# Patient Record
Sex: FEMALE | Race: WHITE | Employment: OTHER | URBAN - METROPOLITAN AREA
[De-identification: names, ages, dates, MRNs, and addresses within clinical notes are randomized per-mention and may not be internally consistent; named-entity substitution may affect disease eponyms.]

---

## 2017-04-25 ENCOUNTER — OFFICE VISIT (OUTPATIENT)
Dept: CARDIOLOGY CLINIC | Age: 77
End: 2017-04-25

## 2017-04-25 VITALS
OXYGEN SATURATION: 96 % | HEART RATE: 62 BPM | SYSTOLIC BLOOD PRESSURE: 132 MMHG | DIASTOLIC BLOOD PRESSURE: 86 MMHG | WEIGHT: 186.6 LBS | BODY MASS INDEX: 29.99 KG/M2 | HEIGHT: 66 IN

## 2017-04-25 DIAGNOSIS — I10 ESSENTIAL HYPERTENSION: Primary | ICD-10-CM

## 2017-04-25 NOTE — MR AVS SNAPSHOT
Visit Information Date & Time Provider Department Dept. Phone Encounter #  
 4/25/2017 11:20 AM Irvin Wood MD [facility] [phone] [id number] Upcoming Health Maintenance Date Due Pneumococcal 65+ High/Highest Risk (2 of 2 - PPSV23) 3/14/2013 MEDICARE YEARLY EXAM 6/2/2016 INFLUENZA AGE 9 TO ADULT 8/1/2016 GLAUCOMA SCREENING Q2Y 6/2/2017 DTaP/Tdap/Td series (2 - Td) 12/14/2017 Allergies as of 4/25/2017  Review Complete On: 4/25/2017 By: Taras Cheadle, LPN Severity Noted Reaction Type Reactions Betadine [Povidone-iodine]  04/25/2017    Rash Current Immunizations  Reviewed on 1/15/2015 Name Date Hep A Vaccine 1/1/2009 Hep B Vaccine 1/1/2009 Influenza High Dose Vaccine PF 9/30/2014 Pneumococcal Vaccine (Unspecified Type) 3/14/2008 Tdap 12/14/2007 Typhoid Vaccine 1/28/2009 Zoster Vaccine, Live 1/8/2008 Not reviewed this visit You Were Diagnosed With   
  
 Codes Comments Essential hypertension    -  Primary ICD-10-CM: I10 
ICD-9-CM: 401.9 Vitals BP Pulse Height(growth percentile) Weight(growth percentile) SpO2 BMI  
 132/86 (BP 1 Location: Left arm, BP Patient Position: Sitting) 62 5' 6\" (1.676 m) 186 lb 9.6 oz (84.6 kg) 96% 30.12 kg/m2 OB Status Smoking Status Postmenopausal Never Smoker BMI and BSA Data Body Mass Index Body Surface Area  
 30.12 kg/m 2 1.98 m 2 Preferred Pharmacy Pharmacy Name Phone [facility] [phone] Your Updated Medication List  
  
   
This list is accurate as of: 4/25/17 12:18 PM.  Always use your most recent med list.  
  
  
  
  
 albuterol 90 mcg/actuation inhaler Commonly known as:  PROVENTIL HFA, VENTOLIN HFA, PROAIR HFA Take 1 Puff by inhalation every four (4) hours as needed. aspirin delayed-release 81 mg tablet Take  by mouth daily. atorvastatin 20 mg tablet Commonly known as:  LIPITOR Take 1 Tab by mouth daily. carvedilol 3.125 mg tablet Commonly known as:  Drema Cali Take 1 Tab by mouth two (2) times daily (with meals). cholecalciferol (vitamin D3) 2,000 unit Tab Take 4,000 Units by mouth daily. clobetasol 0.05 % ointment Commonly known as:  Levorn Kirkland Apply 1 Each to affected area daily. EPINEPHrine 0.3 mg/0.3 mL injection Commonly known as:  EPIPEN  
0.3 mL by IntraMUSCular route once as needed for up to 1 dose. estradiol 0.01 % (0.1 mg/gram) vaginal cream  
Commonly known as:  ESTRACE Insert  into vagina. fluticasone 50 mcg/actuation nasal spray Commonly known as:  Annalisa Doug 2 Sprays by Both Nostrils route daily. fluticasone-salmeterol 250-50 mcg/dose diskus inhaler Commonly known as:  ADVAIR Take 1 Puff by inhalation two (2) times a day. Must rinse mouth after use  
  
 ibuprofen 600 mg tablet Commonly known as:  MOTRIN Take 200 mg by mouth as needed. multivitamin tablet Commonly known as:  ONE A DAY Take 1 Tab by mouth daily. OTHER Vi active daily  
  
 raloxifene 60 mg tablet Commonly known as:  EVISTA Take 1 Tab by mouth daily. SALINE NASAL MIST NA  
by Nasal route daily. SINGULAIR 10 mg tablet Generic drug:  montelukast  
Take 10 mg by mouth daily. SUDAFED PO Take  by mouth as needed. ZyrTEC 10 mg Cap Generic drug:  Cetirizine Take  by mouth daily. We Performed the Following AMB POC EKG ROUTINE W/ 12 LEADS, INTER & REP [75728 CPT(R)] Patient Instructions Follow up with Arnie Linton in 1 year Introducing [facility]! Dear Christin Erickson: Thank you for requesting a Socialite account. Our records indicate that you already have an active Socialite account. You can access your account anytime at [email or web address] Did you know that you can access your hospital and ER discharge instructions at any time in [employer]Suite? You can also review all of your test results from your hospital stay or ER visit. Additional Information If you have questions, please visit the Frequently Asked Questions section of the [employer] website at [email or web address] Remember, SpiderSuite is NOT to be used for urgent needs. For medical emergencies, dial 911. Now available from your iPhone and Android! Please provide this summary of care documentation to your next provider. Your primary care clinician is listed as Jordon Asif. If you have any questions after today's visit, please call [phone].

## 2017-04-25 NOTE — PROGRESS NOTES
HISTORY OF PRESENT ILLNESS  Jorge Smith is a 68 y.o. female. Patient with h/o HTN, HLD, Ca score of 0 in 2012, PVCs on ECG on 1/16 and echo on 1/16 with EF 55% mild MR. S/p LTKR in 1/16(seen by Dr. Altaf Mcmillan)  Also remote h/o breast ca s/p mastectomy in 1980 but no chemo or xrt  Here for follow up  Echo on 4/16:Ejection fraction was estimated to be 55 %. There were no  regional wall motion abnormalities. Wall thickness was at the upper limits  of normal.    Left atrium: The atrium was mildly dilated. Mitral valve: There was mild regurgitation. Aortic valve: Leaflets exhibited sclerosis. holter on 4/16: NSR  frequent pacs couplets triples and occasional non sustained at, frequent pvcs (0.65%)         Past Medical History   Diagnosis Date    Palpitations         resolved    Dyslipidemia         labs 2008 - chol 283, HDL 83, ,     HTN (hypertension)      Breast cancer ([facility] [address] 1980    Asthma         dr. Flores Connors allergist    Hyperlipidemia LDL goal < 130         for increased LDL particles, Dr. Stacie Senior             Past Surgical History   Procedure Laterality Date    Echo 2d adult   5/5/2008       normal, LVEF 60%    Stress test cardiolite   1/5/12       walked 7:31, no chest pain, normal MPI.  Ct heart w/o cont with calcium   1/2012       CAC score 0; calcified mediastinal lymph nodes consistent granulomatous disease    Hx urological   8/1/14       Urodynamics    Hx gyn        Hx hysterectomy Bilateral 03/19/2015        HPI  Doing great no complaints at all  Review of Systems   Respiratory: Negative. Cardiovascular: Negative. Physical Exam  Physical Exam   Blood pressure 132/86, pulse 62, height 5' 6\" (1.676 m), weight 84.6 kg (186 lb 9.6 oz), SpO2 96 %. Constitutional: She is oriented to person, place, and time. She appears well-developed and well-nourished. No distress. HENT: Head: Normocephalic. Eyes: No scleral icterus.  Neck: Normal range of motion. Neck supple. No JVD present. No tracheal deviation present. Cardiovascular: Normal rate, regular rhythm, normal heart sounds and intact distal pulses. Exam reveals no gallop and no friction rub. No murmur heard. Pulmonary/Chest: Effort normal and breath sounds normal. No stridor. No respiratory distress, wheezes or  rales. Abdominal: She exhibits no distension. Musculoskeletal: She exhibits no edema. Neurological: She is alert and oriented to person, place, and time. Coordination normal.   Skin: Skin is warm. No rash noted. Not diaphoretic. No erythema. Psychiatric:  Normal mood and affect. Behavior is normal.   Current Outpatient Prescriptions on File Prior to Visit   Medication Sig Dispense Refill    estradiol (ESTRACE) 0.01 % (0.1 mg/gram) vaginal cream Insert  into vagina.  OTHER Vi active daily      multivitamin (ONE A DAY) tablet Take 1 Tab by mouth daily.  cholecalciferol, vitamin D3, 2,000 unit tab Take 4,000 Units by mouth daily.  atorvastatin (LIPITOR) 20 mg tablet Take 1 Tab by mouth daily. 90 Tab 3    albuterol (PROVENTIL HFA, VENTOLIN HFA, PROAIR HFA) 90 mcg/actuation inhaler Take 1 Puff by inhalation every four (4) hours as needed. 2 Inhaler 3    fluticasone-salmeterol (ADVAIR) 250-50 mcg/dose diskus inhaler Take 1 Puff by inhalation two (2) times a day. Must rinse mouth after use 2 Inhaler 3    fluticasone (FLONASE) 50 mcg/actuation nasal spray 2 Sprays by Both Nostrils route daily. 2 Bottle 6    raloxifene (EVISTA) 60 mg tablet Take 1 Tab by mouth daily. 90 Tab 4    montelukast (SINGULAIR) 10 mg tablet Take 10 mg by mouth daily.  Cetirizine (ZYRTEC) 10 mg Cap Take  by mouth daily.  aspirin delayed-release 81 mg tablet Take  by mouth daily.  SODIUM CHLORIDE (SALINE NASAL MIST NA) by Nasal route daily.  PSEUDOEPHEDRINE HCL (SUDAFED PO) Take  by mouth as needed.       carvedilol (COREG) 3.125 mg tablet Take 1 Tab by mouth two (2) times daily (with meals). 180 Tab 3    EPINEPHrine (EPIPEN) 0.3 mg/0.3 mL (1:1,000) injection 0.3 mL by IntraMUSCular route once as needed for up to 1 dose. 0.3 mL 0    clobetasol (TEMOVATE) 0.05 % ointment Apply 1 Each to affected area daily. 0    ibuprofen (MOTRIN) 600 mg tablet Take 200 mg by mouth as needed. 0     No current facility-administered medications on file prior to visit. Lab Results   Component Value Date/Time    Sodium 139 04/20/2016 02:04 PM    Potassium 4.1 04/20/2016 02:04 PM    Chloride 98 04/20/2016 02:04 PM    CO2 25 04/20/2016 02:04 PM    Glucose 111 04/20/2016 02:04 PM    BUN 19 04/20/2016 02:04 PM    Creatinine 0.64 04/20/2016 02:04 PM    BUN/Creatinine ratio 30 04/20/2016 02:04 PM    GFR est  04/20/2016 02:04 PM    GFR est non-AA 88 04/20/2016 02:04 PM    Calcium 9.1 04/20/2016 02:04 PM     Lab Results   Component Value Date/Time    TSH 3.030 04/20/2016 02:04 PM       ASSESSMENT and PLAN  Palpitations: very seldom she tells me. She actually never started coreg and has continued hctz all along. Her ECG today shows NSR and no significant st t changes. Discussed results of previous echo and holter.  No additional interventions at this time  HTN:normal  encouraged to drink oj or eat banana to replete potassium  HLD: closely followed by her PCP  See her back in 1 year unless of occurring symptoms

## 2018-03-27 ENCOUNTER — HOSPITAL ENCOUNTER (INPATIENT)
Age: 78
LOS: 1 days | Discharge: HOME | DRG: 310 | End: 2018-03-28
Attending: STUDENT IN AN ORGANIZED HEALTH CARE EDUCATION/TRAINING PROGRAM | Admitting: FAMILY MEDICINE
Payer: MEDICARE

## 2018-03-27 DIAGNOSIS — I48.91 ATRIAL FIBRILLATION WITH RVR (HCC): Primary | ICD-10-CM

## 2018-03-27 DIAGNOSIS — R77.8 ELEVATED TROPONIN: ICD-10-CM

## 2018-03-27 PROBLEM — R42 DIZZINESS: Status: ACTIVE | Noted: 2018-03-27

## 2018-03-27 LAB
ALBUMIN SERPL-MCNC: 3.5 G/DL (ref 3.5–5)
ALBUMIN/GLOB SERPL: 1.1 {RATIO} (ref 1.1–2.2)
ALP SERPL-CCNC: 64 U/L (ref 45–117)
ALT SERPL-CCNC: 28 U/L (ref 12–78)
ANION GAP SERPL CALC-SCNC: 10 MMOL/L (ref 5–15)
AST SERPL-CCNC: 22 U/L (ref 15–37)
BASOPHILS # BLD: 0.1 K/UL (ref 0–0.1)
BASOPHILS NFR BLD: 1 % (ref 0–1)
BILIRUB SERPL-MCNC: 0.3 MG/DL (ref 0.2–1)
BUN SERPL-MCNC: 20 MG/DL (ref 6–20)
BUN/CREAT SERPL: 25 (ref 12–20)
CALCIUM SERPL-MCNC: 8.6 MG/DL (ref 8.5–10.1)
CHLORIDE SERPL-SCNC: 106 MMOL/L (ref 97–108)
CK SERPL-CCNC: 79 U/L (ref 26–192)
CO2 SERPL-SCNC: 26 MMOL/L (ref 21–32)
CREAT SERPL-MCNC: 0.8 MG/DL (ref 0.55–1.02)
DIFFERENTIAL METHOD BLD: NORMAL
EOSINOPHIL # BLD: 0.2 K/UL (ref 0–0.4)
EOSINOPHIL NFR BLD: 2 % (ref 0–7)
ERYTHROCYTE [DISTWIDTH] IN BLOOD BY AUTOMATED COUNT: 13.2 % (ref 11.5–14.5)
GLOBULIN SER CALC-MCNC: 3.1 G/DL (ref 2–4)
GLUCOSE SERPL-MCNC: 154 MG/DL (ref 65–100)
HCT VFR BLD AUTO: 38.5 % (ref 35–47)
HGB BLD-MCNC: 13.1 G/DL (ref 11.5–16)
IMM GRANULOCYTES # BLD: 0 K/UL (ref 0–0.04)
IMM GRANULOCYTES NFR BLD AUTO: 0 % (ref 0–0.5)
LYMPHOCYTES # BLD: 2.4 K/UL (ref 0.8–3.5)
LYMPHOCYTES NFR BLD: 25 % (ref 12–49)
MCH RBC QN AUTO: 32 PG (ref 26–34)
MCHC RBC AUTO-ENTMCNC: 34 G/DL (ref 30–36.5)
MCV RBC AUTO: 94.1 FL (ref 80–99)
MONOCYTES # BLD: 0.6 K/UL (ref 0–1)
MONOCYTES NFR BLD: 7 % (ref 5–13)
NEUTS SEG # BLD: 6.1 K/UL (ref 1.8–8)
NEUTS SEG NFR BLD: 65 % (ref 32–75)
NRBC # BLD: 0 K/UL (ref 0–0.01)
NRBC BLD-RTO: 0 PER 100 WBC
PLATELET # BLD AUTO: 195 K/UL (ref 150–400)
PMV BLD AUTO: 12 FL (ref 8.9–12.9)
POTASSIUM SERPL-SCNC: 3.4 MMOL/L (ref 3.5–5.1)
PROT SERPL-MCNC: 6.6 G/DL (ref 6.4–8.2)
RBC # BLD AUTO: 4.09 M/UL (ref 3.8–5.2)
SODIUM SERPL-SCNC: 142 MMOL/L (ref 136–145)
TROPONIN I SERPL-MCNC: 0.07 NG/ML
WBC # BLD AUTO: 9.4 K/UL (ref 3.6–11)

## 2018-03-27 PROCEDURE — 74011250636 HC RX REV CODE- 250/636: Performed by: STUDENT IN AN ORGANIZED HEALTH CARE EDUCATION/TRAINING PROGRAM

## 2018-03-27 PROCEDURE — 93005 ELECTROCARDIOGRAM TRACING: CPT

## 2018-03-27 PROCEDURE — 99285 EMERGENCY DEPT VISIT HI MDM: CPT

## 2018-03-27 PROCEDURE — 74011000250 HC RX REV CODE- 250: Performed by: STUDENT IN AN ORGANIZED HEALTH CARE EDUCATION/TRAINING PROGRAM

## 2018-03-27 PROCEDURE — 96374 THER/PROPH/DIAG INJ IV PUSH: CPT

## 2018-03-27 PROCEDURE — 80053 COMPREHEN METABOLIC PANEL: CPT | Performed by: STUDENT IN AN ORGANIZED HEALTH CARE EDUCATION/TRAINING PROGRAM

## 2018-03-27 PROCEDURE — 36415 COLL VENOUS BLD VENIPUNCTURE: CPT | Performed by: STUDENT IN AN ORGANIZED HEALTH CARE EDUCATION/TRAINING PROGRAM

## 2018-03-27 PROCEDURE — 85025 COMPLETE CBC W/AUTO DIFF WBC: CPT | Performed by: STUDENT IN AN ORGANIZED HEALTH CARE EDUCATION/TRAINING PROGRAM

## 2018-03-27 PROCEDURE — 96361 HYDRATE IV INFUSION ADD-ON: CPT

## 2018-03-27 PROCEDURE — 5A2204Z RESTORATION OF CARDIAC RHYTHM, SINGLE: ICD-10-PCS | Performed by: FAMILY MEDICINE

## 2018-03-27 PROCEDURE — 82550 ASSAY OF CK (CPK): CPT | Performed by: STUDENT IN AN ORGANIZED HEALTH CARE EDUCATION/TRAINING PROGRAM

## 2018-03-27 PROCEDURE — 65660000000 HC RM CCU STEPDOWN

## 2018-03-27 PROCEDURE — 74011250637 HC RX REV CODE- 250/637: Performed by: FAMILY MEDICINE

## 2018-03-27 PROCEDURE — 84484 ASSAY OF TROPONIN QUANT: CPT | Performed by: STUDENT IN AN ORGANIZED HEALTH CARE EDUCATION/TRAINING PROGRAM

## 2018-03-27 RX ORDER — ATORVASTATIN CALCIUM 20 MG/1
20 TABLET, FILM COATED ORAL DAILY
Status: DISCONTINUED | OUTPATIENT
Start: 2018-03-28 | End: 2018-03-28 | Stop reason: HOSPADM

## 2018-03-27 RX ORDER — POTASSIUM CHLORIDE 750 MG/1
40 TABLET, FILM COATED, EXTENDED RELEASE ORAL
Status: COMPLETED | OUTPATIENT
Start: 2018-03-27 | End: 2018-03-27

## 2018-03-27 RX ORDER — MELATONIN
4000 DAILY
Status: DISCONTINUED | OUTPATIENT
Start: 2018-03-28 | End: 2018-03-28 | Stop reason: HOSPADM

## 2018-03-27 RX ORDER — RALOXIFENE HYDROCHLORIDE 60 MG/1
60 TABLET, FILM COATED ORAL DAILY
Status: DISCONTINUED | OUTPATIENT
Start: 2018-03-28 | End: 2018-03-28 | Stop reason: HOSPADM

## 2018-03-27 RX ORDER — CETIRIZINE HCL 10 MG
10 TABLET ORAL DAILY
Status: DISCONTINUED | OUTPATIENT
Start: 2018-03-28 | End: 2018-03-28 | Stop reason: HOSPADM

## 2018-03-27 RX ORDER — FLUTICASONE PROPIONATE AND SALMETEROL 250; 50 UG/1; UG/1
1 POWDER RESPIRATORY (INHALATION)
Status: ON HOLD | COMMUNITY
End: 2019-05-10

## 2018-03-27 RX ORDER — ASPIRIN 81 MG/1
81 TABLET ORAL DAILY
Status: DISCONTINUED | OUTPATIENT
Start: 2018-03-28 | End: 2018-03-28 | Stop reason: HOSPADM

## 2018-03-27 RX ORDER — LABETALOL HYDROCHLORIDE 5 MG/ML
20 INJECTION, SOLUTION INTRAVENOUS ONCE
Status: COMPLETED | OUTPATIENT
Start: 2018-03-27 | End: 2018-03-27

## 2018-03-27 RX ORDER — SODIUM CHLORIDE 0.9 % (FLUSH) 0.9 %
5-10 SYRINGE (ML) INJECTION EVERY 8 HOURS
Status: DISCONTINUED | OUTPATIENT
Start: 2018-03-27 | End: 2018-03-28 | Stop reason: HOSPADM

## 2018-03-27 RX ORDER — HYDROCHLOROTHIAZIDE 12.5 MG/1
12.5 TABLET ORAL DAILY
COMMUNITY
End: 2018-03-28

## 2018-03-27 RX ORDER — MONTELUKAST SODIUM 10 MG/1
10 TABLET ORAL DAILY
Status: DISCONTINUED | OUTPATIENT
Start: 2018-03-28 | End: 2018-03-28 | Stop reason: HOSPADM

## 2018-03-27 RX ORDER — ESTRADIOL 0.1 MG/G
1 CREAM VAGINAL
COMMUNITY

## 2018-03-27 RX ORDER — HYDROCHLOROTHIAZIDE 25 MG/1
12.5 TABLET ORAL DAILY
Status: DISCONTINUED | OUTPATIENT
Start: 2018-03-28 | End: 2018-03-28

## 2018-03-27 RX ORDER — SODIUM CHLORIDE 0.9 % (FLUSH) 0.9 %
5-10 SYRINGE (ML) INJECTION AS NEEDED
Status: DISCONTINUED | OUTPATIENT
Start: 2018-03-27 | End: 2018-03-28 | Stop reason: HOSPADM

## 2018-03-27 RX ORDER — CETIRIZINE HCL 10 MG
10 TABLET ORAL EVERY EVENING
COMMUNITY

## 2018-03-27 RX ADMIN — LABETALOL HYDROCHLORIDE 20 MG: 5 INJECTION INTRAVENOUS at 22:33

## 2018-03-27 RX ADMIN — SODIUM CHLORIDE 1000 ML: 900 INJECTION, SOLUTION INTRAVENOUS at 22:30

## 2018-03-27 RX ADMIN — POTASSIUM CHLORIDE 40 MEQ: 750 TABLET, EXTENDED RELEASE ORAL at 23:44

## 2018-03-27 NOTE — IP AVS SNAPSHOT
[address] 
[phone] Patient: Jacqueline Ambrose MRN: [id number] About your hospitalization You were admitted on:  March 27, 2018 You last received care in the:  [facility] 4 SURG/BARIATRICS You were discharged on:  March 28, 2018 Why you were hospitalized Your primary diagnosis was:  A-Fib (Hcc) Your diagnoses also included:  Dizziness, Encounter For Cardioversion Procedure Follow-up Information Follow up With Details Comments Contact Info Sergei Carrera MD Go on 4/10/2018 2 pm for follow up visit- start Eliquis and toprol and [address] [facility] [address] [facility] 
[phone] Diogenes Villasenor MD Schedule an appointment as soon as possible for a visit on 4/3/2018 As needed  - [facility] hospital f/u appointment on Tuesday, 4/3/18 @ 10:00 a.m. 17[address] [facility] [address] [facility] [facility] 
[phone] Your Scheduled Appointments Tuesday April 10, 2018  2:00 PM EDT  
ESTABLISHED PATIENT with Sergei Carrera MD  
[facility] ([address] [place] [address]) [address]  [address] [facility]  
[phone] Wednesday April 25, 2018 11:20 AM EDT  
ESTABLISHED PATIENT with Gillian Singh MD  
[facility] ([address] [place] [address]) [address] [facility]  
[phone] Discharge Orders None A check kamran indicates which time of day the medication should be taken. My Medications START taking these medications Instructions Each Dose to Equal  
 Morning Noon Evening Bedtime  
 apixaban 5 mg tablet Commonly known as:  Chante Grad Your last dose was: Your next dose is: Take 1 Tab by mouth two (2) times a day. 5 mg  
    
   
   
   
  
 flecainide 100 mg tablet Commonly known as:  TAMBOCOR Your last dose was: Your next dose is: Take 1 Tab by mouth every twelve (12) hours. 100 mg  
    
   
   
   
  
 metoprolol succinate 50 mg XL tablet Commonly known as:  TOPROL-XL Start taking on:  3/29/2018 Your last dose was: Your next dose is: Take 1 Tab by mouth daily. 50 mg CONTINUE taking these medications Instructions Each Dose to Equal  
 Morning Noon Evening Bedtime ADVAIR DISKUS 250-50 mcg/dose diskus inhaler Generic drug:  fluticasone-salmeterol Your last dose was: Your next dose is: Take 1 Puff by inhalation as needed. 1 Puff  
    
   
   
   
  
 albuterol 90 mcg/actuation inhaler Commonly known as:  PROVENTIL HFA, VENTOLIN HFA, PROAIR HFA Your last dose was: Your next dose is: Take 1 Puff by inhalation every four (4) hours as needed. 1 Puff  
    
   
   
   
  
 aspirin delayed-release 81 mg tablet Your last dose was: Your next dose is: Take  by mouth daily. atorvastatin 20 mg tablet Commonly known as:  LIPITOR Your last dose was: Your next dose is: Take 1 Tab by mouth daily. 20 mg  
    
   
   
   
  
 cetirizine 10 mg tablet Commonly known as:  ZYRTEC Your last dose was: Your next dose is: Take 10 mg by mouth daily. 10 mg  
    
   
   
   
  
 cholecalciferol (vitamin D3) 2,000 unit Tab Your last dose was: Your next dose is: Take 4,000 Units by mouth daily. 4000 Units EPINEPHrine 0.3 mg/0.3 mL injection Commonly known as:  Meredith Son Your last dose was: Your next dose is: 0.3 mL by IntraMUSCular route once as needed for up to 1 dose. 0.3 mg  
    
   
   
   
  
 * ESTRACE 0.01 % (0.1 mg/gram) vaginal cream  
Generic drug:  estradiol Your last dose was: Your next dose is: Insert 2 g into vagina every Monday and Thursday. 2 g  
    
   
   
   
  
 * estradiol 0.01 % (0.1 mg/gram) vaginal cream  
Commonly known as:  ESTRACE Your last dose was: Your next dose is: Insert  into vagina. fluticasone 50 mcg/actuation nasal spray Commonly known as:  Bhaveshjese Soliz Your last dose was: Your next dose is: 2 Sprays by Both Nostrils route daily. 2 Spray  
    
   
   
   
  
 multivitamin tablet Commonly known as:  ONE A DAY Your last dose was: Your next dose is: Take 1 Tab by mouth daily. 1 Tab OTHER Your last dose was: Your next dose is:    
   
   
 Vi active daily  
     
   
   
   
  
 raloxifene 60 mg tablet Commonly known as:  EVISTA Your last dose was: Your next dose is: Take 1 Tab by mouth daily. 60 mg  
    
   
   
   
  
 SINGULAIR 10 mg tablet Generic drug:  montelukast  
   
Your last dose was: Your next dose is: Take 10 mg by mouth daily. 10 mg  
    
   
   
   
  
 * Notice: This list has 2 medication(s) that are the same as other medications prescribed for you. Read the directions carefully, and ask your doctor or other care provider to review them with you. STOP taking these medications   
 hydroCHLOROthiazide 12.5 mg tablet Commonly known as:  HYDRODIURIL  
   
  
 SUDAFED PO Where to Get Your Medications Information on where to get these meds will be given to you by the nurse or doctor. ! Ask your nurse or doctor about these medications  
  apixaban 5 mg tablet  
 flecainide 100 mg tablet  
 metoprolol succinate 50 mg XL tablet Discharge Instructions Discharge Instructions PATIENT ID: Rikki Zhao MRN: [id number] YOB: 1940 DATE OF ADMISSION: 3/27/2018  9:27 PM   
DATE OF DISCHARGE: 3/28/2018 PRIMARY CARE PROVIDER: Stella Segundo MD  
 
 
DISCHARGING PHYSICIAN: Estela Albert NP To contact this individual call [phone] and ask the  to page. If unavailable ask to be transferred the Adult Hospitalist Department. DISCHARGE DIAGNOSES Atrial Fibrillation CONSULTATIONS: IP CONSULT TO CARDIOLOGY 
IP CONSULT TO HOSPITALIST 
 
PROCEDURES/SURGERIES: * No surgery found * PENDING TEST RESULTS:  
At the time of discharge the following test results are still pending: na 
 
FOLLOW UP APPOINTMENTS:  
Follow-up Information Follow up With Details Comments Contact Info Yulisa Garcia MD Go on 4/10/2018 2 pm for follow up visit- start Eliquis and toprol and 2450 [facility] [address] [facility] 
[phone] Stella Segundo MD Schedule an appointment as soon as possible for a visit As needed 17[address] [facility] [address] [facility] 
[phone] ADDITIONAL CARE RECOMMENDATIONS: We have prescribed you Toprol XL and Flecainide which control atrial fibrillation. We have prescribed you a blood thinner called Eliquis to prevent complications from atrial fibrillation. Please follow up with Dr. Lilian Urbina as scheduled for you in April. Stop your HCTZ since you are starting these other medications. Discuss this with Dr. Lilian Urbina. Stop your Sudafed as these can cause you to have fast heart rates DIET: resume ACTIVITY: resume WOUND CARE: na 
 
EQUIPMENT needed: prescriptions DISCHARGE MEDICATIONS: 
 See Medication Reconciliation Form · It is important that you take the medication exactly as they are prescribed. · Keep your medication in the bottles provided by the pharmacist and keep a list of the medication names, dosages, and times to be taken in your wallet. · Do not take other medications without consulting your doctor. NOTIFY YOUR PHYSICIAN FOR ANY OF THE FOLLOWING:  
Fever over 101 degrees for 24 hours. Chest pain, shortness of breath, fever, chills, nausea, vomiting, diarrhea, change in mentation, falling, weakness, bleeding. Severe pain or pain not relieved by medications. Or, any other signs or symptoms that you may have questions about. DISPOSITION: 
  Home With: 
 OT  PT  HH  RN  
  
 SNF/Inpatient Rehab/LTAC  
x Independent/assisted living Hospice Other: CDMP Checked:  
Yes x Signed:  
Vincenzo Fernandez NP 
3/28/2018 
3:03 PM 
 
  
Atrial Fibrillation: Care Instructions Your Care Instructions Atrial fibrillation is an irregular and often fast heartbeat. Treating this condition is important for several reasons. It can cause blood clots, which can travel from your heart to your brain and cause a stroke. If you have a fast heartbeat, you may feel lightheaded, dizzy, and weak. An irregular heartbeat can also increase your risk for heart failure. Atrial fibrillation is often the result of another heart condition, such as high blood pressure or coronary artery disease. Making changes to improve your heart condition will help you stay healthy and active. Follow-up care is a key part of your treatment and safety. Be sure to make and go to all appointments, and call your doctor if you are having problems. It's also a good idea to know your test results and keep a list of the medicines you take. How can you care for yourself at home? Medicines ? · Take your medicines exactly as prescribed. Call your doctor if you think you are having a problem with your medicine. You will get more details on the specific medicines your doctor prescribes. ? · If your doctor has given you a blood thinner to prevent a stroke, be sure you get instructions about how to take your medicine safely. Blood thinners can cause serious bleeding problems.   
? · Do not take any vitamins, over-the-counter drugs, or herbal products without talking to your doctor first. ? Lifestyle changes ? · Do not smoke. Smoking can increase your chance of a stroke and heart attack. If you need help quitting, talk to your doctor about stop-smoking programs and medicines. These can increase your chances of quitting for good. ? · Eat a heart-healthy diet. ? · Stay at a healthy weight. Lose weight if you need to.  
? · Limit alcohol to 2 drinks a day for men and 1 drink a day for women. Too much alcohol can cause health problems. ? · Avoid colds and flu. Get a pneumococcal vaccine shot. If you have had one before, ask your doctor whether you need another dose. Get a flu shot every year. If you must be around people with colds or flu, wash your hands often. Activity ? · If your doctor recommends it, get more exercise. Walking is a good choice. Bit by bit, increase the amount you walk every day. Try for at least 30 minutes on most days of the week. You also may want to swim, bike, or do other activities. Your doctor may suggest that you join a cardiac rehabilitation program so that you can have help increasing your physical activity safely. ? · Start light exercise if your doctor says it is okay. Even a small amount will help you get stronger, have more energy, and manage stress. Walking is an easy way to get exercise. Start out by walking a little more than you did in the hospital. Gradually increase the amount you walk. ? · When you exercise, watch for signs that your heart is working too hard. You are pushing too hard if you cannot talk while you are exercising. If you become short of breath or dizzy or have chest pain, sit down and rest immediately. ? · Check your pulse regularly. Place two fingers on the artery at the palm side of your wrist, in line with your thumb. If your heartbeat seems uneven or fast, talk to your doctor. When should you call for help? Call 911 anytime you think you may need emergency care. For example, call if: ? · You have symptoms of a heart attack. These may include: ¨ Chest pain or pressure, or a strange feeling in the chest. 
¨ Sweating. ¨ Shortness of breath. ¨ Nausea or vomiting. ¨ Pain, pressure, or a strange feeling in the back, neck, jaw, or upper belly or in one or both shoulders or arms. ¨ Lightheadedness or sudden weakness. ¨ A fast or irregular heartbeat. After you call 911, the  may tell you to chew 1 adult-strength or 2 to 4 low-dose aspirin. Wait for an ambulance. Do not try to drive yourself. ? · You have symptoms of a stroke. These may include: 
¨ Sudden numbness, tingling, weakness, or loss of movement in your face, arm, or leg, especially on only one side of your body. ¨ Sudden vision changes. ¨ Sudden trouble speaking. ¨ Sudden confusion or trouble understanding simple statements. ¨ Sudden problems with walking or balance. ¨ A sudden, severe headache that is different from past headaches. ? · You passed out (lost consciousness). ?Call your doctor now or seek immediate medical care if: 
? · You have new or increased shortness of breath. ? · You feel dizzy or lightheaded, or you feel like you may faint. ? · Your heart rate becomes irregular. ? · You can feel your heart flutter in your chest or skip heartbeats. Tell your doctor if these symptoms are new or worse. ? Watch closely for changes in your health, and be sure to contact your doctor if you have any problems. Where can you learn more? Go to http://mary-ginger.info/. Enter U020 in the search box to learn more about \"Atrial Fibrillation: Care Instructions. \" Current as of: September 21, 2016 Content Version: 11.4 © 3344-6562 City Voice. Care instructions adapted under license by Woopie (which disclaims liability or warranty for this information).  If you have questions about a medical condition or this instruction, always ask your healthcare professional. [address] any warranty or liability for your use of this information. Learning About Cardioversion What is cardioversion? Cardioversion helps your heart return to a normal rhythm. It treats problems like atrial fibrillation. It is also sometimes used in emergencies. It can correct a fast heartbeat that causes low blood pressure, chest pain, or heart failure. There are two types: · The electrical type uses an electric current. The current enters your body through patches on your chest or back. · The chemical type uses medicines. The medicine is usually put into your arm through a tube called an IV. How is cardioversion done? Your doctor may ask you to take medicines before the treatment. These help prevent blood clots. Your doctor will watch you closely to make sure that there are no problems. Electrical cardioversion The electrical procedure is done in a hospital. You will get medicine to help you relax and control the pain. Your doctor will put patches on your chest or back. The patches send an electric current to your heart. This resets your heart rhythm. The electrical part takes about 5 minutes. But you will probably be in the hospital for 1 to 2 hours. You will need to recover from the effects of the sedative medicine. Chemical cardioversion The chemical procedure is most often done in a hospital. In most cases, the medicine is put into your arm through a tube called an IV. But you may get medicines to take by mouth. You may feel a quick sting or pinch when the IV starts. The procedure usually takes about 4 to 8 hours. What can you expect after cardioversion? · You can usually go home the same day. You will need someone to drive you home. · Your doctor may have you take medicines daily. These help your heart beat normally and prevent blood clots. · After electrical cardioversion, you may have redness where the patches were. This looks and feels like a sunburn. · Abnormal heart rhythms sometimes come back after cardioversion. Follow-up care is a key part of your treatment and safety. Be sure to make and go to all appointments, and call your doctor if you are having problems. It's also a good idea to know your test results and keep a list of the medicines you take. Where can you learn more? Go to http://mary-ginger.info/. Enter H046 in the search box to learn more about \"Learning About Cardioversion. \" Current as of: September 21, 2016 Content Version: 11.4 © 1249-6636 Remind. Care instructions adapted under license by Pix4D (which disclaims liability or warranty for this information). If you have questions about a medical condition or this instruction, always ask your healthcare professional. [place] [address] any warranty or liability for your use of this information. Follow up 4/10/18 at 2:00 pm with Dr Lucho Noble, post cardioversion  
eliquis $ 10 copay card given  toprol and flecainide Stop hydrochlorothiazide Kamelio Announcement We are excited to announce that we are making your provider's discharge notes available to you in Kamelio. You will see these notes when they are completed and signed by the physician that discharged you from your recent hospital stay. If you have any questions or concerns about any information you see in homedeco2ut, please call the Health Information Department where you were seen or reach out to your Primary Care Provider for more information about your plan of care. Introducing [facility]! Dear Kusum Hopkins: Thank you for requesting a Kamelio account. Our records indicate that you already have an active Kamelio account. You can access your account anytime at [email or web address]/[email or web address] Labs Did you know that you can access your hospital and ER discharge instructions at any time in Anova Culinary? You can also review all of your test results from your hospital stay or ER visit. Additional Information If you have questions, please visit the Frequently Asked Questions section of the [employer] website at [email or web address] Remember, Anova [employer] is NOT to be used for urgent needs. For medical emergencies, dial 911. Now available from your iPhone and Android! Introducing Jasen Perez As a [facility] patient, I wanted to make you aware of our electronic visit tool called Jasen Perez. Evotec/Textura allows you to connect within minutes with a medical provider 24 hours a day, seven days a week via a mobile device or tablet or logging into a secure website from your computer. You can access Jasen Perez from anywhere in the [place]. A virtual visit might be right for you when you have a simple condition and feel like you just dont want to get out of bed, or cant get away from work for an appointment, when your regular [employer] provider is not available (evenings, weekends or holidays), or when youre out of town and need minor care. Electronic visits cost only $49 and if the Evotec/Textura provider determines a prescription is needed to treat your condition, one can be electronically transmitted to a nearby pharmacy*. Please take a moment to enroll today if you have not already done so. The enrollment process is free and takes just a few minutes. To enroll, please download the Meitu dayron to your tablet or phone, or visit [email or web address] to enroll on your computer. And, as an [address] [place] [address] patient with a PlaceIQ account, the results of your visits will be scanned into your electronic medical record and your primary care provider will be able to view the scanned results. We urge you to continue to see your regular [employer] provider for your ongoing medical care. And while your primary care provider may not be the one available when you seek a Carbon Salonany[employer] virtual visit, the peace of mind you get from getting a real diagnosis real time can be priceless. For more information on Studio [employer]ale, view our Frequently Asked Questions (FAQs) at [email or web address]. Sincerely, 
 
Jose Kaplan MD 
Chief Medical Officer [address] *:  certain medications cannot be prescribed via Studio Whale Unresulted Labs-Please follow up with your PCP about these lab tests Order Current Status EKG, 12 LEAD, INITIAL Preliminary result EKG, 12 LEAD, INITIAL Preliminary result Providers Seen During Your Hospitalization Provider Specialty Primary office phone Murray Reese MD Emergency Medicine [phone] Michael Comer MD Family Practice [phone] Monie Mendoza MD Internal Medicine [phone] Your Primary Care Physician (PCP) Primary Care Physician Office Phone Office Fax Ralf Hinton [phone] You are allergic to the following Allergen Reactions Betadine (Povidone-Iodine) Rash Recent Documentation Height Weight BMI OB Status Smoking Status 1.676 m 87.4 kg 31.1 kg/m2 Postmenopausal Never Smoker Emergency Contacts Name Discharge Info Relation Home Work Mobile Virginia Ragsdale [1] Child [2] [id number] Patient Belongings The following personal items are in your possession at time of discharge: 
  Dental Appliances: None  Visual Aid: Glasses, With patient      Home Medications: None   Jewelry: Watch  Clothing: At bedside    Other Valuables: Cell Phone, Eyeglasses Discharge Instructions Attachments/References ATRIAL FIBRILLATION (ENGLISH) CARDIOVERSION: GENERAL INFO (ENGLISH) MEFS - FLECAINIDE (TAMBOCOR) - (BY MOUTH) (ENGLISH) MEFS - APIXABAN (ELIQUIS) - (BY MOUTH) (ENGLISH) MEFS - METOPROLOL (LOPRESSOR, TOPROL XL) - (BY MOUTH) (ENGLISH) Patient Handouts Atrial Fibrillation: Care Instructions Your Care Instructions Atrial fibrillation is an irregular and often fast heartbeat. Treating this condition is important for several reasons. It can cause blood clots, which can travel from your heart to your brain and cause a stroke. If you have a fast heartbeat, you may feel lightheaded, dizzy, and weak. An irregular heartbeat can also increase your risk for heart failure. Atrial fibrillation is often the result of another heart condition, such as high blood pressure or coronary artery disease. Making changes to improve your heart condition will help you stay healthy and active. Follow-up care is a key part of your treatment and safety. Be sure to make and go to all appointments, and call your doctor if you are having problems. It's also a good idea to know your test results and keep a list of the medicines you take. How can you care for yourself at home? Medicines ? · Take your medicines exactly as prescribed. Call your doctor if you think you are having a problem with your medicine. You will get more details on the specific medicines your doctor prescribes. ? · If your doctor has given you a blood thinner to prevent a stroke, be sure you get instructions about how to take your medicine safely. Blood thinners can cause serious bleeding problems. ? · Do not take any vitamins, over-the-counter drugs, or herbal products without talking to your doctor first. ? Lifestyle changes ? · Do not smoke. Smoking can increase your chance of a stroke and heart attack. If you need help quitting, talk to your doctor about stop-smoking programs and medicines. These can increase your chances of quitting for good. ? · Eat a heart-healthy diet. ? · Stay at a healthy weight. Lose weight if you need to.  
? · Limit alcohol to 2 drinks a day for men and 1 drink a day for women. Too much alcohol can cause health problems. ? · Avoid colds and flu. Get a pneumococcal vaccine shot. If you have had one before, ask your doctor whether you need another dose. Get a flu shot every year. If you must be around people with colds or flu, wash your hands often. Activity ? · If your doctor recommends it, get more exercise. Walking is a good choice. Bit by bit, increase the amount you walk every day. Try for at least 30 minutes on most days of the week. You also may want to swim, bike, or do other activities. Your doctor may suggest that you join a cardiac rehabilitation program so that you can have help increasing your physical activity safely. ? · Start light exercise if your doctor says it is okay. Even a small amount will help you get stronger, have more energy, and manage stress. Walking is an easy way to get exercise. Start out by walking a little more than you did in the hospital. Gradually increase the amount you walk. ? · When you exercise, watch for signs that your heart is working too hard. You are pushing too hard if you cannot talk while you are exercising. If you become short of breath or dizzy or have chest pain, sit down and rest immediately. ? · Check your pulse regularly. Place two fingers on the artery at the palm side of your wrist, in line with your thumb. If your heartbeat seems uneven or fast, talk to your doctor. When should you call for help? Call 911 anytime you think you may need emergency care. For example, call if: 
? · You have symptoms of a heart attack. These may include: ¨ Chest pain or pressure, or a strange feeling in the chest. 
¨ Sweating. ¨ Shortness of breath. ¨ Nausea or vomiting. ¨ Pain, pressure, or a strange feeling in the back, neck, jaw, or upper belly or in one or both shoulders or arms. ¨ Lightheadedness or sudden weakness. ¨ A fast or irregular heartbeat. After you call 911, the  may tell you to chew 1 adult-strength or 2 to 4 low-dose aspirin. Wait for an ambulance. Do not try to drive yourself. ? · You have symptoms of a stroke. These may include: 
¨ Sudden numbness, tingling, weakness, or loss of movement in your face, arm, or leg, especially on only one side of your body. ¨ Sudden vision changes. ¨ Sudden trouble speaking. ¨ Sudden confusion or trouble understanding simple statements. ¨ Sudden problems with walking or balance. ¨ A sudden, severe headache that is different from past headaches. ? · You passed out (lost consciousness). ?Call your doctor now or seek immediate medical care if: 
? · You have new or increased shortness of breath. ? · You feel dizzy or lightheaded, or you feel like you may faint. ? · Your heart rate becomes irregular. ? · You can feel your heart flutter in your chest or skip heartbeats. Tell your doctor if these symptoms are new or worse. ? Watch closely for changes in your health, and be sure to contact your doctor if you have any problems. Where can you learn more? Go to http://mary-ginger.info/. Enter U020 in the search box to learn more about \"Atrial Fibrillation: Care Instructions. \" Current as of: September 21, 2016 Content Version: 11.4 © 3976-6671 [employer]. Care instructions adapted under license by [employer] (which disclaims liability or warranty for this information). If you have questions about a medical condition or this instruction, always ask your healthcare professional. [address] any warranty or liability for your use of this information. Learning About Cardioversion What is cardioversion? Cardioversion helps your heart return to a normal rhythm. It treats problems like atrial fibrillation. It is also sometimes used in emergencies. It can correct a fast heartbeat that causes low blood pressure, chest pain, or heart failure. There are two types: · The electrical type uses an electric current. The current enters your body through patches on your chest or back. · The chemical type uses medicines. The medicine is usually put into your arm through a tube called an IV. How is cardioversion done? Your doctor may ask you to take medicines before the treatment. These help prevent blood clots. Your doctor will watch you closely to make sure that there are no problems. Electrical cardioversion The electrical procedure is done in a hospital. You will get medicine to help you relax and control the pain. Your doctor will put patches on your chest or back. The patches send an electric current to your heart. This resets your heart rhythm. The electrical part takes about 5 minutes. But you will probably be in the hospital for 1 to 2 hours. You will need to recover from the effects of the sedative medicine. Chemical cardioversion The chemical procedure is most often done in a hospital. In most cases, the medicine is put into your arm through a tube called an IV. But you may get medicines to take by mouth. You may feel a quick sting or pinch when the IV starts. The procedure usually takes about 4 to 8 hours. What can you expect after cardioversion? · You can usually go home the same day. You will need someone to drive you home. · Your doctor may have you take medicines daily. These help your heart beat normally and prevent blood clots. · After electrical cardioversion, you may have redness where the patches were. This looks and feels like a sunburn. · Abnormal heart rhythms sometimes come back after cardioversion. Follow-up care is a key part of your treatment and safety.  Be sure to make and go to all appointments, and call your doctor if you are having problems. It's also a good idea to know your test results and keep a list of the medicines you take. Where can you learn more? Go to http://mary-ginger.info/. Enter U689 in the search box to learn more about \"Learning About Cardioversion. \" Current as of: September 21, 2016 Content Version: 11.4 © 1745-0710 [employer]. Care instructions adapted under license by [employer] (which disclaims liability or warranty for this information). If you have questions about a medical condition or this instruction, always ask your healthcare professional. [place] [address] any warranty or liability for your use of this information. Flecainide (Tambocor) - (By mouth) Why this medicine is used:  
Used to prevent or treat arrhythmias (irregular heartbeats). Contact a nurse or doctor right away if you have: 
· Fast, pounding, or uneven heartbeat · Shortness of breath, trouble breathing · Lightheadedness or fainting · Swelling in your hands, ankles, feet Common side effects: · Vision changes · Dizziness, tiredness, weakness · Diarrhea, constipation, nausea, stomach pain, cramps · Headache © 2017 [address] Information is for End User's use only and may not be sold, redistributed or otherwise used for commercial purposes. Apixaban (Eliquis) - (By mouth) Why this medicine is used:  
Treats and prevents blood clots. Contact a nurse or doctor right away if you have: 
· Sudden or severe headache · Back pain, numbness, tingling, weakness in your legs or feet · Loss of bladder or bowel control · Bloody vomit or vomit that looks like coffee grounds; bloody or black, tarry stools · Bleeding that does not stop or bruises that do not heal  
 
Common side effects: · Minor bleeding or bruising © 2017 [address] Information is for End User's use only and may not be sold, redistributed or otherwise used for commercial purposes. Metoprolol (Lopressor, Toprol XL) - (By mouth) Why this medicine is used:  
Treats high blood pressure, chest pain, and heart failure. Contact a nurse or doctor right away if you have: · Lightheadedness, dizziness, fainting · Rapid weight gain; swelling in your hands, ankles, or feet Common side effects: · Mild dizziness · Tiredness © 2017 [address] Information is for End User's use only and may not be sold, redistributed or otherwise used for commercial purposes. Please provide this summary of care documentation to your next provider. Signatures-by signing, you are acknowledging that this After Visit Summary has been reviewed with you and you have received a copy. Patient Signature:  ____________________________________________________________ Date:  ____________________________________________________________  
  
Jyoti Parks Provider Signature:  ____________________________________________________________ Date:  ____________________________________________________________

## 2018-03-27 NOTE — IP AVS SNAPSHOT
[address] 
[phone] Patient: Earl Pod MRN: [id number] HKL:[id number] A check kamran indicates which time of day the medication should be taken. My Medications START taking these medications Instructions Each Dose to Equal  
 Morning Noon Evening Bedtime  
 apixaban 5 mg tablet Commonly known as:  Neda Bonilla Your last dose was: Your next dose is: Take 1 Tab by mouth two (2) times a day. 5 mg  
    
   
   
   
  
 flecainide 100 mg tablet Commonly known as:  TAMBOCOR Your last dose was: Your next dose is: Take 1 Tab by mouth every twelve (12) hours. 100 mg  
    
   
   
   
  
 metoprolol succinate 50 mg XL tablet Commonly known as:  TOPROL-XL Start taking on:  3/29/2018 Your last dose was: Your next dose is: Take 1 Tab by mouth daily. 50 mg CONTINUE taking these medications Instructions Each Dose to Equal  
 Morning Noon Evening Bedtime ADVAIR DISKUS 250-50 mcg/dose diskus inhaler Generic drug:  fluticasone-salmeterol Your last dose was: Your next dose is: Take 1 Puff by inhalation as needed. 1 Puff  
    
   
   
   
  
 albuterol 90 mcg/actuation inhaler Commonly known as:  PROVENTIL HFA, VENTOLIN HFA, PROAIR HFA Your last dose was: Your next dose is: Take 1 Puff by inhalation every four (4) hours as needed. 1 Puff  
    
   
   
   
  
 aspirin delayed-release 81 mg tablet Your last dose was: Your next dose is: Take  by mouth daily. atorvastatin 20 mg tablet Commonly known as:  LIPITOR Your last dose was: Your next dose is: Take 1 Tab by mouth daily. 20 mg  
    
   
   
   
  
 cetirizine 10 mg tablet Commonly known as:  ZYRTEC  
 Your last dose was: Your next dose is: Take 10 mg by mouth daily. 10 mg  
    
   
   
   
  
 cholecalciferol (vitamin D3) 2,000 unit Tab Your last dose was: Your next dose is: Take 4,000 Units by mouth daily. 4000 Units EPINEPHrine 0.3 mg/0.3 mL injection Commonly known as:  Amol Walton Your last dose was: Your next dose is: 0.3 mL by IntraMUSCular route once as needed for up to 1 dose. 0.3 mg  
    
   
   
   
  
 * ESTRACE 0.01 % (0.1 mg/gram) vaginal cream  
Generic drug:  estradiol Your last dose was: Your next dose is: Insert 2 g into vagina every Monday and Thursday. 2 g  
    
   
   
   
  
 * estradiol 0.01 % (0.1 mg/gram) vaginal cream  
Commonly known as:  ESTRACE Your last dose was: Your next dose is: Insert  into vagina. fluticasone 50 mcg/actuation nasal spray Commonly known as:  Marlaine Romance Your last dose was: Your next dose is: 2 Sprays by Both Nostrils route daily. 2 Spray  
    
   
   
   
  
 multivitamin tablet Commonly known as:  ONE A DAY Your last dose was: Your next dose is: Take 1 Tab by mouth daily. 1 Tab OTHER Your last dose was: Your next dose is:    
   
   
 Vi active daily  
     
   
   
   
  
 raloxifene 60 mg tablet Commonly known as:  EVISTA Your last dose was: Your next dose is: Take 1 Tab by mouth daily. 60 mg  
    
   
   
   
  
 SINGULAIR 10 mg tablet Generic drug:  montelukast  
   
Your last dose was: Your next dose is: Take 10 mg by mouth daily. 10 mg  
    
   
   
   
  
 * Notice: This list has 2 medication(s) that are the same as other medications prescribed for you.  Read the directions carefully, and ask your doctor or other care provider to review them with you. STOP taking these medications   
 hydroCHLOROthiazide 12.5 mg tablet Commonly known as:  CESAR KUNZ Where to Get Your Medications Information on where to get these meds will be given to you by the nurse or doctor. ! Ask your nurse or doctor about these medications  
  apixaban 5 mg tablet  
 flecainide 100 mg tablet  
 metoprolol succinate 50 mg XL tablet

## 2018-03-28 ENCOUNTER — APPOINTMENT (OUTPATIENT)
Dept: CT IMAGING | Age: 78
DRG: 310 | End: 2018-03-28
Attending: FAMILY MEDICINE
Payer: MEDICARE

## 2018-03-28 VITALS
HEIGHT: 66 IN | OXYGEN SATURATION: 100 % | TEMPERATURE: 97.3 F | WEIGHT: 192.68 LBS | DIASTOLIC BLOOD PRESSURE: 78 MMHG | SYSTOLIC BLOOD PRESSURE: 132 MMHG | RESPIRATION RATE: 18 BRPM | HEART RATE: 84 BPM | BODY MASS INDEX: 30.97 KG/M2

## 2018-03-28 PROBLEM — Z01.89 ENCOUNTER FOR CARDIOVERSION PROCEDURE: Status: ACTIVE | Noted: 2018-03-28

## 2018-03-28 LAB
ANION GAP SERPL CALC-SCNC: 7 MMOL/L (ref 5–15)
ATRIAL RATE: 133 BPM
ATRIAL RATE: 65 BPM
BASOPHILS # BLD: 0.1 K/UL (ref 0–0.1)
BASOPHILS NFR BLD: 1 % (ref 0–1)
BUN SERPL-MCNC: 15 MG/DL (ref 6–20)
BUN/CREAT SERPL: 23 (ref 12–20)
CALCIUM SERPL-MCNC: 8.1 MG/DL (ref 8.5–10.1)
CALCULATED P AXIS, ECG09: 63 DEGREES
CALCULATED R AXIS, ECG10: 13 DEGREES
CALCULATED R AXIS, ECG10: 23 DEGREES
CALCULATED T AXIS, ECG11: -52 DEGREES
CALCULATED T AXIS, ECG11: 60 DEGREES
CHLORIDE SERPL-SCNC: 110 MMOL/L (ref 97–108)
CHOLEST SERPL-MCNC: 142 MG/DL
CO2 SERPL-SCNC: 26 MMOL/L (ref 21–32)
CREAT SERPL-MCNC: 0.66 MG/DL (ref 0.55–1.02)
DIAGNOSIS, 93000: NORMAL
DIAGNOSIS, 93000: NORMAL
DIFFERENTIAL METHOD BLD: ABNORMAL
EOSINOPHIL # BLD: 0.2 K/UL (ref 0–0.4)
EOSINOPHIL NFR BLD: 3 % (ref 0–7)
ERYTHROCYTE [DISTWIDTH] IN BLOOD BY AUTOMATED COUNT: 13.2 % (ref 11.5–14.5)
GLUCOSE SERPL-MCNC: 101 MG/DL (ref 65–100)
HCT VFR BLD AUTO: 36.2 % (ref 35–47)
HDLC SERPL-MCNC: 76 MG/DL
HDLC SERPL: 1.9 {RATIO} (ref 0–5)
HGB BLD-MCNC: 11.8 G/DL (ref 11.5–16)
IMM GRANULOCYTES # BLD: 0 K/UL (ref 0–0.04)
IMM GRANULOCYTES NFR BLD AUTO: 0 % (ref 0–0.5)
LDLC SERPL CALC-MCNC: 55.2 MG/DL (ref 0–100)
LIPID PROFILE,FLP: NORMAL
LYMPHOCYTES # BLD: 2.6 K/UL (ref 0.8–3.5)
LYMPHOCYTES NFR BLD: 34 % (ref 12–49)
MAGNESIUM SERPL-MCNC: 2.2 MG/DL (ref 1.6–2.4)
MCH RBC QN AUTO: 31.4 PG (ref 26–34)
MCHC RBC AUTO-ENTMCNC: 32.6 G/DL (ref 30–36.5)
MCV RBC AUTO: 96.3 FL (ref 80–99)
MONOCYTES # BLD: 0.5 K/UL (ref 0–1)
MONOCYTES NFR BLD: 7 % (ref 5–13)
NEUTS SEG # BLD: 4.2 K/UL (ref 1.8–8)
NEUTS SEG NFR BLD: 55 % (ref 32–75)
NRBC # BLD: 0 K/UL (ref 0–0.01)
NRBC BLD-RTO: 0 PER 100 WBC
P-R INTERVAL, ECG05: 180 MS
PLATELET # BLD AUTO: 172 K/UL (ref 150–400)
PMV BLD AUTO: 12.3 FL (ref 8.9–12.9)
POTASSIUM SERPL-SCNC: 4 MMOL/L (ref 3.5–5.1)
Q-T INTERVAL, ECG07: 306 MS
Q-T INTERVAL, ECG07: 412 MS
QRS DURATION, ECG06: 94 MS
QRS DURATION, ECG06: 98 MS
QTC CALCULATION (BEZET), ECG08: 428 MS
QTC CALCULATION (BEZET), ECG08: 444 MS
RBC # BLD AUTO: 3.76 M/UL (ref 3.8–5.2)
SODIUM SERPL-SCNC: 143 MMOL/L (ref 136–145)
TRIGL SERPL-MCNC: 54 MG/DL (ref ?–150)
TROPONIN I SERPL-MCNC: 0.06 NG/ML
TROPONIN I SERPL-MCNC: 0.07 NG/ML
TROPONIN I SERPL-MCNC: 0.07 NG/ML
VENTRICULAR RATE, ECG03: 127 BPM
VENTRICULAR RATE, ECG03: 65 BPM
VLDLC SERPL CALC-MCNC: 10.8 MG/DL
WBC # BLD AUTO: 7.6 K/UL (ref 3.6–11)

## 2018-03-28 PROCEDURE — 74011000250 HC RX REV CODE- 250: Performed by: FAMILY MEDICINE

## 2018-03-28 PROCEDURE — 83735 ASSAY OF MAGNESIUM: CPT | Performed by: NURSE PRACTITIONER

## 2018-03-28 PROCEDURE — 74011250637 HC RX REV CODE- 250/637: Performed by: FAMILY MEDICINE

## 2018-03-28 PROCEDURE — 36415 COLL VENOUS BLD VENIPUNCTURE: CPT | Performed by: FAMILY MEDICINE

## 2018-03-28 PROCEDURE — 80061 LIPID PANEL: CPT | Performed by: FAMILY MEDICINE

## 2018-03-28 PROCEDURE — 93325 DOPPLER ECHO COLOR FLOW MAPG: CPT

## 2018-03-28 PROCEDURE — 84484 ASSAY OF TROPONIN QUANT: CPT | Performed by: FAMILY MEDICINE

## 2018-03-28 PROCEDURE — 74011250636 HC RX REV CODE- 250/636: Performed by: INTERNAL MEDICINE

## 2018-03-28 PROCEDURE — 99152 MOD SED SAME PHYS/QHP 5/>YRS: CPT

## 2018-03-28 PROCEDURE — 74011250637 HC RX REV CODE- 250/637: Performed by: NURSE PRACTITIONER

## 2018-03-28 PROCEDURE — 70450 CT HEAD/BRAIN W/O DYE: CPT

## 2018-03-28 PROCEDURE — 74011000250 HC RX REV CODE- 250: Performed by: INTERNAL MEDICINE

## 2018-03-28 PROCEDURE — 85025 COMPLETE CBC W/AUTO DIFF WBC: CPT | Performed by: FAMILY MEDICINE

## 2018-03-28 PROCEDURE — 74011250636 HC RX REV CODE- 250/636: Performed by: FAMILY MEDICINE

## 2018-03-28 PROCEDURE — 93005 ELECTROCARDIOGRAM TRACING: CPT

## 2018-03-28 PROCEDURE — 77030039046 HC PAD DEFIB RADIOTRNSPNT CNMD -B

## 2018-03-28 PROCEDURE — 74011250637 HC RX REV CODE- 250/637: Performed by: INTERNAL MEDICINE

## 2018-03-28 PROCEDURE — 80048 BASIC METABOLIC PNL TOTAL CA: CPT | Performed by: FAMILY MEDICINE

## 2018-03-28 RX ORDER — FLECAINIDE ACETATE 100 MG/1
100 TABLET ORAL EVERY 12 HOURS
Qty: 60 TAB | Refills: 2 | Status: SHIPPED | OUTPATIENT
Start: 2018-03-28 | End: 2018-04-10 | Stop reason: SDUPTHER

## 2018-03-28 RX ORDER — FLECAINIDE ACETATE 100 MG/1
100 TABLET ORAL EVERY 12 HOURS
Status: DISCONTINUED | OUTPATIENT
Start: 2018-03-28 | End: 2018-03-28 | Stop reason: HOSPADM

## 2018-03-28 RX ORDER — MIDAZOLAM HYDROCHLORIDE 1 MG/ML
.5-1 INJECTION, SOLUTION INTRAMUSCULAR; INTRAVENOUS
Status: DISCONTINUED | OUTPATIENT
Start: 2018-03-28 | End: 2018-03-28

## 2018-03-28 RX ORDER — FENTANYL CITRATE 50 UG/ML
25-200 INJECTION, SOLUTION INTRAMUSCULAR; INTRAVENOUS
Status: DISCONTINUED | OUTPATIENT
Start: 2018-03-28 | End: 2018-03-28

## 2018-03-28 RX ORDER — ATROPINE SULFATE 0.1 MG/ML
1 INJECTION INTRAVENOUS AS NEEDED
Status: DISCONTINUED | OUTPATIENT
Start: 2018-03-28 | End: 2018-03-28

## 2018-03-28 RX ORDER — FLECAINIDE ACETATE 100 MG/1
100 TABLET ORAL EVERY 12 HOURS
Qty: 60 TAB | Refills: 1 | Status: SHIPPED | OUTPATIENT
Start: 2018-03-28 | End: 2018-03-28

## 2018-03-28 RX ORDER — METOPROLOL SUCCINATE 50 MG/1
50 TABLET, EXTENDED RELEASE ORAL DAILY
Qty: 30 TAB | Refills: 2 | Status: SHIPPED | OUTPATIENT
Start: 2018-03-29 | End: 2018-04-10 | Stop reason: SDUPTHER

## 2018-03-28 RX ORDER — METOPROLOL SUCCINATE 50 MG/1
50 TABLET, EXTENDED RELEASE ORAL DAILY
Qty: 30 TAB | Refills: 1 | Status: SHIPPED | OUTPATIENT
Start: 2018-03-29 | End: 2018-03-28

## 2018-03-28 RX ORDER — METOPROLOL TARTRATE 5 MG/5ML
2.5 INJECTION INTRAVENOUS ONCE
Status: COMPLETED | OUTPATIENT
Start: 2018-03-28 | End: 2018-03-28

## 2018-03-28 RX ORDER — SODIUM CHLORIDE 0.9 % (FLUSH) 0.9 %
5-10 SYRINGE (ML) INJECTION AS NEEDED
Status: DISCONTINUED | OUTPATIENT
Start: 2018-03-28 | End: 2018-03-28

## 2018-03-28 RX ORDER — METOPROLOL SUCCINATE 50 MG/1
50 TABLET, EXTENDED RELEASE ORAL DAILY
Status: DISCONTINUED | OUTPATIENT
Start: 2018-03-28 | End: 2018-03-28 | Stop reason: HOSPADM

## 2018-03-28 RX ORDER — METOPROLOL SUCCINATE 50 MG/1
50 TABLET, EXTENDED RELEASE ORAL DAILY
Status: DISCONTINUED | OUTPATIENT
Start: 2018-03-29 | End: 2018-03-28

## 2018-03-28 RX ORDER — ENOXAPARIN SODIUM 100 MG/ML
1 INJECTION SUBCUTANEOUS EVERY 12 HOURS
Status: DISCONTINUED | OUTPATIENT
Start: 2018-03-28 | End: 2018-03-28

## 2018-03-28 RX ADMIN — Medication 9 ML: at 06:00

## 2018-03-28 RX ADMIN — RALOXIFENE 60 MG: 60 TABLET ORAL at 08:38

## 2018-03-28 RX ADMIN — HYDROCHLOROTHIAZIDE 12.5 MG: 25 TABLET ORAL at 08:39

## 2018-03-28 RX ADMIN — MIDAZOLAM HYDROCHLORIDE 1 MG: 1 INJECTION, SOLUTION INTRAMUSCULAR; INTRAVENOUS at 13:01

## 2018-03-28 RX ADMIN — ENOXAPARIN SODIUM 90 MG: 100 INJECTION SUBCUTANEOUS at 02:29

## 2018-03-28 RX ADMIN — FENTANYL CITRATE 50 MCG: 50 INJECTION, SOLUTION INTRAMUSCULAR; INTRAVENOUS at 12:54

## 2018-03-28 RX ADMIN — VITAMIN D, TAB 1000IU (100/BT) 4000 UNITS: 25 TAB at 08:38

## 2018-03-28 RX ADMIN — MIDAZOLAM HYDROCHLORIDE 2 MG: 1 INJECTION, SOLUTION INTRAMUSCULAR; INTRAVENOUS at 12:54

## 2018-03-28 RX ADMIN — Medication 10 ML: at 14:54

## 2018-03-28 RX ADMIN — Medication 10 ML: at 12:55

## 2018-03-28 RX ADMIN — BENZOCAINE 2 SPRAY: 200 SPRAY DENTAL; ORAL; PERIODONTAL at 12:50

## 2018-03-28 RX ADMIN — MIDAZOLAM HYDROCHLORIDE 2 MG: 1 INJECTION, SOLUTION INTRAMUSCULAR; INTRAVENOUS at 12:55

## 2018-03-28 RX ADMIN — FLECAINIDE ACETATE 100 MG: 100 TABLET ORAL at 14:53

## 2018-03-28 RX ADMIN — METOPROLOL SUCCINATE 50 MG: 50 TABLET, EXTENDED RELEASE ORAL at 16:54

## 2018-03-28 RX ADMIN — MIDAZOLAM HYDROCHLORIDE 1 MG: 1 INJECTION, SOLUTION INTRAMUSCULAR; INTRAVENOUS at 13:00

## 2018-03-28 RX ADMIN — APIXABAN 5 MG: 5 TABLET, FILM COATED ORAL at 16:54

## 2018-03-28 RX ADMIN — METOPROLOL TARTRATE 2.5 MG: 5 INJECTION, SOLUTION INTRAVENOUS at 02:29

## 2018-03-28 NOTE — CONSULTS
Cardiac Electrophysiology Consultation Note     Subjective:      Kenton Fuentes is a 68 y.o. patient who is seen for evaluation of  Atrial fibrillation with RVR and troponin 0.07  She has no chest pain and no known AFIB before  She had seen Dr Abhi Field in clinic and had holter with PACs  She felt tired and dizzy starting the night before last night but waited to come to ER when symptoms did not resolve  ECG showed atrial fibrillation with RVR and rate has not been controlled with metoprolol IV  She has not gotten out of bed since she was admitted  No chest pain  No BARRY  Stress test was negative 5 years ago per her  She has no GI bleeding  No stroke  Echo in 2016 with normal LVEF, mild MR    Patient Active Problem List   Diagnosis Code    Dyslipidemia E78.5    Palpitations R00.2    Chest pain, unspecified R07.9    Asthma J45.909    Hyperlipidemia LDL goal < 130 E78.5    HTN (hypertension) I10    Breast cancer (HCC) C50.919    Lung nodule R91.1    Cystocele [id number]    Uterine prolapse N81.4    Dizziness R42    A-fib ([facility] [address] I48.91     Current Facility-Administered Medications   Medication Dose Route Frequency Provider Last Rate Last Dose    enoxaparin (LOVENOX) injection 90 mg  1 mg/kg SubCUTAneous Q12H Tamir Rodriguez MD   90 mg at 03/28/18 5313    aspirin delayed-release tablet 81 mg  81 mg Oral DAILY Tamir Rodriguez MD        atorvastatin (LIPITOR) tablet 20 mg  20 mg Oral DAILY Tamir Rodriguez MD        cetirizine (ZYRTEC) tablet 10 mg  10 mg Oral DAILY Tamir Rodriguez MD        cholecalciferol (VITAMIN D3) tablet 4,000 Units  4,000 Units Oral DAILY Tamir Rodriguez MD        hydroCHLOROthiazide (HYDRODIURIL) tablet 12.5 mg  12.5 mg Oral DAILY Tamir Rodriguez MD        montelukast (SINGULAIR) tablet 10 mg  10 mg Oral DAILY Tamir Rodriguez MD        raloxifene (EVISTA) tablet 60 mg  60 mg Oral DAILY Tamir Rodriguez MD        sodium chloride (NS) flush 5-10 mL  5-10 mL IntraVENous Q8H Nicki Jennings MD   9 mL at 03/28/18 0600    sodium chloride (NS) flush 5-10 mL  5-10 mL IntraVENous PRN Nicki Jennings MD         Allergies   Allergen Reactions    Betadine [Povidone-Iodine] Rash     Past Medical History:   Diagnosis Date    Asthma     dr. Aurelia Dos Santos allergist    Atrial fibrillation ([facility] [address]     Breast cancer ([facility] [address] 1980    Dyslipidemia     labs 2008 - chol 283, HDL 83, ,     HTN (hypertension)     Hyperlipidemia LDL goal < 130     for increased LDL particles, Dr. Wally Cotton Lung nodule     Dr. Calos Fong Palpitations     resolved     Past Surgical History:   Procedure Laterality Date    CT HEART W/O CONT WITH CALCIUM  1/2012    CAC score 0; calcified mediastinal lymph nodes consistent granulomatous disease    ECHO 2D ADULT  5/5/2008    normal, LVEF 60%    HX GYN      HX HYSTERECTOMY Bilateral 03/19/2015    HX UROLOGICAL  8/1/14    Urodynamics    STRESS TEST CARDIOLITE  1/5/12    walked 7:31, no chest pain, normal MPI. Family History   Problem Relation Age of Onset    Heart Failure Mother     Stroke Father     Other Other      endometrial cancer, neice     Social History   Substance Use Topics    Smoking status: Never Smoker    Smokeless tobacco: Never Used    Alcohol use Yes      Comment: wine nightly        Review of Systems:   Constitutional: Negative for fever, chills, weight loss, +malaise/fatigue. HEENT: Negative for nosebleeds, vision changes. Respiratory: Negative for cough, hemoptysis  Cardiovascular: Negative for chest pain, palpitations, orthopnea, claudication, leg swelling, syncope, and PND. Gastrointestinal: Negative for nausea, vomiting, diarrhea, blood in stool and melena. Genitourinary: Negative for dysuria, and hematuria. Musculoskeletal: Negative for myalgias, arthralgia. Skin: Negative for rash. Heme: Does not bleed or bruise easily.    Neurological: Negative for speech change and focal weakness     Objective:     Visit Vitals    BP 117/51    Pulse (!) 118    Temp 98.3 °F (36.8 °C)    Resp 17    Ht 5' 6\" (1.676 m)    Wt 192 lb 10.9 oz (87.4 kg)    SpO2 96%    BMI 31.1 kg/m2      Physical Exam:   Constitutional: well-developed and well-nourished. No respiratory distress. Head: Normocephalic and atraumatic. Eyes: Pupils are equal, round  ENT: hearing normal  Neck: supple. No JVD present. Cardiovascular: fast rate, irregular rhythm. Exam reveals no gallop and no friction rub. No murmur heard. Pulmonary/Chest: Effort normal and breath sounds normal. No wheezes. Abdominal: Soft, no tenderness. mild obesity  Musculoskeletal: no edema. Neurological: alert,oriented. Skin: Skin is warm and dry  Psychiatric: normal mood and affect. Behavior is normal. Judgment and thought content normal.      EKG: nonspecific ST and T waves changes, atrial fibrillation, rate increased. Assessment/Plan:   Symptomatic atrial fibrillation with rapid ventricular rate, cannot be controlled with beta blocker  lovenox now  I recommend and she agrees with TIM and cardioversion  Troponin mild elevation is not NSTEMI, this is related to rapid ventricular rate likely  Level has been flat 0.07       Thank you for involving me in this patient's care and please call with further concerns or questions. Zaria Juares M.D.   Electrophysiology/Cardiology  [facility] [place]  [address], [address] , [address]  [address]  [phone]

## 2018-03-28 NOTE — PROCEDURES
Cardiac Procedure Note   Patient: Isabelle Peabody  MRN: [id number]  SSN: [id number]   YOB: 1940 Age: 68 y.o.   Sex: female    Date of Procedure: 3/28/2018   Pre-procedure Diagnosis: atrial fibrillation rapid ventricular rate despite beta blocker  Hypertension, dizziness, fatigue  Post-procedure Diagnosis: same  Procedure: TIM and Cardioversion  :  Dr. Dung Guerrero MD    Assistant(s):  None  Anesthesia: Moderate Sedation   Total 6 mg versed and 50 mcg fentanyl for TIM and cardioversion  Estimated Blood Loss: none  Specimens Removed: None  Findings:   TIM no PFO, trace AR, mild MR, no TR or NH  Mild atherosclerosis in prox descending aorta  No KENNEDY thrombus  LA moderate dilation  RA normal    200 J CV to NSR    Complications: None   Implants:  None  Signed by:  Dung Guerrero MD  3/28/2018  1:06 PM

## 2018-03-28 NOTE — ED NOTES
Pt placed on hospital bed. Remains on monitor, call light within reach, NADN, [address]

## 2018-03-28 NOTE — ED NOTES
Pt resting in bed, on monitor, A&Ox3 and following commands, denies CP/SOB, aware she is waiting on admit bed, NADN, HR improving, WCTM.

## 2018-03-28 NOTE — H&P
[address] [facility] [place]  ACUTE CARE HISTORY AND PHYSICAL    Name:LINDA ELLISON  MR#: [id number]  : 1940  ACCOUNT #: [de-identified]   DATE OF SERVICE: 2018    CHIEF COMPLAINT:  Dizziness. HISTORY OF PRESENT ILLNESS:  A 70-year-old white female with past medical history of paroxysmal atrial fibrillation, asthma, breast cancer, hyperlipidemia, hypertension, lung nodule, palpitations, presented to the Emergency Department from home with a chief complaint of dizziness. The patient notes onset of symptoms starting yesterday while she was walking. Episodes were intermittent and debilitating, to the point where she had to stop her activities. There were no specific exacerbating or alleviating factors. According to ER records, she complained of generalized weakness and seeing colored spots over her left eye, which prompted her to come to the ED. She has a known history of paroxysmal atrial fibrillation in the past.  Currently she is not on any anticoagulant or rate control medication for the same. The patient does not complain of any chest pain, shortness of breath, cough, congestion, syncope, loss of consciousness, numbness, paresthesias, slurred speech, facial droop, headache, visual disturbance other than that mentioned (neck pain, back pain, abdominal pain, nausea, vomiting, diarrhea, melena, dysuria, hematuria, calf pain, swelling, edema, fever, chills or rash). On arrival in the Emergency Department, initial reported vital signs were blood pressure of 149/89, heart rate 130, respiratory rate 18, O2 saturations 95% on room air. Workup included troponin equals 0.07. Potassium 3.4. A 12-lead EKG showed atrial fibrillation, premature ventricular complexes, ST changes in the anterior septal leads at 127 beats a minute. The patient initially remained tachycardic. She was given a dose of labetalol 20 mg IV x1 dose. Of note, there is a nationwide shortage of IV diltiazem.   The patient is now seen for admission to the hospitalist service for continued evaluation and treatment. From review of the chart records, a cardiologist was consulted per the ED. PAST MEDICAL HISTORY:    1. Asthma. 2.  Paroxysmal atrial fibrillation. 3.  Breast cancer. 4.  Hypertension. 5.  Hyperlipidemia. 6.  Lung nodule. 7.  Palpitations. PAST SURGICAL HISTORY:  Status post hysterectomy. MEDICATIONS:  Complete medication list reviewed and noted on chart records.   albuterol (PROVENTIL HFA, VENTOLIN HFA, PROAIR HFA) 90 mcg/actuation inhaler    12/28/15  -- Madeline Payton MD        Take 1 Puff by inhalation every four (4) hours as needed.      aspirin delayed-release 81 mg tablet  3/27/2018  --  --  Historical Provider      Take  by mouth daily.      atorvastatin (LIPITOR) 20 mg tablet  3/26/2018  01/12/16  -- Madeline Payton MD      Take 1 Tab by mouth daily.      cetirizine (ZYRTEC) 10 mg tablet  3/27/2018  --  --  Historical Provider      Take 10 mg by mouth daily.      cholecalciferol, vitamin D3, 2,000 unit tab  3/27/2018  --  --  Historical Provider      Take 4,000 Units by mouth daily.      EPINEPHrine (EPIPEN) 0.3 mg/0.3 mL (1:1,000) injection    01/12/16  -- Madeline Payton MD      0.3 mL by IntraMUSCular route once as needed for up to 1 dose.      estradiol (ESTRACE) 0.01 % (0.1 mg/gram) vaginal cream    07/22/15  --  Historical Provider      Insert  into vagina.      Notes:          estradiol (ESTRACE) 0.01 % (0.1 mg/gram) vaginal cream  3/26/2018  --  --  Historical Provider      Insert 2 g into vagina every Monday and Thursday.      fluticasone (FLONASE) 50 mcg/actuation nasal spray    12/14/15  -- Madeline Payton MD      2 Sprays by Both Nostrils route daily.      fluticasone-salmeterol (ADVAIR DISKUS) 250-50 mcg/dose diskus inhaler    --  --  Historical Provider      Take 1 Puff by inhalation as needed.      hydroCHLOROthiazide (HYDRODIURIL) 12.5 mg tablet 3/27/2018  --  --  Historical Provider      Take 12.5 mg by mouth daily.      montelukast (SINGULAIR) 10 mg tablet  3/27/2018  --  --  Historical Provider      Take 10 mg by mouth daily.      multivitamin (ONE A DAY) tablet  3/27/2018  --  --  Historical Provider      Take 1 Tab by mouth daily.      OTHER    --  --  Historical Provider      Vi active daily      PSEUDOEPHEDRINE HCL (SUDAFED PO)  3/27/2018  --  --  Historical Provider      Take  by mouth as needed.      raloxifene (EVISTA) 60 mg tablet  3/27/2018  08/26/15  -- Erik Juarez MD      Take 1 Tab by mouth daily.         ALLERGIES:  NO KNOWN DRUG ALLERGIES; BETADINE. SOCIAL HISTORY:  Negative for smoking. The patient admits to drinking one alcoholic drink per day. No report of illicit drugs. . FAMILY HISTORY:  Uterine cancer. MI. Stroke in father. Heart failure in mother. REVIEW OF SYSTEMS:  All systems reviewed. Pertinent positives were as per HPI, otherwise negative. PHYSICAL EXAMINATION:  VITAL SIGNS:  Temperature 98.4 degrees Fahrenheit, blood pressure 123/57, heart rate of 114, respiratory rate of 14, O2 saturation 95% on room air. Recorded weight equals 192 pounds (87.4 kg), recorded height of 5 feet 6 inches tall. GENERAL:  The patient is in no acute respiratory distress. The patient is awake, alert and oriented x3. NEUROLOGIC:  GCS of 15. Moves all extremities x4. Sensation grossly intact. No slurred speech or facial droop. HEENT:  Normocephalic, atraumatic. PERRLA. EOMs intact. Sclerae anicteric. Conjunctivae clear. Nares are patent. Oropharynx is clear. The tongue is midline, not edematous. NECK:  Supple, without lymphadenopathy, JVD, carotid bruits or thyromegaly. LYMPHATIC:  Negative cervical or supraclavicular lymphadenopathy. LUNGS:  Clear to auscultation bilaterally. CARDIOVASCULAR:  Heart tachycardic, irregular rhythm without murmurs, rubs or gallops.     GASTROINTESTINAL:  Abdomen soft, nontender, nondistended. Normoactive bowel sounds. No rebound, guarding, rigidity. No auscultated abdominal bruits or pulsatile mass. BACK:  No CVA tenderness. No step-off deformity. MUSCULOSKELETAL:  No acute palpable bony deformity. Negative for calf tenderness. VASCULAR:  2+ radial, 1+ dorsalis pedis pulses without cyanosis, clubbing, edema. SKIN:  Warm and dry. LABORATORY DATA:  I reviewed. Sodium 142, potassium 3.4, chloride 106, CO2 of 26, BUN of 20, creatinine 0.80, glucose 154, anion gap 10, calcium 8.6, GFR greater than 60, total bilirubin 0.3, total protein 6.6, albumin 3.4, ALT 20, AST 22,  CK total 79. Troponin I of 0.07, repeat is 0.07. WBC of 9.4, hemoglobin 13.1, hematocrit 38.5, platelets 092, neutrophils are 65%. IMAGING STUDIES:  12-lead EKG results noted in HPI. IMPRESSION AND PLAN:  1. Atrial fibrillation:  Rapid ventricular response, rate not controlled. As mentioned, there is a nationwide shortage of intravenous diltiazem. As such, an intravenous infusion was not ordered. We will order additional dose of intravenous metoprolol. Provide other agents as needed for rate control. Aim for goal heart rate of less than 100. The patient has a [id number] score of 4. She has an ATRIA bleeding risk score equals 2 (which is low risk). I discussed starting anticoagulation, as this patient is a candidate for the same, with the patient and she is in agreement at this time. Will place an order for Lovenox. The patient was seen by cardiologist and medications may be adjusted accordingly. Continue with telemetry monitoring. 2.  Elevated troponin. Repeat serial troponin levels and monitor closely. 3.  Intractable dizziness. Order computed tomography (CT) scan of the head to rule out any acute intracranial abnormalities. Place the patient on fall precautions. Neurovascular checks. 4.  Generalized weakness. Plan as noted above.   5.  Obesity (body mass index equals 31.6). Recommend weight loss, heart-healthy diet and lifestyle modification. 6.  Hypertension. The patient will be on intermittent dosing of medications for treatment of atrial fibrillation, which will help blood pressure at this time. 7.  Hyperlipidemia. Check lipid panel. Continue on atorvastatin. 8.  Deep venous thrombosis prophylaxis:  The patient is on Lovenox at this time. 9.  CODE STATUS:  FULL CODE. MD LES Tracey / LUIS ANGEL  D: 03/28/2018 02:21     T: 03/28/2018 04:10  JOB #: [id number]

## 2018-03-28 NOTE — PROGRESS NOTES
Cardiac Cath Lab Procedure Area Arrival Note:    Pia Rios arrived to Cardiac Cath Lab, Procedure Area. Patient identifiers verified with NAME and DATE OF BIRTH. Procedure verified with patient. Consent forms verified. Allergies verified. Patient informed of procedure and plan of care. Questions answered with review. Patient voiced understanding of procedure and plan of care. Patient on cardiac monitor, non-invasive blood pressure, SPO2 monitor. On room air Placed on  O2 @ 4 lpm via nasal l cannula  IV of NS on pump at 25 ml/hr. Patient status doing well without problems. Patient is A&Ox 4. Patient reports no pain. Patient medicated during procedure with orders obtained and verified by Dr. Skylar Venegas. Refer to patients Cardiac Cath Lab PROCEDURE REPORT for vital signs, assessment, status, and response during procedure, printed at end of case. Printed report on chart or scanned into chart.

## 2018-03-28 NOTE — PROGRESS NOTES
TRANSFER - IN REPORT:    Verbal report received from Doc Quinn\[facility] [place] [facility]\"" [place] (name[place] on Sagar Burleson  being received from Cath Lab Recovery (unit) for routine post - op      Report consisted of patients Situation, Background, Assessment and   Recommendations(SBAR). Information from the following report(s) SBAR, Kardex, Procedure Summary and Cardiac Rhythm NSR was reviewed with the receiving nurse. Opportunity for questions and clarification was provided. Assessment completed upon patients arrival to unit and care assumed.

## 2018-03-28 NOTE — DISCHARGE SUMMARY
Discharge Summary       PATIENT ID: Erik Cornelius  MRN: [id number]   YOB: 1940    DATE OF ADMISSION: 3/27/2018  9:27 PM    DATE OF DISCHARGE: 3/28/2018  PRIMARY CARE PROVIDER: Reg Wong MD       DISCHARGING PHYSICIAN: Selene Majano NP    To contact this individual call [phone] and ask the  to page. If unavailable ask to be transferred the Adult Hospitalist Department. CONSULTATIONS: IP CONSULT TO CARDIOLOGY  IP CONSULT TO HOSPITALIST    PROCEDURES/SURGERIES: * No surgery found *    ADMITTING DIAGNOSES & HOSPITAL COURSE:   68year old female with PMH of PAF, Asthma, Breast Cancer, Hyperlipidemia, Hypertension,  Lung Nodule, and palpitations who presented with lightheadedness. She was found to be in   A Fib with RVR. She was seen by Dr. Abida Tay who was able to perform a successful cardioversion. She was discharged with BB, Flecainide, and Eliquis to home in stable condition with outpatient  Follow up.       DISCHARGE DIAGNOSES / PLAN:      Dizziness and weakness r/t Afib with RVR (POA)  New onset A-fib with RVR  Cardiology following: TIM and Cardioversion completed with 200 J with conversion to NSR  TIM no PFO, trace AR, mild MR, no TR or VT Mild atherosclerosis in prox descending aortaNo KENNEDY thrombus LA moderate dilation RA normal  MD De La Cruz recommends eliquis, flecainide and metoprolol with 2 week follow-up  Advised to discontinue HCTZ, discuss at follow up    Elevated Troponin  Troponin 0.07  Elevated in the setting of RVR and demand/ischemia     Obesity  BMI 31  Counseled     History of hyperlipidemia  Continue home Lipitor     History of hypertension  Continue Hydrochlorothiazide      History of Asthma  Continue home Zyrtec, singulair, Advair, and duoneb     Code status: Full  DVT prophylaxis: Lovenox     Care Plan discussed with: Patient/Family  Disposition: Home w/Family and TBD         PENDING TEST RESULTS:   At the time of discharge the following test results are still pending: na    FOLLOW UP APPOINTMENTS:    Follow-up Information     Follow up With Details Comments Contact Info    Brenden Blanton MD Go on 4/10/2018 2 pm for follow up visit- start Eliquis and toprol and [address] [address] [address] [facility]           ADDITIONAL CARE RECOMMENDATIONS: We have prescribed you Toprol XL and Flecainide which control atrial fibrillation. We have prescribed you a blood thinner called Eliquis to prevent complications from atrial fibrillation. Please follow up with Dr. Chuck Pérez as scheduled for you in April. DIET: resume    ACTIVITY: resume    WOUND CARE: na    EQUIPMENT needed: prescriptions              DISCHARGE MEDICATIONS:  Current Discharge Medication List      START taking these medications    Details   metoprolol succinate (TOPROL-XL) 50 mg XL tablet Take 1 Tab by mouth daily. Qty: 30 Tab, Refills: 1      flecainide (TAMBOCOR) 100 mg tablet Take 1 Tab by mouth every twelve (12) hours. Qty: 60 Tab, Refills: 1      apixaban (ELIQUIS) 5 mg tablet Take 1 Tab by mouth two (2) times a day. Qty: 60 Tab, Refills: 1         CONTINUE these medications which have NOT CHANGED    Details   hydroCHLOROthiazide (HYDRODIURIL) 12.5 mg tablet Take 12.5 mg by mouth daily. cetirizine (ZYRTEC) 10 mg tablet Take 10 mg by mouth daily. fluticasone-salmeterol (ADVAIR DISKUS) 250-50 mcg/dose diskus inhaler Take 1 Puff by inhalation as needed. !! estradiol (ESTRACE) 0.01 % (0.1 mg/gram) vaginal cream Insert 2 g into vagina every Monday and Thursday. multivitamin (ONE A DAY) tablet Take 1 Tab by mouth daily. cholecalciferol, vitamin D3, 2,000 unit tab Take 4,000 Units by mouth daily. Associated Diagnoses: Pre-operative cardiovascular examination      atorvastatin (LIPITOR) 20 mg tablet Take 1 Tab by mouth daily.   Qty: 90 Tab, Refills: 3    Associated Diagnoses: Hyperlipidemia      EPINEPHrine (EPIPEN) 0.3 mg/0.3 mL (1:1,000) injection 0.3 mL by IntraMUSCular route once as needed for up to 1 dose. Qty: 0.3 mL, Refills: 0      albuterol (PROVENTIL HFA, VENTOLIN HFA, PROAIR HFA) 90 mcg/actuation inhaler Take 1 Puff by inhalation every four (4) hours as needed. Qty: 2 Inhaler, Refills: 3      fluticasone (FLONASE) 50 mcg/actuation nasal spray 2 Sprays by Both Nostrils route daily. Qty: 2 Bottle, Refills: 6      raloxifene (EVISTA) 60 mg tablet Take 1 Tab by mouth daily. Qty: 90 Tab, Refills: 4    Associated Diagnoses: Palpitations      montelukast (SINGULAIR) 10 mg tablet Take 10 mg by mouth daily. Associated Diagnoses: Palpitations      aspirin delayed-release 81 mg tablet Take  by mouth daily. Associated Diagnoses: Palpitations      !! estradiol (ESTRACE) 0.01 % (0.1 mg/gram) vaginal cream Insert  into vagina. OTHER Vi active daily       ! ! - Potential duplicate medications found. Please discuss with provider. STOP taking these medications       PSEUDOEPHEDRINE HCL (SUDAFED PO) Comments:   Reason for Stopping:                 NOTIFY YOUR PHYSICIAN FOR ANY OF THE FOLLOWING:   Fever over 101 degrees for 24 hours. Chest pain, shortness of breath, fever, chills, nausea, vomiting, diarrhea, change in mentation, falling, weakness, bleeding. Severe pain or pain not relieved by medications. Or, any other signs or symptoms that you may have questions about. DISPOSITION:    Home With:   OT  PT  HH  RN       Long term SNF/Inpatient Rehab   x Independent/assisted living    Hospice    Other:       PATIENT CONDITION AT DISCHARGE:     Functional status    Poor     Deconditioned    x Independent      Cognition   x  Lucid     Forgetful     Dementia      Catheters/lines (plus indication)    Howe     PICC     PEG    x None      Code status    x Full code     DNR      PHYSICAL EXAMINATION AT DISCHARGE:  Constitutional:  No acute distress, cooperative, pleasant    ENT:  Oral mucous membranes moist, oropharynx benign. Resp:  CTA bilaterally.  No wheezing/rhonchi/rales. No accessory muscle use   CV:  Regular rhythm, normal rate, no murmurs, gallops, rubs    GI:  Soft, non distended, non tender. normoactive bowel sounds, no hepatosplenomegaly     Musculoskeletal:  No edema, warm, 2+ pulses throughout    Neurologic:  Moves all extremities. AAOx3,gait not observed but documented by nursing as stable                                             Psych:  Good insight, Not anxious nor agitated.                                             Skin:  Good turgor, no rashes or ulcers                                            Hematologic/Lymphatic/Immunlogic:  No jaundice nor lymph node swelling     Visit Vitals    /78 (BP Patient Position: Standing)    Pulse 84    Temp 97.3 °F (36.3 °C)    Resp 18    Ht 5' 6\" (1.676 m)    Wt 87.4 kg (192 lb 10.9 oz)    SpO2 100%    BMI 31.1 kg/m2           CHRONIC MEDICAL DIAGNOSES:  Problem List as of 3/28/2018  Date Reviewed: 3/28/2018          Codes Class Noted - Resolved    Encounter for cardioversion procedure ICD-10-CM: Z01.89  ICD-9-CM: V72.85  3/28/2018 - Present    Overview Signed 3/28/2018  1:06 PM by Dorothy Chapa MD     3/28/2018 200 J to NSR after a TIM without thrombus             Dizziness ICD-10-CM: R42  ICD-9-CM: 780.4  3/27/2018 - Present        * (Principal)A-fib ([facility] ICD-10-CM: I48.91  ICD-9-CM: 427.31  3/27/2018 - Present        Cystocele ICD-10-CM: QLB4812  ICD-9-CM: TMA9100  8/1/2014 - Present        Uterine prolapse ICD-10-CM: N81.4  ICD-9-CM: 618.1  8/1/2014 - Present        HTN (hypertension) ICD-10-CM: I10  ICD-9-CM: 401.9  Unknown - Present        Breast cancer ([facility] ICD-10-CM: C50.919  ICD-9-CM: 174.9  Unknown - Present        Lung nodule ICD-10-CM: R91.1  ICD-9-CM: 793.11  Unknown - Present    Overview Signed 5/14/2014 10:34 AM by MD Dr. Joon Manuel: [id number]  ICD-9-CM: 493.90  Unknown - Present        Hyperlipidemia LDL goal < 130 ICD-10-CM: E78.5  ICD-9-CM: 272.4  Unknown - Present    Overview Signed 4/26/2013  9:17 AM by Kandi Moore MD     for increased LDL particles, Dr. Manuel Morning             Dyslipidemia ICD-10-CM: E78.5  ICD-9-CM: 272.4  Unknown - Present    Overview Signed 12/31/2011  4:11 PM by Nathaniel Bazan MD     labs 2008 - chol 283, HDL 83, ,              Palpitations ICD-10-CM: R00.2  ICD-9-CM: 785.1  Unknown - Present        Chest pain, unspecified ICD-10-CM: R07.9  ICD-9-CM: 786.50  12/31/2011 - Present              Greater than 30 minutes were spent with the patient on counseling and coordination of care    Signed:   Javad Kessler NP  3/28/2018  3:14 PM

## 2018-03-28 NOTE — H&P
H&P [facility]. [facility]# [id number].

## 2018-03-28 NOTE — PROCEDURES
CARDIOVERSION  Procedure date: 3/28/2018  PROCEDURE:   Electrical synchronized cardioversion of atrial fibrillation. BRIEF HISTORY:  This is a 68 y. o.woman with the history of persistent atrial fibrillation with rapid ventricular rate and she has dizziness, fatigue and ventricular rate cannot be controlled with beta blocker. She is just started on lovenox. She has hypertension. The risks and benefits have been discussed with her and she agreed to proceed. PROCEDURE IN DETAIL:   The patient is now in the supine position and the pads were applied in the anterior posterior direction. She was given additional 1 mg Versed and she got versed and fentanyl during TIM which has shown no KENNEDY thrombus. Thereafter the conscious sedation was maintained and 200 joule biphasic shock was delivered in anterior posterior direction and converted to sinus rhythm . The patient was then arousable. COMPLICATIONS:  None.     Specimen removed: NONE  ASSESSMENT AND PLAN:  The patient will be discharged home with family later  She will take eliquis, flecainide and toprol  Follow up 2 weeks

## 2018-03-28 NOTE — ED PROVIDER NOTES
HPI Comments: 68 y.o. female with past medical history significant for palpitations, HTN, breast cancer, Afib who presents from home with chief complaint of dizziness. Pt has one day onset of intermittent dizziness while walking yesterday with assoc SOB on exertion. Pt having to stop and collect herself before heading home to recuperate. Pt's sx are accompanied by generalized weakness and seeing \"colored lights above her left eye. \" She states feeling overall \"very uneasy. \" Pt has a past hx of Afib, and notes that she \"feels it becoming more frequent lately. \" Denies CP, SOB, and f/c. Not currently on anti-coagulants. Drove down to [place] two weeks ago, but did not observe leg edema. There are no other acute medical concerns at this time. Social hx: Drinks 1-2 times a day. Denies smoking or drug use. PCP: Claudy Callejas MD  Cardiologist: Beltran Gao MD    Note written by Karen Rider, as dictated by Ana Cristina Banda MD 9:50 PM      The history is provided by the patient. No  was used. Past Medical History:   Diagnosis Date    Asthma     dr. Edward Williamson allergist    [facility]     Breast cancer ([facility] 1980    Dyslipidemia     labs 2008 - chol 283, HDL 83, ,     HTN (hypertension)     Hyperlipidemia LDL goal < 130     for increased LDL particles, Dr. Garcia Pérez Lung nodule     Dr. Alexandria Cole Palpitations     resolved       Past Surgical History:   Procedure Laterality Date    CT HEART W/O CONT WITH CALCIUM  1/2012    CAC score 0; calcified mediastinal lymph nodes consistent granulomatous disease    ECHO 2D ADULT  5/5/2008    normal, LVEF 60%    HX GYN      HX HYSTERECTOMY Bilateral 03/19/2015    HX UROLOGICAL  8/1/14    Urodynamics    STRESS TEST CARDIOLITE  1/5/12    walked 7:31, no chest pain, normal MPI.          Family History:   Problem Relation Age of Onset    Heart Failure Mother     Stroke Father     Other Other endometrial cancer, neice       Social History     Social History    Marital status:      Spouse name: N/A    Number of children: N/A    Years of education: N/A     Occupational History    Not on file. Social History Main Topics    Smoking status: Never Smoker    Smokeless tobacco: Never Used    Alcohol use Yes      Comment: wine nightly    Drug use: No    Sexual activity: Yes     Partners: Male     Birth control/ protection: None     Other Topics Concern    Not on file     Social History Narrative    Retired          ALLERGIES: Betadine [povidone-iodine]    Review of Systems   Constitutional: Negative for activity change, chills, diaphoresis, fatigue and fever. HENT: Negative for congestion and sore throat. Eyes: Negative for photophobia and visual disturbance. Respiratory: Positive for shortness of breath. Negative for chest tightness. Cardiovascular: Negative for chest pain, palpitations and leg swelling. Gastrointestinal: Negative for abdominal pain, blood in stool, constipation, diarrhea, nausea and vomiting. Genitourinary: Negative for difficulty urinating, dysuria, flank pain, frequency and hematuria. Musculoskeletal: Negative for back pain. Neurological: Positive for dizziness. Negative for syncope, numbness and headaches. All other systems reviewed and are negative. Vitals:    03/27/18 2120   BP: 149/89   Pulse: (!) 130   Resp: 18   Temp: 98.4 °F (36.9 °C)   SpO2: 95%   Weight: 87.4 kg (192 lb 9.6 oz)   Height: 5' 6\" (1.676 m)            Physical Exam   Constitutional: She is oriented to person, place, and time. She appears well-developed and well-nourished. No distress. HENT:   Head: Normocephalic and atraumatic. Nose: Nose normal.   Mouth/Throat: Oropharynx is clear and moist. No oropharyngeal exudate. Eyes: Conjunctivae and EOM are normal. Pupils are equal, round, and reactive to light. Right eye exhibits no discharge.  Left eye exhibits no discharge. No scleral icterus. Neck: Normal range of motion. Neck supple. No JVD present. No tracheal deviation present. No thyromegaly present. Cardiovascular: Normal heart sounds and intact distal pulses. Exam reveals no gallop and no friction rub. No murmur heard. Pulmonary/Chest: Effort normal and breath sounds normal. No stridor. No respiratory distress. She has no wheezes. She has no rales. She exhibits no tenderness. Abdominal: Soft. Bowel sounds are normal. She exhibits no distension and no mass. There is no tenderness. There is no rebound. Musculoskeletal: Normal range of motion. She exhibits no edema or tenderness. Lymphadenopathy:     She has no cervical adenopathy. Neurological: She is alert and oriented to person, place, and time. No cranial nerve deficit. Coordination normal.   Skin: Skin is warm and dry. No rash noted. She is not diaphoretic. No erythema. No pallor. Psychiatric: She has a normal mood and affect. Her behavior is normal. Judgment and thought content normal.   Nursing note and vitals reviewed. Note written by Pittsviller Libman, Scribe, as dictated by Linsey Cabral MD 9:50 PM      MDM  Number of Diagnoses or Management Options  Atrial fibrillation with RVR [facility]):   Elevated troponin:   Diagnosis management comments: paroxysmal A. fib, A. fib with RVR, ACS. 66-year-old female presenting with dizziness this is associated dyspnea on exertion. Per patient she has a history of A. fib the proximal is not on anticoagulation or medications for such. Triage EKG shows A. fib with RVR rate in the 120s/130s. Patient currently has no neuro deficits or focal findings. Likely dizziness associated with A. fib with RVR during exertional episodes. Plan: 1 L normal saline bolus, unable to start a diltiazem bolus for drip due to nationwide shortage. CBC, CMP, cardiac enzymes, repeat EKG, labetalol 20 mg IV.     Reassessment: Patient heart rate is now in the 100-110 range for consult to cardiology Dr. Salvador Muñoz will see and evaluate patient in the morning agrees with plan and will admit to Hospitalist.       Amount and/or Complexity of Data Reviewed  Clinical lab tests: ordered and reviewed  Obtain history from someone other than the patient: yes  Review and summarize past medical records: yes  Discuss the patient with other providers: yes  Independent visualization of images, tracings, or specimens: yes    Risk of Complications, Morbidity, and/or Mortality  Presenting problems: moderate  Diagnostic procedures: moderate  Management options: moderate    Critical Care  Total time providing critical care: 30-74 minutes (Total critical care time spent exclusive of procedures:  40 min.)    Patient Progress  Patient progress: improved        ED Course       Procedures    ED EKG interpretation:  Rhythm: normal sinus rhythm; Rate (approx.): 127; ST/T wave: normal; AFib. Note written by Karen Brock, as dictated by Gayathri Nunez MD 9:50 PM    CONSULT NOTE:  11:08 PM Gayathri Nunez MD spoke with Dr. Salvador Muñoz, Consult for Cardiology. Discussed available diagnostic tests and clinical findings. Dr. Salvador Muñoz will see the patient in the morning. 7:56 AM  Patient is being admitted to the hospital.  The results of their tests and reasons for their admission have been discussed with them and/or available family. They convey agreement and understanding for the need to be admitted and for their admission diagnosis. Consultation has been made with the inpatient physician specialist for hospitalization.     LABORATORY TESTS:  Recent Results (from the past 12 hour(s))   EKG, 12 LEAD, INITIAL    Collection Time: 03/27/18  9:30 PM   Result Value Ref Range    Ventricular Rate 127 BPM    Atrial Rate 133 BPM    QRS Duration 94 ms    Q-T Interval 306 ms    QTC Calculation (Bezet) 444 ms    Calculated R Axis 13 degrees    Calculated T Axis -52 degrees    Diagnosis       Atrial fibrillation with premature ventricular or aberrantly conducted   complexes  Anteroseptal infarct , age undetermined  No previous ECGs available     CBC WITH AUTOMATED DIFF    Collection Time: 03/27/18  9:40 PM   Result Value Ref Range    WBC 9.4 3.6 - 11.0 K/uL    RBC 4.09 3.80 - 5.20 M/uL    HGB 13.1 11.5 - 16.0 g/dL    HCT 38.5 35.0 - 47.0 %    MCV 94.1 80.0 - 99.0 FL    MCH 32.0 26.0 - 34.0 PG    MCHC 34.0 30.0 - 36.5 g/dL    RDW 13.2 11.5 - 14.5 %    PLATELET 963 218 - 570 K/uL    MPV 12.0 8.9 - 12.9 FL    NRBC 0.0 0  WBC    ABSOLUTE NRBC 0.00 0.00 - 0.01 K/uL    NEUTROPHILS 65 32 - 75 %    LYMPHOCYTES 25 12 - 49 %    MONOCYTES 7 5 - 13 %    EOSINOPHILS 2 0 - 7 %    BASOPHILS 1 0 - 1 %    IMMATURE GRANULOCYTES 0 0.0 - 0.5 %    ABS. NEUTROPHILS 6.1 1.8 - 8.0 K/UL    ABS. LYMPHOCYTES 2.4 0.8 - 3.5 K/UL    ABS. MONOCYTES 0.6 0.0 - 1.0 K/UL    ABS. EOSINOPHILS 0.2 0.0 - 0.4 K/UL    ABS. BASOPHILS 0.1 0.0 - 0.1 K/UL    ABS. IMM. GRANS. 0.0 0.00 - 0.04 K/UL    DF AUTOMATED     METABOLIC PANEL, COMPREHENSIVE    Collection Time: 03/27/18  9:40 PM   Result Value Ref Range    Sodium 142 136 - 145 mmol/L    Potassium 3.4 (L) 3.5 - 5.1 mmol/L    Chloride 106 97 - 108 mmol/L    CO2 26 21 - 32 mmol/L    Anion gap 10 5 - 15 mmol/L    Glucose 154 (H) 65 - 100 mg/dL    BUN 20 6 - 20 MG/DL    Creatinine 0.80 0.55 - 1.02 MG/DL    BUN/Creatinine ratio 25 (H) 12 - 20      GFR est AA >60 >60 ml/min/1.73m2    GFR est non-AA >60 >60 ml/min/1.73m2    Calcium 8.6 8.5 - 10.1 MG/DL    Bilirubin, total 0.3 0.2 - 1.0 MG/DL    ALT (SGPT) 28 12 - 78 U/L    AST (SGOT) 22 15 - 37 U/L    Alk.  phosphatase 64 45 - 117 U/L    Protein, total 6.6 6.4 - 8.2 g/dL    Albumin 3.5 3.5 - 5.0 g/dL    Globulin 3.1 2.0 - 4.0 g/dL    A-G Ratio 1.1 1.1 - 2.2     TROPONIN I    Collection Time: 03/27/18  9:40 PM   Result Value Ref Range    Troponin-I, Qt. 0.07 (H) <0.05 ng/mL   CK W/ REFLX CKMB    Collection Time: 03/27/18  9:40 PM   Result Value Ref Range    CK 79 26 - 192 U/L TROPONIN I    Collection Time: 03/27/18 11:52 PM   Result Value Ref Range    Troponin-I, Qt. 0.07 (H) <0.05 ng/mL   TROPONIN I    Collection Time: 03/28/18  4:00 AM   Result Value Ref Range    Troponin-I, Qt. 0.07 (H) <2.86 ng/mL   METABOLIC PANEL, BASIC    Collection Time: 03/28/18  4:00 AM   Result Value Ref Range    Sodium 143 136 - 145 mmol/L    Potassium 4.0 3.5 - 5.1 mmol/L    Chloride 110 (H) 97 - 108 mmol/L    CO2 26 21 - 32 mmol/L    Anion gap 7 5 - 15 mmol/L    Glucose 101 (H) 65 - 100 mg/dL    BUN 15 6 - 20 MG/DL    Creatinine 0.66 0.55 - 1.02 MG/DL    BUN/Creatinine ratio 23 (H) 12 - 20      GFR est AA >60 >60 ml/min/1.73m2    GFR est non-AA >60 >60 ml/min/1.73m2    Calcium 8.1 (L) 8.5 - 10.1 MG/DL   LIPID PANEL    Collection Time: 03/28/18  4:00 AM   Result Value Ref Range    LIPID PROFILE          Cholesterol, total 142 <200 MG/DL    Triglyceride 54 <150 MG/DL    HDL Cholesterol 76 MG/DL    LDL, calculated 55.2 0 - 100 MG/DL    VLDL, calculated 10.8 MG/DL    CHOL/HDL Ratio 1.9 0 - 5.0     CBC WITH AUTOMATED DIFF    Collection Time: 03/28/18  4:00 AM   Result Value Ref Range    WBC 7.6 3.6 - 11.0 K/uL    RBC 3.76 (L) 3.80 - 5.20 M/uL    HGB 11.8 11.5 - 16.0 g/dL    HCT 36.2 35.0 - 47.0 %    MCV 96.3 80.0 - 99.0 FL    MCH 31.4 26.0 - 34.0 PG    MCHC 32.6 30.0 - 36.5 g/dL    RDW 13.2 11.5 - 14.5 %    PLATELET 340 300 - 615 K/uL    MPV 12.3 8.9 - 12.9 FL    NRBC 0.0 0  WBC    ABSOLUTE NRBC 0.00 0.00 - 0.01 K/uL    NEUTROPHILS 55 32 - 75 %    LYMPHOCYTES 34 12 - 49 %    MONOCYTES 7 5 - 13 %    EOSINOPHILS 3 0 - 7 %    BASOPHILS 1 0 - 1 %    IMMATURE GRANULOCYTES 0 0.0 - 0.5 %    ABS. NEUTROPHILS 4.2 1.8 - 8.0 K/UL    ABS. LYMPHOCYTES 2.6 0.8 - 3.5 K/UL    ABS. MONOCYTES 0.5 0.0 - 1.0 K/UL    ABS. EOSINOPHILS 0.2 0.0 - 0.4 K/UL    ABS. BASOPHILS 0.1 0.0 - 0.1 K/UL    ABS. IMM.  GRANS. 0.0 0.00 - 0.04 K/UL    DF AUTOMATED         IMAGING RESULTS:  CT HEAD WO CONT   Final Result        Ct Head Wo Cont    Result Date: 3/28/2018  EXAM:  CT HEAD WO CONT INDICATION:   DIZZINESS/ EVALUATE FOR ICH COMPARISON: None. CONTRAST:  None. TECHNIQUE: Unenhanced CT of the head was performed using 5 mm images. Brain and bone windows were generated. CT dose reduction was achieved through use of a standardized protocol tailored for this examination and automatic exposure control for dose modulation. FINDINGS: The ventricles and sulci are normal in size, shape and configuration and midline. There is no significant white matter disease. There is no intracranial hemorrhage, extra-axial collection, mass, mass effect or midline shift. The basilar cisterns are open. No acute infarct is identified. The bone windows demonstrate no abnormalities. The visualized portions of the paranasal sinuses and mastoid air cells are clear. IMPRESSION: No acute intracranial abnormality      MEDICATIONS GIVEN:  Medications   aspirin delayed-release tablet 81 mg (not administered)   atorvastatin (LIPITOR) tablet 20 mg (not administered)   cetirizine (ZYRTEC) tablet 10 mg (not administered)   cholecalciferol (VITAMIN D3) tablet 4,000 Units (not administered)   hydroCHLOROthiazide (HYDRODIURIL) tablet 12.5 mg (not administered)   montelukast (SINGULAIR) tablet 10 mg (not administered)   raloxifene (EVISTA) tablet 60 mg (not administered)   sodium chloride (NS) flush 5-10 mL (9 mL IntraVENous Given 3/28/18 0600)   sodium chloride (NS) flush 5-10 mL (not administered)   enoxaparin (LOVENOX) injection 90 mg (90 mg SubCUTAneous Given 3/28/18 0229)   sodium chloride 0.9 % bolus infusion 1,000 mL (0 mL IntraVENous IV Completed 3/28/18 0035)   labetalol (NORMODYNE;TRANDATE) injection 20 mg (20 mg IntraVENous Given 3/27/18 2233)   potassium chloride SR (KLOR-CON 10) tablet 40 mEq (40 mEq Oral Given 3/27/18 2344)   metoprolol (LOPRESSOR) injection 2.5 mg (2.5 mg IntraVENous Given 3/28/18 0229)       IMPRESSION:  1.  Atrial fibrillation with RVR ([address] 2. Elevated troponin        PLAN:  1.  Admit to Hospitalist    Total critical care time spent exclusive of procedures:  40 minutes      Tanmay Mendez MD

## 2018-03-28 NOTE — CDMP QUERY
Patient is noted to have a BMI of 31.1  Please clarify if this patient is:     =>Morbidly obese (BMI ³ 40)  =>Obese (BMI 30 - 39.9)  =>Overweight (BMI 25 - 29.9)  =>Other explanation of clinical findings  =>Unable to determine (no explanation for clinical findings)    Presentation: 5'6\", 192 lbs = BMI 31.1    REFERENCE:  The [address] [place] [address] has issued a statement indicating that, \"Individuals who are overweight, obese, or morbidly obese are at an increased risk for certain medical conditions when compared to persons of normal weight. Therefore, these conditions are always clinically significant and reportable when documented by the provider. Please clarify and document your clinical opinion in the progress notes and discharge summary, including the definitive and or presumptive diagnosis, (suspected or probable), related to the above clinical findings. Please include clinical findings supporting your diagnosis. .     Thank You  [email or web address]  [phone]

## 2018-03-28 NOTE — PHYSICIAN ADVISORY
Short Stay Review       Pt Name:  Sinan Hargrove   MR#  [id number]   CSN#   [id number]   Room and Hospital  439/01  @ [facility]   Hospitalization date  3/27/2018  9:27 PM   Current Attending Physician  Sha Dumont MD     A discharge order has been placed for this episode of hospital care for Ms. Sinan Hargrove; since this hospital stay is less than two midnights, I reviewed Ms. [address] chart. Ms. Awan [facility]  healthcare insurance/benefit include:  Payor: [place] MED[place]ARE / Plan: [place] MEDICARE PART A & B / Product Type: Medicare /     Utilization Review related clinical summary:   The pt was hospitalized for symptomatic Afib with rapid ventricular response. She suffers from other complex morbidities. Her condition is now improved after she went through synchronized DC cardioversion. This is an exceptional recovery. Her risk of deterioration were high when she was hospitalized.      On the basis of chart review, this patient's hospitalization status      is appropriate for Naila Elder MD MPH FACP   Physician Advisor    [facility] [address]   Utilization Review, Care Management         CSN:  [id number]   SHARRON:   [id number]  Admitted on :  3/27/2018   Discharge order

## 2018-03-28 NOTE — PROGRESS NOTES
I have seen and examined this patient and agree with the assessment and plan documented by Grace Bach. Lisa ISAAC  See my discharge summary from the same date                                                                Hospitalist Progress Note  Grace Bach NP student  Answering service: [phone] OR [phone] from in house phone        Date of Service:  3/28/2018  NAME:  Ibrahima Cherry  :  1940  MRN:  [id number]      Admission Summary:    A 66-year-old white female with past medical history of paroxysmal atrial fibrillation, asthma, breast cancer, hyperlipidemia, hypertension, lung nodule, palpitations, presented to the ER from home with a chief complaint of dizziness. The patient notes onset of symptoms starting yesterday while she was walking. Episodes were intermittent and debilitating, to the point where she had to stop her activities. There were no specific exacerbating or alleviating factors. She complained of dizziness and generalized weakness which prompted her to come to the ED. She denies chest pain, shortness of breath, cough, congestion, syncope, loss of consciousness, numbness, paresthesias, slurred speech, facial droop, headache, visual disturbance, nausea, vomiting, diarrhea, melena, dysuria, hematuria, calf pain, swelling, edema, fever, chills or rash. Interval history / Subjective:   Patient now in NSR post cardioversion.      Assessment & Plan:     Dizziness and weakness r/t Afib with RVR  New onset A-fib with RVR  Continue fall precautions  Continue telemetry monitoring  Cardiology following: TIM and Cardioversion completed with 200 J with conversion to NSR  TIM no PFO, trace AR, mild MR, no TR or MN Mild atherosclerosis in prox descending aortaNo KENNEDY thrombus LA moderate dilation RA normal  MD De La Cruz recommends eliquis, flecainide and metoprolol with 2 week follow-up    Elevated Troponin  Troponin 0.07  Elevated in the setting of RVR and demand/ischemia    Obesity  BMI 32  Counseled    History of hyperlipidemia  Continue home Lipitor    History of hypertension  Continue Hydrochlorothiazide     History of Asthma  Continue home Zyrtec, singulair, Advair, and duoneb    Code status: Full  DVT prophylaxis: Lovenox    Care Plan discussed with: Patient/Family  Disposition: Home w/Family and TBD     Hospital Problems  Date Reviewed: 4/25/2017          Codes Class Noted POA    Dizziness ICD-10-CM: R42  ICD-9-CM: 780.4  3/27/2018 Unknown        * (Principal)A-fib ([facility] [address] ICD-10-CM: I48.91  ICD-9-CM: 427.31  3/27/2018 Unknown                Review of Systems:   A comprehensive review of systems was negative except for that written in the HPI. Vital Signs:    Last 24hrs VS reviewed since prior progress note. Most recent are:  Visit Vitals    /78 (BP 1 Location: Left arm, BP Patient Position: At rest)    Pulse (!) 110    Temp 97.5 °F (36.4 °C)    Resp 17    Ht 5' 6\" (1.676 m)    Wt 87.4 kg (192 lb 10.9 oz)    SpO2 96%    BMI 31.1 kg/m2         Intake/Output Summary (Last 24 hours) at 03/28/18 0830  Last data filed at 03/28/18 0513   Gross per 24 hour   Intake                0 ml   Output                0 ml   Net                0 ml        Physical Examination:             Constitutional:  No acute distress, cooperative, pleasant    ENT:  Oral mucous membranes moist, oropharynx benign. Resp:  CTA bilaterally. No wheezing/rhonchi/rales. No accessory muscle use   CV:  Regular rhythm, normal rate, no murmurs, gallops, rubs    GI:  Soft, non distended, non tender. normoactive bowel sounds, no hepatosplenomegaly     Musculoskeletal:  No edema, warm, 2+ pulses throughout    Neurologic:  Moves all extremities. AAOx3,     Psych:  Good insight, Not anxious nor agitated.     Skin:  Good turgor, no rashes or ulcers    Hematologic/Lymphatic/Immunlogic:  No jaundice nor lymph node swelling         Data Review:    Review and/or order of clinical lab test      Labs:     Recent Labs 03/28/18 0400 03/27/18 2140   WBC  7.6  9.4   HGB  11.8  13.1   HCT  36.2  38.5   PLT  172  195     Recent Labs      03/28/18   0400  03/27/18 2140   NA  143  142   K  4.0  3.4*   CL  110*  106   CO2  26  26   BUN  15  20   CREA  0.66  0.80   GLU  101*  154*   CA  8.1*  8.6   MG  2.2   --      Recent Labs      03/27/18 2140   SGOT  22   ALT  28   AP  64   TBILI  0.3   TP  6.6   ALB  3.5   GLOB  3.1     No results for input(s): INR, PTP, APTT in the last 72 hours. No lab exists for component: INREXT   No results for input(s): FE, TIBC, PSAT, FERR in the last 72 hours. No results found for: FOL, RBCF   No results for input(s): PH, PCO2, PO2 in the last 72 hours.   Recent Labs      03/28/18 0400 03/27/18 2352 03/27/18 2140   TROIQ  0.07*  0.07*  0.07*     Lab Results   Component Value Date/Time    Cholesterol, total 142 03/28/2018 04:00 AM    HDL Cholesterol 76 03/28/2018 04:00 AM    LDL, calculated 55.2 03/28/2018 04:00 AM    Triglyceride 54 03/28/2018 04:00 AM    CHOL/HDL Ratio 1.9 03/28/2018 04:00 AM     Lab Results   Component Value Date/Time    Glucose (POC) 106 (H) 10/25/2009 03:04 AM     No results found for: COLOR, APPRN, SPGRU, REFSG, EULALIO, PROTU, GLUCU, KETU, BILU, UROU, KADIE, LEUKU, GLUKE, EPSU, BACTU, WBCU, RBCU, CASTS, UCRY      Medications Reviewed:     Current Facility-Administered Medications   Medication Dose Route Frequency    enoxaparin (LOVENOX) injection 90 mg  1 mg/kg SubCUTAneous Q12H    aspirin delayed-release tablet 81 mg  81 mg Oral DAILY    atorvastatin (LIPITOR) tablet 20 mg  20 mg Oral DAILY    cetirizine (ZYRTEC) tablet 10 mg  10 mg Oral DAILY    cholecalciferol (VITAMIN D3) tablet 4,000 Units  4,000 Units Oral DAILY    hydroCHLOROthiazide (HYDRODIURIL) tablet 12.5 mg  12.5 mg Oral DAILY    montelukast (SINGULAIR) tablet 10 mg  10 mg Oral DAILY    raloxifene (EVISTA) tablet 60 mg  60 mg Oral DAILY    sodium chloride (NS) flush 5-10 mL  5-10 mL IntraVENous Q8H  sodium chloride (NS) flush 5-10 mL  5-10 mL IntraVENous PRN     ______________________________________________________________________  EXPECTED LENGTH OF STAY: - - -  ACTUAL LENGTH OF STAY:          1                 Evaline Denver

## 2018-03-28 NOTE — ED TRIAGE NOTES
Triage note: pt arrives from home for reports of dizziness all day and then tonight began to have \"lights in the upper part of my vision. \" The lights lasted only a few minutes. Pt denies weakness, or other symptoms. Pt with hx of a fib, rate between 130-150 in triage.

## 2018-03-28 NOTE — PROGRESS NOTES
TRANSFER - IN REPORT:    Verbal report received from Lee Ann Hernandez RN on Mora Rodriguez  being received from procedure arera for routine progression of care. Report consisted of patients Situation, Background, Assessment and Recommendations(SBAR). Information from the following report(s) SBAR, MAR, Recent Results and Cardiac Rhythm NSR was reviewed with the receiving clinician. Opportunity for questions and clarification was provided. Assessment completed upon patients arrival to [address] [place] [address] and care assumed. Cardiac Cath Lab Recovery Arrival Note:    Mora Rodriguez arrived to [facility] recovery area. Patient procedure= TIM/CV. Patient on cardiac monitor, non-invasive blood pressure, SPO2 monitor. On  O2 @ 2 lpm via NC.  IV  of NS on pump at 25 ml/hr. Patient status doing well without problems. Patient is A&Ox 4. Patient reports No Pain. PROCEDURE SITE CHECK:    Procedure site:without any bleeding and No Hematoma, No pain/discomfort reported at procedure site. No change in patient status. Continue to monitor patient and status.

## 2018-03-28 NOTE — DISCHARGE INSTRUCTIONS
Discharge Instructions       PATIENT ID: Mendez Hoffman  MRN: [id number]   YOB: 1940    DATE OF ADMISSION: 3/27/2018  9:27 PM    DATE OF DISCHARGE: 3/28/2018    PRIMARY CARE PROVIDER: Katia Sher MD       DISCHARGING PHYSICIAN: Nena Roth NP    To contact this individual call [phone] and ask the  to page. If unavailable ask to be transferred the Adult Hospitalist Department. DISCHARGE DIAGNOSES Atrial Fibrillation    CONSULTATIONS: IP CONSULT TO CARDIOLOGY  IP CONSULT TO HOSPITALIST    PROCEDURES/SURGERIES: * No surgery found *    PENDING TEST RESULTS:   At the time of discharge the following test results are still pending: na    FOLLOW UP APPOINTMENTS:   Follow-up Information     Follow up With Details Comments Contact Info    Morteza Segura MD Go on 4/10/2018 2 pm for follow up visit- start Eliquis and toprol and [address] [facility] 200  [facility] 7 [address] [facility]      Katia Sher MD Schedule an appointment as soon as possible for a visit As needed [address] [place] [address]  [phone]             ADDITIONAL CARE RECOMMENDATIONS: We have prescribed you Toprol XL and Flecainide which control atrial fibrillation. We have prescribed you a blood thinner called Eliquis to prevent complications from atrial fibrillation. Please follow up with Dr. Jn Mallory as scheduled for you in April. Stop your HCTZ since you are starting these other medications. Discuss this with Dr. Jn Mallory. Stop your Sudafed as these can cause you to have fast heart rates    DIET: resume    ACTIVITY: resume    WOUND CARE: na    EQUIPMENT needed: prescriptions      DISCHARGE MEDICATIONS:   See Medication Reconciliation Form    · It is important that you take the medication exactly as they are prescribed. · Keep your medication in the bottles provided by the pharmacist and keep a list of the medication names, dosages, and times to be taken in your wallet. · Do not take other medications without consulting your doctor. NOTIFY YOUR PHYSICIAN FOR ANY OF THE FOLLOWING:   Fever over 101 degrees for 24 hours. Chest pain, shortness of breath, fever, chills, nausea, vomiting, diarrhea, change in mentation, falling, weakness, bleeding. Severe pain or pain not relieved by medications. Or, any other signs or symptoms that you may have questions about. DISPOSITION:    Home With:   OT  PT  HH  RN       SNF/Inpatient Rehab/LTAC   x Independent/assisted living    Hospice    Other:     CDMP Checked:   Yes x       Signed:   Aram Sigala NP  3/28/2018  3:03 PM       Atrial Fibrillation: Care Instructions  Your Care Instructions    Atrial fibrillation is an irregular and often fast heartbeat. Treating this condition is important for several reasons. It can cause blood clots, which can travel from your heart to your brain and cause a stroke. If you have a fast heartbeat, you may feel lightheaded, dizzy, and weak. An irregular heartbeat can also increase your risk for heart failure. Atrial fibrillation is often the result of another heart condition, such as high blood pressure or coronary artery disease. Making changes to improve your heart condition will help you stay healthy and active. Follow-up care is a key part of your treatment and safety. Be sure to make and go to all appointments, and call your doctor if you are having problems. It's also a good idea to know your test results and keep a list of the medicines you take. How can you care for yourself at home? Medicines  ? · Take your medicines exactly as prescribed. Call your doctor if you think you are having a problem with your medicine. You will get more details on the specific medicines your doctor prescribes. ? · If your doctor has given you a blood thinner to prevent a stroke, be sure you get instructions about how to take your medicine safely. Blood thinners can cause serious bleeding problems.    ? · Do not take any vitamins, over-the-counter drugs, or herbal products without talking to your doctor first.   ? Lifestyle changes  ? · Do not smoke. Smoking can increase your chance of a stroke and heart attack. If you need help quitting, talk to your doctor about stop-smoking programs and medicines. These can increase your chances of quitting for good. ? · Eat a heart-healthy diet. ? · Stay at a healthy weight. Lose weight if you need to.   ? · Limit alcohol to 2 drinks a day for men and 1 drink a day for women. Too much alcohol can cause health problems. ? · Avoid colds and flu. Get a pneumococcal vaccine shot. If you have had one before, ask your doctor whether you need another dose. Get a flu shot every year. If you must be around people with colds or flu, wash your hands often. Activity  ? · If your doctor recommends it, get more exercise. Walking is a good choice. Bit by bit, increase the amount you walk every day. Try for at least 30 minutes on most days of the week. You also may want to swim, bike, or do other activities. Your doctor may suggest that you join a cardiac rehabilitation program so that you can have help increasing your physical activity safely. ? · Start light exercise if your doctor says it is okay. Even a small amount will help you get stronger, have more energy, and manage stress. Walking is an easy way to get exercise. Start out by walking a little more than you did in the hospital. Gradually increase the amount you walk. ? · When you exercise, watch for signs that your heart is working too hard. You are pushing too hard if you cannot talk while you are exercising. If you become short of breath or dizzy or have chest pain, sit down and rest immediately. ? · Check your pulse regularly. Place two fingers on the artery at the palm side of your wrist, in line with your thumb. If your heartbeat seems uneven or fast, talk to your doctor. When should you call for help?   Call [phone] anytime you think you may need emergency care. For example, call if:  ? · You have symptoms of a heart attack. These may include:  ¨ Chest pain or pressure, or a strange feeling in the chest.  ¨ Sweating. ¨ Shortness of breath. ¨ Nausea or vomiting. ¨ Pain, pressure, or a strange feeling in the back, neck, jaw, or upper belly or in one or both shoulders or arms. ¨ Lightheadedness or sudden weakness. ¨ A fast or irregular heartbeat. After you call 911, the  may tell you to chew 1 adult-strength or 2 to 4 low-dose aspirin. Wait for an ambulance. Do not try to drive yourself. ? · You have symptoms of a stroke. These may include:  ¨ Sudden numbness, tingling, weakness, or loss of movement in your face, arm, or leg, especially on only one side of your body. ¨ Sudden vision changes. ¨ Sudden trouble speaking. ¨ Sudden confusion or trouble understanding simple statements. ¨ Sudden problems with walking or balance. ¨ A sudden, severe headache that is different from past headaches. ? · You passed out (lost consciousness). ?Call your doctor now or seek immediate medical care if:  ? · You have new or increased shortness of breath. ? · You feel dizzy or lightheaded, or you feel like you may faint. ? · Your heart rate becomes irregular. ? · You can feel your heart flutter in your chest or skip heartbeats. Tell your doctor if these symptoms are new or worse. ? Watch closely for changes in your health, and be sure to contact your doctor if you have any problems. Where can you learn more? Go to http://mary-ginger.info/. Enter U020 in the search box to learn more about \"Atrial Fibrillation: Care Instructions. \"  Current as of: September 21, 2016  Content Version: 11.4  © 7348-6302 [employer]. Care instructions adapted under license by [email or web address] (which disclaims liability or warranty for this information).  If you have questions about a medical condition or this instruction, always ask your healthcare professional. [address] any warranty or liability for your use of this information. Learning About Cardioversion  What is cardioversion? Cardioversion helps your heart return to a normal rhythm. It treats problems like atrial fibrillation. It is also sometimes used in emergencies. It can correct a fast heartbeat that causes low blood pressure, chest pain, or heart failure. There are two types:  · The electrical type uses an electric current. The current enters your body through patches on your chest or back. · The chemical type uses medicines. The medicine is usually put into your arm through a tube called an IV. How is cardioversion done? Your doctor may ask you to take medicines before the treatment. These help prevent blood clots. Your doctor will watch you closely to make sure that there are no problems. Electrical cardioversion  The electrical procedure is done in a hospital. You will get medicine to help you relax and control the pain. Your doctor will put patches on your chest or back. The patches send an electric current to your heart. This resets your heart rhythm. The electrical part takes about 5 minutes. But you will probably be in the hospital for 1 to 2 hours. You will need to recover from the effects of the sedative medicine. Chemical cardioversion  The chemical procedure is most often done in a hospital. In most cases, the medicine is put into your arm through a tube called an IV. But you may get medicines to take by mouth. You may feel a quick sting or pinch when the IV starts. The procedure usually takes about 4 to 8 hours. What can you expect after cardioversion? · You can usually go home the same day. You will need someone to drive you home. · Your doctor may have you take medicines daily. These help your heart beat normally and prevent blood clots.   · After electrical cardioversion, you may have redness where the patches were. This looks and feels like a sunburn. · Abnormal heart rhythms sometimes come back after cardioversion. Follow-up care is a key part of your treatment and safety. Be sure to make and go to all appointments, and call your doctor if you are having problems. It's also a good idea to know your test results and keep a list of the medicines you take. Where can you learn more? Go to http://mary-ginger.info/. Enter Y463 in the search box to learn more about \"Learning About Cardioversion. \"  Current as of: September 21, 2016  Content Version: 11.4  © 9261-0444 [employer]. Care instructions adapted under license by [employer] (which disclaims liability or warranty for this information). If you have questions about a medical condition or this instruction, always ask your healthcare professional. [facility] any warranty or liability for your use of this information.   Follow up 4/10/18 at 2:00 pm with Dr Jovanny Mendez, post cardioversion   eliquis $ 10 copay card given   toprol and flecainide  Stop hydrochlorothiazide

## 2018-03-28 NOTE — ROUTINE PROCESS
Bedside shift change report given to Elvira (oncoming nurse) by Dala Fothergill (offgoing nurse). Report included the following information SBAR, OR Summary, Procedure Summary, Med Rec Status and Cardiac Rhythm A-fib.

## 2018-03-28 NOTE — PROGRESS NOTES
Hospital follow-up PCP transitional care appointment has been scheduled with Dr. Juan Diego Silva for Tuesday, 4/3/18 at 10:00 a.m. Pending patient discharge.   Levar Juares, Care Management Specialist.

## 2018-03-28 NOTE — PROGRESS NOTES
TRANSFER - OUT REPORT:    Verbal report given to Elvira MADISON on Jaycee Artist being transferred to Room 439 for routine progression of care       Report consisted of patients Situation, Background, Assessment and   Recommendations(SBAR). Information from the following report(s) SBAR, Procedure Summary, MAR, Recent Results and Cardiac Rhythm NSR was reviewed with the receiving nurse. Opportunity for questions and clarification was provided.

## 2018-03-28 NOTE — PROGRESS NOTES
I have reviewed discharge instructions with the patient and pt's daughter. The patient and family verbalized understanding.

## 2018-03-28 NOTE — ROUTINE PROCESS
TRANSFER - IN REPORT:    Verbal report received from [facility] (name) on Ivory Tom  being received from ED(unit) for routine progression of care      Report consisted of patients Situation, Background, Assessment and   Recommendations(SBAR). Information from the following report(s) SBAR, Intake/Output, Med Rec Status and Cardiac Rhythm A-Fib 120s was reviewed with the receiving nurse. Opportunity for questions and clarification was provided. Assessment completed upon patients arrival to unit and care assumed.      SKIN  Primary Nurse Chanda Candelaria RN and  Vj Glynn RN performed a dual skin assessment on this patient No impairment noted  Guicho score is 23

## 2018-03-28 NOTE — PROGRESS NOTES
TRANSFER - IN REPORT:    Verbal report received from EVER Jhaveri RN(name) on Caden Amos  being received from Room 439(unit) for routine progression of care      Report consisted of patients Situation, Background, Assessment and   Recommendations(SBAR). Information from the following report(s) SBAR, Procedure Summary, MAR, Recent Results and Cardiac Rhythm AFIB was reviewed with the receiving nurse. Opportunity for questions and clarification was provided. Assessment completed upon patients arrival to unit and care assumed.

## 2018-04-02 ENCOUNTER — TELEPHONE (OUTPATIENT)
Dept: CARDIOLOGY CLINIC | Age: 78
End: 2018-04-02

## 2018-04-02 NOTE — TELEPHONE ENCOUNTER
Verified patient with two patient identifiers. Spoke with patient,she does not think she is back on Afib. She checked her HR and BP was normal.Advised her to come in for EKG,she declined. As far shortness of breath,she has hx. of Asthma and she is wheezing. She's concerned about her weight and gained 4-5 lbs. Discussed with Gordon Moctezuma and he does not think she needs to be on diuretics since EF last TIM was 60%. Patient said that she has appointment to see PCP this coming Thursday (4/5/18). She was advised if symptoms gets worse to go to ED.

## 2018-04-02 NOTE — TELEPHONE ENCOUNTER
Verified patient with two types of identifiers. Called c/o shortness of breath and a 4-5lb weight gain along with a cough. She is concerned that she is holding on to fluid and wants to know what to do. Advised her that I would let Dr Mary Villegas know and call with any changes.

## 2018-04-03 ENCOUNTER — HOSPITAL ENCOUNTER (OUTPATIENT)
Dept: GENERAL RADIOLOGY | Age: 78
Discharge: HOME | End: 2018-04-03
Payer: MEDICARE

## 2018-04-03 DIAGNOSIS — R06.02 SOB (SHORTNESS OF BREATH): ICD-10-CM

## 2018-04-03 PROCEDURE — 71046 X-RAY EXAM CHEST 2 VIEWS: CPT

## 2018-04-10 ENCOUNTER — OFFICE VISIT (OUTPATIENT)
Dept: CARDIOLOGY CLINIC | Age: 78
End: 2018-04-10

## 2018-04-10 VITALS
DIASTOLIC BLOOD PRESSURE: 64 MMHG | BODY MASS INDEX: 30.37 KG/M2 | HEIGHT: 66 IN | SYSTOLIC BLOOD PRESSURE: 122 MMHG | HEART RATE: 59 BPM | WEIGHT: 189 LBS

## 2018-04-10 DIAGNOSIS — Z98.890 HISTORY OF CARDIOVERSION: ICD-10-CM

## 2018-04-10 DIAGNOSIS — I48.91 ATRIAL FIBRILLATION, UNSPECIFIED TYPE (HCC): Primary | ICD-10-CM

## 2018-04-10 DIAGNOSIS — I10 ESSENTIAL HYPERTENSION: ICD-10-CM

## 2018-04-10 RX ORDER — METOPROLOL SUCCINATE 50 MG/1
50 TABLET, EXTENDED RELEASE ORAL DAILY
Qty: 90 TAB | Refills: 1 | Status: SHIPPED | OUTPATIENT
Start: 2018-04-10 | End: 2018-04-25 | Stop reason: DRUGHIGH

## 2018-04-10 RX ORDER — FLECAINIDE ACETATE 100 MG/1
100 TABLET ORAL EVERY 12 HOURS
Qty: 180 TAB | Refills: 1 | Status: SHIPPED | OUTPATIENT
Start: 2018-04-10 | End: 2018-08-01 | Stop reason: DRUGHIGH

## 2018-04-10 NOTE — MR AVS SNAPSHOT
[address] [facility] [address] [place] [address] 
[phone] Patient: Anali Jones MRN: [id number] Visit Information Date & Time Provider Department Dept. Phone Encounter #  
 4/10/2018  2:00 PM Sherri Kwan MD [facility] [phone] [id number] Follow-up Instructions Return in about 6 months (around 10/10/2018). Your Appointments 4/25/2018 11:20 AM  
ESTABLISHED PATIENT with Raul Corey MD  
[facility] ([facility]) Appt Note: annual f/u per Dr. Kael Mills [address]  
[address] [facility]  
  
    
 10/16/2018  2:00 PM  
ESTABLISHED PATIENT with Sherri Kwan MD  
[facility] ([facility]) Appt Note: 6 month f/u  
 [address] [place] [address]  
[address] [facility] [address] [address] Upcoming Health Maintenance Date Due Pneumococcal 65+ High/Highest Risk (2 of 2 - PPSV23) 3/14/2013 GLAUCOMA SCREENING Q2Y 6/2/2017 DTaP/Tdap/Td series (2 - Td) 12/14/2017 MEDICARE YEARLY EXAM 3/14/2018 Allergies as of 4/10/2018  Review Complete On: 4/10/2018 By: Sherri Kwan MD  
  
 Severity Noted Reaction Type Reactions Betadine [Povidone-iodine]  04/25/2017    Rash Current Immunizations  Reviewed on 3/28/2018 Name Date Hep A Vaccine 1/1/2009 Hep B Vaccine 1/1/2009 Influenza High Dose Vaccine PF 9/30/2014 Influenza Vaccine 10/12/2017 Pneumococcal Vaccine (Unspecified Type) 3/14/2008 Tdap 12/14/2007 Typhoid Vaccine 1/28/2009 Zoster Vaccine, Live 1/8/2008 Not reviewed this visit You Were Diagnosed With   
  
 Codes Comments  [facility], unspecified [facility]    -  Primary ICD-10-CM: I48.91 
ICD-9-CM: 427.31   
 Essential hypertension     ICD-10-CM: I10 
ICD-9-CM: 401.9 History of cardioversion     ICD-10-CM: Z98.890 ICD-9-CM: V15.1 Vitals BP Pulse Height(growth percentile) Weight(growth percentile) BMI OB Status 122/64 (BP 1 Location: Left arm, BP Patient Position: Sitting) (!) 59 5' 6\" (1.676 m) 189 lb (85.7 kg) 30.51 kg/m2 Postmenopausal  
 Smoking Status Never Smoker Vitals History BMI and BSA Data Body Mass Index Body Surface Area 30.51 kg/m 2 2 m 2 Preferred Pharmacy Pharmacy Name Phone [facility], [facility] [phone] Your Updated Medication List  
  
   
This list is accurate as of 4/10/18  3:05 PM.  Always use your most recent med list.  
  
  
  
  
 Davis Neumann 250-50 mcg/dose diskus inhaler Generic drug:  fluticasone-salmeterol Take 1 Puff by inhalation as needed. albuterol 90 mcg/actuation inhaler Commonly known as:  PROVENTIL HFA, VENTOLIN HFA, PROAIR HFA Take 1 Puff by inhalation every four (4) hours as needed. apixaban 5 mg tablet Commonly known as:  Verl Nailer Take 1 Tab by mouth two (2) times a day. aspirin delayed-release 81 mg tablet Take  by mouth daily. atorvastatin 20 mg tablet Commonly known as:  LIPITOR Take 1 Tab by mouth daily. cetirizine 10 mg tablet Commonly known as:  ZYRTEC Take 10 mg by mouth daily. cholecalciferol (vitamin D3) 2,000 unit Tab Take 4,000 Units by mouth daily. EPINEPHrine 0.3 mg/0.3 mL injection Commonly known as:  EPIPEN  
0.3 mL by IntraMUSCular route once as needed for up to 1 dose. ESTRACE 0.01 % (0.1 mg/gram) vaginal cream  
Generic drug:  estradiol Insert 2 g into vagina every Monday and Thursday. flecainide 100 mg tablet Commonly known as:  TAMBOCOR Take 1 Tab by mouth every twelve (12) hours. fluticasone 50 mcg/actuation nasal spray Commonly known as:  Margarita Mealy 2 Sprays by Both Nostrils route daily. metoprolol succinate 50 mg XL tablet Commonly known as:  TOPROL-XL Take 1 Tab by mouth daily. multivitamin tablet Commonly known as:  ONE A DAY Take 1 Tab by mouth daily. OTHER Vi active daily  
  
 raloxifene 60 mg tablet Commonly known as:  EVISTA Take 1 Tab by mouth daily. SINGULAIR 10 mg tablet Generic drug:  montelukast  
Take 10 mg by mouth daily. We Performed the Following AMB POC EKG ROUTINE W/ 12 LEADS, INTER & REP [27595 CPT(R)] Follow-up Instructions Return in about 6 months (around 10/10/2018). Introducing [facility] SERVICES! Dear Tracey Sees: Thank you for requesting a Klarna account. Our records indicate that you already have an active Klarna account. You can access your account anytime at [email or web address] Did you know that you can access your hospital and ER discharge instructions at any time in Klarna? You can also review all of your test results from your hospital stay or ER visit. Additional Information If you have questions, please visit the Frequently Asked Questions section of the Klarna website at [email or web address] Remember, Klarna is NOT to be used for urgent needs. For medical emergencies, dial 911. Now available from your iPhone and Android! Please provide this summary of care documentation to your next provider. Your primary care clinician is listed as Rigo Whitney. If you have any questions after today's visit, please call [phone].

## 2018-04-10 NOTE — PROGRESS NOTES
Verified patient with two types of identifiers. Verified medications with the patient. Verified pharmacy with patient.

## 2018-04-10 NOTE — PROGRESS NOTES
Cardiac Electrophysiology OFFICE Note     Subjective:      Jagdish Espinal is a 68 y.o. patient who is seen for evaluation of atrial fibrillation after cardioversion. She had a TIM with normal left ventricular function and mild MR TR. The patient had dilated left atrium. After cardioversion she felt better. However she had cold probably related to the seasonal change. She also have dry cough. She felt tired but tolerated the flecainide. This week she has gotten better and get used to it. She is traveling to [place] in a few days and will be there for 10 days. When I saw her at [facility]:  Atrial fibrillation with RVR and troponin 0.07  She has no chest pain and no known AFIB before  She had seen Dr Katia Durand in clinic and had holter with PACs  She felt tired and dizzy starting the night before last night but waited to come to ER when symptoms did not resolve  ECG showed atrial fibrillation with RVR and rate has not been controlled with metoprolol IV  Stress test was negative 5 years ago per her  She has no GI bleeding  No stroke  Echo in 2016 with normal LVEF, mild MR     Patient Active Problem List   Diagnosis Code    Dyslipidemia E78.5    Palpitations R00.2    Chest pain, unspecified R07.9    Asthma J45.909    Hyperlipidemia LDL goal < 130 E78.5    HTN (hypertension) I10    Breast cancer (HCC) C50.919    Lung nodule R91.1    Cystocele GUQ9147    Uterine prolapse N81.4    Dizziness R42    A-fib ([facility] [address] I48.91    Encounter for cardioversion procedure Z01.89     Current Outpatient Prescriptions   Medication Sig Dispense Refill    apixaban (ELIQUIS) 5 mg tablet Take 1 Tab by mouth two (2) times a day. 60 Tab 2    flecainide (TAMBOCOR) 100 mg tablet Take 1 Tab by mouth every twelve (12) hours. 60 Tab 2    metoprolol succinate (TOPROL-XL) 50 mg XL tablet Take 1 Tab by mouth daily. 30 Tab 2    cetirizine (ZYRTEC) 10 mg tablet Take 10 mg by mouth daily.       fluticasone-salmeterol (ADVAIR DISKUS) 250-50 mcg/dose diskus inhaler Take 1 Puff by inhalation as needed.  estradiol (ESTRACE) 0.01 % (0.1 mg/gram) vaginal cream Insert 2 g into vagina every Monday and Thursday.  OTHER Vi active daily      multivitamin (ONE A DAY) tablet Take 1 Tab by mouth daily.  cholecalciferol, vitamin D3, 2,000 unit tab Take 4,000 Units by mouth daily.  atorvastatin (LIPITOR) 20 mg tablet Take 1 Tab by mouth daily. 90 Tab 3    EPINEPHrine (EPIPEN) 0.3 mg/0.3 mL (1:1,000) injection 0.3 mL by IntraMUSCular route once as needed for up to 1 dose. 0.3 mL 0    albuterol (PROVENTIL HFA, VENTOLIN HFA, PROAIR HFA) 90 mcg/actuation inhaler Take 1 Puff by inhalation every four (4) hours as needed. 2 Inhaler 3    fluticasone (FLONASE) 50 mcg/actuation nasal spray 2 Sprays by Both Nostrils route daily. 2 Bottle 6    raloxifene (EVISTA) 60 mg tablet Take 1 Tab by mouth daily. 90 Tab 4    montelukast (SINGULAIR) 10 mg tablet Take 10 mg by mouth daily.  aspirin delayed-release 81 mg tablet Take  by mouth daily. Allergies   Allergen Reactions    Betadine [Povidone-Iodine] Rash     Past Medical History:   Diagnosis Date    Asthma     dr. Pebbles [place] allergist    Atrial fibrillation ([facility] [address]     Breast cancer ([facility] [address] 1980    Dyslipidemia     labs 2008 - chol 283, HDL 83, ,     HTN (hypertension)     Hyperlipidemia LDL goal < 130     for increased LDL particles, Dr. Kimber Yan nodule     Dr. Yariel Ramirez Palpitations     resolved     Past Surgical History:   Procedure Laterality Date    CT HEART W/O CONT WITH CALCIUM  1/2012    CAC score 0; calcified mediastinal lymph nodes consistent granulomatous disease    ECHO 2D ADULT  5/5/2008    normal, LVEF 60%    HX GYN      HX HYSTERECTOMY Bilateral 03/19/2015    HX UROLOGICAL  8/1/14    Urodynamics    VT CARDIOVERSION ELECTIVE ARRHYTHMIA EXTERNAL  3/28/2018         STRESS TEST CARDIOLITE  1/5/12    walked 7:31, no chest pain, normal MPI.      Family History   Problem Relation Age of Onset    Heart Failure Mother     Stroke Father     Other Other      endometrial cancer, neice     Social History   Substance Use Topics    Smoking status: Never Smoker    Smokeless tobacco: Never Used    Alcohol use Yes      Comment: wine nightly        Review of Systems:   Constitutional: Negative for fever, chills, weight loss, malaise/fatigue. HEENT: Negative for nosebleeds, vision changes. Respiratory: Negative for cough, hemoptysis  Cardiovascular: Negative for chest pain, palpitations, orthopnea, claudication, leg swelling, syncope, and PND. Gastrointestinal: Negative for nausea, vomiting, diarrhea, blood in stool and melena. Genitourinary: Negative for dysuria, and hematuria. Musculoskeletal: Negative for myalgias, arthralgia. Skin: Negative for rash. Heme: Does not bleed or bruise easily. Neurological: Negative for speech change and focal weakness     Objective:     Visit Vitals    /64 (BP 1 Location: Left arm, BP Patient Position: Sitting)    Pulse (!) 59    Ht 5' 6\" (1.676 m)    Wt 189 lb (85.7 kg)    BMI 30.51 kg/m2      Physical Exam:   Constitutional: well-developed and well-nourished. No respiratory distress. Head: Normocephalic and atraumatic. Eyes: Pupils are equal, round  ENT: hearing normal  Neck: supple. No JVD present. Cardiovascular: Normal rate, regular rhythm. Exam reveals no gallop and no friction rub. No murmur heard. Pulmonary/Chest: Effort normal and breath sounds normal. No wheezes. Abdominal: Soft, no tenderness. Musculoskeletal: no edema. Neurological: alert,oriented. Skin: Skin is warm and dry  Psychiatric: normal mood and affect. Behavior is normal. Judgment and thought content normal.      EKG: Normal sinus rhythm with first degree av block  normal QRS duration.   Anteroseptal Q waves present    Assessment/Plan:       ICD-10-CM ICD-9-CM    1. persistent atrial fibrillation I48.91 427.31 AMB POC EKG ROUTINE W/ 12 LEADS, INTER & REP   2. Essential hypertension I10 401.9    3. History of cardioversion Z98.890 V15.1      reviewed diet, exercise and weight control  reviewed medications and side effects in detail   Patient is doing better with sinus rhythm. She is recovering from her allergies or common cold. She may travel to [address] [place] [address]. The patient will see Dr. Savannah Mendez when she comes back. I will see her again in 6 months if she has recurrent atrial fibrillation, she may benefit from EP study and catheter ablation  Thank you for involving me in this patient's care and please call with further concerns or questions. Naida Harada, M.D.   Electrophysiology/Cardiology  [facility] [place]  [address] [facility], [facility] [address]  [address] [facility], [address] [place] [address]  [phone]

## 2018-04-25 ENCOUNTER — OFFICE VISIT (OUTPATIENT)
Dept: CARDIOLOGY CLINIC | Age: 78
End: 2018-04-25

## 2018-04-25 VITALS
HEIGHT: 66 IN | WEIGHT: 191 LBS | HEART RATE: 60 BPM | DIASTOLIC BLOOD PRESSURE: 68 MMHG | BODY MASS INDEX: 30.7 KG/M2 | SYSTOLIC BLOOD PRESSURE: 146 MMHG

## 2018-04-25 DIAGNOSIS — I48.19 PERSISTENT ATRIAL FIBRILLATION (HCC): Primary | ICD-10-CM

## 2018-04-25 RX ORDER — METOPROLOL SUCCINATE 25 MG/1
25 TABLET, EXTENDED RELEASE ORAL
COMMUNITY
End: 2018-09-07

## 2018-04-25 NOTE — PROGRESS NOTES
HISTORY OF PRESENT ILLNESS  Mickey Sorenson is a 68 y.o. female. Patient with h/o HTN, HLD, Ca score of 0 in 2012, PVCs on ECG on 1/16 and echo on 1/16 with EF 55% mild MR. S/p LTKR in 1/16(seen by Dr. Nicole Lawrence)  Also remote h/o breast ca s/p mastectomy in 1980 but no chemo or xrt  Here for follow up  Echo on 4/16:Ejection fraction was estimated to be 55 %. There were no  regional wall motion abnormalities. Wall thickness was at the upper limits  of normal.    Left atrium: The atrium was mildly dilated. Mitral valve: There was mild regurgitation. Aortic valve: Leaflets exhibited sclerosis. holter on 4/16: NSR  frequent pacs couplets triples and occasional non sustained at, frequent pvcs (0.65%)  On 3/18: [facility] to [facility] onset A-fib with RVR  TIM and Cardioversion completed with 200 J with conversion to NSR  TIM no PFO, trace AR, mild MR, no TR or ID Mild atherosclerosis in prox descending aortaNo KENNEDY thrombus LA moderate dilation RA normal  Started eliquis, flecainide and metoprolol             Past Medical History   Diagnosis Date    Palpitations            resolved    Dyslipidemia            labs 2008 - chol 283, HDL 83, ,     HTN (hypertension)       Breast cancer (HCC) Nilson Scales allergist    Hyperlipidemia LDL goal < 130            for increased LDL particles, Dr. Bauer Sender Dr. Jackie Lopez   Past Surgical History   Procedure Laterality Date    Echo 2d adult    5/5/2008         normal, LVEF 60%    Stress test cardiolite    1/5/12         walked 7:31, no chest pain, normal MPI.     Ct heart w/o cont with calcium    1/2012         CAC score 0; calcified mediastinal lymph nodes consistent granulomatous disease    Hx urological    8/1/14         Urodynamics    Hx gyn          Hx hysterectomy Bilateral        HPI  Feels tired fatigued lack of pep  No cp or sob or palpitations  Lots of bruises after oac started  Review of Systems   Constitutional: Positive for malaise/fatigue. Respiratory: Negative. Cardiovascular: Negative. Physical Exam  Physical Exam   Blood pressure 146/68, pulse 60, height 5' 6\" (1.676 m), weight 86.6 kg (191 lb). Constitutional: She is oriented to person, place, and time. She appears well-developed and well-nourished. No distress. HENT: Head: Normocephalic. Eyes: No scleral icterus. Neck: Normal range of motion. Neck supple. No JVD present. No tracheal deviation present. Cardiovascular: Normal rate, regular rhythm, normal heart sounds and intact distal pulses. Exam reveals no gallop and no friction rub. No murmur heard. Pulmonary/Chest: Effort normal and breath sounds normal. No stridor. No respiratory distress, wheezes or  rales. Abdominal: She exhibits no distension. Musculoskeletal: She exhibits no edema. Neurological: She is alert and oriented to person, place, and time. Coordination normal.   Skin: Skin is warm. No rash noted. Not diaphoretic. No erythema. Psychiatric:  Normal mood and affect. Behavior is normal.   Current Outpatient Prescriptions on File Prior to Visit   Medication Sig Dispense Refill    apixaban (ELIQUIS) 5 mg tablet Take 1 Tab by mouth two (2) times a day. 180 Tab 1    flecainide (TAMBOCOR) 100 mg tablet Take 1 Tab by mouth every twelve (12) hours. 180 Tab 1    metoprolol succinate (TOPROL-XL) 50 mg XL tablet Take 1 Tab by mouth daily. 90 Tab 1    cetirizine (ZYRTEC) 10 mg tablet Take 10 mg by mouth daily.  fluticasone-salmeterol (ADVAIR DISKUS) 250-50 mcg/dose diskus inhaler Take 1 Puff by inhalation as needed.  estradiol (ESTRACE) 0.01 % (0.1 mg/gram) vaginal cream Insert 2 g into vagina every Monday and Thursday.  OTHER Vi active daily      multivitamin (ONE A DAY) tablet Take 1 Tab by mouth daily.  cholecalciferol, vitamin D3, 2,000 unit tab Take 4,000 Units by mouth daily.       atorvastatin (LIPITOR) 20 mg tablet Take 1 Tab by mouth daily. 90 Tab 3    EPINEPHrine (EPIPEN) 0.3 mg/0.3 mL (1:1,000) injection 0.3 mL by IntraMUSCular route once as needed for up to 1 dose. 0.3 mL 0    albuterol (PROVENTIL HFA, VENTOLIN HFA, PROAIR HFA) 90 mcg/actuation inhaler Take 1 Puff by inhalation every four (4) hours as needed. 2 Inhaler 3    fluticasone (FLONASE) 50 mcg/actuation nasal spray 2 Sprays by Both Nostrils route daily. 2 Bottle 6    raloxifene (EVISTA) 60 mg tablet Take 1 Tab by mouth daily. 90 Tab 4    montelukast (SINGULAIR) 10 mg tablet Take 10 mg by mouth daily.  aspirin delayed-release 81 mg tablet Take  by mouth daily. No current facility-administered medications on file prior to visit.         ASSESSMENT and PLAN  PAF: s/p brandy cardioversion on 3/18 she remains in NSR  I suspect fatigue mostly related to toprol, this will be decreased to 25 mg and she will start to take it at night  Continue same dose of flecainide  Continue eliquis but stop asa for bruising and also she has no other h/o cad at this time  HTN: a bit elevated now off hctz I have asked her to keep an eye on it and let me know if it stays consistently >140 , may need to add medication  HLD: closely followed by her PCP  See her back in 3 months  unless of occurring symptoms

## 2018-04-25 NOTE — MR AVS SNAPSHOT
[address] [facility] [address] [place] 
[phone] Patient: Jamil Nixon MRN: [id number] Visit Information Date & Time Provider Department Dept. Phone Encounter #  
 4/25/2018 11:20 AM Dunia Grimes MD [facility] [place] [phone] [id number] Follow-up Instructions Return in about 3 months (around 7/25/2018). Follow-up and Disposition History Your Appointments 10/16/2018  2:00 PM  
ESTABLISHED PATIENT with Tiffany Yanez MD  
[facility] ([address]) Appt Note: 6 month f/u  
 [address] [facility] [address] [place]  
[address] [facility] [facility] [phone] Upcoming Health Maintenance Date Due Pneumococcal 65+ High/Highest Risk (2 of 2 - PPSV23) 3/14/2013 GLAUCOMA SCREENING Q2Y 6/2/2017 DTaP/Tdap/Td series (2 - Td) 12/14/2017 MEDICARE YEARLY EXAM 3/14/2018 Allergies as of 4/25/2018  Review Complete On: 4/25/2018 By: Dunia Grimes MD  
  
 Severity Noted Reaction Type Reactions Betadine [Povidone-iodine]  04/25/2017    Rash Current Immunizations  Reviewed on 3/28/2018 Name Date Hep A Vaccine 1/1/2009 Hep B Vaccine 1/1/2009 Influenza High Dose Vaccine PF 9/30/2014 Influenza Vaccine 10/12/2017 Pneumococcal Vaccine (Unspecified Type) 3/14/2008 Tdap 12/14/2007 Typhoid Vaccine 1/28/2009 Zoster Vaccine, Live 1/8/2008 Not reviewed this visit You Were Diagnosed With   
  
 Codes Comments Persistent atrial fibrillation (HCC)    -  Primary ICD-10-CM: I48.1 ICD-9-CM: 427.31 Vitals BP Pulse Height(growth percentile) Weight(growth percentile) BMI OB Status 146/68 (BP 1 Location: Left arm, BP Patient Position: Sitting) 60 5' 6\" (1.676 m) 191 lb (86.6 kg) 30.83 kg/m2 Postmenopausal  
 Smoking Status Never Smoker Vitals History BMI and BSA Data Body Mass Index Body Surface Area  
 30.83 kg/m 2 2.01 m 2 Preferred Pharmacy Pharmacy Name Phone [facility], [facility] [phone] Your Updated Medication List  
  
   
This list is accurate as of 4/25/18 12:22 PM.  Always use your most recent med list.  
  
  
  
  
 Frank  250-50 mcg/dose diskus inhaler Generic drug:  fluticasone-salmeterol Take 1 Puff by inhalation as needed. albuterol 90 mcg/actuation inhaler Commonly known as:  PROVENTIL HFA, VENTOLIN HFA, PROAIR HFA Take 1 Puff by inhalation every four (4) hours as needed. apixaban 5 mg tablet Commonly known as:  Jay Sheets Take 1 Tab by mouth two (2) times a day. atorvastatin 20 mg tablet Commonly known as:  LIPITOR Take 1 Tab by mouth daily. cetirizine 10 mg tablet Commonly known as:  ZYRTEC Take 10 mg by mouth daily. cholecalciferol (vitamin D3) 2,000 unit Tab Take 4,000 Units by mouth daily. EPINEPHrine 0.3 mg/0.3 mL injection Commonly known as:  EPIPEN  
0.3 mL by IntraMUSCular route once as needed for up to 1 dose. ESTRACE 0.01 % (0.1 mg/gram) vaginal cream  
Generic drug:  estradiol Insert 2 g into vagina every Monday and Thursday. flecainide 100 mg tablet Commonly known as:  TAMBOCOR Take 1 Tab by mouth every twelve (12) hours. fluticasone 50 mcg/actuation nasal spray Commonly known as:  Deon Andrey 2 Sprays by Both Nostrils route daily. multivitamin tablet Commonly known as:  ONE A DAY Take 1 Tab by mouth daily. OTHER Vi active daily  
  
 raloxifene 60 mg tablet Commonly known as:  EVISTA Take 1 Tab by mouth daily. SINGULAIR 10 mg tablet Generic drug:  montelukast  
Take 10 mg by mouth daily. TOPROL XL 25 mg XL tablet Generic drug:  metoprolol succinate Take 25 mg by mouth nightly. Follow-up Instructions Return in about 3 months (around 7/25/2018). Patient Instructions Stop Aspirin Decrease Toprol XL 25 mg at bedtime BP check for 2 weeks and keep log Follow up with DR. Colunga in 3 months Introducing [facility] SERVICES! Dear Chase Curtis: Thank you for requesting a [employer] account. Our records indicate that you already have an active [employer] account. You can access your account anytime at [email or web address]. [email or web address] Did you know that you can access your hospital and ER discharge instructions at any time in Ne[employer]? You can also review all of your test results from your hospital stay or ER visit. Additional Information If you have questions, please visit the Frequently Asked Questions section of the Neov[employer] website at [email or web address] Remember, Neov[employer] is NOT to be used for urgent needs. For medical emergencies, dial 911. Now available from your iPhone and Android! Please provide this summary of care documentation to your next provider. Your primary care clinician is listed as Enoch Medrano. If you have any questions after today's visit, please call [phone].

## 2018-04-25 NOTE — PATIENT INSTRUCTIONS
Stop Aspirin    Decrease Toprol XL 25 mg at bedtime    BP check for 2 weeks and keep log     Follow up with DR. Colunga in 3 months

## 2018-06-27 ENCOUNTER — HOSPITAL ENCOUNTER (OUTPATIENT)
Dept: GENERAL RADIOLOGY | Age: 78
Discharge: HOME | End: 2018-06-27
Payer: MEDICARE

## 2018-06-27 DIAGNOSIS — R93.89 ABNORMAL FINDINGS ON EXAMINATION OF BODY STRUCTURE: ICD-10-CM

## 2018-06-27 PROCEDURE — 71046 X-RAY EXAM CHEST 2 VIEWS: CPT

## 2018-08-01 ENCOUNTER — OFFICE VISIT (OUTPATIENT)
Dept: CARDIOLOGY CLINIC | Age: 78
End: 2018-08-01

## 2018-08-01 VITALS
HEIGHT: 66 IN | OXYGEN SATURATION: 98 % | RESPIRATION RATE: 16 BRPM | HEART RATE: 64 BPM | WEIGHT: 193 LBS | SYSTOLIC BLOOD PRESSURE: 140 MMHG | DIASTOLIC BLOOD PRESSURE: 80 MMHG | BODY MASS INDEX: 31.02 KG/M2

## 2018-08-01 DIAGNOSIS — I48.19 PERSISTENT ATRIAL FIBRILLATION (HCC): ICD-10-CM

## 2018-08-01 DIAGNOSIS — R06.02 SHORTNESS OF BREATH: Primary | ICD-10-CM

## 2018-08-01 RX ORDER — FLECAINIDE ACETATE 50 MG/1
50 TABLET ORAL 2 TIMES DAILY
COMMUNITY
End: 2018-08-27 | Stop reason: ALTCHOICE

## 2018-08-01 NOTE — MR AVS SNAPSHOT
[address] [facility] [address] [place] 
[phone] Patient: Bennie Charles MRN: [id number] Visit Information Date & Time Provider Department Dept. Phone Encounter #  
 8/1/2018 11:20 AM Bri Landers MD [facility] [place] [phone] [id number] Follow-up Instructions Return in about 1 week (around 8/8/2018). Follow-up and Disposition History Your Appointments 10/16/2018  2:00 PM  
ESTABLISHED PATIENT with Drew Baptiste MD  
[facility] ([facility]) Appt Note: 6 month f/u  
 [address] [place]  
[address] [address] Upcoming Health Maintenance Date Due Pneumococcal 65+ High/Highest Risk (2 of 2 - PPSV23) 3/14/2013 GLAUCOMA SCREENING Q2Y 6/2/2017 DTaP/Tdap/Td series (2 - Td) 12/14/2017 MEDICARE YEARLY EXAM 3/14/2018 Influenza Age 5 to Adult 8/1/2018 Allergies as of 8/1/2018  Review Complete On: 8/1/2018 By: Bri Landers MD  
  
 Severity Noted Reaction Type Reactions Betadine [Povidone-iodine]  04/25/2017    Rash Current Immunizations  Reviewed on 3/28/2018 Name Date Hep A Vaccine 1/1/2009 Hep B Vaccine 1/1/2009 Influenza High Dose Vaccine PF 9/30/2014 Influenza Vaccine 10/12/2017 Pneumococcal Vaccine (Unspecified Type) 3/14/2008 Tdap 12/14/2007 Typhoid Vaccine 1/28/2009 Zoster Vaccine, Live 1/8/2008 Not reviewed this visit You Were Diagnosed With   
  
 Codes Comments Persistent atrial fibrillation (HCC)    -  Primary ICD-10-CM: I48.1 ICD-9-CM: 427.31 Vitals BP Pulse Resp Height(growth percentile) Weight(growth percentile) SpO2  
 140/80 (BP 1 Location: Left arm, BP Patient Position: Sitting) 64 16 5' 6\" (1.676 m) 193 lb (87.5 kg) 98% BMI OB Status Smoking Status 31.15 kg/m2 Postmenopausal Never Smoker BMI and BSA Data Body Mass Index Body Surface Area  
 31.15 kg/m 2 2.02 m 2 Preferred Pharmacy Pharmacy Name Phone [facility], [facility] [phone] Your Updated Medication List  
  
   
This list is accurate as of 8/1/18 12:48 PM.  Always use your most recent med list.  
  
  
  
  
 Thera Melina 250-50 mcg/dose diskus inhaler Generic drug:  fluticasone-salmeterol Take 1 Puff by inhalation as needed. albuterol 90 mcg/actuation inhaler Commonly known as:  PROVENTIL HFA, VENTOLIN HFA, PROAIR HFA Take 1 Puff by inhalation every four (4) hours as needed. apixaban 5 mg tablet Commonly known as:  Kai Hammer Take 1 Tab by mouth two (2) times a day. atorvastatin 20 mg tablet Commonly known as:  LIPITOR Take 1 Tab by mouth daily. cetirizine 10 mg tablet Commonly known as:  ZYRTEC Take 10 mg by mouth daily. cholecalciferol (vitamin D3) 2,000 unit Tab Take 4,000 Units by mouth daily. EPINEPHrine 0.3 mg/0.3 mL injection Commonly known as:  EPIPEN  
0.3 mL by IntraMUSCular route once as needed for up to 1 dose. ESTRACE 0.01 % (0.1 mg/gram) vaginal cream  
Generic drug:  estradiol Insert 2 g into vagina every Monday and Thursday. flecainide 50 mg tablet Commonly known as:  TAMBOCOR Take 50 mg by mouth two (2) times a day. fluticasone 50 mcg/actuation nasal spray Commonly known as:  [place] 2 Sprays by Both Nostrils route daily. multivitamin tablet Commonly known as:  ONE A DAY Take 1 Tab by mouth daily. OTHER Vi active daily  
  
 raloxifene 60 mg tablet Commonly known as:  EVISTA Take 1 Tab by mouth daily. SINGULAIR 10 mg tablet Generic drug:  montelukast  
Take 10 mg by mouth daily. TOPROL XL 25 mg XL tablet Generic drug:  metoprolol succinate Take 25 mg by mouth nightly. We Performed the Following AMB POC EKG ROUTINE W/ 12 LEADS, INTER & REP [15254 CPT(R)] Follow-up Instructions Return in about 1 week (around 8/8/2018). Patient Instructions Decrease Flecainide 50 mg twice a day Echo and EKG in 1 week Follow up with Jc Brandt a week after Introducing [facility] SERVICES! Dear Elma Chatterjee: Thank you for requesting a [employer] account. Our records indicate that you already have an active Light[employer] account. You can access your account anytime at [email or web address] Did you know that you can access your hospital and ER discharge instructions at any time in [employer]? You can also review all of your test results from your hospital stay or ER visit. Additional Information If you have questions, please visit the Frequently Asked Questions section of the Light[employer] website at [email or web address] Remember, [employer] is NOT to be used for urgent needs. For medical emergencies, dial 911. Now available from your iPhone and Android! Please provide this summary of care documentation to your next provider. Your primary care clinician is listed as Brandon Henry. If you have any questions after today's visit, please call [phone].

## 2018-08-01 NOTE — PATIENT INSTRUCTIONS
Decrease Flecainide 50 mg twice a day    Echo and EKG in 1 week    Follow up with Toyin Chatman a week after

## 2018-08-01 NOTE — PROGRESS NOTES
HISTORY OF PRESENT ILLNESS  Ramakrishna Puentes is a 68 y.o. female. Patient with h/o HTN, HLD, Ca score of 0 in 2012, PVCs on ECG on 1/16 and echo on 1/16 with EF 55% mild MR. S/p LTKR in 1/16(seen by Dr. Trisha Hill)  Also remote h/o breast ca s/p mastectomy in 1980 but no chemo or xrt  Here for follow up  Echo on 4/16:Ejection fraction was estimated to be 55 %. There were no  regional wall motion abnormalities. Wall thickness was at the upper limits  of normal.    Left atrium: The atrium was mildly dilated. Mitral valve: There was mild regurgitation. Aortic valve: Leaflets exhibited sclerosis. holter on 4/16: NSR  frequent pacs couplets triples and occasional non sustained at, frequent pvcs (0.65%)  On 3/18: admitted to [facility] 27 onset A-fib with RVR  TIM and Cardioversion completed with 200 J with conversion to NSR  TIM no PFO, trace AR, mild MR, no TR or ND Mild atherosclerosis in prox descending aortaNo KENNEDY thrombus LA moderate dilation RA normal  Started eliquis, flecainide and metoprolol                 Past Medical History   Diagnosis Date    Palpitations            resolved    Dyslipidemia            labs 2008 - chol 283, HDL 83, ,     HTN (hypertension)       Breast cancer (HCC) Eva Wilkerson allergist    Hyperlipidemia LDL goal < 130            for increased LDL particles, Dr. Rita Noriega   Past Surgical History   Procedure Laterality Date    Echo 2d adult    5/5/2008         normal, LVEF 60%    Stress test cardiolite    1/5/12         walked 7:31, no chest pain, normal MPI.     Ct heart w/o cont with calcium    1/2012         CAC score 0; calcified mediastinal lymph nodes consistent granulomatous disease    Hx urological    8/1/14         Urodynamics    Hx gyn          Hx hysterectomy Bilateral         HPI  Some sob with exertion and le edema reported no cp no palpitations  Review of Systems Respiratory: Positive for shortness of breath. Negative for cough, hemoptysis, sputum production and wheezing. Cardiovascular: Positive for leg swelling. Negative for chest pain, palpitations, orthopnea, claudication and PND. Visit Vitals    /80 (BP 1 Location: Left arm, BP Patient Position: Sitting)    Pulse 64    Resp 16    Ht 5' 6\" (1.676 m)    Wt 87.5 kg (193 lb)    SpO2 98%    BMI 31.15 kg/m2       Physical Exam   Neck: No JVD present. Carotid bruit is not present. Cardiovascular: Normal rate and regular rhythm. Pulmonary/Chest: Effort normal and breath sounds normal.   Abdominal: Soft. Musculoskeletal: She exhibits edema. 1+   Psychiatric: She has a normal mood and affect. Current Outpatient Prescriptions on File Prior to Visit   Medication Sig Dispense Refill    metoprolol succinate (TOPROL XL) 25 mg XL tablet Take 25 mg by mouth nightly.  apixaban (ELIQUIS) 5 mg tablet Take 1 Tab by mouth two (2) times a day. 180 Tab 1    flecainide (TAMBOCOR) 100 mg tablet Take 1 Tab by mouth every twelve (12) hours. 180 Tab 1    cetirizine (ZYRTEC) 10 mg tablet Take 10 mg by mouth daily.  fluticasone-salmeterol (ADVAIR DISKUS) 250-50 mcg/dose diskus inhaler Take 1 Puff by inhalation as needed.  estradiol (ESTRACE) 0.01 % (0.1 mg/gram) vaginal cream Insert 2 g into vagina every Monday and Thursday.  OTHER Vi active daily      multivitamin (ONE A DAY) tablet Take 1 Tab by mouth daily.  cholecalciferol, vitamin D3, 2,000 unit tab Take 4,000 Units by mouth daily.  atorvastatin (LIPITOR) 20 mg tablet Take 1 Tab by mouth daily. 90 Tab 3    EPINEPHrine (EPIPEN) 0.3 mg/0.3 mL (1:1,000) injection 0.3 mL by IntraMUSCular route once as needed for up to 1 dose. 0.3 mL 0    albuterol (PROVENTIL HFA, VENTOLIN HFA, PROAIR HFA) 90 mcg/actuation inhaler Take 1 Puff by inhalation every four (4) hours as needed.  2 Inhaler 3    fluticasone (FLONASE) 50 mcg/actuation nasal spray 2 Sprays by Both Nostrils route daily. 2 Bottle 6    raloxifene (EVISTA) 60 mg tablet Take 1 Tab by mouth daily. 90 Tab 4    montelukast (SINGULAIR) 10 mg tablet Take 10 mg by mouth daily. No current facility-administered medications on file prior to visit. Lab Results   Component Value Date/Time    Sodium 143 03/28/2018 04:00 AM    Potassium 4.0 03/28/2018 04:00 AM    Chloride 110 (H) 03/28/2018 04:00 AM    CO2 26 03/28/2018 04:00 AM    Anion gap 7 03/28/2018 04:00 AM    Glucose 101 (H) 03/28/2018 04:00 AM    BUN 15 03/28/2018 04:00 AM    Creatinine 0.66 03/28/2018 04:00 AM    BUN/Creatinine ratio 23 (H) 03/28/2018 04:00 AM    GFR est AA >60 03/28/2018 04:00 AM    GFR est non-AA >60 03/28/2018 04:00 AM    Calcium 8.1 (L) 03/28/2018 04:00 AM       ASSESSMENT and PLAN  PAF: s/p brandy cardioversion on 3/18 she remains in NSR on flecainide and toprol  ECG with NSR with FAV and LNGQT, discussed with patient , continue same dose of nightly toprol (fatigue much better) and decrease flecainide to 50 mg bid with ecg in 1 week    Continue eliquis , no longer c/o bruising after stopping asa  Obtain recent labs from pcp    SOB/LE edema: proceed with echo for now.  No diuretics for the moment (interfere with flecainide)    HTN: acceptable albeit not optimal    HLD: closely followed by her PCP    See her back in 1 week after tests

## 2018-08-20 ENCOUNTER — CLINICAL SUPPORT (OUTPATIENT)
Dept: CARDIOLOGY CLINIC | Age: 78
End: 2018-08-20

## 2018-08-20 ENCOUNTER — OFFICE VISIT (OUTPATIENT)
Dept: CARDIOLOGY CLINIC | Age: 78
End: 2018-08-20

## 2018-08-20 DIAGNOSIS — R60.9 EDEMA, UNSPECIFIED TYPE: Primary | ICD-10-CM

## 2018-08-20 DIAGNOSIS — R06.02 SHORTNESS OF BREATH: ICD-10-CM

## 2018-08-20 DIAGNOSIS — I48.19 PERSISTENT ATRIAL FIBRILLATION (HCC): ICD-10-CM

## 2018-08-20 DIAGNOSIS — I48.91 ATRIAL FIBRILLATION, UNSPECIFIED TYPE (HCC): Primary | ICD-10-CM

## 2018-08-20 NOTE — PROGRESS NOTES
Patient came by for EKG and Echo. EKG showed to  in absence of . He said for patient to continue same meds.

## 2018-08-21 LAB
BUN SERPL-MCNC: 16 MG/DL (ref 8–27)
BUN/CREAT SERPL: 23 (ref 12–28)
CALCIUM SERPL-MCNC: 8.9 MG/DL (ref 8.7–10.3)
CHLORIDE SERPL-SCNC: 104 MMOL/L (ref 96–106)
CO2 SERPL-SCNC: 22 MMOL/L (ref 20–29)
CREAT SERPL-MCNC: 0.69 MG/DL (ref 0.57–1)
GLUCOSE SERPL-MCNC: 92 MG/DL (ref 65–99)
MAGNESIUM SERPL-MCNC: 2 MG/DL (ref 1.6–2.3)
POTASSIUM SERPL-SCNC: 4.4 MMOL/L (ref 3.5–5.2)
SODIUM SERPL-SCNC: 143 MMOL/L (ref 134–144)

## 2018-08-27 ENCOUNTER — OFFICE VISIT (OUTPATIENT)
Dept: CARDIOLOGY CLINIC | Age: 78
End: 2018-08-27

## 2018-08-27 VITALS
HEIGHT: 66 IN | WEIGHT: 189.8 LBS | BODY MASS INDEX: 30.5 KG/M2 | DIASTOLIC BLOOD PRESSURE: 60 MMHG | SYSTOLIC BLOOD PRESSURE: 106 MMHG | HEART RATE: 60 BPM | RESPIRATION RATE: 18 BRPM | OXYGEN SATURATION: 95 %

## 2018-08-27 DIAGNOSIS — R06.02 SHORTNESS OF BREATH: ICD-10-CM

## 2018-08-27 DIAGNOSIS — I48.19 PERSISTENT ATRIAL FIBRILLATION (HCC): ICD-10-CM

## 2018-08-27 DIAGNOSIS — I48.91 ATRIAL FIBRILLATION, UNSPECIFIED TYPE (HCC): Primary | ICD-10-CM

## 2018-08-27 DIAGNOSIS — I42.0 DILATED CARDIOMYOPATHY (HCC): ICD-10-CM

## 2018-08-27 RX ORDER — LOSARTAN POTASSIUM 25 MG/1
25 TABLET ORAL DAILY
COMMUNITY
End: 2018-08-27 | Stop reason: SDUPTHER

## 2018-08-27 RX ORDER — LOSARTAN POTASSIUM 25 MG/1
25 TABLET ORAL DAILY
Qty: 30 TAB | Refills: 2 | Status: SHIPPED | OUTPATIENT
Start: 2018-08-27 | End: 2018-11-15 | Stop reason: SDUPTHER

## 2018-08-27 NOTE — PATIENT INSTRUCTIONS
Stop Flecainide    Start Losartan 25 mg daily    Limited echo in 2-3 weeks and see River Goddard on same day (Per River Goddard)

## 2018-08-27 NOTE — PROGRESS NOTES
HISTORY OF PRESENT ILLNESS  Lizabeth Xie is a 68 y.o. female. Patient with h/o HTN, HLD, Ca score of 0 in 2012, PVCs on ECG on 1/16 and echo on 1/16 with EF 55% mild MR. S/p LTKR in 1/16(seen by Dr. Tonny Sykes)  Also remote h/o breast ca s/p mastectomy in 1980 but no chemo or xrt  Here for follow up  Echo on 4/16:Ejection fraction was estimated to be 55 %. There were no  regional wall motion abnormalities. Wall thickness was at the upper limits  of normal.    Left atrium: The atrium was mildly dilated. Mitral valve: There was mild regurgitation. Aortic valve: Leaflets exhibited sclerosis. holter on 4/16: NSR  frequent pacs couplets triples and occasional non sustained at, frequent pvcs (0.65%)  On 3/18: admitted to [facility] 27 onset A-fib with RVR  TIM and Cardioversion completed with 200 J with conversion to NSR  TIM no PFO, trace AR, mild MR, no TR or OH Mild atherosclerosis in prox descending aortaNo KENNEDY thrombus LA moderate dilation RA normal  Started eliquis, flecainide and metoprolol  Echo on 8/20/18: The ventricle was dilated. Systolic function was mildly  reduced by EF (biplane method of disks). Ejection fraction was estimated  to be 45 % in the range of 40 % to 50 %. There was mild diffuse  hypokinesis. Right ventricle: The size was at the upper limits of normal.    Left atrium: The atrium was moderately dilated. Right atrium: The atrium was mildly to moderately dilated. Mitral valve: There was moderate regurgitation. Aortic valve: The valve was trileaflet. Leaflets exhibited normal  thickness, normal cuspal separation, and sclerosis without stenosis. Tricuspid valve: There was mild to moderate regurgitation. Pulmonic valve: There was mild regurgitation. Pericardium: A trivial pericardial effusion was identified. COMPARISONS:  Comparison was made with the previous study of 22-Jan-2016. LV overall  function has decreased from 55 % to 45- 50 %.      Alberto Bumpers   Past Medical History   Diagnosis Date    Palpitations            resolved    Dyslipidemia            labs 2008 - chol 283, HDL 83, ,     HTN (hypertension)       Breast cancer (HCC) Felipe Fuentes allergist    Hyperlipidemia LDL goal < 130            for increased LDL particles, Dr. Marlen Rod   Past Surgical History   Procedure Laterality Date    Echo 2d adult    5/5/2008         normal, LVEF 60%    Stress test cardiolite    1/5/12         walked 7:31, no chest pain, normal MPI.  Ct heart w/o cont with calcium    1/2012         CAC score 0; calcified mediastinal lymph nodes consistent granulomatous disease    Hx urological    8/1/14         Urodynamics    Hx gyn          Hx hysterectomy Bilateral           HPI  Sob is better she tells me after decreasing flecainide  Review of Systems   Respiratory: Negative. Cardiovascular: Negative for chest pain, palpitations, orthopnea, claudication, leg swelling and PND. Physical Exam  Physical Exam   Blood pressure 106/60, pulse 60, resp. rate 18, height 5' 6\" (1.676 m), weight 86.1 kg (189 lb 12.8 oz), SpO2 95 %.   No le edema  Lab Results   Component Value Date/Time    TSH 3.030 04/20/2016 02:04 PM       ASSESSMENT and PLAN  PAF: s/p brandy cardioversion on 3/18 she remains in NSR continue eliquis and toprol  Unfortunately EF has now reduced , etiology of this unclear , I do not think related to structural heart disease but flecainide needs to be stopped for now  Patient aware of increase potential for recurrent AF off flecainide  No substitutions for now     Continue eliquis , no longer c/o bruising after stopping asa       SOB/LE edema: discussed results of echo now with mild CMP of unclear etiology and moderate MR  Stop flecainide and continue toprol  Add losartan 25 mg (side effects explained)  Limited echo in 2 weeks  Obtain eventually tsh and cbc if next echo is also abnormal and proceed at that time with stress test     HTN: low normal     HLD: closely followed by her PCP     See her back in 2 weeks after tests

## 2018-08-27 NOTE — MR AVS SNAPSHOT
[address] [place] 
[phone] Patient: Camila Harris MRN: [id number] [id number] Visit Information Date & Time Provider Department Dept. Phone Encounter #  
 8/27/2018 10:00 AM Kwesi Villalobos MD [facility] Sue Howell [phone] [id number] Follow-up Instructions Return in about 3 weeks (around 9/17/2018). Follow-up and Disposition History Your Appointments 10/16/2018  2:00 PM  
ESTABLISHED PATIENT with Mariely Pascual MD  
[facility] ([address]) Appt Note: 6 month f/u  
 [address] [place]  
[address] [facility] [address] Upcoming Health Maintenance Date Due Pneumococcal 65+ High/Highest Risk (2 of 2 - PPSV23) 3/14/2013 GLAUCOMA SCREENING Q2Y 6/2/2017 DTaP/Tdap/Td series (2 - Td) 12/14/2017 MEDICARE YEARLY EXAM 3/14/2018 Influenza Age 5 to Adult 8/1/2018 Allergies as of 8/27/2018  Review Complete On: 8/27/2018 By: Kwesi Villalobos MD  
  
 Severity Noted Reaction Type Reactions Betadine [Povidone-iodine]  04/25/2017    Rash Current Immunizations  Reviewed on 3/28/2018 Name Date Hep A Vaccine 1/1/2009 Hep B Vaccine 1/1/2009 Influenza High Dose Vaccine PF 9/30/2014 Influenza Vaccine 10/12/2017 Pneumococcal Vaccine (Unspecified Type) 3/14/2008 Tdap 12/14/2007 Typhoid Vaccine 1/28/2009 Zoster Vaccine, Live 1/8/2008 Not reviewed this visit You Were Diagnosed With   
  
 Codes Comments At[facility], unspecified [facility]    -  Primary ICD-10-CM: I48.91 
ICD-9-CM: 427.31 Shortness of breath     ICD-10-CM: R06.02 
ICD-9-CM: 786.05 Persistent atrial fibrillation (HCC)     ICD-10-CM: I48.1 ICD-9-CM: 427.31 Dilated cardiomyopathy ([facility]     ICD-10-CM: I42.0 ICD-9-CM: 425.4 Vitals BP Pulse Resp Height(growth percentile) Weight(growth percentile) SpO2  
 106/60 (BP 1 Location: Left arm, BP Patient Position: Sitting) 60 18 5' 6\" (1.676 m) 189 lb 12.8 oz (86.1 kg) 95% BMI OB Status Smoking Status 30.63 kg/m2 Postmenopausal Never Smoker Vitals History BMI and BSA Data Body Mass Index Body Surface Area  
 30.63 kg/m 2 2 m 2 Preferred Pharmacy Pharmacy Name Phone [facility], [facility] [phone] Your Updated Medication List  
  
   
This list is accurate as of 8/27/18 11:15 AM.  Always use your most recent med list.  
  
  
  
  
 Jess Baker 250-50 mcg/dose diskus inhaler Generic drug:  fluticasone-salmeterol Take 1 Puff by inhalation as needed. albuterol 90 mcg/actuation inhaler Commonly known as:  PROVENTIL HFA, VENTOLIN HFA, PROAIR HFA Take 1 Puff by inhalation every four (4) hours as needed. apixaban 5 mg tablet Commonly known as:  Heather Zac Take 1 Tab by mouth two (2) times a day. atorvastatin 20 mg tablet Commonly known as:  LIPITOR Take 1 Tab by mouth daily. cetirizine 10 mg tablet Commonly known as:  ZYRTEC Take 10 mg by mouth daily. cholecalciferol (vitamin D3) 2,000 unit Tab Take 4,000 Units by mouth daily. EPINEPHrine 0.3 mg/0.3 mL injection Commonly known as:  EPIPEN  
0.3 mL by IntraMUSCular route once as needed for up to 1 dose. ESTRACE 0.01 % (0.1 mg/gram) vaginal cream  
Generic drug:  estradiol Insert 2 g into vagina every Monday and Thursday. fluticasone 50 mcg/actuation nasal spray Commonly known as:  Sidney Metro 2 Sprays by Both Nostrils route daily. losartan 25 mg tablet Commonly known as:  COZAAR Take 25 mg by mouth daily. multivitamin tablet Commonly known as:  ONE A DAY Take 1 Tab by mouth daily. OTHER Vi active daily  
  
 raloxifene 60 mg tablet Commonly known as:  EVISTA Take 1 Tab by mouth daily. SINGULAIR 10 mg tablet Generic drug:  montelukast  
Take 10 mg by mouth daily. TOPROL XL 25 mg XL tablet Generic drug:  metoprolol succinate Take 25 mg by mouth nightly. Follow-up Instructions Return in about 3 weeks (around 9/17/2018). Patient Instructions Stop Flecainide Start Losartan 25 mg daily Limited echo in 2-3 weeks and see [facility] on same day (Per [facility]) Introducing [facility] SERVICES! Dear Kristie Frances: Thank you for requesting a [employer] account. Our records indicate that you already have an active [employer] account. You can access your account anytime at [email or web address] Did you know that you can access your hospital and ER discharge instructions at any time in [employer]? You can also review all of your test results from your hospital stay or ER visit. Additional Information If you have questions, please visit the Frequently Asked Questions section of the [employer] website at [email or web address] Remember, [employer] is NOT to be used for urgent needs. For medical emergencies, dial 911. Now available from your iPhone and Android! Please provide this summary of care documentation to your next provider. Your primary care clinician is listed as Larwence Comment. If you have any questions after today's visit, please call [phone].

## 2018-09-07 RX ORDER — METOPROLOL SUCCINATE 50 MG/1
25 TABLET, EXTENDED RELEASE ORAL EVERY EVENING
COMMUNITY
End: 2019-02-05 | Stop reason: DRUGHIGH

## 2018-09-07 RX ORDER — CHOLECALCIFEROL (VITAMIN D3) 125 MCG
2000 CAPSULE ORAL DAILY
COMMUNITY
End: 2021-07-27 | Stop reason: DRUGHIGH

## 2018-09-07 RX ORDER — ATORVASTATIN CALCIUM 20 MG/1
20 TABLET, FILM COATED ORAL EVERY EVENING
COMMUNITY

## 2018-09-07 RX ORDER — FLUTICASONE PROPIONATE 50 MCG
2 SPRAY, SUSPENSION (ML) NASAL
Status: ON HOLD | COMMUNITY
End: 2019-05-10

## 2018-09-08 ENCOUNTER — ANESTHESIA EVENT (OUTPATIENT)
Dept: ENDOSCOPY | Age: 78
End: 2018-09-08
Payer: MEDICARE

## 2018-09-08 NOTE — ANESTHESIA PREPROCEDURE EVALUATION
Anesthetic History No history of anesthetic complications Review of Systems / Medical History Patient summary reviewed, nursing notes reviewed and pertinent labs reviewed Pulmonary Asthma : well controlled Neuro/Psych Within defined limits Cardiovascular Hypertension Dysrhythmias : atrial fibrillation Hyperlipidemia GI/Hepatic/Renal 
Within defined limits Endo/Other Arthritis and cancer Other Findings Physical Exam 
 
Airway Mallampati: II 
TM Distance: > 6 cm Neck ROM: normal range of motion Mouth opening: Normal 
 
 Cardiovascular Regular rate and rhythm,  S1 and S2 normal,  no murmur, click, rub, or gallop Dental 
No notable dental hx Pulmonary Breath sounds clear to auscultation Abdominal 
GI exam deferred Other Findings Anesthetic Plan ASA: 3 Anesthesia type: MAC Induction: Intravenous Anesthetic plan and risks discussed with: Patient

## 2018-09-10 ENCOUNTER — HOSPITAL ENCOUNTER (OUTPATIENT)
Age: 78
Setting detail: OUTPATIENT SURGERY
Discharge: HOME | End: 2018-09-10
Attending: COLON & RECTAL SURGERY | Admitting: COLON & RECTAL SURGERY
Payer: MEDICARE

## 2018-09-10 ENCOUNTER — ANESTHESIA (OUTPATIENT)
Dept: ENDOSCOPY | Age: 78
End: 2018-09-10
Payer: MEDICARE

## 2018-09-10 VITALS
HEIGHT: 66 IN | OXYGEN SATURATION: 100 % | TEMPERATURE: 98.1 F | BODY MASS INDEX: 30.22 KG/M2 | WEIGHT: 188.06 LBS | SYSTOLIC BLOOD PRESSURE: 137 MMHG | DIASTOLIC BLOOD PRESSURE: 52 MMHG | RESPIRATION RATE: 18 BRPM | HEART RATE: 54 BPM

## 2018-09-10 PROCEDURE — 76040000019: Performed by: COLON & RECTAL SURGERY

## 2018-09-10 PROCEDURE — 76060000031 HC ANESTHESIA FIRST 0.5 HR: Performed by: COLON & RECTAL SURGERY

## 2018-09-10 PROCEDURE — 77030027957 HC TBNG IRR ENDOGTR BUSS -B: Performed by: COLON & RECTAL SURGERY

## 2018-09-10 PROCEDURE — 74011250636 HC RX REV CODE- 250/636

## 2018-09-10 PROCEDURE — 74011250636 HC RX REV CODE- 250/636: Performed by: COLON & RECTAL SURGERY

## 2018-09-10 RX ORDER — SODIUM CHLORIDE 9 MG/ML
INJECTION, SOLUTION INTRAVENOUS
Status: DISCONTINUED | OUTPATIENT
Start: 2018-09-10 | End: 2018-09-10 | Stop reason: HOSPADM

## 2018-09-10 RX ORDER — SODIUM CHLORIDE 9 MG/ML
50 INJECTION, SOLUTION INTRAVENOUS CONTINUOUS
Status: DISCONTINUED | OUTPATIENT
Start: 2018-09-10 | End: 2018-09-10 | Stop reason: HOSPADM

## 2018-09-10 RX ORDER — NALOXONE HYDROCHLORIDE 0.4 MG/ML
0.4 INJECTION, SOLUTION INTRAMUSCULAR; INTRAVENOUS; SUBCUTANEOUS
Status: DISCONTINUED | OUTPATIENT
Start: 2018-09-10 | End: 2018-09-10 | Stop reason: HOSPADM

## 2018-09-10 RX ORDER — FLUMAZENIL 0.1 MG/ML
0.2 INJECTION INTRAVENOUS
Status: DISCONTINUED | OUTPATIENT
Start: 2018-09-10 | End: 2018-09-10 | Stop reason: HOSPADM

## 2018-09-10 RX ORDER — ATROPINE SULFATE 0.1 MG/ML
0.5 INJECTION INTRAVENOUS
Status: DISCONTINUED | OUTPATIENT
Start: 2018-09-10 | End: 2018-09-10 | Stop reason: HOSPADM

## 2018-09-10 RX ORDER — DEXTROMETHORPHAN/PSEUDOEPHED 2.5-7.5/.8
1.2 DROPS ORAL
Status: DISCONTINUED | OUTPATIENT
Start: 2018-09-10 | End: 2018-09-10 | Stop reason: HOSPADM

## 2018-09-10 RX ORDER — PROPOFOL 10 MG/ML
INJECTION, EMULSION INTRAVENOUS AS NEEDED
Status: DISCONTINUED | OUTPATIENT
Start: 2018-09-10 | End: 2018-09-10 | Stop reason: HOSPADM

## 2018-09-10 RX ORDER — SODIUM CHLORIDE 0.9 % (FLUSH) 0.9 %
5-10 SYRINGE (ML) INJECTION EVERY 8 HOURS
Status: DISCONTINUED | OUTPATIENT
Start: 2018-09-10 | End: 2018-09-10 | Stop reason: HOSPADM

## 2018-09-10 RX ORDER — EPINEPHRINE 0.1 MG/ML
1 INJECTION INTRACARDIAC; INTRAVENOUS
Status: DISCONTINUED | OUTPATIENT
Start: 2018-09-10 | End: 2018-09-10 | Stop reason: HOSPADM

## 2018-09-10 RX ORDER — SODIUM CHLORIDE 0.9 % (FLUSH) 0.9 %
5-10 SYRINGE (ML) INJECTION AS NEEDED
Status: DISCONTINUED | OUTPATIENT
Start: 2018-09-10 | End: 2018-09-10 | Stop reason: HOSPADM

## 2018-09-10 RX ADMIN — SODIUM CHLORIDE: 9 INJECTION, SOLUTION INTRAVENOUS at 09:21

## 2018-09-10 RX ADMIN — PROPOFOL 50 MG: 10 INJECTION, EMULSION INTRAVENOUS at 09:23

## 2018-09-10 RX ADMIN — PROPOFOL 50 MG: 10 INJECTION, EMULSION INTRAVENOUS at 09:26

## 2018-09-10 RX ADMIN — PROPOFOL 50 MG: 10 INJECTION, EMULSION INTRAVENOUS at 09:19

## 2018-09-10 NOTE — H&P
[address] [facility] HISTORY AND PHYSICAL Eduard Ragsdale 
MR#: [id number] : 1940 ACCOUNT #: [de-identified] ADMIT DATE: 09/10/2018 CHIEF COMPLAINT:  Colonoscopy surveillance, history of serrated adenoma. POSTOPERATIVE DIAGNOSIS/FINDINGS:  Normal colon. PROCEDURE:  Colonoscopy. HISTORY OF PRESENT ILLNESS:  The patient is a 70-year-old female for colonoscopy screening. She comes in today after having a polyp in 1243-4202, and this was a serrated adenoma. She understands the procedure. PAST MEDICAL HISTORY:  Positive for AFib, some hypertension. She has had some mild degenerative disease. PAST SURGICAL HISTORY:  Includes hysterectomy, knee replacement, and a cardiac procedure, ablation. MEDICATIONS:  See list including Eliquis and flecainide. SOCIAL HISTORY:  She does not smoke or drink. ALLERGIES:  NONE LISTED HERE. REVIEW OF SYSTEMS:  No recent cardiopulmonary symptoms. Her flu shots are up-to-date. All of her shots are up-to-date. PHYSICAL EXAMINATION: 
LUNGS:  Clear. CHEST:  Symmetric. ABDOMEN:  Soft without rebound or guarding. ANORECTAL:  Deferred. PERIPHERAL VASCULAR:  Intact. LABORATORIES:  None. IMPRESSION:  This is a 70-year-old female with a history of serrated adenoma. RECOMMENDATIONS:  Recommend colonoscopy. PROCEDURE:  The patient will be taken to the endoscopy suite and notes will be in the chart. MD CHRISTINE Wilburn/BRET 
D: 09/10/2018 09:43 T: 09/10/2018 10:06 JOB #: [id number]

## 2018-09-10 NOTE — ANESTHESIA POSTPROCEDURE EVALUATION
Post-Anesthesia Evaluation and Assessment Patient: Olaf Francis MRN: [id number]  SSN: [id number] YOB: 1940  Age: 68 y.o. Sex: female Cardiovascular Function/Vital Signs Visit Vitals  /52  Pulse (!) 54  Temp 36.7 °C (98.1 °F)  Resp 18  Ht 5' 6\" (1.676 m)  Wt 85.3 kg (188 lb 1 oz)  SpO2 100%  Breastfeeding No  
 BMI 30.35 kg/m2 Patient is status post MAC anesthesia for Procedure(s): 
COLONOSCOPY. Nausea/Vomiting: None Postoperative hydration reviewed and adequate. Pain: 
Pain Scale 1: Numeric (0 - 10) (09/10/18 1000) Pain Intensity 1: 0 (09/10/18 1000) Managed Neurological Status: At baseline Mental Status and Level of Consciousness: Arousable Pulmonary Status:  
O2 Device: Room air (09/10/18 1000) Adequate oxygenation and airway patent Complications related to anesthesia: None Post-anesthesia assessment completed. No concerns Signed By: Jt Esteban MD   
 September 10, 2018

## 2018-09-10 NOTE — PROGRESS NOTES
Dannielle Becker  1940  [id number]    Situation:  Verbal report received from:Erin  Procedure: Procedure(s):  COLONOSCOPY    Background:    Preoperative diagnosis: HISTORY OF POLYPS  Postoperative diagnosis: normal exam    :  Sylvia Perea  Assistant(s): Endoscopy Technician-1: Rome Seth IV  Endoscopy RN-1: Rita Del Cid RN    Specimens: * No specimens in log *  H. Pylori  no    Assessment:  Intra-procedure medications     Anesthesia gave intra-procedure sedation and medications, see anesthesia flow sheet yes    Intravenous fluids: NS@ KVO     Vital signs stable     Abdominal assessment: round and soft     Recommendation:  Discharge patient per MD order    Family or Friend   Permission to share finding with family or friend yes

## 2018-09-10 NOTE — OP NOTES
Rudy Hernandez  [id number]  1940    Colonoscopy Operative Report    Procedure Type:   Colonoscopy --screening     Pre-operative Diagnosis:  See indication above. Post-operative Diagnosis:  See findings below. Surgeon:  Debbie John MD    Referring Provider: Henri Novoa MD    Sedation and Analgesia:  MAC anesthesia Propofol    Specimens Removed:  none    EBL:  0 mL. Complications: None apparent. Indication For Procedure:    Personal history of colon polyps (screening only)    Findings:       Procedure Details:  After informed consent was obtained, the patient was taken to the endoscopy suite where standard monitoring devices were attached and intravenous access was established. Sedation was administered by the anesthetist as needed throughout the procedure. The patient was placed in the left lateral decubitus position, and inspection of the perineum revealed no significant external lesions. Digital rectal examination revealed no masses. The Olympus videocolonoscope was lubricated and inserted transanally into the rectum. It was advanced into the colon and without difficulty to the cecum, which was identified by the presence of the ileocecal valve and the appendiceal orifice. The quality of the bowel preparation was good, as was the overall visualization. Careful inspection was performed during withdrawal of the colonoscope. There were no apparent complications, and the patient appeared to have tolerated the procedure well. At its conclusion, she was transported to the recovery area in good condition. Impression:    normal colonic mucosa throughout    Recommendations: --Repeat colonoscopy in 5 years. High fiber diet. Resume normal medication(s).

## 2018-09-10 NOTE — DISCHARGE INSTRUCTIONS
[facility]  [id number]  1940    COLON DISCHARGE INSTRUCTIONS  Discomfort:  Redness at IV site- apply warm compress to area; if redness or soreness persist- contact your physician  There may be a slight amount of blood passed from the rectum  Gaseous discomfort- walking, belching will help relieve any discomfort  You may not operate a vehicle for 12 hours  You may not engage in an occupation involving machinery or appliances for rest of today  You may not drink alcoholic beverages for at least 12 hours  Avoid making any critical decisions for at least 24 hour  DIET:   High fiber diet. - however -  remember your colon is empty and a heavy meal will produce gas. Avoid these foods:  vegetables, fried / greasy foods, carbonated drinks for today    MEDICATIONS:         ACTIVITY:  You may resume your normal daily activities it is recommended that you spend the remainder of the day resting -  avoid any strenuous activity. CALL M.D. ANY SIGN OF:   Increasing pain, nausea, vomiting  Abdominal distension (swelling)  New increased bleeding (oral or rectal)  Fever (chills)  Pain in chest area  Bloody discharge from nose or mouth  Shortness of breath     Follow-up Instructions:   Call Gualberto Vora MD if any questions or problems. Telephone # [phone]  Biopsy results will be available in  7 to10 days  Should have a repeat colonoscopy in 5 years. COLONOSCOPY FINDINGS:  Your colonoscopy showed: normal colon and no issue or POLYPS. [employer] Activation    Thank you for requesting access to [employer]. Please follow the instructions below to securely access and download your online medical record. [employer] allows you to send messages to your doctor, view your test results, renew your prescriptions, schedule appointments, and more. How Do I Sign Up? 1. In your internet browser, go to [email or web address]  2. Click on the First Time User? Click Here link in the Sign In box.  You will be redirect to the New Member Sign Up page. 3. Enter your [employer] Access Code exactly as it appears below. You will not need to use this code after youve completed the sign-up process. If you do not sign up before the expiration date, you must request a new code. MyChart Access Code: Activation code not generated  Current [employer] Status: Active (This is the date your [employer] access code will )    4. Enter the last four digits of your Social Security Number (xxxx) and Date of Birth (mm/dd/yyyy) as indicated and click Submit. You will be taken to the next sign-up page. 5. Create a [employer] ID. This will be your [employer] login ID and cannot be changed, so think of one that is secure and easy to remember. 6. Create a [employer] password. You can change your password at any time. 7. Enter your Password Reset Question and Answer. This can be used at a later time if you forget your password. 8. Enter your e-mail address. You will receive e-mail notification when new information is available in [address]. 9. Click Sign Up. You can now view and download portions of your medical record. 10. Click the Download Summary menu link to download a portable copy of your medical information. Additional Information    If you have questions, please visit the Frequently Asked Questions section of the [employer] website at [email or web address] Remember, [employer] is NOT to be used for urgent needs. For medical emergencies, dial 911.

## 2018-09-10 NOTE — IP AVS SNAPSHOT
1111 [facility] [address] [place] [address] 
[phone] Patient: Nancy Sanchez MRN: [id number] SRU:55/3/[id number] About your hospitalization You were admitted on:  September 10, 2018 [facility] You were discharged on:  September 10, 2018 Why you were hospitalized Your primary diagnosis was:  Not on File Follow-up Information Follow up With Details Comments Contact Info [address] [place] 
[phone] Your Scheduled Appointments Monday September 24, 2018  8:00 AM EDT  
ECHO CARDIOGRAMS 2D with Bam Blake [facility] ([facility] SCHEDULING) [address] [place]  
[phone] Monday September 24, 2018  9:00 AM EDT  
ESTABLISHED PATIENT with Joaquín Carcamo MD  
[facility] ([facility] [address] [facility]  
[phone] Tuesday October 16, 2018  2:00 PM EDT  
ESTABLISHED PATIENT with Kriss Farrell MD  
[facility] ([facility] [address] [facility]  
[phone] Discharge Orders None A check kamran indicates which time of day the medication should be taken. My Medications CONTINUE taking these medications Instructions Each Dose to Equal  
 Morning Noon Evening Bedtime ADVAIR DISKUS 250-50 mcg/dose diskus inhaler Generic drug:  fluticasone-salmeterol Your last dose was: Your next dose is: Take 1 Puff by inhalation two (2) times daily as needed. 1 Puff  
    
   
   
   
  
 albuterol 90 mcg/actuation inhaler Commonly known as:  PROVENTIL HFA, VENTOLIN HFA, PROAIR HFA Your last dose was: Your next dose is: Take 1 Puff by inhalation every four (4) hours as needed. 1 Puff  
    
   
   
   
  
 apixaban 5 mg tablet Commonly known as:  Yuliya Vargas Your last dose was: Your next dose is: Take 1 Tab by mouth two (2) times a day. 5 mg  
    
   
   
   
  
 atorvastatin 20 mg tablet Commonly known as:  LIPITOR Your last dose was: Your next dose is: Take 20 mg by mouth every evening. 20 mg  
    
   
   
   
  
 cetirizine 10 mg tablet Commonly known as:  ZYRTEC Your last dose was: Your next dose is: Take 10 mg by mouth every evening. 10 mg  
    
   
   
   
  
 cholecalciferol 5,000 unit capsule Commonly known as:  VITAMIN D3 Your last dose was: Your next dose is: Take 5,000 Units by mouth daily. 5000 Units EPINEPHrine 0.3 mg/0.3 mL injection Commonly known as:  Angel Leslie Your last dose was: Your next dose is: 0.3 mL by IntraMUSCular route once as needed for up to 1 dose. 0.3 mg  
    
   
   
   
  
 ESTRACE 0.01 % (0.1 mg/gram) vaginal cream  
Generic drug:  estradiol Your last dose was: Your next dose is: Insert  into vagina every Monday and Thursday. fluticasone 50 mcg/actuation nasal spray Commonly known as:  Nikhil Dietrich Your last dose was: Your next dose is: 2 Sprays by Both Nostrils route nightly. 2 Spray  
    
   
   
   
  
 losartan 25 mg tablet Commonly known as:  COZAAR Your last dose was: Your next dose is: Take 1 Tab by mouth daily. 25 mg  
    
   
   
   
  
 MULTIVITAMIN PO Your last dose was: Your next dose is: Take  by mouth. Takes one po once daily. raloxifene 60 mg tablet Commonly known as:  EVISTA Your last dose was: Your next dose is: Take 1 Tab by mouth daily. 60 mg  
    
   
   
   
  
 SINGULAIR 10 mg tablet Generic drug:  montelukast  
   
Your last dose was: Your next dose is: Take 10 mg by mouth every evening. 10 mg  
    
   
   
   
  
 TOPROL XL 50 mg XL tablet Generic drug:  metoprolol succinate Your last dose was: Your next dose is: Take 25 mg by mouth every evening. 25 mg Discharge Instructions Dimas Corrales [id number] 
1940 COLON DISCHARGE INSTRUCTIONS Discomfort: 
Redness at IV site- apply warm compress to area; if redness or soreness persist- contact your physician There may be a slight amount of blood passed from the rectum Gaseous discomfort- walking, belching will help relieve any discomfort You may not operate a vehicle for 12 hours You may not engage in an occupation involving machinery or appliances for rest of today You may not drink alcoholic beverages for at least 12 hours Avoid making any critical decisions for at least 24 hour DIET: 
 High fiber diet.  however -  remember your colon is empty and a heavy meal will produce gas. Avoid these foods:  vegetables, fried / greasy foods, carbonated drinks for today MEDICATIONS: 
 
 
  
ACTIVITY: 
You may resume your normal daily activities it is recommended that you spend the remainder of the day resting -  avoid any strenuous activity. CALL M.D. ANY SIGN OF: Increasing pain, nausea, vomiting Abdominal distension (swelling) New increased bleeding (oral or rectal) Fever (chills) Pain in chest area Bloody discharge from nose or mouth Shortness of breath Follow-up Instructions: 
 Call Orion Shen MD if any questions or problems. Telephone # [phone] Biopsy results will be available in  7 to10 days Should have a repeat colonoscopy in 5 years. COLONOSCOPY FINDINGS: 
Your colonoscopy showed: normal colon and no issue or POLYPS. [employer] (SAM) Activation Thank you for requesting access to [email or web address]. Please follow the instructions below to securely access and download your online medical record. Medico.com allows you to send messages to your doctor, view your test results, renew your prescriptions, schedule appointments, and more. How Do I Sign Up? 1. In your internet browser, go to [email or web address] 
2. Click on the First Time User? Click Here link in the Sign In box. You will be redirect to the New Member Sign Up page. 3. Enter your Medico.com Access Code exactly as it appears below. You will not need to use this code after youve completed the sign-up process. If you do not sign up before the expiration date, you must request a new code. Medico.com Access Code: Activation code not generated Current Medico.com Status: Active (This is the date your Medico.com access code will ) 4. Enter the last four digits of your Social Security Number (xxxx) and Date of Birth (mm/dd/yyyy) as indicated and click Submit. You will be taken to the next sign-up page. 5. Create a Medico.com ID. This will be your Medico.com login ID and cannot be changed, so think of one that is secure and easy to remember. 6. Create a Medico.com password. You can change your password at any time. 7. Enter your Password Reset Question and Answer. This can be used at a later time if you forget your password. 8. Enter your e-mail address. You will receive e-mail notification when new information is available in [address] 9. Click Sign Up. You can now view and download portions of your medical record. 10. Click the Download Summary menu link to download a portable copy of your medical information. Additional Information If you have questions, please visit the Frequently Asked Questions section of the [email or web address] website at [email or web address] Remember, Medico.com is NOT to be used for urgent needs. For medical emergencies, dial 911. Introducing [facility] & HEALTH SERVICES! Dear Zaira Samayoa: Thank you for requesting a [employer] account. Our records indicate that you already have an active [employer] account. You can access your account anytime at [email or web address] Did you know that you can access your hospital and ER discharge instructions at any time in [employer]? You can also review all of your test results from your hospital stay or ER visit. Additional Information If you have questions, please visit the Frequently Asked Questions section of the [employer] website at [email or web address] Remember, [employer] is NOT to be used for urgent needs. For medical emergencies, dial 911. Now available from your iPhone and Android! Introducing Jasen Perez As a [employer] patient, I wanted to make you aware of our electronic visit tool called Jasen Perez. [employer] 24/7 allows you to connect within minutes with a medical provider 24 hours a day, seven days a week via a mobile device or tablet or logging into a secure website from your computer. You can access Jasen Perez from anywhere in the [place]. A virtual visit might be right for you when you have a simple condition and feel like you just dont want to get out of bed, or cant get away from work for an appointment, when your regular [employer] provider is not available (evenings, weekends or holidays), or when youre out of town and need minor care. Electronic visits cost only $49 and if the [employer] 24/7 provider determines a prescription is needed to treat your condition, one can be electronically transmitted to a nearby pharmacy*. Please take a moment to enroll today if you have not already done so. The enrollment process is free and takes just a few minutes. To enroll, please download the [employer] 24/7 dayron to your tablet or phone, or visit [email or web address] to enroll on your computer. And, as an [address] [place] [address] patient with a [employer] account, the results of your visits will be scanned into your electronic medical record and your primary care provider will be able to view the scanned results. We urge you to continue to see your regular [facility] provider for your ongoing medical care. And while your primary care provider may not be the one available when you seek a Jasen Salamancafin virtual visit, the peace of mind you get from getting a real diagnosis real time can be priceless. For more information on Jasen Salamancafin, view our Frequently Asked Questions (FAQs) at [email or web address]. Sincerely, 
 
Marquise Carranza MD 
Chief Medical Officer Shakira Mai *:  certain medications cannot be prescribed via Jasen Nicolasanyfin Providers Seen During Your Hospitalization Provider Specialty Primary office phone Ben Martinez MD Colon and Rectal Surgery [phone] Your Primary Care Physician (PCP) Primary Care Physician Office Phone Office Fax Kelsey Alvarado [phone] You are allergic to the following Allergen Reactions Betadine (Povidone-Iodine) Rash Recent Documentation Height Weight Breastfeeding? BMI OB Status Smoking Status 1.676 m 85.3 kg No 30.35 kg/m2 Postmenopausal Never Smoker Emergency Contacts Name Discharge Info Relation Home Work Mobile [facility] DISCHARGE CAREGIVER [3] Child [2] [phone] Patient Belongings The following personal items are in your possession at time of discharge: 
  Dental Appliances: None  Visual Aid: Glasses Please provide this summary of care documentation to your next provider. Signatures-by signing, you are acknowledging that this After Visit Summary has been reviewed with you and you have received a copy.   
  
 
  
    
    
 Patient Signature: ____________________________________________________________ Date:  ____________________________________________________________  
  
[facility] Provider Signature:  ____________________________________________________________ Date:  ____________________________________________________________

## 2018-09-24 ENCOUNTER — CLINICAL SUPPORT (OUTPATIENT)
Dept: CARDIOLOGY CLINIC | Age: 78
End: 2018-09-24

## 2018-09-24 ENCOUNTER — OFFICE VISIT (OUTPATIENT)
Dept: CARDIOLOGY CLINIC | Age: 78
End: 2018-09-24

## 2018-09-24 VITALS
SYSTOLIC BLOOD PRESSURE: 120 MMHG | HEART RATE: 60 BPM | RESPIRATION RATE: 16 BRPM | WEIGHT: 191 LBS | DIASTOLIC BLOOD PRESSURE: 60 MMHG | HEIGHT: 66 IN | OXYGEN SATURATION: 98 % | BODY MASS INDEX: 30.7 KG/M2

## 2018-09-24 DIAGNOSIS — Z98.890 HISTORY OF CARDIOVERSION: ICD-10-CM

## 2018-09-24 DIAGNOSIS — I48.91 ATRIAL FIBRILLATION, UNSPECIFIED TYPE (HCC): Primary | ICD-10-CM

## 2018-09-24 DIAGNOSIS — I42.0 DILATED CARDIOMYOPATHY (HCC): Primary | ICD-10-CM

## 2018-09-24 DIAGNOSIS — I10 ESSENTIAL HYPERTENSION: ICD-10-CM

## 2018-09-24 NOTE — PROGRESS NOTES
HISTORY OF PRESENT ILLNESS  Justo Morales is a 68 y.o. female. Patient with h/o HTN, HLD, Ca score of 0 in 2012, PVCs on ECG on 1/16 and echo on 1/16 with EF 55% mild MR. S/p LTKR in 1/16(seen by Dr. Atif Santos)  Also remote h/o breast ca s/p mastectomy in 1980 but no chemo or xrt    Echo on 4/16:Ejection fraction was estimated to be 55 %. There were no  regional wall motion abnormalities. Wall thickness was at the upper limits  of normal.    Left atrium: The atrium was mildly dilated. Mitral valve: There was mild regurgitation. Aortic valve: Leaflets exhibited sclerosis. holter on 4/16: NSR  frequent pacs couplets triples and occasional non sustained at, frequent pvcs (0.65%)  On 3/18: admitted to [facility] 27 onset A-fib with RVR  TIM and Cardioversion completed with 200 J with conversion to NSR  TIM no PFO, trace AR, mild MR, no TR or PA Mild atherosclerosis in prox descending aortaNo KENNEDY thrombus LA moderate dilation RA normal  Started eliquis, flecainide and metoprolol  Echo on 8/20/18: The ventricle was dilated. Systolic function was mildly  reduced by EF (biplane method of disks). Ejection fraction was estimated  to be 45 % in the range of 40 % to 50 %. There was mild diffuse  hypokinesis. Right ventricle: The size was at the upper limits of normal.    Left atrium: The atrium was moderately dilated. Right atrium: The atrium was mildly to moderately dilated. Mitral valve: There was moderate regurgitation. Aortic valve: The valve was trileaflet. Leaflets exhibited normal  thickness, normal cuspal separation, and sclerosis without stenosis. Tricuspid valve: There was mild to moderate regurgitation. Pulmonic valve: There was mild regurgitation. Pericardium: A trivial pericardial effusion was identified. COMPARISONS:  Comparison was made with the previous study of 22-Jan-2016. LV overall  function has decreased from 55 % to 45- 50 %.   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 Isaac Felty Dr. Brinton Salina   Past Surgical History   Procedure Laterality Date    Echo 2d adult    5/5/2008         normal, LVEF 60%    Stress test cardiolite    1/5/12         walked 7:31, no chest pain, normal MPI.  Ct heart w/o cont with calcium    1/2012         CAC score 0; calcified mediastinal lymph nodes consistent granulomatous disease    Hx urological    8/1/14         Urodynamics    Hx gyn          Hx hysterectomy Bilateral          HPI  Doing very well no complaints at at this time  Review of Systems   Constitutional: Negative. Respiratory: Negative. Cardiovascular: Negative. Physical Exam   Cardiovascular: Normal rate and regular rhythm.        Visit Vitals    /60 (BP 1 Location: Left arm, BP Patient Position: Sitting)    Pulse 60    Resp 16    Ht 5' 6\" (1.676 m)    Wt 86.6 kg (191 lb)    SpO2 98%    BMI 30.83 kg/m2       Lab Results   Component Value Date/Time    Cholesterol, total 142 03/28/2018 04:00 AM    HDL Cholesterol 76 03/28/2018 04:00 AM    LDL, calculated 55.2 03/28/2018 04:00 AM    VLDL, calculated 10.8 03/28/2018 04:00 AM    Triglyceride 54 03/28/2018 04:00 AM    CHOL/HDL Ratio 1.9 03/28/2018 04:00 AM     Lab Results   Component Value Date/Time    WBC 7.6 03/28/2018 04:00 AM    Hemoglobin (POC) 12.9 10/25/2009 03:04 AM    HGB 11.8 03/28/2018 04:00 AM    Hematocrit (POC) 38 10/25/2009 03:04 AM    HCT 36.2 03/28/2018 04:00 AM    PLATELET 351 91/14/8645 04:00 AM    MCV 96.3 03/28/2018 04:00 AM     Lab Results   Component Value Date/Time    Sodium 143 08/20/2018 12:45 PM    Potassium 4.4 08/20/2018 12:45 PM Chloride 104 08/20/2018 12:45 PM    CO2 22 08/20/2018 12:45 PM    Anion gap 7 03/28/2018 04:00 AM    Glucose 92 08/20/2018 12:45 PM    BUN 16 08/20/2018 12:45 PM    Creatinine 0.69 08/20/2018 12:45 PM    BUN/Creatinine ratio 23 08/20/2018 12:45 PM    GFR est AA 97 08/20/2018 12:45 PM    GFR est non-AA 84 08/20/2018 12:45 PM    Calcium 8.9 08/20/2018 12:45 PM     Lab Results   Component Value Date/Time    TSH 3.030 04/20/2016 02:04 PM     Current Outpatient Prescriptions on File Prior to Visit   Medication Sig Dispense Refill    fluticasone (FLONASE) 50 mcg/actuation nasal spray 2 Sprays by Both Nostrils route nightly.  atorvastatin (LIPITOR) 20 mg tablet Take 20 mg by mouth every evening.  metoprolol succinate (TOPROL XL) 50 mg XL tablet Take 25 mg by mouth every evening.  MULTIVITAMIN PO Take  by mouth. Takes one po once daily.  cholecalciferol (VITAMIN D3) 5,000 unit capsule Take 5,000 Units by mouth daily.  losartan (COZAAR) 25 mg tablet Take 1 Tab by mouth daily. 30 Tab 2    apixaban (ELIQUIS) 5 mg tablet Take 1 Tab by mouth two (2) times a day. 180 Tab 1    cetirizine (ZYRTEC) 10 mg tablet Take 10 mg by mouth every evening.  fluticasone-salmeterol (ADVAIR DISKUS) 250-50 mcg/dose diskus inhaler Take 1 Puff by inhalation two (2) times daily as needed.  estradiol (ESTRACE) 0.01 % (0.1 mg/gram) vaginal cream Insert  into vagina every Monday and Thursday.  EPINEPHrine (EPIPEN) 0.3 mg/0.3 mL (1:1,000) injection 0.3 mL by IntraMUSCular route once as needed for up to 1 dose. 0.3 mL 0    albuterol (PROVENTIL HFA, VENTOLIN HFA, PROAIR HFA) 90 mcg/actuation inhaler Take 1 Puff by inhalation every four (4) hours as needed. 2 Inhaler 3    raloxifene (EVISTA) 60 mg tablet Take 1 Tab by mouth daily. 90 Tab 4    montelukast (SINGULAIR) 10 mg tablet Take 10 mg by mouth every evening. No current facility-administered medications on file prior to visit.             ASSESSMENT and PLAN  PAF: s/p brandy cardioversion on 3/18 she remains in NSR continue eliquis and toprol  Patient with echo of 8/18 revealing reduction in EF  Flecainide stopped  Echo today with EF of 60%  Unclear etiology of previous ef reduction  Discussed with patient   Continue to stay off flecainide for now  Continue toprol losartan and eliquis      Continue eliquis , no longer c/o bruising after stopping asa         SOB/LE edema: resolved continue toprol losartan at this time    BP is well controlled and cholesterol is closely followed by her pcp    See her back in 6 months or sooner if any issues

## 2018-09-24 NOTE — MR AVS SNAPSHOT
[address] [facility] [address] [facility] 
[phone] Patient: Corry Nguyen MRN: [id number] YCI:[birth year] Visit Information Date & Time Provider Department Dept. Phone Encounter #  
 9/24/2018  9:00 AM Loni Kehr, MD [facility] Shira Navarrete [phone] [id number] Your Appointments 10/16/2018  2:00 PM  
ESTABLISHED PATIENT with Kayden Valiente MD  
[facility] ([facility]) Appt Note: 6 month f/u  
 [address] [place]  
[address] [address] Upcoming Health Maintenance Date Due Pneumococcal 65+ High/Highest Risk (2 of 2 - PPSV23) 3/14/2013 GLAUCOMA SCREENING Q2Y 6/2/2017 DTaP/Tdap/Td series (2 - Td) 12/14/2017 MEDICARE YEARLY EXAM 3/14/2018 Influenza Age 5 to Adult 8/1/2018 Allergies as of 9/24/2018  Review Complete On: 9/24/2018 By: Markus Espinal Severity Noted Reaction Type Reactions Betadine [Povidone-iodine]  04/25/2017    Rash Current Immunizations  Reviewed on 3/28/2018 Name Date Hep A Vaccine 1/1/2009 Hep B Vaccine 1/1/2009 Influenza High Dose Vaccine PF 9/30/2014 Influenza Vaccine 10/12/2017 Pneumococcal Vaccine (Unspecified Type) 3/14/2008 Tdap 12/14/2007 Typhoid Vaccine 1/28/2009 Zoster Vaccine, Live 1/8/2008 Not reviewed this visit Vitals BP Pulse Resp Height(growth percentile) Weight(growth percentile) SpO2  
 120/60 (BP 1 Location: Left arm, BP Patient Position: Sitting) 60 16 5' 6\" (1.676 m) 191 lb (86.6 kg) 98% BMI OB Status Smoking Status 30.83 kg/m2 Postmenopausal Never Smoker BMI and BSA Data Body Mass Index Body Surface Area  
 30.83 kg/m 2 2.01 m 2 Preferred Pharmacy Pharmacy Name Phone  [facility], [address] [facility] [address] M[address] [phone] Your Updated Medication List  
  
   
This list is accurate as of 9/24/18  9:41 AM.  Always use your most recent med list.  
  
  
  
  
 Sandy Melendez 250-50 mcg/dose diskus inhaler Generic drug:  fluticasone-salmeterol Take 1 Puff by inhalation two (2) times daily as needed. albuterol 90 mcg/actuation inhaler Commonly known as:  PROVENTIL HFA, VENTOLIN HFA, PROAIR HFA Take 1 Puff by inhalation every four (4) hours as needed. apixaban 5 mg tablet Commonly known as:  Griselda Scripture Take 1 Tab by mouth two (2) times a day. atorvastatin 20 mg tablet Commonly known as:  LIPITOR Take 20 mg by mouth every evening. cetirizine 10 mg tablet Commonly known as:  ZYRTEC Take 10 mg by mouth every evening. cholecalciferol 5,000 unit capsule Commonly known as:  VITAMIN D3 Take 5,000 Units by mouth daily. EPINEPHrine 0.3 mg/0.3 mL injection Commonly known as:  EPIPEN  
0.3 mL by IntraMUSCular route once as needed for up to 1 dose. ESTRACE 0.01 % (0.1 mg/gram) vaginal cream  
Generic drug:  estradiol Insert  into vagina every Monday and Thursday. fluticasone 50 mcg/actuation nasal spray Commonly known as:  Jose A [place] 2 Sprays by Both Nostrils route nightly. losartan 25 mg tablet Commonly known as:  COZAAR Take 1 Tab by mouth daily. MULTIVITAMIN PO Take  by mouth. Takes one po once daily. raloxifene 60 mg tablet Commonly known as:  EVISTA Take 1 Tab by mouth daily. SINGULAIR 10 mg tablet Generic drug:  montelukast  
Take 10 mg by mouth every evening. TOPROL XL 50 mg XL tablet Generic drug:  metoprolol succinate Take 25 mg by mouth every evening. Patient Instructions Follow up with Chucho Hernandez in 6 months Introducing [facility] & HEALTH SERVICES! Dear Esmond Jeans: Thank you for requesting a [employer] account.   Our records indicate that you already have an active [employer] account. You can access your account anytime at [email or web address] Did you know that you can access your hospital and ER discharge instructions at any time in [employer]? You can also review all of your test results from your hospital stay or ER visit. Additional Information If you have questions, please visit the Frequently Asked Questions section of the [employer] website at [email or web address] Remember, [employer] is NOT to be used for urgent needs. For medical emergencies, dial 911. Now available from your iPhone and Android! Please provide this summary of care documentation to your next provider. Your primary care clinician is listed as Charlee Jorgensen. If you have any questions after today's visit, please call [phone].

## 2018-10-08 RX ORDER — APIXABAN 5 MG/1
TABLET, FILM COATED ORAL
Qty: 180 TAB | Refills: 1 | Status: SHIPPED | OUTPATIENT
Start: 2018-10-08 | End: 2019-03-23 | Stop reason: SDUPTHER

## 2018-10-08 NOTE — TELEPHONE ENCOUNTER
Request for eliquis 5mg twice a day. Last office visit 9/24/18, next office visit 10/16/18. Refills per verbal order from Dr. Hay Espinoza.

## 2018-10-16 ENCOUNTER — OFFICE VISIT (OUTPATIENT)
Dept: CARDIOLOGY CLINIC | Age: 78
End: 2018-10-16

## 2018-10-16 VITALS
WEIGHT: 192 LBS | DIASTOLIC BLOOD PRESSURE: 68 MMHG | SYSTOLIC BLOOD PRESSURE: 124 MMHG | HEIGHT: 66 IN | HEART RATE: 69 BPM | BODY MASS INDEX: 30.86 KG/M2

## 2018-10-16 DIAGNOSIS — I42.0 DILATED CARDIOMYOPATHY (HCC): ICD-10-CM

## 2018-10-16 DIAGNOSIS — I49.3 PVC (PREMATURE VENTRICULAR CONTRACTION): ICD-10-CM

## 2018-10-16 DIAGNOSIS — R00.2 PALPITATIONS: ICD-10-CM

## 2018-10-16 DIAGNOSIS — Z98.890 HISTORY OF CARDIOVERSION: ICD-10-CM

## 2018-10-16 DIAGNOSIS — I48.19 PERSISTENT ATRIAL FIBRILLATION (HCC): Primary | ICD-10-CM

## 2018-10-16 DIAGNOSIS — I31.39 PERICARDIAL EFFUSION: ICD-10-CM

## 2018-10-16 DIAGNOSIS — I10 ESSENTIAL HYPERTENSION: ICD-10-CM

## 2018-10-16 NOTE — MR AVS SNAPSHOT
[address] [facility] [address] [place] 
[phone] Patient: Trino Arnett MRN: [id number] OXR:24[birth year]40 Visit Information Date & Time Provider Department Dept. Phone Encounter #  
 10/16/2018  2:00 PM Daivd Kehr, MD [facility]ley [phone] [id number] Follow-up Instructions Return in about 11 months (around 9/16/2019). Your Appointments 3/25/2019  8:20 AM  
ESTABLISHED PATIENT with Missy Schultz MD  
[facility] ([address]) Appt Note: 6 month f/u  
 [address] [facility] [address] [place]  
[address] [phone] Upcoming Health Maintenance Date Due Shingrix Vaccine Age 50> (1 of 2) 10/9/1990 Pneumococcal 65+ High/Highest Risk (2 of 2 - PPSV23) 3/14/2013 GLAUCOMA SCREENING Q2Y 6/2/2017 DTaP/Tdap/Td series (2 - Td) 12/14/2017 MEDICARE YEARLY EXAM 3/14/2018 Influenza Age 5 to Adult 8/1/2018 Allergies as of 10/16/2018  Review Complete On: 10/16/2018 By: Daivd Kehr, MD  
  
 Severity Noted Reaction Type Reactions Betadine [Povidone-iodine]  04/25/2017    Rash Current Immunizations  Reviewed on 3/28/2018 Name Date Hep A Vaccine 1/1/2009 Hep B Vaccine 1/1/2009 Influenza High Dose Vaccine PF 9/30/2014 Influenza Vaccine 10/12/2017 Pneumococcal Vaccine (Unspecified Type) 3/14/2008 Tdap 12/14/2007 Typhoid Vaccine 1/28/2009 Zoster Vaccine, Live 1/8/2008 Not reviewed this visit You Were Diagnosed With   
  
 Codes Comments Persistent atrial fibrillation (HCC)    -  Primary ICD-10-CM: I48.1 ICD-9-CM: 427.31 Dilated cardiomyopathy ([facility] [address]     ICD-10-CM: I42.0 ICD-9-CM: 425.4 Essential hypertension     ICD-10-CM: I10 
ICD-9-CM: 401.9 History of cardioversion     ICD-10-CM: Z98.890 ICD-9-CM: V15.1 Palpitations     ICD-10-CM: R00.2 ICD-9-CM: 785.1 Pericardial effusion     ICD-10-CM: I31.3 ICD-9-CM: 423.9 PVC (premature ventricular contraction)     ICD-10-CM: I49.3 ICD-9-CM: 427.69 Vitals BP Pulse Height(growth percentile) Weight(growth percentile) BMI OB Status 124/68 (BP 1 Location: Left arm, BP Patient Position: Sitting) 69 5' 6\" (1.676 m) 192 lb (87.1 kg) 30.99 kg/m2 Postmenopausal  
 Smoking Status Never Smoker Vitals History BMI and BSA Data Body Mass Index Body Surface Area 30.99 kg/m 2 2.01 m 2 Preferred Pharmacy Pharmacy Name Phone [facility], [facility] [phone] Your Updated Medication List  
  
   
This list is accurate as of 10/16/18  2:34 PM.  Always use your most recent med list.  
  
  
  
  
 Baldo Mons 250-50 mcg/dose diskus inhaler Generic drug:  fluticasone-salmeterol Take 1 Puff by inhalation two (2) times daily as needed. albuterol 90 mcg/actuation inhaler Commonly known as:  PROVENTIL HFA, VENTOLIN HFA, PROAIR HFA Take 1 Puff by inhalation every four (4) hours as needed. atorvastatin 20 mg tablet Commonly known as:  LIPITOR Take 20 mg by mouth every evening. cetirizine 10 mg tablet Commonly known as:  ZYRTEC Take 10 mg by mouth every evening. cholecalciferol 5,000 unit capsule Commonly known as:  VITAMIN D3 Take 5,000 Units by mouth daily. ELIQUIS 5 mg tablet Generic drug:  apixaban TAKE 1 TABLET TWICE A DAY  
  
 EPINEPHrine 0.3 mg/0.3 mL injection Commonly known as:  EPIPEN  
0.3 mL by IntraMUSCular route once as needed for up to 1 dose. ESTRACE 0.01 % (0.1 mg/gram) vaginal cream  
Generic drug:  estradiol Insert  into vagina every Monday and Thursday. fluticasone 50 mcg/actuation nasal spray Commonly known as:  Goldie Ortiz 2 Sprays by Both Nostrils route nightly. losartan 25 mg tablet Commonly known as:  COZAAR Take 1 Tab by mouth daily. MULTIVITAMIN PO Take  by mouth. Takes one po once daily. raloxifene 60 mg tablet Commonly known as:  EVISTA Take 1 Tab by mouth daily. SINGULAIR 10 mg tablet Generic drug:  montelukast  
Take 10 mg by mouth every evening. TOPROL XL 50 mg XL tablet Generic drug:  metoprolol succinate Take 25 mg by mouth every evening. Follow-up Instructions Return in about 11 months (around 9/16/2019). Introducing [facility] SERVICES! Dear Aleksandr Conner: Thank you for requesting a [facility] account. Our records indicate that you already have an active [facility] account. You can access your account anytime at [email or web address] Did you know that you can access your hospital and ER discharge instructions at any time in [facility]? You can also review all of your test results from your hospital stay or ER visit. Additional Information If you have questions, please visit the Frequently Asked Questions section of the [facility] website at [email or web address] Remember, [facility] is NOT to be used for urgent needs. For medical emergencies, dial 911. Now available from your iPhone and Android! Please provide this summary of care documentation to your next provider. Your primary care clinician is listed as Sarah Zapata. If you have any questions after today's visit, please call [phone].

## 2018-10-16 NOTE — PROGRESS NOTES
Cardiac Electrophysiology OFFICE Note     Subjective:      Srinivasan Padgett is a 66 y.o. patient who is seen for Follow up on PVC and atrial fibrillation. The patient had August 2018 echocardiogram with EF dropped to 45% and Dr. Jovita Reed stopped the Flecainide and she was started on Toprol along with Losartan. She had been on Toprol before. After that a month later, the echocardiogram in September reported ejection fraction improved from 45 to 60%. The patient is feeling great. She only feels occasional thumping in the chest such as PVCs. No additional sustained fast heart beats any longer. She has no significant shortness of breath except for occasional asthma attacks. I had seen her for evaluation of atrial fibrillation after cardioversion. She had a TIM with normal left ventricular function and mild MR TR. The patient had dilated left atrium. After cardioversion she felt better. However she had cold probably related to the seasonal change. She also have dry cough. She felt tired but tolerated the flecainide. This week she has gotten better and get used to it. She is traveling to [place] in a few days and will be there for 10 days.   When I saw her at [facility]:  Atrial fibrillation with RVR and troponin 0.07  She has no chest pain and no known AFIB before  She had seen Dr Jovita Reed in clinic and had holter with PACs  She felt tired and dizzy starting the night before last night but waited to come to ER when symptoms did not resolve  ECG showed atrial fibrillation with RVR and rate has not been controlled with metoprolol IV  Stress test was negative 5 years ago per her  She has no GI bleeding  No stroke  Echo in 2016 with normal LVEF, mild MR     Patient Active Problem List   Diagnosis Code    Dyslipidemia E78.5    Palpitations R00.2    Chest pain, unspecified R07.9    Asthma J45.909    Hyperlipidemia LDL goal < 130 E78.5    HTN (hypertension) I10    Breast cancer ([facility] [address] C50.919    Lung nodule R91.1    Cystocele [id number]    Uterine prolapse N81.4    Dizziness R42    A-fib (HCC) I48.91    Encounter for cardioversion procedure Z01.89     Current Outpatient Prescriptions   Medication Sig Dispense Refill    ELIQUIS 5 mg tablet TAKE 1 TABLET TWICE A  Tab 1    fluticasone (FLONASE) 50 mcg/actuation nasal spray 2 Sprays by Both Nostrils route nightly.  atorvastatin (LIPITOR) 20 mg tablet Take 20 mg by mouth every evening.  metoprolol succinate (TOPROL XL) 50 mg XL tablet Take 25 mg by mouth every evening.  MULTIVITAMIN PO Take  by mouth. Takes one po once daily.  cholecalciferol (VITAMIN D3) 5,000 unit capsule Take 5,000 Units by mouth daily.  losartan (COZAAR) 25 mg tablet Take 1 Tab by mouth daily. 30 Tab 2    cetirizine (ZYRTEC) 10 mg tablet Take 10 mg by mouth every evening.  fluticasone-salmeterol (ADVAIR DISKUS) 250-50 mcg/dose diskus inhaler Take 1 Puff by inhalation two (2) times daily as needed.  estradiol (ESTRACE) 0.01 % (0.1 mg/gram) vaginal cream Insert  into vagina every Monday and Thursday.  EPINEPHrine (EPIPEN) 0.3 mg/0.3 mL (1:1,000) injection 0.3 mL by IntraMUSCular route once as needed for up to 1 dose. 0.3 mL 0    albuterol (PROVENTIL HFA, VENTOLIN HFA, PROAIR HFA) 90 mcg/actuation inhaler Take 1 Puff by inhalation every four (4) hours as needed. 2 Inhaler 3    raloxifene (EVISTA) 60 mg tablet Take 1 Tab by mouth daily. 90 Tab 4    montelukast (SINGULAIR) 10 mg tablet Take 10 mg by mouth every evening.        Allergies   Allergen Reactions    Betadine [Povidone-Iodine] Rash     Past Medical History:   Diagnosis Date    Arthritis     Asthma     dr. James Browne allergist    Atrial fibrillation ([facility] [address]     Breast cancer ([facility] [address] 1980    right - mastectomy    Coagulation disorder ([facility] [address]     on eliquis    Dyslipidemia     labs 2008 - chol 283, HDL 83, ,     HTN (hypertension)     Hyperlipidemia LDL goal < 130     for increased LDL particles, Dr. Will Pe[place] nodule     Dr. Sundeep Lyle Palpitations     resolved     Past Surgical History:   Procedure Laterality Date    CHEST SURGERY PROCEDURE UNLISTED      lung nodule removed - benign    COLONOSCOPY N/A 9/10/2018    COLONOSCOPY performed by Zachery Crigler, MD at [facility] 149 W/O CONT WITH CALCIUM  1/2012    CAC score 0; calcified mediastinal lymph nodes consistent granulomatous disease    ECHO 2D ADULT  5/5/2008    normal, LVEF 60%    HX APPENDECTOMY      HX HYSTERECTOMY Bilateral 03/19/2015    and uro repair    HX KNEE REPLACEMENT Right     HX MASTECTOMY Right     HX TONSILLECTOMY      HX UROLOGICAL  8/1/14    Urodynamics    DC CARDIOVERSION ELECTIVE ARRHYTHMIA EXTERNAL  3/28/2018         STRESS TEST CARDIOLITE  1/5/12    walked 7:31, no chest pain, normal MPI. Family History   Problem Relation Age of Onset    Heart Failure Mother     Stroke Father     Other Other      endometrial cancer, niece     Social History   Substance Use Topics    Smoking status: Never Smoker    Smokeless tobacco: Never Used    Alcohol use Yes      Comment: wine nightly        Review of Systems:   Constitutional: Negative for fever, chills, weight loss, malaise/fatigue. HEENT: Negative for nosebleeds, vision changes. Respiratory: Negative for cough, hemoptysis  Cardiovascular: Negative for chest pain, palpitations, orthopnea, claudication, leg swelling, syncope, and PND. Gastrointestinal: Negative for nausea, vomiting, diarrhea, blood in stool and melena. Genitourinary: Negative for dysuria, and hematuria. Musculoskeletal: Negative for myalgias, arthralgia. Skin: Negative for rash. Heme: Does not bleed or bruise easily.    Neurological: Negative for speech change and focal weakness     Objective:     Visit Vitals    /68 (BP 1 Location: Left arm, BP Patient Position: Sitting)    Pulse 69    Ht 5' 6\" (1.676 m)    Wt 192 lb (87.1 kg)    BMI 30.99 kg/m2 Physical Exam:   Constitutional: well-developed and well-nourished. No respiratory distress. Head: Normocephalic and atraumatic. Eyes: Pupils are equal, round  ENT: hearing normal  Neck: supple. No JVD present. Cardiovascular: Normal rate, regular rhythm. Exam reveals no gallop and no friction rub. No murmur heard. Pulmonary/Chest: Effort normal and breath sounds normal. No wheezes. Abdominal: Soft, no tenderness. Musculoskeletal: trace edema. Neurological: alert,oriented. Skin: Skin is warm and dry  Psychiatric: normal mood and affect. Behavior is normal. Judgment and thought content normal.      EKG: Normal sinus rhythm with first degree av block  normal QRS duration. Anteroseptal Q waves present    Assessment/Plan:       ICD-10-CM ICD-9-CM    1. Persistent atrial fibrillation (HCC) I48.1 427.31    2. Dilated cardiomyopathy (HCC) I42.0 425.4    3. Essential hypertension I10 401.9    4. History of cardioversion Z98.890 V15.1    5. Palpitations R00.2 785.1    6. Pericardial effusion I31.3 423.9    There was a small pericardial effusion reported on the echocardiogram. The EF presumably had been normal in September. We will continue with medication Losartan and Toprol. She is off Flecainide for now. She does have some symptom related PVC but not too severe. She will continue to monitor. Should she have more frequent and symptomatic PVC and atrial fibrillation, Dr. Tram Alexander will refer her back to me. She will see him in March. She wants to schedule an appointment in September with me, but will call me if the symptoms are getting worse. reviewed diet, exercise and weight control  reviewed medications and side effects in detail     Thank you for involving me in this patient's care and please call with further concerns or questions. Caroline Heaton M.D.   Electrophysiology/Cardiology  [address] [place] [address] [facility] [place]  [address] [place], [address] [place], [place] [id number] [phone]

## 2018-11-15 RX ORDER — LOSARTAN POTASSIUM 25 MG/1
25 TABLET ORAL DAILY
Qty: 90 TAB | Refills: 2 | Status: SHIPPED | OUTPATIENT
Start: 2018-11-15 | End: 2019-02-05

## 2019-02-05 ENCOUNTER — OFFICE VISIT (OUTPATIENT)
Dept: CARDIOLOGY CLINIC | Age: 79
End: 2019-02-05

## 2019-02-05 VITALS
HEART RATE: 70 BPM | HEIGHT: 66 IN | WEIGHT: 191.6 LBS | RESPIRATION RATE: 16 BRPM | DIASTOLIC BLOOD PRESSURE: 80 MMHG | BODY MASS INDEX: 30.79 KG/M2 | SYSTOLIC BLOOD PRESSURE: 130 MMHG

## 2019-02-05 DIAGNOSIS — I10 BENIGN ESSENTIAL HTN: ICD-10-CM

## 2019-02-05 DIAGNOSIS — I48.0 PAROXYSMAL A-FIB (HCC): Primary | ICD-10-CM

## 2019-02-05 DIAGNOSIS — Z79.01 CHRONIC ANTICOAGULATION: ICD-10-CM

## 2019-02-05 DIAGNOSIS — I42.8 NON-ISCHEMIC CARDIOMYOPATHY (HCC): ICD-10-CM

## 2019-02-05 RX ORDER — METOPROLOL SUCCINATE 50 MG/1
TABLET, EXTENDED RELEASE ORAL DAILY
COMMUNITY
End: 2019-02-22 | Stop reason: SDUPTHER

## 2019-02-05 NOTE — LETTER
2/6/2019 9:17 AM 
 
Patient:  Grey Joya YOB: 1940 Date of Visit: 2/5/2019 Dear Lydia Waggoner MD 
[address] [facility] 7 [address] VIA Facsimile: [phone] 
 : 
Ms. Delbert Ovalles is a 67 yo F pt of Dr. Leandra Oppenheim with a history of atrial fibrillation, previously had been on Flecainide, but she says her heart rate went too low and then has been on Toprol, she is on Eliquis for oral anticoagulation, history of nonischemic cardiomyopathy, coronary calcium score of zero in 2012, history of PVCs, history of breast cancer. She awoke last night and felt \"unsteady\". Later in the day at lunch, she was sitting at her computer and \"did not feel right\". She tried to schedule an appointment to see Dr. Vern Phalen, but was unavailable and ended up going to [facility].  They did an EKG there. She was in atrial fibrillation with a heart rate of 107 bpm.  She denies any current chest pain and her breathing has been stable. She notes often that she cannot tell when she is in atrial fibrillation. She is compensated on exam with clear lungs. She is in atrial fibrillation, heart rate is between 100 and 110 bpm, blood pressure here is 130/80. Assessment and Plan: 1. Paroxysmal atrial fibrillation. Heart rate is on the faster side and will double her Toprol for rate control. Apparently, her heart went too slow with Flecainide in the past.   To avoid hypotension,will have her hold her Losartan now. She has followup with Dr. Vern Phalen in a few weeks and she will keep this. 2. Cardiomyopathy. This was nonischemic and her EF had normalized on most recent echocardiogram.   
3. History of breast cancer. 4. Essential hypertension. Blood pressure is controlled. 5. Chronic anticoagulation. No bleeding issues on Eliquis. If you have questions, please do not hesitate to call me. Sincerely, Robson Corado MD

## 2019-02-05 NOTE — PROGRESS NOTES
[place]: Lala Scales  [phone]    History of Present Illness:   Ms. Chaparrita Nguyen is a 65 yo F pt of Dr. Sebastian Ward with a history of atrial fibrillation, previously had been on Flecainide, but she says her heart rate went too low and then has been on Toprol, she is on Eliquis for oral anticoagulation, history of nonischemic cardiomyopathy, coronary calcium score of zero in 2012, history of PVCs, history of breast cancer. She awoke last night and felt \"unsteady\". Later in the day at lunch, she was sitting at her computer and \"did not feel right\". She tried to schedule an appointment to see Dr. Jessica Easley, but was unavailable and ended up going to [facility].  They did an EKG there. She was in atrial fibrillation with a heart rate of 107 bpm.  She denies any current chest pain and her breathing has been stable. She notes often that she cannot tell when she is in atrial fibrillation. She is compensated on exam with clear lungs. She is in atrial fibrillation, heart rate is between 100 and 110 bpm, blood pressure here is 130/80. Assessment and Plan:   1. Paroxysmal atrial fibrillation. Heart rate is on the faster side and will double her Toprol for rate control. Apparently, her heart went too slow with Flecainide in the past.   To avoid hypotension,will have her hold her Losartan now. She has followup with Dr. Jessica Easley in a few weeks and she will keep this. 2. Cardiomyopathy. This was nonischemic and her EF had normalized on most recent echocardiogram.    3. History of breast cancer. 4. Essential hypertension. Blood pressure is controlled. 5. Chronic anticoagulation. No bleeding issues on Eliquis. She  has a past medical history of Arthritis, Asthma, Atrial fibrillation ([address] Breast cancer ([address] (1980), Coagulation disorder ([address] Dyslipidemia, HTN (hypertension), Hyperlipidemia LDL goal < 130, Lung nodule, and Palpitations.  She also has no past medical history of Adverse effect of anesthesia or Sleep apnea. All other systems negative except as above. PE  Vitals:    02/05/19 1426   BP: 130/80   Pulse: 70   Resp: 16   Weight: 191 lb 9.6 oz (86.9 kg)   Height: 5' 6\" (1.676 m)    Body mass index is 30.93 kg/m².    General appearance - alert, well appearing, and in no distress  Mental status - affect appropriate to mood  Eyes - sclera anicteric, moist mucous membranes  Neck - supple, no JVD  Chest - clear to auscultation, no wheezes, rales or rhonchi  Heart - irregularly irregular, tachy, normal S1, S2, I/VI systolic murmur LUSB  Abdomen - soft, nontender, nondistended, no masses or organomegaly  Neurological -  no focal deficit  Extremities - peripheral pulses normal, no pedal edema      Recent Labs:  Lab Results   Component Value Date/Time    Cholesterol, total 142 03/28/2018 04:00 AM    HDL Cholesterol 76 03/28/2018 04:00 AM    LDL, calculated 55.2 03/28/2018 04:00 AM    Triglyceride 54 03/28/2018 04:00 AM    CHOL/HDL Ratio 1.9 03/28/2018 04:00 AM     Lab Results   Component Value Date/Time    Creatinine (POC) 1.0 10/25/2009 03:04 AM    Creatinine 0.69 08/20/2018 12:45 PM     Lab Results   Component Value Date/Time    BUN 16 08/20/2018 12:45 PM    BUN (POC) 14 10/25/2009 03:04 AM     Lab Results   Component Value Date/Time    Potassium 4.4 08/20/2018 12:45 PM     No results found for: HBA1C, HGBE8, ZQF2YXMD, RIA8ZCAE  Lab Results   Component Value Date/Time    Hemoglobin (POC) 12.9 10/25/2009 03:04 AM    HGB 11.8 03/28/2018 04:00 AM     Lab Results   Component Value Date/Time    PLATELET 019 91/74/4821 04:00 AM       Reviewed:  Past Medical History:   Diagnosis Date    Arthritis     Asthma     dr. Callum Vaca allergist    Atrial fibrillation ([facility]     Breast cancer ([facility] [address] [facility] 1980    right - mastectomy    Coagulation disorder ([facility] [address] [facility]     on eliquis    Dyslipidemia     labs 2008 - chol 283, HDL 83, ,     HTN (hypertension)     Hyperlipidemia LDL goal < 130     for increased LDL particles, Dr. Stephania Cranker nodule     Dr. Cynthia Montesinos Palpitations     resolved     Social History     Tobacco Use   Smoking Status Never Smoker   Smokeless Tobacco Never Used     Social History     Substance and Sexual Activity   Alcohol Use Yes    Comment: wine nightly     Allergies   Allergen Reactions    Betadine [Povidone-Iodine] Rash       Current Outpatient Medications   Medication Sig    losartan (COZAAR) 25 mg tablet Take 1 Tab by mouth daily.  ELIQUIS 5 mg tablet TAKE 1 TABLET TWICE A DAY    fluticasone (FLONASE) 50 mcg/actuation nasal spray 2 Sprays by Both Nostrils route nightly.  atorvastatin (LIPITOR) 20 mg tablet Take 20 mg by mouth every evening.  metoprolol succinate (TOPROL XL) 50 mg XL tablet Take 25 mg by mouth every evening.  MULTIVITAMIN PO Take  by mouth. Takes one po once daily.  cholecalciferol (VITAMIN D3) 5,000 unit capsule Take 5,000 Units by mouth daily.  cetirizine (ZYRTEC) 10 mg tablet Take 10 mg by mouth every evening.  fluticasone-salmeterol (ADVAIR DISKUS) 250-50 mcg/dose diskus inhaler Take 1 Puff by inhalation two (2) times daily as needed.  estradiol (ESTRACE) 0.01 % (0.1 mg/gram) vaginal cream Insert  into vagina every Monday and Thursday.  EPINEPHrine (EPIPEN) 0.3 mg/0.3 mL (1:1,000) injection 0.3 mL by IntraMUSCular route once as needed for up to 1 dose.  albuterol (PROVENTIL HFA, VENTOLIN HFA, PROAIR HFA) 90 mcg/actuation inhaler Take 1 Puff by inhalation every four (4) hours as needed.  raloxifene (EVISTA) 60 mg tablet Take 1 Tab by mouth daily.  montelukast (SINGULAIR) 10 mg tablet Take 10 mg by mouth every evening. No current facility-administered medications for this visit.         Per Gregory MD  [facility] [place]  [address] [facility] [place] [address]  [address]

## 2019-02-06 ENCOUNTER — DOCUMENTATION ONLY (OUTPATIENT)
Dept: CARDIOLOGY CLINIC | Age: 79
End: 2019-02-06

## 2019-02-07 NOTE — PROGRESS NOTES
She was seen in clinic by Dr Keyshawn Fulton who sent me his note  She appears to have tachycardia and bradycardia problem with AFIB and toprol  Also problem with low Bp  She likely will need pacer +/- av node ablation or AFIB ablation  I had done cardioversion last year and she did not tolerate flecainide    Laura will make appt for her to evaluate and discuss the above procedures with me

## 2019-02-20 ENCOUNTER — OFFICE VISIT (OUTPATIENT)
Dept: CARDIOLOGY CLINIC | Age: 79
End: 2019-02-20

## 2019-02-20 ENCOUNTER — HOSPITAL ENCOUNTER (OUTPATIENT)
Dept: LAB | Age: 79
Discharge: HOME | End: 2019-02-20
Payer: MEDICARE

## 2019-02-20 VITALS
HEART RATE: 69 BPM | RESPIRATION RATE: 14 BRPM | WEIGHT: 198 LBS | HEIGHT: 66 IN | DIASTOLIC BLOOD PRESSURE: 70 MMHG | OXYGEN SATURATION: 98 % | BODY MASS INDEX: 31.82 KG/M2 | SYSTOLIC BLOOD PRESSURE: 110 MMHG

## 2019-02-20 DIAGNOSIS — Z01.818 PREOP TESTING: ICD-10-CM

## 2019-02-20 DIAGNOSIS — I48.91 ATRIAL FIBRILLATION, UNSPECIFIED TYPE (HCC): Primary | ICD-10-CM

## 2019-02-20 PROCEDURE — 80048 BASIC METABOLIC PNL TOTAL CA: CPT

## 2019-02-20 PROCEDURE — 36415 COLL VENOUS BLD VENIPUNCTURE: CPT

## 2019-02-20 PROCEDURE — 83735 ASSAY OF MAGNESIUM: CPT

## 2019-02-20 RX ORDER — AMIODARONE HYDROCHLORIDE 400 MG/1
400 TABLET ORAL 2 TIMES DAILY
COMMUNITY
End: 2019-02-20 | Stop reason: SDUPTHER

## 2019-02-20 RX ORDER — SODIUM CHLORIDE 9 MG/ML
25 INJECTION, SOLUTION INTRAVENOUS CONTINUOUS
Status: CANCELLED | OUTPATIENT
Start: 2019-02-22

## 2019-02-20 RX ORDER — AMIODARONE HYDROCHLORIDE 400 MG/1
400 TABLET ORAL DAILY
Status: ON HOLD | COMMUNITY
End: 2019-02-22

## 2019-02-20 RX ORDER — AMIODARONE HYDROCHLORIDE 400 MG/1
400 TABLET ORAL 2 TIMES DAILY
Qty: 6 TAB | Refills: 0 | Status: ON HOLD | OUTPATIENT
Start: 2019-02-20 | End: 2019-02-22

## 2019-02-20 NOTE — PATIENT INSTRUCTIONS
Start Amiodarone 400 mg twice a day start today    Have blood work drawn today    TIM and Cardioversion scheduled [facility] on Feb.22,2019 @ 1:15PM    NO TOPROL THE DAY OF PROCEDURE BUT CONTINUE REST OF YOUR MEDICINES

## 2019-02-20 NOTE — PROGRESS NOTES
Visit Vitals  /70 (BP 1 Location: Left arm, BP Patient Position: Sitting)   Pulse 69   Resp 14   Ht 5' 6\" (1.676 m)   Wt 198 lb (89.8 kg)   SpO2 98%   BMI 31.96 kg/m²

## 2019-02-21 LAB
BUN SERPL-MCNC: 13 MG/DL (ref 8–27)
BUN/CREAT SERPL: 17 (ref 12–28)
CALCIUM SERPL-MCNC: 8.9 MG/DL (ref 8.7–10.3)
CHLORIDE SERPL-SCNC: 105 MMOL/L (ref 96–106)
CO2 SERPL-SCNC: 26 MMOL/L (ref 20–29)
CREAT SERPL-MCNC: 0.75 MG/DL (ref 0.57–1)
GLUCOSE SERPL-MCNC: 127 MG/DL (ref 65–99)
MAGNESIUM SERPL-MCNC: 2.2 MG/DL (ref 1.6–2.3)
POTASSIUM SERPL-SCNC: 4.8 MMOL/L (ref 3.5–5.2)
SODIUM SERPL-SCNC: 143 MMOL/L (ref 134–144)

## 2019-02-22 ENCOUNTER — HOSPITAL ENCOUNTER (OUTPATIENT)
Dept: CARDIAC CATH/INVASIVE PROCEDURES | Age: 79
Discharge: HOME | End: 2019-02-22
Attending: SPECIALIST | Admitting: SPECIALIST
Payer: MEDICARE

## 2019-02-22 ENCOUNTER — ANESTHESIA EVENT (OUTPATIENT)
Dept: CARDIAC CATH/INVASIVE PROCEDURES | Age: 79
End: 2019-02-22
Payer: MEDICARE

## 2019-02-22 ENCOUNTER — TELEPHONE (OUTPATIENT)
Dept: CARDIOLOGY CLINIC | Age: 79
End: 2019-02-22

## 2019-02-22 ENCOUNTER — ANESTHESIA (OUTPATIENT)
Dept: CARDIAC CATH/INVASIVE PROCEDURES | Age: 79
End: 2019-02-22
Payer: MEDICARE

## 2019-02-22 VITALS
TEMPERATURE: 97.6 F | HEIGHT: 66 IN | WEIGHT: 196 LBS | HEART RATE: 61 BPM | RESPIRATION RATE: 20 BRPM | OXYGEN SATURATION: 94 % | BODY MASS INDEX: 31.5 KG/M2 | SYSTOLIC BLOOD PRESSURE: 135 MMHG | DIASTOLIC BLOOD PRESSURE: 66 MMHG

## 2019-02-22 DIAGNOSIS — I48.0 EPISODIC ATRIAL FIBRILLATION (HCC): ICD-10-CM

## 2019-02-22 LAB
ATRIAL RATE: 80 BPM
CALCULATED P AXIS, ECG09: 54 DEGREES
CALCULATED R AXIS, ECG10: 21 DEGREES
CALCULATED T AXIS, ECG11: 64 DEGREES
DIAGNOSIS, 93000: NORMAL
P-R INTERVAL, ECG05: 200 MS
Q-T INTERVAL, ECG07: 394 MS
QRS DURATION, ECG06: 90 MS
QTC CALCULATION (BEZET), ECG08: 454 MS
VENTRICULAR RATE, ECG03: 80 BPM

## 2019-02-22 PROCEDURE — 93041 RHYTHM ECG TRACING: CPT

## 2019-02-22 PROCEDURE — 74011250636 HC RX REV CODE- 250/636: Performed by: SPECIALIST

## 2019-02-22 PROCEDURE — 74011250636 HC RX REV CODE- 250/636

## 2019-02-22 PROCEDURE — 93325 DOPPLER ECHO COLOR FLOW MAPG: CPT

## 2019-02-22 PROCEDURE — 92960 CARDIOVERSION ELECTRIC EXT: CPT

## 2019-02-22 PROCEDURE — 74011000250 HC RX REV CODE- 250: Performed by: SPECIALIST

## 2019-02-22 PROCEDURE — 76060000031 HC ANESTHESIA FIRST 0.5 HR: Performed by: SPECIALIST

## 2019-02-22 RX ORDER — SODIUM CHLORIDE 9 MG/ML
25 INJECTION, SOLUTION INTRAVENOUS CONTINUOUS
Status: DISCONTINUED | OUTPATIENT
Start: 2019-02-22 | End: 2019-02-22

## 2019-02-22 RX ORDER — FLECAINIDE ACETATE 50 MG/1
50 TABLET ORAL 2 TIMES DAILY
Qty: 60 TAB | Refills: 1 | Status: SHIPPED | OUTPATIENT
Start: 2019-02-22 | End: 2019-03-22 | Stop reason: ALTCHOICE

## 2019-02-22 RX ORDER — PROPOFOL 10 MG/ML
INJECTION, EMULSION INTRAVENOUS AS NEEDED
Status: DISCONTINUED | OUTPATIENT
Start: 2019-02-22 | End: 2019-02-22 | Stop reason: HOSPADM

## 2019-02-22 RX ORDER — LIDOCAINE HYDROCHLORIDE 20 MG/ML
INJECTION, SOLUTION EPIDURAL; INFILTRATION; INTRACAUDAL; PERINEURAL AS NEEDED
Status: DISCONTINUED | OUTPATIENT
Start: 2019-02-22 | End: 2019-02-22 | Stop reason: HOSPADM

## 2019-02-22 RX ORDER — METOPROLOL SUCCINATE 25 MG/1
25 TABLET, EXTENDED RELEASE ORAL 2 TIMES DAILY
Qty: 180 TAB | Refills: 1 | Status: SHIPPED | OUTPATIENT
Start: 2019-02-22 | End: 2019-02-27 | Stop reason: DRUGHIGH

## 2019-02-22 RX ORDER — SODIUM CHLORIDE 9 MG/ML
INJECTION, SOLUTION INTRAVENOUS
Status: DISCONTINUED | OUTPATIENT
Start: 2019-02-22 | End: 2019-02-22 | Stop reason: HOSPADM

## 2019-02-22 RX ADMIN — SODIUM CHLORIDE 25 ML/HR: 900 INJECTION, SOLUTION INTRAVENOUS at 13:35

## 2019-02-22 RX ADMIN — PROPOFOL 10 MG: 10 INJECTION, EMULSION INTRAVENOUS at 13:53

## 2019-02-22 RX ADMIN — PROPOFOL 50 MG: 10 INJECTION, EMULSION INTRAVENOUS at 13:51

## 2019-02-22 RX ADMIN — PROPOFOL 20 MG: 10 INJECTION, EMULSION INTRAVENOUS at 13:52

## 2019-02-22 RX ADMIN — LIDOCAINE HYDROCHLORIDE 80 MG: 20 INJECTION, SOLUTION EPIDURAL; INFILTRATION; INTRACAUDAL; PERINEURAL at 13:51

## 2019-02-22 RX ADMIN — BENZOCAINE 2 SPRAY: 200 SPRAY DENTAL; ORAL; PERIODONTAL at 13:49

## 2019-02-22 RX ADMIN — SODIUM CHLORIDE: 9 INJECTION, SOLUTION INTRAVENOUS at 13:50

## 2019-02-22 NOTE — PROGRESS NOTES
Cardiac Catheterization Procedure Note   Patient: Mi Landon  MRN: [id number]  SSN: [id number]   YOB: 1940 Age: 66 y.o.   Sex: female    Date of Procedure: 2/22/2019   Pre-procedure Diagnosis: Atrial Fibrillation/Atrial Flutter  Post-procedure Diagnosis: No Cardiac Source of Embolism  Procedure: TIM and Cardioversion  :  Dr. Nayely Hyatt MD    Assistant(s):  None  Anesthesia: Moderate Sedation   Estimated Blood Loss: Less than 10 mL   Specimens Removed: None  Findings:   EF 50%  Mild mr    mild to moderate tr   no KENNEDY thrombus   no shunt  Cardioversion X1 with 200 j and restoration of NSR  A/p Successful dc cardioversion with restoration of nsr  Continue toprol start back small dose of flecainide (50 mg bid)  Obtain limited echo next week in office  Complications: None   Implants:  None  Signed by:  Nayely Hyatt MD  2/22/2019  2:01 PM

## 2019-02-22 NOTE — DISCHARGE INSTRUCTIONS
Patient Education        Deciding Between Electrical Cardioversion and Rate Control Medicines for Atrial Fibrillation  How can you decide between electrical cardioversion and rate control medicines for atrial fibrillation? What is atrial fibrillation? Atrial fibrillation (say \"AY-tree-neo kwv-vymg-TPO-shun\") is a kind of uneven heartbeat. It can make you feel lightheaded and dizzy. You may feel weak. It also can make you more likely to have a stroke. Electrical cardioversion can return your heart to a normal rhythm. First you'll get medicines to make you sleepy and control pain. Then your doctor will use patches to send an electric current to your heart. This resets the rhythm of your heart. Not everyone with atrial fibrillation needs this treatment. For some people, taking medicines may be better. Most people can live with an uneven heartbeat. It just has to be kept under control so the heart does not beat too fast.  Use this information to help you and your doctor decide which treatment to choose for atrial fibrillation. What are abdalla points about this decision? · Electrical cardioversion can return your heart to a normal rhythm. But the problem can come back. The longer you have had atrial fibrillation, the more likely it is to come back after this treatment. · Cardioversion may not work as well when an uneven heartbeat is caused by another heart disease, such as heart failure. · If your symptoms bother you a lot, you may want to try cardioversion. But even if it works, you may still need to take blood thinners to prevent a stroke. · If you don't have symptoms, or if they don't bother you much, you can try medicines to slow your heart rate. And you can take blood thinners to prevent a stroke. · Cardioversion does have risks, such as stroke. Discuss the risks with your doctor. Make sure you understand them. · You may have more than one heart problem.  Cardioversion doesn't work as well if you have more than one heart problem. Why might you choose electrical cardioversion? · It restores the normal heart rhythm for most people. · The idea of having an electric shock does not bother you. · Your symptoms bother you a lot. · You have had atrial fibrillation just one time. · You do not have other heart problems. · You may not have to take as many medicines. Or you may not need to take them as long. Why might you choose rate-control medicines? · These medicines keep many people from having symptoms. · You prefer to take medicines rather than have an electric shock. · Your symptoms don't bother you much. · If these medicines don't work, you can still try electrical cardioversion. Your decision  Thinking about the facts and your feelings can help you make a decision that is right for you. Be sure you understand the benefits and risks of your options. And think about what else you need to do before you make the decision. Where can you learn more? Go to http://mary-ginger.info/. Enter W399 in the search box to learn more about \"Deciding Between Electrical Cardioversion and Rate Control Medicines for Atrial Fibrillation. \"  Current as of: July 22, 2018  Content Version: 11.9  © 6330-7047 [employer]. Care instructions adapted under license by [employer] (which disclaims liability or warranty for this information). If you have questions about a medical condition or this instruction, always ask your healthcare professional. [place] [address] any warranty or liability for your use of this information.

## 2019-02-22 NOTE — TELEPHONE ENCOUNTER
Requested Prescriptions     Signed Prescriptions Disp Refills    metoprolol succinate (TOPROL-XL) 25 mg XL tablet 180 Tab 1     Sig: Take 1 Tab by mouth two (2) times a day. Do not need to split tablets. Authorizing Provider: Brandyn Wilcox     Ordering User: Rico Castellanos     Verbal order per Dr. Modesta Rincon. Confirmed follow up appts.     Future Appointments   Date Time Provider Hazel Romeroi   2/27/2019 10:00 AM Reji Alberto  [address]   3/4/2019  8:00 AM VASCULAR, [address]   3/7/2019  1:20 PM Chiquis Lozada  [address]   3/22/2019  8:40 AM Reji Alberto  [address] 9/17/2019  1:00 PM Chiquis Lozada  [address]

## 2019-02-22 NOTE — ANESTHESIA PREPROCEDURE EVALUATION
Anesthetic History No history of anesthetic complications Review of Systems / Medical History Patient summary reviewed, nursing notes reviewed and pertinent labs reviewed Pulmonary Within defined limits Asthma Neuro/Psych Within defined limits Cardiovascular Within defined limits Hypertension Dysrhythmias : atrial fibrillation GI/Hepatic/Renal 
Within defined limits Endo/Other Within defined limits Obesity and arthritis Other Findings Physical Exam 
 
Airway Mallampati: II 
TM Distance: > 6 cm Neck ROM: normal range of motion Mouth opening: Normal 
 
 Cardiovascular Regular rate and rhythm,  S1 and S2 normal,  no murmur, click, rub, or gallop Dental 
No notable dental hx Pulmonary Breath sounds clear to auscultation Abdominal 
GI exam deferred Other Findings Anesthetic Plan ASA: 3 Anesthesia type: MAC Induction: Intravenous Anesthetic plan and risks discussed with: Patient

## 2019-02-22 NOTE — ANESTHESIA POSTPROCEDURE EVALUATION
* No procedures listed *. 
 
<BSHSIANPOST> Visit Vitals /69 (BP 1 Location: Left arm, BP Patient Position: At rest) Pulse 73 Temp 36.8 °C (98.3 °F) Resp 18 Ht 5' 6\" (1.676 m) Wt 88.9 kg (196 lb) SpO2 98% Breastfeeding? No  
BMI 31.64 kg/m²

## 2019-02-22 NOTE — PROGRESS NOTES
Cardiac Cath Lab Recovery Arrival Note:      Bernardino Vieyra arrived to Cardiac Cath Lab, Recovery Area. Staff introduced to patient. Patient identifiers verified with NAME and DATE OF BIRTH. Procedure verified with patient. Consent forms reviewed and signed by patient or authorized representative and verified. Allergies verified. Patient and family oriented to department. Patient and family informed of procedure and plan of care. Questions answered with review. Patient prepped for procedure, per orders from physician, prior to arrival.    Patient on cardiac monitor, non-invasive blood pressure, SPO2 monitor. On room air. Patient is A&Ox 4. Patient reports no complaints. Patient in stretcher, in low position, with side rails up, call bell within reach, patient instructed to call if assistance as needed. Patient prep in: [address], [place]. Patient family has pager # [phone]  Family in: friends brought pt to hospital and will take her home. Prep by: Zaira Rivera RN    Pre TIM/CV teaching completed    1300 anesthesia in to see pt    1300 Dr Manning Degree in to see pt. Aware pt only had chemistry, no CBC , informed by Dr Manning Degree that only chemistry was needed.

## 2019-02-22 NOTE — PROGRESS NOTES
Anesthesia name:  Roney Ny CRNA     Anesthesia is present for case. Refer to anesthesia log for vitals.

## 2019-02-22 NOTE — PROGRESS NOTES
TRANSFER - IN REPORT:    Verbal report received from George Min on Chucho Frank  being received from procedural area for routine progression of care. Report consisted of patients Situation, Background, Assessment and Recommendations(SBAR). Information from the following report(s) Procedure Summary, MAR and Recent Results was reviewed with the receiving clinician. Opportunity for questions and clarification was provided. Assessment completed upon patients arrival to [address] [place] [address] and care assumed. Cardiac Cath Lab Recovery Arrival Note:    Chucho Frank arrived to [facility] recovery area. Patient procedure= TIM/CV. Patient on cardiac monitor, non-invasive blood pressure, SPO2 monitor. On  or O2 @ 2 lpm via NC.  IV  of NS on pump at 25 ml/hr. Patient status doing well without problems. Patient is A&Ox 3.  Patient reports no c/o's.    :        .

## 2019-02-22 NOTE — PROGRESS NOTES
Cardiac Cath Lab Procedure Area Arrival Note:    Sylvain Valdez arrived to Cardiac Cath Lab, Procedure Area. Patient identifiers verified with NAME and DATE OF BIRTH. Procedure verified with patient. Consent forms verified. Allergies verified. Patient informed of procedure and plan of care. Questions answered with review. Patient voiced understanding of procedure and plan of care. Patient on cardiac monitor, non-invasive blood pressure, SPO2 monitor. On  O2 @ 2 lpm via NC.  IV of NS on pump at 25 ml/hr. Patient status doing well without problems. Patient is A&Ox 4. Patient reports no pain. Patient medicated during procedure with orders obtained and verified by Dr. Gladys Kim. Refer to patients Cardiac Cath Lab PROCEDURE REPORT for vital signs, assessment, status, and response during procedure, printed at end of case. Printed report on chart or scanned into chart. 1405: TRANSFER - OUT REPORT:    Verbal report given to Emilee Fritz RN(name) on Sylvain Valdez  being transferred to (unit) for routine post - op       Report consisted of patients Situation, Background, Assessment and   Recommendations(SBAR). Information from the following report(s) SBAR, Procedure Summary and MAR was reviewed with the receiving nurse. Lines:   Peripheral IV 02/22/19 Left Forearm (Active)        Opportunity for questions and clarification was provided.       Patient transported with:   Registered Nurse

## 2019-02-22 NOTE — PROGRESS NOTES
Pt ambulated 100 ft gait steady, voided, back to stretcher assisted with dressing.   1600 tolerated liq,   Discharged via w/c with friends, instructions and belongings

## 2019-02-25 ENCOUNTER — HOSPITAL ENCOUNTER (OUTPATIENT)
Dept: VASCULAR SURGERY | Age: 79
Discharge: HOME | End: 2019-02-25
Attending: INTERNAL MEDICINE
Payer: MEDICARE

## 2019-02-25 ENCOUNTER — HOSPITAL ENCOUNTER (OUTPATIENT)
Dept: GENERAL RADIOLOGY | Age: 79
Discharge: HOME | End: 2019-02-25
Attending: INTERNAL MEDICINE
Payer: MEDICARE

## 2019-02-25 DIAGNOSIS — M79.662 PAIN OF LEFT LOWER LEG: ICD-10-CM

## 2019-02-25 DIAGNOSIS — R06.00 DYSPNEA: ICD-10-CM

## 2019-02-25 PROCEDURE — 71046 X-RAY EXAM CHEST 2 VIEWS: CPT

## 2019-02-25 PROCEDURE — 93971 EXTREMITY STUDY: CPT

## 2019-02-27 ENCOUNTER — OFFICE VISIT (OUTPATIENT)
Dept: CARDIOLOGY CLINIC | Age: 79
End: 2019-02-27

## 2019-02-27 VITALS
RESPIRATION RATE: 14 BRPM | BODY MASS INDEX: 31.27 KG/M2 | HEIGHT: 66 IN | SYSTOLIC BLOOD PRESSURE: 116 MMHG | DIASTOLIC BLOOD PRESSURE: 80 MMHG | HEART RATE: 57 BPM | WEIGHT: 194.6 LBS | OXYGEN SATURATION: 97 %

## 2019-02-27 DIAGNOSIS — I48.91 ATRIAL FIBRILLATION, UNSPECIFIED TYPE (HCC): Primary | ICD-10-CM

## 2019-02-27 RX ORDER — METOPROLOL SUCCINATE 25 MG/1
25 TABLET, EXTENDED RELEASE ORAL
COMMUNITY
End: 2019-03-22 | Stop reason: DRUGHIGH

## 2019-02-27 RX ORDER — FLUTICASONE PROPIONATE 220 UG/1
AEROSOL, METERED RESPIRATORY (INHALATION) AS NEEDED
COMMUNITY

## 2019-02-27 NOTE — PROGRESS NOTES
HISTORY OF PRESENT ILLNESS  Jamil Nixon is a 66 y.o. female. Patient with h/o HTN, HLD, Ca score of 0 in 2012, PVCs on ECG on 1/16 and echo on 1/16 with EF 55% mild MR. S/p LTKR in 1/16(seen by Dr. Heidy Mratin)  Also remote h/o breast ca s/p mastectomy in 1980 but no chemo or xrt     Echo on 4/16:Ejection fraction was estimated to be 55 %. There were no  regional wall motion abnormalities. Wall thickness was at the upper limits  of normal.    Left atrium: The atrium was mildly dilated. Mitral valve: There was mild regurgitation. Aortic valve: Leaflets exhibited sclerosis. holter on 4/16: NSR  frequent pacs couplets triples and occasional non sustained at, frequent pvcs (0.65%)     On 3/18: admitted to [facility] 27 onset A-fib with RVR     TIM and Cardioversion completed with 200 J with conversion to NSR  TIM no PFO, trace AR, mild MR, no TR or NC Mild atherosclerosis in prox descending aortaNo KENNEDY thrombus LA moderate dilation RA normal  Started eliquis, flecainide and metoprolol     Echo on 8/20/18: The ventricle was dilated. Systolic function was mildly  reduced by EF (biplane method of disks). Ejection fraction was estimated  to be 45 % in the range of 40 % to 50 %. There was mild diffuse  hypokinesis. Right ventricle: The size was at the upper limits of normal.    Left atrium: The atrium was moderately dilated. Right atrium: The atrium was mildly to moderately dilated. Mitral valve: There was moderate regurgitation. Aortic valve: The valve was trileaflet. Leaflets exhibited normal  thickness, normal cuspal separation, and sclerosis without stenosis. Tricuspid valve: There was mild to moderate regurgitation. Pulmonic valve: There was mild regurgitation. Pericardium: A trivial pericardial effusion was identified. COMPARISONS:  Comparison was made with the previous study of 22-Jan-2016. LV overall  function has decreased from 55 % to 45- 50 %.     Ky Likens on 2/19 resulting in NSR. Flecainide resumed    CXR on 2/25/19:1. Interval development of small bilateral pleural effusions and mild basilar    atelectasis. Follow-up to resolution is suggested. Doppler LLE on 2/19:No evidence of left lower extremity vein thrombosis. 02/27/19   ECHO ADULT FOLLOW-UP OR LIMITED 02/27/2019 2/27/2019    Narrative · Left ventricular low normal systolic function. Estimated left   ventricular ejection fraction is 51 - 55%. Calculated left ventricular   ejection fraction is 50%. Biplane method used to measure ejection   fraction. Normal left ventricular wall motion, no regional wall motion   abnormality noted. · Mild to moderate mitral valve regurgitation. · Trivial-to-small pericardial effusion. · Mild tricuspid valve stenosis is present. Signed by: MD Maninder Stoddard   Past Medical History   Diagnosis Date    Palpitations            resolved    Dyslipidemia            labs 2008 - chol 283, HDL 83, ,     HTN (hypertension)       Breast cancer (HCC) Fernando Ibrahim allergist    Hyperlipidemia LDL goal < 130            for increased LDL particles, Dr. Elvin Morales   Past Surgical History   Procedure Laterality Date    Echo 2d adult    5/5/2008         normal, LVEF 60%    Stress test cardiolite    1/5/12         walked 7:31, no chest pain, normal MPI.  Ct heart w/o cont with calcium    1/2012         CAC score 0; calcified mediastinal lymph nodes consistent granulomatous disease    Hx urological    8/1/14         Urodynamics    Hx gyn          Hx hysterectomy Bilateral           HPI  Tired but mostly sob    no cp reported and no palptations  Review of Systems   Respiratory: Positive for shortness of breath. Cardiovascular: Negative. Physical Exam   Cardiovascular: Normal rate and regular rhythm. Murmur heard.    Systolic murmur is present with a grade of 1/6.  Pulmonary/Chest: She has decreased breath sounds in the right lower field and the left lower field. Abdominal: Soft. Musculoskeletal: She exhibits no edema. Psychiatric: She has a normal mood and affect. Current Outpatient Medications on File Prior to Visit   Medication Sig Dispense Refill    fluticasone (FLOVENT HFA) 220 mcg/actuation inhaler Flovent  mcg/actuation aerosol inhaler   Inhale 1 puff twice a day by inhalation route.  flecainide (TAMBOCOR) 50 mg tablet Take 1 Tab by mouth two (2) times a day. 60 Tab 1    metoprolol succinate (TOPROL-XL) 25 mg XL tablet Take 1 Tab by mouth two (2) times a day. Do not need to split tablets. 180 Tab 1    ELIQUIS 5 mg tablet TAKE 1 TABLET TWICE A  Tab 1    fluticasone (FLONASE) 50 mcg/actuation nasal spray 2 Sprays by Both Nostrils route nightly.  atorvastatin (LIPITOR) 20 mg tablet Take 20 mg by mouth every evening.  MULTIVITAMIN PO Take  by mouth. Takes one po once daily.  cholecalciferol (VITAMIN D3) 5,000 unit capsule Take 5,000 Units by mouth daily.  cetirizine (ZYRTEC) 10 mg tablet Take 10 mg by mouth every evening.  estradiol (ESTRACE) 0.01 % (0.1 mg/gram) vaginal cream Insert  into vagina every Monday and Thursday.  EPINEPHrine (EPIPEN) 0.3 mg/0.3 mL (1:1,000) injection 0.3 mL by IntraMUSCular route once as needed for up to 1 dose. 0.3 mL 0    albuterol (PROVENTIL HFA, VENTOLIN HFA, PROAIR HFA) 90 mcg/actuation inhaler Take 1 Puff by inhalation every four (4) hours as needed. 2 Inhaler 3    raloxifene (EVISTA) 60 mg tablet Take 1 Tab by mouth daily. 90 Tab 4    montelukast (SINGULAIR) 10 mg tablet Take 10 mg by mouth every evening.  fluticasone-salmeterol (ADVAIR DISKUS) 250-50 mcg/dose diskus inhaler Take 1 Puff by inhalation two (2) times daily as needed. No current facility-administered medications on file prior to visit.       Lab Results   Component Value Date/Time    Sodium 143 02/20/2019 12:12 PM    Potassium 4.8 02/20/2019 12:12 PM    Chloride 105 02/20/2019 12:12 PM    CO2 26 02/20/2019 12:12 PM    Anion gap 7 03/28/2018 04:00 AM    Glucose 127 (H) 02/20/2019 12:12 PM    BUN 13 02/20/2019 12:12 PM    Creatinine 0.75 02/20/2019 12:12 PM    BUN/Creatinine ratio 17 02/20/2019 12:12 PM    GFR est AA 88 02/20/2019 12:12 PM    GFR est non-AA 77 02/20/2019 12:12 PM    Calcium 8.9 02/20/2019 12:12 PM   MG 2.2  ASSESSMENT and PLAN  AF: s/p brandy cardioversion on 2/19  With NSR restoration  continue eliquis and toprol but decrease toprol to once a day  On account of fatigue and sob   there could be a electrical mechanical dissociation in time and hopefully her symptoms will improve in the next 4 weeks    I would avoid diuretics at this time if possible given use of flecainide  ecg on 2/25/19 NSR FAV PACs IVCD 120 and normal qtc   amiodarone not started on account of iodine allergy reported by patient      Continue eliquis , no longer c/o bruising after stopping asa  Follow up with ep as planned  Bmp .  Mg before next ov otherwise see her back in 3 weeks           BP is low normal    Her cholesterol is closely followed by her pcp     See her back at the time of procedure

## 2019-02-27 NOTE — PATIENT INSTRUCTIONS
Decrease Toprol 25 mg at bedtime    Have blood work drawn before you see     Follow up with DR. Colunga in 3 weeks

## 2019-02-27 NOTE — PROGRESS NOTES
Visit Vitals  /80 (BP 1 Location: Left arm, BP Patient Position: Sitting)   Pulse (!) 57   Resp 14   Ht 5' 6\" (1.676 m)   Wt 194 lb 9.6 oz (88.3 kg)   SpO2 97%   BMI 31.41 kg/m²

## 2019-03-04 ENCOUNTER — HOSPITAL ENCOUNTER (OUTPATIENT)
Dept: LAB | Age: 79
Discharge: HOME | End: 2019-03-04
Payer: MEDICARE

## 2019-03-04 PROCEDURE — 80048 BASIC METABOLIC PNL TOTAL CA: CPT

## 2019-03-04 PROCEDURE — 83735 ASSAY OF MAGNESIUM: CPT

## 2019-03-04 PROCEDURE — 36415 COLL VENOUS BLD VENIPUNCTURE: CPT

## 2019-03-05 LAB
BUN SERPL-MCNC: 10 MG/DL (ref 8–27)
BUN/CREAT SERPL: 15 (ref 12–28)
CALCIUM SERPL-MCNC: 9 MG/DL (ref 8.7–10.3)
CHLORIDE SERPL-SCNC: 104 MMOL/L (ref 96–106)
CO2 SERPL-SCNC: 25 MMOL/L (ref 20–29)
CREAT SERPL-MCNC: 0.65 MG/DL (ref 0.57–1)
GLUCOSE SERPL-MCNC: 114 MG/DL (ref 65–99)
MAGNESIUM SERPL-MCNC: 2.2 MG/DL (ref 1.6–2.3)
POTASSIUM SERPL-SCNC: 4.3 MMOL/L (ref 3.5–5.2)
SODIUM SERPL-SCNC: 142 MMOL/L (ref 134–144)

## 2019-03-07 ENCOUNTER — OFFICE VISIT (OUTPATIENT)
Dept: CARDIOLOGY CLINIC | Age: 79
End: 2019-03-07

## 2019-03-07 VITALS
OXYGEN SATURATION: 95 % | HEIGHT: 66 IN | DIASTOLIC BLOOD PRESSURE: 70 MMHG | BODY MASS INDEX: 30.95 KG/M2 | SYSTOLIC BLOOD PRESSURE: 102 MMHG | HEART RATE: 83 BPM | WEIGHT: 192.6 LBS | RESPIRATION RATE: 14 BRPM

## 2019-03-07 DIAGNOSIS — Z79.01 CHRONIC ANTICOAGULATION: ICD-10-CM

## 2019-03-07 DIAGNOSIS — I48.19 PERSISTENT ATRIAL FIBRILLATION (HCC): Primary | ICD-10-CM

## 2019-03-07 DIAGNOSIS — Z85.3 HX: BREAST CANCER: ICD-10-CM

## 2019-03-07 DIAGNOSIS — R06.02 SHORTNESS OF BREATH: ICD-10-CM

## 2019-03-07 DIAGNOSIS — I10 ESSENTIAL HYPERTENSION: ICD-10-CM

## 2019-03-07 DIAGNOSIS — R00.2 PALPITATIONS: ICD-10-CM

## 2019-03-07 DIAGNOSIS — Z98.890 HISTORY OF CARDIOVERSION: ICD-10-CM

## 2019-03-07 DIAGNOSIS — I42.0 DILATED CARDIOMYOPATHY (HCC): ICD-10-CM

## 2019-03-07 NOTE — PATIENT INSTRUCTIONS
Your Afib Ablation procedure has been scheduled for 5/10/19 at 8:00 am, at [facility].    Please report to Admitting Department by 6:15 am, or 2 hours prior to your scheduled procedure. Please bring a list of your current medications and medication bottles, if able, to the hospital on this day. You will be unable to drive after your procedure so please make sure to bring someone with you to your procedure. You will need to have nothing to eat or drink after midnight, the night prior to your procedure. You may have small sips of water, if needed, to take with your medication. You will need labs drawn prior to your procedure. Please go to [facility] to have this done no sooner than 4/8/19 and no later than 5/4/19. You should stop your medication, Eliquis, 2 days prior to your scheduled procedure. After your procedure, you will need to follow up with Dr. Vic Montoya.  Your follow-up appointment has been scheduled for 5/23/19 at 2:00 pm.

## 2019-03-07 NOTE — PROGRESS NOTES
Cardiac Electrophysiology OFFICE Note     Subjective:      Stephenie Dawson is a 66 y.o. patient who is seen for PVC and atrial fibrillation. The patient had cardioversion with Dr. Kasi Pablo on 2/22/19 and had follow up on 2/27/19 and now appears to be back in atrial fibrillation. I have seen her before and Dr. Kasi Pablo referred her for catheter ablation of atrial fibrillation. She is fatigued and has shortness of breath. The patient had August 2018 echocardiogram with EF dropped to 45% and Dr. Kasi Pablo stopped the Flecainide and she was started on Toprol along with Losartan. She had been on Toprol before. After that a month later, the echocardiogram in September reported ejection fraction improved from 45 to 60%. She only feels occasional thumping in the chest such as PVCs. No additional sustained fast heart beats any longer. She has no significant shortness of breath except for occasional asthma attacks. I had seen her for evaluation of atrial fibrillation after cardioversion. She had a TIM with normal left ventricular function and mild MR TR. The patient had dilated left atrium. After cardioversion she felt better. However she had cold probably related to the seasonal change. She also have dry cough. She felt tired but tolerated the flecainide. This week she has gotten better and get used to it. She is traveling to [place] in a few days and will be there for 10 days.   When I saw her at [facility]:  Atrial fibrillation with RVR and troponin 0.07  She has no chest pain and no known AFIB before  She had seen Dr Kasi Pablo in clinic and had holter with PACs  She felt tired and dizzy starting the night before last night but waited to come to ER when symptoms did not resolve  ECG showed atrial fibrillation with RVR and rate has not been controlled with metoprolol IV  Stress test was negative 5 years ago per her  She has no GI bleeding  No stroke  Echo in 2016 with normal LVEF, mild MR     Patient Active Problem List   Diagnosis Code    Dyslipidemia E78.5    Palpitations R00.2    Chest pain, unspecified R07.9    Asthma J45.909    Hyperlipidemia LDL goal < 130 E78.5    HTN (hypertension) I10    Breast cancer (HCC) C50.919    Lung nodule R91.1    Cystocele [id number]    Uterine prolapse N81.4    Dizziness R42    A-fib (HCC) I48.91    Encounter for cardioversion procedure Z01.89     Current Outpatient Medications   Medication Sig Dispense Refill    fluticasone (FLOVENT HFA) 220 mcg/actuation inhaler Flovent  mcg/actuation aerosol inhaler   Inhale 1 puff twice a day by inhalation route.  metoprolol succinate (TOPROL XL) 25 mg XL tablet Take 25 mg by mouth nightly.  flecainide (TAMBOCOR) 50 mg tablet Take 1 Tab by mouth two (2) times a day. 60 Tab 1    ELIQUIS 5 mg tablet TAKE 1 TABLET TWICE A  Tab 1    fluticasone (FLONASE) 50 mcg/actuation nasal spray 2 Sprays by Both Nostrils route nightly.  atorvastatin (LIPITOR) 20 mg tablet Take 20 mg by mouth every evening.  MULTIVITAMIN PO Take  by mouth. Takes one po once daily.  cholecalciferol (VITAMIN D3) 5,000 unit capsule Take 5,000 Units by mouth daily.  cetirizine (ZYRTEC) 10 mg tablet Take 10 mg by mouth every evening.  estradiol (ESTRACE) 0.01 % (0.1 mg/gram) vaginal cream Insert  into vagina every Monday and Thursday.  EPINEPHrine (EPIPEN) 0.3 mg/0.3 mL (1:1,000) injection 0.3 mL by IntraMUSCular route once as needed for up to 1 dose. 0.3 mL 0    albuterol (PROVENTIL HFA, VENTOLIN HFA, PROAIR HFA) 90 mcg/actuation inhaler Take 1 Puff by inhalation every four (4) hours as needed. 2 Inhaler 3    raloxifene (EVISTA) 60 mg tablet Take 1 Tab by mouth daily. 90 Tab 4    montelukast (SINGULAIR) 10 mg tablet Take 10 mg by mouth every evening.  fluticasone-salmeterol (ADVAIR DISKUS) 250-50 mcg/dose diskus inhaler Take 1 Puff by inhalation two (2) times daily as needed.        Allergies   Allergen Reactions    Betadine [Povidone-Iodine] Rash     Past Medical History:   Diagnosis Date    Arthritis     Asthma     dr. Morales Board allergist    [facility]     Breast cancer ([facility] [address] 1980    right - mastectomy    Coagulation disorder ([facility] [address]     on eliquis    Dyslipidemia     labs 2008 - chol 283, HDL 83, ,     HTN (hypertension)     Hyperlipidemia LDL goal < 130     for increased LDL particles, Dr. Andrew Subramanian nodule     Dr. Swapna Arango Palpitations     resolved     Past Surgical History:   Procedure Laterality Date    CHEST SURGERY PROCEDURE UNLISTED      lung nodule removed - benign    COLONOSCOPY N/A 9/10/2018    COLONOSCOPY performed by Elbert Sanchez MD at [facility] W/O CONT WITH CALCIUM  1/2012    CAC score 0; calcified mediastinal lymph nodes consistent granulomatous disease    ECHO 2D ADULT  5/5/2008    normal, LVEF 60%    HX APPENDECTOMY      HX HYSTERECTOMY Bilateral 03/19/2015    and uro repair    HX KNEE REPLACEMENT Right     HX MASTECTOMY Right     HX TONSILLECTOMY      HX UROLOGICAL  8/1/14    Urodynamics    UT CARDIOVERSION ELECTIVE ARRHYTHMIA EXTERNAL  3/28/2018         STRESS TEST CARDIOLITE  1/5/12    walked 7:31, no chest pain, normal MPI. Family History   Problem Relation Age of Onset    Heart Failure Mother     Stroke Father     Other Other         endometrial cancer, niece     Social History     Tobacco Use    Smoking status: Never Smoker    Smokeless tobacco: Never Used   Substance Use Topics    Alcohol use: Yes     Comment: wine nightly        Review of Systems:   Constitutional: Negative for fever, chills, weight loss, + malaise/fatigue. HEENT: Negative for nosebleeds, vision changes. Respiratory: Negative for cough, hemoptysis  Cardiovascular: Negative for chest pain, + palpitations, no orthopnea, claudication, + leg swelling, no syncope, and PND.    Gastrointestinal: Negative for nausea, vomiting, diarrhea, blood in stool and melena. Genitourinary: Negative for dysuria, and hematuria. Musculoskeletal: Negative for myalgias, arthralgia. Skin: Negative for rash. Heme: Does not bleed or bruise easily. Neurological: Negative for speech change and focal weakness     Objective:     Visit Vitals  /70 (BP 1 Location: Left arm, BP Patient Position: Sitting)   Pulse 83   Resp 14   Ht 5' 6\" (1.676 m)   Wt 192 lb 9.6 oz (87.4 kg)   SpO2 95%   BMI 31.09 kg/m²      Physical Exam:   Constitutional: well-developed and well-nourished. No respiratory distress. Head: Normocephalic and atraumatic. Eyes: Pupils are equal, round  ENT: hearing normal  Neck: supple. No JVD present. Cardiovascular: Normal rate, irregular rhythm. Exam reveals no gallop and no friction rub. No murmur heard. Pulmonary/Chest: Effort normal and breath sounds normal. No wheezes. Abdominal: Soft, no tenderness. Musculoskeletal: trace edema. Neurological: alert,oriented. Skin: Skin is warm and dry  Psychiatric: normal mood and affect. Behavior is normal. Judgment and thought content normal.         Assessment/Plan:       ICD-10-CM ICD-9-CM    1. Persistent atrial fibrillation (HCC) I48.1 427.31    2. Chronic anticoagulation Z79.01 V58.61    3. Dilated cardiomyopathy (HCC) I42.0 425.4    4. Essential hypertension I10 401.9    5. History of cardioversion Z98.890 V15.1    6. Palpitations R00.2 785.1    7. Shortness of breath R06.02 786.05    8. HX: breast cancer Z85.3 V10.3    There was a small pericardial effusion reported on the echocardiogram with LVEF 50% mild MR AR   She was symptomatic with atrial fibrillation and now appears to be back in atrial fibrillation. I will check with a 12-lead EKG. She is on low dose medication and likely will not be able to maintain sinus rhythm as she cannot tolerate more medication because of low blood pressure. We discussed risks and benefits of ablation of atrial fibrillation and she agrees to proceed.   Risks include but are not limited to bleeding in the groin, infection in the groin and/or heart valves, fistula between the groin arteries and veins, valvular damage, diaphragmatic paralysis, aortic dissection, heart attack, stroke, blood clot in the leg, pulmonary embolism, lung collapse (pneumothorax or hemothorax), heart collapse (pericardial tamponade), death. The added risks for left atrial ablation may be atrial-esophageal fistula, pulmonary vein stenoses, kidney failure (from contrast injection), higher risk of bleeding, stroke and heart attack. Elective or emergency surgery may be required to repair some of these complications. Prolonged hospitalization would be required. General anesthesia and transeptal catheterization may be required for the procedure  Thank you for involving me in this patient's care and please call with further concerns or questions. Suzanne Eli M.D.   Electrophysiology/Cardiology  [facility] [place]  [address] [place] [address]  [phone]

## 2019-03-18 ENCOUNTER — HOSPITAL ENCOUNTER (OUTPATIENT)
Dept: CT IMAGING | Age: 79
Discharge: HOME | End: 2019-03-18
Attending: INTERNAL MEDICINE

## 2019-03-18 DIAGNOSIS — R06.02 SHORTNESS OF BREATH: ICD-10-CM

## 2019-03-18 DIAGNOSIS — Z79.01 CHRONIC ANTICOAGULATION: ICD-10-CM

## 2019-03-18 DIAGNOSIS — I48.19 PERSISTENT ATRIAL FIBRILLATION (HCC): ICD-10-CM

## 2019-03-18 DIAGNOSIS — Z85.3 HX: BREAST CANCER: ICD-10-CM

## 2019-03-18 DIAGNOSIS — R00.2 PALPITATIONS: ICD-10-CM

## 2019-03-18 DIAGNOSIS — I10 ESSENTIAL HYPERTENSION: ICD-10-CM

## 2019-03-18 DIAGNOSIS — Z98.890 HISTORY OF CARDIOVERSION: ICD-10-CM

## 2019-03-18 DIAGNOSIS — I42.0 DILATED CARDIOMYOPATHY (HCC): ICD-10-CM

## 2019-03-21 ENCOUNTER — HOSPITAL ENCOUNTER (OUTPATIENT)
Dept: CT IMAGING | Age: 79
Discharge: HOME | End: 2019-03-21
Attending: INTERNAL MEDICINE
Payer: MEDICARE

## 2019-03-21 PROCEDURE — 71275 CT ANGIOGRAPHY CHEST: CPT

## 2019-03-21 PROCEDURE — 74011636320 HC RX REV CODE- 636/320: Performed by: INTERNAL MEDICINE

## 2019-03-21 PROCEDURE — 74011000258 HC RX REV CODE- 258: Performed by: INTERNAL MEDICINE

## 2019-03-21 RX ORDER — SODIUM CHLORIDE 0.9 % (FLUSH) 0.9 %
10 SYRINGE (ML) INJECTION
Status: COMPLETED | OUTPATIENT
Start: 2019-03-21 | End: 2019-03-21

## 2019-03-21 RX ADMIN — Medication 10 ML: at 09:27

## 2019-03-21 RX ADMIN — IOPAMIDOL 80 ML: 755 INJECTION, SOLUTION INTRAVENOUS at 09:26

## 2019-03-21 RX ADMIN — SODIUM CHLORIDE 100 ML: 900 INJECTION, SOLUTION INTRAVENOUS at 09:27

## 2019-03-22 ENCOUNTER — OFFICE VISIT (OUTPATIENT)
Dept: CARDIOLOGY CLINIC | Age: 79
End: 2019-03-22

## 2019-03-22 VITALS
BODY MASS INDEX: 31.21 KG/M2 | WEIGHT: 194.2 LBS | HEIGHT: 66 IN | SYSTOLIC BLOOD PRESSURE: 110 MMHG | OXYGEN SATURATION: 95 % | DIASTOLIC BLOOD PRESSURE: 80 MMHG | HEART RATE: 95 BPM | RESPIRATION RATE: 14 BRPM

## 2019-03-22 DIAGNOSIS — Z98.890 HISTORY OF CARDIOVERSION: ICD-10-CM

## 2019-03-22 DIAGNOSIS — I48.19 ATRIAL FIBRILLATION, PERSISTENT (HCC): Primary | ICD-10-CM

## 2019-03-22 DIAGNOSIS — R06.02 SHORTNESS OF BREATH: ICD-10-CM

## 2019-03-22 DIAGNOSIS — Z85.3 HX: BREAST CANCER: ICD-10-CM

## 2019-03-22 DIAGNOSIS — R00.2 PALPITATIONS: ICD-10-CM

## 2019-03-22 DIAGNOSIS — Z79.01 CHRONIC ANTICOAGULATION: ICD-10-CM

## 2019-03-22 RX ORDER — METOPROLOL SUCCINATE 25 MG/1
25 TABLET, EXTENDED RELEASE ORAL 2 TIMES DAILY
COMMUNITY
End: 2019-07-18 | Stop reason: SDUPTHER

## 2019-03-22 NOTE — PROGRESS NOTES
Visit Vitals  /80 (BP 1 Location: Left arm, BP Patient Position: Sitting)   Pulse 95   Resp 14   Ht 5' 6\" (1.676 m)   Wt 194 lb 3.2 oz (88.1 kg)   SpO2 95%   BMI 31.34 kg/m²

## 2019-03-22 NOTE — PROGRESS NOTES
HISTORY OF PRESENT ILLNESS  Pheobe Moritz is a 66 y.o. female. Patient with h/o HTN, HLD, Ca score of 0 in 2012, PVCs on ECG on 1/16 and echo on 1/16 with EF 55% mild MR. S/p LTKR in 1/16(seen by Dr. Tessie Buitrago)  Also remote h/o breast ca s/p mastectomy in 1980 but no chemo or xrt     Echo on 4/16:Ejection fraction was estimated to be 55 %. There were no  regional wall motion abnormalities. Wall thickness was at the upper limits  of normal.    Left atrium: The atrium was mildly dilated. Mitral valve: There was mild regurgitation. Aortic valve: Leaflets exhibited sclerosis. holter on 4/16: NSR  frequent pacs couplets triples and occasional non sustained at, frequent pvcs (0.65%)     On 3/18: admitted to [facility] 27 onset A-fib with RVR     TIM and Cardioversion completed with 200 J with conversion to NSR  TIM no PFO, trace AR, mild MR, no TR or NH Mild atherosclerosis in prox descending aortaNo KENNEDY thrombus LA moderate dilation RA normal  Started eliquis, flecainide and metoprolol     Echo on 8/20/18: The ventricle was dilated. Systolic function was mildly  reduced by EF (biplane method of disks). Ejection fraction was estimated  to be 45 % in the range of 40 % to 50 %. There was mild diffuse  hypokinesis. Right ventricle: The size was at the upper limits of normal.    Left atrium: The atrium was moderately dilated. Right atrium: The atrium was mildly to moderately dilated. Mitral valve: There was moderate regurgitation. Aortic valve: The valve was trileaflet. Leaflets exhibited normal  thickness, normal cuspal separation, and sclerosis without stenosis. Tricuspid valve: There was mild to moderate regurgitation. Pulmonic valve: There was mild regurgitation. Pericardium: A trivial pericardial effusion was identified. COMPARISONS:  Comparison was made with the previous study of 22-Jan-2016. LV overall  function has decreased from 55 % to 45- 50 %.       TIM /CARDIOVERSION on 2/19 resulting in NSR. Flecainide resumed     CXR on 2/25/19:1. Interval development of small bilateral pleural effusions and mild basilar     atelectasis. Follow-up to resolution is suggested. Doppler LLE on 2/19:No evidence of left lower extremity vein thrombosis. 02/27/19   ECHO ADULT FOLLOW-UP OR LIMITED 02/27/2019 2/27/2019     Narrative · Left ventricular low normal systolic function. Estimated left   ventricular ejection fraction is 51 - 55%. Calculated left ventricular   ejection fraction is 50%. Biplane method used to measure ejection   fraction. Normal left ventricular wall motion, no regional wall motion   abnormality noted. · Mild to moderate mitral valve regurgitation. · Trivial-to-small pericardial effusion. · Mild tricuspid valve stenosis is present.          Signed by: Ellen Warren MD                       Past Medical History   Diagnosis Date    Palpitations            resolved    Dyslipidemia            labs 2008 - chol 283, HDL 83, ,     HTN (hypertension)       Breast cancer (HCC) Otto Booth allergist    Hyperlipidemia LDL goal < 130            for increased LDL particles, Dr. Perla Vidal   Past Surgical History   Procedure Laterality Date    Echo 2d adult    5/5/2008         normal, LVEF 60%    Stress test cardiolite    1/5/12         walked 7:31, no chest pain, normal MPI.  Ct heart w/o cont with calcium    1/2012         CAC score 0; calcified mediastinal lymph nodes consistent granulomatous disease    Hx urological    8/1/14         Urodynamics    Hx gyn          Hx hysterectomy Bilateral         HPI  Sob a bit and fatigued   no cp reported but she does notice some palpitations   Review of Systems   Respiratory: Positive for shortness of breath. Negative for cough, hemoptysis, sputum production and wheezing. Cardiovascular: Positive for palpitations.  Negative for chest pain, orthopnea, claudication, leg swelling and PND. Physical Exam   Visit Vitals  /80 (BP 1 Location: Left arm, BP Patient Position: Sitting)   Pulse 95   Resp 14   Ht 5' 6\" (1.676 m)   Wt 194 lb 3.2 oz (88.1 kg)   SpO2 95%   BMI 31.34 kg/m²       Lab Results   Component Value Date/Time    Sodium 142 03/04/2019 03:18 PM    Potassium 4.3 03/04/2019 03:18 PM    Chloride 104 03/04/2019 03:18 PM    CO2 25 03/04/2019 03:18 PM    Anion gap 7 03/28/2018 04:00 AM    Glucose 114 (H) 03/04/2019 03:18 PM    BUN 10 03/04/2019 03:18 PM    Creatinine 0.65 03/04/2019 03:18 PM    BUN/Creatinine ratio 15 03/04/2019 03:18 PM    GFR est AA 98 03/04/2019 03:18 PM    GFR est non-AA 85 03/04/2019 03:18 PM    Calcium 9.0 03/04/2019 03:18 PM     Mg 2.2 on 3/4/19  ASSESSMENT and PLAN  AF: s/p brandy cardioversion on 2/19  With NSR restoration  Unfortunately few days after her last ov with me she was back in AF with symptoms of sob and fatigue\ seen by Dr. Aisha España.    It was felt best options was to proceed with ablation  At this time no need for flecainide since she remains in AF   we need better HR control  Increase toprol to 25 mg bid   continue eliquis     amiodarone not started on account of iodine allergy reported by patient      Continue eliquis , no longer c/o bruising after stopping asa  For atrial fibrillation ablation in 4/19  Some weight fluctuations upward   no evidence for volume overload for now   Continue to follow clinically  Diuretics on hold for now    See her back in 3 months after ablation ( chest ct results pending at this time)

## 2019-03-25 RX ORDER — APIXABAN 5 MG/1
TABLET, FILM COATED ORAL
Qty: 180 TAB | Refills: 1 | Status: ON HOLD | OUTPATIENT
Start: 2019-03-25 | End: 2019-05-12 | Stop reason: SDUPTHER

## 2019-03-25 NOTE — TELEPHONE ENCOUNTER
Requested Prescriptions     Signed Prescriptions Disp Refills    ELIQUIS 5 mg tablet 180 Tab 1     Sig: TAKE 1 TABLET TWICE A DAY     Authorizing Provider: GARY LAYNE     Ordering User: Carmin Lanes       Per Dr. Thresea Olszewski   Date Time Provider Hazel Wade   5/10/2019  8:15 AM [facility] CATH LAB 2 [facility]   5/23/2019  2:00 PM Jackie Springer  [address] 6/21/2019  8:20 AM Lexi Michele  [address] 9/17/2019  1:00 PM Jackie Springer  [address]

## 2019-04-18 ENCOUNTER — TELEPHONE (OUTPATIENT)
Dept: CARDIOLOGY CLINIC | Age: 79
End: 2019-04-18

## 2019-04-18 RX ORDER — POTASSIUM CHLORIDE 750 MG/1
10 CAPSULE, EXTENDED RELEASE ORAL DAILY
COMMUNITY
End: 2019-04-18 | Stop reason: SDUPTHER

## 2019-04-18 RX ORDER — POTASSIUM CHLORIDE 750 MG/1
10 CAPSULE, EXTENDED RELEASE ORAL DAILY
Qty: 30 CAP | Refills: 3 | Status: SHIPPED | OUTPATIENT
Start: 2019-04-18 | End: 2019-04-25

## 2019-04-18 RX ORDER — FUROSEMIDE 20 MG/1
20 TABLET ORAL DAILY
COMMUNITY
End: 2019-04-18 | Stop reason: SDUPTHER

## 2019-04-18 RX ORDER — FUROSEMIDE 20 MG/1
20 TABLET ORAL DAILY
Qty: 30 TAB | Refills: 3 | Status: SHIPPED | OUTPATIENT
Start: 2019-04-18 | End: 2019-04-25

## 2019-04-18 NOTE — TELEPHONE ENCOUNTER
Patient states that she has excess swelling in both of her legs and is concerned. She would like a call back advising her of what she should do.     Phone #: [phone]  Thanks

## 2019-04-18 NOTE — TELEPHONE ENCOUNTER
Verified patient with two patient identifiers. Spoke with patient,she said that she has gained 10 pounds since she came back from vacation. Noticed edema on both legs and ankles and she is out of breath. Discussed with Rodrigo Stanley and he said to start Lasix and Potassium 10 meq daily. Follow up with Rodrigo Stanley in 1 week. Appointment given on April 25,2019 @ 11:40 AM.

## 2019-04-22 ENCOUNTER — HOSPITAL ENCOUNTER (OUTPATIENT)
Dept: LAB | Age: 79
Discharge: HOME | End: 2019-04-22
Payer: MEDICARE

## 2019-04-22 PROCEDURE — 85025 COMPLETE CBC W/AUTO DIFF WBC: CPT

## 2019-04-22 PROCEDURE — 80048 BASIC METABOLIC PNL TOTAL CA: CPT

## 2019-04-22 PROCEDURE — 36415 COLL VENOUS BLD VENIPUNCTURE: CPT

## 2019-04-23 LAB
BASOPHILS # BLD AUTO: 0 X10E3/UL (ref 0–0.2)
BASOPHILS NFR BLD AUTO: 1 %
BUN SERPL-MCNC: 13 MG/DL (ref 8–27)
BUN/CREAT SERPL: 20 (ref 12–28)
CALCIUM SERPL-MCNC: 8.4 MG/DL (ref 8.7–10.3)
CHLORIDE SERPL-SCNC: 102 MMOL/L (ref 96–106)
CO2 SERPL-SCNC: 23 MMOL/L (ref 20–29)
CREAT SERPL-MCNC: 0.66 MG/DL (ref 0.57–1)
EOSINOPHIL # BLD AUTO: 0.2 X10E3/UL (ref 0–0.4)
EOSINOPHIL NFR BLD AUTO: 3 %
ERYTHROCYTE [DISTWIDTH] IN BLOOD BY AUTOMATED COUNT: 14.3 % (ref 12.3–15.4)
GLUCOSE SERPL-MCNC: 122 MG/DL (ref 65–99)
HCT VFR BLD AUTO: 38.3 % (ref 34–46.6)
HGB BLD-MCNC: 12.5 G/DL (ref 11.1–15.9)
IMM GRANULOCYTES # BLD AUTO: 0 X10E3/UL (ref 0–0.1)
IMM GRANULOCYTES NFR BLD AUTO: 0 %
LYMPHOCYTES # BLD AUTO: 1.9 X10E3/UL (ref 0.7–3.1)
LYMPHOCYTES NFR BLD AUTO: 26 %
MCH RBC QN AUTO: 30.7 PG (ref 26.6–33)
MCHC RBC AUTO-ENTMCNC: 32.6 G/DL (ref 31.5–35.7)
MCV RBC AUTO: 94 FL (ref 79–97)
MONOCYTES # BLD AUTO: 0.5 X10E3/UL (ref 0.1–0.9)
MONOCYTES NFR BLD AUTO: 7 %
NEUTROPHILS # BLD AUTO: 4.5 X10E3/UL (ref 1.4–7)
NEUTROPHILS NFR BLD AUTO: 63 %
PLATELET # BLD AUTO: 199 X10E3/UL (ref 150–379)
POTASSIUM SERPL-SCNC: 4.5 MMOL/L (ref 3.5–5.2)
RBC # BLD AUTO: 4.07 X10E6/UL (ref 3.77–5.28)
SODIUM SERPL-SCNC: 140 MMOL/L (ref 134–144)
WBC # BLD AUTO: 7.1 X10E3/UL (ref 3.4–10.8)

## 2019-04-25 ENCOUNTER — OFFICE VISIT (OUTPATIENT)
Dept: CARDIOLOGY CLINIC | Age: 79
End: 2019-04-25

## 2019-04-25 ENCOUNTER — HOSPITAL ENCOUNTER (OUTPATIENT)
Dept: GENERAL RADIOLOGY | Age: 79
Discharge: HOME | End: 2019-04-25
Payer: MEDICARE

## 2019-04-25 VITALS
RESPIRATION RATE: 19 BRPM | SYSTOLIC BLOOD PRESSURE: 131 MMHG | HEART RATE: 78 BPM | OXYGEN SATURATION: 97 % | DIASTOLIC BLOOD PRESSURE: 78 MMHG | HEIGHT: 66 IN | WEIGHT: 199 LBS | BODY MASS INDEX: 31.98 KG/M2

## 2019-04-25 DIAGNOSIS — I48.19 PERSISTENT ATRIAL FIBRILLATION (HCC): ICD-10-CM

## 2019-04-25 DIAGNOSIS — R06.02 SHORTNESS OF BREATH: Primary | ICD-10-CM

## 2019-04-25 DIAGNOSIS — R06.02 SHORTNESS OF BREATH: ICD-10-CM

## 2019-04-25 PROCEDURE — 71046 X-RAY EXAM CHEST 2 VIEWS: CPT

## 2019-04-25 RX ORDER — POTASSIUM CHLORIDE 750 MG/1
20 CAPSULE, EXTENDED RELEASE ORAL DAILY
Qty: 180 CAP | Refills: 2 | Status: SHIPPED | OUTPATIENT
Start: 2019-04-25 | End: 2019-06-03 | Stop reason: DRUGHIGH

## 2019-04-25 RX ORDER — FUROSEMIDE 40 MG/1
40 TABLET ORAL DAILY
Qty: 90 TAB | Refills: 2 | Status: SHIPPED | OUTPATIENT
Start: 2019-04-25 | End: 2019-05-02 | Stop reason: DRUGHIGH

## 2019-04-25 NOTE — PROGRESS NOTES
HISTORY OF PRESENT ILLNESS  Yuriy Menchaca is a 66 y.o. female. Patient with h/o HTN, HLD, Ca score of 0 in 2012, PVCs on ECG on 1/16 and echo on 1/16 with EF 55% mild MR. S/p LTKR in 1/16(seen by Dr. Marta Benitez)  Also remote h/o breast ca s/p mastectomy in 1980 but no chemo or xrt     Echo on 4/16:Ejection fraction was estimated to be 55 %. There were no  regional wall motion abnormalities. Wall thickness was at the upper limits  of normal.    Left atrium: The atrium was mildly dilated. Mitral valve: There was mild regurgitation. Aortic valve: Leaflets exhibited sclerosis. holter on 4/16: NSR  frequent pacs couplets triples and occasional non sustained at, frequent pvcs (0.65%)     On 3/18: admitted to [facility] 27 onset A-fib with RVR     TIM and Cardioversion completed with 200 J with conversion to NSR  TIM no PFO, trace AR, mild MR, no TR or NM Mild atherosclerosis in prox descending aortaNo KENNEDY thrombus LA moderate dilation RA normal  Started eliquis, flecainide and metoprolol     Echo on 8/20/18: The ventricle was dilated. Systolic function was mildly  reduced by EF (biplane method of disks). Ejection fraction was estimated  to be 45 % in the range of 40 % to 50 %. There was mild diffuse  hypokinesis. Right ventricle: The size was at the upper limits of normal.    Left atrium: The atrium was moderately dilated. Right atrium: The atrium was mildly to moderately dilated. Mitral valve: There was moderate regurgitation. Aortic valve: The valve was trileaflet. Leaflets exhibited normal  thickness, normal cuspal separation, and sclerosis without stenosis. Tricuspid valve: There was mild to moderate regurgitation. Pulmonic valve: There was mild regurgitation. Pericardium: A trivial pericardial effusion was identified. COMPARISONS:  Comparison was made with the previous study of 22-Jan-2016. LV overall  function has decreased from 55 % to 45- 50 %.       TIM /CARDIOVERSION on 2/19 resulting in NSR. Flecainide resumed     CXR on 2/25/19:1. Interval development of small bilateral pleural effusions and mild basilar     atelectasis. Follow-up to resolution is suggested. Doppler LLE on 2/19:No evidence of left lower extremity vein thrombosis. 02/27/19   ECHO ADULT FOLLOW-UP OR LIMITED 02/27/2019 2/27/2019    Addendum · Left ventricular low normal systolic function. Estimated left  ventricular ejection fraction is 51 - 55%. Calculated left ventricular  ejection fraction is 50%. Biplane method used to measure ejection  fraction. Normal left ventricular wall motion, no regional wall motion  abnormality noted. · Mild to moderate mitral valve regurgitation. · Trivial-to-small pericardial effusion. · Mild tricuspid valve regurgitation is present. Nelsy Mast MD 2/27/2019  2:19 PM          Narrative · Left ventricular low normal systolic function. Estimated left   ventricular ejection fraction is 51 - 55%. Calculated left ventricular   ejection fraction is 50%. Biplane method used to measure ejection   fraction. Normal left ventricular wall motion, no regional wall motion   abnormality noted. · Mild to moderate mitral valve regurgitation. · Trivial-to-small pericardial effusion. · Mild tricuspid valve stenosis is present. Signed by: Nelsy Mast MD               02/27/19   ECHO ADULT FOLLOW-UP OR LIMITED 02/27/2019 2/27/2019     Narrative · Left ventricular low normal systolic function. Estimated left   ventricular ejection fraction is 51 - 55%. Calculated left ventricular   ejection fraction is 50%. Biplane method used to measure ejection   fraction. Normal left ventricular wall motion, no regional wall motion   abnormality noted. · Mild to moderate mitral valve regurgitation. · Trivial-to-small pericardial effusion. · Mild tricuspid valve stenosis is present.          Signed by: Nelsy Mast MD       chest ct on 3/19:1. CTA pulmonary vein/left atrial mapping performed.   2. No accessory pulmonary vein, thrombus or stenosis. 3. 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 4. No acute finding.                    Past Medical History   Diagnosis Date    Palpitations            resolved    Dyslipidemia            labs 2008 - chol 283, HDL 83, ,     HTN (hypertension)       Breast cancer (HCC) Evelyn Chiu dr. Mallorie Brownlee allergist    Hyperlipidemia LDL goal < 130            for increased LDL particles, Dr. Michael Vieyra   Past Surgical History   Procedure Laterality Date    Echo 2d adult    5/5/2008         normal, LVEF 60%    Stress test cardiolite    1/5/12         walked 7:31, no chest pain, normal MPI.  Ct heart w/o cont with calcium    1/2012         CAC score 0; calcified mediastinal lymph nodes consistent granulomatous disease    Hx urological    8/1/14         Urodynamics    Hx gyn          Hx hysterectomy Bilateral        HPI  sob she tells me with activities and le edema   no cp reported  Review of Systems   Respiratory: Positive for shortness of breath. Negative for cough, hemoptysis and sputum production. Cardiovascular: Negative. Visit Vitals  /78 (BP 1 Location: Right arm, BP Patient Position: Sitting)   Pulse 78   Resp 19   Ht 5' 6\" (1.676 m)   Wt 199 lb (90.3 kg)   SpO2 97%   BMI 32.12 kg/m²       Physical Exam   Neck: No JVD present. Carotid bruit is not present. Cardiovascular: Normal rate. An irregularly irregular rhythm present. Murmur heard. Systolic murmur is present with a grade of 1/6. Pulmonary/Chest: Effort normal. She has decreased breath sounds in the right lower field and the left lower field. Abdominal: Soft. Musculoskeletal: She exhibits edema.   1-2+b/l   Psychiatric: She has a normal mood and affect.      Current Outpatient Medications on File Prior to Visit   Medication Sig Dispense Refill    furosemide (LASIX) 20 mg tablet Take 1 Tab by mouth daily. 30 Tab 3    potassium chloride SA (MICRO-K) 10 mEq capsule Take 1 Cap by mouth daily. 30 Cap 3    ELIQUIS 5 mg tablet TAKE 1 TABLET TWICE A  Tab 1    metoprolol succinate (TOPROL XL) 25 mg XL tablet Take 25 mg by mouth two (2) times a day.  fluticasone (FLOVENT HFA) 220 mcg/actuation inhaler Flovent  mcg/actuation aerosol inhaler   Inhale 1 puff twice a day by inhalation route.  fluticasone (FLONASE) 50 mcg/actuation nasal spray 2 Sprays by Both Nostrils route nightly.  atorvastatin (LIPITOR) 20 mg tablet Take 20 mg by mouth every evening.  MULTIVITAMIN PO Take  by mouth. Takes one po once daily.  cholecalciferol (VITAMIN D3) 5,000 unit capsule Take 5,000 Units by mouth daily.  cetirizine (ZYRTEC) 10 mg tablet Take 10 mg by mouth every evening.  fluticasone-salmeterol (ADVAIR DISKUS) 250-50 mcg/dose diskus inhaler Take 1 Puff by inhalation two (2) times daily as needed.  estradiol (ESTRACE) 0.01 % (0.1 mg/gram) vaginal cream Insert  into vagina every Monday and Thursday.  EPINEPHrine (EPIPEN) 0.3 mg/0.3 mL (1:1,000) injection 0.3 mL by IntraMUSCular route once as needed for up to 1 dose. 0.3 mL 0    albuterol (PROVENTIL HFA, VENTOLIN HFA, PROAIR HFA) 90 mcg/actuation inhaler Take 1 Puff by inhalation every four (4) hours as needed. 2 Inhaler 3    raloxifene (EVISTA) 60 mg tablet Take 1 Tab by mouth daily. 90 Tab 4    montelukast (SINGULAIR) 10 mg tablet Take 10 mg by mouth every evening. No current facility-administered medications on file prior to visit. ASSESSMENT and PLAN  AF: s/p brandy cardioversion on 2/19  With NSR restoration  Unfortunately few days after her last ov with me she was back in AF with symptoms of sob and fatigue seen by Dr. Vinita Lyons.    It was felt best options was to proceed with ablation  She is scheduled for AF ablation on 5/19  At this time off flecainide  since she remains in AF  HR controlled  Continue toprol bid for now     amiodarone not started on account of iodine allergy reported by patient      Continue eliquis , no longer c/o bruising after stopping asa    Sob: unclear etiology , she also has asthma but some evidence of increased volume   increase lasix to 60 mg x 2 days with potassium 30 meq then she will continue with 40 mg and 20 meq of lasix and potassium respectively  Obtain cxr   obtain bmp in 1 week  Ef low normal as per echo of 2/19 hold off repeating for now  Worsening asthma from toprol also a possibility af also I suspect contributing to her sob     See her back in 1 week

## 2019-04-25 NOTE — PROGRESS NOTES
Visit Vitals  /78 (BP 1 Location: Right arm, BP Patient Position: Sitting)   Pulse 78   Resp 19   Ht 5' 6\" (1.676 m)   Wt 199 lb (90.3 kg)   SpO2 97%   BMI 32.12 kg/m²     Verified patient with two types of identifiers. Verified pharmacy with patient. Verified medications with the patient. Discontinued medications not current per Dr. Jordyn Esqueda.

## 2019-04-29 ENCOUNTER — TELEPHONE (OUTPATIENT)
Dept: CARDIOLOGY CLINIC | Age: 79
End: 2019-04-29

## 2019-04-29 NOTE — TELEPHONE ENCOUNTER
Patient would like to know if she should have her lab work done prior to her appt with Dr. Darius Reyna on 5/2/19. She stated that she was to have labs done on 5/2/19 and then see the doctor a few days later but that was the only available appt. Please advise.     Phone #: [phone]  Thanks

## 2019-04-29 NOTE — TELEPHONE ENCOUNTER
Verified patient with two patient identifiers. Spoke with patient and was advised to get her lab work done 2-3 days prior to her appointment. Also told her the result of chest xray.

## 2019-04-30 ENCOUNTER — HOSPITAL ENCOUNTER (OUTPATIENT)
Dept: LAB | Age: 79
Discharge: HOME | End: 2019-04-30
Payer: MEDICARE

## 2019-04-30 PROCEDURE — 80048 BASIC METABOLIC PNL TOTAL CA: CPT

## 2019-04-30 PROCEDURE — 36415 COLL VENOUS BLD VENIPUNCTURE: CPT

## 2019-05-01 LAB
BUN SERPL-MCNC: 15 MG/DL (ref 8–27)
BUN/CREAT SERPL: 21 (ref 12–28)
CALCIUM SERPL-MCNC: 8.8 MG/DL (ref 8.7–10.3)
CHLORIDE SERPL-SCNC: 104 MMOL/L (ref 96–106)
CO2 SERPL-SCNC: 26 MMOL/L (ref 20–29)
CREAT SERPL-MCNC: 0.7 MG/DL (ref 0.57–1)
GLUCOSE SERPL-MCNC: 88 MG/DL (ref 65–99)
POTASSIUM SERPL-SCNC: 4.6 MMOL/L (ref 3.5–5.2)
SODIUM SERPL-SCNC: 142 MMOL/L (ref 134–144)

## 2019-05-02 ENCOUNTER — OFFICE VISIT (OUTPATIENT)
Dept: CARDIOLOGY CLINIC | Age: 79
End: 2019-05-02

## 2019-05-02 VITALS
RESPIRATION RATE: 15 BRPM | HEIGHT: 66 IN | SYSTOLIC BLOOD PRESSURE: 108 MMHG | HEART RATE: 70 BPM | OXYGEN SATURATION: 95 % | BODY MASS INDEX: 30.7 KG/M2 | WEIGHT: 191 LBS | DIASTOLIC BLOOD PRESSURE: 62 MMHG

## 2019-05-02 DIAGNOSIS — Z98.890 HISTORY OF CARDIOVERSION: ICD-10-CM

## 2019-05-02 DIAGNOSIS — Z79.01 CHRONIC ANTICOAGULATION: ICD-10-CM

## 2019-05-02 DIAGNOSIS — I48.19 PERSISTENT ATRIAL FIBRILLATION (HCC): Primary | ICD-10-CM

## 2019-05-02 DIAGNOSIS — I10 ESSENTIAL HYPERTENSION: ICD-10-CM

## 2019-05-02 RX ORDER — FUROSEMIDE 40 MG/1
40 TABLET ORAL DAILY
COMMUNITY
End: 2019-06-03 | Stop reason: DRUGHIGH

## 2019-05-02 NOTE — PATIENT INSTRUCTIONS
Decrease Lasix 40 mg alternate 20 mg daily    Decrease Potassium 20 meq 10 meq daily    Follow up with Xenia Hooper in 1 month

## 2019-05-02 NOTE — PROGRESS NOTES
Visit Vitals  /62 (BP 1 Location: Left arm, BP Patient Position: Sitting)   Pulse 70   Resp 15   Ht 5' 6\" (1.676 m)   Wt 191 lb (86.6 kg)   SpO2 95%   BMI 30.83 kg/m²     Verified patient with two types of identifiers. Verified pharmacy with patient. Verified medications with the patient. Discontinued medications not current per Dr. Zacharaih Samuel.

## 2019-05-02 NOTE — PROGRESS NOTES
HISTORY OF PRESENT ILLNESS  Blaine Parr is a 66 y.o. female. Patient with h/o HTN, HLD, Ca score of 0 in 2012, PVCs on ECG on 1/16 and echo on 1/16 with EF 55% mild MR. S/p LTKR in 1/16(seen by Dr. Yair Dillon)  Also remote h/o breast ca s/p mastectomy in 1980 but no chemo or xrt     Echo on 4/16:Ejection fraction was estimated to be 55 %. There were no  regional wall motion abnormalities. Wall thickness was at the upper limits  of normal.    Left atrium: The atrium was mildly dilated. Mitral valve: There was mild regurgitation. Aortic valve: Leaflets exhibited sclerosis. holter on 4/16: NSR  frequent pacs couplets triples and occasional non sustained at, frequent pvcs (0.65%)     On 3/18: admitted to [facility] 27 onset A-fib with RVR     TIM and Cardioversion completed with 200 J with conversion to NSR  TIM no PFO, trace AR, mild MR, no TR or MA Mild atherosclerosis in prox descending aortaNo KENNEDY thrombus LA moderate dilation RA normal  Started eliquis, flecainide and metoprolol     Echo on 8/20/18: The ventricle was dilated. Systolic function was mildly  reduced by EF (biplane method of disks). Ejection fraction was estimated  to be 45 % in the range of 40 % to 50 %. There was mild diffuse  hypokinesis. Right ventricle: The size was at the upper limits of normal.    Left atrium: The atrium was moderately dilated. Right atrium: The atrium was mildly to moderately dilated. Mitral valve: There was moderate regurgitation. Aortic valve: The valve was trileaflet. Leaflets exhibited normal  thickness, normal cuspal separation, and sclerosis without stenosis. Tricuspid valve: There was mild to moderate regurgitation. Pulmonic valve: There was mild regurgitation. Pericardium: A trivial pericardial effusion was identified. COMPARISONS:  Comparison was made with the previous study of 22-Jan-2016. LV overall  function has decreased from 55 % to 45- 50 %.       TIM /CARDIOVERSION on 2/19 resulting in NSR. Flecainide resumed     CXR on 2/25/19:1. Interval development of small bilateral pleural effusions and mild basilar     atelectasis. Follow-up to resolution is suggested. Doppler LLE on 2/19:No evidence of left lower extremity vein thrombosis. 02/27/19   ECHO ADULT FOLLOW-UP OR LIMITED 02/27/2019 2/27/2019     Addendum · Left ventricular low normal systolic function. Estimated left  ventricular ejection fraction is 51 - 55%. Calculated left ventricular  ejection fraction is 50%. Biplane method used to measure ejection  fraction. Normal left ventricular wall motion, no regional wall motion  abnormality noted. · Mild to moderate mitral valve regurgitation. · Trivial-to-small pericardial effusion. · Mild tricuspid valve regurgitation is present. Carl Titus MD 2/27/2019  2:19 PM           Narrative · Left ventricular low normal systolic function. Estimated left   ventricular ejection fraction is 51 - 55%. Calculated left ventricular   ejection fraction is 50%. Biplane method used to measure ejection   fraction. Normal left ventricular wall motion, no regional wall motion   abnormality noted. · Mild to moderate mitral valve regurgitation. · Trivial-to-small pericardial effusion. · Mild tricuspid valve stenosis is present.          Signed by: Carl Titus MD                 02/27/19   ECHO ADULT FOLLOW-UP OR LIMITED 02/27/2019 2/27/2019     Narrative · Left ventricular low normal systolic function. Estimated left   ventricular ejection fraction is 51 - 55%. Calculated left ventricular   ejection fraction is 50%. Biplane method used to measure ejection   fraction. Normal left ventricular wall motion, no regional wall motion   abnormality noted. · Mild to moderate mitral valve regurgitation. · Trivial-to-small pericardial effusion.   · Mild tricuspid valve stenosis is present.          Signed by: Carl Titus MD       chest ct on 3/19:1. CTA pulmonary vein/left atrial mapping performed. 2. No accessory pulmonary vein, thrombus or stenosis. 3. 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 4. No acute finding.                    Past Medical History   Diagnosis Date    Palpitations            resolved    Dyslipidemia            labs 2008 - chol 283, HDL 83, ,     HTN (hypertension)       Breast cancer (HCC) Benjamin Solis allergist    Hyperlipidemia LDL goal < 130            for increased LDL particles, Dr. David Huggins Nine   Past Surgical History   Procedure Laterality Date    Echo 2d adult    5/5/2008         normal, LVEF 60%    Stress test cardiolite    1/5/12         walked 7:31, no chest pain, normal MPI.  Ct heart w/o cont with calcium    1/2012         CAC score 0; calcified mediastinal lymph nodes consistent granulomatous disease    Hx urological    8/1/14         Urodynamics    Hx gyn          Hx hysterectomy Bilateral         HPI  Feeling much better   Sob resolved   lost about 10 lbs   no cp or sob or palpitations  Review of Systems   Respiratory: Negative. Cardiovascular: Negative. Visit Vitals  /62 (BP 1 Location: Left arm, BP Patient Position: Sitting)   Pulse 70   Resp 15   Ht 5' 6\" (1.676 m)   Wt 191 lb (86.6 kg)   SpO2 95%   BMI 30.83 kg/m²       Physical Exam   Neck: No JVD present. Carotid bruit is not present. Cardiovascular: Normal rate. An irregularly irregular rhythm present. Pulmonary/Chest: Effort normal and breath sounds normal.   Abdominal: Soft. Musculoskeletal: She exhibits edema. trace   Psychiatric: She has a normal mood and affect. Current Outpatient Medications on File Prior to Visit   Medication Sig Dispense Refill    furosemide (LASIX) 40 mg tablet Take 1 Tab by mouth daily. 90 Tab 2    potassium chloride SA (MICRO-K) 10 mEq capsule Take 2 Caps by mouth daily.  180 Cap 2    ELIQUIS 5 mg tablet TAKE 1 TABLET TWICE A DAY 180 Tab 1    metoprolol succinate (TOPROL XL) 25 mg XL tablet Take 25 mg by mouth two (2) times a day.  fluticasone (FLOVENT HFA) 220 mcg/actuation inhaler Flovent  mcg/actuation aerosol inhaler   Inhale 1 puff twice a day by inhalation route.  fluticasone (FLONASE) 50 mcg/actuation nasal spray 2 Sprays by Both Nostrils route nightly.  atorvastatin (LIPITOR) 20 mg tablet Take 20 mg by mouth every evening.  MULTIVITAMIN PO Take  by mouth. Takes one po once daily.  cholecalciferol (VITAMIN D3) 5,000 unit capsule Take 5,000 Units by mouth daily.  cetirizine (ZYRTEC) 10 mg tablet Take 10 mg by mouth every evening.  fluticasone-salmeterol (ADVAIR DISKUS) 250-50 mcg/dose diskus inhaler Take 1 Puff by inhalation two (2) times daily as needed.  estradiol (ESTRACE) 0.01 % (0.1 mg/gram) vaginal cream Insert  into vagina every Monday and Thursday.  EPINEPHrine (EPIPEN) 0.3 mg/0.3 mL (1:1,000) injection 0.3 mL by IntraMUSCular route once as needed for up to 1 dose. 0.3 mL 0    albuterol (PROVENTIL HFA, VENTOLIN HFA, PROAIR HFA) 90 mcg/actuation inhaler Take 1 Puff by inhalation every four (4) hours as needed. 2 Inhaler 3    raloxifene (EVISTA) 60 mg tablet Take 1 Tab by mouth daily. 90 Tab 4    montelukast (SINGULAIR) 10 mg tablet Take 10 mg by mouth every evening. No current facility-administered medications on file prior to visit.         Lab Results   Component Value Date/Time    Sodium 142 04/30/2019 11:49 AM    Potassium 4.6 04/30/2019 11:49 AM    Chloride 104 04/30/2019 11:49 AM    CO2 26 04/30/2019 11:49 AM    Anion gap 7 03/28/2018 04:00 AM    Glucose 88 04/30/2019 11:49 AM    BUN 15 04/30/2019 11:49 AM    Creatinine 0.70 04/30/2019 11:49 AM    BUN/Creatinine ratio 21 04/30/2019 11:49 AM    GFR est AA 96 04/30/2019 11:49 AM    GFR est non-AA 83 04/30/2019 11:49 AM    Calcium 8.8 04/30/2019 11:49 AM   CXR 4/25/19: no acute process    ASSESSMENT and PLAN  AF: s/p brandy cardioversion on 2/19  With NSR restoration  Unfortunately few days after her last ov with me she was back in AF with symptoms of sob and fatigue seen by Dr. Caesar Navarrete.    It was felt best options was to proceed with ablation  She is scheduled for AF ablation on 5/19  At this time off flecainide  since she remains in AF  HR controlled  Continue toprol bid for now      amiodarone not started on account of iodine allergy reported by patient      Continue eliquis , no longer c/o bruising after stopping asa     Sob: resolved continue lasix and potassium but alternate 40 mg to 20 mg of lasix   she has lst 9 lbs since her last office visit  Ef low normal as per echo of 2/19 hold off repeating for now until after ablation    CXR and bmp ok and discussed with patient     See her back in 1 month after ablation

## 2019-05-03 RX ORDER — SODIUM CHLORIDE 0.9 % (FLUSH) 0.9 %
5-40 SYRINGE (ML) INJECTION EVERY 8 HOURS
Status: CANCELLED | OUTPATIENT
Start: 2019-05-03

## 2019-05-03 RX ORDER — SODIUM CHLORIDE 0.9 % (FLUSH) 0.9 %
5-40 SYRINGE (ML) INJECTION AS NEEDED
Status: CANCELLED | OUTPATIENT
Start: 2019-05-03

## 2019-05-09 ENCOUNTER — ANESTHESIA EVENT (OUTPATIENT)
Dept: CARDIAC CATH/INVASIVE PROCEDURES | Age: 79
End: 2019-05-09
Payer: MEDICARE

## 2019-05-10 ENCOUNTER — APPOINTMENT (OUTPATIENT)
Dept: CARDIAC CATH/INVASIVE PROCEDURES | Age: 79
End: 2019-05-10
Attending: INTERNAL MEDICINE
Payer: MEDICARE

## 2019-05-10 ENCOUNTER — ANESTHESIA (OUTPATIENT)
Dept: CARDIAC CATH/INVASIVE PROCEDURES | Age: 79
End: 2019-05-10
Payer: MEDICARE

## 2019-05-10 ENCOUNTER — HOSPITAL ENCOUNTER (OUTPATIENT)
Age: 79
Setting detail: OBSERVATION
Discharge: HOME | End: 2019-05-12
Attending: INTERNAL MEDICINE | Admitting: INTERNAL MEDICINE
Payer: MEDICARE

## 2019-05-10 DIAGNOSIS — I48.19 ATRIAL FIBRILLATION, PERSISTENT (HCC): ICD-10-CM

## 2019-05-10 PROBLEM — I34.0 NON-RHEUMATIC MITRAL REGURGITATION: Status: ACTIVE | Noted: 2019-05-10

## 2019-05-10 PROBLEM — Z86.79 S/P ABLATION OF ATRIAL FIBRILLATION: Status: ACTIVE | Noted: 2019-05-10

## 2019-05-10 PROBLEM — I30.9 PERICARDIAL EFFUSION, ACUTE: Status: ACTIVE | Noted: 2019-05-10

## 2019-05-10 PROBLEM — Z98.890 S/P ABLATION OF ATRIAL FIBRILLATION: Status: ACTIVE | Noted: 2019-05-10

## 2019-05-10 PROBLEM — I36.1 NON-RHEUMATIC TRICUSPID VALVE INSUFFICIENCY: Status: ACTIVE | Noted: 2019-05-10

## 2019-05-10 LAB
ABO + RH BLD: NORMAL
ACT BLD: 164 SECS (ref 79–138)
ACT BLD: 202 SECS (ref 79–138)
ACT BLD: 257 SECS (ref 79–138)
ANION GAP SERPL CALC-SCNC: 5 MMOL/L (ref 5–15)
ATRIAL RATE: 75 BPM
BLOOD GROUP ANTIBODIES SERPL: NORMAL
BUN SERPL-MCNC: 12 MG/DL (ref 6–20)
BUN/CREAT SERPL: 21 (ref 12–20)
CALCIUM SERPL-MCNC: 8.1 MG/DL (ref 8.5–10.1)
CALCULATED P AXIS, ECG09: 70 DEGREES
CALCULATED R AXIS, ECG10: 33 DEGREES
CALCULATED T AXIS, ECG11: 41 DEGREES
CHLORIDE SERPL-SCNC: 114 MMOL/L (ref 97–108)
CO2 SERPL-SCNC: 26 MMOL/L (ref 21–32)
CREAT SERPL-MCNC: 0.58 MG/DL (ref 0.55–1.02)
DIAGNOSIS, 93000: NORMAL
ERYTHROCYTE [DISTWIDTH] IN BLOOD BY AUTOMATED COUNT: 13.4 % (ref 11.5–14.5)
GLUCOSE SERPL-MCNC: 135 MG/DL (ref 65–100)
HCT VFR BLD AUTO: 39.9 % (ref 35–47)
HGB BLD-MCNC: 12.9 G/DL (ref 11.5–16)
MCH RBC QN AUTO: 31.2 PG (ref 26–34)
MCHC RBC AUTO-ENTMCNC: 32.3 G/DL (ref 30–36.5)
MCV RBC AUTO: 96.6 FL (ref 80–99)
NRBC # BLD: 0 K/UL (ref 0–0.01)
NRBC BLD-RTO: 0 PER 100 WBC
P-R INTERVAL, ECG05: 198 MS
PLATELET # BLD AUTO: 137 K/UL (ref 150–400)
PMV BLD AUTO: 12.4 FL (ref 8.9–12.9)
POTASSIUM SERPL-SCNC: 3.7 MMOL/L (ref 3.5–5.1)
Q-T INTERVAL, ECG07: 436 MS
QRS DURATION, ECG06: 96 MS
QTC CALCULATION (BEZET), ECG08: 486 MS
RBC # BLD AUTO: 4.13 M/UL (ref 3.8–5.2)
SODIUM SERPL-SCNC: 145 MMOL/L (ref 136–145)
SPECIMEN EXP DATE BLD: NORMAL
VENTRICULAR RATE, ECG03: 75 BPM
WBC # BLD AUTO: 6.5 K/UL (ref 3.6–11)

## 2019-05-10 PROCEDURE — 74011250636 HC RX REV CODE- 250/636: Performed by: INTERNAL MEDICINE

## 2019-05-10 PROCEDURE — 94640 AIRWAY INHALATION TREATMENT: CPT

## 2019-05-10 PROCEDURE — 86900 BLOOD TYPING SEROLOGIC ABO: CPT

## 2019-05-10 PROCEDURE — C1730 CATH, EP, 19 OR FEW ELECT: HCPCS | Performed by: INTERNAL MEDICINE

## 2019-05-10 PROCEDURE — 77030008684 HC TU ET CUF COVD -B: Performed by: NURSE ANESTHETIST, CERTIFIED REGISTERED

## 2019-05-10 PROCEDURE — C1894 INTRO/SHEATH, NON-LASER: HCPCS | Performed by: INTERNAL MEDICINE

## 2019-05-10 PROCEDURE — 85027 COMPLETE CBC AUTOMATED: CPT

## 2019-05-10 PROCEDURE — C1892 INTRO/SHEATH,FIXED,PEEL-AWAY: HCPCS | Performed by: INTERNAL MEDICINE

## 2019-05-10 PROCEDURE — C1759 CATH, INTRA ECHOCARDIOGRAPHY: HCPCS | Performed by: INTERNAL MEDICINE

## 2019-05-10 PROCEDURE — 85347 COAGULATION TIME ACTIVATED: CPT

## 2019-05-10 PROCEDURE — 36620 INSERTION CATHETER ARTERY: CPT

## 2019-05-10 PROCEDURE — C1893 INTRO/SHEATH, FIXED,NON-PEEL: HCPCS | Performed by: INTERNAL MEDICINE

## 2019-05-10 PROCEDURE — C1731 CATH, EP, 20 OR MORE ELEC: HCPCS | Performed by: INTERNAL MEDICINE

## 2019-05-10 PROCEDURE — 77030016898 HC CBL EP EXT3 STJU -B: Performed by: INTERNAL MEDICINE

## 2019-05-10 PROCEDURE — 74011000250 HC RX REV CODE- 250

## 2019-05-10 PROCEDURE — 93320 DOPPLER ECHO COMPLETE: CPT

## 2019-05-10 PROCEDURE — 77030013797 HC KT TRNSDUC PRSSR EDWD -A: Performed by: INTERNAL MEDICINE

## 2019-05-10 PROCEDURE — 74011250636 HC RX REV CODE- 250/636

## 2019-05-10 PROCEDURE — C8925 2D TEE W OR W/O FOL W/CON,IN: HCPCS

## 2019-05-10 PROCEDURE — 77030013079 HC BLNKT BAIR HGGR 3M -A

## 2019-05-10 PROCEDURE — 77030018733 HC ELECTRD KT ENSITE STJU -G: Performed by: INTERNAL MEDICINE

## 2019-05-10 PROCEDURE — 80048 BASIC METABOLIC PNL TOTAL CA: CPT

## 2019-05-10 PROCEDURE — 93613 INTRACARDIAC EPHYS 3D MAPG: CPT | Performed by: INTERNAL MEDICINE

## 2019-05-10 PROCEDURE — 93662 INTRACARDIAC ECG (ICE): CPT | Performed by: INTERNAL MEDICINE

## 2019-05-10 PROCEDURE — 77030039046 HC PAD DEFIB RADIOTRNSPNT CNMD -B: Performed by: INTERNAL MEDICINE

## 2019-05-10 PROCEDURE — 77030020506 HC NDL TRNSPTL NRG BAYL -F: Performed by: INTERNAL MEDICINE

## 2019-05-10 PROCEDURE — 77030033352 HC TBNG IRR PMP COOL PNT STJU -B: Performed by: INTERNAL MEDICINE

## 2019-05-10 PROCEDURE — 99218 HC RM OBSERVATION: CPT

## 2019-05-10 PROCEDURE — 77030026438 HC STYL ET INTUB CARD -A: Performed by: NURSE ANESTHETIST, CERTIFIED REGISTERED

## 2019-05-10 PROCEDURE — 74011000250 HC RX REV CODE- 250: Performed by: NURSE PRACTITIONER

## 2019-05-10 PROCEDURE — 77030016899 HC CBL EP EXT4 BSC -B: Performed by: INTERNAL MEDICINE

## 2019-05-10 PROCEDURE — 74011250637 HC RX REV CODE- 250/637: Performed by: NURSE PRACTITIONER

## 2019-05-10 PROCEDURE — 76060000039 HC ANESTHESIA 4 TO 4.5 HR: Performed by: INTERNAL MEDICINE

## 2019-05-10 PROCEDURE — 77030013785 HC KT PERICARDCENT BSC -C: Performed by: INTERNAL MEDICINE

## 2019-05-10 PROCEDURE — 74011250637 HC RX REV CODE- 250/637: Performed by: INTERNAL MEDICINE

## 2019-05-10 PROCEDURE — 36415 COLL VENOUS BLD VENIPUNCTURE: CPT

## 2019-05-10 PROCEDURE — 93656 COMPRE EP EVAL ABLTJ ATR FIB: CPT | Performed by: INTERNAL MEDICINE

## 2019-05-10 PROCEDURE — C1766 INTRO/SHEATH,STRBLE,NON-PEEL: HCPCS | Performed by: INTERNAL MEDICINE

## 2019-05-10 PROCEDURE — 93005 ELECTROCARDIOGRAM TRACING: CPT

## 2019-05-10 PROCEDURE — 77030003390 HC NDL ANGI MRTM -A: Performed by: INTERNAL MEDICINE

## 2019-05-10 PROCEDURE — C2630 CATH, EP, COOL-TIP: HCPCS | Performed by: INTERNAL MEDICINE

## 2019-05-10 RX ORDER — AMIODARONE HYDROCHLORIDE 400 MG/1
400 TABLET ORAL 2 TIMES DAILY
Qty: 28 TAB | Refills: 0 | Status: SHIPPED | OUTPATIENT
Start: 2019-05-11 | End: 2019-05-16 | Stop reason: SDUPTHER

## 2019-05-10 RX ORDER — HYDROCODONE BITARTRATE AND ACETAMINOPHEN 5; 325 MG/1; MG/1
1 TABLET ORAL
Status: DISCONTINUED | OUTPATIENT
Start: 2019-05-10 | End: 2019-05-12 | Stop reason: HOSPADM

## 2019-05-10 RX ORDER — POTASSIUM CHLORIDE 750 MG/1
20 TABLET, FILM COATED, EXTENDED RELEASE ORAL DAILY
Status: DISCONTINUED | OUTPATIENT
Start: 2019-05-11 | End: 2019-05-12 | Stop reason: HOSPADM

## 2019-05-10 RX ORDER — SODIUM CHLORIDE 0.9 % (FLUSH) 0.9 %
5-40 SYRINGE (ML) INJECTION EVERY 8 HOURS
Status: DISCONTINUED | OUTPATIENT
Start: 2019-05-10 | End: 2019-05-10 | Stop reason: HOSPADM

## 2019-05-10 RX ORDER — ALBUTEROL SULFATE 0.83 MG/ML
2.5 SOLUTION RESPIRATORY (INHALATION)
Status: DISCONTINUED | OUTPATIENT
Start: 2019-05-10 | End: 2019-05-12 | Stop reason: HOSPADM

## 2019-05-10 RX ORDER — AMIODARONE HYDROCHLORIDE 200 MG/1
400 TABLET ORAL 2 TIMES DAILY
Status: DISCONTINUED | OUTPATIENT
Start: 2019-05-10 | End: 2019-05-12 | Stop reason: HOSPADM

## 2019-05-10 RX ORDER — HEPARIN SODIUM 1000 [USP'U]/ML
INJECTION, SOLUTION INTRAVENOUS; SUBCUTANEOUS AS NEEDED
Status: DISCONTINUED | OUTPATIENT
Start: 2019-05-10 | End: 2019-05-10

## 2019-05-10 RX ORDER — BUDESONIDE 0.5 MG/2ML
500 INHALANT ORAL
Status: DISCONTINUED | OUTPATIENT
Start: 2019-05-10 | End: 2019-05-12 | Stop reason: HOSPADM

## 2019-05-10 RX ORDER — HEPARIN SODIUM 10000 [USP'U]/100ML
INJECTION, SOLUTION INTRAVENOUS
Status: DISCONTINUED | OUTPATIENT
Start: 2019-05-10 | End: 2019-05-10 | Stop reason: HOSPADM

## 2019-05-10 RX ORDER — PROTAMINE SULFATE 10 MG/ML
INJECTION, SOLUTION INTRAVENOUS AS NEEDED
Status: DISCONTINUED | OUTPATIENT
Start: 2019-05-10 | End: 2019-05-10 | Stop reason: HOSPADM

## 2019-05-10 RX ORDER — CETIRIZINE HCL 10 MG
10 TABLET ORAL EVERY EVENING
Status: DISCONTINUED | OUTPATIENT
Start: 2019-05-10 | End: 2019-05-12 | Stop reason: HOSPADM

## 2019-05-10 RX ORDER — FLUTICASONE PROPIONATE 50 MCG
2 SPRAY, SUSPENSION (ML) NASAL
Status: DISCONTINUED | OUTPATIENT
Start: 2019-05-10 | End: 2019-05-12 | Stop reason: HOSPADM

## 2019-05-10 RX ORDER — NEOSTIGMINE METHYLSULFATE 1 MG/ML
INJECTION INTRAVENOUS AS NEEDED
Status: DISCONTINUED | OUTPATIENT
Start: 2019-05-10 | End: 2019-05-10 | Stop reason: HOSPADM

## 2019-05-10 RX ORDER — ACETAMINOPHEN 325 MG/1
650 TABLET ORAL
Status: DISCONTINUED | OUTPATIENT
Start: 2019-05-10 | End: 2019-05-12 | Stop reason: HOSPADM

## 2019-05-10 RX ORDER — HEPARIN SODIUM 200 [USP'U]/100ML
INJECTION, SOLUTION INTRAVENOUS
Status: DISCONTINUED | OUTPATIENT
Start: 2019-05-10 | End: 2019-05-10

## 2019-05-10 RX ORDER — LIDOCAINE HYDROCHLORIDE 10 MG/ML
0.1 INJECTION, SOLUTION EPIDURAL; INFILTRATION; INTRACAUDAL; PERINEURAL AS NEEDED
Status: DISCONTINUED | OUTPATIENT
Start: 2019-05-10 | End: 2019-05-10

## 2019-05-10 RX ORDER — SODIUM CHLORIDE 0.9 % (FLUSH) 0.9 %
5-40 SYRINGE (ML) INJECTION EVERY 8 HOURS
Status: DISCONTINUED | OUTPATIENT
Start: 2019-05-10 | End: 2019-05-12 | Stop reason: HOSPADM

## 2019-05-10 RX ORDER — SODIUM CHLORIDE 0.9 % (FLUSH) 0.9 %
5-40 SYRINGE (ML) INJECTION AS NEEDED
Status: DISCONTINUED | OUTPATIENT
Start: 2019-05-10 | End: 2019-05-12 | Stop reason: HOSPADM

## 2019-05-10 RX ORDER — RALOXIFENE HYDROCHLORIDE 60 MG/1
60 TABLET, FILM COATED ORAL DAILY
Status: DISCONTINUED | OUTPATIENT
Start: 2019-05-11 | End: 2019-05-12 | Stop reason: HOSPADM

## 2019-05-10 RX ORDER — MONTELUKAST SODIUM 10 MG/1
10 TABLET ORAL EVERY EVENING
Status: DISCONTINUED | OUTPATIENT
Start: 2019-05-10 | End: 2019-05-12 | Stop reason: HOSPADM

## 2019-05-10 RX ORDER — ONDANSETRON 2 MG/ML
INJECTION INTRAMUSCULAR; INTRAVENOUS AS NEEDED
Status: DISCONTINUED | OUTPATIENT
Start: 2019-05-10 | End: 2019-05-10 | Stop reason: HOSPADM

## 2019-05-10 RX ORDER — FENTANYL CITRATE 50 UG/ML
25 INJECTION, SOLUTION INTRAMUSCULAR; INTRAVENOUS
Status: DISCONTINUED | OUTPATIENT
Start: 2019-05-10 | End: 2019-05-10 | Stop reason: HOSPADM

## 2019-05-10 RX ORDER — HEPARIN SODIUM 1000 [USP'U]/ML
INJECTION, SOLUTION INTRAVENOUS; SUBCUTANEOUS AS NEEDED
Status: DISCONTINUED | OUTPATIENT
Start: 2019-05-10 | End: 2019-05-10 | Stop reason: HOSPADM

## 2019-05-10 RX ORDER — GLYCOPYRROLATE 0.2 MG/ML
INJECTION INTRAMUSCULAR; INTRAVENOUS AS NEEDED
Status: DISCONTINUED | OUTPATIENT
Start: 2019-05-10 | End: 2019-05-10 | Stop reason: HOSPADM

## 2019-05-10 RX ORDER — POTASSIUM CHLORIDE 750 MG/1
40 TABLET, FILM COATED, EXTENDED RELEASE ORAL
Status: COMPLETED | OUTPATIENT
Start: 2019-05-10 | End: 2019-05-10

## 2019-05-10 RX ORDER — ROCURONIUM BROMIDE 10 MG/ML
INJECTION, SOLUTION INTRAVENOUS AS NEEDED
Status: DISCONTINUED | OUTPATIENT
Start: 2019-05-10 | End: 2019-05-10 | Stop reason: HOSPADM

## 2019-05-10 RX ORDER — FUROSEMIDE 40 MG/1
40 TABLET ORAL DAILY
Status: DISCONTINUED | OUTPATIENT
Start: 2019-05-11 | End: 2019-05-12 | Stop reason: HOSPADM

## 2019-05-10 RX ORDER — SUCCINYLCHOLINE CHLORIDE 20 MG/ML
INJECTION INTRAMUSCULAR; INTRAVENOUS AS NEEDED
Status: DISCONTINUED | OUTPATIENT
Start: 2019-05-10 | End: 2019-05-10 | Stop reason: HOSPADM

## 2019-05-10 RX ORDER — SODIUM CHLORIDE 0.9 % (FLUSH) 0.9 %
5-40 SYRINGE (ML) INJECTION EVERY 8 HOURS
Status: DISCONTINUED | OUTPATIENT
Start: 2019-05-10 | End: 2019-05-10

## 2019-05-10 RX ORDER — SODIUM CHLORIDE, SODIUM LACTATE, POTASSIUM CHLORIDE, CALCIUM CHLORIDE 600; 310; 30; 20 MG/100ML; MG/100ML; MG/100ML; MG/100ML
50 INJECTION, SOLUTION INTRAVENOUS CONTINUOUS
Status: DISCONTINUED | OUTPATIENT
Start: 2019-05-10 | End: 2019-05-10

## 2019-05-10 RX ORDER — FENTANYL CITRATE 50 UG/ML
INJECTION, SOLUTION INTRAMUSCULAR; INTRAVENOUS AS NEEDED
Status: DISCONTINUED | OUTPATIENT
Start: 2019-05-10 | End: 2019-05-10 | Stop reason: HOSPADM

## 2019-05-10 RX ORDER — SODIUM CHLORIDE 0.9 % (FLUSH) 0.9 %
5-40 SYRINGE (ML) INJECTION AS NEEDED
Status: DISCONTINUED | OUTPATIENT
Start: 2019-05-10 | End: 2019-05-10 | Stop reason: HOSPADM

## 2019-05-10 RX ORDER — ONDANSETRON 2 MG/ML
4 INJECTION INTRAMUSCULAR; INTRAVENOUS
Status: DISCONTINUED | OUTPATIENT
Start: 2019-05-10 | End: 2019-05-12 | Stop reason: HOSPADM

## 2019-05-10 RX ORDER — ONDANSETRON 2 MG/ML
4 INJECTION INTRAMUSCULAR; INTRAVENOUS AS NEEDED
Status: DISCONTINUED | OUTPATIENT
Start: 2019-05-10 | End: 2019-05-10 | Stop reason: HOSPADM

## 2019-05-10 RX ORDER — ATORVASTATIN CALCIUM 20 MG/1
20 TABLET, FILM COATED ORAL EVERY EVENING
Status: DISCONTINUED | OUTPATIENT
Start: 2019-05-10 | End: 2019-05-12 | Stop reason: HOSPADM

## 2019-05-10 RX ORDER — THERA TABS 400 MCG
1 TAB ORAL DAILY
Status: DISCONTINUED | OUTPATIENT
Start: 2019-05-11 | End: 2019-05-12 | Stop reason: HOSPADM

## 2019-05-10 RX ORDER — HYDROMORPHONE HYDROCHLORIDE 1 MG/ML
0.2 INJECTION, SOLUTION INTRAMUSCULAR; INTRAVENOUS; SUBCUTANEOUS
Status: DISCONTINUED | OUTPATIENT
Start: 2019-05-10 | End: 2019-05-10 | Stop reason: HOSPADM

## 2019-05-10 RX ORDER — FLUTICASONE PROPIONATE AND SALMETEROL 250; 50 UG/1; UG/1
1 POWDER RESPIRATORY (INHALATION) 2 TIMES DAILY
Status: DISCONTINUED | OUTPATIENT
Start: 2019-05-10 | End: 2019-05-10

## 2019-05-10 RX ORDER — PROPOFOL 10 MG/ML
INJECTION, EMULSION INTRAVENOUS AS NEEDED
Status: DISCONTINUED | OUTPATIENT
Start: 2019-05-10 | End: 2019-05-10 | Stop reason: HOSPADM

## 2019-05-10 RX ORDER — SODIUM CHLORIDE, SODIUM LACTATE, POTASSIUM CHLORIDE, CALCIUM CHLORIDE 600; 310; 30; 20 MG/100ML; MG/100ML; MG/100ML; MG/100ML
INJECTION, SOLUTION INTRAVENOUS
Status: DISCONTINUED | OUTPATIENT
Start: 2019-05-10 | End: 2019-05-10 | Stop reason: HOSPADM

## 2019-05-10 RX ORDER — METOPROLOL SUCCINATE 25 MG/1
25 TABLET, EXTENDED RELEASE ORAL 2 TIMES DAILY
Status: DISCONTINUED | OUTPATIENT
Start: 2019-05-10 | End: 2019-05-12 | Stop reason: HOSPADM

## 2019-05-10 RX ORDER — SODIUM CHLORIDE 0.9 % (FLUSH) 0.9 %
5-40 SYRINGE (ML) INJECTION AS NEEDED
Status: DISCONTINUED | OUTPATIENT
Start: 2019-05-10 | End: 2019-05-10

## 2019-05-10 RX ORDER — SODIUM CHLORIDE 9 MG/ML
INJECTION, SOLUTION INTRAVENOUS
Status: DISCONTINUED | OUTPATIENT
Start: 2019-05-10 | End: 2019-05-10 | Stop reason: HOSPADM

## 2019-05-10 RX ORDER — MORPHINE SULFATE 2 MG/ML
2 INJECTION, SOLUTION INTRAMUSCULAR; INTRAVENOUS
Status: DISCONTINUED | OUTPATIENT
Start: 2019-05-10 | End: 2019-05-10 | Stop reason: HOSPADM

## 2019-05-10 RX ORDER — MIDAZOLAM HYDROCHLORIDE 1 MG/ML
INJECTION, SOLUTION INTRAMUSCULAR; INTRAVENOUS AS NEEDED
Status: DISCONTINUED | OUTPATIENT
Start: 2019-05-10 | End: 2019-05-10 | Stop reason: HOSPADM

## 2019-05-10 RX ORDER — EPHEDRINE SULFATE/0.9% NACL/PF 50 MG/5 ML
SYRINGE (ML) INTRAVENOUS AS NEEDED
Status: DISCONTINUED | OUTPATIENT
Start: 2019-05-10 | End: 2019-05-10 | Stop reason: HOSPADM

## 2019-05-10 RX ORDER — MELATONIN
5000 DAILY
Status: DISCONTINUED | OUTPATIENT
Start: 2019-05-11 | End: 2019-05-12 | Stop reason: HOSPADM

## 2019-05-10 RX ADMIN — HEPARIN SODIUM 10000 UNITS: 1000 INJECTION, SOLUTION INTRAVENOUS; SUBCUTANEOUS at 10:59

## 2019-05-10 RX ADMIN — Medication 10 ML: at 15:01

## 2019-05-10 RX ADMIN — SODIUM CHLORIDE, SODIUM LACTATE, POTASSIUM CHLORIDE, CALCIUM CHLORIDE: 600; 310; 30; 20 INJECTION, SOLUTION INTRAVENOUS at 10:02

## 2019-05-10 RX ADMIN — Medication 10 ML: at 15:02

## 2019-05-10 RX ADMIN — PROPOFOL 150 MG: 10 INJECTION, EMULSION INTRAVENOUS at 09:55

## 2019-05-10 RX ADMIN — MIDAZOLAM HYDROCHLORIDE 2 MG: 1 INJECTION, SOLUTION INTRAMUSCULAR; INTRAVENOUS at 09:28

## 2019-05-10 RX ADMIN — ATORVASTATIN CALCIUM 20 MG: 20 TABLET, FILM COATED ORAL at 17:52

## 2019-05-10 RX ADMIN — ROCURONIUM BROMIDE 10 MG: 10 INJECTION, SOLUTION INTRAVENOUS at 10:19

## 2019-05-10 RX ADMIN — NEOSTIGMINE METHYLSULFATE 3 MG: 1 INJECTION INTRAVENOUS at 13:04

## 2019-05-10 RX ADMIN — Medication 5 MG: at 11:03

## 2019-05-10 RX ADMIN — BUDESONIDE 500 MCG: 0.5 INHALANT RESPIRATORY (INHALATION) at 21:24

## 2019-05-10 RX ADMIN — HEPARIN SODIUM 3000 UNITS: 1000 INJECTION, SOLUTION INTRAVENOUS; SUBCUTANEOUS at 11:59

## 2019-05-10 RX ADMIN — POTASSIUM CHLORIDE 40 MEQ: 750 TABLET, EXTENDED RELEASE ORAL at 17:53

## 2019-05-10 RX ADMIN — ONDANSETRON 4 MG: 2 INJECTION INTRAMUSCULAR; INTRAVENOUS at 10:08

## 2019-05-10 RX ADMIN — GLYCOPYRROLATE 0.4 MG: 0.2 INJECTION INTRAMUSCULAR; INTRAVENOUS at 13:04

## 2019-05-10 RX ADMIN — FENTANYL CITRATE 50 MCG: 50 INJECTION, SOLUTION INTRAMUSCULAR; INTRAVENOUS at 09:55

## 2019-05-10 RX ADMIN — Medication 5 MG: at 10:18

## 2019-05-10 RX ADMIN — HEPARIN SODIUM 18 UNITS/KG/HR: 10000 INJECTION, SOLUTION INTRAVENOUS at 11:12

## 2019-05-10 RX ADMIN — Medication 5 MG: at 10:07

## 2019-05-10 RX ADMIN — SODIUM CHLORIDE: 9 INJECTION, SOLUTION INTRAVENOUS at 09:36

## 2019-05-10 RX ADMIN — ROCURONIUM BROMIDE 15 MG: 10 INJECTION, SOLUTION INTRAVENOUS at 10:06

## 2019-05-10 RX ADMIN — SODIUM CHLORIDE: 9 INJECTION, SOLUTION INTRAVENOUS at 10:27

## 2019-05-10 RX ADMIN — FENTANYL CITRATE 25 MCG: 50 INJECTION, SOLUTION INTRAMUSCULAR; INTRAVENOUS at 12:29

## 2019-05-10 RX ADMIN — MONTELUKAST SODIUM 10 MG: 10 TABLET, FILM COATED ORAL at 17:53

## 2019-05-10 RX ADMIN — HEPARIN SODIUM 5000 UNITS: 1000 INJECTION, SOLUTION INTRAVENOUS; SUBCUTANEOUS at 11:30

## 2019-05-10 RX ADMIN — SUCCINYLCHOLINE CHLORIDE 160 MG: 20 INJECTION INTRAMUSCULAR; INTRAVENOUS at 09:55

## 2019-05-10 RX ADMIN — ROCURONIUM BROMIDE 5 MG: 10 INJECTION, SOLUTION INTRAVENOUS at 09:55

## 2019-05-10 RX ADMIN — AMIODARONE HYDROCHLORIDE 400 MG: 200 TABLET ORAL at 17:52

## 2019-05-10 RX ADMIN — Medication 10 MG: at 12:13

## 2019-05-10 RX ADMIN — METOPROLOL SUCCINATE 25 MG: 25 TABLET, EXTENDED RELEASE ORAL at 17:52

## 2019-05-10 RX ADMIN — PROTAMINE SULFATE 50 MG: 10 INJECTION, SOLUTION INTRAVENOUS at 12:17

## 2019-05-10 RX ADMIN — CETIRIZINE HYDROCHLORIDE 10 MG: 10 TABLET, FILM COATED ORAL at 17:54

## 2019-05-10 RX ADMIN — FENTANYL CITRATE 25 MCG: 50 INJECTION, SOLUTION INTRAMUSCULAR; INTRAVENOUS at 10:51

## 2019-05-10 RX ADMIN — ROCURONIUM BROMIDE 20 MG: 10 INJECTION, SOLUTION INTRAVENOUS at 10:42

## 2019-05-10 RX ADMIN — ACETAMINOPHEN 650 MG: 325 TABLET ORAL at 15:06

## 2019-05-10 NOTE — Clinical Note
BP dropping. ICE showing pericardial effusion. See anesthesia record for vital signs .  Heparin drip stopped

## 2019-05-10 NOTE — Clinical Note
Transseptal Cath Performed check box under hemodynamic and ICE and Fluoro, [place] 98cm via a guiding sheath. Needle inserted.

## 2019-05-10 NOTE — PROGRESS NOTES
Cardiac Cath Lab Procedure Area Arrival Note: 
 
Héctor Zamorano arrived to Cardiac Cath Lab, Procedure Area. Patient identifiers verified with NAME and DATE OF BIRTH. Procedure verified with patient. Consent forms verified. Allergies verified. Patient informed of procedure and plan of care. Questions answered with review. Patient voiced understanding of procedure and plan of care. Patient on cardiac monitor, non-invasive blood pressure, SPO2 monitor. Placed on O2 per CRNA documentation. IV per CRNA documentation. Patient status doing well without problems. Patient is A&Ox 4. Patient reports no discomfort. Patient medicated during procedure with orders obtained and verified by Dr. Brandi Slade. Refer to patients Cardiac Cath Lab PROCEDURE REPORT for vital signs, assessment, status, and response during procedure, printed at end of case. Printed report on chart or scanned into chart.

## 2019-05-10 NOTE — ROUTINE PROCESS
Cardiac Cath Lab Recovery Arrival Note: 
 
 
Amanda Thapa arrived to Cardiac Cath Lab, Recovery Area. Staff introduced to patient. Patient identifiers verified with NAME and DATE OF BIRTH. Procedure verified with patient. Consent forms reviewed and signed by patient or authorized representative and verified. Allergies verified. Patient informed of procedure and plan of care. Questions answered with review. Patient prepped for procedure, per orders from physician, prior to arrival. 
 
Patient on cardiac monitor, non-invasive blood pressure, SPO2 monitor. Patient is A&Ox 4. Patient reports no pain, no chest pain, no n/v. Patient in stretcher, in low position, with side rails up, call bell within reach, patient instructed to call of assistance as needed. Patient prep in: [facility] Recovery Area, Bed# 5. Family in: Family Deann Devi and Ancelmo Graham [phone]. Prep by:  LOS Pinedo

## 2019-05-10 NOTE — PROCEDURES
Cardiac Procedure Note   Patient: Ellen Cuellar  MRN: [id number]  SSN: [id number]   YOB: 1940 Age: 66 y.o. Sex: female    Date of Procedure: 5/10/2019   Pre-procedure Diagnosis: symptomatic persistent atrial fibrillation  Post-procedure Diagnosis: same  Pericardial effusion  Procedure:   1. EP study  2. Ablation atrial fibrillation  3. TIM  4. cardioversion  :  Dr. Marcia Dubon MD    Assistant(s):  None  Anesthesia: general anesthesia  Estimated Blood Loss: Less than 50 mL   Specimens Removed: None  Findings:   Right PV isolated  During catheter maneuver around LSPV and KENNEDY, BP dropped transiently.     ICE showed posterior moderate pericardial effusion   2 D echo at bedside same, no tamponade  TIM moderate posterolateral LA and LV pericardial effusion, no tamponade,   Effusion is loculated  No pericardiocentesis indicated as BP is stable post heparin reversal with protamine  Will consult CT surgery in case she needs pericardial window  I called Dr Danish Ramires, he will call back after surgery per nurse  Complications: moderate pericardial effusion loculated as above but no tamponade  BP and HR stable and normal  PVC  Moderate MR and TR on TIM  Implants:  None  Signed by:  Marcia Dubon MD  5/10/2019  1:35 PM

## 2019-05-10 NOTE — ANESTHESIA PREPROCEDURE EVALUATION
Relevant Problems No relevant active problems Anesthetic History No history of anesthetic complications Review of Systems / Medical History Patient summary reviewed, nursing notes reviewed and pertinent labs reviewed Pulmonary Asthma Neuro/Psych Within defined limits Cardiovascular Hypertension Dysrhythmias : atrial fibrillation Exercise tolerance: >4 METS 
  
GI/Hepatic/Renal 
  
 
 
 
 
 
 Endo/Other Arthritis Other Findings Physical Exam 
 
Airway Mallampati: I 
TM Distance: > 6 cm Neck ROM: normal range of motion Mouth opening: Normal 
 
 Cardiovascular Rhythm: regular Rate: normal 
 
 
 
 Dental 
No notable dental hx Pulmonary Breath sounds clear to auscultation Abdominal 
 
 
 
 Other Findings Anesthetic Plan ASA: 2 Anesthesia type: general 
 
 
 
 
Induction: Intravenous Anesthetic plan and risks discussed with: Patient

## 2019-05-10 NOTE — DISCHARGE INSTRUCTIONS
Hold eliquis for 48 hours due to pericardial effusion  Resume 5/13/2019       Patient Instructions Post-EP study and ablation    1. No heavy lifting or exercises for 1 week. This includes the following:  Do not push or move furniture, jog or run    2. Do not drive for 3 days. 3.  Call Dr. Brandi Slade at [phone] if you experience any of the following symptoms:  Dizziness, lightheadedness, fainting spells  Lack of energy  Shortness of breath  Rapid heart rate  Chest or muscle twitches  Blurry vision, double vision, weakness, numbness  Nausea, vomiting  Fever  Bleeding in the stool, black stool  Leg swelling, pain    4. Follow-up with Dr. Brandi Slade as noted below. Future Appointments   Date Time Provider Hazel Wade   5/23/2019  2:00 PM Ignacio Joy  [address]   6/3/2019  9:40 AM Jodee Serna  [address]   6/21/2019  8:20 AM Jodee Serna  [address]   9/17/2019  1:00 PM MD Mary Gamble M.D.   Electrophysiology/Cardiology  [facility] [place]  [address], [place] [address], [address]  [facility], [address]                             [address] [place] [address]  [phone]

## 2019-05-10 NOTE — H&P
Cardiac Electrophysiology H/P Note Subjective:  
  
Darshan Ortiz is a 66 y.o. patient who is seen for ablation of atrial fibrillation She has been in persistent atrial fibrillation and off NOAC 4 days She had been seen for PVC and atrial fibrillation. The patient had cardioversion with Dr. Evaristo Zambrano on 2/22/19 and had follow up on 2/27/19 and now appears to be back in atrial fibrillation. I have seen her before and Dr. Evaristo Zambrano referred her for catheter ablation of atrial fibrillation. She is fatigued and has shortness of breath. 
  
  
The patient had August 2018 echocardiogram with EF dropped to 45% and Dr. Evaristo Zambrano stopped the Flecainide and she was started on Toprol along with Losartan. She had been on Toprol before. After that a month later, the echocardiogram in September reported ejection fraction improved from 45 to 60%. She only feels occasional thumping in the chest such as PVCs. No additional sustained fast heart beats any longer. She has no significant shortness of breath except for occasional asthma attacks.  
  
I had seen her for evaluation of atrial fibrillation after cardioversion. She had a TIM with normal left ventricular function and mild MR TR. The patient had dilated left atrium. After cardioversion she felt better. However she had cold probably related to the seasonal change. She also have dry cough. She felt tired but tolerated the flecainide. This week she has gotten better and get used to it. When I saw her at [facility]: 
Atrial fibrillation with RVR and troponin 0.07 She has no chest pain and no known AFIB before She had seen Dr Evaristo Zambrano in clinic and had holter with PACs She felt tired and dizzy ECG showed atrial fibrillation with RVR and rate has not been controlled with metoprolol IV Stress test was negative 5 years ago per her She has no GI bleeding No stroke Echo in 2016 with normal LVEF, mild MR Patient Active Problem List  
Diagnosis Code  Dyslipidemia E78.5  Palpitations R00.2  Chest pain, unspecified R07.9  Asthma J45.909  Hyperlipidemia LDL goal < 130 E78.5  
 HTN (hypertension) I10  
 [facility] C50.919  Lung nodule R91.1  Cystocele [id number]  Uterine prolapse N81.4  Dizziness R42  A-fib ([facility] I48.91  
 Encounter for cardioversion procedure Z01.89 No current facility-administered medications on file prior to encounter. Current Outpatient Medications on File Prior to Encounter Medication Sig Dispense Refill  atorvastatin (LIPITOR) 20 mg tablet Take 20 mg by mouth every evening.  MULTIVITAMIN PO Take  by mouth. Takes one po once daily.  cholecalciferol (VITAMIN D3) 5,000 unit capsule Take 5,000 Units by mouth daily.  cetirizine (ZYRTEC) 10 mg tablet Take 10 mg by mouth every evening.  raloxifene (EVISTA) 60 mg tablet Take 1 Tab by mouth daily. 90 Tab 4  
 montelukast (SINGULAIR) 10 mg tablet Take 10 mg by mouth every evening.  fluticasone (FLOVENT HFA) 220 mcg/actuation inhaler Flovent  mcg/actuation aerosol inhaler Inhale 1 puff twice a day by inhalation route.  fluticasone (FLONASE) 50 mcg/actuation nasal spray 2 Sprays by Both Nostrils route nightly.  fluticasone-salmeterol (ADVAIR DISKUS) 250-50 mcg/dose diskus inhaler Take 1 Puff by inhalation two (2) times daily as needed.  estradiol (ESTRACE) 0.01 % (0.1 mg/gram) vaginal cream Insert  into vagina every Monday and Thursday.  EPINEPHrine (EPIPEN) 0.3 mg/0.3 mL (1:1,000) injection 0.3 mL by IntraMUSCular route once as needed for up to 1 dose. 0.3 mL 0  
 albuterol (PROVENTIL HFA, VENTOLIN HFA, PROAIR HFA) 90 mcg/actuation inhaler Take 1 Puff by inhalation every four (4) hours as needed. 2 Inhaler 3 Current Facility-Administered Medications Medication Dose Route Frequency Provider Last Rate Last Dose  lactated Ringers infusion  50 mL/hr IntraVENous CONTINUOUS Veronica Marroquin MD KENNA      
 sodium chloride (NS) flush 5-40 mL  5-40 mL IntraVENous Q8H Catrachita Mccain MD      
 sodium chloride (NS) flush 5-40 mL  5-40 mL IntraVENous PRN Catrachita Mccain MD      
 lidocaine (PF) (XYLOCAINE) 10 mg/mL (1 %) injection 0.1 mL  0.1 mL SubCUTAneous PRN Catrachita Mccain MD      
 sodium chloride (NS) flush 5-40 mL  5-40 mL IntraVENous Q8H Jose Rogers MD      
 sodium chloride (NS) flush 5-40 mL  5-40 mL IntraVENous PRN Jose Rogers MD      
 
Facility-Administered Medications Ordered in Other Encounters Medication Dose Route Frequency Provider Last Rate Last Dose  midazolam (VERSED) injection   IntraVENous PRN Carla Ibarra CRNA   2 mg at 05/10/19 1281 Allergies Allergen Reactions  Betadine [Povidone-Iodine] Rash Past Medical History:  
Diagnosis Date  Arthritis  Asthma   
 dr. Romana Chalet allergist  
 Atrial fibrillation ([facility]  Breast cancer ([address] 1980  
 right - mastectomy  Coagulation disorder ([address]   
 on eliquis  Dyslipidemia   
 labs 2008 - chol 283, HDL 83, ,   
 HTN (hypertension)  Hyperlipidemia LDL goal < 130   
 for increased LDL particles, Dr. Lissa Grimes  Lung nodule Dr. Betty Levin  Palpitations   
 resolved Past Surgical History:  
Procedure Laterality Date  CHEST SURGERY PROCEDURE UNLISTED    
 lung nodule removed - benign  COLONOSCOPY N/A 9/10/2018 COLONOSCOPY performed by Deuce Lozano MD at [facility] ENDOSCOPY  CT HEART W/O CONT WITH CALCIUM  1/2012 CAC score 0; calcified mediastinal lymph nodes consistent granulomatous disease  ECHO 2D ADULT  5/5/2008  
 normal, LVEF 60%  HX APPENDECTOMY  HX HYSTERECTOMY Bilateral 03/19/2015  
 and uro repair  HX KNEE REPLACEMENT Right  HX MASTECTOMY Right  HX TONSILLECTOMY  HX UROLOGICAL  8/1/14 [employer]  MT CARDIOVERSION ELECTIVE ARRHYTHMIA EXTERNAL  3/28/2018  STRESS TEST CARDIOLITE  1/5/12 walked 7:31, no chest pain, normal MPI. Family History Problem Relation Age of Onset  Heart Failure Mother  Stroke Father  Other Other   
     endometrial cancer, niece Social History Tobacco Use  Smoking status: Never Smoker  Smokeless tobacco: Never Used Substance Use Topics  Alcohol use: Yes Comment: wine nightly Review of Systems:  
Constitutional: Negative for fever, chills, weight loss, + malaise/fatigue. HEENT: Negative for nosebleeds, vision changes. Respiratory: Negative for cough, hemoptysis Cardiovascular: Negative for chest pain, + palpitations, no orthopnea, claudication, + leg swelling, no syncope, and PND. Gastrointestinal: Negative for nausea, vomiting, diarrhea, blood in stool and melena. Genitourinary: Negative for dysuria, and hematuria. Musculoskeletal: Negative for myalgias, arthralgia. Skin: Negative for rash. Heme: Does not bleed or bruise easily. Neurological: Negative for speech change and focal weakness Objective:  
 
Visit Vitals /60 (BP 1 Location: Left arm, BP Patient Position: At rest) Pulse 87 Temp 98.4 °F (36.9 °C) Resp 19 Ht 5' 6\" (1.676 m) Wt 188 lb 6.4 oz (85.5 kg) SpO2 97% Breastfeeding? No  
BMI 30.41 kg/m² Physical Exam:  
Constitutional: well-developed and well-nourished. No respiratory distress. Head: Normocephalic and atraumatic. Eyes: Pupils are equal, round ENT: hearing normal 
Neck: supple. No JVD present. Cardiovascular: Normal rate, irregular rhythm. Exam reveals no gallop and no friction rub. No murmur heard. Pulmonary/Chest: Effort normal and breath sounds normal. No wheezes. Abdominal: Soft, no tenderness. Obese Musculoskeletal: 3+ edema. Neurological: alert,oriented. Skin: Skin is warm and dry Psychiatric: normal mood and affect. Behavior is normal. Judgment and thought content normal.   
  
 
Assessment/Plan:  
Persistent atrial fibrillation Leg edema Unable to tolerate medications Hypotension to drug Hx of breast cancer She agrees to TIM and atrial fibrillation ablation Risks include but are not limited to bleeding in the groin, infection in the groin and/or heart valves, fistula between the groin arteries and veins, valvular damage, diaphragmatic paralysis, aortic dissection, heart attack, stroke, blood clot in the leg, pulmonary embolism, lung collapse (pneumothorax or hemothorax), heart collapse (pericardial tamponade), death. The added risks for left atrial ablation may be atrial-esophageal fistula, pulmonary vein stenoses, kidney failure (from contrast injection), higher risk of bleeding, stroke and heart attack. Elective or emergency surgery may be required to repair some of these complications. Prolonged hospitalization would be required. General anesthesia and transeptal catheterization may be required for the procedure Thank you for involving me in this patient's care and please call with further concerns or questions. Alise Johnston M.D. Electrophysiology/Cardiology [facility], [place] [address] [place] [address] 25 [phone] 
[phone]                                        [phone]

## 2019-05-10 NOTE — DISCHARGE SUMMARY
Cardiology Discharge Summary               Patient ID:  Corry Nguyen  [id number]  66 y.o.  1940    Admit Date: 5/10/2019    Discharge Date: 5/12/2019     Admitting Physician: Kayden Valiente MD     Discharge Physician: Loni Kehr, MD    Admission Diagnoses: Atrial fibrillation, persistent [facility]) [I48.1];S/P ablation of atrial fibrillation [Z98.890, Z86.79];S/P ablation of atrial fibrillation [Z98.890, Z86.79]    Discharge Diagnoses: Principal Problem:    S/P ablation of atrial fibrillation (5/10/2019)    Active Problems:    Persistent atrial fibrillation ([address] (3/27/2018)      Pericardial effusion, acute (5/10/2019)      Overview: Loculated posterior LA and LV, no tamponade      Non-rheumatic mitral regurgitation (5/10/2019)      Overview: moderate      Non-rheumatic tricuspid valve insufficiency (5/10/2019)        Discharge Condition: Stable    Cardiology Procedures this Admission:  TIM/ablation of atrial fibrillation    Hospital Course: she was admitted to CCU due to posterior lateral LA LV moderate pericardial effusion during left pulmonary vein isolation. Heparin was reversed. She was stable so no pericardiocentesis was warranted. CT surgery consult was obtained in case she needs pericardial window since the effusion is posterior and lateral location  Hold eliquis 48 hrs  She is NSR, amiodarone and beta blocker for now as she is at high risk of AFIB recurrence   Repeat echo with no tamponade and mild to moderate effusion of 1.3-1.8 cm   hemodynamically stable with normal bp and HR    cxr:1. No radiographic evidence of acute cardiopulmonary disease.   2. Postsurgical changes in the right upper lobe    We will  Dc home she will need close follow up this wek in office with Dr. Chandrakant Huertas or myself  Resume eliquis on 5/13/19  For now continue amiodarone but she seems to have developed some facial dermatitis possibly related to it   this will be followed for now    Consults: Cardiac Surgery    Discharge Exam: Visit Vitals  /61 (BP 1 Location: Left arm, BP Patient Position: At rest)   Pulse 75   Temp 98.1 °F (36.7 °C)   Resp 14   Ht 5' 6\" (1.676 m)   Wt 88 kg (194 lb 0.1 oz)   SpO2 96%   Breastfeeding? No   BMI 31.31 kg/m²     General Appearance:  Well developed, well nourished,alert and oriented x 3, and individual in no acute distress. Ears/Nose/Mouth/Throat:   Hearing grossly normal.         Neck: Supple. Chest:   Lungs clear to auscultation bilaterally. Cardiovascular:  Regular rhythm, 2/6 systolic murmur. Abdomen:   Soft, non-tender, obese   Extremities: 3+ edema bilaterally. Skin: Warm and dry. Disposition: home    Patient Instructions:   Current Discharge Medication List      START taking these medications    Details   amiodarone (PACERONE) 400 mg tablet Take 1 Tab by mouth two (2) times a day. Indications: Prevention of Recurrent Atrial Fibrillation  Qty: 28 Tab, Refills: 0         CONTINUE these medications which have NOT CHANGED    Details   furosemide (LASIX) 40 mg tablet Take 40 mg by mouth daily. Take 40 mg daily alternate 20 mg      potassium chloride SA (MICRO-K) 10 mEq capsule Take 2 Caps by mouth daily. Qty: 180 Cap, Refills: 2      metoprolol succinate (TOPROL XL) 25 mg XL tablet Take 25 mg by mouth two (2) times a day. fluticasone (FLOVENT HFA) 220 mcg/actuation inhaler Flovent  mcg/actuation aerosol inhaler   Inhale 1 puff twice a day by inhalation route. atorvastatin (LIPITOR) 20 mg tablet Take 20 mg by mouth every evening. MULTIVITAMIN PO Take  by mouth. Takes one po once daily. cholecalciferol (VITAMIN D3) 5,000 unit capsule Take 5,000 Units by mouth daily. cetirizine (ZYRTEC) 10 mg tablet Take 10 mg by mouth every evening. albuterol (PROVENTIL HFA, VENTOLIN HFA, PROAIR HFA) 90 mcg/actuation inhaler Take 1 Puff by inhalation every four (4) hours as needed.   Qty: 2 Inhaler, Refills: 3      raloxifene (EVISTA) 60 mg tablet Take 1 Tab by mouth daily. Qty: 90 Tab, Refills: 4    Associated Diagnoses: Palpitations      montelukast (SINGULAIR) 10 mg tablet Take 10 mg by mouth every evening. Associated Diagnoses: Palpitations      estradiol (ESTRACE) 0.01 % (0.1 mg/gram) vaginal cream Insert  into vagina every Monday and Thursday. EPINEPHrine (EPIPEN) 0.3 mg/0.3 mL (1:1,000) injection 0.3 mL by IntraMUSCular route once as needed for up to 1 dose. Qty: 0.3 mL, Refills: 0      ELIQUIS 5 mg 1 tab twice a day resume on 5/13/19                                      Referenced discharge instructions provided by nursing for diet and activity.         Future Appointments   Date Time Provider Hazel Wade   5/23/2019  2:00 PM Augusto Alejo  [address]   6/3/2019  9:40 AM Nelsy Mast  [address]   6/21/2019  8:20 AM Nelsy Mast  [address]   9/17/2019  1:00 PM Augusto Alejo  [address]     Signed:  Jo Aldridge MD  5/12/2019  7:46 PM

## 2019-05-10 NOTE — PROGRESS NOTES
Problem: Falls - Risk of 
Goal: *Absence of Falls Description Document Donovan Pina Fall Risk and appropriate interventions in the flowsheet. Outcome: Progressing Towards Goal 
  
Problem: Patient Education: Go to Patient Education Activity Goal: Patient/Family Education Outcome: Progressing Towards Goal 
  
Problem: Pressure Injury - Risk of 
Goal: *Prevention of pressure injury Description Document Guicho Scale and appropriate interventions in the flowsheet. Outcome: Progressing Towards Goal 
  
Problem: Patient Education: Go to Patient Education Activity Goal: Patient/Family Education Outcome: Progressing Towards Goal 
  
Problem: Patient Education: Go to Patient Education Activity Goal: Patient/Family Education Outcome: Progressing Towards Goal 
  
Problem: Pain Goal: *Control of Pain Outcome: Progressing Towards Goal 
Goal: *PALLIATIVE CARE:  Alleviation of Pain Outcome: Progressing Towards Goal 
  
Problem: Patient Education: Go to Patient Education Activity Goal: Patient/Family Education Outcome: Progressing Towards Goal 
  
Problem: Pressure Injury - Risk of 
Goal: *Prevention of pressure injury Description Document Guicho Scale and appropriate interventions in the flowsheet. Outcome: Progressing Towards Goal 
  
Problem: Patient Education: Go to Patient Education Activity Goal: Patient/Family Education Outcome: Progressing Towards Goal 
  
Problem: Infection - Risk of, Multi-drug Resistant Organism Colonization (MDRO) Goal: *Absence of MDRO colonization Outcome: Progressing Towards Goal 
Goal: *Absence of infection signs and symptoms Outcome: Progressing Towards Goal 
  
Problem: Patient Education: Go to Patient Education Activity Goal: Patient/Family Education Outcome: Progressing Towards Goal 
  
Problem: Patient Education: Go to Patient Education Activity Goal: Patient/Family Education Outcome: Progressing Towards Goal 
  
Problem: Afib Pathway: Day 1 
 Goal: Off Pathway (Use only if patient is Off Pathway) Outcome: Progressing Towards Goal 
Goal: Activity/Safety Outcome: Progressing Towards Goal 
Goal: Consults, if ordered Outcome: Progressing Towards Goal 
Goal: Diagnostic Test/Procedures Outcome: Progressing Towards Goal 
Goal: Nutrition/Diet Outcome: Progressing Towards Goal 
Goal: Discharge Planning Outcome: Progressing Towards Goal 
Goal: Medications Outcome: Progressing Towards Goal 
Goal: Respiratory Outcome: Progressing Towards Goal 
Goal: Treatments/Interventions/Procedures Outcome: Progressing Towards Goal 
Goal: Psychosocial 
Outcome: Progressing Towards Goal 
Goal: *Optimal pain control at patient's stated goal 
Outcome: Progressing Towards Goal 
Goal: *Hemodynamically stable Outcome: Progressing Towards Goal 
Goal: *Stable cardiac rhythm Outcome: Progressing Towards Goal 
Goal: *Lungs clear or at baseline Outcome: Progressing Towards Goal 
Goal: *Labs within defined limits Outcome: Progressing Towards Goal 
Goal: *Describes available resources and support systems Outcome: Progressing Towards Goal 
  
Problem: Afib Pathway: Day 2 Goal: Off Pathway (Use only if patient is Off Pathway) Outcome: Progressing Towards Goal 
Goal: Activity/Safety Outcome: Progressing Towards Goal 
Goal: Consults, if ordered Outcome: Progressing Towards Goal 
Goal: Diagnostic Test/Procedures Outcome: Progressing Towards Goal 
Goal: Nutrition/Diet Outcome: Progressing Towards Goal 
Goal: Discharge Planning Outcome: Progressing Towards Goal 
Goal: Medications Outcome: Progressing Towards Goal 
Goal: Respiratory Outcome: Progressing Towards Goal 
Goal: Treatments/Interventions/Procedures Outcome: Progressing Towards Goal 
Goal: Psychosocial 
Outcome: Progressing Towards Goal 
Goal: *Hemodynamically stable Outcome: Progressing Towards Goal 
Goal: *Optimal pain control at patient's stated goal 
Outcome: Progressing Towards Goal 
 Goal: *Stable cardiac rhythm Outcome: Progressing Towards Goal 
Goal: *Lungs clear or at baseline Outcome: Progressing Towards Goal 
Goal: *Describes available resources and support systems Outcome: Progressing Towards Goal 
  
Problem: Afib Pathway: Day 3 Goal: Off Pathway (Use only if patient is Off Pathway) Outcome: Progressing Towards Goal 
Goal: Activity/Safety Outcome: Progressing Towards Goal 
Goal: Diagnostic Test/Procedures Outcome: Progressing Towards Goal 
Goal: Nutrition/Diet Outcome: Progressing Towards Goal 
Goal: Discharge Planning Outcome: Progressing Towards Goal 
Goal: Medications Outcome: Progressing Towards Goal 
Goal: Respiratory Outcome: Progressing Towards Goal 
Goal: Treatments/Interventions/Procedures Outcome: Progressing Towards Goal 
Goal: Psychosocial 
Outcome: Progressing Towards Goal 
  
Problem: Afib: Discharge Outcomes (not in CAT) Goal: *Hemodynamically stable Outcome: Progressing Towards Goal 
Goal: *Stable cardiac rhythm Outcome: Progressing Towards Goal 
Goal: *Lungs clear or at baseline Outcome: Progressing Towards Goal 
Goal: *Optimal pain control at patient's stated goal 
Outcome: Progressing Towards Goal 
Goal: *Identifies cardiac risk factors Outcome: Progressing Towards Goal 
Goal: *Verbalizes home exercise program, activity guidelines, cardiac precautions Outcome: Progressing Towards Goal 
Goal: *Verbalizes understanding and describes prescribed diet Outcome: Progressing Towards Goal 
Goal: *Verbalizes understanding and describes medication purposes and frequencies Outcome: Progressing Towards Goal 
Goal: *Anxiety reduced or absent Outcome: Progressing Towards Goal 
Goal: *Understands and describes signs and symptoms to report to providers(Stroke Metric) Outcome: Progressing Towards Goal 
Goal: *Describes follow-up/return visits to physicians Outcome: Progressing Towards Goal 
Goal: *Describes available resources and support systems Outcome: Progressing Towards Goal 
Goal: *Influenza immunization Outcome: Progressing Towards Goal 
Goal: *Pneumococcal immunization Outcome: Progressing Towards Goal 
Goal: *Describes smoking cessation resources Outcome: Progressing Towards Goal 
  
Problem: Patient Education: Go to Patient Education Activity Goal: Patient/Family Education Outcome: Progressing Towards Goal 
  
Problem: Cath Lab Procedures: Post-Cath Day of Procedure (Initiate SCIP Measures for Post-Op Care) Goal: Off Pathway (Use only if patient is Off Pathway) Outcome: Progressing Towards Goal 
Goal: Activity/Safety Outcome: Progressing Towards Goal 
Goal: Consults, if ordered Outcome: Progressing Towards Goal 
Goal: Diagnostic Test/Procedures Outcome: Progressing Towards Goal 
Goal: Nutrition/Diet Outcome: Progressing Towards Goal 
Goal: Discharge Planning Outcome: Progressing Towards Goal 
Goal: Medications Outcome: Progressing Towards Goal 
Goal: Respiratory Outcome: Progressing Towards Goal 
Goal: Treatments/Interventions/Procedures Outcome: Progressing Towards Goal 
Goal: Psychosocial 
Outcome: Progressing Towards Goal 
Goal: *Procedure site is without bleeding and signs of infection six hours post sheath removal 
Outcome: Progressing Towards Goal 
Goal: *Hemodynamically stable Outcome: Progressing Towards Goal 
Goal: *Optimal pain control at patient's stated goal 
Outcome: Progressing Towards Goal 
  
Problem: Cath Lab Procedures: Post-Cath Day 1 Goal: Off Pathway (Use only if patient is Off Pathway) Outcome: Progressing Towards Goal 
Goal: Activity/Safety Outcome: Progressing Towards Goal 
Goal: Diagnostic Test/Procedures Outcome: Progressing Towards Goal 
Goal: Nutrition/Diet Outcome: Progressing Towards Goal 
Goal: Discharge Planning Outcome: Progressing Towards Goal 
Goal: Medications Outcome: Progressing Towards Goal 
Goal: Respiratory Outcome: Progressing Towards Goal 
Goal: Treatments/Interventions/Procedures Outcome: Progressing Towards Goal 
Goal: Psychosocial 
Outcome: Progressing Towards Goal 
  
Problem: Cath Lab Procedures: Discharge Outcomes Goal: *Stable cardiac rhythm Outcome: Progressing Towards Goal 
Goal: *Hemodynamically stable Outcome: Progressing Towards Goal 
Goal: *Optimal pain control at patient's stated goal 
Outcome: Progressing Towards Goal 
Goal: *Pulses palpable, skin color within defined limits, skin temperature warm Outcome: Progressing Towards Goal 
Goal: *Lungs clear or at baseline Outcome: Progressing Towards Goal 
Goal: *Demonstrates ability to perform prescribed activity without shortness of breath or discomfort Outcome: Progressing Towards Goal 
Goal: *Verbalizes home exercise program, activity guidelines, cardiac precautions Outcome: Progressing Towards Goal 
Goal: *Verbalizes understanding and describes prescribed diet Outcome: Progressing Towards Goal 
Goal: *Verbalizes understanding and describes medication purposes and frequencies Outcome: Progressing Towards Goal 
Goal: *Identifies cardiac risk factors Outcome: Progressing Towards Goal 
Goal: *No signs and symptoms of infection or wound complications Outcome: Progressing Towards Goal 
Goal: *Anxiety reduced or absent Outcome: Progressing Towards Goal 
Goal: *Verbalizes and demonstrates incision care Outcome: Progressing Towards Goal 
Goal: *Understands and describes signs and symptoms to report to providers(Stroke Metric) Outcome: Progressing Towards Goal 
Goal: *Describes follow-up/return visits to physicians Outcome: Progressing Towards Goal 
Goal: *Describes available resources and support systems Outcome: Progressing Towards Goal 
Goal: *Influenza immunization Outcome: Progressing Towards Goal 
Goal: *Pneumococcal immunization Outcome: Progressing Towards Goal

## 2019-05-10 NOTE — ANESTHESIA PROCEDURE NOTES
Arterial Line Placement    Start time: 5/10/2019 8:09 AM  End time: 5/10/2019 8:15 AM  Performed by: Tomas Mead MD  Authorized by: Tomas Mead MD     Pre-Procedure  Indications:  Arterial pressure monitoring  Preanesthetic Checklist: patient identified, risks and benefits discussed, anesthesia consent, site marked, patient being monitored, timeout performed and patient being monitored    Timeout Time: 08:09        Procedure:   Prep:  Chlorhexidine  Orientation:  Left  Location:  Radial artery  Catheter size:  20 G  Number of attempts:  1    Assessment:   Post-procedure:  Line secured and sterile dressing applied  Patient Tolerance:  Patient tolerated the procedure well with no immediate complications

## 2019-05-10 NOTE — Clinical Note
Transseptal Cath Performed check box under hemodynamic and ICE and Fluoro, [place] 98cm via a guiding sheath. Needle inserted.  Second transeptal

## 2019-05-10 NOTE — Clinical Note
Bilateral groin clipped, and draped. Wet prep solution applied at: 1021. Wet prep solution dried at: 1023. Wet prep elapsed drying time: 2 mins.

## 2019-05-10 NOTE — PROGRESS NOTES
TRANSFER - OUT REPORT: 
 
Verbal report given to Gavi Thorne RN(name) on Ellen Cuellar  being transferred to CCU(unit) for urgent transfer Report consisted of patients Situation, Background, Assessment and  
Recommendations(SBAR). Information from the following report(s) SBAR, Procedure Summary, MAR and Recent Results was reviewed with the receiving nurse. Lines:  
Peripheral IV 05/10/19 Left;Posterior Hand (Active) Peripheral IV 05/10/19 Right Hand (Active) Arterial Line 05/10/19 Left Radial artery (Active) Opportunity for questions and clarification was provided. Patient transported with: 
 Monitor Registered Nurse and CRNA

## 2019-05-10 NOTE — CONSULTS
[facility]   History and Physical    Subjective:      Blaine Parr is a 66 y.o. female who was referred for cardiac evaluation by Dr. Hayden Amaro for pericardial effusion s/p right PV isolation. A TIM showed moderate posterolateral LA and LV pericardial effusion, no tamponade, effusion is loculated. Last dose of eliquis was 5/7/19. [facility] was consulted for possible pericardial window. The patient has had SOB and LE edema over the past couple of months related to Afib. She was started on lasix which helped with LE edema. She had a cardioversion but went back into afib and then saw Dr. Hayden Amaro and made plans for ablation. She denies CP, SOB, dizziness, abdominal pain, nausea. The patient has a medical history of afib, HLD, HTN, asthma/seasonal allergies, breast CA. Past surgical history of mastectomy, right knee replacement. She denies smoking, drinks alcohol socially. She has a family history of heart failure and stroke. Cardiac Testing    EP study: Right PV isolated  During catheter maneuver around LSPV and KENNEDY, BP dropped transiently. ICE showed posterior moderate pericardial effusion   2 D echo at bedside same, no tamponade  TIM moderate posterolateral LA and LV pericardial effusion, no tamponade,   Effusion is loculated  No pericardiocentesis indicated as BP is stable post heparin reversal with protamine  Will consult CT surgery in case she needs pericardial window  I called Dr Andrei Hurst, he will call back after surgery per nurse  Complications: moderate pericardial effusion loculated as above but no tamponade  BP and HR stable and normal  PVC  Moderate MR and TR on TIM    TTE 2/27/19: Left ventricular low normal systolic function. Estimated left ventricular ejection fraction is 51 - 55%. Calculated left ventricular ejection fraction is 50%. Biplane method used to measure ejection fraction. Normal left ventricular wall motion, no regional wall motion abnormality noted. Mild to moderate mitral valve regurgitation.  Lucy Garcia pericardial effusion. Mild tricuspid valve regurgitation is present. Past Medical History:   Diagnosis Date    Arthritis     Asthma     dr. Africa Rivera allergist    Atrial fibrillation ([facility]     Breast cancer ([facility] 1980    right - mastectomy    Coagulation disorder ([facility] [facility]     on eliquis    Dyslipidemia     labs 2008 - chol 283, HDL 83, ,     HTN (hypertension)     Hyperlipidemia LDL goal < 130     for increased LDL particles, Dr. Lyn Jennings nodule     Dr. Charla Christopher Palpitations     resolved     Past Surgical History:   Procedure Laterality Date    CHEST SURGERY PROCEDURE UNLISTED      lung nodule removed - benign    COLONOSCOPY N/A 9/10/2018    COLONOSCOPY performed by Cal Angeles MD at [facility], [place] [address] W/O CONT WITH CALCIUM  1/2012    CAC score 0; calcified mediastinal lymph nodes consistent granulomatous disease    ECHO 2D ADULT  5/5/2008    normal, LVEF 60%    HX APPENDECTOMY      HX HYSTERECTOMY Bilateral 03/19/2015    and uro repair    HX KNEE REPLACEMENT Right     HX MASTECTOMY Right     HX TONSILLECTOMY      HX UROLOGICAL  8/1/14    [employer]    [place] CARDIOVERSION ELECTIVE ARRHYTHMIA EXTERNAL  3/28/2018         STRESS TEST CARDIOLITE  1/5/12    walked 7:31, no chest pain, normal MPI. Social History     Tobacco Use    Smoking status: Never Smoker    Smokeless tobacco: Never Used   Substance Use Topics    Alcohol use: Yes     Comment: wine nightly      Family History   Problem Relation Age of Onset    Heart Failure Mother     Stroke Father     Other Other         endometrial cancer, niece     Prior to Admission medications    Medication Sig Start Date End Date Taking? Authorizing Provider   furosemide (LASIX) 40 mg tablet Take 40 mg by mouth daily. Take 40 mg daily alternate 20 mg   Yes Provider, Historical   potassium chloride SA (MICRO-K) 10 mEq capsule Take 2 Caps by mouth daily. Patient taking differently: Take 20 mEq by mouth daily.  1 tablet daily alternate 4/25/19  Yes Danii Almazan MD   metoprolol succinate (TOPROL XL) 25 mg XL tablet Take 25 mg by mouth two (2) times a day. Yes Provider, Historical   atorvastatin (LIPITOR) 20 mg tablet Take 20 mg by mouth every evening. Yes Provider, Historical   MULTIVITAMIN PO Take  by mouth. Takes one po once daily. Yes Provider, Historical   cholecalciferol (VITAMIN D3) 5,000 unit capsule Take 5,000 Units by mouth daily. Yes Provider, Historical   cetirizine (ZYRTEC) 10 mg tablet Take 10 mg by mouth every evening. Yes Provider, Historical   raloxifene (EVISTA) 60 mg tablet Take 1 Tab by mouth daily. 8/26/15  Yes Daylin Sweeney MD   montelukast (SINGULAIR) 10 mg tablet Take 10 mg by mouth every evening. Yes Provider, Historical   ELIQUIS 5 mg tablet TAKE 1 TABLET TWICE A DAY 3/25/19   Janet Huntley MD   fluticasone (FLOVENT HFA) 220 mcg/actuation inhaler Flovent  mcg/actuation aerosol inhaler   Inhale 1 puff twice a day by inhalation route. Provider, Historical   fluticasone (FLONASE) 50 mcg/actuation nasal spray 2 Sprays by Both Nostrils route nightly. Provider, Historical   fluticasone-salmeterol (ADVAIR DISKUS) 250-50 mcg/dose diskus inhaler Take 1 Puff by inhalation two (2) times daily as needed. Provider, Historical   estradiol (ESTRACE) 0.01 % (0.1 mg/gram) vaginal cream Insert  into vagina every Monday and Thursday. Provider, Historical   EPINEPHrine (EPIPEN) 0.3 mg/0.3 mL (1:1,000) injection 0.3 mL by IntraMUSCular route once as needed for up to 1 dose. 1/12/16   Sharita Christopher MD   albuterol (PROVENTIL HFA, VENTOLIN HFA, PROAIR HFA) 90 mcg/actuation inhaler Take 1 Puff by inhalation every four (4) hours as needed. 12/28/15   Daylin Sweeney MD       Allergies   Allergen Reactions    Betadine [Povidone-Iodine] Rash       Review of Systems:   Consititutional: Denies fever or chills.   Eyes:  Denies use of glasses or vision problems(cataracts). ENT:  Denies hearing or swallowing difficulty. CV: Denies CP, claudication, + HTN. Resp: Denies productive cough, +SOB with exertion. : Denies dialysis or kidney problems. GI: Denies ulcers, esophageal strictures, liver problems. M/S: +right knee replacement  Skin: Denies varicose veins, + LE edema. Neuro: Denies strokes, or TIAs. Psych: Denies anxiety or depression. Endocrine: Denies thyroid problems or diabetes. Heme/Lymphatic: Denies easy bruising or lymphedema. Objective:     VS:   Visit Vitals  /69   Pulse 75   Temp (!) 92.8 °F (33.8 °C)   Resp 24   Ht 5' 6\" (1.676 m)   Wt 188 lb (85.3 kg)   SpO2 96%   Breastfeeding? No   BMI 30.34 kg/m²       Physical Exam:    General appearance: alert, cooperative, no distress  Head: normocephalic, without obvious abnormality; atraumatic  Eyes: conjunctivae/corneas clear; EOM's intact. Nose: nares normal; no drainage. Neck: no carotid bruit and no JVD  Lungs: clear to auscultation bilaterally  Heart: regular rate and rhythm; no murmur  Abdomen: soft, non-tender; bowel sounds normal  Extremities: moves all extremities; no weakness. Skin: Skin color normal; No varicose veins, 2+ LE edema. Neurologic: Grossly normal      Labs:   Recent Labs     05/10/19  1354   WBC 6.5   HGB 12.9   HCT 39.9   *       Diagnostics:     CXR: IMPRESSION:  No acute process.      EKG: Sinus rhythm with fusion complexes   Nonspecific T wave abnormality   Prolonged QT   When compared with ECG of 22-FEB-2019 14:23,   fusion complexes are now present   Nonspecific T wave abnormality now evident in Anterior leads   Assessment:     Principal Problem:    S/P ablation of atrial fibrillation (5/10/2019)    Active Problems:    Persistent atrial fibrillation ([address] (3/27/2018)      Pericardial effusion, acute (5/10/2019)      Overview: Loculated posterior LA and LV, no tamponade      Non-rheumatic mitral regurgitation (5/10/2019)      Overview: moderate Non-rheumatic tricuspid valve insufficiency (5/10/2019)        Plan:      1. Pericardial effusion: No tamponade on echo, BP and HR stable, Surgical plans per Dr. María Gonzalez  2. Afib s/p PV isolation: On PO amio, BB, EP following  3. HTN: On BB  4. HLD: On statin  5. Asthma: On pulmicort, advair, singulair  6. Hx Breast CA :  On evista      Signed By: Mat Monk NP     May 10, 2019

## 2019-05-10 NOTE — PROGRESS NOTES
[address] REPORT:Verbal report received from Adelso(name) on Chirag Abt  being received from Cath Lab(unit) for routine progression of care  Report consisted of patients Situation, Background, Assessment and  
Recommendations(SBAR). Information from the following report(s) SBAR, Intake/Output, MAR, Recent Results and Cardiac Rhythm Afib was reviewed with the receiving nurse. Opportunity for questions and clarification was provided. Assessment completed upon patients arrival to unit and care assumed. 1350 Pt arrived to CCU from Cath Lab. Pt A&Ox4 in NSR. Axillary Temp 92.8 F. Pt placed on [employer]. Primary Nurse Alexander Zelaya RN and Anand Vaz RN performed a dual skin assessment on this patient No impairment noted Guicho score is 20 Patient resting on [employer] bed. [address], NP with Cardiac Surgery at bedside. 46 Pt having increased ectopy. Episodes of NSR with Bigeminal PVCs. Alphia Boxer, NP notified. Orders received for PO Potassium. 1930 Bedside shift change report given to Taran Rmoero (oncoming nurse) by Burke Nuno (offgoing nurse). Report included the following information SBAR, Intake/Output, MAR, Recent Results and Cardiac Rhythm NSR with PVCs.

## 2019-05-11 ENCOUNTER — APPOINTMENT (OUTPATIENT)
Dept: NON INVASIVE DIAGNOSTICS | Age: 79
End: 2019-05-11
Attending: INTERNAL MEDICINE
Payer: MEDICARE

## 2019-05-11 LAB
ALBUMIN SERPL-MCNC: 2.7 G/DL (ref 3.5–5)
ALBUMIN/GLOB SERPL: 1.1 {RATIO} (ref 1.1–2.2)
ALP SERPL-CCNC: 56 U/L (ref 45–117)
ALT SERPL-CCNC: 28 U/L (ref 12–78)
ANION GAP SERPL CALC-SCNC: 6 MMOL/L (ref 5–15)
AST SERPL-CCNC: 30 U/L (ref 15–37)
BILIRUB SERPL-MCNC: 0.5 MG/DL (ref 0.2–1)
BUN SERPL-MCNC: 13 MG/DL (ref 6–20)
BUN/CREAT SERPL: 23 (ref 12–20)
CALCIUM SERPL-MCNC: 8.5 MG/DL (ref 8.5–10.1)
CHLORIDE SERPL-SCNC: 112 MMOL/L (ref 97–108)
CO2 SERPL-SCNC: 25 MMOL/L (ref 21–32)
CREAT SERPL-MCNC: 0.57 MG/DL (ref 0.55–1.02)
ERYTHROCYTE [DISTWIDTH] IN BLOOD BY AUTOMATED COUNT: 13.5 % (ref 11.5–14.5)
GLOBULIN SER CALC-MCNC: 2.5 G/DL (ref 2–4)
GLUCOSE SERPL-MCNC: 124 MG/DL (ref 65–100)
HCT VFR BLD AUTO: 36.9 % (ref 35–47)
HGB BLD-MCNC: 12 G/DL (ref 11.5–16)
MCH RBC QN AUTO: 31.2 PG (ref 26–34)
MCHC RBC AUTO-ENTMCNC: 32.5 G/DL (ref 30–36.5)
MCV RBC AUTO: 95.8 FL (ref 80–99)
NRBC # BLD: 0 K/UL (ref 0–0.01)
NRBC BLD-RTO: 0 PER 100 WBC
PLATELET # BLD AUTO: 151 K/UL (ref 150–400)
PMV BLD AUTO: 12.5 FL (ref 8.9–12.9)
POTASSIUM SERPL-SCNC: 4.5 MMOL/L (ref 3.5–5.1)
PROT SERPL-MCNC: 5.2 G/DL (ref 6.4–8.2)
RBC # BLD AUTO: 3.85 M/UL (ref 3.8–5.2)
SODIUM SERPL-SCNC: 143 MMOL/L (ref 136–145)
TSH SERPL DL<=0.05 MIU/L-ACNC: 0.93 UIU/ML (ref 0.36–3.74)
WBC # BLD AUTO: 9 K/UL (ref 3.6–11)

## 2019-05-11 PROCEDURE — 84443 ASSAY THYROID STIM HORMONE: CPT

## 2019-05-11 PROCEDURE — 74011250637 HC RX REV CODE- 250/637: Performed by: INTERNAL MEDICINE

## 2019-05-11 PROCEDURE — 93308 TTE F-UP OR LMTD: CPT

## 2019-05-11 PROCEDURE — 99218 HC RM OBSERVATION: CPT

## 2019-05-11 PROCEDURE — 74011000250 HC RX REV CODE- 250: Performed by: NURSE PRACTITIONER

## 2019-05-11 PROCEDURE — 36415 COLL VENOUS BLD VENIPUNCTURE: CPT

## 2019-05-11 PROCEDURE — 85027 COMPLETE CBC AUTOMATED: CPT

## 2019-05-11 PROCEDURE — 80053 COMPREHEN METABOLIC PANEL: CPT

## 2019-05-11 PROCEDURE — 74011250637 HC RX REV CODE- 250/637: Performed by: NURSE PRACTITIONER

## 2019-05-11 PROCEDURE — 74011000250 HC RX REV CODE- 250

## 2019-05-11 PROCEDURE — 94640 AIRWAY INHALATION TREATMENT: CPT

## 2019-05-11 RX ORDER — BACITRACIN 500 UNIT/G
PACKET (EA) TOPICAL
Status: COMPLETED
Start: 2019-05-11 | End: 2019-05-11

## 2019-05-11 RX ADMIN — BUDESONIDE 500 MCG: 0.5 INHALANT RESPIRATORY (INHALATION) at 19:33

## 2019-05-11 RX ADMIN — Medication 10 ML: at 07:10

## 2019-05-11 RX ADMIN — CETIRIZINE HYDROCHLORIDE 10 MG: 10 TABLET, FILM COATED ORAL at 18:34

## 2019-05-11 RX ADMIN — AMIODARONE HYDROCHLORIDE 400 MG: 200 TABLET ORAL at 09:06

## 2019-05-11 RX ADMIN — FUROSEMIDE 40 MG: 40 TABLET ORAL at 09:03

## 2019-05-11 RX ADMIN — BUDESONIDE 500 MCG: 0.5 INHALANT RESPIRATORY (INHALATION) at 08:08

## 2019-05-11 RX ADMIN — Medication 10 ML: at 21:18

## 2019-05-11 RX ADMIN — METOPROLOL SUCCINATE 25 MG: 25 TABLET, EXTENDED RELEASE ORAL at 18:34

## 2019-05-11 RX ADMIN — ATORVASTATIN CALCIUM 20 MG: 20 TABLET, FILM COATED ORAL at 18:34

## 2019-05-11 RX ADMIN — FLUTICASONE PROPIONATE 2 SPRAY: 50 SPRAY, METERED NASAL at 21:16

## 2019-05-11 RX ADMIN — VITAMIN D, TAB 1000IU (100/BT) 5000 UNITS: 25 TAB at 09:03

## 2019-05-11 RX ADMIN — RALOXIFENE 60 MG: 60 TABLET ORAL at 11:09

## 2019-05-11 RX ADMIN — METOPROLOL SUCCINATE 25 MG: 25 TABLET, EXTENDED RELEASE ORAL at 09:05

## 2019-05-11 RX ADMIN — AMIODARONE HYDROCHLORIDE 400 MG: 200 TABLET ORAL at 18:34

## 2019-05-11 RX ADMIN — POTASSIUM CHLORIDE 20 MEQ: 750 TABLET, EXTENDED RELEASE ORAL at 09:04

## 2019-05-11 RX ADMIN — BACITRACIN 1 PACKET: 500 OINTMENT TOPICAL at 17:18

## 2019-05-11 RX ADMIN — MONTELUKAST SODIUM 10 MG: 10 TABLET, FILM COATED ORAL at 18:34

## 2019-05-11 RX ADMIN — Medication 10 ML: at 17:23

## 2019-05-11 RX ADMIN — THERA TABS 1 TABLET: TAB at 09:03

## 2019-05-11 NOTE — PROGRESS NOTES
Cardiology Progress Note       NAME:  Eloisa Morrison :   1940 MRN:   [id number] Assessment/Plan:  
1. S/p attempted AF ablation with pericardial effusion complication. She is doing well this am. No complaints. No sob no dizziness standing up. 2. Pericardial effusion: no clinical evidence of hemodynamic instability. Proceed with echo today. 3. PAF: in NSR for now started on amiodarone by Dr. Kelly Aguilar. Off eliquis at least until Monday for pericardial effusion. Continue toprol as well for now 4. Labs ok tsh normal and h/h normal as well 5. Right and left groins with no bleeding hematoma or pulsatile masses Subjective:  
Cardiac ROS: Patient denies any exertional chest pain, dyspnea, palpitations, syncope, orthopnea, edema or paroxysmal nocturnal dyspnea. Objective:  
 
Visit Vitals /64 (BP 1 Location: Left arm, BP Patient Position: At rest) Pulse 70 Temp 97.9 °F (36.6 °C) Resp 21 Ht 5' 6\" (1.676 m) Wt 190 lb 11.2 oz (86.5 kg) SpO2 99% Breastfeeding? No  
BMI 30.78 kg/m² O2 Flow Rate (L/min): 3 l/min O2 Device: Room air Temp (24hrs), Av.1 °F (36.2 °C), Min:92.8 °F (33.8 °C), Max:98.6 °F (37 °C) No intake/output data recorded.  1901 -  0700 In: 1700 [I.V.:1700] Out: 7247 [[id number]] Hemodynamics: 
 CO:   
 CI:   
 CVP:   
 SVR:   
 PAP Systolic:   
 PAP Diastolic:   
 PVR:   
 EE69:   
 SCV02:   
 
TELE:  
 
 
 
Extubation Date / Time:  
 
Ventilator: 
  
 
Oxygen Therapy: 
Oxygen Therapy O2 Sat (%): 99 % (19) Pulse via Oximetry: 69 beats per minute (19) O2 Device: Room air (19) O2 Flow Rate (L/min): 3 l/min (05/10/19 2100) CXR: 
 
Admission Weight: Last Weight Weight: 188 lb 6.4 oz (85.5 kg) Weight: 190 lb 11.2 oz (86.5 kg) Intake / Kaye Foster Current Shift: No intake/output data recorded. Last 24 hrs.:  1901 -  0700 In: 1700 [I.V.:1700] Out: 1790 [[id number]] Chest Tube: EXAM: 
Visit Vitals /64 (BP 1 Location: Left arm, BP Patient Position: At rest) Pulse 70 Temp 97.9 °F (36.6 °C) Resp 21 Ht 5' 6\" (1.676 m) Wt 190 lb 11.2 oz (86.5 kg) SpO2 99% Breastfeeding? No  
BMI 30.78 kg/m² Neck: supple, symmetrical, trachea midline, no adenopathy, thyroid: not enlarged, symmetric, no tenderness/mass/nodules, no carotid bruit and no JVD Heart: regular rate and rhythm, systolic murmur: systolic ejection 1/6, grunting at 2nd left intercostal space Lungs: clear to auscultation bilaterally Abdomen: soft, non-tender. Bowel sounds normal. No masses,  no organomegaly Extremities: no edema Principal Problem: S/P ablation of atrial fibrillation (5/10/2019) Active Problems: 
  Persistent atrial fibrillation ([address] (3/27/2018) Pericardial effusion, acute (5/10/2019) Overview: Loculated posterior LA and LV, no tamponade Non-rheumatic mitral regurgitation (5/10/2019) Overview: moderate Non-rheumatic tricuspid valve insufficiency (5/10/2019) Care Plan discussed with: 
  Comments Patient Family RN Care Manager Consultant:     
 
 
Data Review: No results for input(s): TNIPOC in the last 72 hours. No lab exists for component: ITNL No results for input(s): CPK, CKMB, TROIQ in the last 72 hours. Recent Labs 05/11/19 
0310 05/10/19 
1513 05/10/19 
1354  145  --   
K 4.5 3.7  --   
* 114*  --   
CO2 25 26  --   
BUN 13 12  --   
CREA 0.57 0.58  --   
* 135*  --   
ALB 2.7*  --   --   
WBC 9.0  --  6.5 HGB 12.0  --  12.9 HCT 36.9  --  39.9   --  137* No results for input(s): INR, PTP, APTT in the last 72 hours. No lab exists for component: INREXT Medications reviewed Current Facility-Administered Medications Medication Dose Route Frequency  sodium chloride (NS) flush 5-40 mL  5-40 mL IntraVENous PRN  
  sodium chloride (NS) flush 5-40 mL  5-40 mL IntraVENous Q8H  
 sodium chloride (NS) flush 5-40 mL  5-40 mL IntraVENous PRN  
 acetaminophen (TYLENOL) tablet 650 mg  650 mg Oral Q4H PRN  
 HYDROcodone-acetaminophen (NORCO) 5-325 mg per tablet 1 Tab  1 Tab Oral Q4H PRN  
 ondansetron (ZOFRAN) injection 4 mg  4 mg IntraVENous Q4H PRN  
 albuterol (PROVENTIL VENTOLIN) nebulizer solution 2.5 mg  2.5 mg Inhalation Q4H PRN  
 atorvastatin (LIPITOR) tablet 20 mg  20 mg Oral QPM  
 cetirizine (ZYRTEC) tablet 10 mg  10 mg Oral QPM  
 cholecalciferol (VITAMIN D3) tablet 5,000 Units  5,000 Units Oral DAILY  fluticasone propionate (FLONASE) 50 mcg/actuation nasal spray 2 Spray  2 Spray Both Nostrils QHS  budesonide (PULMICORT) 500 mcg/2 ml nebulizer suspension  500 mcg Inhalation BID RT  
 furosemide (LASIX) tablet 40 mg  40 mg Oral DAILY  metoprolol succinate (TOPROL-XL) XL tablet 25 mg  25 mg Oral BID  montelukast (SINGULAIR) tablet 10 mg  10 mg Oral QPM  
 therapeutic multivitamin (THERAGRAN) tablet 1 Tab  1 Tab Oral DAILY  potassium chloride SR (KLOR-CON 10) tablet 20 mEq  20 mEq Oral DAILY  raloxifene (EVISTA) tablet 60 mg  60 mg Oral DAILY  amiodarone (CORDARONE) tablet 400 mg  400 mg Oral BID Marybeth Barrett MD

## 2019-05-11 NOTE — PROGRESS NOTES
Dr. Ralf Stewart asked us to follow concerning pericardial effusion noted during PV isolation procedure for atrial fibrillation. A new pericardial effusion seen on echo remained stable and unassociated with signs of cardiac tamponade or persistent hemodynamic changes for 45 min in cath lab. I viewed the echo images myself. These show a small pericardial effusion without RV diastolic compression. Under observation in CCU subsequently, she has remained hemodynamically stable, without hypotension and without tachycardia. She now denies abdominal discomfort, lightheadedness, and chest pain. Her periphery is warm and well-perfused. Neck veins are not distended presently. Liver is nontender. Pulses are full. Surgical intervention is not now warranted. I concur with following her closely near term. I discussed with nursing staff the need to watch serial BP and heart rate. They are to watch for changes in jugular distension. Orthostatic signs might be an early sign of trouble. Will follow with Dr. Ralf Stewart.

## 2019-05-11 NOTE — PROGRESS NOTES
0730: Bedside shift change report given to Gracy Wilkins RN (oncoming nurse) by Linden Ruff RN (offgoing nurse). Report included the following information SBAR, Kardex, ED Summary, Intake/Output, MAR, Recent Results and Cardiac Rhythm NSR c bigeminal PVCs. 0830: Dr Jordyn Esqueda, cardiology, assessing pt at bedside. MD removed L and R groin dressings. 0900: Echo tech at bedside. 1100: Howe catheter removed. RN assisted pt up from bed to bedside commode and then to the sink to brush her teeth. No complaints of dizziness, SOB or visible arrhythmias. 1200: RN assessed post-cath sites. L groin site had a small amount of blood around the site; a bandaid was placed over the site. 1600: Dr Jordyn Esqueda said to D/C the pt's arterial line and place orders to transfer pt to CVSU only. 66 91 21: RN removed arterial line and placed a tegaderm over the site. 1700: RN walked with pt to the front of the unit and back with no SOB or orthostatic hypotension. 1930: Bedside shift change report given to Linden Ruff RN (oncoming nurse) by Gracy Wilkins RN (offgoing nurse). Report included the following information SBAR, Kardex, Procedure Summary, Intake/Output, MAR, Recent Results and Cardiac Rhythm NSR.

## 2019-05-11 NOTE — PROGRESS NOTES
1930  Bedside shift change report given to Iris Denton (oncoming nurse) by Husam Jiménez (offgoing nurse). Report included the following information SBAR, Kardex, Intake/Output, MAR, Recent Results and Cardiac Rhythm NSR, PVC'S Itrace done. Bilat groin site stable. 0000  Pt denies c/o, Bilat groin sites stable. VSS. No drops in BP.  NSR  with bigemeny cont. 0310  Labs drawn and sent. No changes in assessment. VSS. 0630  Assisted pt OOB to [facility] for BM. 0730   Bedside shift change report given to Meghan Fabianite (oncoming nurse) by Iris Denton (offgoing nurse). Report included the following information SBAR, Kardex, Intake/Output, MAR and Recent Results.

## 2019-05-11 NOTE — PROGRESS NOTES
Cardiac Surgery Preoperative Note Admit Date: 5/10/2019 Plan/Recommendations/Medical Decision Making: No evidence of tamponade Risk of osmotic enlargement of pericardial fluid Recommend follow up Echo next week Patient seen and reviewed with Dr.Denyer CAGLE 
   
 
Assessment:  
 
Principal Problem: S/P ablation of atrial fibrillation (5/10/2019) Active Problems: 
  Persistent atrial fibrillation ([address] (3/27/2018) Pericardial effusion, acute (5/10/2019) Overview: Loculated posterior LA and LV, no tamponade Non-rheumatic mitral regurgitation (5/10/2019) Overview: moderate Non-rheumatic tricuspid valve insufficiency (5/10/2019) Summary:  
 
Stable hemodynamics in sinus rhythm without ectopy on no support. No chest pain or shortness of breath. Objective:  
 
Visit Vitals /64 Pulse 70 Temp 97.9 °F (36.6 °C) Resp 21 Ht 5' 6\" (1.676 m) Wt 190 lb (86.2 kg) SpO2 99% Breastfeeding? No  
BMI 30.67 kg/m² Temp (24hrs), Av.8 °F (36 °C), Min:92.8 °F (33.8 °C), Max:98.6 °F (37 °C) Last 3 Recorded Weights in this Encounter 05/10/19 1427 19 0400 19 1014 Weight: 188 lb (85.3 kg) 190 lb 11.2 oz (86.5 kg) 190 lb (86.2 kg) Lab Data Reviewed:  
Recent Labs 19 
0310 WBC 9.0 HGB 12.0 HCT 36.9  CREA 0.57 CXR Results  (Last 48 hours) None Last 24hr Input/Output: 
 
Intake/Output Summary (Last 24 hours) at 2019 1029 Last data filed at 2019 1000 Gross per 24 hour Intake 1200 ml Output 3285 ml Net -2085 ml Admission Weight: Last Weight Weight: 188 lb 6.4 oz (85.5 kg) Weight: 190 lb (86.2 kg) EXAM: 
   
Lungs:   Clear to auscultation bilaterally. Heart:  Regular rate and rhythm, S1, S2 normal, no murmur, no click, no rub Abdomen:   Soft, non-tender. Bowel sounds present. No masses,  No organomegaly. Extremities:  No edema Neurologic:  Gross motor and sensory apparatus intact.   
 
 
 
Signed By: JOSE Dorantes

## 2019-05-12 ENCOUNTER — APPOINTMENT (OUTPATIENT)
Dept: GENERAL RADIOLOGY | Age: 79
End: 2019-05-12
Attending: SPECIALIST
Payer: MEDICARE

## 2019-05-12 VITALS
WEIGHT: 194 LBS | RESPIRATION RATE: 14 BRPM | DIASTOLIC BLOOD PRESSURE: 61 MMHG | TEMPERATURE: 98.1 F | OXYGEN SATURATION: 96 % | BODY MASS INDEX: 31.18 KG/M2 | HEIGHT: 66 IN | HEART RATE: 75 BPM | SYSTOLIC BLOOD PRESSURE: 126 MMHG

## 2019-05-12 PROCEDURE — 74011250637 HC RX REV CODE- 250/637: Performed by: INTERNAL MEDICINE

## 2019-05-12 PROCEDURE — 71045 X-RAY EXAM CHEST 1 VIEW: CPT

## 2019-05-12 PROCEDURE — 74011000250 HC RX REV CODE- 250: Performed by: NURSE PRACTITIONER

## 2019-05-12 PROCEDURE — 74011250637 HC RX REV CODE- 250/637: Performed by: NURSE PRACTITIONER

## 2019-05-12 PROCEDURE — 99218 HC RM OBSERVATION: CPT

## 2019-05-12 PROCEDURE — 94640 AIRWAY INHALATION TREATMENT: CPT

## 2019-05-12 RX ADMIN — VITAMIN D, TAB 1000IU (100/BT) 5000 UNITS: 25 TAB at 08:13

## 2019-05-12 RX ADMIN — POTASSIUM CHLORIDE 20 MEQ: 750 TABLET, EXTENDED RELEASE ORAL at 08:14

## 2019-05-12 RX ADMIN — FUROSEMIDE 40 MG: 40 TABLET ORAL at 08:14

## 2019-05-12 RX ADMIN — THERA TABS 1 TABLET: TAB at 08:13

## 2019-05-12 RX ADMIN — BUDESONIDE 500 MCG: 0.5 INHALANT RESPIRATORY (INHALATION) at 08:15

## 2019-05-12 RX ADMIN — AMIODARONE HYDROCHLORIDE 400 MG: 200 TABLET ORAL at 08:12

## 2019-05-12 RX ADMIN — RALOXIFENE 60 MG: 60 TABLET ORAL at 08:15

## 2019-05-12 RX ADMIN — METOPROLOL SUCCINATE 25 MG: 25 TABLET, EXTENDED RELEASE ORAL at 08:12

## 2019-05-12 NOTE — PROGRESS NOTES
1025: TRANSFER - IN REPORT: 
 
Verbal report received from [facility]) on Darshan Ortiz  being received from CCU(unit) for routine progression of care Report consisted of patients Situation, Background, Assessment and  
Recommendations(SBAR). Information from the following report(s) SBAR and Kardex was reviewed with the receiving nurse. Opportunity for questions and clarification was provided. Assessment completed upon patients arrival to unit and care assumed. 1430: I have reviewed discharge instructions with the patient. The patient verbalized understanding. Current Discharge Medication List  
  
START taking these medications Details  
amiodarone (PACERONE) 400 mg tablet Take 1 Tab by mouth two (2) times a day. Indications: Prevention of Recurrent Atrial Fibrillation 
Qty: 28 Tab, Refills: 0 CONTINUE these medications which have CHANGED Details  
!! apixaban (ELIQUIS) 5 mg tablet Take 1 Tab by mouth two (2) times a day. Qty: 60 Tab, Refills: 2 Comments: Start 5/13/19  
  
!! apixaban (ELIQUIS) 5 mg tablet Take 1 Tab by mouth two (2) times a day. Start 5/13/19 Qty: 180 Tab, Refills: 1  
  
 !! - Potential duplicate medications found. Please discuss with provider. CONTINUE these medications which have NOT CHANGED Details  
furosemide (LASIX) 40 mg tablet Take 40 mg by mouth daily. Take 40 mg daily alternate 20 mg  
  
potassium chloride SA (MICRO-K) 10 mEq capsule Take 2 Caps by mouth daily. Qty: 180 Cap, Refills: 2  
  
metoprolol succinate (TOPROL XL) 25 mg XL tablet Take 25 mg by mouth two (2) times a day. fluticasone (FLOVENT HFA) 220 mcg/actuation inhaler Flovent  mcg/actuation aerosol inhaler Inhale 1 puff twice a day by inhalation route. atorvastatin (LIPITOR) 20 mg tablet Take 20 mg by mouth every evening. MULTIVITAMIN PO Take  by mouth. Takes one po once daily. cholecalciferol (VITAMIN D3) 5,000 unit capsule Take 5,000 Units by mouth daily. cetirizine (ZYRTEC) 10 mg tablet Take 10 mg by mouth every evening. albuterol (PROVENTIL HFA, VENTOLIN HFA, PROAIR HFA) 90 mcg/actuation inhaler Take 1 Puff by inhalation every four (4) hours as needed. Qty: 2 Inhaler, Refills: 3  
  
raloxifene (EVISTA) 60 mg tablet Take 1 Tab by mouth daily. Qty: 90 Tab, Refills: 4 Associated Diagnoses: Palpitations  
  
montelukast (SINGULAIR) 10 mg tablet Take 10 mg by mouth every evening. Associated Diagnoses: Palpitations  
  
estradiol (ESTRACE) 0.01 % (0.1 mg/gram) vaginal cream Insert  into vagina every Monday and Thursday. EPINEPHrine (EPIPEN) 0.3 mg/0.3 mL (1:1,000) injection 0.3 mL by IntraMUSCular route once as needed for up to 1 dose. Qty: 0.3 mL, Refills: 0 STOP taking these medications  
  
 fluticasone (FLONASE) 50 mcg/actuation nasal spray Comments:  
Reason for Stopping:

## 2019-05-12 NOTE — PROGRESS NOTES
0730- Bedside report obtained from [facility]. Assumed care for patient at this time. 7206- Patient up in the room with standby assistance from the nurse. Orthostatics checked at this time. BP's and heart rate normal with change of body position. Denies dizziness. 0830- Sitting in chair. Breakfast served. Patient feeling well. 
0900- Cardiac Surgery visits the patient. 1025- TRANSFER - OUT REPORT: 
 
Verbal report given to Lianne(name) on Bela Butler  being transferred to CVSU(unit) for routine progression of care Report consisted of patients Situation, Background, Assessment and  
Recommendations(SBAR). Information from the following report(s) SBAR, Kardex and MAR was reviewed with the receiving nurse. Lines:  
Peripheral IV 05/10/19 Left;Posterior Hand (Active) Site Assessment Clean, dry, & intact 5/12/2019  8:00 AM  
Phlebitis Assessment 0 5/12/2019  8:00 AM  
Infiltration Assessment 0 5/12/2019  8:00 AM  
Dressing Status Clean, dry, & intact 5/12/2019  8:00 AM  
Dressing Type Transparent 5/12/2019  8:00 AM  
Hub Color/Line Status Pink 5/12/2019  8:00 AM  
Action Taken Open ports on tubing capped 5/12/2019  8:00 AM  
Alcohol Cap Used Yes 5/12/2019  8:00 AM  
  
 
Opportunity for questions and clarification was provided. Patient transported with: 
 Monitor Registered Nurse

## 2019-05-12 NOTE — PROGRESS NOTES
Problem: Falls - Risk of 
Goal: *Absence of Falls Description Document Toy Paige Fall Risk and appropriate interventions in the flowsheet. Outcome: Progressing Towards Goal 
  
Problem: Patient Education: Go to Patient Education Activity Goal: Patient/Family Education Outcome: Progressing Towards Goal 
  
Problem: Pressure Injury - Risk of 
Goal: *Prevention of pressure injury Description Document Guicho Scale and appropriate interventions in the flowsheet. Outcome: Progressing Towards Goal 
  
Problem: Patient Education: Go to Patient Education Activity Goal: Patient/Family Education Outcome: Progressing Towards Goal 
  
Problem: Patient Education: Go to Patient Education Activity Goal: Patient/Family Education Outcome: Progressing Towards Goal 
  
Problem: Pain Goal: *Control of Pain Outcome: Progressing Towards Goal 
Goal: *PALLIATIVE CARE:  Alleviation of Pain Outcome: Progressing Towards Goal 
  
Problem: Patient Education: Go to Patient Education Activity Goal: Patient/Family Education Outcome: Progressing Towards Goal 
  
Problem: Pressure Injury - Risk of 
Goal: *Prevention of pressure injury Description Document Guicho Scale and appropriate interventions in the flowsheet. Outcome: Progressing Towards Goal 
  
Problem: Patient Education: Go to Patient Education Activity Goal: Patient/Family Education Outcome: Progressing Towards Goal 
  
Problem: Infection - Risk of, Multi-drug Resistant Organism Colonization (MDRO) Goal: *Absence of MDRO colonization Outcome: Progressing Towards Goal 
Goal: *Absence of infection signs and symptoms Outcome: Progressing Towards Goal 
  
Problem: Patient Education: Go to Patient Education Activity Goal: Patient/Family Education Outcome: Progressing Towards Goal 
  
Problem: Patient Education: Go to Patient Education Activity Goal: Patient/Family Education Outcome: Progressing Towards Goal 
  
Problem: Afib Pathway: Day 1 
 Goal: Off Pathway (Use only if patient is Off Pathway) Outcome: Progressing Towards Goal 
Goal: Activity/Safety Outcome: Progressing Towards Goal 
Goal: Consults, if ordered Outcome: Progressing Towards Goal 
Goal: Diagnostic Test/Procedures Outcome: Progressing Towards Goal 
Goal: Nutrition/Diet Outcome: Progressing Towards Goal 
Goal: Discharge Planning Outcome: Progressing Towards Goal 
Goal: Medications Outcome: Progressing Towards Goal 
Goal: Respiratory Outcome: Progressing Towards Goal 
Goal: Treatments/Interventions/Procedures Outcome: Progressing Towards Goal 
Goal: Psychosocial 
Outcome: Progressing Towards Goal 
Goal: *Optimal pain control at patient's stated goal 
Outcome: Progressing Towards Goal 
Goal: *Hemodynamically stable Outcome: Progressing Towards Goal 
Goal: *Stable cardiac rhythm Outcome: Progressing Towards Goal 
Goal: *Lungs clear or at baseline Outcome: Progressing Towards Goal 
Goal: *Labs within defined limits Outcome: Progressing Towards Goal 
Goal: *Describes available resources and support systems Outcome: Progressing Towards Goal 
  
Problem: Afib Pathway: Day 2 Goal: Off Pathway (Use only if patient is Off Pathway) Outcome: Progressing Towards Goal 
Goal: Activity/Safety Outcome: Progressing Towards Goal 
Goal: Consults, if ordered Outcome: Progressing Towards Goal 
Goal: Diagnostic Test/Procedures Outcome: Progressing Towards Goal 
Goal: Nutrition/Diet Outcome: Progressing Towards Goal 
Goal: Discharge Planning Outcome: Progressing Towards Goal 
Goal: Medications Outcome: Progressing Towards Goal 
Goal: Respiratory Outcome: Progressing Towards Goal 
Goal: Treatments/Interventions/Procedures Outcome: Progressing Towards Goal 
Goal: Psychosocial 
Outcome: Progressing Towards Goal 
Goal: *Hemodynamically stable Outcome: Progressing Towards Goal 
Goal: *Optimal pain control at patient's stated goal 
Outcome: Progressing Towards Goal 
 Goal: *Stable cardiac rhythm Outcome: Progressing Towards Goal 
Goal: *Lungs clear or at baseline Outcome: Progressing Towards Goal 
Goal: *Describes available resources and support systems Outcome: Progressing Towards Goal 
  
Problem: Afib Pathway: Day 3 Goal: Off Pathway (Use only if patient is Off Pathway) Outcome: Progressing Towards Goal 
Goal: Activity/Safety Outcome: Progressing Towards Goal 
Goal: Diagnostic Test/Procedures Outcome: Progressing Towards Goal 
Goal: Nutrition/Diet Outcome: Progressing Towards Goal 
Goal: Discharge Planning Outcome: Progressing Towards Goal 
Goal: Medications Outcome: Progressing Towards Goal 
Goal: Respiratory Outcome: Progressing Towards Goal 
Goal: Treatments/Interventions/Procedures Outcome: Progressing Towards Goal 
Goal: Psychosocial 
Outcome: Progressing Towards Goal 
  
Problem: Afib: Discharge Outcomes (not in CAT) Goal: *Hemodynamically stable Outcome: Progressing Towards Goal 
Goal: *Stable cardiac rhythm Outcome: Progressing Towards Goal 
Goal: *Lungs clear or at baseline Outcome: Progressing Towards Goal 
Goal: *Optimal pain control at patient's stated goal 
Outcome: Progressing Towards Goal 
Goal: *Identifies cardiac risk factors Outcome: Progressing Towards Goal 
Goal: *Verbalizes home exercise program, activity guidelines, cardiac precautions Outcome: Progressing Towards Goal 
Goal: *Verbalizes understanding and describes prescribed diet Outcome: Progressing Towards Goal 
Goal: *Verbalizes understanding and describes medication purposes and frequencies Outcome: Progressing Towards Goal 
Goal: *Anxiety reduced or absent Outcome: Progressing Towards Goal 
Goal: *Understands and describes signs and symptoms to report to providers(Stroke Metric) Outcome: Progressing Towards Goal 
Goal: *Describes follow-up/return visits to physicians Outcome: Progressing Towards Goal 
Goal: *Describes available resources and support systems Outcome: Progressing Towards Goal 
Goal: *Influenza immunization Outcome: Progressing Towards Goal 
Goal: *Pneumococcal immunization Outcome: Progressing Towards Goal 
Goal: *Describes smoking cessation resources Outcome: Progressing Towards Goal

## 2019-05-12 NOTE — PROGRESS NOTES
2000  Bedside shift change report given to Jake Solis (oncoming nurse) by Lizette Haas (offgoing nurse). Report included the following information SBAR, Kardex, Intake/Output, MAR, Recent Results and Cardiac Rhythm NSR.  
 
2100  Complete CHG bath done and linens changed. 0000  No changes in assessment. VSS. 0400  No changes in assessment. 0630  OOB to BSC,   Pt c/o continued SOB on exertion. Informed pt this would be passed on to oncoming RN and on call Physician today. No other needs noted. 0730  Bedside shift change report given to Nestor Abdul (oncoming nurse) Jake Solis (offgoing nurse). Report included the following information SBAR, Kardex, Intake/Output, MAR, Recent Results and Cardiac Rhythm NSR.

## 2019-05-13 ENCOUNTER — TELEPHONE (OUTPATIENT)
Dept: CARDIOLOGY CLINIC | Age: 79
End: 2019-05-13

## 2019-05-13 NOTE — TELEPHONE ENCOUNTER
----- Message from Reilly Fang MD sent at 5/13/2019  7:22 AM EDT -----  Has pericardial effusion with ablation and rash with amiodarone  Went home yesterday  Dr Dalia Plummer wanted me to change appt 2 weeks to this week for follow up (probably tomorrow or Thursday I guess)

## 2019-05-13 NOTE — TELEPHONE ENCOUNTER
Spoke with patient. Informed her of message and that needed to move her appointment up. Pt stated that she will be able to make that appointment. Pt verbalized understanding and denies any further questions at this time.

## 2019-05-13 NOTE — TELEPHONE ENCOUNTER
Future Appointments   Date Time Provider Hazel Wade   5/16/2019  3:20 PM Jose Rogers  [address] 5/23/2019  2:00 PM Jose Rogers  [address]   6/3/2019  9:40 AM Leonela Fall  [address] 6/21/2019  8:20 AM Leonela Fall  [address] 9/17/2019  1:00 PM Jose Rogers  [address]

## 2019-05-16 ENCOUNTER — OFFICE VISIT (OUTPATIENT)
Dept: CARDIOLOGY CLINIC | Age: 79
End: 2019-05-16

## 2019-05-16 VITALS
HEART RATE: 69 BPM | RESPIRATION RATE: 18 BRPM | OXYGEN SATURATION: 96 % | HEIGHT: 66 IN | BODY MASS INDEX: 30.22 KG/M2 | SYSTOLIC BLOOD PRESSURE: 118 MMHG | DIASTOLIC BLOOD PRESSURE: 60 MMHG | WEIGHT: 188 LBS

## 2019-05-16 DIAGNOSIS — Z98.890 HISTORY OF CARDIOVERSION: ICD-10-CM

## 2019-05-16 DIAGNOSIS — I31.39 PERICARDIAL EFFUSION: ICD-10-CM

## 2019-05-16 DIAGNOSIS — I42.0 DILATED CARDIOMYOPATHY (HCC): ICD-10-CM

## 2019-05-16 DIAGNOSIS — Z79.01 CHRONIC ANTICOAGULATION: ICD-10-CM

## 2019-05-16 DIAGNOSIS — Z98.890 S/P ABLATION OF ATRIAL FIBRILLATION: ICD-10-CM

## 2019-05-16 DIAGNOSIS — I48.19 PERSISTENT ATRIAL FIBRILLATION (HCC): Primary | ICD-10-CM

## 2019-05-16 DIAGNOSIS — I10 ESSENTIAL HYPERTENSION: ICD-10-CM

## 2019-05-16 DIAGNOSIS — Z86.79 S/P ABLATION OF ATRIAL FIBRILLATION: ICD-10-CM

## 2019-05-16 RX ORDER — AMIODARONE HYDROCHLORIDE 400 MG/1
400 TABLET ORAL DAILY
Qty: 30 TAB | Refills: 5 | Status: SHIPPED | OUTPATIENT
Start: 2019-05-16 | End: 2019-06-03 | Stop reason: DRUGHIGH

## 2019-05-16 NOTE — PROGRESS NOTES
Cardiac Electrophysiology OFFICE Note     Subjective:      Héctor Zamorano is a 66 y.o. patient who is seen for follow up of pericardial effusion that developed during AF ablation on 05/10/2019. There was no tamponade. PVI was not able to be completed on left side due to pericardial effusion without tamponade so heparin was stopped and reversed  PVI was completed on the right side    Restarted Eliquis on POD #1. Currently asymptomatic. 2 D echo showed small to moderate posterior pericardial effusion     She has been taking amiodarone as prescribed during blanking period, tolerating without difficulty. No bleeding issues on Eliquis. Planning to travel to [place] later this month. Previous:  Echo (05/11/2019): LVEF 51-55%, no RWMA. Mod dilated LA, mod dilated RA. Small to mod anterior pericardial effusion adjacent to LV & RA.    TIM (05/10/2019): LVEF 56-60%, no RWMA. RV mod dilated. LA mod dilated. RA mod dilated. Mod MR. Mod TR. Mod posterior loculated pericardial effusion adjacent to LV & LA measuring 15 mm. Persistent AF, failed DCCV & flecainide. Symptoms with AF include fatigue, SOB, leg edema. Cardioversion with Dr. Marylen Gale on 2/22/19, had follow up on 2/27/19, back in atrial fibrillation. Mild dilated cardiomyopathy, improved on GDMT. Couldn't tolerate meds due to low BP.     Negative stress test 5 yrs ago per her report.     Patient Active Problem List   Diagnosis Code    Dyslipidemia E78.5    Palpitations R00.2    Chest pain, unspecified R07.9    Asthma J45.909    Hyperlipidemia LDL goal < 130 E78.5    HTN (hypertension) I10    Breast cancer (HCC) C50.919    Lung nodule R91.1    Cystocele [id number]    Uterine prolapse N81.4    Dizziness R42    Persistent atrial fibrillation (HCC) I48.1    Encounter for cardioversion procedure Z01.89    S/P ablation of atrial fibrillation Z98.890, Z86.79    Pericardial effusion, acute I30.9    Non-rheumatic mitral regurgitation I34.0    Non-rheumatic tricuspid valve insufficiency I36.1     Current Outpatient Medications   Medication Sig Dispense Refill    amiodarone (PACERONE) 400 mg tablet Take 1 Tab by mouth daily. Take 1 tab daily for 2 weeks then 0.5 tab daily  Indications: Prevention of Recurrent Atrial Fibrillation 30 Tab 5    apixaban (ELIQUIS) 5 mg tablet Take 1 Tab by mouth two (2) times a day. 60 Tab 2    apixaban (ELIQUIS) 5 mg tablet Take 1 Tab by mouth two (2) times a day. Start 5/13/19 180 Tab 1    furosemide (LASIX) 40 mg tablet Take 40 mg by mouth daily. Take 40 mg daily alternate 20 mg      potassium chloride SA (MICRO-K) 10 mEq capsule Take 2 Caps by mouth daily. (Patient taking differently: Take 20 mEq by mouth daily. 1 tablet daily alternate) 180 Cap 2    metoprolol succinate (TOPROL XL) 25 mg XL tablet Take 25 mg by mouth two (2) times a day.  fluticasone (FLOVENT HFA) 220 mcg/actuation inhaler Flovent  mcg/actuation aerosol inhaler   Inhale 1 puff twice a day by inhalation route.  atorvastatin (LIPITOR) 20 mg tablet Take 20 mg by mouth every evening.  MULTIVITAMIN PO Take  by mouth. Takes one po once daily.  cholecalciferol (VITAMIN D3) 5,000 unit capsule Take 5,000 Units by mouth daily.  cetirizine (ZYRTEC) 10 mg tablet Take 10 mg by mouth every evening.  estradiol (ESTRACE) 0.01 % (0.1 mg/gram) vaginal cream Insert  into vagina every Monday and Thursday.  EPINEPHrine (EPIPEN) 0.3 mg/0.3 mL (1:1,000) injection 0.3 mL by IntraMUSCular route once as needed for up to 1 dose. 0.3 mL 0    albuterol (PROVENTIL HFA, VENTOLIN HFA, PROAIR HFA) 90 mcg/actuation inhaler Take 1 Puff by inhalation every four (4) hours as needed. 2 Inhaler 3    raloxifene (EVISTA) 60 mg tablet Take 1 Tab by mouth daily. 90 Tab 4    montelukast (SINGULAIR) 10 mg tablet Take 10 mg by mouth every evening.        Allergies   Allergen Reactions    Betadine [Povidone-Iodine] Rash     Past Medical History:   Diagnosis Date    Arthritis     Asthma     dr. Domingo Aragon allergist    Atrial fibrillation ([address]     Breast cancer ([address] 1980    right - mastectomy    Coagulation disorder ([address]     on eliquis    Dyslipidemia     labs 2008 - chol 283, HDL 83, ,     HTN (hypertension)     Hyperlipidemia LDL goal < 130     for increased LDL particles, Dr. Hancock Amie nodule     Dr. Donald Captain Palpitations     resolved     Past Surgical History:   Procedure Laterality Date    CHEST SURGERY PROCEDURE UNLISTED      lung nodule removed - benign    COLONOSCOPY N/A 9/10/2018    COLONOSCOPY performed by Nay Gaffney MD at [facility] [address] W/O CONT WITH CALCIUM  1/2012    CAC score 0; calcified mediastinal lymph nodes consistent granulomatous disease    ECHO 2D ADULT  5/5/2008    normal, LVEF 60%    HX APPENDECTOMY      HX HYSTERECTOMY Bilateral 03/19/2015    and uro repair    HX KNEE REPLACEMENT Right     HX MASTECTOMY Right     HX TONSILLECTOMY      HX UROLOGICAL  8/1/14    Urodynamics    INTRACARD ECHO, THER/DX INTERVENT N/A 5/10/2019    Intracardiac Echocardiogram performed by Amber Mackey MD at [address]  3/28/2018         MS EPHYS EVL TRNSPTL TX ATRIAL FIB ISOLAT PULM VEIN N/A 5/10/2019    ABLATION A-FIB  W COMPLETE EP STUDY performed by Amber Mackey MD at [address] CATH LAB    MS INTRACARDIAC ELECTROPHYSIOLOGIC 3D MAPPING N/A 5/10/2019    Ep 3d Mapping performed [place] [address] CATH LAB    STRESS TEST [facility]  1/5/12    walked 7:31, no chest pain, normal MPI.      Family History   Problem Relation Age of Onset    Heart Failure Mother     Stroke Father     Other Other         endometrial cancer, niece     Social History     Tobacco Use    Smoking status: Never Smoker    Smokeless tobacco: Never Used   Substance Use Topics    Alcohol use: Yes     Comment: wine nightly        Review of Systems:   Constitutional: Negative for fever, chills, weight loss, malaise/fatigue. HEENT: Negative for nosebleeds, vision changes. Respiratory: Negative for cough, hemoptysis  Cardiovascular: Negative for chest pain, palpitations, no orthopnea, claudication, leg swelling, no syncope, and PND. Gastrointestinal: Negative for nausea, vomiting, diarrhea, blood in stool and melena. Genitourinary: Negative for dysuria, and hematuria. Musculoskeletal: Negative for myalgias, arthralgia. Skin: Negative for rash. Heme: Does not bleed or bruise easily. Neurological: Negative for speech change and focal weakness     Objective:     Visit Vitals  /60 (BP 1 Location: Left arm, BP Patient Position: Sitting)   Pulse 69   Resp 18   Ht 5' 6\" (1.676 m)   Wt 188 lb (85.3 kg)   SpO2 96%   BMI 30.34 kg/m²      Physical Exam:   Constitutional: well-developed and well-nourished. No respiratory distress. Head: Normocephalic and atraumatic. Eyes: Pupils are equal, round  ENT: hearing normal  Neck: supple. No JVD present. Cardiovascular: Normal rate, regular rhythm. Exam reveals no gallop and no friction rub. No murmur heard. Pulmonary/Chest: Effort normal and breath sounds normal. No wheezes. Abdominal: Soft, no tenderness. Musculoskeletal: trace edema. Neurological: alert,oriented. Skin: Skin is warm and dry  Psychiatric: normal mood and affect. Behavior is normal. Judgment and thought content normal.         Assessment/Plan:       ICD-10-CM ICD-9-CM    1. Persistent atrial fibrillation (HCC) I48.1 427.31 ECHO ADULT COMPLETE   2. Chronic anticoagulation Z79.01 V58.61 ECHO ADULT COMPLETE   3. Essential hypertension I10 401.9 ECHO ADULT COMPLETE   4. Dilated cardiomyopathy (HCC) I42.0 425.4 ECHO ADULT COMPLETE   5. History of cardioversion Z98.890 V15.1    6. Pericardial effusion I31.3 423.9    7. S/P ablation of atrial fibrillation Z98.890 V45.89     Z86.79         Ms. Soria had EP study & partial PVI of the left superior vein and full PVI of the right veins during ablation on 05/10/2019, developed pericardial effusion during procedure. No tamponade. She was able to restarted Eliquis thereafter. No symptoms of pericarditis at this time. Continue Eliquis for embolic CVA prophylaxis. Echo is going to be repeated to reassess pericardial effusion. Currently taking amiodarone 400 mg po bid. Plan to reduce to 400 mg po bid for 2 weeks, then reduce to 200 mg po daily  thereafter. Anticipate amiodarone x 3 months (blanking period). Discussed potential recurrence of AF, potential need for repeat ablation. Follow up in 2 months, sooner for new/worsening issues. Follow up with Dr. Hermelinda Prince as previously scheduled. Future Appointments   Date Time Provider Hazel Wade   6/3/2019  9:40 AM Maureen Simon  [address]   6/20/2019 10:00 AM Verne Cheadle ATHENA SCHED   6/21/2019  8:20 AM Maureen Simon  [address]   7/18/2019  1:20 PM Maki Solomon  [address]   9/17/2019  1:00 PM Maki Solomon  [address]       Thank you for involving me in this patient's care and please call with further concerns or questions. Du Healy M.D.   Electrophysiology/Cardiology  [facility] [place]  [address], [place] [address], [address]  [address]  [phone]

## 2019-05-16 NOTE — PROGRESS NOTES
Cardiac Electrophysiology OFFICE Note Subjective:  
  
Ramakrishna Puentes is a 66 y.o. patient who is seen for follow up of pericardial effusion that developed during AF ablation on 05/10/2019. There was no tamponade. PVI was not able to be completed on left side, but all mapping was done. Restarted Eliquis on POD #1. Currently asymptomatic. She has been taking amiodarone as prescribed during blanking period, tolerating without difficulty. No bleeding issues on Eliquis. Planning to travel to [place] later this month. Previous: 
Echo (05/11/2019): LVEF 51-55%, no RWMA. Mod dilated LA, mod dilated RA. Small to mod anterior pericardial effusion adjacent to LV & RA. 
 
TIM (05/10/2019): LVEF 56-60%, no RWMA. RV mod dilated. LA mod dilated. RA mod dilated. Mod MR. Mod TR. Mod posterior loculated pericardial effusion adjacent to LV & LA measuring 15 mm. Persistent AF, failed DCCV & flecainide. Symptoms with AF include fatigue, SOB, leg edema. Cardioversion with Dr. Tennille De La Rosa on 2/22/19, had follow up on 2/27/19, back in atrial fibrillation. Mild dilated cardiomyopathy, improved on GDMT. Couldn't tolerate meds due to low BP. Negative stress test 5 yrs ago per her report. 
  
Patient Active Problem List  
Diagnosis Code  Dyslipidemia E78.5  Palpitations R00.2  Chest pain, unspecified R07.9  Asthma J45.909  Hyperlipidemia LDL goal < 130 E78.5  
 HTN (hypertension) I10  
 Breast cancer ([facility] C50.919  Lung nodule R91.1  Cystocele [id number]  Uterine prolapse N81.4  Dizziness R42  Persistent atrial fibrillation (HCC) I48.1  Encounter for cardioversion procedure Z01.89  
 S/P ablation of atrial fibrillation Z98.890, Z86.79  
 Pericardial effusion, acute I30.9  Non-rheumatic mitral regurgitation I34.0  Non-rheumatic tricuspid valve insufficiency I36.1 Current Outpatient Medications Medication Sig Dispense Refill  apixaban (ELIQUIS) 5 mg tablet Take 1 Tab by mouth two (2) times a day. 60 Tab 2  
 apixaban (ELIQUIS) 5 mg tablet Take 1 Tab by mouth two (2) times a day. Start 5/13/19 180 Tab 1  
 amiodarone (PACERONE) 400 mg tablet Take 1 Tab by mouth two (2) times a day. Indications: Prevention of Recurrent Atrial Fibrillation 28 Tab 0  
 furosemide (LASIX) 40 mg tablet Take 40 mg by mouth daily. Take 40 mg daily alternate 20 mg    
 potassium chloride SA (MICRO-K) 10 mEq capsule Take 2 Caps by mouth daily. (Patient taking differently: Take 20 mEq by mouth daily. 1 tablet daily alternate) 180 Cap 2  
 metoprolol succinate (TOPROL XL) 25 mg XL tablet Take 25 mg by mouth two (2) times a day.  fluticasone (FLOVENT HFA) 220 mcg/actuation inhaler Flovent  mcg/actuation aerosol inhaler Inhale 1 puff twice a day by inhalation route.  atorvastatin (LIPITOR) 20 mg tablet Take 20 mg by mouth every evening.  MULTIVITAMIN PO Take  by mouth. Takes one po once daily.  cholecalciferol (VITAMIN D3) 5,000 unit capsule Take 5,000 Units by mouth daily.  cetirizine (ZYRTEC) 10 mg tablet Take 10 mg by mouth every evening.  estradiol (ESTRACE) 0.01 % (0.1 mg/gram) vaginal cream Insert  into vagina every Monday and Thursday.  EPINEPHrine (EPIPEN) 0.3 mg/0.3 mL (1:1,000) injection 0.3 mL by IntraMUSCular route once as needed for up to 1 dose. 0.3 mL 0  
 albuterol (PROVENTIL HFA, VENTOLIN HFA, PROAIR HFA) 90 mcg/actuation inhaler Take 1 Puff by inhalation every four (4) hours as needed. 2 Inhaler 3  
 raloxifene (EVISTA) 60 mg tablet Take 1 Tab by mouth daily. 90 Tab 4  
 montelukast (SINGULAIR) 10 mg tablet Take 10 mg by mouth every evening. Allergies Allergen Reactions  Betadine [Povidone-Iodine] Rash Past Medical History:  
Diagnosis Date  Arthritis  Asthma   
 dr. Hema Camacho allergist  
 Atrial fibrillation ([facility]  Breast cancer ([facility] 1980  
 right - mastectomy  Coagulation disorder ([address]   
 on eliquis  Dyslipidemia   
 labs 2008 - chol 283, HDL 83, ,   
 HTN (hypertension)  Hyperlipidemia LDL goal < 130   
 for increased LDL particles, Dr. Santi Barrientos  Lung nodule Dr. Castañeda Pa  Palpitations   
 resolved Past Surgical History:  
Procedure Laterality Date  CHEST SURGERY PROCEDURE UNLISTED    
 lung nodule removed - benign  COLONOSCOPY N/A 9/10/2018 COLONOSCOPY performed by Elfego Mosley MD at [facility] ENDOSCOPY  CT HEART W/O CONT WITH CALCIUM  1/2012 CAC score 0; calcified mediastinal lymph nodes consistent granulomatous disease  ECHO 2D ADULT  5/5/2008  
 normal, LVEF 60%  HX APPENDECTOMY  HX HYSTERECTOMY Bilateral 03/19/2015  
 and uro repair  HX KNEE REPLACEMENT Right  HX MASTECTOMY Right  HX TONSILLECTOMY  HX UROLOGICAL  8/1/14 Urodynamics  INTRACARD ECHO, THER/DX INTERVENT N/A 5/10/2019 Intracardiac Echocardiogram performed by Ana Hopper MD at [address] CATH LAB  GA CARDIOVERSION ELECTIVE ARRHYTHMIA EXTERNAL  3/28/2018  GA EPHYS EVL TRNSPTL TX ATRIAL FIB ISOLAT PULM VEIN N/A 5/10/2019 ABLATION A-FIB  W COMPLETE EP STUDY [facility] [facility]  GA INTRACARDIAC ELECTROPHYSIOLOGIC 3D MAPPING N/A 5/10/2019 Ep 3d Mapping [facility] [address] CATH [facility]  STRESS TEST CARDIOLITE  1/5/12  
 walked 7:31, no chest pain, normal MPI. Family History Problem Relation Age of Onset  Heart Failure Mother  Stroke Father  Other Other   
     endometrial cancer, niece Social History Tobacco Use  Smoking status: Never Smoker  Smokeless tobacco: Never Used Substance Use Topics  Alcohol use: Yes Comment: wine nightly Review of Systems:  
Constitutional: Negative for fever, chills, weight loss, malaise/fatigue. HEENT: Negative for nosebleeds, vision changes. Respiratory: Negative for cough, hemoptysis Cardiovascular: Negative for chest pain, palpitations, no orthopnea, claudication, leg swelling, no syncope, and PND. Gastrointestinal: Negative for nausea, vomiting, diarrhea, blood in stool and melena. Genitourinary: Negative for dysuria, and hematuria. Musculoskeletal: Negative for myalgias, arthralgia. Skin: Negative for rash. Heme: Does not bleed or bruise easily. Neurological: Negative for speech change and focal weakness Objective:  
 
Visit Vitals /60 (BP 1 Location: Left arm, BP Patient Position: Sitting) Pulse 69 Resp 18 Ht 5' 6\" (1.676 m) Wt 188 lb (85.3 kg) SpO2 96% BMI 30.34 kg/m² Physical Exam:  
Constitutional: well-developed and well-nourished. No respiratory distress. Head: Normocephalic and atraumatic. Eyes: Pupils are equal, round ENT: hearing normal 
Neck: supple. No JVD present. Cardiovascular: Normal rate, regular rhythm. Exam reveals no gallop and no friction rub. No murmur heard. Pulmonary/Chest: Effort normal and breath sounds normal. No wheezes. Abdominal: Soft, no tenderness. Musculoskeletal: trace edema. Neurological: alert,oriented. Skin: Skin is warm and dry Psychiatric: normal mood and affect. Behavior is normal. Judgment and thought content normal.   
  
 
Assessment/Plan: ICD-10-CM ICD-9-CM 1. Persistent atrial fibrillation (HCC) I48.1 427.31   
2. Chronic anticoagulation Z79.01 V58.61   
3. Essential hypertension I10 401.9 4. Dilated cardiomyopathy (HCC) I42.0 425.4 Ms. Soria had EP study & partial PVI ablation on 05/10/2019, developed pericardial effusion during procedure. No tamponade. Stable overnight, rechecked echo the following morning. Restarted Eliquis thereafter. No symptoms of pericardial effusion at this time. Continue Eliquis for embolic CVA prophylaxis. Echo in 1 month to reassess effusion. Currently taking amiodarone 400 mg po bid. Plan to reduce to 200 mg po bid in 1 week, then reduce to 200 mg po daily 2 weeks thereafter. Anticipate amiodarone x 3 months (blanking period). Discussed potential recurrence of AF, potential need for repeat ablation. Follow up in 2 months, sooner for new/worsening issues. Follow up with Dr. Nay Orellana as previously scheduled. Future Appointments Date Time Provider Hazel Sheri 6/3/2019  9:40 AM Wallis Epley,  [address]  
6/20/2019 10:00 AM Declan LEE  
6/21/2019  8:20 AM Wallis Epley,  [address]  
9/17/2019  1:00 PM Caro Garcia  [address] Thank you for involving me in this patient's care and please call with further concerns or questions. Diogo Aviles M.D. Electrophysiology/Cardiology [facility] [address], [facility] [address], [place] [address] [place] [address] 
[phone]                                        [phone]

## 2019-05-16 NOTE — PATIENT INSTRUCTIONS
Please take Amiodarone 400 mg daily for 2 weeks, then 200 mg daily until you come back to see Dr. Derek Sellers will be scheduled for an Echo in 1 month    You will need to follow up in clinic with Dr. Gerardo Nunez in 2 months.

## 2019-05-21 ENCOUNTER — TELEPHONE (OUTPATIENT)
Dept: CARDIOLOGY CLINIC | Age: 79
End: 2019-05-21

## 2019-05-21 NOTE — TELEPHONE ENCOUNTER
Regarding: FW:Verena After Visit Follow-Up Message. Contact: [phone]      ----- Message -----  From: Cait Hoang  Sent: 5/20/2019   5:46 PM  To: Dalia Munson  Subject: Marge Vieyra After Visit Follow-Up Message. ----- Message from [address] [place] [address] 951, [facility] sent at 5/20/2019  5:46 PM EDT -----    I  would like to clarify the dosage of amiodarone. It was my understanding from Dr Vinita Lyons  that I should stop taking two pills, one in the morning and one in the evening on Thursday 5/23. However my check out information indicated 2 weeks of the 2x a day dose then go to one a day. Does this mean 2 weeks after the ablation or 2 weeks from my visit on May 16? After this I should take only 1 pill a day. When I get to that point is morning or evening better -- or does it not make a difference. Also I am noticing a little tingling in my hands -- I think it just started recently. Just checking to see if it okay -- especially as I will be away the week of May 26. I'll look forward to hearing from you. Kenrick Sharma  ----- Message -----  From: Sherri Hollis MD  Sent: 5/19/2019 12:00 AM EDT  To: Cait Hoang  Subject: Verena After Visit Follow-Up Message. Jose Ms. Guerrerolow,    Thank you for your recent visit to [facility]. Your health is important to us and we are privileged to be your healthcare provider and partner. If you have follow-up questions regarding your treatment plan from your recent visit, please contact us through the [facility] Patient Portal by replying to this message. In the near future, you may receive a survey about your experience with us. We hope you will take the time to let us know how we did so we can continue to improve the care you receive.       Thank you,    [facility]

## 2019-05-21 NOTE — TELEPHONE ENCOUNTER
Verified patient with two types of identifiers. Reviewed Amiodarone instructions with patient. Patient verbalized understanding and will call with any other questions.

## 2019-06-03 ENCOUNTER — OFFICE VISIT (OUTPATIENT)
Dept: CARDIOLOGY CLINIC | Age: 79
End: 2019-06-03

## 2019-06-03 VITALS
BODY MASS INDEX: 30.34 KG/M2 | HEIGHT: 66 IN | DIASTOLIC BLOOD PRESSURE: 66 MMHG | SYSTOLIC BLOOD PRESSURE: 118 MMHG | HEART RATE: 59 BPM | RESPIRATION RATE: 14 BRPM | WEIGHT: 188.8 LBS | OXYGEN SATURATION: 96 %

## 2019-06-03 DIAGNOSIS — I48.19 PERSISTENT ATRIAL FIBRILLATION (HCC): Primary | ICD-10-CM

## 2019-06-03 RX ORDER — FUROSEMIDE 40 MG/1
40 TABLET ORAL AS NEEDED
COMMUNITY
End: 2021-05-26

## 2019-06-03 RX ORDER — POTASSIUM CHLORIDE 750 MG/1
10 TABLET, EXTENDED RELEASE ORAL AS NEEDED
COMMUNITY
End: 2019-10-28 | Stop reason: DRUGHIGH

## 2019-06-03 RX ORDER — AMIODARONE HYDROCHLORIDE 200 MG/1
TABLET ORAL
COMMUNITY
End: 2019-06-03 | Stop reason: DRUGHIGH

## 2019-06-03 RX ORDER — AMIODARONE HYDROCHLORIDE 200 MG/1
200 TABLET ORAL DAILY
COMMUNITY
End: 2020-01-21

## 2019-06-03 NOTE — PATIENT INSTRUCTIONS
Decrease Amiodarone 200 mg daily    Lasix 40 mg daily as needed only    Potassium 10 meq daily as needed    Keep pending appointment

## 2019-06-03 NOTE — PROGRESS NOTES
HISTORY OF PRESENT ILLNESS  Jil Roa is a 66 y.o. female. Patient with h/o HTN, HLD, Ca score of 0 in 2012, PVCs on ECG on 1/16 and echo on 1/16 with EF 55% mild MR. S/p LTKR in 1/16(seen by Dr. Melba Olmedo)  Also remote h/o breast ca s/p mastectomy in 1980 but no chemo or xrt     Echo on 4/16:Ejection fraction was estimated to be 55 %. There were no  regional wall motion abnormalities. Wall thickness was at the upper limits  of normal.    Left atrium: The atrium was mildly dilated. Mitral valve: There was mild regurgitation. Aortic valve: Leaflets exhibited sclerosis. holter on 4/16: NSR  frequent pacs couplets triples and occasional non sustained at, frequent pvcs (0.65%)     On 3/18: [facility] A-fib with RVR     TIM and Cardioversion completed with 200 J with conversion to NSR  TIM no PFO, trace AR, mild MR, no TR or VT Mild atherosclerosis in prox descending aortaNo KENNEDY thrombus LA moderate dilation RA normal  Started eliquis, flecainide and metoprolol     Echo on 8/20/18: The ventricle was dilated. Systolic function was mildly  reduced by EF (biplane method of disks). Ejection fraction was estimated  to be 45 % in the range of 40 % to 50 %. There was mild diffuse  hypokinesis. Right ventricle: The size was at the upper limits of normal.    Left atrium: The atrium was moderately dilated. Right atrium: The atrium was mildly to moderately dilated. Mitral valve: There was moderate regurgitation. Aortic valve: The valve was trileaflet. Leaflets exhibited normal  thickness, normal cuspal separation, and sclerosis without stenosis. Tricuspid valve: There was mild to moderate regurgitation. Pulmonic valve: There was mild regurgitation. Pericardium: A trivial pericardial effusion was identified. COMPARISONS:  Comparison was made with the previous study of 22-Jan-2016. LV overall  function has decreased from 55 % to 45- 50 %.       TIM /CARDIOVERSION on 2/19 resulting in NSR. Flecainide resumed     CXR on 2/25/19:1. Interval development of small bilateral pleural effusions and mild basilar     atelectasis. Follow-up to resolution is suggested. Doppler LLE on 2/19:No evidence of left lower extremity vein thrombosis.         02/27/19   ECHO ADULT FOLLOW-UP OR LIMITED 02/27/2019 2/27/2019     Addendum · Left ventricular low normal systolic function. Estimated left  ventricular ejection fraction is 51 - 55%. Calculated left ventricular  ejection fraction is 50%. Biplane method used to measure ejection  fraction. Normal left ventricular wall motion, no regional wall motion  abnormality noted. · Mild to moderate mitral valve regurgitation. · Trivial-to-small pericardial effusion. · Mild tricuspid valve regurgitation is present. Teodoro Orozco MD 2/27/2019  2:19 PM           Narrative · Left ventricular low normal systolic function. Estimated left   ventricular ejection fraction is 51 - 55%. Calculated left ventricular   ejection fraction is 50%. Biplane method used to measure ejection   fraction. Normal left ventricular wall motion, no regional wall motion   abnormality noted. · Mild to moderate mitral valve regurgitation. · Trivial-to-small pericardial effusion. · Mild tricuspid valve stenosis is present.          Signed by: iT Barclay MD                 02/27/19   ECHO ADULT FOLLOW-UP OR LIMITED 02/27/2019 2/27/2019     Narrative · Left ventricular low normal systolic function. Estimated left   ventricular ejection fraction is 51 - 55%. Calculated left ventricular   ejection fraction is 50%. Biplane method used to measure ejection   fraction. Normal left ventricular wall motion, no regional wall motion   abnormality noted. · Mild to moderate mitral valve regurgitation. · Trivial-to-small pericardial effusion.   · Mild tricuspid valve stenosis is present.          Signed by: Ti Barclay MD       chest ct on 3/19:1. CTA pulmonary vein/left atrial mapping performed. 2. No accessory pulmonary vein, thrombus or stenosis. 3. 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 4. No acute finding. AF ablation on 5/19  EP study & partial PVI of the left superior vein and full PVI of the right veins during ablation on 05/10/2019, developed pericardial effusion during procedure. No tamponade. She was able to restarted Eliquis thereafter. but Melia Ibanez was admitted to CCU due to posterior lateral LA LV moderate pericardial effusion during left pulmonary vein isolation. Heparin was reversed. She was stable so no pericardiocentesis was warranted  Started on AMIO and BBlockers  05/10/19   ECHO ADULT FOLLOW-UP OR LIMITED 05/11/2019 5/11/2019    Narrative · Left Ventricle: Low normal systolic dysfunction. Estimated left   ventricular ejection fraction is 51 - 55%. No regional wall motion   abnormality noted. · Pericardium: Small-to-moderate anterior pericardial effusion adjacent to   left ventricle and right atrium. · Left Atrium: Moderately dilated left atrium. · Right Atrium: Moderately dilated right atrium. Signed by: MD Cecy Sheets   Past Medical History   Diagnosis Date    Palpitations            resolved    Dyslipidemia            labs 2008 - chol 283, HDL 83, ,     HTN (hypertension)       Breast cancer (HCC) [place] dr. Mallorie Brownlee allergist    Hyperlipidemia LDL goal < 130            for increased LDL particles, Dr. Michael Vieyra   Past Surgical History   Procedure Laterality Date    Echo 2d adult    5/5/2008         normal, LVEF 60%    Stress test cardiolite    1/5/12         walked 7:31, no chest pain, normal MPI.     Ct heart w/o cont with calcium    1/2012         CAC score 0; calcified mediastinal lymph nodes consistent granulomatous disease    Hx urological    8/1/14         Urodynamics    Hx gyn          Hx hysterectomy Bilateral She is feeling well no cp or sob reported    no palpitations    Visit Vitals  /66 (BP 1 Location: Left arm, BP Patient Position: Sitting)   Pulse (!) 59   Resp 14   Ht 5' 6\" (1.676 m)   Wt 188 lb 12.8 oz (85.6 kg)   SpO2 96%   BMI 30.47 kg/m²       Review of Systems   Respiratory: Negative. Cardiovascular: Negative. Physical Exam   Neck: No JVD present. Carotid bruit is not present. Cardiovascular: Normal rate and regular rhythm. Pulmonary/Chest: Effort normal and breath sounds normal.   Abdominal: Soft. Musculoskeletal: She exhibits no edema. Psychiatric: She has a normal mood and affect. Current Outpatient Medications on File Prior to Visit   Medication Sig Dispense Refill    amiodarone (PACERONE) 400 mg tablet Take 1 Tab by mouth daily. Take 1 tab daily for 2 weeks then 0.5 tab daily  Indications: Prevention of Recurrent Atrial Fibrillation 30 Tab 5    apixaban (ELIQUIS) 5 mg tablet Take 1 Tab by mouth two (2) times a day. 60 Tab 2    apixaban (ELIQUIS) 5 mg tablet Take 1 Tab by mouth two (2) times a day. Start 5/13/19 180 Tab 1    furosemide (LASIX) 40 mg tablet Take 40 mg by mouth daily. Take 40 mg daily alternate 20 mg      potassium chloride SA (MICRO-K) 10 mEq capsule Take 2 Caps by mouth daily. (Patient taking differently: Take 20 mEq by mouth daily. 1 tablet daily alternate) 180 Cap 2    metoprolol succinate (TOPROL XL) 25 mg XL tablet Take 25 mg by mouth two (2) times a day.  fluticasone (FLOVENT HFA) 220 mcg/actuation inhaler Flovent  mcg/actuation aerosol inhaler   Inhale 1 puff twice a day by inhalation route.  atorvastatin (LIPITOR) 20 mg tablet Take 20 mg by mouth every evening.  MULTIVITAMIN PO Take  by mouth. Takes one po once daily.  cholecalciferol (VITAMIN D3) 5,000 unit capsule Take 5,000 Units by mouth daily.  cetirizine (ZYRTEC) 10 mg tablet Take 10 mg by mouth every evening.       estradiol (ESTRACE) 0.01 % (0.1 mg/gram) vaginal cream Insert  into vagina every Monday and Thursday.  EPINEPHrine (EPIPEN) 0.3 mg/0.3 mL (1:1,000) injection 0.3 mL by IntraMUSCular route once as needed for up to 1 dose. 0.3 mL 0    albuterol (PROVENTIL HFA, VENTOLIN HFA, PROAIR HFA) 90 mcg/actuation inhaler Take 1 Puff by inhalation every four (4) hours as needed. 2 Inhaler 3    raloxifene (EVISTA) 60 mg tablet Take 1 Tab by mouth daily. 90 Tab 4    montelukast (SINGULAIR) 10 mg tablet Take 10 mg by mouth every evening. No current facility-administered medications on file prior to visit.       ASSESSMENT and PLAN  AF: s/p brandy cardioversion on 2/19  With NSR restoration  Unfortunately few days after her last ov with me she was back in AF with symptoms of sob and fatigue   now after partial af ablation complicated by pericardial effusion which remained stable  Now on amiodarone eliquis and bblocker  Check ecg she seems in nsr  Aware of potential side effects no allergic reaction thus far ( iodine allergy)      Continue eliquis , no longer c/o bruising after stopping asa     Sob: resolved at this time no evidence for volume overload  Discussed options start both lasix and potassium on a prn base   she will let me know if sob recurs ( she was at 8000 feet last week with no issues she tells me)          See her back at the time of echo

## 2019-06-03 NOTE — PROGRESS NOTES
Sean Souza is a 66 y.o. female    Chief Complaint   Patient presents with    Irregular Heart Beat    Follow-up     1 Month F/up       1. Have you been to the ER, urgent care clinic since your last visit? Hospitalized since your last visit? No    2. Have you seen or consulted any other health care providers outside of the [address] [place] [address] since your last visit? Include any pap smears or colon screening.  No

## 2019-06-07 NOTE — ANESTHESIA POSTPROCEDURE EVALUATION
Post-Anesthesia Evaluation and Assessment    Patient: Shahnaz Adair MRN: [id number]  SSN: [id number]    YOB: 1940  Age: 66 y.o. Sex: female      I have evaluated the patient and they are stable and ready for discharge from the PACU. Cardiovascular Function/Vital Signs  Visit Vitals  /61 (BP 1 Location: Left arm, BP Patient Position: At rest)   Pulse 75   Temp 36.7 °C (98.1 °F)   Resp 14   Ht 5' 6\" (1.676 m)   Wt 88 kg (194 lb 0.1 oz)   SpO2 96%   Breastfeeding? No   BMI 31.31 kg/m²       Patient is status post General anesthesia for Procedure(s):  ABLATION A-FIB  W COMPLETE EP STUDY  Ep 3d Mapping  Intracardiac Echocardiogram.    Nausea/Vomiting: None    Postoperative hydration reviewed and adequate. Pain:  Pain Scale 1: Numeric (0 - 10) (05/12/19 1116)  Pain Intensity 1: 0 (05/12/19 1116)   Managed    Neurological Status: At baseline    Mental Status, Level of Consciousness: Alert and  oriented to person, place, and time    Pulmonary Status:   O2 Device: Room air (05/12/19 1116)   Adequate oxygenation and airway patent    Complications related to anesthesia: None    Post-anesthesia assessment completed. No concerns    Signed By: Randal Davis MD     June 7, 2019              Procedure(s):  ABLATION A-FIB  W COMPLETE EP STUDY  Ep 3d Mapping  Intracardiac Echocardiogram.    general    <BSHSIANPOST>    No vitals data found for the desired time range.

## 2019-06-21 ENCOUNTER — OFFICE VISIT (OUTPATIENT)
Dept: CARDIOLOGY CLINIC | Age: 79
End: 2019-06-21

## 2019-06-21 VITALS
BODY MASS INDEX: 30.89 KG/M2 | RESPIRATION RATE: 12 BRPM | WEIGHT: 192.2 LBS | SYSTOLIC BLOOD PRESSURE: 140 MMHG | HEART RATE: 56 BPM | DIASTOLIC BLOOD PRESSURE: 70 MMHG | OXYGEN SATURATION: 95 % | HEIGHT: 66 IN

## 2019-06-21 DIAGNOSIS — I10 BENIGN ESSENTIAL HTN: ICD-10-CM

## 2019-06-21 DIAGNOSIS — I31.39 PERICARDIAL EFFUSION: ICD-10-CM

## 2019-06-21 DIAGNOSIS — Z98.890 S/P ABLATION OF ATRIAL FIBRILLATION: ICD-10-CM

## 2019-06-21 DIAGNOSIS — Z86.79 S/P ABLATION OF ATRIAL FIBRILLATION: ICD-10-CM

## 2019-06-21 DIAGNOSIS — I48.0 PAROXYSMAL A-FIB (HCC): Primary | ICD-10-CM

## 2019-06-21 DIAGNOSIS — Z79.899 ON AMIODARONE THERAPY: ICD-10-CM

## 2019-06-21 DIAGNOSIS — Z85.3 HX: BREAST CANCER: ICD-10-CM

## 2019-06-21 NOTE — PROGRESS NOTES
HISTORY OF PRESENT ILLNESS  Danyel Jasmine is a 66 y.o. female. Patient with h/o HTN, HLD, Ca score of 0 in 2012, PVCs on ECG on 1/16 and echo on 1/16 with EF 55% mild MR. S/p LTKR in 1/16(seen by Dr. Anna Weathers)  Also remote h/o breast ca s/p mastectomy in 1980 but no chemo or xrt     Echo on 4/16:Ejection fraction was estimated to be 55 %. There were no  regional wall motion abnormalities. Wall thickness was at the upper limits  of normal.    Left atrium: The atrium was mildly dilated. Mitral valve: There was mild regurgitation. Aortic valve: Leaflets exhibited sclerosis. holter on 4/16: NSR  frequent pacs couplets triples and occasional non sustained at, frequent pvcs (0.65%)     On 3/18: admitted to [place] [address] onset A-fib with RVR     TIM and Cardioversion completed with 200 J with conversion to NSR  TIM no PFO, trace AR, mild MR, no TR or TX Mild atherosclerosis in prox descending aortaNo KENNEDY thrombus LA moderate dilation RA normal  Started eliquis, flecainide and metoprolol     Echo on 8/20/18: The ventricle was dilated. Systolic function was mildly  reduced by EF (biplane method of disks). Ejection fraction was estimated  to be 45 % in the range of 40 % to 50 %. There was mild diffuse  hypokinesis.        TIM /CARDIOVERSION on 2/19 resulting in NSR. Flecainide resumed     CXR on 2/25/19:1. Interval development of small bilateral pleural effusions and mild basilar     atelectasis. Follow-up to resolution is suggested. Doppler LLE on 2/19:No evidence of left lower extremity vein thrombosis.         02/27/19   ECHO ADULT FOLLOW-UP OR LIMITED 02/27/2019 2/27/2019     Addendum · Left ventricular low normal systolic function. Estimated left  ventricular ejection fraction is 51 - 55%. Calculated left ventricular  ejection fraction is 50%. Biplane method used to measure ejection  fraction. Normal left ventricular wall motion, no regional wall motion  abnormality noted.  · Mild to moderate mitral valve regurgitation. · Trivial-to-small pericardial effusion. · Mild tricuspid valve regurgitation is present. Shimon Newby MD 2/27/2019  2:19 PM           Narrative · Left ventricular low normal systolic function. Estimated left   ventricular ejection fraction is 51 - 55%. Calculated left ventricular   ejection fraction is 50%. Biplane method used to measure ejection   fraction. Normal left ventricular wall motion, no regional wall motion   abnormality noted. · Mild to moderate mitral valve regurgitation. · Trivial-to-small pericardial effusion. · Mild tricuspid valve stenosis is present.          Signed by: Johnathon March MD                 02/27/19   ECHO ADULT FOLLOW-UP OR LIMITED 02/27/2019 2/27/2019     Narrative · Left ventricular low normal systolic function. Estimated left   ventricular ejection fraction is 51 - 55%. Calculated left ventricular   ejection fraction is 50%. Biplane method used to measure ejection   fraction. Normal left ventricular wall motion, no regional wall motion   abnormality noted. · Mild to moderate mitral valve regurgitation. · Trivial-to-small pericardial effusion. · Mild tricuspid valve stenosis is present.          Signed by: Johnathon March MD       chest ct on 3/19:1. CTA pulmonary vein/left atrial mapping performed. 2. No accessory pulmonary vein, thrombus or stenosis. 3. 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 4. No acute finding. AF ablation on 5/19  EP study & partial PVI of the left superior vein and full PVI of the right veins during ablation on 05/10/2019, developed pericardial effusion during procedure.  No tamponade.  She was able to restarted Eliquis thereafter.    but Edie Ramos was admitted to CCU due to posterior lateral LA LV moderate pericardial effusion during left pulmonary vein isolation.  Heparin was reversed.  She was stable so no pericardiocentesis was warranted  Started on AMIO and BBlockers  05/10/19   ECHO ADULT FOLLOW-UP OR LIMITED 05/11/2019 5/11/2019     Narrative · Left Ventricle: Low normal systolic dysfunction. Estimated left   ventricular ejection fraction is 51 - 55%. No regional wall motion   abnormality noted. · Pericardium: Small-to-moderate anterior pericardial effusion adjacent to   left ventricle and right atrium. · Left Atrium: Moderately dilated left atrium. · Right Atrium: Moderately dilated right atrium.          Signed by: Reyes Greaser, MD      06/20/19   ECHO ADULT COMPLETE 06/21/2019 6/21/2019    Narrative · Left Ventricle: Borderline hypertrophy. Low normal systolic dysfunction. Calculated left ventricular ejection fraction is 53%. Biplane method used   to measure ejection fraction. No regional wall motion abnormality noted. Moderate (grade 2) left ventricular diastolic dysfunction. · Pericardium: Small circumferential pericardial effusion measuring 1 mm. · Left Atrium: Moderately dilated left atrium. · Mitral Valve: Mild to moderate mitral valve regurgitation. · Aortic Valve: Mild aortic valve sclerosis with no significant stenosis. Mild aortic valve regurgitation is present. · Tricuspid Valve: Mild to moderate tricuspid valve regurgitation is   present. · Right Atrium: Mildly dilated right atrium. · Right Ventricle: Mildly dilated right ventricle. · Pulmonary Artery: There is no evidence of pulmonary hypertension.         Signed by: Reyes Greaser, MD                        Past Medical History   Diagnosis Date    Palpitations            resolved    Dyslipidemia            labs 2008 - chol 283, HDL 83, ,     HTN (hypertension)       Breast cancer (HCC) Sulema Wade allergist    Hyperlipidemia LDL goal < 130            for increased LDL particles, Dr. Rosanne Mondragon   Past Surgical History   Procedure Laterality Date    Echo 2d adult    5/5/2008         normal, LVEF 60%    Stress test cardiolite    1/5/12         walked 7:31, no chest pain, normal MPI.  Ct heart w/o cont with calcium    1/2012         CAC score 0; calcified mediastinal lymph nodes consistent granulomatous disease    Hx urological    8/1/14         Urodynamics    Hx gyn          Hx hysterectomy Bilateral          HPI  Doing well no sob she exercises routinely with no issues she tells me  Review of Systems   Respiratory: Negative. Cardiovascular: Negative. Visit Vitals  /70 (BP 1 Location: Left arm, BP Patient Position: Sitting)   Pulse (!) 56   Resp 12   Ht 5' 6\" (1.676 m)   Wt 192 lb 3.2 oz (87.2 kg)   SpO2 95%   BMI 31.02 kg/m²       Physical Exam   Neck: No JVD present. Carotid bruit is not present. Cardiovascular: Normal rate and regular rhythm. Murmur heard. Systolic murmur is present with a grade of 1/6. Pulmonary/Chest: Effort normal and breath sounds normal.   Abdominal: Soft. Musculoskeletal: She exhibits no edema. Psychiatric: She has a normal mood and affect. Current Outpatient Medications on File Prior to Visit   Medication Sig Dispense Refill    furosemide (LASIX) 40 mg tablet Take 40 mg by mouth as needed.  potassium chloride (KLOR-CON) 10 mEq tablet Take 10 mEq by mouth as needed.  amiodarone (CORDARONE) 200 mg tablet Take 200 mg by mouth daily.  apixaban (ELIQUIS) 5 mg tablet Take 1 Tab by mouth two (2) times a day. 60 Tab 2    metoprolol succinate (TOPROL XL) 25 mg XL tablet Take 25 mg by mouth two (2) times a day.  fluticasone (FLOVENT HFA) 220 mcg/actuation inhaler Flovent  mcg/actuation aerosol inhaler   Inhale 1 puff twice a day by inhalation route.  atorvastatin (LIPITOR) 20 mg tablet Take 20 mg by mouth every evening.  MULTIVITAMIN PO Take  by mouth. Takes one po once daily.  cholecalciferol (VITAMIN D3) 5,000 unit capsule Take 5,000 Units by mouth daily.  cetirizine (ZYRTEC) 10 mg tablet Take 10 mg by mouth every evening.       estradiol (ESTRACE) 0.01 % (0.1 mg/gram) vaginal cream Insert  into vagina every Monday and Thursday.  EPINEPHrine (EPIPEN) 0.3 mg/0.3 mL (1:1,000) injection 0.3 mL by IntraMUSCular route once as needed for up to 1 dose. 0.3 mL 0    albuterol (PROVENTIL HFA, VENTOLIN HFA, PROAIR HFA) 90 mcg/actuation inhaler Take 1 Puff by inhalation every four (4) hours as needed. 2 Inhaler 3    raloxifene (EVISTA) 60 mg tablet Take 1 Tab by mouth daily. 90 Tab 4    montelukast (SINGULAIR) 10 mg tablet Take 10 mg by mouth every evening. No current facility-administered medications on file prior to visit.         ASSESSMENT and PLAN  AF: s/p  partial af ablation complicated by pericardial effusion which has  remained stable  Discussed results of today's echo   no significant changes in effusion and no tamponade  Now on amiodarone eliquis and bblocker and thus she remains in NSR    Aware of potential side effects no allergic reaction thus far ( iodine allergy)      Continue eliquis , no longer c/o bruising after stopping asa  Obtain limited echo in 4 months and lft and tsh     Sob: resolved at this time no evidence for volume overload  Continue lasix and potassium on a prn base   she will let me know if sob recurs ( she was at 8000 feet few weeks ago with no issues she tells me)  Otherwise see her back in 4 months or sooner if any issues

## 2019-06-21 NOTE — PROGRESS NOTES
Chief Complaint   Patient presents with    Follow-up      Denies chest pain/shortness of breath/dizziness. Continued occassional swelling in left ankle (arthritis)     Visit Vitals  /70 (BP 1 Location: Left arm, BP Patient Position: Sitting)   Pulse (!) 56   Resp 12   Ht 5' 6\" (1.676 m)   Wt 192 lb 3.2 oz (87.2 kg)   SpO2 95%   BMI 31.02 kg/m²     Medication profile updated. Verbal order per Dr. Nawaf Marroquin.

## 2019-07-18 ENCOUNTER — OFFICE VISIT (OUTPATIENT)
Dept: CARDIOLOGY CLINIC | Age: 79
End: 2019-07-18

## 2019-07-18 VITALS
OXYGEN SATURATION: 98 % | WEIGHT: 192 LBS | BODY MASS INDEX: 30.86 KG/M2 | HEART RATE: 57 BPM | DIASTOLIC BLOOD PRESSURE: 60 MMHG | HEIGHT: 66 IN | SYSTOLIC BLOOD PRESSURE: 120 MMHG | RESPIRATION RATE: 16 BRPM

## 2019-07-18 DIAGNOSIS — Z86.79 S/P ABLATION OF ATRIAL FIBRILLATION: ICD-10-CM

## 2019-07-18 DIAGNOSIS — Z98.890 S/P ABLATION OF ATRIAL FIBRILLATION: ICD-10-CM

## 2019-07-18 DIAGNOSIS — Z98.890 HISTORY OF CARDIOVERSION: ICD-10-CM

## 2019-07-18 DIAGNOSIS — I10 BENIGN ESSENTIAL HTN: ICD-10-CM

## 2019-07-18 DIAGNOSIS — Z79.01 CHRONIC ANTICOAGULATION: ICD-10-CM

## 2019-07-18 DIAGNOSIS — Z79.899 ON AMIODARONE THERAPY: ICD-10-CM

## 2019-07-18 DIAGNOSIS — I31.39 PERICARDIAL EFFUSION: ICD-10-CM

## 2019-07-18 DIAGNOSIS — I48.19 PERSISTENT ATRIAL FIBRILLATION (HCC): Primary | ICD-10-CM

## 2019-07-18 RX ORDER — LEVOTHYROXINE SODIUM 25 UG/1
25 TABLET ORAL
COMMUNITY
Start: 2019-07-02

## 2019-07-18 RX ORDER — METOPROLOL SUCCINATE 25 MG/1
25 TABLET, EXTENDED RELEASE ORAL 2 TIMES DAILY
Qty: 90 TAB | Refills: 3 | Status: SHIPPED | OUTPATIENT
Start: 2019-07-18 | End: 2020-01-20

## 2019-07-18 NOTE — PROGRESS NOTES
Cardiac Electrophysiology OFFICE Note     Subjective:      Roberta Parsons is a 66 y.o. patient who is seen for follow up of pericardial effusion that developed during AF ablation on 05/10/2019. There was no tamponade. PVI was not able to be completed on left side due to pericardial effusion without tamponade so heparin was stopped and reversed  PVI was completed on the right side  She is doing well, no recurrent of AF  She takes amiodarone 100 mg every day as she splits 200 mg tab by 1/2  She has no chest pain or dyspnea    Previous:  Echo (05/11/2019): LVEF 51-55%, no RWMA. Mod dilated LA, mod dilated RA. Small to mod anterior pericardial effusion adjacent to LV & RA.    TIM (05/10/2019): LVEF 56-60%, no RWMA. RV mod dilated. LA mod dilated. RA mod dilated. Mod MR. Mod TR. Mod posterior loculated pericardial effusion adjacent to LV & LA measuring 15 mm. Persistent AF, failed DCCV & flecainide. Symptoms with AF include fatigue, SOB, leg edema. Cardioversion with Dr. Samantha Agrawal on 2/22/19, had follow up on 2/27/19, back in atrial fibrillation. Mild dilated cardiomyopathy, improved on GDMT. Couldn't tolerate meds due to low BP.     Negative stress test 5 yrs ago per her report.     Patient Active Problem List   Diagnosis Code    Dyslipidemia E78.5    Palpitations R00.2    Chest pain, unspecified R07.9    Asthma J45.909    Hyperlipidemia LDL goal < 130 E78.5    HTN (hypertension) I10    Breast cancer (HCC) C50.919    Lung nodule R91.1    Cystocele [id number]    Uterine prolapse N81.4    Dizziness R42    Persistent atrial fibrillation (HCC) I48.1    Encounter for cardioversion procedure Z01.89    S/P ablation of atrial fibrillation Z98.890, Z86.79    Pericardial effusion, acute I30.9    Non-rheumatic mitral regurgitation I34.0    Non-rheumatic tricuspid valve insufficiency I36.1     Current Outpatient Medications   Medication Sig Dispense Refill    levothyroxine (SYNTHROID) 25 mcg tablet       metoprolol succinate (TOPROL XL) 25 mg XL tablet Take 1 Tab by mouth two (2) times a day. 90 Tab 3    furosemide (LASIX) 40 mg tablet Take 40 mg by mouth as needed.  potassium chloride (KLOR-CON) 10 mEq tablet Take 10 mEq by mouth as needed.  amiodarone (CORDARONE) 200 mg tablet Take 200 mg by mouth daily.  apixaban (ELIQUIS) 5 mg tablet Take 1 Tab by mouth two (2) times a day. 60 Tab 2    fluticasone (FLOVENT HFA) 220 mcg/actuation inhaler Flovent  mcg/actuation aerosol inhaler   Inhale 1 puff twice a day by inhalation route.  atorvastatin (LIPITOR) 20 mg tablet Take 20 mg by mouth every evening.  MULTIVITAMIN PO Take  by mouth. Takes one po once daily.  cholecalciferol (VITAMIN D3) 5,000 unit capsule Take 5,000 Units by mouth daily.  cetirizine (ZYRTEC) 10 mg tablet Take 10 mg by mouth every evening.  estradiol (ESTRACE) 0.01 % (0.1 mg/gram) vaginal cream Insert  into vagina every Monday and Thursday.  EPINEPHrine (EPIPEN) 0.3 mg/0.3 mL (1:1,000) injection 0.3 mL by IntraMUSCular route once as needed for up to 1 dose. 0.3 mL 0    albuterol (PROVENTIL HFA, VENTOLIN HFA, PROAIR HFA) 90 mcg/actuation inhaler Take 1 Puff by inhalation every four (4) hours as needed. 2 Inhaler 3    raloxifene (EVISTA) 60 mg tablet Take 1 Tab by mouth daily. 90 Tab 4    montelukast (SINGULAIR) 10 mg tablet Take 10 mg by mouth every evening.        Allergies   Allergen Reactions    Betadine [Povidone-Iodine] Rash     Past Medical History:   Diagnosis Date    Arthritis     Asthma     dr. Lisset Gorman allergist    Atrial fibrillation ([facility]     Breast cancer ([facility] 1980    right - mastectomy    Coagulation disorder ([facility]     on eliquis    Dyslipidemia     labs 2008 - chol 283, HDL 83, ,     HTN (hypertension)     Hyperlipidemia LDL goal < 130     for increased LDL particles, Dr. Onesimo Cintron Lung nodule     Dr. Krystyna Boyce Palpitations     resolved     Past Surgical History:   Procedure Laterality Date    CHEST SURGERY PROCEDURE UNLISTED      lung nodule removed - benign    COLONOSCOPY N/A 9/10/2018    COLONOSCOPY performed by Maribel Colon [facility] at [facility] W/O CONT WITH CALCIUM  1/2012    CAC score 0; calcified mediastinal lymph nodes consistent granulomatous disease    ECHO 2D ADULT  5/5/2008    normal, LVEF 60%    HX APPENDECTOMY      HX HYSTERECTOMY Bilateral 03/19/2015    and uro repair    HX KNEE REPLACEMENT Right     HX MASTECTOMY Right     HX TONSILLECTOMY      HX UROLOGICAL  8/1/14    Urodynamics    INTRACARD ECHO, THER/DX INTERVENT N/A 5/10/2019    Intracardiac Echocardiogram performed by Tim Crowley MD at [place] [address] EXTERNAL  3/28/2018         GA EPHYS EVL TRNSPTL TX ATRIAL FIB ISOLAT PULM VEIN N/A 5/10/2019    ABLATION A-FIB  W COMPLETE EP STUDY performed by Tim Crowley MD at [place] [address], [facility] [address] CATH LAB    GA INTRACARDIAC ELECTROPHYSIOLOGIC 3D MAPPING N/A 5/10/2019    Ep 3d Mapping performed by Tim Crowley MD at [place] [address], [facility] [address] CATH LAB    STRESS TEST [facility]  1/5/12    walked 7:31, no chest pain, normal MPI. Family History   Problem Relation Age of Onset    Heart Failure Mother     Stroke Father     Other Other         endometrial cancer, niece     Social History     Tobacco Use    Smoking status: Never Smoker    Smokeless tobacco: Never Used   Substance Use Topics    Alcohol use: Yes     Comment: wine nightly        Review of Systems:   Constitutional: Negative for fever, chills, weight loss, malaise/fatigue. HEENT: Negative for nosebleeds, vision changes. Respiratory: Negative for cough, hemoptysis  Cardiovascular: Negative for chest pain, palpitations, no orthopnea, claudication, leg swelling, no syncope, and PND. Gastrointestinal: Negative for nausea, vomiting, diarrhea, blood in stool and melena. Genitourinary: Negative for dysuria, and hematuria. Musculoskeletal: Negative for myalgias, arthralgia. Skin: Negative for rash. Heme: Does not bleed or bruise easily. Neurological: Negative for speech change and focal weakness     Objective:     Visit Vitals  /60 (BP 1 Location: Left arm, BP Patient Position: Sitting)   Pulse (!) 57   Resp 16   Ht 5' 6\" (1.676 m)   Wt 192 lb (87.1 kg)   SpO2 98%   BMI 30.99 kg/m²      Physical Exam:   Constitutional: well-developed and well-nourished. No respiratory distress. Head: Normocephalic and atraumatic. Eyes: Pupils are equal, round  ENT: hearing normal  Neck: supple. No JVD present. Cardiovascular: slow rate, regular rhythm. Exam reveals no gallop and no friction rub. No murmur heard. Pulmonary/Chest: Effort normal and breath sounds normal. No wheezes. Abdominal: Soft, no tenderness. Musculoskeletal: trace edema. Neurological: alert,oriented. Skin: Skin is warm and dry  Psychiatric: normal mood and affect. Behavior is normal. Judgment and thought content normal.         Assessment/Plan:       ICD-10-CM ICD-9-CM    1. Persistent atrial fibrillation (HCC) I48.1 427.31    2. S/P ablation of atrial fibrillation Z98.890 V45.89     Z86.79     3. Pericardial effusion I31.3 423.9    4. Benign essential HTN I10 401.1    5. Chronic anticoagulation Z79.01 V58.61    6. History of cardioversion Z98.890 V15.1    7. On amiodarone therapy Z79.899 V58.69      Ms. Soria had EP study & partial PVI of the left superior vein and full PVI of the right veins during ablation on 05/10/2019, developed small pericardial effusion during procedure. No tamponade. Echo in 6/2019 small effusion  No symptoms of pericarditis at this time. She does not yet want to stop amiodarone 100 mg every day  She wants to call back for refill if running out  She has more amiodarone left because she takes half  She got refill of toprol  Discussed potential recurrence of AF, potential need for repeat ablation.     Future Appointments Date Time Provider Hazel Wade   10/24/2019 10:00 AM Benjy Garcia   10/28/2019  8:40 AM Brady Gaines  [address]   1/21/2020  2:00 PM Tmi Crowley  [address]         Thank you for involving me in this patient's care and please call with further concerns or questions. Mehnaz Bonilla M.D.   Electrophysiology/Cardiology  [facility]  [facility] [address], [facility] [address] , [facility] [address] [place] [address]  [phone]

## 2019-09-25 RX ORDER — APIXABAN 5 MG/1
TABLET, FILM COATED ORAL
Qty: 180 TAB | Refills: 4 | Status: SHIPPED | OUTPATIENT
Start: 2019-09-25 | End: 2020-12-11

## 2019-10-24 ENCOUNTER — HOSPITAL ENCOUNTER (OUTPATIENT)
Dept: LAB | Age: 79
Discharge: HOME | End: 2019-10-24
Payer: MEDICARE

## 2019-10-24 PROCEDURE — 80076 HEPATIC FUNCTION PANEL: CPT

## 2019-10-24 PROCEDURE — 84443 ASSAY THYROID STIM HORMONE: CPT

## 2019-10-24 PROCEDURE — 36415 COLL VENOUS BLD VENIPUNCTURE: CPT

## 2019-10-25 LAB
ALBUMIN SERPL-MCNC: 4.1 G/DL (ref 3.5–4.8)
ALP SERPL-CCNC: 77 IU/L (ref 39–117)
ALT SERPL-CCNC: 32 IU/L (ref 0–32)
AST SERPL-CCNC: 21 IU/L (ref 0–40)
BILIRUB DIRECT SERPL-MCNC: 0.14 MG/DL (ref 0–0.4)
BILIRUB SERPL-MCNC: 0.4 MG/DL (ref 0–1.2)
PROT SERPL-MCNC: 5.9 G/DL (ref 6–8.5)
TSH SERPL DL<=0.005 MIU/L-ACNC: 12.41 UIU/ML (ref 0.45–4.5)

## 2019-10-28 ENCOUNTER — OFFICE VISIT (OUTPATIENT)
Dept: CARDIOLOGY CLINIC | Age: 79
End: 2019-10-28

## 2019-10-28 VITALS
WEIGHT: 196 LBS | RESPIRATION RATE: 15 BRPM | HEART RATE: 57 BPM | SYSTOLIC BLOOD PRESSURE: 132 MMHG | HEIGHT: 66 IN | BODY MASS INDEX: 31.5 KG/M2 | DIASTOLIC BLOOD PRESSURE: 74 MMHG | OXYGEN SATURATION: 95 %

## 2019-10-28 DIAGNOSIS — I48.19 PERSISTENT ATRIAL FIBRILLATION (HCC): Primary | ICD-10-CM

## 2019-10-28 RX ORDER — POTASSIUM CHLORIDE 20 MEQ/1
20 TABLET, EXTENDED RELEASE ORAL
COMMUNITY
End: 2021-05-26 | Stop reason: SDUPTHER

## 2019-10-28 NOTE — PROGRESS NOTES
Chief Complaint   Patient presents with    Follow-up    Hypertension    Irregular Heart Beat     1. Have you been to the ER, urgent care clinic since your last visit? Hospitalized since your last visit? No    2. Have you seen or consulted any other health care providers outside of the [address] [place] [address] since your last visit? Include any pap smears or colon screening. No    3) Do you have an Advance Directive on file?  No  Visit Vitals  /74 (BP 1 Location: Left arm, BP Patient Position: Sitting)   Pulse (!) 57   Resp 15   Ht 5' 6\" (1.676 m)   Wt 196 lb (88.9 kg)   SpO2 95%   BMI 31.64 kg/m²

## 2019-10-28 NOTE — PROGRESS NOTES
HISTORY OF PRESENT ILLNESS  Rj Altamirano is a 78 y.o. female. Patient with h/o HTN, HLD, Ca score of 0 in 2012, PVCs on ECG on 1/16 and echo on 1/16 with EF 55% mild MR. S/p LTKR in 1/16(seen by Dr. Abdirahman Emmanuel)  Also remote h/o breast ca s/p mastectomy in 1980 but no chemo or xrt     Echo on 4/16:Ejection fraction was estimated to be 55 %. There were no  regional wall motion abnormalities. Wall thickness was at the upper limits  of normal.    Left atrium: The atrium was mildly dilated. Mitral valve: There was mild regurgitation. Aortic valve: Leaflets exhibited sclerosis. holter on 4/16: NSR  frequent pacs couplets triples and occasional non sustained at, frequent pvcs (0.65%)     On 3/18: admitted to [place] [id number] onset A-fib with RVR     TIM and Cardioversion completed with 200 J with conversion to NSR  TIM no PFO, trace AR, mild MR, no TR or MI Mild atherosclerosis in prox descending aortaNo KENNEDY thrombus LA moderate dilation RA normal  Started eliquis, flecainide and metoprolol     Echo on 8/20/18: The ventricle was dilated. Systolic function was mildly  reduced by EF (biplane method of disks). Ejection fraction was estimated  to be 45 % in the range of 40 % to 50 %. There was mild diffuse  hypokinesis.        TIM /CARDIOVERSION on 2/19 resulting in NSR. Flecainide resumed     CXR on 2/25/19:1. Interval development of small bilateral pleural effusions and mild basilar     atelectasis. Follow-up to resolution is suggested. Doppler LLE on 2/19:No evidence of left lower extremity vein thrombosis.         02/27/19   ECHO ADULT FOLLOW-UP OR LIMITED 02/27/2019 2/27/2019     Addendum · Left ventricular low normal systolic function. Estimated left  ventricular ejection fraction is 51 - 55%. Calculated left ventricular  ejection fraction is 50%. Biplane method used to measure ejection  fraction. Normal left ventricular wall motion, no regional wall motion  abnormality noted.  · Mild to moderate mitral valve regurgitation. · Trivial-to-small pericardial effusion. · Mild tricuspid valve regurgitation is present. Karen Rodriguez MD 2/27/2019  2:19 PM           Narrative · Left ventricular low normal systolic function. Estimated left   ventricular ejection fraction is 51 - 55%. Calculated left ventricular   ejection fraction is 50%. Biplane method used to measure ejection   fraction. Normal left ventricular wall motion, no regional wall motion   abnormality noted. · Mild to moderate mitral valve regurgitation. · Trivial-to-small pericardial effusion. · Mild tricuspid valve stenosis is present.          Signed by: Sakshi Floyd MD                 02/27/19   ECHO ADULT FOLLOW-UP OR LIMITED 02/27/2019 2/27/2019     Narrative · Left ventricular low normal systolic function. Estimated left   ventricular ejection fraction is 51 - 55%. Calculated left ventricular   ejection fraction is 50%. Biplane method used to measure ejection   fraction. Normal left ventricular wall motion, no regional wall motion   abnormality noted. · Mild to moderate mitral valve regurgitation. · Trivial-to-small pericardial effusion. · Mild tricuspid valve stenosis is present.          Signed by: Sakshi Floyd MD       chest ct on 3/19:1. CTA pulmonary vein/left atrial mapping performed. 2. No accessory pulmonary vein, thrombus or stenosis. 3. 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   4. No acute finding.   AF ablation on 5/19  EP study & partial PVI of the left superior vein and full PVI of the right veins during ablation on 05/10/2019, developed pericardial effusion during procedure.  No tamponade.  She was able to restarted Eliquis thereafter.   but  she was admitted to CCU due to posterior lateral LA LV moderate pericardial effusion during left pulmonary vein isolation.  Heparin was reversed.  She was stable so no pericardiocentesis was warranted  Started on AMIO and BBlockers       05/10/19   ECHO ADULT FOLLOW-UP OR LIMITED 05/11/2019 5/11/2019     Narrative · Left Ventricle: Low normal systolic dysfunction. Estimated left   ventricular ejection fraction is 51 - 55%. No regional wall motion   abnormality noted. · Pericardium: Small-to-moderate anterior pericardial effusion adjacent to   left ventricle and right atrium. · Left Atrium: Moderately dilated left atrium. · Right Atrium: Moderately dilated right atrium.          Signed by: Jacy Shields MD           06/20/19   ECHO ADULT COMPLETE 06/21/2019 6/21/2019     Narrative · Left Ventricle: Borderline hypertrophy. Low normal systolic dysfunction. Calculated left ventricular ejection fraction is 53%. Biplane method used   to measure ejection fraction. No regional wall motion abnormality noted. Moderate (grade 2) left ventricular diastolic dysfunction. · Pericardium: Small circumferential pericardial effusion measuring 1 mm. · Left Atrium: Moderately dilated left atrium. · Mitral Valve: Mild to moderate mitral valve regurgitation. · Aortic Valve: Mild aortic valve sclerosis with no significant stenosis. Mild aortic valve regurgitation is present. · Tricuspid Valve: Mild to moderate tricuspid valve regurgitation is   present. · Right Atrium: Mildly dilated right atrium. · Right Ventricle: Mildly dilated right ventricle. · Pulmonary Artery: There is no evidence of pulmonary hypertension.          Signed by: Jacy Shields MD            06/20/19   ECHO ADULT COMPLETE 06/21/2019 6/21/2019    Narrative · Left Ventricle: Borderline hypertrophy. Low normal systolic dysfunction. Calculated left ventricular ejection fraction is 53%. Biplane method used   to measure ejection fraction. No regional wall motion abnormality noted. Moderate (grade 2) left ventricular diastolic dysfunction. · Pericardium: Small circumferential pericardial effusion measuring 1 mm. · Left Atrium: Moderately dilated left atrium.   · Mitral Valve: Mild to moderate mitral valve regurgitation. · Aortic Valve: Mild aortic valve sclerosis with no significant stenosis. Mild aortic valve regurgitation is present. · Tricuspid Valve: Mild to moderate tricuspid valve regurgitation is   present. · Right Atrium: Mildly dilated right atrium. · Right Ventricle: Mildly dilated right ventricle. · Pulmonary Artery: There is no evidence of pulmonary hypertension. Signed by: Jose Luis Victoria MD     10/24/19   ECHO ADULT FOLLOW-UP OR LIMITED 10/28/2019 10/28/2019    Narrative · Left Ventricle: Normal systolic function (ejection fraction normal). Mildly dilated left ventricle. Mild concentric hypertrophy. Estimated left   ventricular ejection fraction is 56 - 60%. Biplane method used to measure   ejection fraction. No regional wall motion abnormality noted. Moderate   (grade 2) left ventricular diastolic dysfunction. · Pericardium: Trivial-to-small circumferential pericardial effusion. · Left Atrium: Moderately dilated left atrium. · Mitral Valve: Mild to moderate mitral valve regurgitation is present. · Aortic Valve: Mild aortic valve sclerosis with no significant stenosis. Mild aortic valve regurgitation is present. · Tricuspid Valve: Mild tricuspid valve regurgitation is present. · Right Atrium: Mildly dilated right atrium. · Right Ventricle: Mildly dilated right ventricle. · Pulmonary Artery: Mild to moderate pulmonary hypertension.         Signed by: Jose Luis Victoria MD            [place]   Past Medical History   Diagnosis Date    Palpitations            resolved    Dyslipidemia            labs 2008 - chol 283, HDL 83, ,     HTN (hypertension)       Breast cancer ([address] Solo Aguilar allergist    Hyperlipidemia LDL goal < 130            for increased LDL particles, Dr. Andres Shelley   Past Surgical History   Procedure Laterality Date    Echo 2d adult    5/5/2008         normal, LVEF 60%    Stress test cardiolite    1/5/12         walked 7:31, no chest pain, normal MPI.  Ct heart w/o cont with calcium    1/2012         CAC score 0; calcified mediastinal lymph nodes consistent granulomatous disease    Hx urological    8/1/14         Urodynamics    Hx gyn          Hx hysterectomy Bilateral          HPI  She is doing well she tells me. No shortness of breath chest pain palpitation fatigue reported. May be a very subtle lack of balance but she is not sure about it. Review of Systems   Constitutional: Negative. Respiratory: Negative. Cardiovascular: Negative. Visit Vitals  /74 (BP 1 Location: Left arm, BP Patient Position: Sitting)   Pulse (!) 57   Resp 15   Ht 5' 6\" (1.676 m)   Wt 196 lb (88.9 kg)   SpO2 95%   BMI 31.64 kg/m²       Physical Exam   Neck: No JVD present. Carotid bruit is not present. Cardiovascular: Normal rate and regular rhythm. Pulmonary/Chest: Effort normal and breath sounds normal.   Abdominal: Soft. Musculoskeletal: She exhibits no edema. Psychiatric: She has a normal mood and affect. Lab Results   Component Value Date/Time    Cholesterol, total 142 03/28/2018 04:00 AM    HDL Cholesterol 76 03/28/2018 04:00 AM    LDL, calculated 55.2 03/28/2018 04:00 AM    VLDL, calculated 10.8 03/28/2018 04:00 AM    Triglyceride 54 03/28/2018 04:00 AM    CHOL/HDL Ratio 1.9 03/28/2018 04:00 AM     Lab Results   Component Value Date/Time    ALT (SGPT) 32 10/24/2019 11:04 AM    AST (SGOT) 21 10/24/2019 11:04 AM    Alk.  phosphatase 77 10/24/2019 11:04 AM    Bilirubin, direct 0.14 10/24/2019 11:04 AM    Bilirubin, total 0.4 10/24/2019 11:04 AM     Lab Results   Component Value Date/Time    TSH 12.410 (H) 10/24/2019 11:04 AM     Current Outpatient Medications on File Prior to Visit   Medication Sig Dispense Refill    ELIQUIS 5 mg tablet TAKE 1 TABLET TWICE A  Tab 4    levothyroxine (SYNTHROID) 25 mcg tablet       metoprolol succinate (TOPROL XL) 25 mg XL tablet Take 1 Tab by mouth two (2) times a day. 90 Tab 3    furosemide (LASIX) 40 mg tablet Take 40 mg by mouth as needed.  potassium chloride (KLOR-CON) 10 mEq tablet Take 10 mEq by mouth as needed.  amiodarone (CORDARONE) 200 mg tablet Take 200 mg by mouth daily.  fluticasone (FLOVENT HFA) 220 mcg/actuation inhaler Flovent  mcg/actuation aerosol inhaler   Inhale 1 puff twice a day by inhalation route.  atorvastatin (LIPITOR) 20 mg tablet Take 20 mg by mouth every evening.  MULTIVITAMIN PO Take  by mouth. Takes one po once daily.  cholecalciferol (VITAMIN D3) 5,000 unit capsule Take 5,000 Units by mouth daily.  cetirizine (ZYRTEC) 10 mg tablet Take 10 mg by mouth every evening.  estradiol (ESTRACE) 0.01 % (0.1 mg/gram) vaginal cream Insert  into vagina every Monday and Thursday.  EPINEPHrine (EPIPEN) 0.3 mg/0.3 mL (1:1,000) injection 0.3 mL by IntraMUSCular route once as needed for up to 1 dose. 0.3 mL 0    albuterol (PROVENTIL HFA, VENTOLIN HFA, PROAIR HFA) 90 mcg/actuation inhaler Take 1 Puff by inhalation every four (4) hours as needed. 2 Inhaler 3    raloxifene (EVISTA) 60 mg tablet Take 1 Tab by mouth daily. 90 Tab 4    montelukast (SINGULAIR) 10 mg tablet Take 10 mg by mouth every evening. No current facility-administered medications on file prior to visit.         ASSESSMENT and PLAN  AF: s/p  partial af ablation complicated by pericardial effusion which has  remained stable  ecg with nsr prwp fav no changes from previous  Discussed results of today's echo  Trivial to mild effusion   mr and tr discussed  Now on amiodarone eliquis and bblocker and thus she remains in NSR     Aware of potential side effects no allergic reaction thus far ( iodine allergy)      Continue eliquis , no longer c/o bruising after stopping asa  Discussed tsh and she will follow up with her pcp for adjustments on her supplements  lft are normal  Discussed neurological effects of amiodarone  She will let me know if lack of balance becomes noticeable     Sob: resolved at this time no evidence for volume overload  Continue lasix and potassium on a prn base    Otherwise see her back in 4 months or sooner if any issues

## 2020-01-20 RX ORDER — METOPROLOL SUCCINATE 25 MG/1
TABLET, EXTENDED RELEASE ORAL
Qty: 90 TAB | Refills: 8 | Status: SHIPPED | OUTPATIENT
Start: 2020-01-20 | End: 2020-06-29

## 2020-01-21 ENCOUNTER — OFFICE VISIT (OUTPATIENT)
Dept: CARDIOLOGY CLINIC | Age: 80
End: 2020-01-21

## 2020-01-21 VITALS
HEIGHT: 66 IN | HEART RATE: 57 BPM | SYSTOLIC BLOOD PRESSURE: 138 MMHG | OXYGEN SATURATION: 98 % | WEIGHT: 195 LBS | BODY MASS INDEX: 31.34 KG/M2 | DIASTOLIC BLOOD PRESSURE: 70 MMHG | RESPIRATION RATE: 16 BRPM

## 2020-01-21 DIAGNOSIS — Z86.79 S/P ABLATION OF ATRIAL FIBRILLATION: ICD-10-CM

## 2020-01-21 DIAGNOSIS — I10 ESSENTIAL HYPERTENSION: ICD-10-CM

## 2020-01-21 DIAGNOSIS — I49.3 PVC (PREMATURE VENTRICULAR CONTRACTION): ICD-10-CM

## 2020-01-21 DIAGNOSIS — I34.0 MITRAL VALVE INSUFFICIENCY, UNSPECIFIED ETIOLOGY: ICD-10-CM

## 2020-01-21 DIAGNOSIS — I48.19 PERSISTENT ATRIAL FIBRILLATION (HCC): Primary | ICD-10-CM

## 2020-01-21 DIAGNOSIS — I42.0 DILATED CARDIOMYOPATHY (HCC): ICD-10-CM

## 2020-01-21 DIAGNOSIS — Z79.01 CHRONIC ANTICOAGULATION: ICD-10-CM

## 2020-01-21 DIAGNOSIS — Z98.890 S/P ABLATION OF ATRIAL FIBRILLATION: ICD-10-CM

## 2020-01-21 RX ORDER — AMIODARONE HYDROCHLORIDE 100 MG/1
100 TABLET ORAL DAILY
Qty: 90 TAB | Refills: 1 | Status: SHIPPED | OUTPATIENT
Start: 2020-01-21 | End: 2020-06-02

## 2020-01-21 NOTE — PROGRESS NOTES
Identified pt with two pt identifiers(name and ). Reviewed record in preparation for visit and have obtained necessary documentation. Chief Complaint   Patient presents with    Follow-up           Visit Vitals  /70 (BP 1 Location: Left arm, BP Patient Position: Sitting)   Pulse (!) 57   Resp 16   Ht 5' 6\" (1.676 m)   Wt 195 lb (88.5 kg)   SpO2 98%   BMI 31.47 kg/m²     Pain Scale: 0 - No pain/10    Coordination of Care Questionnaire:  :   1. Have you been to the ER, urgent care clinic since your last visit? Hospitalized since your last visit? No    2. Have you seen or consulted any other health care providers outside of the [address] [place] [address] since your last visit? Include any pap smears or colon screening.  Yes, PCP Partner MD

## 2020-01-21 NOTE — PROGRESS NOTES
Cardiac Electrophysiology OFFICE Note Subjective:  
  
Jil Roa is a 78 y.o. patient who is seen for follow up, is s/p AF ablation on 05/10/2019, had pericardial effusion develop afterward. PVI not able to be completed on left side due to pericardial effusion without tamponade; heparin stopped & reversed. Since ablation, she denies known recurrence of AF. Currently taking amiodarone 200 mg po daily. She started thyroid supplement back in the fall, has been monitored by PCP. States labs were done a few weeks ago. ECG today showed SB 58 bpm with QTc approx 460 ms. She has not required any doses of her prn furosemide. She denies chest pain, palpitations, SOB, PND, orthopnea, lightheadedness, syncope, or edema. Previous: 
Echo (05/11/2019): LVEF 51-55%, no RWMA. Mod dilated LA, mod dilated RA. Small to mod anterior pericardial effusion adjacent to LV & RA. 
 
TIM (05/10/2019): LVEF 56-60%, no RWMA. RV mod dilated. LA mod dilated. RA mod dilated. Mod MR. Mod TR. Mod posterior loculated pericardial effusion adjacent to LV & LA measuring 15 mm. Persistent AF, failed DCCV & flecainide. Symptoms with AF include fatigue, SOB, leg edema. Cardioversion with Dr. Kamran Espinoza on 2/22/19, had follow up on 2/27/19, back in atrial fibrillation. Mild dilated cardiomyopathy, improved on GDMT. Couldn't tolerate meds due to low BP. Negative stress test 5 yrs ago per her report. 
  
Patient Active Problem List  
Diagnosis Code  Dyslipidemia E78.5  Palpitations R00.2  Chest pain, unspecified R07.9  Asthma J45.909  Hyperlipidemia LDL goal < 130 E78.5  
 HTN (hypertension) I10  
 Breast cancer ([facility] [address] C50.919  Lung nodule R91.1  Cystocele [id number]  Uterine prolapse N81.4  Dizziness R42  Persistent atrial fibrillation I48.19  
 Encounter for cardioversion procedure Z01.89  
 S/P ablation of atrial fibrillation Z98.890, Z86.79  
  Pericardial effusion, acute I30.9  Non-rheumatic mitral regurgitation I34.0  Non-rheumatic tricuspid valve insufficiency I36.1 Current Outpatient Medications Medication Sig Dispense Refill  TOPROL XL 25 mg XL tablet TAKE 1 TABLET TWICE A DAY 90 Tab 8  potassium chloride (K-DUR, KLOR-CON) 20 mEq tablet Take 20 mEq by mouth daily as needed.  ELIQUIS 5 mg tablet TAKE 1 TABLET TWICE A  Tab 4  
 levothyroxine (SYNTHROID) 25 mcg tablet  furosemide (LASIX) 40 mg tablet Take 40 mg by mouth as needed.  amiodarone (CORDARONE) 200 mg tablet Take 200 mg by mouth daily.  fluticasone (FLOVENT HFA) 220 mcg/actuation inhaler Flovent  mcg/actuation aerosol inhaler Inhale 1 puff twice a day by inhalation route.  atorvastatin (LIPITOR) 20 mg tablet Take 20 mg by mouth every evening.  MULTIVITAMIN PO Take  by mouth. Takes one po once daily.  cholecalciferol (VITAMIN D3) 5,000 unit capsule Take 5,000 Units by mouth daily.  cetirizine (ZYRTEC) 10 mg tablet Take 10 mg by mouth every evening.  estradiol (ESTRACE) 0.01 % (0.1 mg/gram) vaginal cream Insert  into vagina every Monday and Thursday.  EPINEPHrine (EPIPEN) 0.3 mg/0.3 mL (1:1,000) injection 0.3 mL by IntraMUSCular route once as needed for up to 1 dose. 0.3 mL 0  
 albuterol (PROVENTIL HFA, VENTOLIN HFA, PROAIR HFA) 90 mcg/actuation inhaler Take 1 Puff by inhalation every four (4) hours as needed. 2 Inhaler 3  
 raloxifene (EVISTA) 60 mg tablet Take 1 Tab by mouth daily. 90 Tab 4  
 montelukast (SINGULAIR) 10 mg tablet Take 10 mg by mouth every evening. Allergies Allergen Reactions  Betadine [Povidone-Iodine] Rash Past Medical History:  
Diagnosis Date  Arthritis  Asthma   
 dr. Asya Fonseca allergist  
 Atrial [address] ([facility] [address]  Breast cancer ([facility] [address] 1980  
 right - mastectomy  Coagulation disorder ([facility] [address]   
 on eliquis  Dyslipidemia labs 2008 - chol 283, HDL 83, ,   
 HTN (hypertension)  Hyperlipidemia LDL goal < 130   
 for increased LDL particles, Dr. Inge Lubin  Lung nodule Dr. Lety Roth  Palpitations   
 resolved Past Surgical History:  
Procedure Laterality Date  CHEST SURGERY PROCEDURE UNLISTED    
 lung nodule removed - benign  COLONOSCOPY N/A 9/10/2018 COLONOSCOPY performed by Derek Treviño MD at [facility] ENDOSCOPY  CT HEART W/O CONT WITH CALCIUM  1/2012 CAC score 0; calcified mediastinal lymph nodes consistent granulomatous disease  ECHO 2D ADULT  5/5/2008  
 normal, LVEF 60%  HX APPENDECTOMY  HX HYSTERECTOMY Bilateral 03/19/2015  
 and uro repair  HX KNEE REPLACEMENT Right  HX MASTECTOMY Right  HX TONSILLECTOMY  HX UROLOGICAL  8/1/14 Urodynamics  INTRACARD ECHO, THER/DX INTERVENT N/A 5/10/2019 Intracardiac Echocardiogram [place] [facility] [place] [address] CATH LAB  ID CARDIOVERSION ELECTIVE ARRHYTHMIA EXTERNAL  3/28/2018  ID EPHYS [id number] [id number] TX ATRIAL FIB ISOLAT PULM VEIN N/A 5/10/2019 ABLATION A-FIB  W COMPLETE EP STUDY performed [facility] [place] [facility] [address] CATH LAB  ID INTRACARDIAC ELECTROPHYSIOLOGIC 3D MAPPING N/A 5/10/2019 Ep 3d Mapping performed [facility] [facility] [address] CATH LAB  STRESS TEST CARDIOLITE  1/5/12  
 walked 7:31, no chest pain, normal MPI. Family History Problem Relation Age of Onset  Heart Failure Mother  Stroke Father  Other Other   
     endometrial cancer, niece Social History Tobacco Use  Smoking status: Never Smoker  Smokeless tobacco: Never Used Substance Use Topics  Alcohol use: Yes Comment: wine nightly Review of Systems:  
Constitutional: Negative for fever, chills, weight loss, malaise/fatigue. HEENT: Negative for nosebleeds, vision changes. Respiratory: Negative for cough, hemoptysis Cardiovascular: Negative for chest pain, palpitations, no orthopnea, claudication, leg swelling, no syncope, and PND. Gastrointestinal: Negative for nausea, vomiting, diarrhea, blood in stool and melena. Genitourinary: Negative for dysuria, and hematuria. Musculoskeletal: Negative for myalgias, arthralgia. Skin: Negative for rash. Heme: Does not bleed or bruise easily. Neurological: Negative for speech change and focal weakness Objective:  
 
Visit Vitals /70 (BP 1 Location: Left arm, BP Patient Position: Sitting) Pulse (!) 57 Resp 16 Ht 5' 6\" (1.676 m) Wt 195 lb (88.5 kg) SpO2 98% BMI 31.47 kg/m² Physical Exam:  
Constitutional: Well-developed and well-nourished. No respiratory distress. Head: Normocephalic and atraumatic. Eyes: Pupils are equal, round ENT: Hearing normal. 
Neck: supple. No JVD present. Cardiovascular: Bradycardic rate, regular rhythm. Exam reveals no gallop and no friction rub. No murmur heard. Pulmonary/Chest: Effort normal and breath sounds normal. No wheezes. Abdominal: Soft, no tenderness. Musculoskeletal: Trace edema. Neurological: Alert,oriented. Skin: Skin is warm and dry. Psychiatric: Normal mood and affect. Behavior is normal. Judgment and thought content normal.   
 
ECG: Sinus bradycardia, 58 bpm.  QTc 460 ms. Assessment/Plan: ICD-10-CM ICD-9-CM 1. Persistent atrial fibrillation I48.19 427.31 AMB POC EKG ROUTINE W/ 12 LEADS, INTER & REP  
2. S/P ablation of atrial fibrillation Z98.890 V45.89   
 Z86.79    
3. Mitral valve insufficiency, unspecified etiology I34.0 424.0 4. Chronic anticoagulation Z79.01 V58.61   
5. Essential hypertension I10 401.9 6. Dilated cardiomyopathy (HCC) I42.0 425.4 7. PVC (premature ventricular contraction) I49.3 427.69   
 
Ms.  Yang had EP study & partial PVI of the left superior vein and full PVI of the right veins during ablation on 05/10/2019, developed small pericardial effusion during procedure. No tamponade. Currently taking amiodarone 200 mg po daily, hypothyroid per labs in 10/2019. States PCP started supplement, rechecked labs recently. Will request lab results LFTs normal in 10/2019, CXR normal in 05/2019. No need to repeat currently. Given thyroid dysfunction & borderline bradycardia, will reduce amiodarone to 100 mg po daily. No known recurrence of AF since procedure. Discussed potential recurrence of AF, potential need for repeat ablation. Follow up with Dr. Kris Jimenez as previously scheduled. EP follow up prn. Future Appointments Date Time Provider Hazel Sheri 3/3/2020 10:40 AM Clay Lozada  [address] Thank you for involving me in this patient's care and please call with further concerns or questions. Harshal Giraldo M.D. Electrophysiology/Cardiology [address] [facility] [place] [address], [address] 
[phone]

## 2020-01-22 ENCOUNTER — DOCUMENTATION ONLY (OUTPATIENT)
Dept: CARDIOLOGY CLINIC | Age: 80
End: 2020-01-22

## 2020-01-22 NOTE — PROGRESS NOTES
Cardiac Electrophysiology OFFICE Note     Subjective:      Bennie Charles is a [address] y.o. patient who is seen for follow up, is s/p AF ablation on 05/10/2019, had pericardial effusion develop afterward. PVI not able to be completed on left side due to pericardial effusion without tamponade; heparin stopped & reversed. Since ablation, she denies known recurrence of AF. Currently taking amiodarone 200 mg po daily. She started thyroid supplement back in the fall, has been monitored by PCP. States labs were done a few weeks ago. ECG today showed SB 58 bpm with QTc approx 460 ms. She has not required any doses of her prn furosemide. She denies chest pain, palpitations, SOB, PND, orthopnea, lightheadedness, syncope, or edema. Previous:  Echo (05/11/2019): LVEF 51-55%, no RWMA. Mod dilated LA, mod dilated RA. Small to mod anterior pericardial effusion adjacent to LV & RA.    TIM (05/10/2019): LVEF 56-60%, no RWMA. RV mod dilated. LA mod dilated. RA mod dilated. Mod MR. Mod TR. Mod posterior loculated pericardial effusion adjacent to LV & LA measuring 15 mm. Persistent AF, failed DCCV & flecainide. Symptoms with AF include fatigue, SOB, leg edema. Cardioversion with Dr. Jordyn Esqueda on 2/22/19, had follow up on 2/27/19, back in atrial fibrillation. Mild dilated cardiomyopathy, improved on GDMT. Couldn't tolerate meds due to low BP.     Negative stress test 5 yrs ago per her report.     Patient Active Problem List   Diagnosis Code    Dyslipidemia E78.5    Palpitations R00.2    Chest pain, unspecified R07.9    Asthma J45.909    Hyperlipidemia LDL goal < 130 E78.5    HTN (hypertension) I10    Breast cancer (HCC) C50.919    Lung nodule R91.1    Cystocele [id number]    Uterine prolapse N81.4    Dizziness R42    Persistent atrial fibrillation I48.19    Encounter for cardioversion procedure Z01.89    S/P ablation of atrial fibrillation Z98.890, Z86.79    Pericardial effusion, acute I30.9    Non-rheumatic mitral regurgitation I34.0    Non-rheumatic tricuspid valve insufficiency I36.1     Current Outpatient Medications   Medication Sig Dispense Refill    amiodarone (PACERONE) 100 mg tablet Take 1 Tab by mouth daily. 90 Tab 1    TOPROL XL 25 mg XL tablet TAKE 1 TABLET TWICE A DAY 90 Tab 8    potassium chloride (K-DUR, KLOR-CON) 20 mEq tablet Take 20 mEq by mouth daily as needed.  ELIQUIS 5 mg tablet TAKE 1 TABLET TWICE A  Tab 4    levothyroxine (SYNTHROID) 25 mcg tablet       furosemide (LASIX) 40 mg tablet Take 40 mg by mouth as needed.  fluticasone (FLOVENT HFA) 220 mcg/actuation inhaler Flovent  mcg/actuation aerosol inhaler   Inhale 1 puff twice a day by inhalation route.  atorvastatin (LIPITOR) 20 mg tablet Take 20 mg by mouth every evening.  MULTIVITAMIN PO Take  by mouth. Takes one po once daily.  cholecalciferol (VITAMIN D3) 5,000 unit capsule Take 5,000 Units by mouth daily.  cetirizine (ZYRTEC) 10 mg tablet Take 10 mg by mouth every evening.  estradiol (ESTRACE) 0.01 % (0.1 mg/gram) vaginal cream Insert  into vagina every Monday and Thursday.  EPINEPHrine (EPIPEN) 0.3 mg/0.3 mL (1:1,000) injection 0.3 mL by IntraMUSCular route once as needed for up to 1 dose. 0.3 mL 0    albuterol (PROVENTIL HFA, VENTOLIN HFA, PROAIR HFA) 90 mcg/actuation inhaler Take 1 Puff by inhalation every four (4) hours as needed. 2 Inhaler 3    raloxifene (EVISTA) 60 mg tablet Take 1 Tab by mouth daily. 90 Tab 4    montelukast (SINGULAIR) 10 mg tablet Take 10 mg by mouth every evening.        Allergies   Allergen Reactions    Betadine [Povidone-Iodine] Rash     Past Medical History:   Diagnosis Date    Arthritis     Asthma     dr. Romana Chalet allergist    [facility] [address]     Breast cancer ([facility] [address] 1980    right - mastectomy    Coagulation disorder ([facility] [address]     on eliquis    Dyslipidemia     labs 2008 - chol 283, HDL 83, ,     HTN (hypertension)     Hyperlipidemia LDL goal < 130     for increased LDL particles, Dr. Loulou Kerns nodule     Dr. Orlando Helms Palpitations     resolved     Past Surgical History:   Procedure Laterality Date    CHEST SURGERY PROCEDURE UNLISTED      lung nodule removed - benign    COLONOSCOPY N/A 9/10/2018    COLONOSCOPY performed by Eli Juarez MD at [facility] [address] W/O CONT WITH CALCIUM  1/2012    CAC score 0; calcified mediastinal lymph nodes consistent granulomatous disease    ECHO 2D ADULT  5/5/2008    normal, LVEF 60%    HX APPENDECTOMY      HX HYSTERECTOMY Bilateral 03/19/2015    and uro repair    HX KNEE REPLACEMENT Right     HX MASTECTOMY Right     HX TONSILLECTOMY      HX UROLOGICAL  8/1/14    Urodynamics    INTRACARD ECHO, THER/DX INTERVENT N/A 5/10/2019    Intracardiac Echocardiogram performed by Jagdish Lane MD at [address]  3/28/2018         AK EPHYS EVL TRNSPTL TX ATRIAL FIB ISOLAT PULM VEIN N/A 5/10/2019    ABLATION A-FIB  W COMPLETE EP STUDY performed by Jagdish Lane MD at [facility] [address] CATH LAB    AK INTRACARDIAC ELECTROPHYSIOLOGIC 3D MAPPING N/A 5/10/2019    Ep 3d Mapping performed by Jagdish Lane MD at [address] [place] [id number], [address] CATH LAB    [facility]  1[facility]    walked 7:31, no chest pain, normal MPI. Family History   Problem Relation Age of Onset    Heart Failure Mother     Stroke Father     Other Other         endometrial cancer, niece     Social History     Tobacco Use    Smoking status: Never Smoker    Smokeless tobacco: Never Used   Substance Use Topics    Alcohol use: Yes     Comment: wine nightly      Review of Systems:   Constitutional: Negative for fever, chills, weight loss, malaise/fatigue. HEENT: Negative for nosebleeds, vision changes.    Respiratory: Negative for cough, hemoptysis  Cardiovascular: Negative for chest pain, palpitations, no orthopnea, claudication, leg swelling, no syncope, and PND. Gastrointestinal: Negative for nausea, vomiting, diarrhea, blood in stool and melena. Genitourinary: Negative for dysuria, and hematuria. Musculoskeletal: Negative for myalgias, arthralgia. Skin: Negative for rash. Heme: Does not bleed or bruise easily. Neurological: Negative for speech change and focal weakness     Objective:     Visit Vitals  /70 (BP 1 Location: Left arm, BP Patient Position: Sitting)   Pulse (!) 57   Resp 16   Ht 5' 6\" (1.676 m)   Wt 195 lb (88.5 kg)   SpO2 98%   BMI 31.47 kg/m²      Physical Exam:   Constitutional: Well-developed and well-nourished. No respiratory distress. Head: Normocephalic and atraumatic. Eyes: Pupils are equal, round  ENT: Hearing normal.  Neck: supple. No JVD present. Cardiovascular: Bradycardic rate, regular rhythm. Exam reveals no gallop and no friction rub. No murmur heard. Pulmonary/Chest: Effort normal and breath sounds normal. No wheezes. Abdominal: Soft, no tenderness. Musculoskeletal: Trace edema. Neurological: Alert,oriented. Skin: Skin is warm and dry. Psychiatric: Normal mood and affect. Behavior is normal. Judgment and thought content normal.      ECG: Sinus bradycardia, 58 bpm.  QTc 460 ms. Assessment/Plan:       ICD-10-CM ICD-9-CM    1. Persistent atrial fibrillation I48.19 427.31 AMB POC EKG ROUTINE W/ 12 LEADS, INTER & REP   2. S/P ablation of atrial fibrillation Z98.890 V45.89     Z86.79     3. Mitral valve insufficiency, unspecified etiology I34.0 424.0    4. Chronic anticoagulation Z79.01 V58.61    5. Essential hypertension I10 401.9    6. Dilated cardiomyopathy (HCC) I42.0 425.4    7. PVC (premature ventricular contraction) I49.3 427.69      Ms. Yang had EP study & partial PVI of the left superior vein and full PVI of the right veins during ablation on 05/10/2019, developed small pericardial effusion during procedure. No tamponade.       Currently taking amiodarone 200 mg po daily, hypothyroid per labs in 10/2019. States PCP started supplement, rechecked labs recently. Will request lab results    LFTs normal in 10/2019, CXR normal in 05/2019. No need to repeat currently. Given thyroid dysfunction & borderline bradycardia, will reduce amiodarone to 100 mg po daily. If she continues to be free of AFIB then we may stop it    No known recurrence of AF since procedure. Discussed potential recurrence of AF, potential need for repeat ablation. Follow up with Dr. Jany Hood as previously scheduled. EP follow up prn. Future Appointments   Date Time Provider Hazel Wade   3/3/2020 10:40 AM Johnathon March  [address]       Thank you for involving me in this patient's care and please call with further concerns or questions. Amy Patel M.D.   Electrophysiology/Cardiology  [facility]  [address], [place] [address], [place] [address] [place] [address]  [phone]

## 2020-01-22 NOTE — PROGRESS NOTES
Received labs from Dr. Cal Ward, date 01/02/2020.       Total chol 188  Tri 76  HDL 87  LDL 86    Glu 103  BUN 13  Cr 0.84  Na 138  K 4.4  Total bili 0.6  AST 22  ALT 19    TSH 6.770  Free T4 1.53

## 2020-03-03 ENCOUNTER — OFFICE VISIT (OUTPATIENT)
Dept: CARDIOLOGY CLINIC | Age: 80
End: 2020-03-03

## 2020-03-03 VITALS
HEART RATE: 56 BPM | DIASTOLIC BLOOD PRESSURE: 70 MMHG | BODY MASS INDEX: 31.02 KG/M2 | SYSTOLIC BLOOD PRESSURE: 162 MMHG | HEIGHT: 66 IN | OXYGEN SATURATION: 96 % | RESPIRATION RATE: 17 BRPM | WEIGHT: 193 LBS

## 2020-03-03 DIAGNOSIS — I10 ESSENTIAL HYPERTENSION: ICD-10-CM

## 2020-03-03 DIAGNOSIS — Z79.899 ON AMIODARONE THERAPY: ICD-10-CM

## 2020-03-03 DIAGNOSIS — Z79.01 CHRONIC ANTICOAGULATION: Primary | ICD-10-CM

## 2020-03-03 DIAGNOSIS — I48.0 PAROXYSMAL A-FIB (HCC): ICD-10-CM

## 2020-03-03 NOTE — PATIENT INSTRUCTIONS
Schedule blood pressure check in 2 weeks. Please bring copy of home readings with you. Have labs obtained in 6 months. Follow up with Dr. Hermelinda Prince 1 week after labs obtained.

## 2020-03-03 NOTE — PROGRESS NOTES
Visit Vitals  /70 (BP 1 Location: Left arm, BP Patient Position: Sitting)   Pulse (!) 56   Resp 17   Ht 5' 6\" (1.676 m)   Wt 193 lb (87.5 kg)   SpO2 96%   BMI 31.15 kg/m²

## 2020-03-03 NOTE — PROGRESS NOTES
HISTORY OF PRESENT ILLNESS  Radha Jorgensen is a 78 y.o. female. Patient with h/o HTN, HLD, Ca score of 0 in 2012, PVCs on ECG on 1/16 and echo on 1/16 with EF 55% mild MR. S/p LTKR in 1/16(seen by Dr. Delfino Cardenas)  Also remote h/o breast ca s/p mastectomy in 1980 but no chemo or xrt     Echo on 4/16:Ejection fraction was estimated to be 55 %. There were no  regional wall motion abnormalities. Wall thickness was at the upper limits  of normal.    Left atrium: The atrium was mildly dilated. Mitral valve: There was mild regurgitation. Aortic valve: Leaflets exhibited sclerosis. holter on 4/16: NSR  frequent pacs couplets triples and occasional non sustained at, frequent pvcs (0.65%)     On 3/18: admitted to [place] [id number] onset A-fib with RVR     TIM and Cardioversion completed with 200 J with conversion to NSR  TIM no PFO, trace AR, mild MR, no TR or DE Mild atherosclerosis in prox descending aortaNo KENNEDY thrombus LA moderate dilation RA normal  Started eliquis, flecainide and metoprolol     Echo on 8/20/18: The ventricle was dilated. Systolic function was mildly  reduced by EF (biplane method of disks). Ejection fraction was estimated  to be 45 % in the range of 40 % to 50 %. There was mild diffuse  hypokinesis.        TIM /CARDIOVERSION on 2/19 resulting in NSR. Flecainide resumed     CXR on 2/25/19:1. Interval development of small bilateral pleural effusions and mild basilar     atelectasis. Follow-up to resolution is suggested. Doppler LLE on 2/19:No evidence of left lower extremity vein thrombosis.         02/27/19   ECHO ADULT FOLLOW-UP OR LIMITED 02/27/2019 2/27/2019     Addendum · Left ventricular low normal systolic function. Estimated left  ventricular ejection fraction is 51 - 55%. Calculated left ventricular  ejection fraction is 50%. Biplane method used to measure ejection  fraction. Normal left ventricular wall motion, no regional wall motion  abnormality noted.  · Mild to moderate mitral valve regurgitation. · Trivial-to-small pericardial effusion. · Mild tricuspid valve regurgitation is present. Lorena Moise MD 2/27/2019  2:19 PM           Narrative · Left ventricular low normal systolic function. Estimated left   ventricular ejection fraction is 51 - 55%. Calculated left ventricular   ejection fraction is 50%. Biplane method used to measure ejection   fraction. Normal left ventricular wall motion, no regional wall motion   abnormality noted. · Mild to moderate mitral valve regurgitation. · Trivial-to-small pericardial effusion. · Mild tricuspid valve stenosis is present.          Signed by: Gianfranco Aburto MD                 02/27/19   ECHO ADULT FOLLOW-UP OR LIMITED 02/27/2019 2/27/2019     Narrative · Left ventricular low normal systolic function. Estimated left   ventricular ejection fraction is 51 - 55%. Calculated left ventricular   ejection fraction is 50%. Biplane method used to measure ejection   fraction. Normal left ventricular wall motion, no regional wall motion   abnormality noted. · Mild to moderate mitral valve regurgitation. · Trivial-to-small pericardial effusion. · Mild tricuspid valve stenosis is present.          Signed by: Gianfranco Aburto MD       chest ct on 3/19:1. CTA pulmonary vein/left atrial mapping performed. 2. No accessory pulmonary vein, thrombus or stenosis. 3. 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   4. No acute finding.   AF ablation on 5/19  EP study & partial PVI of the left superior vein and full PVI of the right veins during ablation on 05/10/2019, developed pericardial effusion during procedure.  No tamponade.  She was able to restarted Eliquis thereafter.   but  she was admitted to CCU due to posterior lateral LA LV moderate pericardial effusion during left pulmonary vein isolation.  Heparin was reversed.  She was stable so no pericardiocentesis was warranted  Started on AMIO and BBlockers          05/10/19   ECHO ADULT FOLLOW-UP OR LIMITED 05/11/2019 5/11/2019     Narrative · Left Ventricle: Low normal systolic dysfunction. Estimated left   ventricular ejection fraction is 51 - 55%. No regional wall motion   abnormality noted. · Pericardium: Small-to-moderate anterior pericardial effusion adjacent to   left ventricle and right atrium. · Left Atrium: Moderately dilated left atrium. · Right Atrium: Moderately dilated right atrium.          Signed by: Corbett Schilder, MD              06/20/19   ECHO ADULT COMPLETE 06/21/2019 6/21/2019     Narrative · Left Ventricle: Borderline hypertrophy. Low normal systolic dysfunction. Calculated left ventricular ejection fraction is 53%. Biplane method used   to measure ejection fraction. No regional wall motion abnormality noted. Moderate (grade 2) left ventricular diastolic dysfunction. · Pericardium: Small circumferential pericardial effusion measuring 1 mm. · Left Atrium: Moderately dilated left atrium. · Mitral Valve: Mild to moderate mitral valve regurgitation. · Aortic Valve: Mild aortic valve sclerosis with no significant stenosis. Mild aortic valve regurgitation is present. · Tricuspid Valve: Mild to moderate tricuspid valve regurgitation is   present. · Right Atrium: Mildly dilated right atrium. · Right Ventricle: Mildly dilated right ventricle. · Pulmonary Artery: There is no evidence of pulmonary hypertension.          Signed by: Corbett Schilder, MD                 06/20/19   ECHO ADULT COMPLETE 06/21/2019 6/21/2019     Narrative · Left Ventricle: Borderline hypertrophy. Low normal systolic dysfunction. Calculated left ventricular ejection fraction is 53%. Biplane method used   to measure ejection fraction. No regional wall motion abnormality noted. Moderate (grade 2) left ventricular diastolic dysfunction. · Pericardium: Small circumferential pericardial effusion measuring 1 mm. · Left Atrium: Moderately dilated left atrium.   · Mitral Valve: Mild to moderate mitral valve regurgitation. · Aortic Valve: Mild aortic valve sclerosis with no significant stenosis. Mild aortic valve regurgitation is present. · Tricuspid Valve: Mild to moderate tricuspid valve regurgitation is   present. · Right Atrium: Mildly dilated right atrium. · Right Ventricle: Mildly dilated right ventricle. · Pulmonary Artery: There is no evidence of pulmonary hypertension.          Signed by: Jodee Serna MD           10/24/19   ECHO ADULT FOLLOW-UP OR LIMITED 10/28/2019 10/28/2019     Narrative · Left Ventricle: Normal systolic function (ejection fraction normal). Mildly dilated left ventricle. Mild concentric hypertrophy. Estimated left   ventricular ejection fraction is 56 - 60%. Biplane method used to measure   ejection fraction. No regional wall motion abnormality noted. Moderate   (grade 2) left ventricular diastolic dysfunction. · Pericardium: Trivial-to-small circumferential pericardial effusion. · Left Atrium: Moderately dilated left atrium. · Mitral Valve: Mild to moderate mitral valve regurgitation is present. · Aortic Valve: Mild aortic valve sclerosis with no significant stenosis. Mild aortic valve regurgitation is present. · Tricuspid Valve: Mild tricuspid valve regurgitation is present. · Right Atrium: Mildly dilated right atrium. · Right Ventricle: Mildly dilated right ventricle.   · Pulmonary Artery: Mild to moderate pulmonary hypertension.          Signed by: Jodee Serna MD                          Past Medical History   Diagnosis Date    Palpitations            resolved    Dyslipidemia            labs 2008 - chol 283, HDL 83, ,     HTN (hypertension)       Breast cancer ([facility] [address] Billie Schilder dr. Alverna Matt allergist    Hyperlipidemia LDL goal < 130            for increased LDL particles, Dr. Srinivasan Lemons   Past Surgical History   Procedure Laterality Date    Echo 2d adult    5/5/2008   Izabela Bob normal, LVEF 60%    Stress test cardiolite    1/5/12         walked 7:31, no chest pain, normal MPI.  Ct heart w/o cont with calcium    1/2012         CAC score 0; calcified mediastinal lymph nodes consistent granulomatous disease    Hx urological    8/1/14         Urodynamics    Hx gyn          Hx hysterectomy Bilateral          HPI  Doing well no cp or sob or palpitations  Review of Systems   Respiratory: Negative. Cardiovascular: Negative. Visit Vitals  /70 (BP 1 Location: Left arm, BP Patient Position: Sitting)   Pulse (!) 56   Resp 17   Ht 5' 6\" (1.676 m)   Wt 193 lb (87.5 kg)   SpO2 96%   BMI 31.15 kg/m²     Wt Readings from Last 3 Encounters:   03/03/20 193 lb (87.5 kg)   01/21/20 195 lb (88.5 kg)   10/28/19 196 lb (88.9 kg)       Physical Exam  Neck:      Vascular: No carotid bruit or JVD. Cardiovascular:      Rate and Rhythm: Regular rhythm. Bradycardia present. Pulmonary:      Effort: Pulmonary effort is normal.      Breath sounds: Normal breath sounds. Abdominal:      Palpations: Abdomen is soft. Musculoskeletal:      Right lower leg: No edema. Left lower leg: No edema. Psychiatric:         Mood and Affect: Mood and affect normal.       Lab Results   Component Value Date/Time    TSH 12.410 (H) 10/24/2019 11:04 AM     Lab Results   Component Value Date/Time    ALT (SGPT) 32 10/24/2019 11:04 AM    AST (SGOT) 21 10/24/2019 11:04 AM    Alk.  phosphatase 77 10/24/2019 11:04 AM    Bilirubin, direct 0.14 10/24/2019 11:04 AM    Bilirubin, total 0.4 10/24/2019 11:04 AM       ASSESSMENT and PLAN  AF: s/p  partial af ablation on 3/42 complicated by pericardial effusion which has  remained stable  ecg with nsr prwp fav no changes from previous  Now on amiodarone eliquis and bblocker and thus far she remains in NSR  ( amiodarone decreased by Dr. Coy January on account of bradycardia and hypothyroidism)     Aware of potential side effects no allergic reaction thus far ( iodine allergy)      Continue eliquis , no longer c/o bruising after stopping asa    Discussed tsh and she will follow up with her pcp for adjustments on her supplements  lft and tsh to be rechecked in 6 months    Discussed neurological effects of amiodarone she has none thus far    She will let me know if lack of balance becomes noticeable     Sob: resolved at this time no evidence for volume overload  Continue lasix and potassium on a prn base    HTN: unusually for her with elevated bp. bp log for 2 weeks and bp check again in 2 weeks before starting additional medications( no increase in toprol for bradycardia)     Otherwise see her back in 6 months or sooner if any issues

## 2020-03-17 ENCOUNTER — OFFICE VISIT (OUTPATIENT)
Dept: CARDIOLOGY CLINIC | Age: 80
End: 2020-03-17

## 2020-03-17 VITALS — DIASTOLIC BLOOD PRESSURE: 62 MMHG | SYSTOLIC BLOOD PRESSURE: 130 MMHG

## 2020-03-17 DIAGNOSIS — I10 ESSENTIAL HYPERTENSION: Primary | ICD-10-CM

## 2020-03-17 RX ORDER — ESTRADIOL 0.1 MG/G
CREAM VAGINAL
COMMUNITY
End: 2020-06-02 | Stop reason: SDUPTHER

## 2020-03-17 RX ORDER — ACETAMINOPHEN 500 MG
500 TABLET ORAL
COMMUNITY

## 2020-03-17 NOTE — PATIENT INSTRUCTIONS
Blood pressure acceptable. Continue same medications. Keep follow up. Future Appointments Date Time Provider Hazel Wade 9/14/2020  9:00 AM Mandy Waters  [address]

## 2020-05-29 ENCOUNTER — TELEPHONE (OUTPATIENT)
Dept: CARDIOLOGY CLINIC | Age: 80
End: 2020-05-29

## 2020-05-29 NOTE — TELEPHONE ENCOUNTER
Identifiers x 2. States last week she had feeling that she was back in a-fib. Confirmed that she is taking amiodarone 100 mg daily and eliquis 5 mg bid. States BP have been WNL. Does not remember numbers. States slight increase in SOB.  Confirmed follow up:    Future Appointments   Date Time Provider Hazel Wade   9/14/2020  9:00 AM Darline Munoz  [address]

## 2020-05-29 NOTE — TELEPHONE ENCOUNTER
Patient states that she believes that she was in afib on either Thursday or Friday of last week and would like a return call to discuss this further. Please advise.     Phone #: [phone]  Thanks

## 2020-06-01 NOTE — TELEPHONE ENCOUNTER
Per Dr. Iliana Morton:    Let me see her as soon as I am back from hospital rotation but also ask her to come by the office for ecg only     Identifiers x 2. Discussed the above.   Scheduled for EKG on 6-2-20 @ 10:40am.     Future Appointments   Date Time Provider Hazel Wade   6/2/2020 10:40 AM Chi Saba  [address]   9/14/2020  9:00 AM Chi Saba  [address]

## 2020-06-02 ENCOUNTER — OFFICE VISIT (OUTPATIENT)
Dept: CARDIOLOGY CLINIC | Age: 80
End: 2020-06-02

## 2020-06-02 DIAGNOSIS — I48.19 PERSISTENT ATRIAL FIBRILLATION (HCC): Primary | ICD-10-CM

## 2020-06-02 RX ORDER — AMOXICILLIN 500 MG/1
500 CAPSULE ORAL AS NEEDED
COMMUNITY
Start: 2020-03-31 | End: 2021-10-13

## 2020-06-02 RX ORDER — AMIODARONE HYDROCHLORIDE 100 MG/1
200 TABLET ORAL DAILY
Qty: 180 TAB | Refills: 1 | Status: SHIPPED | OUTPATIENT
Start: 2020-06-02 | End: 2020-12-09

## 2020-06-02 NOTE — PROGRESS NOTES
Complaints of shortness of breath. Blood pressures sysolic range of . Diastolic in 88R. Verbal order per Dr. Lilia Molina:    Increase amiodarone to 200 mg daily. Continue eliquis 5 mg bid. Have labs of BMP/MG/TSH obtained. Follow up with Dr. Lilia Molina on 6-12-20 @ 3pm.      Informed patient of the above. Verbalized understanding.

## 2020-06-03 ENCOUNTER — TELEPHONE (OUTPATIENT)
Dept: CARDIOLOGY CLINIC | Age: 80
End: 2020-06-03

## 2020-06-03 NOTE — TELEPHONE ENCOUNTER
Patient would like to know if she needs to fast for the lab work ordered. Please advise.      Phone: [phone]

## 2020-06-09 ENCOUNTER — HOSPITAL ENCOUNTER (OUTPATIENT)
Dept: LAB | Age: 80
Discharge: HOME | End: 2020-06-09
Payer: MEDICARE

## 2020-06-09 PROCEDURE — 36415 COLL VENOUS BLD VENIPUNCTURE: CPT

## 2020-06-09 PROCEDURE — 83735 ASSAY OF MAGNESIUM: CPT

## 2020-06-09 PROCEDURE — 80048 BASIC METABOLIC PNL TOTAL CA: CPT

## 2020-06-09 PROCEDURE — 84443 ASSAY THYROID STIM HORMONE: CPT

## 2020-06-10 LAB
BUN SERPL-MCNC: 13 MG/DL (ref 8–27)
BUN/CREAT SERPL: 13 (ref 12–28)
CALCIUM SERPL-MCNC: 8.8 MG/DL (ref 8.7–10.3)
CHLORIDE SERPL-SCNC: 104 MMOL/L (ref 96–106)
CO2 SERPL-SCNC: 27 MMOL/L (ref 20–29)
CREAT SERPL-MCNC: 0.97 MG/DL (ref 0.57–1)
GLUCOSE SERPL-MCNC: 118 MG/DL (ref 65–99)
INTERPRETATION: NORMAL
MAGNESIUM SERPL-MCNC: 2.1 MG/DL (ref 1.6–2.3)
POTASSIUM SERPL-SCNC: 4.2 MMOL/L (ref 3.5–5.2)
SODIUM SERPL-SCNC: 142 MMOL/L (ref 134–144)
TSH SERPL DL<=0.005 MIU/L-ACNC: 4.72 UIU/ML (ref 0.45–4.5)

## 2020-06-11 ENCOUNTER — TELEPHONE (OUTPATIENT)
Dept: CARDIOLOGY CLINIC | Age: 80
End: 2020-06-11

## 2020-06-12 ENCOUNTER — OFFICE VISIT (OUTPATIENT)
Dept: CARDIOLOGY CLINIC | Age: 80
End: 2020-06-12

## 2020-06-12 VITALS
HEART RATE: 88 BPM | SYSTOLIC BLOOD PRESSURE: 130 MMHG | OXYGEN SATURATION: 100 % | WEIGHT: 192 LBS | DIASTOLIC BLOOD PRESSURE: 80 MMHG | BODY MASS INDEX: 30.86 KG/M2 | RESPIRATION RATE: 16 BRPM | HEIGHT: 66 IN

## 2020-06-12 DIAGNOSIS — I48.19 PERSISTENT ATRIAL FIBRILLATION (HCC): Primary | ICD-10-CM

## 2020-06-12 NOTE — PROGRESS NOTES
[facility]                                                            OFFICE NOTE        Brendon Ballard M.D.,KATIE Ada Dates   1940  [id number]    Date/Time:  6/12/20203:12 PM          SUBJECTIVE:  2 weeks ago she felt palpitations and since then she has experienced fatigue and sob       Assessment/Plan  AF: s/p  partial af ablation on 5/64 complicated by pericardial effusion which has  remained stable  ecg now with AF rate controlled    on amiodarone (now back to 200 mg daily) eliquis and bblocker   ( amiodarone previously decreased by Dr. Wendy Jauregui on account of bradycardia and hypothyroidism)     Aware of potential side effects no allergic reaction thus far ( iodine allergy)      Continue eliquis , no longer c/o bruising after stopping asa    discused options   to be noted that patient admits to occasionally albeit rarely missing to take her medications    We will need to proceed with TIM first and then cardioversion in this patient symptomatic with AF  She is aware of risks and benefits and agreeable to proceed     Discussed neurological effects of amiodarone she has none thus far     She will let me know if lack of balance becomes noticeable     Sob: recurrent  I suspect related to AF  Continue lasix and potassium on a prn base     HTN: controlled     Otherwise see her back 2 weeks after cardioversion            HPI   78 y.o. female. Patient with h/o HTN, HLD, Ca score of 0 in 2012, PVCs on ECG on 1/16 and echo on 1/16 with EF 55% mild MR. S/p LTKR in 1/16(seen by Dr. Sophia Nance)  Also remote h/o breast ca s/p mastectomy in 1980 but no chemo or xrt     Echo on 4/16:Ejection fraction was estimated to be 55 %. There were no  regional wall motion abnormalities. Wall thickness was at the upper limits  of normal.    Left atrium: The atrium was mildly dilated. Mitral valve:  There was mild regurgitation. Aortic valve: Leaflets exhibited sclerosis. holter on 4/16: NSR  frequent pacs couplets triples and occasional non sustained at, frequent pvcs (0.65%)     On 3/18: admitted to [facility] 27 onset A-fib with RVR     TIM and Cardioversion completed with 200 J with conversion to NSR  TIM no PFO, trace AR, mild MR, no TR or DC Mild atherosclerosis in prox descending aortaNo KENNEDY thrombus LA moderate dilation RA normal  Started eliquis, flecainide and metoprolol     Echo on 8/20/18: The ventricle was dilated. Systolic function was mildly  reduced by EF (biplane method of disks). Ejection fraction was estimated  to be 45 % in the range of 40 % to 50 %. There was mild diffuse  hypokinesis.        TIM /CARDIOVERSION on 2/19 resulting in NSR. Flecainide resumed     CXR on 2/25/19:1. Interval development of small bilateral pleural effusions and mild basilar     atelectasis. Follow-up to resolution is suggested. Doppler LLE on 2/19:No evidence of left lower extremity vein thrombosis.         02/27/19   ECHO ADULT FOLLOW-UP OR LIMITED 02/27/2019 2/27/2019     Addendum · Left ventricular low normal systolic function. Estimated left  ventricular ejection fraction is 51 - 55%. Calculated left ventricular  ejection fraction is 50%. Biplane method used to measure ejection  fraction. Normal left ventricular wall motion, no regional wall motion  abnormality noted. · Mild to moderate mitral valve regurgitation. · Trivial-to-small pericardial effusion. · Mild tricuspid valve regurgitation is present. Chucho Vasquez MD 2/27/2019  2:19 PM           Narrative · Left ventricular low normal systolic function. Estimated left   ventricular ejection fraction is 51 - 55%. Calculated left ventricular   ejection fraction is 50%. Biplane method used to measure ejection   fraction. Normal left ventricular wall motion, no regional wall motion   abnormality noted. · Mild to moderate mitral valve regurgitation.   · Trivial-to-small pericardial effusion. · Mild tricuspid valve stenosis is present.          Signed by: Reyes Borg, MD                 02/27/19   ECHO ADULT FOLLOW-UP OR LIMITED 02/27/2019 2/27/2019     Narrative · Left ventricular low normal systolic function. Estimated left   ventricular ejection fraction is 51 - 55%. Calculated left ventricular   ejection fraction is 50%. Biplane method used to measure ejection   fraction. Normal left ventricular wall motion, no regional wall motion   abnormality noted. · Mild to moderate mitral valve regurgitation. · Trivial-to-small pericardial effusion. · Mild tricuspid valve stenosis is present.          Signed by: Reyes Borg, MD       chest ct on 3/19:1. CTA pulmonary vein/left atrial mapping performed. 2. No accessory pulmonary vein, thrombus or stenosis. 3. 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 4. No acute finding.     AF ablation on 5/19  EP study & partial PVI of the left superior vein and full PVI of the right veins during ablation on 05/10/2019, developed pericardial effusion during procedure.  No tamponade.  She was able to restarted Eliquis thereafter.   but  she was admitted to CCU due to posterior lateral LA LV moderate pericardial effusion during left pulmonary vein isolation.  Heparin was reversed.  She was stable so no pericardiocentesis was warranted  Started on AMIO and BBlockers                CARDIAC STUDIES        10/24/19   ECHO ADULT FOLLOW-UP OR LIMITED 10/28/2019 10/28/2019    Narrative · Left Ventricle: Normal systolic function (ejection fraction normal). Mildly dilated left ventricle. Mild concentric hypertrophy. Estimated left   ventricular ejection fraction is 56 - 60%. Biplane method used to measure   ejection fraction. No regional wall motion abnormality noted. Moderate   (grade 2) left ventricular diastolic dysfunction. · Pericardium: Trivial-to-small circumferential pericardial effusion.   · Left Atrium: Moderately dilated left atrium. · Mitral Valve: Mild to moderate mitral valve regurgitation is present. · Aortic Valve: Mild aortic valve sclerosis with no significant stenosis. Mild aortic valve regurgitation is present. · Tricuspid Valve: Mild tricuspid valve regurgitation is present. · Right Atrium: Mildly dilated right atrium. · Right Ventricle: Mildly dilated right ventricle. · Pulmonary Artery: Mild to moderate pulmonary hypertension. Signed by: Danny Ho MD                                 EKG Results     None              IMAGING      MRI Results (most recent):  Results from Hospital Encounter encounter on 07/16/13   MRI KNEE RT WO CONT    Narrative **Final Report**       ICD Codes / Adm. Diagnosis: 836.2  401.9 / Other tear of cartilage or men    Examination:  MR KNEE WO CON RT  - [id number] - Jul 16 2013  3:28PM  Accession No:  [id number]  Reason:  tear      REPORT:  EXAM:  Right knee MRI without contrast    INDICATION: Pain    COMPARISON: None    TECHNIQUE: Axial T2 fat-saturated and proton density fat-saturated; coronal   T1 and proton density fat-saturated; and sagittal T2 fat-saturated, proton   density fat-saturated, and gradient echo MRI of the     knee . CONTRAST:  None. FINDINGS: Bone marrow: Degenerative type bone marrow edema is present in the   medial aspects of the medial femoral condyle and medial tibial plateau. A   small focus of subchondral bone marrow edema is present in the lateral facet   of the patella. There is no evidence of fracture or marrow replacing process. Joint fluid: There is a mild joint effusion. Areas of synovitis are seen   throughout the joint space, particularly in the suprapatellar recess and   anterior to the lateral compartment. There is additional 9 mm loose body   along the course of the popliteal tendon on series 7 image 55. There is a moderate-sized Baker's cyst posterior medial to the knee.  This   measures 2.0 x 1.9 x 7.7 cm craniocaudal. There is a small rounded loose   body within this measuring 5 mm on series 8 image 4. Several strands of   synovitis are seen within this. Collateral ligaments and posterior, lateral corner: Intact. Medial meniscus: The medial meniscus is extruded. There is a complete radial   tear of the posterior horn of the medial meniscus as evidenced by an absent    posterior horn on series 9 image 8. The distal material is flipped   inferiorly and laterally into the gutter on series 8 image 4 and series 6   image 17. Lateral meniscus: High signal is seen in the free edge of the body related   to free edge tearing. Irregular increased signal is seen anterior to the   anterior horn is likely related to synovitis. ACL and PCL: A small area mucoid degeneration is present in the proximal   ACL. The ACL and PCL are intact however. Tendons: Intact. .    Muscles: Within normal limits. Patellofemoral alignment: No patellar subluxation/tilt. Trochlear groove is   not hypoplastic. TT-TG distance: Normal at 12 mm. Articular cartilage: Full-thickness cartilage loss is seen along the   weightbearing surfaces of the medial femoral condyle and medial tibial   plateau with significant marginal osteophytosis. Mild underlying subchondral   bone marrow edema is present medially. Near full-thickness fissuring is seen along the vertical ridge of the   patella. Mild irregular cartilage loss is seen along the lateral aspect of   the patellofemoral compartment. Small marginal osteophytes are seen adjacent   to this. There is a small area of subchondral bone marrow edema related to a   full-thickness fissure in the lateral facet of patella. There is a shallow thickness of fissuring are seen along the lateral femoral   condyle towards the tibial spine. Significant marginal osteophytes are   present. There is diffuse mild cartilage thinning throughout the lateral   compartment. Soft tissue mass: None. Nonspecific soft tissue edema is seen anterior to   the patellar tendon. IMPRESSION:  1. Complete radial tear of the posterior horn of the medial meniscus with   displacement of meniscal material into the inferior gutter. 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 2. Moderate lateral and patellofemoral osteoarthritis. 3. Free edge tearing in the body of the lateral meniscus. 4. Moderate joint effusion with areas of extensive synovitis likely related   to long-standing degenerative disease. 5. A moderate-sized Baker's cyst is seen posterior medial to the knee with   areas of synovitis and a small loose body. An additional small loose body is   seen along the course of the popliteus tendon. Signing/Reading Doctor: Mauro Villegas ([id number])    Approved: Mauro Villegas ([id number])  Jul 16 2013  4:08PM                                      CT Results (most recent):  Results from Hospital Encounter encounter on 03/18/19   CTA CHEST W OR W WO CONT    Narrative INDICATION: Atrial fibrillation, pulmonary vein mapping. CONTRAST: 100 mL of Isovue 370. TECHNIQUE: Multislice helical CT of the chest was performed on a 64-slice  multiple detector row CT system ([employer]). Unenhanced  images were obtained  to localize the volume for acquisition. Bolus tracking software was used for  timing. Thin section images were acquired during uneventful rapid bolus  intravenous administration of contrast. 3D image post processing was performed  including coronal MIPS and SSD. CT dose reduction was achieved through the use  of a standardized protocol tailored for this examination and automatic exposure  control for dose modulation. FINDINGS:   The left superior and inferior pulmonary veins and right superior and inferior  pulmonary veins demonstrate no stenosis. There is early branching of the right  inferior pulmonary vein.  No accessory pulmonary veins are identified. Left superior pulmonary vein ostium measures 1.9 x 1.8 cm, with first branch  originating 0.8 cm from the atrium. Left inferior pulmonary vein measures 1.9 x 1.4 cm, with first branch  originating 0.6 cm from the atrium. Right superior pulmonary vein measures 2.1 x 1.9 cm, with first branch  originating 0.8 cm from the atrium. Right inferior pulmonary vein measures 2.1 x 1.9 cm, with first branch  originating 0.1 cm from the atrium. There is no thrombus or mass within the pulmonary veins or left atrium. The visualized lungs are clear of mass, nodule, acute air space disease or  edema. There is no pleural or pericardial effusion. The aorta enhances normally without evidence of aneurysm or dissection. There is no significant adenopathy. Impression IMPRESSION:   1. CTA pulmonary vein/left atrial mapping performed. 2. No accessory pulmonary vein, thrombus or stenosis. 3. 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 4. No acute finding. XR Results (most recent):  Results from Hospital Encounter encounter on 05/10/19   XR CHEST PORT    Narrative INDICATION: . sob  Additional history: Dyspnea. Atrial fibrillation  COMPARISON: Previous chest xray, 4/25/2019. LIMITATIONS: Portable technique. Jarocho New FINDINGS: Single frontal view of the chest.   .  Lines/tubes/surgical: Prescribed cardiac leads. Heart/mediastinum: Unremarkable. Lungs/pleura: Chronic appearing lung changes. Linear opacity in the right upper  lobe associated with chain suture material. No visualized pleural effusion or  pneumothorax. Additional Comments: None. .    Impression IMPRESSION:  1. No radiographic evidence of acute cardiopulmonary disease.   2. Postsurgical changes in the right upper lobe               Past Medical History:   Diagnosis Date    Arthritis     Asthma     dr. Shelby Francis allergist    [facility] ([facility] [address]     Breast cancer [facility]) 1980    right - mastectomy    Coagulation disorder ([address]     on eliquis    Dyslipidemia     labs 2008 - chol 283, HDL 83, ,     HTN (hypertension)     Hyperlipidemia LDL goal < 130     for increased LDL particles, Dr. Ashlyn Esteves nodule     Dr. Jb Negron Palpitations     resolved     Past Surgical History:   Procedure Laterality Date    CHEST SURGERY PROCEDURE UNLISTED      lung nodule removed - benign    COLONOSCOPY N/A 9/10/2018    COLONOSCOPY performed by Joseph Christiansen MD at [facility] W/O CONT WITH CALCIUM  1/2012    CAC score 0; calcified mediastinal lymph nodes consistent granulomatous disease    ECHO 2D ADULT  5/5/2008    normal, LVEF 60%    HX APPENDECTOMY      HX HYSTERECTOMY Bilateral 03/19/2015    and uro repair    HX KNEE REPLACEMENT Right     HX MASTECTOMY Right     HX TONSILLECTOMY      HX UROLOGICAL  8/1/14    Urodynamics    INTRACARD ECHO, THER/DX INTERVENT N/A 5/10/2019    Intracardiac Echocardiogram performed by Claribel Cifuentes MD at [place] [address] EXTERNAL  3/28/2018         MT EPHYS EVL TRNSPTL TX ATRIAL FIB ISOLAT PULM VEIN N/A 5/10/2019    ABLATION A-FIB  W COMPLETE EP STUDY performed by Claribel Cifuentes MD at [place] [address], [address] CATH LAB    MT INTRACARDIAC ELECTROPHYSIOLOGIC 3D MAPPING N/A 5/10/2019    Ep 3d Mapping performed by Claribel Cifuentes MD at [place] [address], [address] CATH LAB    STRESS TEST [facility]  1/5/12    walked 7:31, no chest pain, normal MPI.      Social History     Tobacco Use    Smoking status: Never Smoker    Smokeless tobacco: Never Used   Substance Use Topics    Alcohol use: Yes     Comment: wine nightly    Drug use: No     Family History   Problem Relation Age of Onset    Heart Failure Mother     Stroke Father     Other Other         endometrial cancer, niece     Allergies   Allergen Reactions    Betadine [Povidone-Iodine] Rash         Visit Vitals  /80 (BP 1 Location: Left arm, BP Patient Position: Sitting)   Pulse 98   Resp 16 Ht 5' 6\" (1.676 m)   Wt 192 lb (87.1 kg)   SpO2 100%   BMI 30.99 kg/m²         Last 3 Recorded Weights in this Encounter    06/12/20 1506   Weight: 192 lb (87.1 kg)            Review of Systems:   Pertinent items are noted in the History of Present Illness. Neck: no JVD  Heart: irregularly irregular rhythm  Lungs: clear to auscultation bilaterally  Abdomen: soft, non-tender. Bowel sounds normal. No masses,  no organomegaly  Extremities: no edema      Current Outpatient Medications on File Prior to Visit   Medication Sig Dispense Refill    amoxicillin (AMOXIL) 500 mg capsule TAKE 2 CAPSULES BY MOUTH 1 HOUR BEFORE DENTAL PROCEDURE AND TAKE 2 CAPSULES 6 HOURS AFTER DENTAL PROCEDURE      amiodarone (PACERONE) 100 mg tablet Take 2 Tabs by mouth daily. 180 Tab 1    acetaminophen (Tylenol Extra Strength) 500 mg tablet Take 500 mg by mouth nightly as needed.  TOPROL XL 25 mg XL tablet TAKE 1 TABLET TWICE A DAY 90 Tab 8    potassium chloride (K-DUR, KLOR-CON) 20 mEq tablet Take 20 mEq by mouth daily as needed.  ELIQUIS 5 mg tablet TAKE 1 TABLET TWICE A  Tab 4    levothyroxine (SYNTHROID) 25 mcg tablet Take  by mouth Daily (before breakfast).  furosemide (LASIX) 40 mg tablet Take 40 mg by mouth as needed.  fluticasone (FLOVENT HFA) 220 mcg/actuation inhaler as needed.  atorvastatin (LIPITOR) 20 mg tablet Take 20 mg by mouth every evening.  MULTIVITAMIN PO Take  by mouth. Takes one po once daily.  cholecalciferol (VITAMIN D3) 5,000 unit capsule Take 5,000 Units by mouth daily.  cetirizine (ZYRTEC) 10 mg tablet Take 10 mg by mouth every evening.  estradiol (ESTRACE) 0.01 % (0.1 mg/gram) vaginal cream Insert  into vagina every Monday and Thursday.  EPINEPHrine (EPIPEN) 0.3 mg/0.3 mL (1:1,000) injection 0.3 mL by IntraMUSCular route once as needed for up to 1 dose.  0.3 mL 0    albuterol (PROVENTIL HFA, VENTOLIN HFA, PROAIR HFA) 90 mcg/actuation inhaler Take 1 Puff by inhalation every four (4) hours as needed. 2 Inhaler 3    raloxifene (EVISTA) 60 mg tablet Take 1 Tab by mouth daily. 90 Tab 4    montelukast (SINGULAIR) 10 mg tablet Take 10 mg by mouth every evening. No current facility-administered medications on file prior to visit. Lina Brizuela had no medications administered during this visit. Current Outpatient Medications   Medication Sig    amoxicillin (AMOXIL) 500 mg capsule TAKE 2 CAPSULES BY MOUTH 1 HOUR BEFORE DENTAL PROCEDURE AND TAKE 2 CAPSULES 6 HOURS AFTER DENTAL PROCEDURE    amiodarone (PACERONE) 100 mg tablet Take 2 Tabs by mouth daily.  acetaminophen (Tylenol Extra Strength) 500 mg tablet Take 500 mg by mouth nightly as needed.  TOPROL XL 25 mg XL tablet TAKE 1 TABLET TWICE A DAY    potassium chloride (K-DUR, KLOR-CON) 20 mEq tablet Take 20 mEq by mouth daily as needed.  ELIQUIS 5 mg tablet TAKE 1 TABLET TWICE A DAY    levothyroxine (SYNTHROID) 25 mcg tablet Take  by mouth Daily (before breakfast).  furosemide (LASIX) 40 mg tablet Take 40 mg by mouth as needed.  fluticasone (FLOVENT HFA) 220 mcg/actuation inhaler as needed.  atorvastatin (LIPITOR) 20 mg tablet Take 20 mg by mouth every evening.  MULTIVITAMIN PO Take  by mouth. Takes one po once daily.  cholecalciferol (VITAMIN D3) 5,000 unit capsule Take 5,000 Units by mouth daily.  cetirizine (ZYRTEC) 10 mg tablet Take 10 mg by mouth every evening.  estradiol (ESTRACE) 0.01 % (0.1 mg/gram) vaginal cream Insert  into vagina every Monday and Thursday.  EPINEPHrine (EPIPEN) 0.3 mg/0.3 mL (1:1,000) injection 0.3 mL by IntraMUSCular route once as needed for up to 1 dose.  albuterol (PROVENTIL HFA, VENTOLIN HFA, PROAIR HFA) 90 mcg/actuation inhaler Take 1 Puff by inhalation every four (4) hours as needed.  raloxifene (EVISTA) 60 mg tablet Take 1 Tab by mouth daily.  montelukast (SINGULAIR) 10 mg tablet Take 10 mg by mouth every evening.      No current facility-administered medications for this visit. Lab Results   Component Value Date/Time    Cholesterol, total 142 03/28/2018 04:00 AM    HDL Cholesterol 76 03/28/2018 04:00 AM    LDL, calculated 55.2 03/28/2018 04:00 AM    VLDL, calculated 10.8 03/28/2018 04:00 AM    Triglyceride 54 03/28/2018 04:00 AM    CHOL/HDL Ratio 1.9 03/28/2018 04:00 AM       Lab Results   Component Value Date/Time    Sodium 142 06/09/2020 11:06 AM    Potassium 4.2 06/09/2020 11:06 AM    Chloride 104 06/09/2020 11:06 AM    CO2 27 06/09/2020 11:06 AM    Anion gap 6 05/11/2019 03:10 AM    Glucose 118 (H) 06/09/2020 11:06 AM    BUN 13 06/09/2020 11:06 AM    Creatinine 0.97 06/09/2020 11:06 AM    BUN/Creatinine ratio 13 06/09/2020 11:06 AM    GFR est AA 64 06/09/2020 11:06 AM    GFR est non-AA 56 (L) 06/09/2020 11:06 AM    Calcium 8.8 06/09/2020 11:06 AM       Lab Results   Component Value Date/Time    ALT (SGPT) 32 10/24/2019 11:04 AM    Alk.  phosphatase 77 10/24/2019 11:04 AM    Bilirubin, direct 0.14 10/24/2019 11:04 AM    Bilirubin, total 0.4 10/24/2019 11:04 AM       Lab Results   Component Value Date/Time    WBC 9.0 05/11/2019 03:10 AM    Hemoglobin (POC) 12.9 10/25/2009 03:04 AM    HGB 12.0 05/11/2019 03:10 AM    Hematocrit (POC) 38 10/25/2009 03:04 AM    HCT 36.9 05/11/2019 03:10 AM    PLATELET 316 50/56/8996 03:10 AM    MCV 95.8 05/11/2019 03:10 AM       Lab Results   Component Value Date/Time    TSH 4.720 (H) 06/09/2020 11:06 AM         Lab Results   Component Value Date/Time    Cholesterol, total 142 03/28/2018 04:00 AM    Cholesterol, total 175 12/18/2015 10:18 AM    Cholesterol, total 251 (H) 06/03/2015 08:45 AM    Cholesterol, total 206 (H) 05/14/2014 10:46 AM    Cholesterol, total 201 (H) 04/26/2013 09:56 AM    HDL Cholesterol 76 03/28/2018 04:00 AM    HDL Cholesterol 93 12/18/2015 10:18 AM    HDL Cholesterol 89 06/03/2015 08:45 AM    HDL Cholesterol 92 05/14/2014 10:46 AM    HDL Cholesterol 99 04/26/2013 09:56 AM    LDL, calculated 55.2 03/28/2018 04:00 AM    LDL, calculated 68 12/18/2015 10:18 AM    LDL, calculated 141 (H) 06/03/2015 08:45 AM    LDL, calculated 98 05/14/2014 10:46 AM    LDL, calculated 83 04/26/2013 09:56 AM    Triglyceride 54 03/28/2018 04:00 AM    Triglyceride 68 12/18/2015 10:18 AM    Triglyceride 104 06/03/2015 08:45 AM    Triglyceride 79 05/14/2014 10:46 AM    Triglyceride 96 04/26/2013 09:56 AM    CHOL/HDL Ratio 1.9 03/28/2018 04:00 AM                Please note that this dictation was completed with [employer] the [employer] voice recognition software. Quite often unanticipated grammatical, syntax, homophones, and other interpretative errors are inadvertently transcribed by the computer software. Please disregard these errors. Please excuse any errors that have escaped final proofreading.

## 2020-06-12 NOTE — PATIENT INSTRUCTIONS
Your TIM/cardioversion is scheduled for procedure has been scheduled for 6-19-20 at 10:45 am [facility] with Dr. Steph Barnes. Please report to cell phone lot by 8:45 am or 2 hours prior to your scheduled procedure. Once arriving, please call patient registration at [phone] for further instructions. Please bring a list of your current medications and medication bottles, if able, to the hospital on this day. You will be unable to drive after your procedure so please make sure to bring someone with you to your procedure. No guests will be allowed in hospital.  Please provide cell phone number for person who will be taking you home. You will need to have nothing to eat or drink after midnight, the night prior to your procedure. You may have small sips of water, if needed, to take with your medication. Make sure that you continue taking eliquis. You do not need to have labs obtained prior to procedure. You will need to be tested for covid 19 on 6-15-20. After being tested, you will need to self quarantine. You do not need to stop any medications prior to procedure. Follow up with Dr. Robin Thomas after procedure. Our office will call you with date/time.

## 2020-06-12 NOTE — PROGRESS NOTES
Visit Vitals  /80 (BP 1 Location: Left arm, BP Patient Position: Sitting)   Pulse 88   Resp 16   Ht 5' 6\" (1.676 m)   Wt 192 lb (87.1 kg)   SpO2 100%   BMI 30.99 kg/m²

## 2020-06-15 ENCOUNTER — OFFICE VISIT (OUTPATIENT)
Dept: PRIMARY CARE CLINIC | Age: 80
End: 2020-06-15

## 2020-06-15 VITALS — OXYGEN SATURATION: 99 % | TEMPERATURE: 98.3 F | RESPIRATION RATE: 16 BRPM | HEART RATE: 90 BPM

## 2020-06-15 DIAGNOSIS — Z01.818 PRE-OP EXAM: Primary | ICD-10-CM

## 2020-06-15 DIAGNOSIS — Z11.59 SPECIAL SCREENING EXAMINATION FOR UNSPECIFIED VIRAL DISEASE: ICD-10-CM

## 2020-06-15 NOTE — PROGRESS NOTES
Patient is being seen at the [facility]. Please see scanned documentation as well for further information. S:  Ms. Evan Hall presents for pre-op or pre-procedure testing for Covid 19. Patient has procedure or surgery by Dr. Richelle Aguero scheduled for 6/19/20. Patient denies current Covid type symptoms. She denies cough/ cold, fever/ chills. O:    Visit Vitals  Pulse 90   Temp 98.3 °F (36.8 °C) (Oral)   Resp 16   SpO2 99%     Alert and oriented  No acute distress, no increased work of breathing  Normocephalic, atraumatic  Skin color normal  Calm and cooperative  Voice clear, conversant without shortness of breath    A/P:  Pre-op, Pre-procedure exam    Covid 19 testing performed  Patient understands she will be contacted if the results are positive. Follow up prn.

## 2020-06-16 DIAGNOSIS — I48.91 ATRIAL FIBRILLATION, UNSPECIFIED TYPE (HCC): Primary | ICD-10-CM

## 2020-06-16 LAB — SARS-COV-2, NAA: NOT DETECTED

## 2020-06-16 RX ORDER — SODIUM CHLORIDE 0.9 % (FLUSH) 0.9 %
5-40 SYRINGE (ML) INJECTION EVERY 8 HOURS
Status: CANCELLED | OUTPATIENT
Start: 2020-06-16

## 2020-06-16 RX ORDER — SODIUM CHLORIDE 0.9 % (FLUSH) 0.9 %
5-40 SYRINGE (ML) INJECTION AS NEEDED
Status: CANCELLED | OUTPATIENT
Start: 2020-06-16

## 2020-06-16 RX ORDER — DIPHENHYDRAMINE HYDROCHLORIDE 50 MG/ML
25 INJECTION, SOLUTION INTRAMUSCULAR; INTRAVENOUS
Status: CANCELLED | OUTPATIENT
Start: 2020-06-19 | End: 2020-06-20

## 2020-06-16 RX ORDER — SODIUM CHLORIDE 9 MG/ML
75 INJECTION, SOLUTION INTRAVENOUS CONTINUOUS
Status: CANCELLED | OUTPATIENT
Start: 2020-06-19

## 2020-06-17 ENCOUNTER — TELEPHONE (OUTPATIENT)
Dept: CARDIOLOGY CLINIC | Age: 80
End: 2020-06-17

## 2020-06-17 NOTE — TELEPHONE ENCOUNTER
Patient has questions about her procedure on Friday. Please advise.     Phone #: [phone]  Thanks

## 2020-06-19 ENCOUNTER — HOSPITAL ENCOUNTER (OUTPATIENT)
Dept: CARDIAC CATH/INVASIVE PROCEDURES | Age: 80
Discharge: HOME | End: 2020-06-19
Attending: SPECIALIST | Admitting: SPECIALIST
Payer: MEDICARE

## 2020-06-19 ENCOUNTER — ANESTHESIA (OUTPATIENT)
Dept: CARDIAC CATH/INVASIVE PROCEDURES | Age: 80
End: 2020-06-19
Payer: MEDICARE

## 2020-06-19 ENCOUNTER — ANESTHESIA EVENT (OUTPATIENT)
Dept: CARDIAC CATH/INVASIVE PROCEDURES | Age: 80
End: 2020-06-19
Payer: MEDICARE

## 2020-06-19 VITALS
BODY MASS INDEX: 29.73 KG/M2 | RESPIRATION RATE: 20 BRPM | SYSTOLIC BLOOD PRESSURE: 129 MMHG | TEMPERATURE: 97.8 F | OXYGEN SATURATION: 97 % | HEART RATE: 60 BPM | WEIGHT: 185 LBS | DIASTOLIC BLOOD PRESSURE: 71 MMHG | HEIGHT: 66 IN

## 2020-06-19 DIAGNOSIS — I48.91 ATRIAL FIBRILLATION, UNSPECIFIED TYPE (HCC): ICD-10-CM

## 2020-06-19 LAB
ATRIAL RATE: 51 BPM
CALCULATED P AXIS, ECG09: 51 DEGREES
CALCULATED R AXIS, ECG10: 21 DEGREES
CALCULATED T AXIS, ECG11: 68 DEGREES
DIAGNOSIS, 93000: NORMAL
ERYTHROCYTE [DISTWIDTH] IN BLOOD BY AUTOMATED COUNT: 13.7 % (ref 11.5–14.5)
HCT VFR BLD AUTO: 37.8 % (ref 35–47)
HGB BLD-MCNC: 12.3 G/DL (ref 11.5–16)
MCH RBC QN AUTO: 31.9 PG (ref 26–34)
MCHC RBC AUTO-ENTMCNC: 32.5 G/DL (ref 30–36.5)
MCV RBC AUTO: 98.2 FL (ref 80–99)
NRBC # BLD: 0 K/UL (ref 0–0.01)
NRBC BLD-RTO: 0 PER 100 WBC
P-R INTERVAL, ECG05: 212 MS
PLATELET # BLD AUTO: 182 K/UL (ref 150–400)
PMV BLD AUTO: 12.2 FL (ref 8.9–12.9)
Q-T INTERVAL, ECG07: 490 MS
QRS DURATION, ECG06: 104 MS
QTC CALCULATION (BEZET), ECG08: 451 MS
RBC # BLD AUTO: 3.85 M/UL (ref 3.8–5.2)
VENTRICULAR RATE, ECG03: 51 BPM
WBC # BLD AUTO: 7.1 K/UL (ref 3.6–11)

## 2020-06-19 PROCEDURE — 77030039046 HC PAD DEFIB RADIOTRNSPNT CNMD -B

## 2020-06-19 PROCEDURE — 93325 DOPPLER ECHO COLOR FLOW MAPG: CPT

## 2020-06-19 PROCEDURE — 74011250636 HC RX REV CODE- 250/636: Performed by: NURSE ANESTHETIST, CERTIFIED REGISTERED

## 2020-06-19 PROCEDURE — 93005 ELECTROCARDIOGRAM TRACING: CPT

## 2020-06-19 PROCEDURE — 76060000031 HC ANESTHESIA FIRST 0.5 HR

## 2020-06-19 PROCEDURE — 74011250636 HC RX REV CODE- 250/636: Performed by: SPECIALIST

## 2020-06-19 PROCEDURE — 85027 COMPLETE CBC AUTOMATED: CPT

## 2020-06-19 PROCEDURE — 92960 CARDIOVERSION ELECTRIC EXT: CPT

## 2020-06-19 PROCEDURE — 36415 COLL VENOUS BLD VENIPUNCTURE: CPT

## 2020-06-19 RX ORDER — DIPHENHYDRAMINE HYDROCHLORIDE 50 MG/ML
25 INJECTION, SOLUTION INTRAMUSCULAR; INTRAVENOUS
Status: DISCONTINUED | OUTPATIENT
Start: 2020-06-19 | End: 2020-06-19 | Stop reason: HOSPADM

## 2020-06-19 RX ORDER — SODIUM CHLORIDE 9 MG/ML
75 INJECTION, SOLUTION INTRAVENOUS CONTINUOUS
Status: DISCONTINUED | OUTPATIENT
Start: 2020-06-19 | End: 2020-06-19 | Stop reason: HOSPADM

## 2020-06-19 RX ORDER — SODIUM CHLORIDE 0.9 % (FLUSH) 0.9 %
5-40 SYRINGE (ML) INJECTION AS NEEDED
Status: DISCONTINUED | OUTPATIENT
Start: 2020-06-19 | End: 2020-06-19 | Stop reason: HOSPADM

## 2020-06-19 RX ORDER — SODIUM CHLORIDE 0.9 % (FLUSH) 0.9 %
5-40 SYRINGE (ML) INJECTION EVERY 8 HOURS
Status: DISCONTINUED | OUTPATIENT
Start: 2020-06-19 | End: 2020-06-19 | Stop reason: HOSPADM

## 2020-06-19 RX ORDER — PROPOFOL 10 MG/ML
INJECTION, EMULSION INTRAVENOUS AS NEEDED
Status: DISCONTINUED | OUTPATIENT
Start: 2020-06-19 | End: 2020-06-19 | Stop reason: HOSPADM

## 2020-06-19 RX ADMIN — PROPOFOL 100 MG: 10 INJECTION, EMULSION INTRAVENOUS at 11:26

## 2020-06-19 RX ADMIN — PROPOFOL 50 MG: 10 INJECTION, EMULSION INTRAVENOUS at 11:28

## 2020-06-19 RX ADMIN — SODIUM CHLORIDE 75 ML/HR: 900 INJECTION, SOLUTION INTRAVENOUS at 10:03

## 2020-06-19 NOTE — ANESTHESIA PREPROCEDURE EVALUATION
Relevant Problems   No relevant active problems       Anesthetic History   No history of anesthetic complications            Review of Systems / Medical History  Patient summary reviewed, nursing notes reviewed and pertinent labs reviewed    Pulmonary            Asthma        Neuro/Psych   Within defined limits           Cardiovascular    Hypertension        Dysrhythmias : atrial fibrillation      Exercise tolerance: >4 METS     GI/Hepatic/Renal                Endo/Other        Arthritis     Other Findings              Physical Exam    Airway  Mallampati: I  TM Distance: > 6 cm  Neck ROM: normal range of motion   Mouth opening: Normal     Cardiovascular    Rhythm: regular  Rate: normal         Dental  No notable dental hx       Pulmonary  Breath sounds clear to auscultation               Abdominal         Other Findings            Anesthetic Plan    ASA: 3  Anesthesia type: MAC          Induction: Intravenous  Anesthetic plan and risks discussed with: Patient

## 2020-06-19 NOTE — PROGRESS NOTES
Pt verbalized understanding of discharge instructions. Pt remains in First degree AV Block after dressing and ambulating. PIV removed and guaze with tape placed; no redness or swelling. Pt escorted to car via wheelchair with belongings. Pt's daughter Mini Chavarria is driving.

## 2020-06-19 NOTE — PROCEDURES
Cardiac Catheterization Procedure Note   Patient: Lura Leyden  MRN: [id number]  SSN: [id number]   YOB: 1940 Age: 78 y.o.   Sex: female    Date of Procedure: 6/19/2020   Pre-procedure Diagnosis: Atrial Fibrillation/Atrial Flutter  Post-procedure Diagnosis: No Cardiac Source of Embolism  Procedure: TIM and Cardioversion  :  Dr. Dorothy Adams MD    Assistant(s):  None  Anesthesia: Moderate Sedation   Estimated Blood Loss: Less than 10 mL   Specimens Removed: None  Findings: No clot in KENNEDY, LVEF about 40%, cardioverted on first attempt to NSR using 959 joules  Complications: None   Implants:  None  Signed by:  Dorothy Adams MD  6/19/2020  11:41 AM

## 2020-06-19 NOTE — PROGRESS NOTES
Anesthesia name:  Sandy Bishop     Anesthesia is present for case. Refer to anesthesia log for vitals.

## 2020-06-19 NOTE — PROGRESS NOTES
Cardiac Cath Lab Recovery Arrival Note:      Coretta Dailey arrived to Cardiac Cath Lab, Recovery Area. Staff introduced to patient. Patient identifiers verified with NAME and DATE OF BIRTH. Procedure verified with patient. Consent forms reviewed and signed by patient or authorized representative and verified. Allergies verified. Patient and family oriented to department. Patient and family informed of procedure and plan of care. Questions answered with review. Patient prepped for procedure, per orders from physician, prior to arrival.    Patient on cardiac monitor, non-invasive blood pressure, SPO2 monitor. On Room Air. Patient is A&Ox 4. Patient reports No pain. Patient in stretcher, in low position, with side rails up, call bell within reach, patient instructed to call if assistance as needed. Patient prep in: CCL Recovery Area, B  Family in: At Home.    Prep by: Ivone Amaya RN

## 2020-06-19 NOTE — ANESTHESIA POSTPROCEDURE EVALUATION
* No procedures listed *. MAC    Anesthesia Post Evaluation        Patient location during evaluation: PACU  Patient participation: complete - patient participated  Level of consciousness: awake  Pain management: adequate  Airway patency: patent  Anesthetic complications: no  Cardiovascular status: hemodynamically stable  Respiratory status: acceptable  Hydration status: acceptable  Comments: I have seen and evaluated the patient. The patient is ready for PACU discharge. Gayle Alejandro, DO                         INITIAL Post-op Vital signs:   Vitals Value Taken Time   /65 6/19/2020 12:15 PM   Temp     Pulse 56 6/19/2020 12:27 PM   Resp     SpO2 100 % 6/19/2020 12:27 PM   Vitals shown include unvalidated device data.

## 2020-06-26 ENCOUNTER — CLINICAL SUPPORT (OUTPATIENT)
Dept: CARDIOLOGY CLINIC | Age: 80
End: 2020-06-26

## 2020-06-26 DIAGNOSIS — I48.19 PERSISTENT ATRIAL FIBRILLATION (HCC): Primary | ICD-10-CM

## 2020-06-26 NOTE — PROGRESS NOTES
Medications confirmed. Dr. Davonte Bryan reviewed EKG. Follow up scheduled.       Future Appointments   Date Time Provider [facility] [place]   7/24/2020  1:00 PM Reyes Borg,  [address] 9/14/2020  9:00 AM Reyes Borg,  [address]

## 2020-06-29 ENCOUNTER — DOCUMENTATION ONLY (OUTPATIENT)
Dept: CARDIOLOGY CLINIC | Age: 80
End: 2020-06-29

## 2020-06-29 ENCOUNTER — TELEPHONE (OUTPATIENT)
Dept: CARDIOLOGY CLINIC | Age: 80
End: 2020-06-29

## 2020-06-29 NOTE — TELEPHONE ENCOUNTER
Per Dr. Gray Escoto:    ECG with second degree av block  Hold toprol and recheck ECG in 1 week to see if she can even continue with amiodarone    Identifiers x 2. Patient informed of the above. Verbalized understanding. Scheduled for repeat EKG. Medication profile updated.

## 2020-06-29 NOTE — PROGRESS NOTES
ECG with second degree av block  Hold toprol and recheck ECG in 1 week to see if she can even continue with amiodarone

## 2020-07-06 ENCOUNTER — TELEPHONE (OUTPATIENT)
Dept: CARDIOLOGY CLINIC | Age: 80
End: 2020-07-06

## 2020-07-06 ENCOUNTER — CLINICAL SUPPORT (OUTPATIENT)
Dept: CARDIOLOGY CLINIC | Age: 80
End: 2020-07-06

## 2020-07-06 DIAGNOSIS — I48.0 PAROXYSMAL A-FIB (HCC): Primary | ICD-10-CM

## 2020-07-06 NOTE — TELEPHONE ENCOUNTER
Dr. Austen Plasencia reviewed EKG. To continue to not take toprol. To repeat EKG at follow up visit. Future Appointments   Date Time Provider [facility]   7/24/2020  1:00 PM Giovani Lucas  [address] 9/14/2020  9:00 AM Giovani Lucas  [address]     Attempted to reach patient by telephone. A message was left for return call.

## 2020-07-06 NOTE — TELEPHONE ENCOUNTER
Patient in for EKG. Cardioversion on 6-19-20. toprol XL discontinued after repeat EKG. Patient continues taking amiodarone 100 mg bid. Dr. Karyle Lawman to review. Confirmed follow up.     Future Appointments   Date Time Provider [facility]   7/24/2020  1:00 PM Lesa Martin  [address]   9/14/2020  9:00 AM Lesa Martin  [address]

## 2020-07-07 NOTE — TELEPHONE ENCOUNTER
Identifiers x 2. Informed patient to continue to not take toprol. States taking amiodorone 2 x 100 mg tablets nightly. Medication profile updated. Confirmed follow up appt.     Future Appointments   Date Time Provider [facility]   7/24/2020  1:00 PM Josefina Hernandez  [address] 9/14/2020  9:00 AM Josefina Hernandez  [address]

## 2020-08-11 ENCOUNTER — OFFICE VISIT (OUTPATIENT)
Dept: CARDIOLOGY CLINIC | Age: 80
End: 2020-08-11
Payer: MEDICARE

## 2020-08-11 VITALS
OXYGEN SATURATION: 97 % | HEIGHT: 66 IN | RESPIRATION RATE: 15 BRPM | HEART RATE: 60 BPM | WEIGHT: 191 LBS | DIASTOLIC BLOOD PRESSURE: 78 MMHG | BODY MASS INDEX: 30.7 KG/M2 | SYSTOLIC BLOOD PRESSURE: 128 MMHG

## 2020-08-11 DIAGNOSIS — I48.19 PERSISTENT ATRIAL FIBRILLATION (HCC): ICD-10-CM

## 2020-08-11 DIAGNOSIS — I48.19 OTHER PERSISTENT ATRIAL FIBRILLATION (HCC): Primary | ICD-10-CM

## 2020-08-11 PROCEDURE — G8427 DOCREV CUR MEDS BY ELIG CLIN: HCPCS | Performed by: SPECIALIST

## 2020-08-11 PROCEDURE — G8752 SYS BP LESS 140: HCPCS | Performed by: SPECIALIST

## 2020-08-11 PROCEDURE — 1090F PRES/ABSN URINE INCON ASSESS: CPT | Performed by: SPECIALIST

## 2020-08-11 PROCEDURE — 93010 ELECTROCARDIOGRAM REPORT: CPT | Performed by: SPECIALIST

## 2020-08-11 PROCEDURE — G8432 DEP SCR NOT DOC, RNG: HCPCS | Performed by: SPECIALIST

## 2020-08-11 PROCEDURE — 99214 OFFICE O/P EST MOD 30 MIN: CPT | Performed by: SPECIALIST

## 2020-08-11 PROCEDURE — G8754 DIAS BP LESS 90: HCPCS | Performed by: SPECIALIST

## 2020-08-11 PROCEDURE — G8536 NO DOC ELDER MAL SCRN: HCPCS | Performed by: SPECIALIST

## 2020-08-11 PROCEDURE — 93005 ELECTROCARDIOGRAM TRACING: CPT | Performed by: SPECIALIST

## 2020-08-11 PROCEDURE — G8399 PT W/DXA RESULTS DOCUMENT: HCPCS | Performed by: SPECIALIST

## 2020-08-11 PROCEDURE — G8417 CALC BMI ABV UP PARAM F/U: HCPCS | Performed by: SPECIALIST

## 2020-08-11 PROCEDURE — 1101F PT FALLS ASSESS-DOCD LE1/YR: CPT | Performed by: SPECIALIST

## 2020-08-11 NOTE — PROGRESS NOTES
Visit Vitals  /78 (BP 1 Location: Left arm, BP Patient Position: Sitting)   Pulse 60   Resp 15   Ht 5' 6\" (1.676 m)   Wt 191 lb (86.6 kg)   SpO2 97%   BMI 30.83 kg/m²

## 2020-08-11 NOTE — PROGRESS NOTES
[address] [facility]                                                            OFFICE NOTE        Berenice Caba M.D.,KATIE LINDA ELLISON   1940  [id number]    Date/Time:  8/11/202012:06 PM        ICD-10-CM ICD-9-CM    1. Other persistent atrial fibrillation ([address]  I48.19 427.31    2. Persistent atrial fibrillation (HCC)  I48.19 427.31 AMB POC EKG ROUTINE W/ 12 LEADS, INTER & REP         SUBJECTIVE:  Doing very well especially after  Stopping metoprolol   no cp or sob or plapitations       Assessment/Plan  AF: s/p  partial af ablation on 5/19 complicated by pericardial effusion which has  remained stable  ecg now with NSR fav and minimal ivcd at 102 msec    (s/p TIM and cdc cardioversion on 6/20    on amiodarone (now back to 200 mg daily) eliquis    ( amiodarone previously decreased by Dr. Starr Henderson on account of bradycardia and hypothyroidism  But patient with recurrent AF then)  conitnue off metoprolol since she feels much better)     Aware of potential side effects no allergic reaction thus far ( iodine allergy)      Continue eliquis , no longer c/o bruising after stopping asa     Discussed need not to miss medications if at all possible     Discussed neurological effects of amiodarone she has none thus far     She will let me know if lack of balance becomes noticeable    Obtain lft and tft in 9/20     Sob: much better and resolved off metoprolol and in nsr  Continue lasix and potassium on a prn base     HTN: controlled     Otherwise see her back in 4 months           HPI   79 y.o. female. Patient with h/o HTN, HLD, Ca score of 0 in 2012, PVCs on ECG on 1/16 and echo on 1/16 with EF 55% mild MR.  S/p LTKR in 1/16(seen by Dr. Jostin Leahy)  Also remote h/o breast ca s/p mastectomy in 1980 but no chemo or xrt     Echo on 4/16:Ejection fraction was estimated to be 55 %. There were no  regional wall motion abnormalities. Wall thickness was at the upper limits  of normal.    Left atrium: The atrium was mildly dilated. Mitral valve: There was mild regurgitation. Aortic valve: Leaflets exhibited sclerosis. holter on 4/16: NSR  frequent pacs couplets triples and occasional non sustained at, frequent pvcs (0.65%)     On 3/18: admitted to [place] [id number] onset A-fib with RVR     TIM and Cardioversion completed with 200 J with conversion to NSR  TIM no PFO, trace AR, mild MR, no TR or ND Mild atherosclerosis in prox descending aortaNo KENNEDY thrombus LA moderate dilation RA normal  Started eliquis, flecainide and metoprolol     Echo on 8/20/18: The ventricle was dilated. Systolic function was mildly  reduced by EF (biplane method of disks). Ejection fraction was estimated  to be 45 % in the range of 40 % to 50 %. There was mild diffuse  hypokinesis.        TIM /CARDIOVERSION on 2/19 resulting in NSR. Flecainide resumed     CXR on 2/25/19:1. Interval development of small bilateral pleural effusions and mild basilar     atelectasis. Follow-up to resolution is suggested. Doppler LLE on 2/19:No evidence of left lower extremity vein thrombosis.         02/27/19   ECHO ADULT FOLLOW-UP OR LIMITED 02/27/2019 2/27/2019     Addendum · Left ventricular low normal systolic function. Estimated left  ventricular ejection fraction is 51 - 55%. Calculated left ventricular  ejection fraction is 50%. Biplane method used to measure ejection  fraction. Normal left ventricular wall motion, no regional wall motion  abnormality noted. · Mild to moderate mitral valve regurgitation. · Trivial-to-small pericardial effusion. · Mild tricuspid valve regurgitation is present. Stella Akers MD 2/27/2019  2:19 PM           Narrative · Left ventricular low normal systolic function. Estimated left   ventricular ejection fraction is 51 - 55%. Calculated left ventricular   ejection fraction is 50%.  Biplane method used to measure ejection fraction. Normal left ventricular wall motion, no regional wall motion   abnormality noted. · Mild to moderate mitral valve regurgitation. · Trivial-to-small pericardial effusion. · Mild tricuspid valve stenosis is present.          Signed by: Danyel Sheehan MD                 02/27/19   ECHO ADULT FOLLOW-UP OR LIMITED 02/27/2019 2/27/2019     Narrative · Left ventricular low normal systolic function. Estimated left   ventricular ejection fraction is 51 - 55%. Calculated left ventricular   ejection fraction is 50%. Biplane method used to measure ejection   fraction. Normal left ventricular wall motion, no regional wall motion   abnormality noted. · Mild to moderate mitral valve regurgitation. · Trivial-to-small pericardial effusion. · Mild tricuspid valve stenosis is present.          Signed by: Danyel Sheehan MD       chest ct on 3/19:1. CTA pulmonary vein/left atrial mapping performed. 2. No accessory pulmonary vein, thrombus or stenosis. 3. 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 4. No acute finding.      AF ablation on 5/19  EP study & partial PVI of the left superior vein and full PVI of the right veins during ablation on 05/10/2019, developed pericardial effusion during procedure.  No tamponade.  She was able to restarted Eliquis thereafter.   but  she was admitted to CCU due to posterior lateral LA LV moderate pericardial effusion during left pulmonary vein isolation.  Heparin was reversed.  She was stable so no pericardiocentesis was warranted  Started on AMIO and BBlockers                CARDIAC STUDIES        06/19/20   ECHO TIM W POSSIBLE CARDIOVERSION 06/19/2020 6/19/2020    Narrative · Mildly dilated left atrium. Left atrial appendage velocity is normal   (greater than 40 cm/sec). · Normal cavity size and wall thickness. Mild global systolic function. Estimated left ventricular ejection fraction is 45 - 50%. · Mild to moderate mitral valve regurgitation is present.   · Mild to moderate tricuspid valve regurgitation is present. Signed by: Hailey Womack MD                                 EKG Results     Procedure 720 Value Units Date/Time    AMB POC EKG ROUTINE W/ 12 LEADS, INTER & REP [[id number]] Resulted:  08/11/20 1137    Order Status:  Completed Updated:  08/11/20 1138              IMAGING      MRI Results (most recent):  Results from Hospital Encounter encounter on 07/16/13   MRI KNEE RT WO CONT    Narrative **Final Report**       ICD Codes / Adm. Diagnosis: 836.2  401.9 / Other tear of cartilage or men    Examination:  MR KNEE WO CON RT  - [id number] - Jul 16 2013  3:28PM  Accession No:  [id number]  Reason:  tear      REPORT:  EXAM:  Right knee MRI without contrast    INDICATION: Pain    COMPARISON: None    TECHNIQUE: Axial T2 fat-saturated and proton density fat-saturated; coronal   T1 and proton density fat-saturated; and sagittal T2 fat-saturated, proton   density fat-saturated, and gradient echo MRI of the     knee . CONTRAST:  None. FINDINGS: Bone marrow: Degenerative type bone marrow edema is present in the   medial aspects of the medial femoral condyle and medial tibial plateau. A   small focus of subchondral bone marrow edema is present in the lateral facet   of the patella. There is no evidence of fracture or marrow replacing process. Joint fluid: There is a mild joint effusion. Areas of synovitis are seen   throughout the joint space, particularly in the suprapatellar recess and   anterior to the lateral compartment. There is additional 9 mm loose body   along the course of the popliteal tendon on series 7 image 55. There is a moderate-sized Baker's cyst posterior medial to the knee. This   measures 2.0 x 1.9 x 7.7 cm craniocaudal. There is a small rounded loose   body within this measuring 5 mm on series 8 image 4. Several strands of   synovitis are seen within this. Collateral ligaments and posterior, lateral corner: Intact.     Medial meniscus: The medial meniscus is extruded. There is a complete radial   tear of the posterior horn of the medial meniscus as evidenced by an absent    posterior horn on series 9 image 8. The distal material is flipped   inferiorly and laterally into the gutter on series 8 image 4 and series 6   image 17. Lateral meniscus: High signal is seen in the free edge of the body related   to free edge tearing. Irregular increased signal is seen anterior to the   anterior horn is likely related to synovitis. ACL and PCL: A small area mucoid degeneration is present in the proximal   ACL. The ACL and PCL are intact however. Tendons: Intact. .    Muscles: Within normal limits. Patellofemoral alignment: No patellar subluxation/tilt. Trochlear groove is   not hypoplastic. TT-TG distance: Normal at 12 mm. Articular cartilage: Full-thickness cartilage loss is seen along the   weightbearing surfaces of the medial femoral condyle and medial tibial   plateau with significant marginal osteophytosis. Mild underlying subchondral   bone marrow edema is present medially. Near full-thickness fissuring is seen along the vertical ridge of the   patella. Mild irregular cartilage loss is seen along the lateral aspect of   the patellofemoral compartment. Small marginal osteophytes are seen adjacent   to this. There is a small area of subchondral bone marrow edema related to a   full-thickness fissure in the lateral facet of patella. There is a shallow thickness of fissuring are seen along the lateral femoral   condyle towards the tibial spine. Significant marginal osteophytes are   present. There is diffuse mild cartilage thinning throughout the lateral   compartment. Soft tissue mass: None. Nonspecific soft tissue edema is seen anterior to   the patellar tendon. IMPRESSION:  1. Complete radial tear of the posterior horn of the medial meniscus with   displacement of meniscal material into the inferior gutter.  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 2. Moderate lateral and patellofemoral osteoarthritis. 3. Free edge tearing in the body of the lateral meniscus. 4. Moderate joint effusion with areas of extensive synovitis likely related   to long-standing degenerative disease. 5. A moderate-sized Baker's cyst is seen posterior medial to the knee with   areas of synovitis and a small loose body. An additional small loose body is   seen along the course of the popliteus tendon. Signing/Reading Doctor: Anahi Mabry ([id number])    Approved: Anahi Mabry ([id number])  Jul 16 2013  4:08PM                                      CT Results (most recent):  Results from Hospital Encounter encounter on 03/18/19   CTA CHEST W OR W WO CONT    Narrative INDICATION: Atrial fibrillation, pulmonary vein mapping. CONTRAST: 100 mL of Isovue 370. TECHNIQUE: Multislice helical CT of the chest was performed on a 64-slice  multiple detector row CT system ([employer]). Unenhanced  images were obtained  to localize the volume for acquisition. Bolus tracking software was used for  timing. Thin section images were acquired during uneventful rapid bolus  intravenous administration of contrast. 3D image post processing was performed  including coronal MIPS and SSD. CT dose reduction was achieved through the use  of a standardized protocol tailored for this examination and automatic exposure  control for dose modulation. FINDINGS:   The left superior and inferior pulmonary veins and right superior and inferior  pulmonary veins demonstrate no stenosis. There is early branching of the right  inferior pulmonary vein. No accessory pulmonary veins are identified. Left superior pulmonary vein ostium measures 1.9 x 1.8 cm, with first branch  originating 0.8 cm from the atrium.   Left inferior pulmonary vein measures 1.9 x 1.4 cm, with first branch  originating 0.6 cm from the atrium. Right superior pulmonary vein measures 2.1 x 1.9 cm, with first branch  originating 0.8 cm from the atrium. Right inferior pulmonary vein measures 2.1 x 1.9 cm, with first branch  originating 0.1 cm from the atrium. There is no thrombus or mass within the pulmonary veins or left atrium. The visualized lungs are clear of mass, nodule, acute air space disease or  edema. There is no pleural or pericardial effusion. The aorta enhances normally without evidence of aneurysm or dissection. There is no significant adenopathy. Impression IMPRESSION:   1. CTA pulmonary vein/left atrial mapping performed. 2. No accessory pulmonary vein, thrombus or stenosis. 3. 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 4. No acute finding. XR Results (most recent):  Results from Hospital Encounter encounter on 05/10/19   XR CHEST PORT    Narrative INDICATION: . sob  Additional history: Dyspnea. Atrial fibrillation  COMPARISON: Previous chest xray, 4/25/2019. LIMITATIONS: Portable technique. [place] FINDINGS: Single frontal view of the chest.   .  Lines/tubes/surgical: Prescribed cardiac leads. Heart/mediastinum: Unremarkable. Lungs/pleura: Chronic appearing lung changes. Linear opacity in the right upper  lobe associated with chain suture material. No visualized pleural effusion or  pneumothorax. Additional Comments: None. .    Impression IMPRESSION:  1. No radiographic evidence of acute cardiopulmonary disease.   2. Postsurgical changes in the right upper lobe               Past Medical History:   Diagnosis Date    Arthritis     Asthma     dr. Tete Gonzalez allergist    [facility] ([facility]     Breast cancer ([facility] 1980    right - mastectomy    Coagulation disorder ([facility]     on eliquis    Dyslipidemia     labs 2008 - chol 283, HDL 83, ,     HTN (hypertension)     Hyperlipidemia LDL goal < 130     for increased LDL particles, Dr. Arsenio Chris nodule     Dr. Domingo Buckner  Palpitations     resolved     Past Surgical History:   Procedure Laterality Date    CHEST SURGERY PROCEDURE UNLISTED      lung nodule removed - benign    COLONOSCOPY N/A 9/10/2018    COLONOSCOPY performed by Spencer Sims MD at [facility] W/O CONT WITH CALCIUM  1/2012    CAC score 0; calcified mediastinal lymph nodes consistent granulomatous disease    ECHO 2D ADULT  5/5/2008    normal, LVEF 60%    HX APPENDECTOMY      HX HYSTERECTOMY Bilateral 03/19/2015    and uro repair    HX KNEE REPLACEMENT Right     HX MASTECTOMY Right     HX TONSILLECTOMY      HX UROLOGICAL  8/1/14    Urodynamics    INTRACARD ECHO, THER/DX INTERVENT N/A 5/10/2019    Intracardiac Echocardiogram performed by Linda Jean Baptiste MD at [place] [address] EXTERNAL  3/28/2018         MA EPHYS EVL TRNSPTL TX ATRIAL FIB ISOLAT PULM VEIN N/A 5/10/2019    ABLATION A-FIB  W COMPLETE EP STUDY performed by Linda Jean Baptiste MD at [facility] CATH [facility]    MA INTRACARDIAC ELECTROPHYSIOLOGIC 3D MAPPING N/A 5/10/2019    Ep 3d Mapping [place] [address] [address] C[facility]    [facility]  [facility]    walked 7:31, no chest pain, normal MPI.      Social History     Tobacco Use    Smoking status: Never Smoker    Smokeless tobacco: Never Used   Substance Use Topics    Alcohol use: Yes     Comment: wine nightly    Drug use: No     Family History   Problem Relation Age of Onset    Heart Failure Mother     Stroke Father     Other Other         endometrial cancer, niece     Allergies   Allergen Reactions    Betadine [Povidone-Iodine] Rash         Visit Vitals  /78 (BP 1 Location: Left arm, BP Patient Position: Sitting)   Pulse 60   Resp 15   Ht 5' 6\" (1.676 m)   Wt 191 lb (86.6 kg)   SpO2 97%   BMI 30.83 kg/m²         Last 3 Recorded Weights in this Encounter    08/11/20 1129   Weight: 191 lb (86.6 kg)            Review of Systems:   Pertinent items are noted in the History of Present Illness. Visit Vitals  /78 (BP 1 Location: Left arm, BP Patient Position: Sitting)   Pulse 60   Resp 15   Ht 5' 6\" (1.676 m)   Wt 191 lb (86.6 kg)   SpO2 97%   BMI 30.83 kg/m²     General Appearance:  Well developed, well nourished,alert and oriented x 3, and individual in no acute distress. Ears/Nose/Mouth/Throat:   Hearing grossly normal.         Neck: Supple. Chest:   Lungs clear to auscultation bilaterally. Cardiovascular:  Regular rate and rhythm, S1, S2 normal, no murmur. Abdomen:   Soft, non-tender, bowel sounds are active. Extremities: No edema bilaterally. Skin: Warm and dry. Current Outpatient Medications on File Prior to Visit   Medication Sig Dispense Refill    amoxicillin (AMOXIL) 500 mg capsule TAKE 2 CAPSULES BY MOUTH 1 HOUR BEFORE DENTAL PROCEDURE AND TAKE 2 CAPSULES 6 HOURS AFTER DENTAL PROCEDURE      amiodarone (PACERONE) 100 mg tablet Take 2 Tabs by mouth daily. (Patient taking differently: Take 200 mg by mouth nightly.) 180 Tab 1    acetaminophen (Tylenol Extra Strength) 500 mg tablet Take 500 mg by mouth nightly as needed.  potassium chloride (K-DUR, KLOR-CON) 20 mEq tablet Take 20 mEq by mouth daily as needed.  ELIQUIS 5 mg tablet TAKE 1 TABLET TWICE A  Tab 4    levothyroxine (SYNTHROID) 25 mcg tablet Take  by mouth Daily (before breakfast).  furosemide (LASIX) 40 mg tablet Take 40 mg by mouth as needed.  fluticasone (FLOVENT HFA) 220 mcg/actuation inhaler as needed.  atorvastatin (LIPITOR) 20 mg tablet Take 20 mg by mouth every evening.  MULTIVITAMIN PO Take  by mouth. Takes one po once daily.  cholecalciferol (VITAMIN D3) 5,000 unit capsule Take 5,000 Units by mouth daily.  cetirizine (ZYRTEC) 10 mg tablet Take 10 mg by mouth every evening.  estradiol (ESTRACE) 0.01 % (0.1 mg/gram) vaginal cream Insert  into vagina every Monday and Thursday.       EPINEPHrine (EPIPEN) 0.3 mg/0.3 mL (1:1,000) injection 0.3 mL by IntraMUSCular route once as needed for up to 1 dose. 0.3 mL 0    albuterol (PROVENTIL HFA, VENTOLIN HFA, PROAIR HFA) 90 mcg/actuation inhaler Take 1 Puff by inhalation every four (4) hours as needed. 2 Inhaler 3    raloxifene (EVISTA) 60 mg tablet Take 1 Tab by mouth daily. 90 Tab 4    montelukast (SINGULAIR) 10 mg tablet Take 10 mg by mouth every evening. No current facility-administered medications on file prior to visit. Elsy Limb had no medications administered during this visit. Current Outpatient Medications   Medication Sig    amoxicillin (AMOXIL) 500 mg capsule TAKE 2 CAPSULES BY MOUTH 1 HOUR BEFORE DENTAL PROCEDURE AND TAKE 2 CAPSULES 6 HOURS AFTER DENTAL PROCEDURE    amiodarone (PACERONE) 100 mg tablet Take 2 Tabs by mouth daily. (Patient taking differently: Take 200 mg by mouth nightly.)    acetaminophen (Tylenol Extra Strength) 500 mg tablet Take 500 mg by mouth nightly as needed.  potassium chloride (K-DUR, KLOR-CON) 20 mEq tablet Take 20 mEq by mouth daily as needed.  ELIQUIS 5 mg tablet TAKE 1 TABLET TWICE A DAY    levothyroxine (SYNTHROID) 25 mcg tablet Take  by mouth Daily (before breakfast).  furosemide (LASIX) 40 mg tablet Take 40 mg by mouth as needed.  fluticasone (FLOVENT HFA) 220 mcg/actuation inhaler as needed.  atorvastatin (LIPITOR) 20 mg tablet Take 20 mg by mouth every evening.  MULTIVITAMIN PO Take  by mouth. Takes one po once daily.  cholecalciferol (VITAMIN D3) 5,000 unit capsule Take 5,000 Units by mouth daily.  cetirizine (ZYRTEC) 10 mg tablet Take 10 mg by mouth every evening.  estradiol (ESTRACE) 0.01 % (0.1 mg/gram) vaginal cream Insert  into vagina every Monday and Thursday.  EPINEPHrine (EPIPEN) 0.3 mg/0.3 mL (1:1,000) injection 0.3 mL by IntraMUSCular route once as needed for up to 1 dose.     albuterol (PROVENTIL HFA, VENTOLIN HFA, PROAIR HFA) 90 mcg/actuation inhaler Take 1 Puff by inhalation every four (4) hours as needed.  raloxifene (EVISTA) 60 mg tablet Take 1 Tab by mouth daily.  montelukast (SINGULAIR) 10 mg tablet Take 10 mg by mouth every evening. No current facility-administered medications for this visit. Lab Results   Component Value Date/Time    Cholesterol, total 142 03/28/2018 04:00 AM    HDL Cholesterol 76 03/28/2018 04:00 AM    LDL, calculated 55.2 03/28/2018 04:00 AM    VLDL, calculated 10.8 03/28/2018 04:00 AM    Triglyceride 54 03/28/2018 04:00 AM    CHOL/HDL Ratio 1.9 03/28/2018 04:00 AM       Lab Results   Component Value Date/Time    Sodium 142 06/09/2020 11:06 AM    Potassium 4.2 06/09/2020 11:06 AM    Chloride 104 06/09/2020 11:06 AM    CO2 27 06/09/2020 11:06 AM    Anion gap 6 05/11/2019 03:10 AM    Glucose 118 (H) 06/09/2020 11:06 AM    BUN 13 06/09/2020 11:06 AM    Creatinine 0.97 06/09/2020 11:06 AM    BUN/Creatinine ratio 13 06/09/2020 11:06 AM    GFR est AA 64 06/09/2020 11:06 AM    GFR est non-AA 56 (L) 06/09/2020 11:06 AM    Calcium 8.8 06/09/2020 11:06 AM       Lab Results   Component Value Date/Time    ALT (SGPT) 32 10/24/2019 11:04 AM    Alk.  phosphatase 77 10/24/2019 11:04 AM    Bilirubin, direct 0.14 10/24/2019 11:04 AM    Bilirubin, total 0.4 10/24/2019 11:04 AM       Lab Results   Component Value Date/Time    WBC 7.1 06/19/2020 09:46 AM    Hemoglobin (POC) 12.9 10/25/2009 03:04 AM    HGB 12.3 06/19/2020 09:46 AM    Hematocrit (POC) 38 10/25/2009 03:04 AM    HCT 37.8 06/19/2020 09:46 AM    PLATELET 955 59/90/8153 09:46 AM    MCV 98.2 06/19/2020 09:46 AM       Lab Results   Component Value Date/Time    TSH 4.720 (H) 06/09/2020 11:06 AM         Lab Results   Component Value Date/Time    Cholesterol, total 142 03/28/2018 04:00 AM    Cholesterol, total 175 12/18/2015 10:18 AM    Cholesterol, total 251 (H) 06/03/2015 08:45 AM    Cholesterol, total 206 (H) 05/14/2014 10:46 AM    Cholesterol, total 201 (H) 04/26/2013 09:56 AM    HDL Cholesterol 76 03/28/2018 04:00 AM    HDL Cholesterol 93 12/18/2015 10:18 AM    HDL Cholesterol 89 06/03/2015 08:45 AM    HDL Cholesterol 92 05/14/2014 10:46 AM    HDL Cholesterol 99 04/26/2013 09:56 AM    LDL, calculated 55.2 03/28/2018 04:00 AM    LDL, calculated 68 12/18/2015 10:18 AM    LDL, calculated 141 (H) 06/03/2015 08:45 AM    LDL, calculated 98 05/14/2014 10:46 AM    LDL, calculated 83 04/26/2013 09:56 AM    Triglyceride 54 03/28/2018 04:00 AM    Triglyceride 68 12/18/2015 10:18 AM    Triglyceride 104 06/03/2015 08:45 AM    Triglyceride 79 05/14/2014 10:46 AM    Triglyceride 96 04/26/2013 09:56 AM    CHOL/HDL Ratio 1.9 03/28/2018 04:00 AM                Please note that this dictation was completed with [employer], the [employer] voice recognition software. Quite often unanticipated grammatical, syntax, homophones, and other interpretative errors are inadvertently transcribed by the computer software. Please disregard these errors. Please excuse any errors that have escaped final proofreading.

## 2020-10-19 NOTE — Clinical Note
Transthoracic echo done by Dr Cory Dahl Skin Substitute Paste Text: The defect edges were debeveled with a #15 scalpel blade.  Given the location of the defect, shape of the defect and the proximity to free margins a skin substitute micronized graft was deemed most appropriate.  The entire vial contents were admixed with 0.5ccs of sterile saline, formed into a paste and then evenly spread over the entire wound bed.

## 2020-10-20 ENCOUNTER — OFFICE VISIT (OUTPATIENT)
Dept: CARDIOLOGY CLINIC | Age: 80
End: 2020-10-20
Payer: MEDICARE

## 2020-10-20 VITALS
SYSTOLIC BLOOD PRESSURE: 120 MMHG | OXYGEN SATURATION: 98 % | WEIGHT: 189 LBS | HEIGHT: 66 IN | BODY MASS INDEX: 30.37 KG/M2 | HEART RATE: 60 BPM | DIASTOLIC BLOOD PRESSURE: 70 MMHG | RESPIRATION RATE: 16 BRPM

## 2020-10-20 DIAGNOSIS — I48.0 PAROXYSMAL A-FIB (HCC): Primary | ICD-10-CM

## 2020-10-20 DIAGNOSIS — I10 ESSENTIAL HYPERTENSION: ICD-10-CM

## 2020-10-20 PROCEDURE — G8399 PT W/DXA RESULTS DOCUMENT: HCPCS | Performed by: SPECIALIST

## 2020-10-20 PROCEDURE — G8752 SYS BP LESS 140: HCPCS | Performed by: SPECIALIST

## 2020-10-20 PROCEDURE — 1101F PT FALLS ASSESS-DOCD LE1/YR: CPT | Performed by: SPECIALIST

## 2020-10-20 PROCEDURE — G0463 HOSPITAL OUTPT CLINIC VISIT: HCPCS | Performed by: SPECIALIST

## 2020-10-20 PROCEDURE — G8432 DEP SCR NOT DOC, RNG: HCPCS | Performed by: SPECIALIST

## 2020-10-20 PROCEDURE — G8417 CALC BMI ABV UP PARAM F/U: HCPCS | Performed by: SPECIALIST

## 2020-10-20 PROCEDURE — G8536 NO DOC ELDER MAL SCRN: HCPCS | Performed by: SPECIALIST

## 2020-10-20 PROCEDURE — 1090F PRES/ABSN URINE INCON ASSESS: CPT | Performed by: SPECIALIST

## 2020-10-20 PROCEDURE — 99214 OFFICE O/P EST MOD 30 MIN: CPT | Performed by: SPECIALIST

## 2020-10-20 PROCEDURE — G8427 DOCREV CUR MEDS BY ELIG CLIN: HCPCS | Performed by: SPECIALIST

## 2020-10-20 PROCEDURE — G8754 DIAS BP LESS 90: HCPCS | Performed by: SPECIALIST

## 2020-10-20 RX ORDER — AMLODIPINE BESYLATE 2.5 MG/1
2.5 TABLET ORAL
Qty: 90 TAB | Refills: 1 | Status: SHIPPED | OUTPATIENT
Start: 2020-10-20 | End: 2020-11-13

## 2020-10-20 NOTE — PROGRESS NOTES
Visit Vitals  /70 (BP 1 Location: Left arm, BP Patient Position: Sitting)   Pulse 60   Resp 16   Ht 5' 6\" (1.676 m)   Wt 189 lb (85.7 kg)   SpO2 98%   BMI 30.51 kg/m²

## 2020-10-20 NOTE — PROGRESS NOTES
[address] [facility]                                                            OFFICE NOTE        Bal Patel M.D.,KATIE Yakov Keep   1940  [id number]    Date/Time:  10/20/70347:11 PM          SUBJECTIVE:  Doing ok no palpitations  No cp or sob  Concerned about her bp       Assessment/Plan  AF: s/p  partial af ablation on 5/19 complicated by pericardial effusion which has  remained stable  Last ecg of 8/20 with NSR fav and ivcd      (s/p TIM and cdc cardioversion on 6/20    on amiodarone (now back to 200 mg daily) and eliquis    ( amiodarone previously decreased by Dr. Reuben pSencer on account of bradycardia and hypothyroidism  But patient with recurrent AF then)  conitnue off metoprolol since she feels much better     Aware of potential side effects no allergic reaction thus far ( iodine allergy)      Continue eliquis , no longer c/o bruising after stopping asa     Discussed need not to miss medications if at all possible     Discussed neurological effects of amiodarone she has none thus far     She will let me know if lack of balance becomes noticeable     lft and tft q 6 months she tells me she will do with her pcp    CXR and eye exam yearly also discussed     Sob: much better and resolved off metoprolol and in nsr  Continue lasix and potassium on a prn base ( not taking at all thus far)     HTN: elevated , bp log reviewed, discussed options   start norvasc 2.5 mg qhs  Side effects explained    After starting bplog for 2 weeks     Otherwise see her back in 4 months           HPI   78 y.o. female. Patient with h/o HTN, HLD, Ca score of 0 in 2012, PVCs on ECG on 1/16 and echo on 1/16 with EF 55% mild MR.  S/p LTKR in 1/16(seen by Dr. Moose Porter)  Also remote h/o breast ca s/p mastectomy in 1980 but no chemo or xrt     Echo on 4/16:Ejection fraction was estimated to be 55 %. There were no  regional wall motion abnormalities. Wall thickness was at the upper limits  of normal.    Left atrium: The atrium was mildly dilated. Mitral valve: There was mild regurgitation. Aortic valve: Leaflets exhibited sclerosis. holter on 4/16: NSR  frequent pacs couplets triples and occasional non sustained at, frequent pvcs (0.65%)     On 3/18: admitted to [place] [address] onset A-fib with RVR     TIM and Cardioversion completed with 200 J with conversion to NSR  TIM no PFO, trace AR, mild MR, no TR or OK Mild atherosclerosis in prox descending aortaNo KENNEDY thrombus LA moderate dilation RA normal  Started eliquis, flecainide and metoprolol     Echo on 8/20/18: The ventricle was dilated. Systolic function was mildly  reduced by EF (biplane method of disks). Ejection fraction was estimated  to be 45 % in the range of 40 % to 50 %. There was mild diffuse  hypokinesis.        TIM /CARDIOVERSION on 2/19 resulting in NSR. Flecainide resumed     CXR on 2/25/19:1. Interval development of small bilateral pleural effusions and mild basilar     atelectasis. Follow-up to resolution is suggested. Doppler LLE on 2/19:No evidence of left lower extremity vein thrombosis.         02/27/19   ECHO ADULT FOLLOW-UP OR LIMITED 02/27/2019 2/27/2019     Addendum · Left ventricular low normal systolic function. Estimated left  ventricular ejection fraction is 51 - 55%. Calculated left ventricular  ejection fraction is 50%. Biplane method used to measure ejection  fraction. Normal left ventricular wall motion, no regional wall motion  abnormality noted. · Mild to moderate mitral valve regurgitation. · Trivial-to-small pericardial effusion. · Mild tricuspid valve regurgitation is present. Ulices Lozada MD 2/27/2019  2:19 PM           Narrative · Left ventricular low normal systolic function. Estimated left   ventricular ejection fraction is 51 - 55%. Calculated left ventricular   ejection fraction is 50%.  Biplane method used to measure ejection fraction. Normal left ventricular wall motion, no regional wall motion   abnormality noted. · Mild to moderate mitral valve regurgitation. · Trivial-to-small pericardial effusion. · Mild tricuspid valve stenosis is present.          Signed by: Rossi Ayoub MD                 02/27/19   ECHO ADULT FOLLOW-UP OR LIMITED 02/27/2019 2/27/2019     Narrative · Left ventricular low normal systolic function. Estimated left   ventricular ejection fraction is 51 - 55%. Calculated left ventricular   ejection fraction is 50%. Biplane method used to measure ejection   fraction. Normal left ventricular wall motion, no regional wall motion   abnormality noted. · Mild to moderate mitral valve regurgitation. · Trivial-to-small pericardial effusion. · Mild tricuspid valve stenosis is present.          Signed by: Rossi Ayoub MD       chest ct on 3/19:1. CTA pulmonary vein/left atrial mapping performed. 2. No accessory pulmonary vein, thrombus or stenosis. 3. 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 4. No acute finding.      AF ablation on 5/19  EP study & partial PVI of the left superior vein and full PVI of the right veins during ablation on 05/10/2019, developed pericardial effusion during procedure.  No tamponade.  She was able to restarted Eliquis thereafter.   but  she was admitted to CCU due to posterior lateral LA LV moderate pericardial effusion during left pulmonary vein isolation.  Heparin was reversed.  She was stable so no pericardiocentesis was warranted  Started on AMIO and BBlockers                CARDIAC STUDIES        06/19/20   ECHO TIM W POSSIBLE CARDIOVERSION 06/19/2020 6/19/2020    Narrative · Mildly dilated left atrium. Left atrial appendage velocity is normal   (greater than 40 cm/sec). · Normal cavity size and wall thickness. Mild global systolic function. Estimated left ventricular ejection fraction is 45 - 50%. · Mild to moderate mitral valve regurgitation is present.   · Mild to moderate tricuspid valve regurgitation is present. Signed by: Nan Celaya MD                                 EKG Results     None              IMAGING      MRI Results (most recent):  Results from Hospital Encounter encounter on 07/16/13   MRI KNEE RT WO CONT    Narrative **Final Report**       ICD Codes / Adm. Diagnosis: 836.2  401.9 / Other tear of cartilage or men    Examination:  MR KNEE WO CON RT  - [id number] - Jul 16 2013  3:28PM  Accession No:  [id number]  Reason:  tear      REPORT:  EXAM:  Right knee MRI without contrast    INDICATION: Pain    COMPARISON: None    TECHNIQUE: Axial T2 fat-saturated and proton density fat-saturated; coronal   T1 and proton density fat-saturated; and sagittal T2 fat-saturated, proton   density fat-saturated, and gradient echo MRI of the     knee . CONTRAST:  None. FINDINGS: Bone marrow: Degenerative type bone marrow edema is present in the   medial aspects of the medial femoral condyle and medial tibial plateau. A   small focus of subchondral bone marrow edema is present in the lateral facet   of the patella. There is no evidence of fracture or marrow replacing process. Joint fluid: There is a mild joint effusion. Areas of synovitis are seen   throughout the joint space, particularly in the suprapatellar recess and   anterior to the lateral compartment. There is additional 9 mm loose body   along the course of the popliteal tendon on series 7 image 55. There is a moderate-sized Baker's cyst posterior medial to the knee. This   measures 2.0 x 1.9 x 7.7 cm craniocaudal. There is a small rounded loose   body within this measuring 5 mm on series 8 image 4. Several strands of   synovitis are seen within this. Collateral ligaments and posterior, lateral corner: Intact. Medial meniscus: The medial meniscus is extruded.  There is a complete radial   tear of the posterior horn of the medial meniscus as evidenced by an absent    posterior horn on series 9 image 8. The distal material is flipped   inferiorly and laterally into the gutter on series 8 image 4 and series 6   image 17. Lateral meniscus: High signal is seen in the free edge of the body related   to free edge tearing. Irregular increased signal is seen anterior to the   anterior horn is likely related to synovitis. ACL and PCL: A small area mucoid degeneration is present in the proximal   ACL. The ACL and PCL are intact however. Tendons: Intact. .    Muscles: Within normal limits. Patellofemoral alignment: No patellar subluxation/tilt. Trochlear groove is   not hypoplastic. TT-TG distance: Normal at 12 mm. Articular cartilage: Full-thickness cartilage loss is seen along the   weightbearing surfaces of the medial femoral condyle and medial tibial   plateau with significant marginal osteophytosis. Mild underlying subchondral   bone marrow edema is present medially. Near full-thickness fissuring is seen along the vertical ridge of the   patella. Mild irregular cartilage loss is seen along the lateral aspect of   the patellofemoral compartment. Small marginal osteophytes are seen adjacent   to this. There is a small area of subchondral bone marrow edema related to a   full-thickness fissure in the lateral facet of patella. There is a shallow thickness of fissuring are seen along the lateral femoral   condyle towards the tibial spine. Significant marginal osteophytes are   present. There is diffuse mild cartilage thinning throughout the lateral   compartment. Soft tissue mass: None. Nonspecific soft tissue edema is seen anterior to   the patellar tendon. IMPRESSION:  1. Complete radial tear of the posterior horn of the medial meniscus with   displacement of meniscal material into the inferior gutter. 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 2. Moderate lateral and patellofemoral osteoarthritis.   3. Free edge tearing in the body of the lateral meniscus. 4. Moderate joint effusion with areas of extensive synovitis likely related   to long-standing degenerative disease. 5. A moderate-sized Baker's cyst is seen posterior medial to the knee with   areas of synovitis and a small loose body. An additional small loose body is   seen along the course of the popliteus tendon. Signing/Reading Doctor: Aroldo Campos ([id number])    Approved: Aroldo Campos ([id number])  Jul 16 2013  4:08PM                                      CT Results (most recent):  Results from Hospital Encounter encounter on 03/18/19   CTA CHEST W OR W WO CONT    Narrative INDICATION: Atrial fibrillation, pulmonary vein mapping. CONTRAST: 100 mL of Isovue 370. TECHNIQUE: Multislice helical CT of the chest was performed on a 64-slice  multiple detector row CT system (Hello! MessengerT). Unenhanced  images were obtained  to localize the volume for acquisition. Bolus tracking software was used for  timing. Thin section images were acquired during uneventful rapid bolus  intravenous administration of contrast. 3D image post processing was performed  including coronal MIPS and SSD. CT dose reduction was achieved through the use  of a standardized protocol tailored for this examination and automatic exposure  control for dose modulation. FINDINGS:   The left superior and inferior pulmonary veins and right superior and inferior  pulmonary veins demonstrate no stenosis. There is early branching of the right  inferior pulmonary vein. No accessory pulmonary veins are identified. Left superior pulmonary vein ostium measures 1.9 x 1.8 cm, with first branch  originating 0.8 cm from the atrium. Left inferior pulmonary vein measures 1.9 x 1.4 cm, with first branch  originating 0.6 cm from the atrium. Right superior pulmonary vein measures 2.1 x 1.9 cm, with first branch  originating 0.8 cm from the atrium.   Right inferior pulmonary vein measures 2.1 x 1.9 cm, with first branch  originating 0.1 cm from the atrium. There is no thrombus or mass within the pulmonary veins or left atrium. The visualized lungs are clear of mass, nodule, acute air space disease or  edema. There is no pleural or pericardial effusion. The aorta enhances normally without evidence of aneurysm or dissection. There is no significant adenopathy. Impression IMPRESSION:   1. CTA pulmonary vein/left atrial mapping performed. 2. No accessory pulmonary vein, thrombus or stenosis. 3. 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 4. No acute finding. XR Results (most recent):  Results from Hospital Encounter encounter on 05/10/19   XR CHEST PORT    Narrative INDICATION: . sob  Additional history: Dyspnea. Atrial fibrillation  COMPARISON: Previous chest xray, 4/25/2019. LIMITATIONS: Portable technique. Bridgette Sleet FINDINGS: Single frontal view of the chest.   .  Lines/tubes/surgical: Prescribed cardiac leads. Heart/mediastinum: Unremarkable. Lungs/pleura: Chronic appearing lung changes. Linear opacity in the right upper  lobe associated with chain suture material. No visualized pleural effusion or  pneumothorax. Additional Comments: None. .    Impression IMPRESSION:  1. No radiographic evidence of acute cardiopulmonary disease.   2. Postsurgical changes in the right upper lobe               Past Medical History:   Diagnosis Date    Arthritis     Asthma     dr. Tavon Pool allergist    [facility] [address]     Breast cancer ([facility] [address] 1980    right - mastectomy    Coagulation disorder ([facility] [address]     on eliquis    Dyslipidemia     labs 2008 - chol 283, HDL 83, ,     HTN (hypertension)     Hyperlipidemia LDL goal < 130     for increased LDL particles, Dr. Elizabeth Renteria nodule     Dr. Carrie Griffin Palpitations     resolved     Past Surgical History:   Procedure Laterality Date    CHEST SURGERY PROCEDURE UNLISTED      lung nodule removed - benign    COLONOSCOPY N/A 9/10/2018    COLONOSCOPY performed by Gabriela Almonte MD at [facility], [place] [address] W/O CONT WITH CALCIUM  1/2012    CAC score 0; calcified mediastinal lymph nodes consistent granulomatous disease    ECHO 2D ADULT  5/5/2008    normal, LVEF 60%    HX APPENDECTOMY      HX HYSTERECTOMY Bilateral 03/19/2015    and uro repair    HX KNEE REPLACEMENT Right     HX MASTECTOMY Right     HX TONSILLECTOMY      HX UROLOGICAL  8/1/14    Urodynamics    INTRACARD ECHO, THER/DX INTERVENT N/A 5/10/2019    Intracardiac Echocardiogram performed by Zaynab Hendrix MD at [place] [address] EXTERNAL  3/28/2018         ME EPHYS EVL TRNSPTL TX ATRIAL FIB ISOLAT PULM VEIN N/A 5/10/2019    ABLATION A-FIB  W COMPLETE EP STUDY performed by Zaynab Hendrix MD at [address] CATH LAB    ME INTRACARDIAC ELECTROPHYSIOLOGIC 3D MAPPING N/A 5/10/2019    Ep 3d Mapping performed by Zaynab Hendrix MD at [place] [address] CATH LAB    STRESS TEST [facility]  1/5/12    walked 7:31, no chest pain, normal MPI. Social History     Tobacco Use    Smoking status: Never Smoker    Smokeless tobacco: Never Used   Substance Use Topics    Alcohol use: Yes     Comment: wine nightly    Drug use: No     Family History   Problem Relation Age of Onset    Heart Failure Mother     Stroke Father     Other Other         endometrial cancer, niece     Allergies   Allergen Reactions    Betadine [Povidone-Iodine] Rash         There were no vitals taken for this visit. There were no vitals filed for this visit. Review of Systems:   Pertinent items are noted in the History of Present Illness. Visit Vitals  /70 (BP 1 Location: Left arm, BP Patient Position: Sitting)   Pulse 60   Resp 16   Ht 5' 6\" (1.676 m)   Wt 189 lb (85.7 kg)   SpO2 98%   BMI 30.51 kg/m²     General Appearance:  Well developed, well nourished,alert and oriented x 3, and individual in no acute distress.    Ears/Nose/Mouth/Throat: Hearing grossly normal.         Neck: Supple. Chest:   Lungs clear to auscultation bilaterally. Cardiovascular:  Regular rate and rhythm, S1, S2 normal, no murmur. Abdomen:   Soft, non-tender, bowel sounds are active. Extremities: No edema bilaterally. Skin: Warm and dry. Current Outpatient Medications on File Prior to Visit   Medication Sig Dispense Refill    amoxicillin (AMOXIL) 500 mg capsule TAKE 2 CAPSULES BY MOUTH 1 HOUR BEFORE DENTAL PROCEDURE AND TAKE 2 CAPSULES 6 HOURS AFTER DENTAL PROCEDURE      amiodarone (PACERONE) 100 mg tablet Take 2 Tabs by mouth daily. (Patient taking differently: Take 200 mg by mouth nightly.) 180 Tab 1    acetaminophen (Tylenol Extra Strength) 500 mg tablet Take 500 mg by mouth nightly as needed.  potassium chloride (K-DUR, KLOR-CON) 20 mEq tablet Take 20 mEq by mouth daily as needed.  ELIQUIS 5 mg tablet TAKE 1 TABLET TWICE A  Tab 4    levothyroxine (SYNTHROID) 25 mcg tablet Take  by mouth Daily (before breakfast).  furosemide (LASIX) 40 mg tablet Take 40 mg by mouth as needed.  fluticasone (FLOVENT HFA) 220 mcg/actuation inhaler as needed.  atorvastatin (LIPITOR) 20 mg tablet Take 20 mg by mouth every evening.  MULTIVITAMIN PO Take  by mouth. Takes one po once daily.  cholecalciferol (VITAMIN D3) 5,000 unit capsule Take 5,000 Units by mouth daily.  cetirizine (ZYRTEC) 10 mg tablet Take 10 mg by mouth every evening.  estradiol (ESTRACE) 0.01 % (0.1 mg/gram) vaginal cream Insert  into vagina every Monday and Thursday.  EPINEPHrine (EPIPEN) 0.3 mg/0.3 mL (1:1,000) injection 0.3 mL by IntraMUSCular route once as needed for up to 1 dose. 0.3 mL 0    albuterol (PROVENTIL HFA, VENTOLIN HFA, PROAIR HFA) 90 mcg/actuation inhaler Take 1 Puff by inhalation every four (4) hours as needed. 2 Inhaler 3    raloxifene (EVISTA) 60 mg tablet Take 1 Tab by mouth daily.  90 Tab 4    montelukast (SINGULAIR) 10 mg tablet Take 10 mg by mouth every evening. No current facility-administered medications on file prior to visit. Ashlyn Tejada had no medications administered during this visit. Current Outpatient Medications   Medication Sig    amoxicillin (AMOXIL) 500 mg capsule TAKE 2 CAPSULES BY MOUTH 1 HOUR BEFORE DENTAL PROCEDURE AND TAKE 2 CAPSULES 6 HOURS AFTER DENTAL PROCEDURE    amiodarone (PACERONE) 100 mg tablet Take 2 Tabs by mouth daily. (Patient taking differently: Take 200 mg by mouth nightly.)    acetaminophen (Tylenol Extra Strength) 500 mg tablet Take 500 mg by mouth nightly as needed.  potassium chloride (K-DUR, KLOR-CON) 20 mEq tablet Take 20 mEq by mouth daily as needed.  ELIQUIS 5 mg tablet TAKE 1 TABLET TWICE A DAY    levothyroxine (SYNTHROID) 25 mcg tablet Take  by mouth Daily (before breakfast).  furosemide (LASIX) 40 mg tablet Take 40 mg by mouth as needed.  fluticasone (FLOVENT HFA) 220 mcg/actuation inhaler as needed.  atorvastatin (LIPITOR) 20 mg tablet Take 20 mg by mouth every evening.  MULTIVITAMIN PO Take  by mouth. Takes one po once daily.  cholecalciferol (VITAMIN D3) 5,000 unit capsule Take 5,000 Units by mouth daily.  cetirizine (ZYRTEC) 10 mg tablet Take 10 mg by mouth every evening.  estradiol (ESTRACE) 0.01 % (0.1 mg/gram) vaginal cream Insert  into vagina every Monday and Thursday.  EPINEPHrine (EPIPEN) 0.3 mg/0.3 mL (1:1,000) injection 0.3 mL by IntraMUSCular route once as needed for up to 1 dose.  albuterol (PROVENTIL HFA, VENTOLIN HFA, PROAIR HFA) 90 mcg/actuation inhaler Take 1 Puff by inhalation every four (4) hours as needed.  raloxifene (EVISTA) 60 mg tablet Take 1 Tab by mouth daily.  montelukast (SINGULAIR) 10 mg tablet Take 10 mg by mouth every evening. No current facility-administered medications for this visit.           Lab Results   Component Value Date/Time    Cholesterol, total 142 03/28/2018 04:00 AM    HDL Cholesterol 76 03/28/2018 04:00 AM    LDL, calculated 55.2 03/28/2018 04:00 AM    VLDL, calculated 10.8 03/28/2018 04:00 AM    Triglyceride 54 03/28/2018 04:00 AM    CHOL/HDL Ratio 1.9 03/28/2018 04:00 AM       Lab Results   Component Value Date/Time    Sodium 142 06/09/2020 11:06 AM    Potassium 4.2 06/09/2020 11:06 AM    Chloride 104 06/09/2020 11:06 AM    CO2 27 06/09/2020 11:06 AM    Anion gap 6 05/11/2019 03:10 AM    Glucose 118 (H) 06/09/2020 11:06 AM    BUN 13 06/09/2020 11:06 AM    Creatinine 0.97 06/09/2020 11:06 AM    BUN/Creatinine ratio 13 06/09/2020 11:06 AM    GFR est AA 64 06/09/2020 11:06 AM    GFR est non-AA 56 (L) 06/09/2020 11:06 AM    Calcium 8.8 06/09/2020 11:06 AM       Lab Results   Component Value Date/Time    ALT (SGPT) 32 10/24/2019 11:04 AM    Alk.  phosphatase 77 10/24/2019 11:04 AM    Bilirubin, direct 0.14 10/24/2019 11:04 AM    Bilirubin, total 0.4 10/24/2019 11:04 AM       Lab Results   Component Value Date/Time    WBC 7.1 06/19/2020 09:46 AM    Hemoglobin (POC) 12.9 10/25/2009 03:04 AM    HGB 12.3 06/19/2020 09:46 AM    Hematocrit (POC) 38 10/25/2009 03:04 AM    HCT 37.8 06/19/2020 09:46 AM    PLATELET 234 90/16/5706 09:46 AM    MCV 98.2 06/19/2020 09:46 AM       Lab Results   Component Value Date/Time    TSH 4.720 (H) 06/09/2020 11:06 AM         Lab Results   Component Value Date/Time    Cholesterol, total 142 03/28/2018 04:00 AM    Cholesterol, total 175 12/18/2015 10:18 AM    Cholesterol, total 251 (H) 06/03/2015 08:45 AM    Cholesterol, total 206 (H) 05/14/2014 10:46 AM    Cholesterol, total 201 (H) 04/26/2013 09:56 AM    HDL Cholesterol 76 03/28/2018 04:00 AM    HDL Cholesterol 93 12/18/2015 10:18 AM    HDL Cholesterol 89 06/03/2015 08:45 AM    HDL Cholesterol 92 05/14/2014 10:46 AM    HDL Cholesterol 99 04/26/2013 09:56 AM    LDL, calculated 55.2 03/28/2018 04:00 AM    LDL, calculated 68 12/18/2015 10:18 AM    LDL, calculated 141 (H) 06/03/2015 08:45 AM    LDL, calculated 98 05/14/2014 10:46 AM    LDL, calculated 83 04/26/2013 09:56 AM    Triglyceride 54 03/28/2018 04:00 AM    Triglyceride 68 12/18/2015 10:18 AM    Triglyceride 104 06/03/2015 08:45 AM    Triglyceride 79 05/14/2014 10:46 AM    Triglyceride 96 04/26/2013 09:56 AM    CHOL/HDL Ratio 1.9 03/28/2018 04:00 AM                Please note that this dictation was completed with [employer], the computer voice recognition software. Quite often unanticipated grammatical, syntax, homophones, and other interpretative errors are inadvertently transcribed by the computer software. Please disregard these errors. Please excuse any errors that have escaped final proofreading.

## 2020-10-20 NOTE — PATIENT INSTRUCTIONS
Start taking norvasc 2.5 mg nightly. Record blood pressure/heart rate daily. Remember to sit for at least 3 minutes. Have both feet flat on floor and arm at heart level. Call or send readings via [employer] in 2 weeks. Keep December/2020 appointment.

## 2020-10-29 ENCOUNTER — TELEPHONE (OUTPATIENT)
Dept: CARDIOLOGY CLINIC | Age: 80
End: 2020-10-29

## 2020-11-13 ENCOUNTER — TELEPHONE (OUTPATIENT)
Dept: CARDIOLOGY CLINIC | Age: 80
End: 2020-11-13

## 2020-11-13 RX ORDER — DIPHENHYDRAMINE HCL 25 MG
25 CAPSULE ORAL
COMMUNITY
End: 2021-07-25

## 2020-11-13 NOTE — TELEPHONE ENCOUNTER
Patient is calling to speak with Liv Martines as she believes she may be allergic to a medication she is taking. Patient states that she had red splotches on her face and it is really hot like she has been in the sun and she has not. Please advise.       Phone: [phone]

## 2020-11-13 NOTE — TELEPHONE ENCOUNTER
Per Dr. Soni How:      Stop amlodipine. May take benadryl 25 mg po. Identifiers x 2. Informed of the above. Verbalized understanding.

## 2020-11-20 RX ORDER — LOSARTAN POTASSIUM 25 MG/1
25 TABLET ORAL
Qty: 90 TAB | Refills: 1 | Status: SHIPPED | OUTPATIENT
Start: 2020-11-20 | End: 2021-01-15 | Stop reason: SDUPTHER

## 2020-11-20 NOTE — TELEPHONE ENCOUNTER
Per Dr. Luzmaria Mata:    Stop norvasc    Start losartan 25 mg at dinner time    Bp log for 2 weeks     Requested Prescriptions     Signed Prescriptions Disp Refills    losartan (COZAAR) 25 mg tablet 90 Tab 1     Sig: Take 1 Tab by mouth daily (with dinner). Authorizing Provider: iVc Yin     Ordering User: Taisha Corrales     Verbal order per Dr. Luzmaria Mata.     Future Appointments   Date Time Provider Hazel Wade   12/11/2020 10:40 AM MD JOSÉ Elder AMB

## 2020-11-23 ENCOUNTER — PATIENT MESSAGE (OUTPATIENT)
Dept: CARDIOLOGY CLINIC | Age: 80
End: 2020-11-23

## 2020-11-25 ENCOUNTER — TELEPHONE (OUTPATIENT)
Dept: CARDIOLOGY CLINIC | Age: 80
End: 2020-11-25

## 2020-11-25 NOTE — TELEPHONE ENCOUNTER
Identifiers x 2. Patient states that the previous BP readings were prior to the losartan. She is going to continue to monitor daily and bring readings to follow up appt. To start taking losartan at bedtime, due to forgetting to take at dinner time.

## 2020-11-29 DIAGNOSIS — I48.19 PERSISTENT ATRIAL FIBRILLATION (HCC): ICD-10-CM

## 2020-12-01 NOTE — TELEPHONE ENCOUNTER
Patient was called by Kory Howell RN 11/25/20 at 5:45pm and told to continue to monitor her BP and bring log in to her follow up appt. Readings thus far forwarded to Dr. Diane Mcclelland.

## 2020-12-03 ENCOUNTER — HOSPITAL ENCOUNTER (OUTPATIENT)
Dept: LAB | Age: 80
Discharge: HOME | End: 2020-12-03
Payer: MEDICARE

## 2020-12-03 PROCEDURE — 84443 ASSAY THYROID STIM HORMONE: CPT

## 2020-12-03 PROCEDURE — 84436 ASSAY OF TOTAL THYROXINE: CPT

## 2020-12-03 PROCEDURE — 85027 COMPLETE CBC AUTOMATED: CPT

## 2020-12-03 PROCEDURE — 80053 COMPREHEN METABOLIC PANEL: CPT

## 2020-12-03 PROCEDURE — 84479 ASSAY OF THYROID (T3 OR T4): CPT

## 2020-12-04 LAB
ALBUMIN SERPL-MCNC: 4.1 G/DL (ref 3.7–4.7)
ALBUMIN/GLOB SERPL: 2 {RATIO} (ref 1.2–2.2)
ALP SERPL-CCNC: 68 IU/L (ref 39–117)
ALT SERPL-CCNC: 18 IU/L (ref 0–32)
AST SERPL-CCNC: 23 IU/L (ref 0–40)
BILIRUB SERPL-MCNC: 0.4 MG/DL (ref 0–1.2)
BUN SERPL-MCNC: 11 MG/DL (ref 8–27)
BUN/CREAT SERPL: 13 (ref 12–28)
CALCIUM SERPL-MCNC: 9 MG/DL (ref 8.7–10.3)
CHLORIDE SERPL-SCNC: 103 MMOL/L (ref 96–106)
CO2 SERPL-SCNC: 25 MMOL/L (ref 20–29)
CREAT SERPL-MCNC: 0.84 MG/DL (ref 0.57–1)
ERYTHROCYTE [DISTWIDTH] IN BLOOD BY AUTOMATED COUNT: 12.9 % (ref 11.7–15.4)
FT4I SERPL CALC-MCNC: 2.7 (ref 1.2–4.9)
GLOBULIN SER CALC-MCNC: 2.1 G/DL (ref 1.5–4.5)
GLUCOSE SERPL-MCNC: 101 MG/DL (ref 65–99)
HCT VFR BLD AUTO: 37.6 % (ref 34–46.6)
HGB BLD-MCNC: 12.8 G/DL (ref 11.1–15.9)
MCH RBC QN AUTO: 31.4 PG (ref 26.6–33)
MCHC RBC AUTO-ENTMCNC: 34 G/DL (ref 31.5–35.7)
MCV RBC AUTO: 92 FL (ref 79–97)
PLATELET # BLD AUTO: 239 X10E3/UL (ref 150–450)
POTASSIUM SERPL-SCNC: 4.2 MMOL/L (ref 3.5–5.2)
PROT SERPL-MCNC: 6.2 G/DL (ref 6–8.5)
RBC # BLD AUTO: 4.07 X10E6/UL (ref 3.77–5.28)
SODIUM SERPL-SCNC: 141 MMOL/L (ref 134–144)
T3RU NFR SERPL: 29 % (ref 24–39)
T4 SERPL-MCNC: 9.3 UG/DL (ref 4.5–12)
TSH SERPL DL<=0.005 MIU/L-ACNC: 2.72 UIU/ML (ref 0.45–4.5)
WBC # BLD AUTO: 5.4 X10E3/UL (ref 3.4–10.8)

## 2020-12-09 RX ORDER — AMIODARONE HYDROCHLORIDE 100 MG/1
200 TABLET ORAL
Qty: 180 TAB | Refills: 1 | Status: SHIPPED | OUTPATIENT
Start: 2020-12-09 | End: 2021-04-09 | Stop reason: SDUPTHER

## 2020-12-11 ENCOUNTER — OFFICE VISIT (OUTPATIENT)
Dept: CARDIOLOGY CLINIC | Age: 80
End: 2020-12-11
Payer: MEDICARE

## 2020-12-11 VITALS
OXYGEN SATURATION: 99 % | HEIGHT: 66 IN | RESPIRATION RATE: 18 BRPM | SYSTOLIC BLOOD PRESSURE: 130 MMHG | WEIGHT: 185 LBS | BODY MASS INDEX: 29.73 KG/M2 | DIASTOLIC BLOOD PRESSURE: 70 MMHG | HEART RATE: 57 BPM

## 2020-12-11 DIAGNOSIS — I48.19 PERSISTENT ATRIAL FIBRILLATION (HCC): Primary | ICD-10-CM

## 2020-12-11 DIAGNOSIS — I10 ESSENTIAL HYPERTENSION: ICD-10-CM

## 2020-12-11 PROCEDURE — G8427 DOCREV CUR MEDS BY ELIG CLIN: HCPCS | Performed by: SPECIALIST

## 2020-12-11 PROCEDURE — G8536 NO DOC ELDER MAL SCRN: HCPCS | Performed by: SPECIALIST

## 2020-12-11 PROCEDURE — G0463 HOSPITAL OUTPT CLINIC VISIT: HCPCS | Performed by: SPECIALIST

## 2020-12-11 PROCEDURE — 99214 OFFICE O/P EST MOD 30 MIN: CPT | Performed by: SPECIALIST

## 2020-12-11 PROCEDURE — G8754 DIAS BP LESS 90: HCPCS | Performed by: SPECIALIST

## 2020-12-11 PROCEDURE — G8510 SCR DEP NEG, NO PLAN REQD: HCPCS | Performed by: SPECIALIST

## 2020-12-11 PROCEDURE — G8399 PT W/DXA RESULTS DOCUMENT: HCPCS | Performed by: SPECIALIST

## 2020-12-11 PROCEDURE — 93005 ELECTROCARDIOGRAM TRACING: CPT | Performed by: SPECIALIST

## 2020-12-11 PROCEDURE — G8752 SYS BP LESS 140: HCPCS | Performed by: SPECIALIST

## 2020-12-11 PROCEDURE — 1100F PTFALLS ASSESS-DOCD GE2>/YR: CPT | Performed by: SPECIALIST

## 2020-12-11 PROCEDURE — 3288F FALL RISK ASSESSMENT DOCD: CPT | Performed by: SPECIALIST

## 2020-12-11 PROCEDURE — 93010 ELECTROCARDIOGRAM REPORT: CPT | Performed by: SPECIALIST

## 2020-12-11 PROCEDURE — G8417 CALC BMI ABV UP PARAM F/U: HCPCS | Performed by: SPECIALIST

## 2020-12-11 PROCEDURE — 1090F PRES/ABSN URINE INCON ASSESS: CPT | Performed by: SPECIALIST

## 2020-12-11 RX ORDER — CYCLOBENZAPRINE HCL 5 MG
5 TABLET ORAL AS NEEDED
COMMUNITY
Start: 2020-12-07 | End: 2021-07-25

## 2020-12-11 RX ORDER — APIXABAN 5 MG/1
TABLET, FILM COATED ORAL
Qty: 180 TAB | Refills: 3 | Status: ON HOLD | OUTPATIENT
Start: 2020-12-11 | End: 2021-06-21 | Stop reason: ALTCHOICE

## 2020-12-11 NOTE — PROGRESS NOTES
[address] [facility]                                                            OFFICE NOTE        Teresita Cote M.D.,KATIE LINDA ELLISON   1940  [id number]    Date/Time:  12/11/202011:21 AM        ICD-10-CM ICD-9-CM    1. Persistent atrial fibrillation (HCC)  I48.19 427.31 AMB POC EKG ROUTINE W/ 12 LEADS, INTER & REP         SUBJECTIVE:    Doing very well completely asymptomatic cardoiac wise   she walks 1-2 miles few times a week with no issues     Assessment/Plan  AF: s/p  partial af ablation on 5/19 complicated by pericardial effusion which has  remained stable  ecg today  with NSR fav and ivcd  No changes from previous     s/p TIM and cdc cardioversion on 6/20    on amiodarone (now back to 200 mg daily) and eliquis      ( amiodarone previously decreased by Dr. Rudy Marcus on account of bradycardia and hypothyroidism  But patient with recurrent AF then)  conitnue off metoprolol since she feels much better     Aware of potential side effects no allergic reaction thus far ( iodine allergy)      Continue eliquis , no longer c/o bruising after stopping asa     Discussed need not to miss medications if at all possible     Discussed neurological effects of amiodarone she has none thus far     She will let me know if lack of balance becomes noticeable     lft and tft q 6 months s    Discussed recenrt labs with normal tsh and lft     CXR and eye exam yearly also discussed     Sob: much better and resolved off metoprolol and in nsr  Continue lasix and potassium on a prn base ( not taking at all thus far)     HTN: well controlled   no changes in meds     Otherwise see her back in 6 months with cmp and tsh           HPI   80 y.o. female. Patient with h/o HTN, HLD, Ca score of 0 in 2012, PVCs on ECG on 1/16 and echo on 1/16 with EF 55% mild MR.  S/p LTKR in 1/16(seen by Dr. Raul John)  Also remote h/o breast ca s/p mastectomy in 1980 but no chemo or xrt     Echo on 4/16:Ejection fraction was estimated to be 55 %. There were no  regional wall motion abnormalities. Wall thickness was at the upper limits  of normal.    Left atrium: The atrium was mildly dilated. Mitral valve: There was mild regurgitation. Aortic valve: Leaflets exhibited sclerosis. holter on 4/16: NSR  frequent pacs couplets triples and occasional non sustained at, frequent pvcs (0.65%)     On 3/18: admitted to [place] [address] onset A-fib with RVR     TIM and Cardioversion completed with 200 J with conversion to NSR  TIM no PFO, trace AR, mild MR, no TR or OK Mild atherosclerosis in prox descending aortaNo KENNEDY thrombus LA moderate dilation RA normal  Started eliquis, flecainide and metoprolol     Echo on 8/20/18: The ventricle was dilated. Systolic function was mildly  reduced by EF (biplane method of disks). Ejection fraction was estimated  to be 45 % in the range of 40 % to 50 %. There was mild diffuse  hypokinesis.        TIM /CARDIOVERSION on 2/19 resulting in NSR. Flecainide resumed     CXR on 2/25/19:1. Interval development of small bilateral pleural effusions and mild basilar     atelectasis. Follow-up to resolution is suggested. Doppler LLE on 2/19:No evidence of left lower extremity vein thrombosis.         02/27/19   ECHO ADULT FOLLOW-UP OR LIMITED 02/27/2019 2/27/2019     Addendum · Left ventricular low normal systolic function. Estimated left  ventricular ejection fraction is 51 - 55%. Calculated left ventricular  ejection fraction is 50%. Biplane method used to measure ejection  fraction. Normal left ventricular wall motion, no regional wall motion  abnormality noted. · Mild to moderate mitral valve regurgitation. · Trivial-to-small pericardial effusion. · Mild tricuspid valve regurgitation is present. Kady Sanon MD 2/27/2019  2:19 PM           Narrative · Left ventricular low normal systolic function.  Estimated left   ventricular ejection fraction is 51 - 55%. Calculated left ventricular   ejection fraction is 50%. Biplane method used to measure ejection   fraction. Normal left ventricular wall motion, no regional wall motion   abnormality noted. · Mild to moderate mitral valve regurgitation. · Trivial-to-small pericardial effusion. · Mild tricuspid valve stenosis is present.          Signed by: Francine Roldan MD                 02/27/19   ECHO ADULT FOLLOW-UP OR LIMITED 02/27/2019 2/27/2019     Narrative · Left ventricular low normal systolic function. Estimated left   ventricular ejection fraction is 51 - 55%. Calculated left ventricular   ejection fraction is 50%. Biplane method used to measure ejection   fraction. Normal left ventricular wall motion, no regional wall motion   abnormality noted. · Mild to moderate mitral valve regurgitation. · Trivial-to-small pericardial effusion. · Mild tricuspid valve stenosis is present.          Signed by: Francine Roldan MD       chest ct on 3/19:1. CTA pulmonary vein/left atrial mapping performed. 2. No accessory pulmonary vein, thrombus or stenosis. 3. 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 4. No acute finding.      AF ablation on 5/19  EP study & partial PVI of the left superior vein and full PVI of the right veins during ablation on 05/10/2019, developed pericardial effusion during procedure.  No tamponade.  She was able to restarted Eliquis thereafter.   but  she was admitted to CCU due to posterior lateral LA LV moderate pericardial effusion during left pulmonary vein isolation.  Heparin was reversed.  She was stable so no pericardiocentesis was warranted  Started on AMIO and BBlockers                CARDIAC STUDIES        06/19/20   ECHO TIM W POSSIBLE CARDIOVERSION 06/19/2020 6/19/2020    Narrative · Mildly dilated left atrium. Left atrial appendage velocity is normal   (greater than 40 cm/sec). · Normal cavity size and wall thickness.  Mild global systolic function. Estimated left ventricular ejection fraction is 45 - 50%. · Mild to moderate mitral valve regurgitation is present. · Mild to moderate tricuspid valve regurgitation is present. Signed by: Tuan Morgan MD                                 EKG Results     Procedure 720 Value Units Date/Time    AMB POC EKG ROUTINE W/ 12 LEADS, INTER & REP [[id number]] Resulted:  12/11/20 1103    Order Status:  Completed Updated:  12/11/20 1107              IMAGING      MRI Results (most recent):  Results from [place] encounter on 07/16/13   MRI KNEE RT WO CONT    Narrative **Final Report**       ICD Codes / Adm. Diagnosis: 836.2  401.9 / Other tear of cartilage or men    Examination:  MR KNEE WO CON RT  - [id number] - Jul 16 2013  3:28PM  Accession No:  [id number]  Reason:  tear      REPORT:  EXAM:  Right knee MRI without contrast    INDICATION: Pain    COMPARISON: None    TECHNIQUE: Axial T2 fat-saturated and proton density fat-saturated; coronal   T1 and proton density fat-saturated; and sagittal T2 fat-saturated, proton   density fat-saturated, and gradient echo MRI of the     knee . CONTRAST:  None. FINDINGS: Bone marrow: Degenerative type bone marrow edema is present in the   medial aspects of the medial femoral condyle and medial tibial plateau. A   small focus of subchondral bone marrow edema is present in the lateral facet   of the patella. There is no evidence of fracture or marrow replacing process. Joint fluid: There is a mild joint effusion. Areas of synovitis are seen   throughout the joint space, particularly in the suprapatellar recess and   anterior to the lateral compartment. There is additional 9 mm loose body   along the course of the popliteal tendon on series 7 image 55. There is a moderate-sized Baker's cyst posterior medial to the knee. This   measures 2.0 x 1.9 x 7.7 cm craniocaudal. There is a small rounded loose   body within this measuring 5 mm on series 8 image 4. Several strands of   synovitis are seen within this. Collateral ligaments and posterior, lateral corner: Intact. Medial meniscus: The medial meniscus is extruded. There is a complete radial   tear of the posterior horn of the medial meniscus as evidenced by an absent    posterior horn on series 9 image 8. The distal material is flipped   inferiorly and laterally into the gutter on series 8 image 4 and series 6   image 17. Lateral meniscus: High signal is seen in the free edge of the body related   to free edge tearing. Irregular increased signal is seen anterior to the   anterior horn is likely related to synovitis. ACL and PCL: A small area mucoid degeneration is present in the proximal   ACL. The ACL and PCL are intact however. Tendons: Intact. .    Muscles: Within normal limits. Patellofemoral alignment: No patellar subluxation/tilt. Trochlear groove is   not hypoplastic. TT-TG distance: Normal at 12 mm. Articular cartilage: Full-thickness cartilage loss is seen along the   weightbearing surfaces of the medial femoral condyle and medial tibial   plateau with significant marginal osteophytosis. Mild underlying subchondral   bone marrow edema is present medially. Near full-thickness fissuring is seen along the vertical ridge of the   patella. Mild irregular cartilage loss is seen along the lateral aspect of   the patellofemoral compartment. Small marginal osteophytes are seen adjacent   to this. There is a small area of subchondral bone marrow edema related to a   full-thickness fissure in the lateral facet of patella. There is a shallow thickness of fissuring are seen along the lateral femoral   condyle towards the tibial spine. Significant marginal osteophytes are   present. There is diffuse mild cartilage thinning throughout the lateral   compartment. Soft tissue mass: None. Nonspecific soft tissue edema is seen anterior to   the patellar tendon. IMPRESSION:  1.  Complete radial tear of the posterior horn of the medial meniscus with   displacement of meniscal material into the inferior gutter. 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 2. Moderate lateral and patellofemoral osteoarthritis. 3. Free edge tearing in the body of the lateral meniscus. 4. Moderate joint effusion with areas of extensive synovitis likely related   to long-standing degenerative disease. 5. A moderate-sized Baker's cyst is seen posterior medial to the knee with   areas of synovitis and a small loose body. An additional small loose body is   seen along the course of the popliteus tendon. Signing/Reading Doctor: Sheldon Musa ([id number])    Approved: Sheldon Musa ([id number])  Jul 16 2013  4:08PM                                      CT Results (most recent):  Results from Hospital Encounter encounter on 03/18/19   CTA CHEST W OR W WO CONT    Narrative INDICATION: Atrial fibrillation, pulmonary vein mapping. CONTRAST: 100 mL of Isovue 370. TECHNIQUE: Multislice helical CT of the chest was performed on a 64-slice  multiple detector row CT system (BuysightT). Unenhanced  images were obtained  to localize the volume for acquisition. Bolus tracking software was used for  timing. Thin section images were acquired during uneventful rapid bolus  intravenous administration of contrast. 3D image post processing was performed  including coronal MIPS and SSD. CT dose reduction was achieved through the use  of a standardized protocol tailored for this examination and automatic exposure  control for dose modulation. FINDINGS:   The left superior and inferior pulmonary veins and right superior and inferior  pulmonary veins demonstrate no stenosis. There is early branching of the right  inferior pulmonary vein. No accessory pulmonary veins are identified.      Left superior pulmonary vein ostium measures 1.9 x 1.8 cm, with first branch  originating 0.8 cm from the atrium. Left inferior pulmonary vein measures 1.9 x 1.4 cm, with first branch  originating 0.6 cm from the atrium. Right superior pulmonary vein measures 2.1 x 1.9 cm, with first branch  originating 0.8 cm from the atrium. Right inferior pulmonary vein measures 2.1 x 1.9 cm, with first branch  originating 0.1 cm from the atrium. There is no thrombus or mass within the pulmonary veins or left atrium. The visualized lungs are clear of mass, nodule, acute air space disease or  edema. There is no pleural or pericardial effusion. The aorta enhances normally without evidence of aneurysm or dissection. There is no significant adenopathy. Impression IMPRESSION:   1. CTA pulmonary vein/left atrial mapping performed. 2. No accessory pulmonary vein, thrombus or stenosis. 3. 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 4. No acute finding. XR Results (most recent):  Results from Hospital Encounter encounter on 05/10/19   XR CHEST PORT    Narrative INDICATION: . sob  Additional history: Dyspnea. Atrial fibrillation  COMPARISON: Previous chest xray, 4/25/2019. LIMITATIONS: Portable technique. [place] FINDINGS: Single frontal view of the chest.   .  Lines/tubes/surgical: Prescribed cardiac leads. Heart/mediastinum: Unremarkable. Lungs/pleura: Chronic appearing lung changes. Linear opacity in the right upper  lobe associated with chain suture material. No visualized pleural effusion or  pneumothorax. Additional Comments: None. .    Impression IMPRESSION:  1. No radiographic evidence of acute cardiopulmonary disease.   2. Postsurgical changes in the right upper lobe               Past Medical History:   Diagnosis Date    Arthritis     Asthma     dr. Bindu Luz allergist    [facility] [address]     Breast cancer ([facility] [address] 1980    right - mastectomy    Coagulation disorder ([facility] [address]     on eliquis    Dyslipidemia     labs 2008 - chol 283, HDL 83, ,     HTN (hypertension)     Hyperlipidemia LDL goal < 130     for increased LDL particles, Dr. Masoud Lino nodule     Dr. Florence Carvalho Palpitations     resolved     Past Surgical History:   Procedure Laterality Date    CHEST SURGERY PROCEDURE UNLISTED      lung nodule removed - benign    COLONOSCOPY N/A 9/10/2018    COLONOSCOPY performed by Romy Abel MD at [facility] W/O CONT WITH CALCIUM  1/2012    CAC score 0; calcified mediastinal lymph nodes consistent granulomatous disease    ECHO 2D ADULT  5/5/2008    normal, LVEF 60%    HX APPENDECTOMY      HX HYSTERECTOMY Bilateral 03/19/2015    and uro repair    HX KNEE REPLACEMENT Right     HX MASTECTOMY Right     HX TONSILLECTOMY      HX UROLOGICAL  8/1/14    Urodynamics    INTRACARD ECHO, THER/DX INTERVENT N/A 5/10/2019    Intracardiac Echocardiogram performed by Jonny Mccain MD at [address]  3/28/2018         IN EPHYS EVL TRNSPTL TX ATRIAL FIB ISOLAT PULM VEIN N/A 5/10/2019    ABLATION A-FIB  W COMPLETE EP STUDY performed by Jonny Mccain MD at [place] [address] CATH LAB    IN INTRACARDIAC ELECTROPHYSIOLOGIC 3D MAPPING N/A 5/10/2019    Ep 3d Mapping performed by Jonny Mccain MD at [place] [address] CATH LAB    STRESS TEST [facility]  1/5/12    walked 7:31, no chest pain, normal MPI. Social History     Tobacco Use    Smoking status: Never Smoker    Smokeless tobacco: Never Used   Substance Use Topics    Alcohol use: Yes     Comment: wine nightly    Drug use: No     Family History   Problem Relation Age of Onset    Heart Failure Mother     Stroke Father     Other Other         endometrial cancer, niece     Allergies   Allergen Reactions    Betadine [Povidone-Iodine] Rash    Iodine Rash    Norvasc [Amlodipine] Other (comments)     Flushing/redness.          Visit Vitals  /70 (BP 1 Location: Left arm, BP Patient Position: Sitting)   Pulse (!) 57   Resp 18   Ht 5' 6\" (1.676 m) Wt 185 lb (83.9 kg)   SpO2 99%   BMI 29.86 kg/m²         Last 3 Recorded Weights in this Encounter    12/11/20 1053   Weight: 185 lb (83.9 kg)            Review of Systems:   Pertinent items are noted in the History of Present Illness. Visit Vitals  /70 (BP 1 Location: Left arm, BP Patient Position: Sitting)   Pulse (!) 57   Resp 18   Ht 5' 6\" (1.676 m)   Wt 185 lb (83.9 kg)   SpO2 99%   BMI 29.86 kg/m²     General Appearance:  Well developed, well nourished,alert and oriented x 3, and individual in no acute distress. Ears/Nose/Mouth/Throat:   Hearing grossly normal.         Neck: Supple. Chest:   Lungs clear to auscultation bilaterally. Cardiovascular:  Regular rate and rhythm, S1, S2 normal, no murmur. Abdomen:   Soft, non-tender, bowel sounds are active. Extremities: No edema bilaterally. Skin: Warm and dry. Current Outpatient Medications on File Prior to Visit   Medication Sig Dispense Refill    cyclobenzaprine (FLEXERIL) 5 mg tablet 5 mg as needed.  amiodarone (PACERONE) 100 mg tablet Take 2 Tabs by mouth nightly. 180 Tab 1    losartan (COZAAR) 25 mg tablet Take 1 Tab by mouth daily (with dinner). 90 Tab 1    diphenhydrAMINE (BenadryL) 25 mg capsule Take 25 mg by mouth every six (6) hours as needed.  amoxicillin (AMOXIL) 500 mg capsule Take 500 mg by mouth as needed.  acetaminophen (Tylenol Extra Strength) 500 mg tablet Take 500 mg by mouth nightly as needed.  ELIQUIS 5 mg tablet TAKE 1 TABLET TWICE A DAY (Patient taking differently: two (2) times a day.) 180 Tab 4    levothyroxine (SYNTHROID) 25 mcg tablet Take  by mouth Daily (before breakfast).  fluticasone (FLOVENT HFA) 220 mcg/actuation inhaler as needed.  atorvastatin (LIPITOR) 20 mg tablet Take 20 mg by mouth every evening.  MULTIVITAMIN PO Take  by mouth. Takes one po once daily.  cholecalciferol (VITAMIN D3) 5,000 unit capsule Take 5,000 Units by mouth daily.       cetirizine (ZYRTEC) 10 mg tablet Take 10 mg by mouth every evening.  estradiol (ESTRACE) 0.01 % (0.1 mg/gram) vaginal cream Insert  into vagina every Monday and Thursday.  EPINEPHrine (EPIPEN) 0.3 mg/0.3 mL (1:1,000) injection 0.3 mL by IntraMUSCular route once as needed for up to 1 dose. 0.3 mL 0    albuterol (PROVENTIL HFA, VENTOLIN HFA, PROAIR HFA) 90 mcg/actuation inhaler Take 1 Puff by inhalation every four (4) hours as needed. 2 Inhaler 3    raloxifene (EVISTA) 60 mg tablet Take 1 Tab by mouth daily. 90 Tab 4    montelukast (SINGULAIR) 10 mg tablet Take 10 mg by mouth every evening.  potassium chloride (K-DUR, KLOR-CON) 20 mEq tablet Take 20 mEq by mouth daily as needed.  furosemide (LASIX) 40 mg tablet Take 40 mg by mouth as needed. No current facility-administered medications on file prior to visit. Jeanine Reeder had no medications administered during this visit. Current Outpatient Medications   Medication Sig    cyclobenzaprine (FLEXERIL) 5 mg tablet 5 mg as needed.  amiodarone (PACERONE) 100 mg tablet Take 2 Tabs by mouth nightly.  losartan (COZAAR) 25 mg tablet Take 1 Tab by mouth daily (with dinner).  diphenhydrAMINE (BenadryL) 25 mg capsule Take 25 mg by mouth every six (6) hours as needed.  amoxicillin (AMOXIL) 500 mg capsule Take 500 mg by mouth as needed.  acetaminophen (Tylenol Extra Strength) 500 mg tablet Take 500 mg by mouth nightly as needed.  ELIQUIS 5 mg tablet TAKE 1 TABLET TWICE A DAY (Patient taking differently: two (2) times a day.)    levothyroxine (SYNTHROID) 25 mcg tablet Take  by mouth Daily (before breakfast).  fluticasone (FLOVENT HFA) 220 mcg/actuation inhaler as needed.  atorvastatin (LIPITOR) 20 mg tablet Take 20 mg by mouth every evening.  MULTIVITAMIN PO Take  by mouth. Takes one po once daily.  cholecalciferol (VITAMIN D3) 5,000 unit capsule Take 5,000 Units by mouth daily.     cetirizine (ZYRTEC) 10 mg tablet Take 10 mg by mouth every evening.  estradiol (ESTRACE) 0.01 % (0.1 mg/gram) vaginal cream Insert  into vagina every Monday and Thursday.  EPINEPHrine (EPIPEN) 0.3 mg/0.3 mL (1:1,000) injection 0.3 mL by IntraMUSCular route once as needed for up to 1 dose.  albuterol (PROVENTIL HFA, VENTOLIN HFA, PROAIR HFA) 90 mcg/actuation inhaler Take 1 Puff by inhalation every four (4) hours as needed.  raloxifene (EVISTA) 60 mg tablet Take 1 Tab by mouth daily.  montelukast (SINGULAIR) 10 mg tablet Take 10 mg by mouth every evening.  potassium chloride (K-DUR, KLOR-CON) 20 mEq tablet Take 20 mEq by mouth daily as needed.  furosemide (LASIX) 40 mg tablet Take 40 mg by mouth as needed. No current facility-administered medications for this visit. Lab Results   Component Value Date/Time    Cholesterol, total 142 03/28/2018 04:00 AM    HDL Cholesterol 76 03/28/2018 04:00 AM    LDL, calculated 55.2 03/28/2018 04:00 AM    VLDL, calculated 10.8 03/28/2018 04:00 AM    Triglyceride 54 03/28/2018 04:00 AM    CHOL/HDL Ratio 1.9 03/28/2018 04:00 AM       Lab Results   Component Value Date/Time    Sodium 141 12/03/2020 09:52 AM    Potassium 4.2 12/03/2020 09:52 AM    Chloride 103 12/03/2020 09:52 AM    CO2 25 12/03/2020 09:52 AM    Anion gap 6 05/11/2019 03:10 AM    Glucose 101 (H) 12/03/2020 09:52 AM    BUN 11 12/03/2020 09:52 AM    Creatinine 0.84 12/03/2020 09:52 AM    BUN/Creatinine ratio 13 12/03/2020 09:52 AM    GFR est AA 76 12/03/2020 09:52 AM    GFR est non-AA 66 12/03/2020 09:52 AM    Calcium 9.0 12/03/2020 09:52 AM       Lab Results   Component Value Date/Time    ALT (SGPT) 18 12/03/2020 09:52 AM    Alk.  phosphatase 68 12/03/2020 09:52 AM    Bilirubin, direct 0.14 10/24/2019 11:04 AM    Bilirubin, total 0.4 12/03/2020 09:52 AM       Lab Results   Component Value Date/Time    WBC 5.4 12/03/2020 09:52 AM    Hemoglobin (POC) 12.9 10/25/2009 03:04 AM    HGB 12.8 12/03/2020 09:52 AM Hematocrit (POC) 38 10/25/2009 03:04 AM    HCT 37.6 12/03/2020 09:52 AM    PLATELET 348 01/10/7727 09:52 AM    MCV 92 12/03/2020 09:52 AM       Lab Results   Component Value Date/Time    TSH 2.720 12/03/2020 09:52 AM         Lab Results   Component Value Date/Time    Cholesterol, total 142 03/28/2018 04:00 AM    Cholesterol, total 175 12/18/2015 10:18 AM    Cholesterol, total 251 (H) 06/03/2015 08:45 AM    Cholesterol, total 206 (H) 05/14/2014 10:46 AM    Cholesterol, total 201 (H) 04/26/2013 09:56 AM    HDL Cholesterol 76 03/28/2018 04:00 AM    HDL Cholesterol 93 12/18/2015 10:18 AM    HDL Cholesterol 89 06/03/2015 08:45 AM    HDL Cholesterol 92 05/14/2014 10:46 AM    HDL Cholesterol 99 04/26/2013 09:56 AM    LDL, calculated 55.2 03/28/2018 04:00 AM    LDL, calculated 68 12/18/2015 10:18 AM    LDL, calculated 141 (H) 06/03/2015 08:45 AM    LDL, calculated 98 05/14/2014 10:46 AM    LDL, calculated 83 04/26/2013 09:56 AM    Triglyceride 54 03/28/2018 04:00 AM    Triglyceride 68 12/18/2015 10:18 AM    Triglyceride 104 06/03/2015 08:45 AM    Triglyceride 79 05/14/2014 10:46 AM    Triglyceride 96 04/26/2013 09:56 AM    CHOL/HDL Ratio 1.9 03/28/2018 04:00 AM                Please note that this dictation was completed with [employer], the [employer] voice recognition software. Quite often unanticipated grammatical, syntax, homophones, and other interpretative errors are inadvertently transcribed by the computer software. Please disregard these errors. Please excuse any errors that have escaped final proofreading.

## 2020-12-11 NOTE — PATIENT INSTRUCTIONS
Please follow up with Dr. Maida Loera in 6 months. Have lab work completed 1 week prior. Lab slip provided.

## 2020-12-11 NOTE — TELEPHONE ENCOUNTER
Received refill request for Eliquis 5 mg tabs. Labs unremarkable on recent check. Refill authorized.     Future Appointments   Date Time Provider Hazel Wade   6/14/2021 10:20 AM MD JOSÉ Murcia AMB

## 2020-12-11 NOTE — PROGRESS NOTES
.  Visit Vitals  /70 (BP 1 Location: Left arm, BP Patient Position: Sitting)   Pulse (!) 57   Resp 18   Ht 5' 6\" (1.676 m)   Wt 185 lb (83.9 kg)   SpO2 99%   BMI 29.86 kg/m²

## 2021-01-19 RX ORDER — LOSARTAN POTASSIUM 25 MG/1
25 TABLET ORAL
Qty: 90 TAB | Refills: 2 | Status: SHIPPED | OUTPATIENT
Start: 2021-01-19 | End: 2021-05-26 | Stop reason: ALTCHOICE

## 2021-01-19 NOTE — TELEPHONE ENCOUNTER
Cardiologist: Dr. Yeni Mccain    Last appt: 12/11/2020  Future Appointments   Date Time Provider Hazel Wade   6/14/2021 10:20 AM MD JOSÉ Loza BS AMB       Requested Prescriptions     Signed Prescriptions Disp Refills    losartan (COZAAR) 25 mg tablet 90 Tab 2     Sig: Take 1 Tab by mouth daily (with dinner). Authorizing Provider: Waylon Ulloa     Ordering User: CLARKE LUNA VO per Dr. Yeni Mccain.

## 2021-04-08 ENCOUNTER — PATIENT MESSAGE (OUTPATIENT)
Dept: CARDIOLOGY CLINIC | Age: 81
End: 2021-04-08

## 2021-04-08 DIAGNOSIS — I48.19 PERSISTENT ATRIAL FIBRILLATION (HCC): ICD-10-CM

## 2021-04-09 ENCOUNTER — PATIENT MESSAGE (OUTPATIENT)
Dept: CARDIOLOGY CLINIC | Age: 81
End: 2021-04-09

## 2021-04-09 ENCOUNTER — TELEPHONE (OUTPATIENT)
Dept: CARDIOLOGY CLINIC | Age: 81
End: 2021-04-09

## 2021-04-09 RX ORDER — AMIODARONE HYDROCHLORIDE 100 MG/1
300 TABLET ORAL
Qty: 270 TAB | Refills: 1 | Status: SHIPPED | OUTPATIENT
Start: 2021-04-09 | End: 2021-04-26

## 2021-04-09 NOTE — TELEPHONE ENCOUNTER
Patient has been going through AFIB and says she has been messaging Dr. Juan Gong about the condition. She says that he told her to schedule an appointment out for 2-3 weeks but when going to schedule Dr. Juan Gong the soonest appointment he has available is 05/28/21. Patient wants to know if its anyway she can be seen sooner. Haven't yet scheduled appointment. Patient can be reached at [phone] or [phone].

## 2021-04-09 NOTE — TELEPHONE ENCOUNTER
Casey Cuba MD  You 29 minutes ago (3:10 PM)     Yes AF   If she feels fine   Continue eliquis for now and increase amiodarone to 300 mg daily     See me in office in 2-3 weeks

## 2021-04-26 ENCOUNTER — OFFICE VISIT (OUTPATIENT)
Dept: CARDIOLOGY CLINIC | Age: 81
End: 2021-04-26
Payer: MEDICARE

## 2021-04-26 VITALS
WEIGHT: 189 LBS | OXYGEN SATURATION: 99 % | HEART RATE: 84 BPM | BODY MASS INDEX: 30.37 KG/M2 | SYSTOLIC BLOOD PRESSURE: 120 MMHG | HEIGHT: 66 IN | DIASTOLIC BLOOD PRESSURE: 70 MMHG | RESPIRATION RATE: 20 BRPM

## 2021-04-26 DIAGNOSIS — I48.19 PERSISTENT ATRIAL FIBRILLATION (HCC): Primary | ICD-10-CM

## 2021-04-26 PROCEDURE — G8754 DIAS BP LESS 90: HCPCS | Performed by: SPECIALIST

## 2021-04-26 PROCEDURE — G0463 HOSPITAL OUTPT CLINIC VISIT: HCPCS | Performed by: SPECIALIST

## 2021-04-26 PROCEDURE — G8417 CALC BMI ABV UP PARAM F/U: HCPCS | Performed by: SPECIALIST

## 2021-04-26 PROCEDURE — G8510 SCR DEP NEG, NO PLAN REQD: HCPCS | Performed by: SPECIALIST

## 2021-04-26 PROCEDURE — 93005 ELECTROCARDIOGRAM TRACING: CPT | Performed by: SPECIALIST

## 2021-04-26 PROCEDURE — G8536 NO DOC ELDER MAL SCRN: HCPCS | Performed by: SPECIALIST

## 2021-04-26 PROCEDURE — 99214 OFFICE O/P EST MOD 30 MIN: CPT | Performed by: SPECIALIST

## 2021-04-26 PROCEDURE — G8752 SYS BP LESS 140: HCPCS | Performed by: SPECIALIST

## 2021-04-26 PROCEDURE — 93010 ELECTROCARDIOGRAM REPORT: CPT | Performed by: SPECIALIST

## 2021-04-26 PROCEDURE — G8427 DOCREV CUR MEDS BY ELIG CLIN: HCPCS | Performed by: SPECIALIST

## 2021-04-26 PROCEDURE — 1100F PTFALLS ASSESS-DOCD GE2>/YR: CPT | Performed by: SPECIALIST

## 2021-04-26 PROCEDURE — 3288F FALL RISK ASSESSMENT DOCD: CPT | Performed by: SPECIALIST

## 2021-04-26 PROCEDURE — G8399 PT W/DXA RESULTS DOCUMENT: HCPCS | Performed by: SPECIALIST

## 2021-04-26 PROCEDURE — 1090F PRES/ABSN URINE INCON ASSESS: CPT | Performed by: SPECIALIST

## 2021-04-26 RX ORDER — METOPROLOL TARTRATE 25 MG/1
12.5 TABLET, FILM COATED ORAL 2 TIMES DAILY
Qty: 90 TAB | Refills: 1 | Status: SHIPPED | OUTPATIENT
Start: 2021-04-26 | End: 2021-04-26 | Stop reason: SDUPTHER

## 2021-04-26 RX ORDER — AMIODARONE HYDROCHLORIDE 200 MG/1
200 TABLET ORAL 2 TIMES DAILY
Qty: 180 TAB | Refills: 1 | Status: ON HOLD | OUTPATIENT
Start: 2021-04-26 | End: 2021-06-25 | Stop reason: SDUPTHER

## 2021-04-26 RX ORDER — AMLODIPINE BESYLATE 2.5 MG/1
TABLET ORAL
COMMUNITY
Start: 2020-10-20 | End: 2021-06-18

## 2021-04-26 RX ORDER — METOPROLOL TARTRATE 25 MG/1
12.5 TABLET, FILM COATED ORAL 2 TIMES DAILY
Qty: 90 TAB | Refills: 1 | Status: SHIPPED | OUTPATIENT
Start: 2021-04-26 | End: 2021-06-28

## 2021-04-26 NOTE — PROGRESS NOTES
[facility]                                                            OFFICE NOTE        Robert Keane M.D.,KATIE ELLISON   1940  [id number]    Date/Time:  4/26/20212:17 PM          SUBJECTIVE:    She feels dyspneic and tired with activities  No cp     Assessment/Plan    1. Atrial fibrillation: She is status post partial ablation of atrial fibrillation in May 2019. This was complicated by pericardial effusion. She has undergone a TIM and cardioversion in June 2020 at which time she was on amiodarone 200 mg daily and Eliquis. She has had some hypothyroidism and bradycardia with increase doses of amiodarone. Unfortunately she is now back in atrial fibrillation and she appears to be symptomatic although not grossly    Limited by her symptoms at this time. She is currently on 300 of amiodarone. In my opinion she has failed amiodarone and this is likely not a long-term medication she will need to be on. Very temporarily nonetheless I will increase the amiodarone to 200 mg twice a day and will start metoprolol 12.5 mg twice a day and I will continue Eliquis. Referred to Dr. Jessee Jordan for electrophysiology to see if repeat ablation of atrial fibrillation is an option. In my opinion proceeding again with DC cardioversion as an option but not long-term since it began she seems to have failed amiodarone therapy and she has had issue with a higher dose of amiodarone in the past.    2.  Hypertension: Well controlled. Obtain CMP and thyroid function test.    See her back in 2 to 3 weeks after EP evaluation    HPI     80 y.o. female. Patient with h/o HTN, HLD, Ca score of 0 in 2012, PVCs on ECG on 1/16 and echo on 1/16 with EF 55% mild MR.  S/p LTKR in 1/16(seen by Dr. Arlene Liu)  Also remote h/o breast ca s/p mastectomy in 1980 but no chemo or xrt     Echo on 4/16:Ejection fraction was estimated to be 55 %. There were no  regional wall motion abnormalities. Wall thickness was at the upper limits  of normal.    Left atrium: The atrium was mildly dilated. Mitral valve: There was mild regurgitation. Aortic valve: Leaflets exhibited sclerosis. holter on 4/16: NSR  frequent pacs couplets triples and occasional non sustained at, frequent pvcs (0.65%)     On 3/18: admitted to [place] [id number] onset A-fib with RVR     TIM and Cardioversion completed with 200 J with conversion to NSR  TIM no PFO, trace AR, mild MR, no TR or MO Mild atherosclerosis in prox descending aortaNo KENNEDY thrombus LA moderate dilation RA normal  Started eliquis, flecainide and metoprolol     Echo on 8/20/18: The ventricle was dilated. Systolic function was mildly  reduced by EF (biplane method of disks). Ejection fraction was estimated  to be 45 % in the range of 40 % to 50 %. There was mild diffuse  hypokinesis.        TIM /CARDIOVERSION on 2/19 resulting in NSR. Flecainide resumed     CXR on 2/25/19:1. Interval development of small bilateral pleural effusions and mild basilar     atelectasis. Follow-up to resolution is suggested. Doppler LLE on 2/19:No evidence of left lower extremity vein thrombosis.         02/27/19   ECHO ADULT FOLLOW-UP OR LIMITED 02/27/2019 2/27/2019     Addendum · Left ventricular low normal systolic function. Estimated left  ventricular ejection fraction is 51 - 55%. Calculated left ventricular  ejection fraction is 50%. Biplane method used to measure ejection  fraction. Normal left ventricular wall motion, no regional wall motion  abnormality noted. · Mild to moderate mitral valve regurgitation. · Trivial-to-small pericardial effusion. · Mild tricuspid valve regurgitation is present. Sarah Gallegos MD 2/27/2019  2:19 PM           Narrative · Left ventricular low normal systolic function. Estimated left   ventricular ejection fraction is 51 - 55%.  Calculated left ventricular   ejection fraction is 50%. Biplane method used to measure ejection   fraction. Normal left ventricular wall motion, no regional wall motion   abnormality noted. · Mild to moderate mitral valve regurgitation. · Trivial-to-small pericardial effusion. · Mild tricuspid valve stenosis is present.          Signed by: Laney Escamilla MD                 02/27/19   ECHO ADULT FOLLOW-UP OR LIMITED 02/27/2019 2/27/2019     Narrative · Left ventricular low normal systolic function. Estimated left   ventricular ejection fraction is 51 - 55%. Calculated left ventricular   ejection fraction is 50%. Biplane method used to measure ejection   fraction. Normal left ventricular wall motion, no regional wall motion   abnormality noted. · Mild to moderate mitral valve regurgitation. · Trivial-to-small pericardial effusion. · Mild tricuspid valve stenosis is present.          Signed by: Laney Escamilla MD       chest ct on 3/19:1. CTA pulmonary vein/left atrial mapping performed. 2. No accessory pulmonary vein, thrombus or stenosis. 3. 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 4. No acute finding.      AF ablation on 5/19  EP study & partial PVI of the left superior vein and full PVI of the right veins during ablation on 05/10/2019, developed pericardial effusion during procedure.  No tamponade.  She was able to restarted Eliquis thereafter.   but  she was admitted to CCU due to posterior lateral LA LV moderate pericardial effusion during left pulmonary vein isolation.  Heparin was reversed.  She was stable so no pericardiocentesis was warranted  Started on AMIO and BBlockers              CARDIAC STUDIES        06/19/20   ECHO TIM W POSSIBLE CARDIOVERSION 06/19/2020 6/19/2020    Narrative · Mildly dilated left atrium. Left atrial appendage velocity is normal   (greater than 40 cm/sec). · Normal cavity size and wall thickness. Mild global systolic function. Estimated left ventricular ejection fraction is 45 - 50%.   · Mild to moderate mitral valve regurgitation is present. · Mild to moderate tricuspid valve regurgitation is present. Signed by: Brian Britt MD                                 EKG Results     None              IMAGING      MRI Results (most recent):  Results from Hospital Encounter encounter on 07/16/13   MRI KNEE RT WO CONT    Narrative **Final Report**       ICD Codes / Adm. Diagnosis: 836.2  401.9 / Other tear of cartilage or men    Examination:  MR KNEE WO CON RT  - [id number] - Jul 16 2013  3:28PM  Accession No:  [id number]  Reason:  tear      REPORT:  EXAM:  Right knee MRI without contrast    INDICATION: Pain    COMPARISON: None    TECHNIQUE: Axial T2 fat-saturated and proton density fat-saturated; coronal   T1 and proton density fat-saturated; and sagittal T2 fat-saturated, proton   density fat-saturated, and gradient echo MRI of the     knee . CONTRAST:  None. FINDINGS: Bone marrow: Degenerative type bone marrow edema is present in the   medial aspects of the medial femoral condyle and medial tibial plateau. A   small focus of subchondral bone marrow edema is present in the lateral facet   of the patella. There is no evidence of fracture or marrow replacing process. Joint fluid: There is a mild joint effusion. Areas of synovitis are seen   throughout the joint space, particularly in the suprapatellar recess and   anterior to the lateral compartment. There is additional 9 mm loose body   along the course of the popliteal tendon on series 7 image 55. There is a moderate-sized Baker's cyst posterior medial to the knee. This   measures 2.0 x 1.9 x 7.7 cm craniocaudal. There is a small rounded loose   body within this measuring 5 mm on series 8 image 4. Several strands of   synovitis are seen within this. Collateral ligaments and posterior, lateral corner: Intact. Medial meniscus: The medial meniscus is extruded.  There is a complete radial   tear of the posterior horn of the medial meniscus as evidenced by an absent    posterior horn on series 9 image 8. The distal material is flipped   inferiorly and laterally into the gutter on series 8 image 4 and series 6   image 17. Lateral meniscus: High signal is seen in the free edge of the body related   to free edge tearing. Irregular increased signal is seen anterior to the   anterior horn is likely related to synovitis. ACL and PCL: A small area mucoid degeneration is present in the proximal   ACL. The ACL and PCL are intact however. Tendons: Intact. .    Muscles: Within normal limits. Patellofemoral alignment: No patellar subluxation/tilt. Trochlear groove is   not hypoplastic. TT-TG distance: Normal at 12 mm. Articular cartilage: Full-thickness cartilage loss is seen along the   weightbearing surfaces of the medial femoral condyle and medial tibial   plateau with significant marginal osteophytosis. Mild underlying subchondral   bone marrow edema is present medially. Near full-thickness fissuring is seen along the vertical ridge of the   patella. Mild irregular cartilage loss is seen along the lateral aspect of   the patellofemoral compartment. Small marginal osteophytes are seen adjacent   to this. There is a small area of subchondral bone marrow edema related to a   full-thickness fissure in the lateral facet of patella. There is a shallow thickness of fissuring are seen along the lateral femoral   condyle towards the tibial spine. Significant marginal osteophytes are   present. There is diffuse mild cartilage thinning throughout the lateral   compartment. Soft tissue mass: None. Nonspecific soft tissue edema is seen anterior to   the patellar tendon. IMPRESSION:  1. Complete radial tear of the posterior horn of the medial meniscus with   displacement of meniscal material into the inferior gutter. 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   2. Moderate lateral and patellofemoral osteoarthritis. 3. Free edge tearing in the body of the lateral meniscus. 4. Moderate joint effusion with areas of extensive synovitis likely related   to long-standing degenerative disease. 5. A moderate-sized Baker's cyst is seen posterior medial to the knee with   areas of synovitis and a small loose body. An additional small loose body is   seen along the course of the popliteus tendon. Signing/Reading Doctor: Theola Councilman ([id number])    Approved: Theola Councilman ([id number])  Jul 16 2013  4:08PM                                      CT Results (most recent):  Results from Hospital Encounter encounter on 03/18/19   CTA CHEST W OR W WO CONT    Narrative INDICATION: Atrial fibrillation, pulmonary vein mapping. CONTRAST: 100 mL of Isovue 370. TECHNIQUE: Multislice helical CT of the chest was performed on a 64-slice  multiple detector row CT system (OpenPlacementT). Unenhanced  images were obtained  to localize the volume for acquisition. Bolus tracking software was used for  timing. Thin section images were acquired during uneventful rapid bolus  intravenous administration of contrast. 3D image post processing was performed  including coronal MIPS and SSD. CT dose reduction was achieved through the use  of a standardized protocol tailored for this examination and automatic exposure  control for dose modulation. FINDINGS:   The left superior and inferior pulmonary veins and right superior and inferior  pulmonary veins demonstrate no stenosis. There is early branching of the right  inferior pulmonary vein. No accessory pulmonary veins are identified. Left superior pulmonary vein ostium measures 1.9 x 1.8 cm, with first branch  originating 0.8 cm from the atrium. Left inferior pulmonary vein measures 1.9 x 1.4 cm, with first branch  originating 0.6 cm from the atrium. Right superior pulmonary vein measures 2.1 x 1.9 cm, with first branch  originating 0.8 cm from the atrium.   Right inferior pulmonary vein measures 2.1 x 1.9 cm, with first branch  originating 0.1 cm from the atrium. There is no thrombus or mass within the pulmonary veins or left atrium. The visualized lungs are clear of mass, nodule, acute air space disease or  edema. There is no pleural or pericardial effusion. The aorta enhances normally without evidence of aneurysm or dissection. There is no significant adenopathy. Impression IMPRESSION:   1. CTA pulmonary vein/left atrial mapping performed. 2. No accessory pulmonary vein, thrombus or stenosis. 3. 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 4. No acute finding. XR Results (most recent):  Results from Hospital Encounter encounter on 05/10/19   XR CHEST PORT    Narrative INDICATION: . sob  Additional history: Dyspnea. Atrial fibrillation  COMPARISON: Previous chest xray, 4/25/2019. LIMITATIONS: Portable technique. Kim Flannery FINDINGS: Single frontal view of the chest.   .  Lines/tubes/surgical: Prescribed cardiac leads. Heart/mediastinum: Unremarkable. Lungs/pleura: Chronic appearing lung changes. Linear opacity in the right upper  lobe associated with chain suture material. No visualized pleural effusion or  pneumothorax. Additional Comments: None. .    Impression IMPRESSION:  1. No radiographic evidence of acute cardiopulmonary disease.   2. Postsurgical changes in the right upper lobe               Past Medical History:   Diagnosis Date    Arthritis     Asthma     dr. Shena Duncan allergist    [facility] [address]     Breast cancer ([facility] [address] 1980    right - mastectomy    Coagulation disorder ([facility] [address]     on eliquis    Dyslipidemia     labs 2008 - chol 283, HDL 83, ,     HTN (hypertension)     Hyperlipidemia LDL goal < 130     for increased LDL particles, Dr. Eloise Russell nodule     Dr. Antionette Andrews Palpitations     resolved     Past Surgical History:   Procedure Laterality Date    CHEST SURGERY PROCEDURE UNLISTED lung nodule removed - benign    COLONOSCOPY N/A 9/10/2018    COLONOSCOPY performed by Marino Duarte MD at [facility] W/O CONT WITH CALCIUM  1/2012    CAC score 0; calcified mediastinal lymph nodes consistent granulomatous disease    ECHO 2D ADULT  5/5/2008    normal, LVEF 60%    HX APPENDECTOMY      HX HYSTERECTOMY Bilateral 03/19/2015    and uro repair    HX KNEE REPLACEMENT Right     HX MASTECTOMY Right     HX TONSILLECTOMY      HX UROLOGICAL  8/1/14    Urodynamics    INTRACARD ECHO, THER/DX INTERVENT N/A 5/10/2019    Intracardiac Echocardiogram performed by Prem Whitten MD at [place] [address] EXTERNAL  3/28/2018         [place] EPHYS EVL TRNSPTL TX ATRIAL FIB ISOLAT PULM VEIN N/A 5/10/2019    ABLATION A-FIB  W COMPLETE EP STUDY performed by Prem Whitten MD at [facility] [address] CATH LAB    [place] INTRACARDIAC ELECTROPHYSIOLOGIC 3D MAPPING N/A 5/10/2019    Ep 3d Mapping performed by Prem Whitten MD at [place] [address] CATH LAB    [facility]  [facility]    walked 7:31, no chest pain, normal MPI. Social History     Tobacco Use    Smoking status: Never Smoker    Smokeless tobacco: Never Used   Substance Use Topics    Alcohol use: Yes     Comment: wine nightly    Drug use: No     Family History   Problem Relation Age of Onset    Heart Failure Mother     Stroke Father     Other Other         endometrial cancer, niece     Allergies   Allergen Reactions    Betadine [Povidone-Iodine] Rash    Iodine Rash    Norvasc [Amlodipine] Other (comments)     Flushing/redness.          Visit Vitals  /70 (BP 1 Location: Left upper arm, BP Patient Position: Sitting, BP Cuff Size: Adult)   Pulse 84   Resp 20   Ht 5' 6\" (1.676 m)   Wt 189 lb (85.7 kg)   SpO2 99%   BMI 30.51 kg/m²         Last 3 Recorded Weights in this Encounter    04/26/21 1353   Weight: 189 lb (85.7 kg)            Review of Systems:   Pertinent items are noted in the History of Present Illness. Neck: no JVD  Heart: irregularly irregular rhythm  Lungs: clear to auscultation bilaterally  Abdomen: soft, non-tender. Bowel sounds normal. No masses,  no organomegaly  Extremities: no edema      Current Outpatient Medications on File Prior to Visit   Medication Sig Dispense Refill    amiodarone (PACERONE) 100 mg tablet Take 3 Tabs by mouth nightly. 270 Tab 1    losartan (COZAAR) 25 mg tablet Take 1 Tab by mouth daily (with dinner). 90 Tab 2    Eliquis 5 mg tablet TAKE 1 TABLET TWICE A  Tab 3    amoxicillin (AMOXIL) 500 mg capsule Take 500 mg by mouth as needed. Only for dental procedure      acetaminophen (Tylenol Extra Strength) 500 mg tablet Take 500 mg by mouth nightly as needed.  levothyroxine (SYNTHROID) 25 mcg tablet Take  by mouth Daily (before breakfast).  furosemide (LASIX) 40 mg tablet Take 40 mg by mouth as needed.  fluticasone (FLOVENT HFA) 220 mcg/actuation inhaler as needed.  atorvastatin (LIPITOR) 20 mg tablet Take 20 mg by mouth every evening.  MULTIVITAMIN PO Take  by mouth. Takes one po once daily.  cholecalciferol (VITAMIN D3) 5,000 unit capsule Take 5,000 Units by mouth daily.  cetirizine (ZYRTEC) 10 mg tablet Take 10 mg by mouth every evening.  estradiol (ESTRACE) 0.01 % (0.1 mg/gram) vaginal cream Insert  into vagina every Monday and Thursday.  EPINEPHrine (EPIPEN) 0.3 mg/0.3 mL (1:1,000) injection 0.3 mL by IntraMUSCular route once as needed for up to 1 dose. 0.3 mL 0    albuterol (PROVENTIL HFA, VENTOLIN HFA, PROAIR HFA) 90 mcg/actuation inhaler Take 1 Puff by inhalation every four (4) hours as needed. 2 Inhaler 3    raloxifene (EVISTA) 60 mg tablet Take 1 Tab by mouth daily. 90 Tab 4    montelukast (SINGULAIR) 10 mg tablet Take 10 mg by mouth every evening.  amLODIPine (NORVASC) 2.5 mg tablet       cyclobenzaprine (FLEXERIL) 5 mg tablet 5 mg as needed.       diphenhydrAMINE (BenadryL) 25 mg capsule Take 25 mg by mouth every six (6) hours as needed.  potassium chloride (K-DUR, KLOR-CON) 20 mEq tablet Take 20 mEq by mouth daily as needed. No current facility-administered medications on file prior to visit. Kristen Garcia had no medications administered during this visit. Current Outpatient Medications   Medication Sig    amiodarone (PACERONE) 100 mg tablet Take 3 Tabs by mouth nightly.  losartan (COZAAR) 25 mg tablet Take 1 Tab by mouth daily (with dinner).  Eliquis 5 mg tablet TAKE 1 TABLET TWICE A DAY    amoxicillin (AMOXIL) 500 mg capsule Take 500 mg by mouth as needed. Only for dental procedure    acetaminophen (Tylenol Extra Strength) 500 mg tablet Take 500 mg by mouth nightly as needed.  levothyroxine (SYNTHROID) 25 mcg tablet Take  by mouth Daily (before breakfast).  furosemide (LASIX) 40 mg tablet Take 40 mg by mouth as needed.  fluticasone (FLOVENT HFA) 220 mcg/actuation inhaler as needed.  atorvastatin (LIPITOR) 20 mg tablet Take 20 mg by mouth every evening.  MULTIVITAMIN PO Take  by mouth. Takes one po once daily.  cholecalciferol (VITAMIN D3) 5,000 unit capsule Take 5,000 Units by mouth daily.  cetirizine (ZYRTEC) 10 mg tablet Take 10 mg by mouth every evening.  estradiol (ESTRACE) 0.01 % (0.1 mg/gram) vaginal cream Insert  into vagina every Monday and Thursday.  EPINEPHrine (EPIPEN) 0.3 mg/0.3 mL (1:1,000) injection 0.3 mL by IntraMUSCular route once as needed for up to 1 dose.  albuterol (PROVENTIL HFA, VENTOLIN HFA, PROAIR HFA) 90 mcg/actuation inhaler Take 1 Puff by inhalation every four (4) hours as needed.  raloxifene (EVISTA) 60 mg tablet Take 1 Tab by mouth daily.  montelukast (SINGULAIR) 10 mg tablet Take 10 mg by mouth every evening.  amLODIPine (NORVASC) 2.5 mg tablet     cyclobenzaprine (FLEXERIL) 5 mg tablet 5 mg as needed.  diphenhydrAMINE (BenadryL) 25 mg capsule Take 25 mg by mouth every six (6) hours as needed.  potassium chloride (K-DUR, KLOR-CON) 20 mEq tablet Take 20 mEq by mouth daily as needed. No current facility-administered medications for this visit. Lab Results   Component Value Date/Time    Cholesterol, total 142 03/28/2018 04:00 AM    HDL Cholesterol 76 03/28/2018 04:00 AM    LDL, calculated 55.2 03/28/2018 04:00 AM    VLDL, calculated 10.8 03/28/2018 04:00 AM    Triglyceride 54 03/28/2018 04:00 AM    CHOL/HDL Ratio 1.9 03/28/2018 04:00 AM       Lab Results   Component Value Date/Time    Sodium 141 12/03/2020 09:52 AM    Potassium 4.2 12/03/2020 09:52 AM    Chloride 103 12/03/2020 09:52 AM    CO2 25 12/03/2020 09:52 AM    Anion gap 6 05/11/2019 03:10 AM    Glucose 101 (H) 12/03/2020 09:52 AM    BUN 11 12/03/2020 09:52 AM    Creatinine 0.84 12/03/2020 09:52 AM    BUN/Creatinine ratio 13 12/03/2020 09:52 AM    GFR est AA 76 12/03/2020 09:52 AM    GFR est non-AA 66 12/03/2020 09:52 AM    Calcium 9.0 12/03/2020 09:52 AM       Lab Results   Component Value Date/Time    ALT (SGPT) 18 12/03/2020 09:52 AM    Alk.  phosphatase 68 12/03/2020 09:52 AM    Bilirubin, direct 0.14 10/24/2019 11:04 AM    Bilirubin, total 0.4 12/03/2020 09:52 AM       Lab Results   Component Value Date/Time    WBC 5.4 12/03/2020 09:52 AM    Hemoglobin (POC) 12.9 10/25/2009 03:04 AM    HGB 12.8 12/03/2020 09:52 AM    Hematocrit (POC) 38 10/25/2009 03:04 AM    HCT 37.6 12/03/2020 09:52 AM    PLATELET 204 03/10/3566 09:52 AM    MCV 92 12/03/2020 09:52 AM       Lab Results   Component Value Date/Time    TSH 2.720 12/03/2020 09:52 AM         Lab Results   Component Value Date/Time    Cholesterol, total 142 03/28/2018 04:00 AM    Cholesterol, total 175 12/18/2015 10:18 AM    Cholesterol, total 251 (H) 06/03/2015 08:45 AM    Cholesterol, total 206 (H) 05/14/2014 10:46 AM    Cholesterol, total 201 (H) 04/26/2013 09:56 AM    HDL Cholesterol 76 03/28/2018 04:00 AM    HDL Cholesterol 93 12/18/2015 10:18 AM    HDL Cholesterol 89 06/03/2015 08:45 AM    HDL Cholesterol 92 05/14/2014 10:46 AM    HDL Cholesterol 99 04/26/2013 09:56 AM    LDL, calculated 55.2 03/28/2018 04:00 AM    LDL, calculated 68 12/18/2015 10:18 AM    LDL, calculated 141 (H) 06/03/2015 08:45 AM    LDL, calculated 98 05/14/2014 10:46 AM    LDL, calculated 83 04/26/2013 09:56 AM    Triglyceride 54 03/28/2018 04:00 AM    Triglyceride 68 12/18/2015 10:18 AM    Triglyceride 104 06/03/2015 08:45 AM    Triglyceride 79 05/14/2014 10:46 AM    Triglyceride 96 04/26/2013 09:56 AM    CHOL/HDL Ratio 1.9 03/28/2018 04:00 AM                Please note that this dictation was completed with [employer] the [employer] voice recognition software. Quite often unanticipated grammatical, syntax, homophones, and other interpretative errors are inadvertently transcribed by the computer software. Please disregard these errors. Please excuse any errors that have escaped final proofreading.

## 2021-04-26 NOTE — PROGRESS NOTES
.  Visit Vitals  /70 (BP 1 Location: Left upper arm, BP Patient Position: Sitting, BP Cuff Size: Adult)   Pulse 84   Resp 20   Ht 5' 6\" (1.676 m)   Wt 189 lb (85.7 kg)   SpO2 99%   BMI 30.51 kg/m²

## 2021-04-26 NOTE — PATIENT INSTRUCTIONS
Please change your Amiodarone to 200mg twice a day. Please start taking Metoprolol 12.5mg twice a day. Please make an appointment with Dr. Lupe Severino.  
 
Please follow up with Dr. Roby Herrera a few weeks after seeing Dr. Lupe Severino

## 2021-04-27 ENCOUNTER — OFFICE VISIT (OUTPATIENT)
Dept: CARDIOLOGY CLINIC | Age: 81
End: 2021-04-27
Payer: MEDICARE

## 2021-04-27 VITALS
HEART RATE: 64 BPM | HEIGHT: 66 IN | SYSTOLIC BLOOD PRESSURE: 136 MMHG | BODY MASS INDEX: 30.22 KG/M2 | RESPIRATION RATE: 16 BRPM | DIASTOLIC BLOOD PRESSURE: 84 MMHG | WEIGHT: 188 LBS

## 2021-04-27 DIAGNOSIS — I48.19 PERSISTENT ATRIAL FIBRILLATION (HCC): Primary | ICD-10-CM

## 2021-04-27 DIAGNOSIS — Z98.890 S/P ABLATION OF ATRIAL FIBRILLATION: ICD-10-CM

## 2021-04-27 DIAGNOSIS — I10 ESSENTIAL HYPERTENSION: ICD-10-CM

## 2021-04-27 DIAGNOSIS — I34.0 MITRAL VALVE INSUFFICIENCY, UNSPECIFIED ETIOLOGY: ICD-10-CM

## 2021-04-27 DIAGNOSIS — Z79.01 CHRONIC ANTICOAGULATION: ICD-10-CM

## 2021-04-27 DIAGNOSIS — Z86.79 S/P ABLATION OF ATRIAL FIBRILLATION: ICD-10-CM

## 2021-04-27 DIAGNOSIS — Z79.899 ON AMIODARONE THERAPY: ICD-10-CM

## 2021-04-27 DIAGNOSIS — Z98.890 HISTORY OF CARDIOVERSION: ICD-10-CM

## 2021-04-27 PROCEDURE — 99215 OFFICE O/P EST HI 40 MIN: CPT | Performed by: INTERNAL MEDICINE

## 2021-04-27 PROCEDURE — G8754 DIAS BP LESS 90: HCPCS | Performed by: INTERNAL MEDICINE

## 2021-04-27 PROCEDURE — G0463 HOSPITAL OUTPT CLINIC VISIT: HCPCS | Performed by: INTERNAL MEDICINE

## 2021-04-27 PROCEDURE — G8427 DOCREV CUR MEDS BY ELIG CLIN: HCPCS | Performed by: INTERNAL MEDICINE

## 2021-04-27 PROCEDURE — G8536 NO DOC ELDER MAL SCRN: HCPCS | Performed by: INTERNAL MEDICINE

## 2021-04-27 PROCEDURE — G8417 CALC BMI ABV UP PARAM F/U: HCPCS | Performed by: INTERNAL MEDICINE

## 2021-04-27 PROCEDURE — G8432 DEP SCR NOT DOC, RNG: HCPCS | Performed by: INTERNAL MEDICINE

## 2021-04-27 PROCEDURE — 3288F FALL RISK ASSESSMENT DOCD: CPT | Performed by: INTERNAL MEDICINE

## 2021-04-27 PROCEDURE — 1090F PRES/ABSN URINE INCON ASSESS: CPT | Performed by: INTERNAL MEDICINE

## 2021-04-27 PROCEDURE — G8399 PT W/DXA RESULTS DOCUMENT: HCPCS | Performed by: INTERNAL MEDICINE

## 2021-04-27 PROCEDURE — 1100F PTFALLS ASSESS-DOCD GE2>/YR: CPT | Performed by: INTERNAL MEDICINE

## 2021-04-27 PROCEDURE — G8752 SYS BP LESS 140: HCPCS | Performed by: INTERNAL MEDICINE

## 2021-04-27 NOTE — PATIENT INSTRUCTIONS
Your A-fib ablation procedure has been scheduled for 6/21/2021 at 7:30 am, at [facility].    Please report to Admitting Department by 6:15 am, or 2 hours prior to your scheduled procedure. Please bring a list of your current medications and medication bottles, if able, to the hospital on this day. You will be unable to drive after your procedure so please make sure to bring someone with you to your procedure. You will need to have nothing to eat or drink after midnight, the night prior to your procedure. You may have small sips of water, if needed, to take with your medication. You will need labs drawn prior to your procedure. Please go to the lab to have this done no sooner than 5/21/2021 and no later than 6/15/2021. You should stop your medication, Eliquis , 2 days prior to your scheduled procedure. After your procedure, you will need to follow up with Dr. Morena Lorenzo.  Your follow-up appointment has been scheduled for 7/8/2021 at 4:20 pm. acute resp failure requiring bipap with hypoxia, 2/2 pul edema  d/w son and he does not any aggressive measures, just necessary meds and keep her comfortable.   appec pulm recs and palliative recs  No BIPAP, only O2 nc.   No repeat trops, will check echo for LVF.  c/w empiric antibx, for posible PNA. procal elevated  s/s eval: pureed diet with honey thick liquids.  Pt probably aspitaed will make her NPO for today.  npo until evaled  caution with fluids as may be easily overloaded, prognosis guarded to poor dementia  dysphagia  poss PNA  resp distress - intermittent  planned for Hospice eval  Morphine PRN  DNR DNI  prognosis very poor  son is an MD - aware of prognosis and is on board with supportive comfort measures  will follow and monitor acute resp failure requiring bipap with hypoxia, 2/2 pul edema  d/w son and he does not any aggressive measures, just keep her comfortable.   D/W son about Hospice care as pt failed s/s eval today.  No BIPAP, only O2 nc. Morphine for dyspnea, distress, pain, palliative measures  will check echo for LVF.  c/w empiric antibx, for posible PNA. procal elevated  Only pleasure feeds. dementia  CHF  dysphagia  poss asp pna  s.p. resp distress  oral and skin care  assist with ADL  will start on Pureed diet, speech and swallow eval  HOB elev  on emp ABX  will dc pulse ox, will dc BIPAP, pt is DNR DNI, non invasive supportive measures only as per son - who is a physician  labs pending  will follow and monitor  tylenol prn, robitussin prn, mylanta prn, dulcolax prn dementia  asp pna  dysphagia  atelectasis  frail and weak  considered for [facility]  discussed with Son - Physician  pt is DNR DNI  will follow  pureed diet - pleasure feeding, remains high risk for aspiration, family aware  would complete total 5 days of ABX therapy pna  dementia  dysphagia  aspiration  resp distress  utilize Morphine PRN for resp distress  pt is on o2 support  on emp ABX regimen  discussed with son - pt is DNR DNI and planned for likely home Hospice DC today  will follow and monitor pna  htn  frail and weak  dementia  dysphagia  cardio following - needs optimization for HTN and BP control  supportive medical regimen without aggressive or invasive measures  o2 support  dual ABX  morphine prn for pain, distress, dyspnea,   prognosis guarded  advanced age and advanced dementia  pt is DNR DNI acute resp failure requiring bipap with hypoxia, 2/2 pul edema  d/w son and he does not any aggressive measures, just keep her comfortable.   D/W son about Hospice care as pt failed s/s eval today.  No BIPAP, only O2 nc. Morphine for dyspnea, distress, pain, palliative measures  will check echo for LVF.  c/w empiric antibx, for posible PNA. procal elevated  Only pleasure feeds. acute resp failure requiring bipap with hypoxia, 2/2 pul edema  d/w son and he does not any aggressive measures, just keep her comfortable.   D/W son about Hospice care as pt failed s/s eval today.  No BIPAP, only O2 nc. Morphine for dyspnea, distress, pain, palliative measures  will check echo for LVF.  c/w empiric antibx, for posible PNA. procal elevated  Only pleasure feeds.

## 2021-04-27 NOTE — PROGRESS NOTES
Cardiac Electrophysiology OFFICE Note     Subjective:      Jennifer Harris is a [de-identified] y.o. patient who presents for follow up, is s/p AF ablation on 05/10/2019 (left side not complete due to pericardial effusion during procedure). She believes she went into recurrent AF on 04/01/2021, feels that it has been very consistent since then based on Apple Watch tracings. ECG on 04/26/2021 showed AF 79 bpm with QTc 445 ms. Rhythm is more regular on auscultation today; Apple Watch shows frequent PACs. Dr. Kathy Li increased amiodarone to 200 mg po bid, started metoprolol 12.5 mg po bid. Thyroid testing WNL in 12/2020 on supplement. LFTs WNL in 12/2020. Overdue for CXR. She denies chest pain, palpitations, SOB, PND, orthopnea, lightheadedness, syncope, or edema. Anticoagulated with Eliquis. Denies bleeding issues. Previous:  S/p TIM/DCCV 06/2020. S/p AF ablation 05/10/2019. PVI unable to be completed on left side due to pericardial effusion without tamponade. Heparin stopped & reversed. Persistent AF, failed DCCV & flecainide. Cardioversion with Dr. Kathy iL on 2/22/19, had follow up on 2/27/19, back in atrial fibrillation. Mild dilated cardiomyopathy, improved on GDMT. Couldn't tolerate meds due to low BP.     Negative stress test 5 yrs ago per her report.     Patient Active Problem List   Diagnosis Code    Dyslipidemia E78.5    Palpitations R00.2    Chest pain, unspecified R07.9    Asthma J45.909    Hyperlipidemia LDL goal < 130 E78.5    HTN (hypertension) I10    Breast cancer (HCC) C50.919    Lung nodule R91.1    Cystocele [id number]    Uterine prolapse N81.4    Dizziness R42    Persistent atrial fibrillation (HCC) I48.19    Encounter for cardioversion procedure Z01.89    S/P ablation of atrial fibrillation Z98.890, Z86.79    Pericardial effusion, acute I30.9    Non-rheumatic mitral regurgitation I34.0    Non-rheumatic tricuspid valve insufficiency I36.1     Current Outpatient Medications   Medication Sig Dispense Refill    amLODIPine (NORVASC) 2.5 mg tablet       amiodarone (CORDARONE) 200 mg tablet Take 1 Tab by mouth two (2) times a day. 180 Tab 1    metoprolol tartrate (LOPRESSOR) 25 mg tablet Take 0.5 Tabs by mouth two (2) times a day. 90 Tab 1    losartan (COZAAR) 25 mg tablet Take 1 Tab by mouth daily (with dinner). 90 Tab 2    Eliquis 5 mg tablet TAKE 1 TABLET TWICE A  Tab 3    cyclobenzaprine (FLEXERIL) 5 mg tablet 5 mg as needed.  diphenhydrAMINE (BenadryL) 25 mg capsule Take 25 mg by mouth every six (6) hours as needed.  amoxicillin (AMOXIL) 500 mg capsule Take 500 mg by mouth as needed. Only for dental procedure      acetaminophen (Tylenol Extra Strength) 500 mg tablet Take 500 mg by mouth nightly as needed.  potassium chloride (K-DUR, KLOR-CON) 20 mEq tablet Take 20 mEq by mouth daily as needed.  levothyroxine (SYNTHROID) 25 mcg tablet Take  by mouth Daily (before breakfast).  furosemide (LASIX) 40 mg tablet Take 40 mg by mouth as needed.  fluticasone (FLOVENT HFA) 220 mcg/actuation inhaler as needed.  atorvastatin (LIPITOR) 20 mg tablet Take 20 mg by mouth every evening.  MULTIVITAMIN PO Take  by mouth. Takes one po once daily.  cholecalciferol (VITAMIN D3) 5,000 unit capsule Take 5,000 Units by mouth daily.  cetirizine (ZYRTEC) 10 mg tablet Take 10 mg by mouth every evening.  estradiol (ESTRACE) 0.01 % (0.1 mg/gram) vaginal cream Insert  into vagina every Monday and Thursday.  EPINEPHrine (EPIPEN) 0.3 mg/0.3 mL (1:1,000) injection 0.3 mL by IntraMUSCular route once as needed for up to 1 dose. 0.3 mL 0    albuterol (PROVENTIL HFA, VENTOLIN HFA, PROAIR HFA) 90 mcg/actuation inhaler Take 1 Puff by inhalation every four (4) hours as needed. 2 Inhaler 3    raloxifene (EVISTA) 60 mg tablet Take 1 Tab by mouth daily.  90 Tab 4    montelukast (SINGULAIR) 10 mg tablet Take 10 mg by mouth every evening. Allergies   Allergen Reactions    Betadine [Povidone-Iodine] Rash    Iodine Rash    Norvasc [Amlodipine] Other (comments)     Flushing/redness. Past Medical History:   Diagnosis Date    Arthritis     Asthma     dr. Lizette Adams allergist    [facility] [address]     Breast cancer ([facility] [address] 1980    right - mastectomy    Coagulation disorder ([facility] [address]     on eliquis    Dyslipidemia     labs 2008 - chol 283, HDL 83, ,     HTN (hypertension)     Hyperlipidemia LDL goal < 130     for increased LDL particles, Dr. Menendez Solar nodule     Dr. Felice Gottlieb Palpitations     resolved     Past Surgical History:   Procedure Laterality Date    CHEST SURGERY PROCEDURE UNLISTED      lung nodule removed - benign    COLONOSCOPY N/A 9/10/2018    COLONOSCOPY performed by Salome Pedraza MD at [facility] W/O CONT WITH CALCIUM  1/2012    CAC score 0; calcified mediastinal lymph nodes consistent granulomatous disease    ECHO 2D ADULT  5/5/2008    normal, LVEF 60%    HX APPENDECTOMY      HX HYSTERECTOMY Bilateral 03/19/2015    and uro repair    HX KNEE REPLACEMENT Right     HX MASTECTOMY Right     HX TONSILLECTOMY      HX UROLOGICAL  8/1/14    Urodynamics    INTRACARD ECHO, THER/DX INTERVENT N/A 5/10/2019    Intracardiac Echocardiogram performed by Nabil Lopez MD at [address]  3/28/2018         SC EPHYS EVL TRNSPTL TX ATRIAL FIB ISOLAT PULM VEIN N/A 5/10/2019    ABLATION A-FIB  W COMPLETE EP STUDY performed by Nabil Lopez MD at [facility] [place] [address] CATH LAB    SC INTRACARDIAC ELECTROPHYSIOLOGIC 3D MAPPING N/A 5/10/2019    Ep 3d Mapping performed by Nabil Lopez MD at [place] [address], [address] CATH LAB    [facility]  [facility]    walked 7:31, no chest pain, normal MPI.      Family History   Problem Relation Age of Onset    Heart Failure Mother     Stroke Father     Other Other         endometrial cancer, niece     Social History     Tobacco Use    Smoking status: Never Smoker    Smokeless tobacco: Never Used   Substance Use Topics    Alcohol use: Yes     Comment: wine nightly      Review of Systems: All other review of systems otherwise negative. Constitutional: Negative for fever, chills, weight loss, + malaise/fatigue. HEENT: Negative for nosebleeds, vision changes. Respiratory: Negative for cough, hemoptysis  Cardiovascular: Negative for chest pain, palpitations, no orthopnea, claudication, leg swelling, no syncope, and PND. + SOB  Gastrointestinal: Negative for nausea, vomiting, diarrhea, blood in stool and melena. Genitourinary: Negative for dysuria, and hematuria. Musculoskeletal: Negative for myalgias, arthralgia. Skin: Negative for rash. Heme: Does not bleed or bruise easily. Neurological: Negative for speech change and focal weakness     Objective:     Visit Vitals  /84 (BP 1 Location: Left upper arm, BP Patient Position: Sitting)   Pulse 64   Resp 16   Ht 5' 6\" (1.676 m)   Wt 188 lb (85.3 kg)   BMI 30.34 kg/m²      Physical Exam:   Constitutional: Well-developed and well-nourished. No respiratory distress. Head: Normocephalic and atraumatic. Eyes: Pupils are equal, round. ENT: Hearing grossly normal.  Neck: Supple. No JVD present. Cardiovascular: Normal rate, irregular rhythm. Exam reveals no gallop and no friction rub. No murmur heard. Pulmonary/Chest: Effort normal and breath sounds normal. No wheezes. Abdominal: Soft, no tenderness. Musculoskeletal: Moves extremities independently. Normal gait. Vasc/lymphatic: Trace edema. Neurological: Alert,oriented. Skin: Skin is warm and dry. Psychiatric: Normal mood and affect. Behavior is normal. Judgment and thought content normal.      ECG (04/26/2021): AF 79 bpm with QTc 445 ms       Assessment/Plan:     Imaging/Studies:  TIM (06/19/2020): LVEF 45-50%. Mildly dilated LA. Mild to mod MR. Mild to mod TR.     Limited echo (10/24/2019): LVEF 56-60%, mild concentric LVH, mildly dilated LV, no RWMA, grade 2 diastolic dysfunction. Mildly dilated RV. Mildly dilated RA. Mod dilated LA. Mild to mod MR. Mild AR & mild aortic valve sclerosis without significant stenosis. Mild to mod PH. Echo (05/11/2019): LVEF 51-55%, no RWMA. Mod dilated LA, mod dilated RA. Small to mod anterior pericardial effusion adjacent to LV & RA.    TIM (05/10/2019): LVEF 56-60%, no RWMA. RV mod dilated. LA mod dilated. RA mod dilated. Mod MR. Mod TR. Mod posterior loculated pericardial effusion adjacent to LV & LA measuring 15 mm. ICD-10-CM ICD-9-CM    1. Persistent atrial fibrillation (HCC)  I48.19 427.31    2. Essential hypertension  I10 401.9    3. Chronic anticoagulation  Z79.01 V58.61    4. On amiodarone therapy  Z79.899 V58.69    5. S/P ablation of atrial fibrillation  Z98.890 V45.89     Z86.79     6. Mitral valve insufficiency, unspecified etiology  I34.0 424.0    7. History of cardioversion  Z98.890 V15.1         Persistent AF: S/p EP study & partial PVI of left superior vein, full PVI of right veins during ablation on 05/10/2019, developed small pericardial effusion during procedure; no tamponade. Amiodarone has failed, required DCCV in 06/2020, now with further recurrence. Symptomatic with SOB, though rate is controlled. Now increased amiodarone to 200 mg po bid. LFTs & thyroid WNL in 12/2020, will recheck CXR at a later time. QTc not significantly prolonged on ECG on 04/26/2020. Risks/benefits of repeat ablation of persistent AF reviewed. She would like to proceed with scheduling. Post ablation, if she has no recurrent AF, would consider stopping amiodarone after blanking period.       Risks include but are not limited to bleeding in the groin, infection in the groin and/or heart valves, fistula between the groin arteries and veins, valvular damage, diaphragmatic paralysis, aortic dissection, heart attack, stroke, blood clot in the leg, pulmonary embolism, lung collapse (pneumothorax or hemothorax), heart collapse (pericardial tamponade), death. The added risks for left atrial ablation may be atrial-esophageal fistula, pulmonary vein stenoses, kidney failure (from contrast injection), higher risk of bleeding, stroke and heart attack. Elective or emergency surgery may be required to repair some of these complications. Prolonged hospitalization would be required. General anesthesia and transeptal catheterization may be required for the procedure    HTN: BP controlled. Continue amlodipine, metoprolol, & losartan as previously prescribed. MR/TR: Mild to moderate per TIM in 06/2020. Asymptomatic prior to recurrent arrhythmia. Anticoagulation: Continue Eliquis 5 mg po bid. She will hold x 2 days prior to procedure. Future Appointments   Date Time Provider Hazel Wade   5/11/2021 11:00 AM MD JOSÉ Mccall AMB       Thank you for involving me in this patient's care and please call with further concerns or questions. Adria Quinonez M.D.   Electrophysiology/Cardiology  [facility] [place]  [facility] [address], [facility] [address] [place] [address]  [phone]

## 2021-05-11 ENCOUNTER — OFFICE VISIT (OUTPATIENT)
Dept: CARDIOLOGY CLINIC | Age: 81
End: 2021-05-11
Payer: MEDICARE

## 2021-05-11 VITALS
DIASTOLIC BLOOD PRESSURE: 80 MMHG | HEART RATE: 68 BPM | WEIGHT: 187 LBS | HEIGHT: 66 IN | BODY MASS INDEX: 30.05 KG/M2 | SYSTOLIC BLOOD PRESSURE: 110 MMHG | RESPIRATION RATE: 16 BRPM | OXYGEN SATURATION: 100 %

## 2021-05-11 DIAGNOSIS — I48.19 PERSISTENT ATRIAL FIBRILLATION (HCC): Primary | ICD-10-CM

## 2021-05-11 PROCEDURE — 1100F PTFALLS ASSESS-DOCD GE2>/YR: CPT | Performed by: SPECIALIST

## 2021-05-11 PROCEDURE — 93005 ELECTROCARDIOGRAM TRACING: CPT | Performed by: SPECIALIST

## 2021-05-11 PROCEDURE — G0463 HOSPITAL OUTPT CLINIC VISIT: HCPCS | Performed by: SPECIALIST

## 2021-05-11 PROCEDURE — 99214 OFFICE O/P EST MOD 30 MIN: CPT | Performed by: SPECIALIST

## 2021-05-11 PROCEDURE — G8417 CALC BMI ABV UP PARAM F/U: HCPCS | Performed by: SPECIALIST

## 2021-05-11 PROCEDURE — G8399 PT W/DXA RESULTS DOCUMENT: HCPCS | Performed by: SPECIALIST

## 2021-05-11 PROCEDURE — G8536 NO DOC ELDER MAL SCRN: HCPCS | Performed by: SPECIALIST

## 2021-05-11 PROCEDURE — G8752 SYS BP LESS 140: HCPCS | Performed by: SPECIALIST

## 2021-05-11 PROCEDURE — G8427 DOCREV CUR MEDS BY ELIG CLIN: HCPCS | Performed by: SPECIALIST

## 2021-05-11 PROCEDURE — G8754 DIAS BP LESS 90: HCPCS | Performed by: SPECIALIST

## 2021-05-11 PROCEDURE — 1090F PRES/ABSN URINE INCON ASSESS: CPT | Performed by: SPECIALIST

## 2021-05-11 PROCEDURE — 3288F FALL RISK ASSESSMENT DOCD: CPT | Performed by: SPECIALIST

## 2021-05-11 PROCEDURE — 93010 ELECTROCARDIOGRAM REPORT: CPT | Performed by: SPECIALIST

## 2021-05-11 PROCEDURE — G8510 SCR DEP NEG, NO PLAN REQD: HCPCS | Performed by: SPECIALIST

## 2021-05-11 NOTE — PROGRESS NOTES
[address] [facility]                                                            OFFICE NOTE        Rexine Spatz M.D.,KATIE LINDA ELLISON   1940  [id number]    Date/Time:  5/11/202111:09 AM        ICD-10-CM ICD-9-CM    1. Persistent atrial fibrillation (HCC)  I48.19 427.31 AMB POC EKG ROUTINE W/ 12 LEADS, INTER & REP         SUBJECTIVE:         Assessment/Plan    1. Atrial fibrillation: She now has a persistent atrial fibrillation. She is symptomatic mostly with shortness of breath and fatigue. She is status post partial ablation of atrial fibrillation May 2019. This was complicated by pericardial effusion. She is now scheduled for atrial fibrillation of towards the end of June with Dr. Morena Lorenzo. For now continue with current dose of amiodarone metoprolol for rate control. Continue with Eliquis of course as well. We will stop the amiodarone as soon as atrial fibrillation ablation has been completed.     She has had some hypothyroidism and bradycardia with increase doses of amiodarone. Proceed with limited echocardiogram in next few weeks prior to repeat ablation atrial fibrillation.     2. Hypertension: Well controlled.     She has not done CMP  TSH at this time.     See her back in approximately 3 months.         HPI   80 y. o. female. Patient with h/o HTN, HLD, Ca score of 0 in 2012, PVCs on ECG on 1/16 and echo on 1/16 with EF 55% mild MR. S/p LTKR in 1/16(seen by Dr. Bela Pollard)  Also remote h/o breast ca s/p mastectomy in 1980 but no chemo or xrt     Echo on 4/16:Ejection fraction was estimated to be 55 %. There were no  regional wall motion abnormalities. Wall thickness was at the upper limits  of normal.    Left atrium: The atrium was mildly dilated. Mitral valve: There was mild regurgitation. Aortic valve: Leaflets exhibited sclerosis.   holter on 4/16: NSR  frequent pacs couplets triples and occasional non sustained at, frequent pvcs (0.65%)     On 3/18: admitted to [facility] onset A-fib with RVR     TIM and Cardioversion completed with 200 J with conversion to NSR  TIM no PFO, trace AR, mild MR, no TR or SC Mild atherosclerosis in prox descending aortaNo KENNEDY thrombus LA moderate dilation RA normal  Started eliquis, flecainide and metoprolol     Echo on 8/20/18: The ventricle was dilated. Systolic function was mildly  reduced by EF (biplane method of disks). Ejection fraction was estimated  to be 45 % in the range of 40 % to 50 %. There was mild diffuse  hypokinesis.        TIM /CARDIOVERSION on 2/19 resulting in NSR. Flecainide resumed     CXR on 2/25/19:1. Interval development of small bilateral pleural effusions and mild basilar     atelectasis. Follow-up to resolution is suggested. Doppler LLE on 2/19:No evidence of left lower extremity vein thrombosis.     chest ct on 3/19:1. CTA pulmonary vein/left atrial mapping performed. 2. No accessory pulmonary vein, thrombus or stenosis. 3. 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 4. No acute finding.      AF ablation on 5/19  EP study & partial PVI of the left superior vein and full PVI of the right veins during ablation on 05/10/2019, developed pericardial effusion during procedure.  No tamponade.  She was able to restarted Eliquis thereafter.   but  she was admitted to CCU due to posterior lateral LA LV moderate pericardial effusion during left pulmonary vein isolation.  Heparin was reversed.  She was stable so no pericardiocentesis was warranted  Started on AMIO and BBlockers                CARDIAC STUDIES        06/19/20   ECHO TIM W POSSIBLE CARDIOVERSION 06/19/2020 6/19/2020    Narrative · Mildly dilated left atrium. Left atrial appendage velocity is normal   (greater than 40 cm/sec). · Normal cavity size and wall thickness. Mild global systolic function.    Estimated left ventricular ejection fraction is 45 - 50%.  · Mild to moderate mitral valve regurgitation is present. · Mild to moderate tricuspid valve regurgitation is present. Signed by: Luc Gonzalez MD                                 EKG Results     Procedure 720 Value Units Date/Time    AMB POC EKG ROUTINE W/ 12 LEADS, INTER & REP [[id number]]     Order Status: Sent               IMAGING      MRI Results (most recent):  Results from [place] encounter on 07/16/13   MRI KNEE RT WO CONT    Narrative **Final Report**       ICD Codes / Adm. Diagnosis: 836.2  401.9 / Other tear of cartilage or men    Examination:  MR KNEE WO CON RT  - [id number] - Jul 16 2013  3:28PM  Accession No:  [id number]  Reason:  tear      REPORT:  EXAM:  Right knee MRI without contrast    INDICATION: Pain    COMPARISON: None    TECHNIQUE: Axial T2 fat-saturated and proton density fat-saturated; coronal   T1 and proton density fat-saturated; and sagittal T2 fat-saturated, proton   density fat-saturated, and gradient echo MRI of the     knee . CONTRAST:  None. FINDINGS: Bone marrow: Degenerative type bone marrow edema is present in the   medial aspects of the medial femoral condyle and medial tibial plateau. A   small focus of subchondral bone marrow edema is present in the lateral facet   of the patella. There is no evidence of fracture or marrow replacing process. Joint fluid: There is a mild joint effusion. Areas of synovitis are seen   throughout the joint space, particularly in the suprapatellar recess and   anterior to the lateral compartment. There is additional 9 mm loose body   along the course of the popliteal tendon on series 7 image 55. There is a moderate-sized Baker's cyst posterior medial to the knee. This   measures 2.0 x 1.9 x 7.7 cm craniocaudal. There is a small rounded loose   body within this measuring 5 mm on series 8 image 4. Several strands of   synovitis are seen within this.     Collateral ligaments and posterior, lateral corner: Intact. Medial meniscus: The medial meniscus is extruded. There is a complete radial   tear of the posterior horn of the medial meniscus as evidenced by an absent    posterior horn on series 9 image 8. The distal material is flipped   inferiorly and laterally into the gutter on series 8 image 4 and series 6   image 17. Lateral meniscus: High signal is seen in the free edge of the body related   to free edge tearing. Irregular increased signal is seen anterior to the   anterior horn is likely related to synovitis. ACL and PCL: A small area mucoid degeneration is present in the proximal   ACL. The ACL and PCL are intact however. Tendons: Intact. .    Muscles: Within normal limits. Patellofemoral alignment: No patellar subluxation/tilt. Trochlear groove is   not hypoplastic. TT-TG distance: Normal at 12 mm. Articular cartilage: Full-thickness cartilage loss is seen along the   weightbearing surfaces of the medial femoral condyle and medial tibial   plateau with significant marginal osteophytosis. Mild underlying subchondral   bone marrow edema is present medially. Near full-thickness fissuring is seen along the vertical ridge of the   patella. Mild irregular cartilage loss is seen along the lateral aspect of   the patellofemoral compartment. Small marginal osteophytes are seen adjacent   to this. There is a small area of subchondral bone marrow edema related to a   full-thickness fissure in the lateral facet of patella. There is a shallow thickness of fissuring are seen along the lateral femoral   condyle towards the tibial spine. Significant marginal osteophytes are   present. There is diffuse mild cartilage thinning throughout the lateral   compartment. Soft tissue mass: None. Nonspecific soft tissue edema is seen anterior to   the patellar tendon. IMPRESSION:  1.  Complete radial tear of the posterior horn of the medial meniscus with   displacement of meniscal material into the inferior gutter. 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 2. Moderate lateral and patellofemoral osteoarthritis. 3. Free edge tearing in the body of the lateral meniscus. 4. Moderate joint effusion with areas of extensive synovitis likely related   to long-standing degenerative disease. 5. A moderate-sized Baker's cyst is seen posterior medial to the knee with   areas of synovitis and a small loose body. An additional small loose body is   seen along the course of the popliteus tendon. Signing/Reading Doctor: Chad Hollins ([id number])    Approved: Chad Hollins ([id number])  Jul 16 2013  4:08PM                                      CT Results (most recent):  Results from Hospital Encounter encounter on 03/18/19   CTA CHEST W OR W WO CONT    Narrative INDICATION: Atrial fibrillation, pulmonary vein mapping. CONTRAST: 100 mL of Isovue 370. TECHNIQUE: Multislice helical CT of the chest was performed on a 64-slice  multiple detector row CT system (BrandtoneT). Unenhanced  images were obtained  to localize the volume for acquisition. Bolus tracking software was used for  timing. Thin section images were acquired during uneventful rapid bolus  intravenous administration of contrast. 3D image post processing was performed  including coronal MIPS and SSD. CT dose reduction was achieved through the use  of a standardized protocol tailored for this examination and automatic exposure  control for dose modulation. FINDINGS:   The left superior and inferior pulmonary veins and right superior and inferior  pulmonary veins demonstrate no stenosis. There is early branching of the right  inferior pulmonary vein. No accessory pulmonary veins are identified. Left superior pulmonary vein ostium measures 1.9 x 1.8 cm, with first branch  originating 0.8 cm from the atrium.   Left inferior pulmonary vein measures 1.9 x 1.4 cm, with first branch  originating 0.6 cm from the atrium. Right superior pulmonary vein measures 2.1 x 1.9 cm, with first branch  originating 0.8 cm from the atrium. Right inferior pulmonary vein measures 2.1 x 1.9 cm, with first branch  originating 0.1 cm from the atrium. There is no thrombus or mass within the pulmonary veins or left atrium. The visualized lungs are clear of mass, nodule, acute air space disease or  edema. There is no pleural or pericardial effusion. The aorta enhances normally without evidence of aneurysm or dissection. There is no significant adenopathy. Impression IMPRESSION:   1. CTA pulmonary vein/left atrial mapping performed. 2. No accessory pulmonary vein, thrombus or stenosis. 3. 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 4. No acute finding. XR Results (most recent):  Results from Hospital Encounter encounter on 05/10/19   XR CHEST PORT    Narrative INDICATION: . sob  Additional history: Dyspnea. Atrial fibrillation  COMPARISON: Previous chest xray, 4/25/2019. LIMITATIONS: Portable technique. Lysbeth Carrel FINDINGS: Single frontal view of the chest.   .  Lines/tubes/surgical: Prescribed cardiac leads. Heart/mediastinum: Unremarkable. Lungs/pleura: Chronic appearing lung changes. Linear opacity in the right upper  lobe associated with chain suture material. No visualized pleural effusion or  pneumothorax. Additional Comments: None. .    Impression IMPRESSION:  1. No radiographic evidence of acute cardiopulmonary disease.   2. Postsurgical changes in the right upper lobe               Past Medical History:   Diagnosis Date    Arthritis     Asthma     dr. Matilde Campos allergist    Atrial fibrillation ([address]     Breast cancer ([address] 1980    right - mastectomy    Coagulation disorder ([address]     on eliquis    Dyslipidemia     labs 2008 - chol 283, HDL 83, ,     HTN (hypertension)     Hyperlipidemia LDL goal < 130     for increased LDL particles, Dr. Maria Luisa Cobos Lung nodule Dr. Silverio Tillman Palpitations     resolved     Past Surgical History:   Procedure Laterality Date    CHEST SURGERY PROCEDURE UNLISTED      lung nodule removed - benign    COLONOSCOPY N/A 9/10/2018    COLONOSCOPY performed by Ebony Anaya MD at [facility] W/O CONT WITH CALCIUM  1/2012    CAC score 0; calcified mediastinal lymph nodes consistent granulomatous disease    ECHO 2D ADULT  5/5/2008    normal, LVEF 60%    HX APPENDECTOMY      HX HYSTERECTOMY Bilateral 03/19/2015    and uro repair    HX KNEE REPLACEMENT Right     HX MASTECTOMY Right     HX TONSILLECTOMY      HX UROLOGICAL  8/1/14    [employer]    INTRACARD ECHO, THER/DX INTERVENT N/A 5/10/2019    Intracardiac Echocardiogram performed by Jackie Morton MD at [address]  3/28/2018         NC EPHYS EVL TRNSPTL TX ATRIAL FIB ISOLAT PULM VEIN N/A 5/10/2019    ABLATION A-FIB  W COMPLETE EP STUDY [place] [address], [address] CATH LAB    NC INTRACARDIAC ELECTROPHYSIOLOGIC 3D MAPPING N/A 5/10/2019    Ep 3d Mapping performed by Jackie Morton MD at [place] [address], [address] CATH LAB    [facility]  1/5/12    walked 7:31, no chest pain, normal MPI. Social History     Tobacco Use    Smoking status: Never Smoker    Smokeless tobacco: Never Used   Substance Use Topics    Alcohol use: Yes     Comment: wine nightly    Drug use: No     Family History   Problem Relation Age of Onset    Heart Failure Mother     Stroke Father     Other Other         endometrial cancer, niece     Allergies   Allergen Reactions    Betadine [Povidone-Iodine] Rash    Iodine Rash    Norvasc [Amlodipine] Other (comments)     Flushing/redness.          Visit Vitals  /80 (BP 1 Location: Left arm, BP Patient Position: Sitting, BP Cuff Size: Adult)   Pulse 68   Resp 16   Ht 5' 6\" (1.676 m)   Wt 187 lb (84.8 kg)   SpO2 100%   BMI 30.18 kg/m²         Last 3 Recorded Weights in this Encounter    05/11/21 1106   Weight: 187 lb (84.8 kg)            Review of Systems:   Pertinent items are noted in the History of Present Illness. Current Outpatient Medications on File Prior to Visit   Medication Sig Dispense Refill    amLODIPine (NORVASC) 2.5 mg tablet       amiodarone (CORDARONE) 200 mg tablet Take 1 Tab by mouth two (2) times a day. 180 Tab 1    metoprolol tartrate (LOPRESSOR) 25 mg tablet Take 0.5 Tabs by mouth two (2) times a day. 90 Tab 1    losartan (COZAAR) 25 mg tablet Take 1 Tab by mouth daily (with dinner). 90 Tab 2    Eliquis 5 mg tablet TAKE 1 TABLET TWICE A  Tab 3    cyclobenzaprine (FLEXERIL) 5 mg tablet 5 mg as needed.  diphenhydrAMINE (BenadryL) 25 mg capsule Take 25 mg by mouth every six (6) hours as needed.  amoxicillin (AMOXIL) 500 mg capsule Take 500 mg by mouth as needed. Only for dental procedure      acetaminophen (Tylenol Extra Strength) 500 mg tablet Take 500 mg by mouth nightly as needed.  potassium chloride (K-DUR, KLOR-CON) 20 mEq tablet Take 20 mEq by mouth daily as needed.  levothyroxine (SYNTHROID) 25 mcg tablet Take  by mouth Daily (before breakfast).  furosemide (LASIX) 40 mg tablet Take 40 mg by mouth as needed.  fluticasone (FLOVENT HFA) 220 mcg/actuation inhaler as needed.  atorvastatin (LIPITOR) 20 mg tablet Take 20 mg by mouth every evening.  MULTIVITAMIN PO Take  by mouth. Takes one po once daily.  cholecalciferol (VITAMIN D3) 5,000 unit capsule Take 5,000 Units by mouth daily.  cetirizine (ZYRTEC) 10 mg tablet Take 10 mg by mouth every evening.  estradiol (ESTRACE) 0.01 % (0.1 mg/gram) vaginal cream Insert  into vagina every Monday and Thursday.  EPINEPHrine (EPIPEN) 0.3 mg/0.3 mL (1:1,000) injection 0.3 mL by IntraMUSCular route once as needed for up to 1 dose.  0.3 mL 0    albuterol (PROVENTIL HFA, VENTOLIN HFA, PROAIR HFA) 90 mcg/actuation inhaler Take 1 Puff by inhalation every four (4) hours as needed. 2 Inhaler 3    raloxifene (EVISTA) 60 mg tablet Take 1 Tab by mouth daily. 90 Tab 4    montelukast (SINGULAIR) 10 mg tablet Take 10 mg by mouth every evening. No current facility-administered medications on file prior to visit. Cruz Aviles had no medications administered during this visit. Current Outpatient Medications   Medication Sig    amLODIPine (NORVASC) 2.5 mg tablet     amiodarone (CORDARONE) 200 mg tablet Take 1 Tab by mouth two (2) times a day.  metoprolol tartrate (LOPRESSOR) 25 mg tablet Take 0.5 Tabs by mouth two (2) times a day.  losartan (COZAAR) 25 mg tablet Take 1 Tab by mouth daily (with dinner).  Eliquis 5 mg tablet TAKE 1 TABLET TWICE A DAY    cyclobenzaprine (FLEXERIL) 5 mg tablet 5 mg as needed.  diphenhydrAMINE (BenadryL) 25 mg capsule Take 25 mg by mouth every six (6) hours as needed.  amoxicillin (AMOXIL) 500 mg capsule Take 500 mg by mouth as needed. Only for dental procedure    acetaminophen (Tylenol Extra Strength) 500 mg tablet Take 500 mg by mouth nightly as needed.  potassium chloride (K-DUR, KLOR-CON) 20 mEq tablet Take 20 mEq by mouth daily as needed.  levothyroxine (SYNTHROID) 25 mcg tablet Take  by mouth Daily (before breakfast).  furosemide (LASIX) 40 mg tablet Take 40 mg by mouth as needed.  fluticasone (FLOVENT HFA) 220 mcg/actuation inhaler as needed.  atorvastatin (LIPITOR) 20 mg tablet Take 20 mg by mouth every evening.  MULTIVITAMIN PO Take  by mouth. Takes one po once daily.  cholecalciferol (VITAMIN D3) 5,000 unit capsule Take 5,000 Units by mouth daily.  cetirizine (ZYRTEC) 10 mg tablet Take 10 mg by mouth every evening.  estradiol (ESTRACE) 0.01 % (0.1 mg/gram) vaginal cream Insert  into vagina every Monday and Thursday.  EPINEPHrine (EPIPEN) 0.3 mg/0.3 mL (1:1,000) injection 0.3 mL by IntraMUSCular route once as needed for up to 1 dose.     albuterol (PROVENTIL HFA, VENTOLIN HFA, PROAIR HFA) 90 mcg/actuation inhaler Take 1 Puff by inhalation every four (4) hours as needed.  raloxifene (EVISTA) 60 mg tablet Take 1 Tab by mouth daily.  montelukast (SINGULAIR) 10 mg tablet Take 10 mg by mouth every evening. No current facility-administered medications for this visit. Lab Results   Component Value Date/Time    Cholesterol, total 142 03/28/2018 04:00 AM    HDL Cholesterol 76 03/28/2018 04:00 AM    LDL, calculated 55.2 03/28/2018 04:00 AM    VLDL, calculated 10.8 03/28/2018 04:00 AM    Triglyceride 54 03/28/2018 04:00 AM    CHOL/HDL Ratio 1.9 03/28/2018 04:00 AM       Lab Results   Component Value Date/Time    Sodium 141 12/03/2020 09:52 AM    Potassium 4.2 12/03/2020 09:52 AM    Chloride 103 12/03/2020 09:52 AM    CO2 25 12/03/2020 09:52 AM    Anion gap 6 05/11/2019 03:10 AM    Glucose 101 (H) 12/03/2020 09:52 AM    BUN 11 12/03/2020 09:52 AM    Creatinine 0.84 12/03/2020 09:52 AM    BUN/Creatinine ratio 13 12/03/2020 09:52 AM    GFR est AA 76 12/03/2020 09:52 AM    GFR est non-AA 66 12/03/2020 09:52 AM    Calcium 9.0 12/03/2020 09:52 AM       Lab Results   Component Value Date/Time    ALT (SGPT) 18 12/03/2020 09:52 AM    Alk.  phosphatase 68 12/03/2020 09:52 AM    Bilirubin, direct 0.14 10/24/2019 11:04 AM    Bilirubin, total 0.4 12/03/2020 09:52 AM       Lab Results   Component Value Date/Time    WBC 5.4 12/03/2020 09:52 AM    Hemoglobin (POC) 12.9 10/25/2009 03:04 AM    HGB 12.8 12/03/2020 09:52 AM    Hematocrit (POC) 38 10/25/2009 03:04 AM    HCT 37.6 12/03/2020 09:52 AM    PLATELET 953 71/32/8761 09:52 AM    MCV 92 12/03/2020 09:52 AM       Lab Results   Component Value Date/Time    TSH 2.720 12/03/2020 09:52 AM         Lab Results   Component Value Date/Time    Cholesterol, total 142 03/28/2018 04:00 AM    Cholesterol, total 175 12/18/2015 10:18 AM    Cholesterol, total 251 (H) 06/03/2015 08:45 AM    Cholesterol, total 206 (H) 05/14/2014 10:46 AM    Cholesterol, total 201 (H) 04/26/2013 09:56 AM    HDL Cholesterol 76 03/28/2018 04:00 AM    HDL Cholesterol 93 12/18/2015 10:18 AM    HDL Cholesterol 89 06/03/2015 08:45 AM    HDL Cholesterol 92 05/14/2014 10:46 AM    HDL Cholesterol 99 04/26/2013 09:56 AM    LDL, calculated 55.2 03/28/2018 04:00 AM    LDL, calculated 68 12/18/2015 10:18 AM    LDL, calculated 141 (H) 06/03/2015 08:45 AM    LDL, calculated 98 05/14/2014 10:46 AM    LDL, calculated 83 04/26/2013 09:56 AM    Triglyceride 54 03/28/2018 04:00 AM    Triglyceride 68 12/18/2015 10:18 AM    Triglyceride 104 06/03/2015 08:45 AM    Triglyceride 79 05/14/2014 10:46 AM    Triglyceride 96 04/26/2013 09:56 AM    CHOL/HDL Ratio 1.9 03/28/2018 04:00 AM                Please note that this dictation was completed with [employer], the [employer] voice recognition software. Quite often unanticipated grammatical, syntax, homophones, and other interpretative errors are inadvertently transcribed by the computer software. Please disregard these errors. Please excuse any errors that have escaped final proofreading.

## 2021-05-11 NOTE — PROGRESS NOTES
Visit Vitals  /80 (BP 1 Location: Left arm, BP Patient Position: Sitting, BP Cuff Size: Adult)   Pulse 68   Resp 16   Ht 5' 6\" (1.676 m)   Wt 187 lb (84.8 kg)   SpO2 100%   BMI 30.18 kg/m²

## 2021-05-20 ENCOUNTER — APPOINTMENT (OUTPATIENT)
Dept: CT IMAGING | Age: 81
End: 2021-05-20
Attending: EMERGENCY MEDICINE
Payer: MEDICARE

## 2021-05-20 ENCOUNTER — HOSPITAL ENCOUNTER (EMERGENCY)
Age: 81
Discharge: HOME | End: 2021-05-20
Attending: EMERGENCY MEDICINE | Admitting: EMERGENCY MEDICINE
Payer: MEDICARE

## 2021-05-20 VITALS
HEIGHT: 65 IN | SYSTOLIC BLOOD PRESSURE: 168 MMHG | BODY MASS INDEX: 29.99 KG/M2 | HEART RATE: 79 BPM | WEIGHT: 180 LBS | OXYGEN SATURATION: 97 % | DIASTOLIC BLOOD PRESSURE: 88 MMHG | TEMPERATURE: 98.5 F | RESPIRATION RATE: 19 BRPM

## 2021-05-20 DIAGNOSIS — S09.90XA CLOSED HEAD INJURY, INITIAL ENCOUNTER: Primary | ICD-10-CM

## 2021-05-20 LAB
ALBUMIN SERPL-MCNC: 3.4 G/DL (ref 3.5–5)
ALBUMIN/GLOB SERPL: 1.4 {RATIO} (ref 1.1–2.2)
ALP SERPL-CCNC: 95 U/L (ref 45–117)
ALT SERPL-CCNC: 50 U/L (ref 12–78)
ANION GAP SERPL CALC-SCNC: 5 MMOL/L (ref 5–15)
AST SERPL-CCNC: 31 U/L (ref 15–37)
BASOPHILS # BLD: 0.1 K/UL (ref 0–0.1)
BASOPHILS NFR BLD: 1 % (ref 0–1)
BILIRUB SERPL-MCNC: 0.6 MG/DL (ref 0.2–1)
BUN SERPL-MCNC: 12 MG/DL (ref 6–20)
BUN/CREAT SERPL: 13 (ref 12–20)
CALCIUM SERPL-MCNC: 8.4 MG/DL (ref 8.5–10.1)
CHLORIDE SERPL-SCNC: 108 MMOL/L (ref 97–108)
CO2 SERPL-SCNC: 28 MMOL/L (ref 21–32)
COMMENT, HOLDF: NORMAL
CREAT SERPL-MCNC: 0.91 MG/DL (ref 0.55–1.02)
DIFFERENTIAL METHOD BLD: ABNORMAL
EOSINOPHIL # BLD: 0.1 K/UL (ref 0–0.4)
EOSINOPHIL NFR BLD: 2 % (ref 0–7)
ERYTHROCYTE [DISTWIDTH] IN BLOOD BY AUTOMATED COUNT: 14.2 % (ref 11.5–14.5)
GLOBULIN SER CALC-MCNC: 2.5 G/DL (ref 2–4)
GLUCOSE SERPL-MCNC: 152 MG/DL (ref 65–100)
HCT VFR BLD AUTO: 38.3 % (ref 35–47)
HGB BLD-MCNC: 12.4 G/DL (ref 11.5–16)
IMM GRANULOCYTES # BLD AUTO: 0.1 K/UL (ref 0–0.04)
IMM GRANULOCYTES NFR BLD AUTO: 1 % (ref 0–0.5)
INR PPP: 1.1 (ref 0.9–1.1)
LYMPHOCYTES # BLD: 0.8 K/UL (ref 0.8–3.5)
LYMPHOCYTES NFR BLD: 14 % (ref 12–49)
MCH RBC QN AUTO: 32 PG (ref 26–34)
MCHC RBC AUTO-ENTMCNC: 32.4 G/DL (ref 30–36.5)
MCV RBC AUTO: 98.7 FL (ref 80–99)
MONOCYTES # BLD: 0.3 K/UL (ref 0–1)
MONOCYTES NFR BLD: 6 % (ref 5–13)
NEUTS SEG # BLD: 4.1 K/UL (ref 1.8–8)
NEUTS SEG NFR BLD: 76 % (ref 32–75)
NRBC # BLD: 0 K/UL (ref 0–0.01)
NRBC BLD-RTO: 0 PER 100 WBC
PLATELET # BLD AUTO: 157 K/UL (ref 150–400)
PMV BLD AUTO: 12.1 FL (ref 8.9–12.9)
POTASSIUM SERPL-SCNC: 3.8 MMOL/L (ref 3.5–5.1)
PROT SERPL-MCNC: 5.9 G/DL (ref 6.4–8.2)
PROTHROMBIN TIME: 11.5 SEC (ref 9–11.1)
RBC # BLD AUTO: 3.88 M/UL (ref 3.8–5.2)
RBC MORPH BLD: ABNORMAL
SAMPLES BEING HELD,HOLD: NORMAL
SODIUM SERPL-SCNC: 141 MMOL/L (ref 136–145)
WBC # BLD AUTO: 5.5 K/UL (ref 3.6–11)

## 2021-05-20 PROCEDURE — 85610 PROTHROMBIN TIME: CPT

## 2021-05-20 PROCEDURE — 99283 EMERGENCY DEPT VISIT LOW MDM: CPT

## 2021-05-20 PROCEDURE — 80053 COMPREHEN METABOLIC PANEL: CPT

## 2021-05-20 PROCEDURE — 85025 COMPLETE CBC W/AUTO DIFF WBC: CPT

## 2021-05-20 PROCEDURE — 70450 CT HEAD/BRAIN W/O DYE: CPT

## 2021-05-20 PROCEDURE — 36415 COLL VENOUS BLD VENIPUNCTURE: CPT

## 2021-05-20 NOTE — ED PROVIDER NOTES
26-year-old female with past medical history significant for A. Fib on Eliquis, hypertension, high cholesterol presents with complaints of falling backwards and hitting her head on a carpeted floor after attempting to stand on 1 foot while getting dressed this morning. Patient reports following the fall she had one episode of vomiting and feels unsteady on her feet when walking. Denies any current head pain. Denies vision changes. Denies numbness, tingling, weakness. No other complaints. Denies any recent illness, fever, chills, nausea, chest pain, shortness of breath, abdominal pain, urinary complaints.     Denies tobacco use, drug use  Nightly alcohol use  Primary CARE[facility]           Past Medical History:   Diagnosis Date    Arthritis     Asthma     dr. William Antonio allergist    Atrial fibrillation ([facility] [address]     Breast cancer ([facility] [address] 1980    right - mastectomy    Coagulation disorder ([facility] [address]     on eliquis    Dyslipidemia     labs 2008 - chol 283, HDL 83, ,     HTN (hypertension)     Hyperlipidemia LDL goal < 130     for increased LDL particles, Dr. Blood Anchors nodule     Dr. Sidhu Bunting Palpitations     resolved       Past Surgical History:   Procedure Laterality Date    COLONOSCOPY N/A 9/10/2018    COLONOSCOPY performed by Ron Garcia MD at [facility] 149 W/O CONT WITH CALCIUM  1/2012    CAC score 0; calcified mediastinal lymph nodes consistent granulomatous disease    ECHO 2D ADULT  5/5/2008    normal, LVEF 60%    HX APPENDECTOMY      HX HYSTERECTOMY Bilateral 03/19/2015    and uro repair    HX KNEE REPLACEMENT Right     HX MASTECTOMY Right     HX TONSILLECTOMY      HX UROLOGICAL  8/1/14    Urodynamics    INTRACARD ECHO, THER/DX INTERVENT N/A 5/10/2019    Intracardiac Echocardiogram performed by Zoë Wilkerson MD at [place] [address] EXTERNAL  3/28/2018         OR CHEST SURGERY PROCEDURE UNLISTED      lung nodule removed - benign    MT EPHYS EVL TRNSPTL TX ATRIAL FIB ISOLAT PULM VEIN N/A 5/10/2019    ABLATION A-FIB  W COMPLETE EP STUDY performed by Chuck Raphael MD at [address], [facility] CATH LAB    MT INTRACARDIAC ELECTROPHYSIOLOGIC 3D [address] [facility] N/A 5/10/2019    Ep 3d Mapping performed by Chuck Raphael MD at [address], [facility] CATH LAB    STRESS TEST CARDIOLITE  1/5/12    walked 7:31, no chest pain, normal MPI. Family History:   Problem Relation Age of Onset    Heart Failure Mother     Stroke Father     Other Other         endometrial cancer, niece       Social History     Socioeconomic History    Marital status:      Spouse name: Not on file    Number of children: Not on file    Years of education: Not on file    Highest education level: Not on file   Occupational History    Not on file   Tobacco Use    Smoking status: Never Smoker    Smokeless tobacco: Never Used   Substance and Sexual Activity    Alcohol use: Yes     Comment: wine nightly    Drug use: No    Sexual activity: Yes     Partners: Male     Birth control/protection: None   Other Topics Concern    Not on file   Social History Narrative    Retired      Social Determinants of Health     Financial Resource Strain:     Difficulty of Paying Living Expenses:    Food Insecurity:     Worried About [address] in the Last Year:    [address] [facility] in the Last Year:    Transportation Needs:     Lack of Transportation (Medical):      Lack of Transportation (Non-Medical):    Physical Activity:     Days of Exercise per Week:     Minutes of Exercise per Session:    Stress:     Feeling of Stress :    Social Connections:     Frequency of Communication with Friends and Family:     Frequency of Social Gatherings with Friends and Family:     Attends Muslim Services:     Active Member of Clubs or Organizations:     Attends Club or Organization Meetings:     Marital Status:    Intimate Partner Violence:     Fear of Current or Ex-Partner:     Emotionally Abused:     Physically Abused:     Sexually Abused: ALLERGIES: Betadine [povidone-iodine], Iodine, and Norvasc [amlodipine]    Review of Systems   Constitutional: Negative for chills and fever. HENT: Negative for congestion, nosebleeds and rhinorrhea. Eyes: Negative for pain and redness. Respiratory: Negative for cough and shortness of breath. Cardiovascular: Negative for chest pain and palpitations. Gastrointestinal: Positive for vomiting. Negative for abdominal pain and nausea. Genitourinary: Negative for dysuria, frequency, vaginal bleeding and vaginal pain. Musculoskeletal: Negative for myalgias. Skin: Negative for rash and wound. Neurological: Positive for light-headedness. Negative for seizures, syncope, facial asymmetry, weakness and headaches. Hematological: Does not bruise/bleed easily. Psychiatric/Behavioral: Negative for agitation, confusion, dysphoric mood and suicidal ideas. The patient is not nervous/anxious. Vitals:    05/20/21 1026   BP: (!) 175/91   Pulse: 95   Resp: 18   Temp: 98.5 °F (36.9 °C)   SpO2: 96%   Weight: 81.6 kg (180 lb)   Height: 5' 5\" (1.651 m)            Physical Exam  Vitals and nursing note reviewed. Constitutional:       Appearance: She is well-developed. HENT:      Head: Normocephalic and atraumatic. Eyes:      Pupils: Pupils are equal, round, and reactive to light. Neck:      Trachea: No tracheal deviation. Cardiovascular:      Rate and Rhythm: Normal rate and regular rhythm. Heart sounds: Normal heart sounds. Pulmonary:      Effort: Pulmonary effort is normal. No respiratory distress. Breath sounds: Normal breath sounds. No stridor. No wheezing or rales. Chest:      Chest wall: No tenderness. Abdominal:      General: Bowel sounds are normal. There is no distension. Palpations: Abdomen is soft. Tenderness: There is no abdominal tenderness. There is no rebound.    Musculoskeletal: General: No tenderness. Normal range of motion. Cervical back: Normal range of motion and neck supple. Skin:     General: Skin is warm and dry. Coloration: Skin is not pale. Findings: No rash. Neurological:      Mental Status: She is alert and oriented to person, place, and time. GCS: GCS eye subscore is 4. GCS verbal subscore is 5. GCS motor subscore is 6. Cranial Nerves: No cranial nerve deficit. Sensory: Sensation is intact. Motor: Motor function is intact. Coordination: Coordination is intact. MDM  Number of Diagnoses or Management Options  Closed head injury, initial encounter  Diagnosis management comments: 60-year-old female on Eliquis for A. fib presents with complaints of 1 episode of vomiting and feeling lightheaded/unsteady after fall with head trauma this morning. Patient is well-appearing, no acute distress, hemodynamically stable, neurologically intact, no signs of head trauma, afebrile, nontoxic. Planhead CT, CBC/CMP/coags, IV fluid hydration. CT and labs unremarkable       Amount and/or Complexity of Data Reviewed  Clinical lab tests: ordered and reviewed  Tests in the radiology section of CPT®: reviewed and ordered    Patient Progress  Patient progress: improved         Procedures    Patient reports feeling improved after IV fluid hydration. Patient's results have been reviewed with them. Patient and/or family have verbally conveyed their understanding and agreement of the patient's signs, symptoms, diagnosis, treatment and prognosis and additionally agree to follow up as recommended or return to the Emergency Room should their condition change prior to follow-up. Discharge instructions have also been provided to the patient with some educational information regarding their diagnosis as well a list of reasons why they would want to return to the ER prior to their follow-up appointment should their condition change.

## 2021-05-20 NOTE — ED TRIAGE NOTES
Pt reports her foot got stuck and she fell backwards hitting the back of her head. Pt reports she is on Eliquis. Pt reports she feels \"unsteady\" and had an episode of vomiting.

## 2021-05-26 ENCOUNTER — ANCILLARY PROCEDURE (OUTPATIENT)
Dept: CARDIOLOGY CLINIC | Age: 81
End: 2021-05-26
Payer: MEDICARE

## 2021-05-26 ENCOUNTER — PATIENT MESSAGE (OUTPATIENT)
Dept: CARDIOLOGY CLINIC | Age: 81
End: 2021-05-26

## 2021-05-26 ENCOUNTER — OFFICE VISIT (OUTPATIENT)
Dept: CARDIOLOGY CLINIC | Age: 81
End: 2021-05-26
Payer: MEDICARE

## 2021-05-26 VITALS
SYSTOLIC BLOOD PRESSURE: 168 MMHG | WEIGHT: 180 LBS | BODY MASS INDEX: 29.99 KG/M2 | HEIGHT: 65 IN | DIASTOLIC BLOOD PRESSURE: 88 MMHG

## 2021-05-26 VITALS
DIASTOLIC BLOOD PRESSURE: 80 MMHG | OXYGEN SATURATION: 96 % | BODY MASS INDEX: 29.73 KG/M2 | HEIGHT: 66 IN | RESPIRATION RATE: 16 BRPM | WEIGHT: 185 LBS | HEART RATE: 76 BPM | SYSTOLIC BLOOD PRESSURE: 120 MMHG

## 2021-05-26 DIAGNOSIS — I42.0 DILATED CARDIOMYOPATHY (HCC): ICD-10-CM

## 2021-05-26 DIAGNOSIS — I48.91 ATRIAL FIBRILLATION, UNSPECIFIED TYPE (HCC): Primary | ICD-10-CM

## 2021-05-26 LAB
ECHO AO ROOT DIAM: 2.94 CM
ECHO EST RA PRESSURE: 8 MMHG
ECHO IVC PROX: 2.06 CM
ECHO LA MAJOR AXIS: 4.54 CM
ECHO LA MINOR AXIS: 2.4 CM
ECHO LV EDV A2C: 139.52 ML
ECHO LV EDV A4C: 107.62 ML
ECHO LV EDV BP: 124.99 ML (ref 56–104)
ECHO LV EDV INDEX A4C: 56.9 ML/M2
ECHO LV EDV INDEX BP: 66.1 ML/M2
ECHO LV EDV NDEX A2C: 73.8 ML/M2
ECHO LV EJECTION FRACTION A2C: 33 PERCENT
ECHO LV EJECTION FRACTION A4C: 23 PERCENT
ECHO LV EJECTION FRACTION BIPLANE: 27.6 PERCENT (ref 55–100)
ECHO LV ESV A2C: 94.16 ML
ECHO LV ESV A4C: 83.32 ML
ECHO LV ESV BP: 90.55 ML (ref 19–49)
ECHO LV ESV INDEX A2C: 49.8 ML/M2
ECHO LV ESV INDEX A4C: 44.1 ML/M2
ECHO LV ESV INDEX BP: 47.9 ML/M2
ECHO LV INTERNAL DIMENSION DIASTOLIC: 5.23 CM (ref 3.9–5.3)
ECHO LV INTERNAL DIMENSION SYSTOLIC: 3.55 CM
ECHO LV IVSD: 1.08 CM (ref 0.6–0.9)
ECHO LV MASS 2D: 217.4 G (ref 67–162)
ECHO LV MASS INDEX 2D: 115 G/M2 (ref 43–95)
ECHO LV POSTERIOR WALL DIASTOLIC: 1.08 CM (ref 0.6–0.9)
ECHO RIGHT VENTRICULAR SYSTOLIC PRESSURE (RVSP): 39.71 MMHG
ECHO TV REGURGITANT MAX VELOCITY: 281.54 CM/S
ECHO TV REGURGITANT PEAK GRADIENT: 31.71 MMHG

## 2021-05-26 PROCEDURE — 93010 ELECTROCARDIOGRAM REPORT: CPT | Performed by: SPECIALIST

## 2021-05-26 PROCEDURE — G8536 NO DOC ELDER MAL SCRN: HCPCS | Performed by: SPECIALIST

## 2021-05-26 PROCEDURE — G8417 CALC BMI ABV UP PARAM F/U: HCPCS | Performed by: SPECIALIST

## 2021-05-26 PROCEDURE — G8399 PT W/DXA RESULTS DOCUMENT: HCPCS | Performed by: SPECIALIST

## 2021-05-26 PROCEDURE — 1090F PRES/ABSN URINE INCON ASSESS: CPT | Performed by: SPECIALIST

## 2021-05-26 PROCEDURE — 3288F FALL RISK ASSESSMENT DOCD: CPT | Performed by: SPECIALIST

## 2021-05-26 PROCEDURE — G8754 DIAS BP LESS 90: HCPCS | Performed by: SPECIALIST

## 2021-05-26 PROCEDURE — G0463 HOSPITAL OUTPT CLINIC VISIT: HCPCS | Performed by: SPECIALIST

## 2021-05-26 PROCEDURE — 1100F PTFALLS ASSESS-DOCD GE2>/YR: CPT | Performed by: SPECIALIST

## 2021-05-26 PROCEDURE — 93005 ELECTROCARDIOGRAM TRACING: CPT | Performed by: SPECIALIST

## 2021-05-26 PROCEDURE — G8752 SYS BP LESS 140: HCPCS | Performed by: SPECIALIST

## 2021-05-26 PROCEDURE — 99214 OFFICE O/P EST MOD 30 MIN: CPT | Performed by: SPECIALIST

## 2021-05-26 PROCEDURE — 93308 TTE F-UP OR LMTD: CPT | Performed by: SPECIALIST

## 2021-05-26 PROCEDURE — G8510 SCR DEP NEG, NO PLAN REQD: HCPCS | Performed by: SPECIALIST

## 2021-05-26 PROCEDURE — G8427 DOCREV CUR MEDS BY ELIG CLIN: HCPCS | Performed by: SPECIALIST

## 2021-05-26 RX ORDER — POTASSIUM CHLORIDE 20 MEQ/1
20 TABLET, EXTENDED RELEASE ORAL DAILY
Qty: 90 TABLET | Refills: 1 | Status: SHIPPED | OUTPATIENT
Start: 2021-05-26 | End: 2021-08-09

## 2021-05-26 RX ORDER — SACUBITRIL AND VALSARTAN 24; 26 MG/1; MG/1
1 TABLET, FILM COATED ORAL 2 TIMES DAILY
Qty: 180 TABLET | Refills: 1 | Status: SHIPPED | OUTPATIENT
Start: 2021-05-26 | End: 2021-10-12 | Stop reason: SDUPTHER

## 2021-05-26 RX ORDER — FUROSEMIDE 40 MG/1
40 TABLET ORAL DAILY
Qty: 90 TABLET | Refills: 1 | Status: ON HOLD | OUTPATIENT
Start: 2021-05-26 | End: 2021-06-25 | Stop reason: SDUPTHER

## 2021-05-26 NOTE — PATIENT INSTRUCTIONS
Please have lab work completed at next earliest convenience. Please STOP Losartan. 48 hours later, please START Entresto 24/26mg Please INCREASE your Lasix and Potassium to daily. You will be scheduled for a Nuclear Stress Test after your appointment today. For Nuclear Stress Test: 
Wear comfortable clothing (shorts or pants with a shirt or blouse- no underwire bras) and walking or athletic shoes. DO NOT eat or drink anything, except water, for at least 2 hours prior to your appointment. AVOID tobacco products for at least 6 hours prior to your test. 
 
DO NOT eat or drink anything containing caffeine, including but not limited to the following: chocolate, regular and decaffeinated coffee, soft drinks, or tea for at least 12-24 hours prior to your test. 
 
Do take your scheduled medications prior to your test.  
 
You will need to inform our office if you are pregnant, nursing, or think you may be pregnant. Your test will be performed on a 1 OR 2 day protocol. This is determined by your height, weight, and other risk factors. For a 2 day test, please allow for 2 hours in the office each day. For a 1 day test, please allow for 4 hours in the office that day. The radioactive isotope used for your testing is different from any of the dyes that are commonly used in x-ray procedures, and is ordered specially for your test. Please call to cancel or reschedule your appointment at least 24 hours prior to your scheduled appointment to avoid being billed for the expensive isotope.

## 2021-05-26 NOTE — PROGRESS NOTES
[address] [facility]                                                            OFFICE NOTE        Ismael Schulte M.D.,KATIE LINDA ELLISON   1940  [id number]    Date/Time:  5/26/202111:06 AM        ICD-10-CM ICD-9-CM    1. Atrial fibrillation, unspecified type (HCC)  I48.91 427.31 AMB POC EKG ROUTINE W/ 12 LEADS, INTER & REP         SUBJECTIVE:  She remains sob   no cp reported       Assessment/Plan     1. Atrial fibrillation: She now has a persistent atrial fibrillation. She is symptomatic mostly with shortness of breath and fatigue.     She is status post partial ablation of atrial fibrillation May 2019. This was complicated by pericardial effusion.     She is now scheduled for atrial fibrillation of towards the end of June with Dr. Ceasar Taylor.     For now continue with current dose of amiodarone toprol  for rate control. Continue with Eliquis of course as well. We will stop the amiodarone as soon as atrial fibrillation ablation has been completed.     She has had some hypothyroidism and bradycardia with increase doses of amiodarone.     2.  Hypertension: Well controlled.     3.  Cardiomyopathy: Unfortunately echocardiogram today reveals a significant reduction in ejection fraction at 30%. The etiology of cardiomyopathy is unclear at this time. She does have atrial fibrillation but her heart rate is being well controlled at all times at this point. Her EKG today reveals atrial fibrillation rate of 76 and nonspecific ST-T wave abnormalities and incomplete left bundle branch block. We have discussed significance implication of cardiomyopathy. Proceed with Lexiscan Myoview stress test to rule out underlying coronary artery disease even if in my opinion this is not very likely at this point but needs to be find out. Continue with Toprol but change losartan to Entresto 2426mg twice a day.     Start Lasix 40 mg daily and potassium 20 mEq daily. Thyroid function test was ordered but is not available at this time. This will need to be checked since could have any impact on her cardiomyopathy. Otherwise I will see her back in approximately 2 to 3weeks after above-mentioned tests have been completed.             HPI   80 y. o. female. Patient with h/o HTN, HLD, Ca score of 0 in 2012, PVCs on ECG on 1/16 and echo on 1/16 with EF 55% mild MR. S/p LTKR in 1/16(seen by Dr. Maxx Ashley)  Also remote h/o breast ca s/p mastectomy in 1980 but no chemo or xrt     Echo on 4/16:Ejection fraction was estimated to be 55 %. There were no  regional wall motion abnormalities. Wall thickness was at the upper limits  of normal.    Left atrium: The atrium was mildly dilated. Mitral valve: There was mild regurgitation. Aortic valve: Leaflets exhibited sclerosis. holter on 4/16: NSR  frequent pacs couplets triples and occasional non sustained at, frequent pvcs (0.65%)     On 3/18: admitted to [facility] 27 onset A-fib with RVR     TIM and Cardioversion completed with 200 J with conversion to NSR  TIM no PFO, trace AR, mild MR, no TR or TN Mild atherosclerosis in prox descending aortaNo KENNEDY thrombus LA moderate dilation RA normal  Started eliquis, flecainide and metoprolol     Echo on 8/20/18: The ventricle was dilated. Systolic function was mildly  reduced by EF (biplane method of disks). Ejection fraction was estimated  to be 45 % in the range of 40 % to 50 %. There was mild diffuse  hypokinesis.        TIM /CARDIOVERSION on 2/19 resulting in NSR. Flecainide resumed     CXR on 2/25/19:1. Interval development of small bilateral pleural effusions and mild basilar     atelectasis. Follow-up to resolution is suggested. Doppler LLE on 2/19:No evidence of left lower extremity vein thrombosis.     chest ct on 3/19:1. CTA pulmonary vein/left atrial mapping performed. 2. No accessory pulmonary vein, thrombus or stenosis.   3. 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 4. No acute finding.      AF ablation on 5/19  EP study & partial PVI of the left superior vein and full PVI of the right veins during ablation on 05/10/2019, developed pericardial effusion during procedure.  No tamponade.  She was able to restarted Eliquis thereafter.   but  she was admitted to CCU due to posterior lateral LA LV moderate pericardial effusion during left pulmonary vein isolation.  Heparin was reversed.  She was stable so no pericardiocentesis was warranted  Started on AMIO and BBlockers              CARDIAC STUDIES        06/19/20    ECHO TIM W POSSIBLE CARDIOVERSION 06/19/2020 6/19/2020    Interpretation Summary  · Mildly dilated left atrium. Left atrial appendage velocity is normal (greater than 40 cm/sec). · Normal cavity size and wall thickness. Mild global systolic function. Estimated left ventricular ejection fraction is 45 - 50%. · Mild to moderate mitral valve regurgitation is present. · Mild to moderate tricuspid valve regurgitation is present. Signed by: Brian Britt MD on 6/19/2020  4:45 PM                              EKG Results     Procedure 720 Value Units Date/Time    AMB POC EKG ROUTINE W/ 12 LEADS, INTER & REP [[id number]] Resulted: 05/26/21 1049    Order Status: Completed Updated: 05/26/21 1050              IMAGING      MRI Results (most recent):  Results from [place] encounter on 07/16/13    MRI KNEE RT WO CONT    Narrative  **Final Report**      ICD Codes / Adm. Diagnosis: 836.2  401.9 / Other tear of cartilage or men  Examination:  MR KNEE WO CON RT  - [id number] - Jul 16 2013  3:28PM  Accession No:  [id number]  Reason:  tear      REPORT:  EXAM:  Right knee MRI without contrast    INDICATION: Pain    COMPARISON: None    TECHNIQUE: Axial T2 fat-saturated and proton density fat-saturated; coronal  T1 and proton density fat-saturated; and sagittal T2 fat-saturated, proton  density fat-saturated, and gradient echo MRI of the     knee . CONTRAST:  None. FINDINGS: Bone marrow: Degenerative type bone marrow edema is present in the  medial aspects of the medial femoral condyle and medial tibial plateau. A  small focus of subchondral bone marrow edema is present in the lateral facet  of the patella. There is no evidence of fracture or marrow replacing process. Joint fluid: There is a mild joint effusion. Areas of synovitis are seen  throughout the joint space, particularly in the suprapatellar recess and  anterior to the lateral compartment. There is additional 9 mm loose body  along the course of the popliteal tendon on series 7 image 55. There is a moderate-sized Baker's cyst posterior medial to the knee. This  measures 2.0 x 1.9 x 7.7 cm craniocaudal. There is a small rounded loose  body within this measuring 5 mm on series 8 image 4. Several strands of  synovitis are seen within this. Collateral ligaments and posterior, lateral corner: Intact. Medial meniscus: The medial meniscus is extruded. There is a complete radial  tear of the posterior horn of the medial meniscus as evidenced by an absent  posterior horn on series 9 image 8. The distal material is flipped  inferiorly and laterally into the gutter on series 8 image 4 and series 6  image 17. Lateral meniscus: High signal is seen in the free edge of the body related  to free edge tearing. Irregular increased signal is seen anterior to the  anterior horn is likely related to synovitis. ACL and PCL: A small area mucoid degeneration is present in the proximal  ACL. The ACL and PCL are intact however. Tendons: Intact. .    Muscles: Within normal limits. Patellofemoral alignment: No patellar subluxation/tilt. Trochlear groove is  not hypoplastic. TT-TG distance: Normal at 12 mm.     Articular cartilage: Full-thickness cartilage loss is seen along the  weightbearing surfaces of the medial femoral condyle and medial tibial  plateau with significant marginal osteophytosis. Mild underlying subchondral  bone marrow edema is present medially. Near full-thickness fissuring is seen along the vertical ridge of the  patella. Mild irregular cartilage loss is seen along the lateral aspect of  the patellofemoral compartment. Small marginal osteophytes are seen adjacent  to this. There is a small area of subchondral bone marrow edema related to a  full-thickness fissure in the lateral facet of patella. There is a shallow thickness of fissuring are seen along the lateral femoral  condyle towards the tibial spine. Significant marginal osteophytes are  present. There is diffuse mild cartilage thinning throughout the lateral  compartment. Soft tissue mass: None. Nonspecific soft tissue edema is seen anterior to  the patellar tendon. IMPRESSION:  1. Complete radial tear of the posterior horn of the medial meniscus with  displacement of meniscal material into the inferior gutter. 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 2. Moderate lateral and patellofemoral osteoarthritis. 3. Free edge tearing in the body of the lateral meniscus. 4. Moderate joint effusion with areas of extensive synovitis likely related  to long-standing degenerative disease. 5. A moderate-sized Baker's cyst is seen posterior medial to the knee with  areas of synovitis and a small loose body. An additional small loose body is  seen along the course of the popliteus tendon. Signing/Reading Doctor: Ann Aranda ([id number])  Approved: Ann Aranda ([id number])  Jul 16 2013  4:08PM      CT Results (most recent):  Results from Hospital Encounter encounter on 05/20/21    CT HEAD WO CONT    Narrative  EXAM: CT HEAD WO CONT    INDICATION: Status post fall, vomiting, unsteadiness    COMPARISON: 3/28/2018. CONTRAST: None. TECHNIQUE: Unenhanced CT of the head was performed using 5 mm images. Brain and  bone windows were generated.  Coronal and sagittal reformats. CT dose reduction  was achieved through use of a standardized protocol tailored for this  examination and automatic exposure control for dose modulation. FINDINGS:  The ventricles and sulci are normal in size, shape and configuration. . There is  no significant white matter disease. There is no intracranial hemorrhage,  extra-axial collection, or mass effect. The basilar cisterns are open. No CT  evidence of acute infarct. The bone windows demonstrate no abnormalities. The visualized portions of the  paranasal sinuses and mastoid air cells are clear. Impression  No acute abnormality. XR Results (most recent):  Results from Hospital Encounter encounter on 05/10/19    XR CHEST PORT    Narrative  INDICATION: . sob  Additional history: Dyspnea. Atrial fibrillation  COMPARISON: Previous chest xray, 4/25/2019. LIMITATIONS: Portable technique. Leandro Kick FINDINGS: Single frontal view of the chest.  .  Lines/tubes/surgical: Prescribed cardiac leads. Heart/mediastinum: Unremarkable. Lungs/pleura: Chronic appearing lung changes. Linear opacity in the right upper  lobe associated with chain suture material. No visualized pleural effusion or  pneumothorax. Additional Comments: None. .    Impression  IMPRESSION:  1. No radiographic evidence of acute cardiopulmonary disease.   2. Postsurgical changes in the right upper lobe          Past Medical History:   Diagnosis Date    Arthritis     Asthma     dr. Rafael Handy allergist    [facility] [address]     Breast cancer ([facility] [address] 1980    right - mastectomy    Coagulation disorder ([address]     on eliquis    Dyslipidemia     labs 2008 - chol 283, HDL 83, ,     HTN (hypertension)     Hyperlipidemia LDL goal < 130     for increased LDL particles, Dr. Alicia Henry nodule     Dr. Ludmila Abreu Palpitations     resolved     Past Surgical History:   Procedure Laterality Date    COLONOSCOPY N/A 9/10/2018    COLONOS[facility]by [facility] at [facility] ENDOSCOPY    CT HEART W/O CONT WITH CALCIUM  1/2012    CAC score 0; calcified mediastinal lymph nodes consistent granulomatous disease    ECHO 2D ADULT  5/5/2008    normal, LVEF 60%    HX APPENDECTOMY      HX HYSTERECTOMY Bilateral 03/19/2015    and uro repair    HX KNEE REPLACEMENT Right     HX MASTECTOMY Right     HX TONSILLECTOMY      HX UROLOGICAL  8/1/14    Urodynamics    INTRACARD ECHO, THER/DX INTERVENT N/A 5/10/2019    Intracardiac Echocardiogram performed by Thom Aguilar MD at [address]  3/28/2018         NE CHEST SURGERY PROCEDURE UNLISTED      lung nodule removed - benign    NE EPHYS EVL TRNSPTL TX ATRIAL FIB ISOLAT PULM VEIN N/A 5/10/2019    ABLATION A-FIB  W COMPLETE EP STUDY performed by Thom Aguilar MD at [address] CATH LAB    NE INTRACARDIAC ELECTROPHYSIOLOGIC 3D MAPPING N/A 5/10/2019    Ep 3d Mapping performed by Thom Aguilar MD at [address] [place] [address] CATH LAB    STRESS TEST [facility]  1/5/12    walked 7:31, no chest pain, normal MPI. Social History     Tobacco Use    Smoking status: Never Smoker    Smokeless tobacco: Never Used   Substance Use Topics    Alcohol use: Yes     Comment: wine nightly    Drug use: No     Family History   Problem Relation Age of Onset    Heart Failure Mother     Stroke Father     Other Other         endometrial cancer, niece     Allergies   Allergen Reactions    Betadine [Povidone-Iodine] Rash    Iodine Rash    Norvasc [Amlodipine] Other (comments)     Flushing/redness. Visit Vitals  /80 (BP 1 Location: Left upper arm, BP Patient Position: Sitting, BP Cuff Size: Adult)   Pulse 76   Resp 16   Ht 5' 6\" (1.676 m)   Wt 185 lb (83.9 kg)   SpO2 96%   BMI 29.86 kg/m²         Last 3 Recorded Weights in this Encounter    05/26/21 1043   Weight: 185 lb (83.9 kg)            Review of Systems:   Pertinent items are noted in the History of Present Illness.        Neck: no JVD  Heart: irregularly irregular rhythm  Lungs: clear to auscultation bilaterally  Abdomen: soft, non-tender. Bowel sounds normal. No masses,  no organomegaly  Extremities: no edema      Current Outpatient Medications on File Prior to Visit   Medication Sig Dispense Refill    amLODIPine (NORVASC) 2.5 mg tablet       amiodarone (CORDARONE) 200 mg tablet Take 1 Tab by mouth two (2) times a day. 180 Tab 1    metoprolol tartrate (LOPRESSOR) 25 mg tablet Take 0.5 Tabs by mouth two (2) times a day. 90 Tab 1    losartan (COZAAR) 25 mg tablet Take 1 Tab by mouth daily (with dinner). 90 Tab 2    Eliquis 5 mg tablet TAKE 1 TABLET TWICE A  Tab 3    cyclobenzaprine (FLEXERIL) 5 mg tablet 5 mg as needed.  diphenhydrAMINE (BenadryL) 25 mg capsule Take 25 mg by mouth every six (6) hours as needed.  amoxicillin (AMOXIL) 500 mg capsule Take 500 mg by mouth as needed. Only for dental procedure      acetaminophen (Tylenol Extra Strength) 500 mg tablet Take 500 mg by mouth nightly as needed.  potassium chloride (K-DUR, KLOR-CON) 20 mEq tablet Take 20 mEq by mouth daily as needed.  levothyroxine (SYNTHROID) 25 mcg tablet Take  by mouth Daily (before breakfast).  furosemide (LASIX) 40 mg tablet Take 40 mg by mouth as needed.  fluticasone (FLOVENT HFA) 220 mcg/actuation inhaler as needed.  atorvastatin (LIPITOR) 20 mg tablet Take 20 mg by mouth every evening.  MULTIVITAMIN PO Take  by mouth. Takes one po once daily.  cholecalciferol (VITAMIN D3) 5,000 unit capsule Take 5,000 Units by mouth daily.  cetirizine (ZYRTEC) 10 mg tablet Take 10 mg by mouth every evening.  estradiol (ESTRACE) 0.01 % (0.1 mg/gram) vaginal cream Insert  into vagina every Monday and Thursday.  EPINEPHrine (EPIPEN) 0.3 mg/0.3 mL (1:1,000) injection 0.3 mL by IntraMUSCular route once as needed for up to 1 dose.  0.3 mL 0    albuterol (PROVENTIL HFA, VENTOLIN HFA, PROAIR HFA) 90 mcg/actuation inhaler Take 1 Puff by inhalation every four (4) hours as needed. 2 Inhaler 3    raloxifene (EVISTA) 60 mg tablet Take 1 Tab by mouth daily. 90 Tab 4    montelukast (SINGULAIR) 10 mg tablet Take 10 mg by mouth every evening. No current facility-administered medications on file prior to visit. Jonathan Lanier had no medications administered during this visit. Current Outpatient Medications   Medication Sig    amLODIPine (NORVASC) 2.5 mg tablet     amiodarone (CORDARONE) 200 mg tablet Take 1 Tab by mouth two (2) times a day.  metoprolol tartrate (LOPRESSOR) 25 mg tablet Take 0.5 Tabs by mouth two (2) times a day.  losartan (COZAAR) 25 mg tablet Take 1 Tab by mouth daily (with dinner).  Eliquis 5 mg tablet TAKE 1 TABLET TWICE A DAY    cyclobenzaprine (FLEXERIL) 5 mg tablet 5 mg as needed.  diphenhydrAMINE (BenadryL) 25 mg capsule Take 25 mg by mouth every six (6) hours as needed.  amoxicillin (AMOXIL) 500 mg capsule Take 500 mg by mouth as needed. Only for dental procedure    acetaminophen (Tylenol Extra Strength) 500 mg tablet Take 500 mg by mouth nightly as needed.  potassium chloride (K-DUR, KLOR-CON) 20 mEq tablet Take 20 mEq by mouth daily as needed.  levothyroxine (SYNTHROID) 25 mcg tablet Take  by mouth Daily (before breakfast).  furosemide (LASIX) 40 mg tablet Take 40 mg by mouth as needed.  fluticasone (FLOVENT HFA) 220 mcg/actuation inhaler as needed.  atorvastatin (LIPITOR) 20 mg tablet Take 20 mg by mouth every evening.  MULTIVITAMIN PO Take  by mouth. Takes one po once daily.  cholecalciferol (VITAMIN D3) 5,000 unit capsule Take 5,000 Units by mouth daily.  cetirizine (ZYRTEC) 10 mg tablet Take 10 mg by mouth every evening.  estradiol (ESTRACE) 0.01 % (0.1 mg/gram) vaginal cream Insert  into vagina every Monday and Thursday.  EPINEPHrine (EPIPEN) 0.3 mg/0.3 mL (1:1,000) injection 0.3 mL by IntraMUSCular route once as needed for up to 1 dose.  albuterol (PROVENTIL HFA, VENTOLIN HFA, PROAIR HFA) 90 mcg/actuation inhaler Take 1 Puff by inhalation every four (4) hours as needed.  raloxifene (EVISTA) 60 mg tablet Take 1 Tab by mouth daily.  montelukast (SINGULAIR) 10 mg tablet Take 10 mg by mouth every evening. No current facility-administered medications for this visit. Lab Results   Component Value Date/Time    Cholesterol, total 142 03/28/2018 04:00 AM    HDL Cholesterol 76 03/28/2018 04:00 AM    LDL, calculated 55.2 03/28/2018 04:00 AM    VLDL, calculated 10.8 03/28/2018 04:00 AM    Triglyceride 54 03/28/2018 04:00 AM    CHOL/HDL Ratio 1.9 03/28/2018 04:00 AM       Lab Results   Component Value Date/Time    Sodium 141 05/20/2021 11:00 AM    Potassium 3.8 05/20/2021 11:00 AM    Chloride 108 05/20/2021 11:00 AM    CO2 28 05/20/2021 11:00 AM    Anion gap 5 05/20/2021 11:00 AM    Glucose 152 (H) 05/20/2021 11:00 AM    BUN 12 05/20/2021 11:00 AM    Creatinine 0.91 05/20/2021 11:00 AM    BUN/Creatinine ratio 13 05/20/2021 11:00 AM    GFR est AA >60 05/20/2021 11:00 AM    GFR est non-AA 59 (L) 05/20/2021 11:00 AM    Calcium 8.4 (L) 05/20/2021 11:00 AM       Lab Results   Component Value Date/Time    ALT (SGPT) 50 05/20/2021 11:00 AM    Alk.  phosphatase 95 05/20/2021 11:00 AM    Bilirubin, direct 0.14 10/24/2019 11:04 AM    Bilirubin, total 0.6 05/20/2021 11:00 AM       Lab Results   Component Value Date/Time    WBC 5.5 05/20/2021 11:00 AM    Hemoglobin (POC) 12.9 10/25/2009 03:04 AM    HGB 12.4 05/20/2021 11:00 AM    Hematocrit (POC) 38 10/25/2009 03:04 AM    HCT 38.3 05/20/2021 11:00 AM    PLATELET 746 40/07/7226 11:00 AM    MCV 98.7 05/20/2021 11:00 AM       Lab Results   Component Value Date/Time    TSH 2.720 12/03/2020 09:52 AM         Lab Results   Component Value Date/Time    Cholesterol, total 142 03/28/2018 04:00 AM    Cholesterol, total 175 12/18/2015 10:18 AM    Cholesterol, total 251 (H) 06/03/2015 08:45 AM    Cholesterol, total 206 (H) 05/14/2014 10:46 AM    Cholesterol, total 201 (H) 04/26/2013 09:56 AM    HDL Cholesterol 76 03/28/2018 04:00 AM    HDL Cholesterol 93 12/18/2015 10:18 AM    HDL Cholesterol 89 06/03/2015 08:45 AM    HDL Cholesterol 92 05/14/2014 10:46 AM    HDL Cholesterol 99 04/26/2013 09:56 AM    LDL, calculated 55.2 03/28/2018 04:00 AM    LDL, calculated 68 12/18/2015 10:18 AM    LDL, calculated 141 (H) 06/03/2015 08:45 AM    LDL, calculated 98 05/14/2014 10:46 AM    LDL, calculated 83 04/26/2013 09:56 AM    Triglyceride 54 03/28/2018 04:00 AM    Triglyceride 68 12/18/2015 10:18 AM    Triglyceride 104 06/03/2015 08:45 AM    Triglyceride 79 05/14/2014 10:46 AM    Triglyceride 96 04/26/2013 09:56 AM    CHOL/HDL Ratio 1.9 03/28/2018 04:00 AM                Please note that this dictation was completed with [id number], the [employer] voice recognition software. Quite often unanticipated grammatical, syntax, homophones, and other interpretative errors are inadvertently transcribed by the computer software. Please disregard these errors. Please excuse any errors that have escaped final proofreading.

## 2021-05-26 NOTE — PROGRESS NOTES
Visit Vitals  /80 (BP 1 Location: Left upper arm, BP Patient Position: Sitting, BP Cuff Size: Adult)   Pulse 76   Resp 16   Ht 5' 6\" (1.676 m)   Wt 185 lb (83.9 kg)   SpO2 96%   BMI 29.86 kg/m²

## 2021-06-01 ENCOUNTER — TELEPHONE (OUTPATIENT)
Dept: CARDIOLOGY CLINIC | Age: 81
End: 2021-06-01

## 2021-06-01 ENCOUNTER — PATIENT MESSAGE (OUTPATIENT)
Dept: CARDIOLOGY CLINIC | Age: 81
End: 2021-06-01

## 2021-06-01 NOTE — TELEPHONE ENCOUNTER
Ruth Azevedo MD  You 4 days ago   MG  correct    Message text    Nevaeh Sinha MD 5 days ago   ES  I would assume yes, but just wanted to check with you

## 2021-06-09 ENCOUNTER — PATIENT MESSAGE (OUTPATIENT)
Dept: CARDIOLOGY CLINIC | Age: 81
End: 2021-06-09

## 2021-06-09 NOTE — TELEPHONE ENCOUNTER
Prior auth form signed and faxed.     Patti Wing (Key: [facility]) - [id number]  Marisol Gonzales 24-26MG tablets  Status: PA Response - Approved

## 2021-06-10 ENCOUNTER — TELEPHONE (OUTPATIENT)
Dept: CARDIOLOGY CLINIC | Age: 81
End: 2021-06-10

## 2021-06-10 NOTE — TELEPHONE ENCOUNTER
Left a voice message requesting a return call requesting a return to reschedule 7/8/2021 appointment with Dr. Henrique Penny.

## 2021-06-10 NOTE — TELEPHONE ENCOUNTER
Patient returning call to reschedule appointment with Dr. Cordelia Valverde, please advise      [phone]

## 2021-06-11 NOTE — TELEPHONE ENCOUNTER
Returned patient call. Verified patient by two identifiers. Rescheduled 7/8/2021 appointment with Dr. Britney Santizo to 7/13/2021, with patient. Patient verbalized all understanding and had no additional questions.      Future Appointments   Date Time Provider Hazel Wade   6/14/2021 12:30 PM CRISTINA REYEZ BS AMB   6/18/2021  3:20 PM MD JOSÉ Guerrier BS AMB   6/21/2021  7:30 AM [facility] CATH LAB [address] [facility]   7/13/2021 11:20 AM MD JOSÉ Tse BS AMB

## 2021-06-14 ENCOUNTER — ANCILLARY PROCEDURE (OUTPATIENT)
Dept: CARDIOLOGY CLINIC | Age: 81
End: 2021-06-14
Payer: MEDICARE

## 2021-06-14 VITALS
BODY MASS INDEX: 29.73 KG/M2 | HEIGHT: 66 IN | WEIGHT: 185 LBS | SYSTOLIC BLOOD PRESSURE: 128 MMHG | DIASTOLIC BLOOD PRESSURE: 66 MMHG

## 2021-06-14 DIAGNOSIS — I42.0 DILATED CARDIOMYOPATHY (HCC): ICD-10-CM

## 2021-06-14 DIAGNOSIS — Z01.89 ENCOUNTER FOR CARDIOVERSION PROCEDURE: ICD-10-CM

## 2021-06-14 DIAGNOSIS — I48.91 ATRIAL FIBRILLATION, UNSPECIFIED TYPE (HCC): ICD-10-CM

## 2021-06-14 DIAGNOSIS — I48.19 PERSISTENT ATRIAL FIBRILLATION (HCC): ICD-10-CM

## 2021-06-14 DIAGNOSIS — E78.5: ICD-10-CM

## 2021-06-14 DIAGNOSIS — I30.9 PERICARDIAL EFFUSION, ACUTE: ICD-10-CM

## 2021-06-14 DIAGNOSIS — R42 DIZZINESS: ICD-10-CM

## 2021-06-14 DIAGNOSIS — E78.5 DYSLIPIDEMIA: ICD-10-CM

## 2021-06-14 DIAGNOSIS — R00.2 PALPITATIONS: ICD-10-CM

## 2021-06-14 DIAGNOSIS — I10 ESSENTIAL HYPERTENSION: ICD-10-CM

## 2021-06-14 PROCEDURE — A9500 TC99M SESTAMIBI: HCPCS | Performed by: SPECIALIST

## 2021-06-14 PROCEDURE — 93017 CV STRESS TEST TRACING ONLY: CPT | Performed by: SPECIALIST

## 2021-06-14 PROCEDURE — 93018 CV STRESS TEST I&R ONLY: CPT | Performed by: SPECIALIST

## 2021-06-14 PROCEDURE — 93016 CV STRESS TEST SUPVJ ONLY: CPT | Performed by: SPECIALIST

## 2021-06-14 PROCEDURE — 78452 HT MUSCLE IMAGE SPECT MULT: CPT | Performed by: SPECIALIST

## 2021-06-14 RX ORDER — TETRAKIS(2-METHOXYISOBUTYLISOCYANIDE)COPPER(I) TETRAFLUOROBORATE 1 MG/ML
10 INJECTION, POWDER, LYOPHILIZED, FOR SOLUTION INTRAVENOUS ONCE
Status: COMPLETED | OUTPATIENT
Start: 2021-06-14 | End: 2021-06-14

## 2021-06-14 RX ORDER — TETRAKIS(2-METHOXYISOBUTYLISOCYANIDE)COPPER(I) TETRAFLUOROBORATE 1 MG/ML
40 INJECTION, POWDER, LYOPHILIZED, FOR SOLUTION INTRAVENOUS ONCE
Status: DISCONTINUED | OUTPATIENT
Start: 2021-06-14 | End: 2021-06-14

## 2021-06-14 RX ORDER — TETRAKIS(2-METHOXYISOBUTYLISOCYANIDE)COPPER(I) TETRAFLUOROBORATE 1 MG/ML
30 INJECTION, POWDER, LYOPHILIZED, FOR SOLUTION INTRAVENOUS ONCE
Status: COMPLETED | OUTPATIENT
Start: 2021-06-14 | End: 2021-06-14

## 2021-06-14 RX ADMIN — TETRAKIS(2-METHOXYISOBUTYLISOCYANIDE)COPPER(I) TETRAFLUOROBORATE 8.1 MILLICURIE: 1 INJECTION, POWDER, LYOPHILIZED, FOR SOLUTION INTRAVENOUS at 13:00

## 2021-06-14 RX ADMIN — REGADENOSON 0.4 MG: 0.08 INJECTION, SOLUTION INTRAVENOUS at 14:15

## 2021-06-14 RX ADMIN — TECHNETIUM TC 99M SESTAMIBI 26.4 MILLICURIE: 1 INJECTION INTRAVENOUS at 14:15

## 2021-06-15 LAB
STRESS BASELINE DIAS BP: 66 MMHG
STRESS BASELINE HR: 67 BPM
STRESS BASELINE SYS BP: 128 MMHG
STRESS O2 SAT PEAK: 100 %
STRESS O2 SAT REST: 100 %
STRESS PEAK DIAS BP: 70 MMHG
STRESS PEAK SYS BP: 120 MMHG
STRESS PERCENT HR ACHIEVED: 54 %
STRESS POST PEAK HR: 76 BPM
STRESS RATE PRESSURE PRODUCT: 9120 BPM*MMHG
STRESS ST DEPRESSION: 0 MM
STRESS ST ELEVATION: 0 MM
STRESS TARGET HR: 140 BPM

## 2021-06-16 RX ORDER — SODIUM CHLORIDE 0.9 % (FLUSH) 0.9 %
5-40 SYRINGE (ML) INJECTION AS NEEDED
Status: CANCELLED | OUTPATIENT
Start: 2021-06-16

## 2021-06-16 RX ORDER — SODIUM CHLORIDE 0.9 % (FLUSH) 0.9 %
5-40 SYRINGE (ML) INJECTION EVERY 8 HOURS
Status: CANCELLED | OUTPATIENT
Start: 2021-06-16

## 2021-06-18 ENCOUNTER — OFFICE VISIT (OUTPATIENT)
Dept: CARDIOLOGY CLINIC | Age: 81
DRG: 270 | End: 2021-06-18
Payer: MEDICARE

## 2021-06-18 VITALS
HEART RATE: 85 BPM | OXYGEN SATURATION: 97 % | RESPIRATION RATE: 16 BRPM | HEIGHT: 66 IN | SYSTOLIC BLOOD PRESSURE: 120 MMHG | WEIGHT: 182 LBS | DIASTOLIC BLOOD PRESSURE: 70 MMHG | BODY MASS INDEX: 29.25 KG/M2

## 2021-06-18 DIAGNOSIS — I48.91 ATRIAL FIBRILLATION, UNSPECIFIED TYPE (HCC): Primary | ICD-10-CM

## 2021-06-18 DIAGNOSIS — I42.0 DILATED CARDIOMYOPATHY (HCC): ICD-10-CM

## 2021-06-18 PROCEDURE — G0463 HOSPITAL OUTPT CLINIC VISIT: HCPCS | Performed by: SPECIALIST

## 2021-06-18 PROCEDURE — G8754 DIAS BP LESS 90: HCPCS | Performed by: SPECIALIST

## 2021-06-18 PROCEDURE — 1090F PRES/ABSN URINE INCON ASSESS: CPT | Performed by: SPECIALIST

## 2021-06-18 PROCEDURE — G8427 DOCREV CUR MEDS BY ELIG CLIN: HCPCS | Performed by: SPECIALIST

## 2021-06-18 PROCEDURE — 1100F PTFALLS ASSESS-DOCD GE2>/YR: CPT | Performed by: SPECIALIST

## 2021-06-18 PROCEDURE — 99214 OFFICE O/P EST MOD 30 MIN: CPT | Performed by: SPECIALIST

## 2021-06-18 PROCEDURE — 3288F FALL RISK ASSESSMENT DOCD: CPT | Performed by: SPECIALIST

## 2021-06-18 PROCEDURE — G8417 CALC BMI ABV UP PARAM F/U: HCPCS | Performed by: SPECIALIST

## 2021-06-18 PROCEDURE — G8510 SCR DEP NEG, NO PLAN REQD: HCPCS | Performed by: SPECIALIST

## 2021-06-18 PROCEDURE — G8536 NO DOC ELDER MAL SCRN: HCPCS | Performed by: SPECIALIST

## 2021-06-18 PROCEDURE — G8399 PT W/DXA RESULTS DOCUMENT: HCPCS | Performed by: SPECIALIST

## 2021-06-18 PROCEDURE — G8752 SYS BP LESS 140: HCPCS | Performed by: SPECIALIST

## 2021-06-18 NOTE — PATIENT INSTRUCTIONS
Stop taking norvasc. Follow up with Dr. Jalaine Holter in 8 weeks. Your A-fib ablation procedure has been scheduled for 6/21/2021 at 7:30 am, at [address]. 
  
Please report to Admitting Department by 6:15 am, or 2 hours prior to your scheduled procedure. Please bring a list of your current medications and medication bottles, if able, to the hospital on this day. You will be unable to drive after your procedure so please make sure to bring someone with you to your procedure. 
  
You will need to have nothing to eat or drink after midnight, the night prior to your procedure. You may have small sips of water, if needed, to take with your medication. 
  
You will need labs drawn prior to your procedure. Please go to the lab to have this done no sooner than 5/21/2021 and no later than 6/15/2021.   
  
You should stop your medication, Eliquis , 2 days prior to your scheduled procedure. 
  
After your procedure, you will need to follow up with Dr. Ml Ayala.  Your follow-up appointment has been scheduled for 7/8/2021 at 4:20 pm.

## 2021-06-18 NOTE — PROGRESS NOTES
Visit Vitals  /70   Pulse 85   Resp 16   Ht 5' 6\" (1.676 m)   Wt 182 lb (82.6 kg)   SpO2 97%   BMI 29.38 kg/m²

## 2021-06-18 NOTE — PROGRESS NOTES
[facility]                                                            OFFICE NOTE        Cory Hernandez M.D.,KATIE Jeff Jay   1940  [id number]    Date/Time:  6/18/20214:11 PM          SUBJECTIVE:  Doing a little better less sob       Assessment/Plan  1.  Atrial fibrillation: She now has a persistent atrial fibrillation.  She is symptomatic mostly with shortness of breath and fatigue.     She is status post partial ablation of atrial fibrillation May 6988.  This was complicated by pericardial effusion.     She is now scheduled for atrial fibrillation ablation of towards this coming Monday with Dr. De La Cruz.     For now continue with current dose of amiodarone toprol  for rate control.  Continue with Eliquis of course as well.   stop the amiodarone as soon as atrial fibrillation ablation has been completed.     She has had some hypothyroidism and bradycardia with increase doses of amiodarone.     2.  Hypertension: Well controlled stop norvasc for now     3.  Cardiomyopathy: Unfortunately echocardiogram revealed  a significant reduction in ejection fraction at 30%. The etiology of cardiomyopathy is unclear at this time. S    he does have atrial fibrillation but her heart rate is being well controlled at all times at this point. Her last EKG revealed atrial fibrillation rate of 76 and nonspecific ST-T wave abnormalities and incomplete left bundle branch block.     We have discussed significance implication of cardiomyopathy.   Nuclear stress test discussed with no ischemia and ef 49%     Continue with Toprol and entresto Entresto 24/26mg twice a day.     continue Lasix 40 mg daily and potassium 20 mEq daily.     Thyroid function test was normal  Lab Results   Component Value Date/Time    TSH 2.91 05/26/2021 12:03 PM          Otherwise I will see her back in approximately 8 weeks weeks         HPI 80 y.o. female. Patient with h/o HTN, HLD, Ca score of 0 in 2012, PVCs on ECG on 1/16 and echo on 1/16 with EF 55% mild MR. S/p LTKR in 1/16(seen by Dr. Suellen Lozada)  Also remote h/o breast ca s/p mastectomy in 1980 but no chemo or xrt     Echo on 4/16:Ejection fraction was estimated to be 55 %. There were no  regional wall motion abnormalities. Wall thickness was at the upper limits  of normal.    Left atrium: The atrium was mildly dilated. Mitral valve: There was mild regurgitation. Aortic valve: Leaflets exhibited sclerosis. holter on 4/16: NSR  frequent pacs couplets triples and occasional non sustained at, frequent pvcs (0.65%)     On 3/18: admitted to [place] [address] onset A-fib with RVR     TIM and Cardioversion completed with 200 J with conversion to NSR  TIM no PFO, trace AR, mild MR, no TR or AZ Mild atherosclerosis in prox descending aortaNo KENNEDY thrombus LA moderate dilation RA normal  Started eliquis, flecainide and metoprolol     Echo on 8/20/18: The ventricle was dilated. Systolic function was mildly  reduced by EF (biplane method of disks). Ejection fraction was estimated  to be 45 % in the range of 40 % to 50 %. There was mild diffuse  hypokinesis.        TIM /CARDIOVERSION on 2/19 resulting in NSR. Flecainide resumed     CXR on 2/25/19:1. Interval development of small bilateral pleural effusions and mild basilar     atelectasis. Follow-up to resolution is suggested. Doppler LLE on 2/19:No evidence of left lower extremity vein thrombosis.     chest ct on 3/19:1. CTA pulmonary vein/left atrial mapping performed. 2. No accessory pulmonary vein, thrombus or stenosis. 3. 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   4. No acute finding.      AF ablation on 5/19  EP study & partial PVI of the left superior vein and full PVI of the right veins during ablation on 05/10/2019, developed pericardial effusion during procedure.  No tamponade.  She was able to restarted Eliquis thereafter.   but  she was admitted to CCU due to posterior lateral LA LV moderate pericardial effusion during left pulmonary vein isolation.  Heparin was reversed.  She was stable so no pericardiocentesis was warranted  Started on AMIO and BBlockers               CARDIAC STUDIES        05/11/21    ECHO ADULT FOLLOW-UP OR LIMITED 05/26/2021 5/26/2021    Interpretation Summary  · LV: Calculated LVEF is 30%. Biplane method used to measure ejection fraction. Normal cavity size. Mild concentric hypertrophy. Moderate-to-severely and globally reduced systolic function. · LA: Dilated left atrium. · RA: Dilated right atrium. · AV: Mild aortic valve regurgitation is present. · MV: Moderate mitral valve regurgitation is present. · TV: Moderate tricuspid valve regurgitation is present. Right Ventricular Arterial Pressure (RVSP) is 40 mmHg. Pulmonary hypertension found to be mild. · IVC: Moderately elevated central venous pressure (8 mmHg); IVC diameter is larger than 21 mm and collapses more than 50% with respiration. Signed by: Ruth Azevedo MD on 5/26/2021  1:27 PM            06/14/21    NUCLEAR CARDIAC STRESS TEST 06/15/2021 6/17/2021    Interpretation Summary  · SPECT: Left ventricular function post-stress was abnormal. Calculated ejection fraction is 49%. The TID ratio is 1.1. · Baseline ECG: Atrial flutter, non-specific ST-T wave abnormalities, myocardial infarction, myocardial infarction. The infarction is located in the anterior regions. .  · SPECT: Myocardial perfusion imaging defect 1: There is a defect that is small in size with a mild reduction in uptake present in the mid-apical and anterior location(s) that is non-reversible. There is normal wall motion in the defect area. Viability in the area is good. The defect appears to probably be artifact caused by breast attenuation.   · SPECT: Left ventricular perfusion is probably normal. Myocardial perfusion imaging supports a low risk stress test.    Signed by: Neel Dick MD on 6/15/2021  5:18 PM                    EKG Results     None              IMAGING      MRI Results (most recent):  Results from Hospital Encounter encounter on 07/16/13    MRI KNEE RT WO CONT    Narrative  **Final Report**      ICD Codes / Adm. Diagnosis: 836.2  401.9 / Other tear of cartilage or men  Examination:  MR KNEE WO CON RT  - [id number] - Jul 16 2013  3:28PM  Accession No:  [id number]  Reason:  tear      REPORT:  EXAM:  Right knee MRI without contrast    INDICATION: Pain    COMPARISON: None    TECHNIQUE: Axial T2 fat-saturated and proton density fat-saturated; coronal  T1 and proton density fat-saturated; and sagittal T2 fat-saturated, proton  density fat-saturated, and gradient echo MRI of the     knee . CONTRAST:  None. FINDINGS: Bone marrow: Degenerative type bone marrow edema is present in the  medial aspects of the medial femoral condyle and medial tibial plateau. A  small focus of subchondral bone marrow edema is present in the lateral facet  of the patella. There is no evidence of fracture or marrow replacing process. Joint fluid: There is a mild joint effusion. Areas of synovitis are seen  throughout the joint space, particularly in the suprapatellar recess and  anterior to the lateral compartment. There is additional 9 mm loose body  along the course of the popliteal tendon on series 7 image 55. There is a moderate-sized Baker's cyst posterior medial to the knee. This  measures 2.0 x 1.9 x 7.7 cm craniocaudal. There is a small rounded loose  body within this measuring 5 mm on series 8 image 4. Several strands of  synovitis are seen within this. Collateral ligaments and posterior, lateral corner: Intact. Medial meniscus: The medial meniscus is extruded. There is a complete radial  tear of the posterior horn of the medial meniscus as evidenced by an absent  posterior horn on series 9 image 8.  The distal material is flipped  inferiorly and laterally into the gutter on series 8 image 4 and series 6  image 17. Lateral meniscus: High signal is seen in the free edge of the body related  to free edge tearing. Irregular increased signal is seen anterior to the  anterior horn is likely related to synovitis. ACL and PCL: A small area mucoid degeneration is present in the proximal  ACL. The ACL and PCL are intact however. Tendons: Intact. .    Muscles: Within normal limits. Patellofemoral alignment: No patellar subluxation/tilt. Trochlear groove is  not hypoplastic. TT-TG distance: Normal at 12 mm. Articular cartilage: Full-thickness cartilage loss is seen along the  weightbearing surfaces of the medial femoral condyle and medial tibial  plateau with significant marginal osteophytosis. Mild underlying subchondral  bone marrow edema is present medially. Near full-thickness fissuring is seen along the vertical ridge of the  patella. Mild irregular cartilage loss is seen along the lateral aspect of  the patellofemoral compartment. Small marginal osteophytes are seen adjacent  to this. There is a small area of subchondral bone marrow edema related to a  full-thickness fissure in the lateral facet of patella. There is a shallow thickness of fissuring are seen along the lateral femoral  condyle towards the tibial spine. Significant marginal osteophytes are  present. There is diffuse mild cartilage thinning throughout the lateral  compartment. Soft tissue mass: None. Nonspecific soft tissue edema is seen anterior to  the patellar tendon. IMPRESSION:  1. Complete radial tear of the posterior horn of the medial meniscus with  displacement of meniscal material into the inferior gutter. 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 2. Moderate lateral and patellofemoral osteoarthritis. 3. Free edge tearing in the body of the lateral meniscus.   4. Moderate joint effusion with areas of extensive synovitis likely related  to long-standing degenerative disease. 5. A moderate-sized Baker's cyst is seen posterior medial to the knee with  areas of synovitis and a small loose body. An additional small loose body is  seen along the course of the popliteus tendon. Signing/Reading Doctor: Fuad Lombardo ([id number])  Approved: Fuad Lombardo ([id number])  Jul 16 2013  4:08PM      CT Results (most recent):  Results from Hospital Encounter encounter on 05/20/21    CT HEAD WO CONT    Narrative  EXAM: CT HEAD WO CONT    INDICATION: Status post fall, vomiting, unsteadiness    COMPARISON: 3/28/2018. CONTRAST: None. TECHNIQUE: Unenhanced CT of the head was performed using 5 mm images. Brain and  bone windows were generated. Coronal and sagittal reformats. CT dose reduction  was achieved through use of a standardized protocol tailored for this  examination and automatic exposure control for dose modulation. FINDINGS:  The ventricles and sulci are normal in size, shape and configuration. . There is  no significant white matter disease. There is no intracranial hemorrhage,  extra-axial collection, or mass effect. The basilar cisterns are open. No CT  evidence of acute infarct. The bone windows demonstrate no abnormalities. The visualized portions of the  paranasal sinuses and mastoid air cells are clear. Impression  No acute abnormality. XR Results (most recent):  Results from Hospital Encounter encounter on 05/10/19    XR CHEST PORT    Narrative  INDICATION: . sob  Additional history: Dyspnea. Atrial fibrillation  COMPARISON: Previous chest xray, 4/25/2019. LIMITATIONS: Portable technique. [facility] FIND[facility]: Single frontal view of the chest.  .  Lines/tubes/surgical: Prescribed cardiac leads. Heart/mediastinum: Unremarkable. Lungs/pleura: Chronic appearing lung changes.  Linear opacity in the right upper  lobe associated with chain suture material. No visualized pleural effusion or  pneumothorax. Additional Comments: None. .    Impression  IMPRESSION:  1. No radiographic evidence of acute cardiopulmonary disease. 2. Postsurgical changes in the right upper lobe          Past Medical History:   Diagnosis Date    Arthritis     Asthma     dr. William Antonio allergist    [facility] ([facility]     Breast cancer ([facility] 1980    right - mastectomy    Coagulation disorder ([facility]     on eliquis    Dyslipidemia     labs 2008 - chol 283, HDL 83, ,     HTN (hypertension)     Hyperlipidemia LDL goal < 130     for increased LDL particles, Dr. Blood Anchors nodule     Dr. Sidhu Bunting Palpitations     resolved     Past Surgical History:   Procedure Laterality Date    COLONOSCOPY N/A 9/10/2018    COLONOSCOPY performed by Ron Garcia MD at [facility] W/O CONT WITH CALCIUM  1/2012    CAC score 0; calcified mediastinal lymph nodes consistent granulomatous disease    ECHO 2D ADULT  5/5/2008    normal, LVEF 60%    HX APPENDECTOMY      HX HYSTERECTOMY Bilateral 03/19/2015    and uro repair    HX KNEE REPLACEMENT Right     HX MASTECTOMY Right     HX TONSILLECTOMY      HX UROLOGICAL  8/1/14    Urodynamics    INTRACARD ECHO, THER/DX INTERVENT N/A 5/10/2019    Intracardiac Echocardiogram performed by Zoë Wilkerson MD at [address]  3/28/2018         ME CHEST SURGERY PROCEDURE UNLISTED      lung nodule removed - benign    ME EPHYS EVL TRNSPTL TX ATRIAL FIB ISOLAT PULM VEIN N/A 5/10/2019    ABLATION A-FIB  W COMPLETE EP STUDY performed by Zoë Wilkerson MD at [place] [address], [address] CATH LAB    ME INTRACARDIAC ELECTROPHYSIOLOGIC 3D MAPPING N/A 5/10/2019    Ep 3d Mapping performed by Zoë Wilkerson MD at [place] [address], [address] CATH LAB    [facility]  [facility]    walked 7:31, no chest pain, normal MPI.      Social History     Tobacco Use    Smoking status: Never Smoker    Smokeless tobacco: Never Used   Substance Use Topics    Alcohol use: Yes     Comment: wine nightly    Drug use: No     Family History   Problem Relation Age of Onset    Heart Failure Mother     Stroke Father     Other Other         endometrial cancer, niece     Allergies   Allergen Reactions    Betadine [Povidone-Iodine] Rash    Iodine Rash    Norvasc [Amlodipine] Other (comments)     Flushing/redness. Visit Vitals  /70   Pulse 85   Resp 16   Ht 5' 6\" (1.676 m)   Wt 182 lb (82.6 kg)   SpO2 97%   BMI 29.38 kg/m²         Last 3 Recorded Weights in this Encounter    06/18/21 1521   Weight: 182 lb (82.6 kg)            Review of Systems:   Pertinent items are noted in the History of Present Illness. Current Outpatient Medications on File Prior to Visit   Medication Sig Dispense Refill    sacubitriL-valsartan (Entresto) 24-26 mg tablet Take 1 Tablet by mouth two (2) times a day. 180 Tablet 1    potassium chloride (K-DUR, KLOR-CON) 20 mEq tablet Take 1 Tablet by mouth daily. 90 Tablet 1    furosemide (Lasix) 40 mg tablet Take 1 Tablet by mouth daily. 90 Tablet 1    amLODIPine (NORVASC) 2.5 mg tablet       amiodarone (CORDARONE) 200 mg tablet Take 1 Tab by mouth two (2) times a day. 180 Tab 1    metoprolol tartrate (LOPRESSOR) 25 mg tablet Take 0.5 Tabs by mouth two (2) times a day. 90 Tab 1    Eliquis 5 mg tablet TAKE 1 TABLET TWICE A  Tab 3    cyclobenzaprine (FLEXERIL) 5 mg tablet 5 mg as needed.  diphenhydrAMINE (BenadryL) 25 mg capsule Take 25 mg by mouth every six (6) hours as needed.  amoxicillin (AMOXIL) 500 mg capsule Take 500 mg by mouth as needed. Only for dental procedure      acetaminophen (Tylenol Extra Strength) 500 mg tablet Take 500 mg by mouth nightly as needed.  levothyroxine (SYNTHROID) 25 mcg tablet Take  by mouth Daily (before breakfast).  fluticasone (FLOVENT HFA) 220 mcg/actuation inhaler as needed.  atorvastatin (LIPITOR) 20 mg tablet Take 20 mg by mouth every evening.       MULTIVITAMIN PO Take  by mouth. Takes one po once daily.  cholecalciferol (VITAMIN D3) 5,000 unit capsule Take 5,000 Units by mouth daily.  cetirizine (ZYRTEC) 10 mg tablet Take 10 mg by mouth every evening.  estradiol (ESTRACE) 0.01 % (0.1 mg/gram) vaginal cream Insert  into vagina every Monday and Thursday.  EPINEPHrine (EPIPEN) 0.3 mg/0.3 mL (1:1,000) injection 0.3 mL by IntraMUSCular route once as needed for up to 1 dose. 0.3 mL 0    albuterol (PROVENTIL HFA, VENTOLIN HFA, PROAIR HFA) 90 mcg/actuation inhaler Take 1 Puff by inhalation every four (4) hours as needed. 2 Inhaler 3    raloxifene (EVISTA) 60 mg tablet Take 1 Tab by mouth daily. 90 Tab 4    montelukast (SINGULAIR) 10 mg tablet Take 10 mg by mouth every evening. No current facility-administered medications on file prior to visit. Loco Ann had no medications administered during this visit. Current Outpatient Medications   Medication Sig    sacubitriL-valsartan (Entresto) 24-26 mg tablet Take 1 Tablet by mouth two (2) times a day.  potassium chloride (K-DUR, KLOR-CON) 20 mEq tablet Take 1 Tablet by mouth daily.  furosemide (Lasix) 40 mg tablet Take 1 Tablet by mouth daily.  amLODIPine (NORVASC) 2.5 mg tablet     amiodarone (CORDARONE) 200 mg tablet Take 1 Tab by mouth two (2) times a day.  metoprolol tartrate (LOPRESSOR) 25 mg tablet Take 0.5 Tabs by mouth two (2) times a day.  Eliquis 5 mg tablet TAKE 1 TABLET TWICE A DAY    cyclobenzaprine (FLEXERIL) 5 mg tablet 5 mg as needed.  diphenhydrAMINE (BenadryL) 25 mg capsule Take 25 mg by mouth every six (6) hours as needed.  amoxicillin (AMOXIL) 500 mg capsule Take 500 mg by mouth as needed. Only for dental procedure    acetaminophen (Tylenol Extra Strength) 500 mg tablet Take 500 mg by mouth nightly as needed.  levothyroxine (SYNTHROID) 25 mcg tablet Take  by mouth Daily (before breakfast).     fluticasone (FLOVENT HFA) 220 mcg/actuation inhaler as needed.  atorvastatin (LIPITOR) 20 mg tablet Take 20 mg by mouth every evening.  MULTIVITAMIN PO Take  by mouth. Takes one po once daily.  cholecalciferol (VITAMIN D3) 5,000 unit capsule Take 5,000 Units by mouth daily.  cetirizine (ZYRTEC) 10 mg tablet Take 10 mg by mouth every evening.  estradiol (ESTRACE) 0.01 % (0.1 mg/gram) vaginal cream Insert  into vagina every Monday and Thursday.  EPINEPHrine (EPIPEN) 0.3 mg/0.3 mL (1:1,000) injection 0.3 mL by IntraMUSCular route once as needed for up to 1 dose.  albuterol (PROVENTIL HFA, VENTOLIN HFA, PROAIR HFA) 90 mcg/actuation inhaler Take 1 Puff by inhalation every four (4) hours as needed.  raloxifene (EVISTA) 60 mg tablet Take 1 Tab by mouth daily.  montelukast (SINGULAIR) 10 mg tablet Take 10 mg by mouth every evening. No current facility-administered medications for this visit. Lab Results   Component Value Date/Time    Cholesterol, total 142 03/28/2018 04:00 AM    HDL Cholesterol 76 03/28/2018 04:00 AM    LDL, calculated 55.2 03/28/2018 04:00 AM    VLDL, calculated 10.8 03/28/2018 04:00 AM    Triglyceride 54 03/28/2018 04:00 AM    CHOL/HDL Ratio 1.9 03/28/2018 04:00 AM       Lab Results   Component Value Date/Time    Sodium 142 06/07/2021 12:35 PM    Potassium 4.3 06/07/2021 12:35 PM    Chloride 108 06/07/2021 12:35 PM    CO2 29 06/07/2021 12:35 PM    Anion gap 5 06/07/2021 12:35 PM    Glucose 112 (H) 06/07/2021 12:35 PM    BUN 21 (H) 06/07/2021 12:35 PM    Creatinine 1.24 (H) 06/07/2021 12:35 PM    BUN/Creatinine ratio 17 06/07/2021 12:35 PM    GFR est AA 50 (L) 06/07/2021 12:35 PM    GFR est non-AA 42 (L) 06/07/2021 12:35 PM    Calcium 9.0 06/07/2021 12:35 PM       Lab Results   Component Value Date/Time    ALT (SGPT) 50 05/20/2021 11:00 AM    Alk.  phosphatase 95 05/20/2021 11:00 AM    Bilirubin, direct 0.14 10/24/2019 11:04 AM    Bilirubin, total 0.6 05/20/2021 11:00 AM       Lab Results Component Value Date/Time    WBC 7.2 06/07/2021 12:35 PM    Hemoglobin (POC) 12.9 10/25/2009 03:04 AM    HGB 12.8 06/07/2021 12:35 PM    Hematocrit (POC) 38 10/25/2009 03:04 AM    HCT 40.8 06/07/2021 12:35 PM    PLATELET 152 45/50/8418 12:35 PM    .2 (H) 06/07/2021 12:35 PM       Lab Results   Component Value Date/Time    TSH 2.91 05/26/2021 12:03 PM         Lab Results   Component Value Date/Time    Cholesterol, total 142 03/28/2018 04:00 AM    Cholesterol, total 175 12/18/2015 10:18 AM    Cholesterol, total 251 (H) 06/03/2015 08:45 AM    Cholesterol, total 206 (H) 05/14/2014 10:46 AM    Cholesterol, total 201 (H) 04/26/2013 09:56 AM    HDL Cholesterol 76 03/28/2018 04:00 AM    HDL Cholesterol 93 12/18/2015 10:18 AM    HDL Cholesterol 89 06/03/2015 08:45 AM    HDL Cholesterol 92 05/14/2014 10:46 AM    HDL Cholesterol 99 04/26/2013 09:56 AM    LDL, calculated 55.2 03/28/2018 04:00 AM    LDL, calculated 68 12/18/2015 10:18 AM    LDL, calculated 141 (H) 06/03/2015 08:45 AM    LDL, calculated 98 05/14/2014 10:46 AM    LDL, calculated 83 04/26/2013 09:56 AM    Triglyceride 54 03/28/2018 04:00 AM    Triglyceride 68 12/18/2015 10:18 AM    Triglyceride 104 06/03/2015 08:45 AM    Triglyceride 79 05/14/2014 10:46 AM    Triglyceride 96 04/26/2013 09:56 AM    CHOL/HDL Ratio 1.9 03/28/2018 04:00 AM                Please note that this dictation was completed with 27 bards, the [employer] voice recognition software. Quite often unanticipated grammatical, syntax, homophones, and other interpretative errors are inadvertently transcribed by the computer software. Please disregard these errors. Please excuse any errors that have escaped final proofreading.

## 2021-06-21 ENCOUNTER — ANESTHESIA (OUTPATIENT)
Dept: CARDIAC CATH/INVASIVE PROCEDURES | Age: 81
DRG: 270 | End: 2021-06-21
Payer: MEDICARE

## 2021-06-21 ENCOUNTER — HOSPITAL ENCOUNTER (INPATIENT)
Age: 81
LOS: 7 days | Discharge: HOME | DRG: 270 | End: 2021-06-28
Attending: INTERNAL MEDICINE | Admitting: INTERNAL MEDICINE
Payer: MEDICARE

## 2021-06-21 ENCOUNTER — ANESTHESIA EVENT (OUTPATIENT)
Dept: CARDIOTHORACIC SURGERY | Age: 81
DRG: 270 | End: 2021-06-21
Payer: MEDICARE

## 2021-06-21 ENCOUNTER — ANESTHESIA EVENT (OUTPATIENT)
Dept: CARDIAC CATH/INVASIVE PROCEDURES | Age: 81
DRG: 270 | End: 2021-06-21
Payer: MEDICARE

## 2021-06-21 ENCOUNTER — APPOINTMENT (OUTPATIENT)
Dept: GENERAL RADIOLOGY | Age: 81
DRG: 270 | End: 2021-06-21
Attending: NURSE PRACTITIONER
Payer: MEDICARE

## 2021-06-21 ENCOUNTER — APPOINTMENT (OUTPATIENT)
Dept: CARDIAC CATH/INVASIVE PROCEDURES | Age: 81
DRG: 270 | End: 2021-06-21
Attending: INTERNAL MEDICINE
Payer: MEDICARE

## 2021-06-21 ENCOUNTER — APPOINTMENT (OUTPATIENT)
Dept: NON INVASIVE DIAGNOSTICS | Age: 81
DRG: 270 | End: 2021-06-21
Attending: NURSE PRACTITIONER
Payer: MEDICARE

## 2021-06-21 ENCOUNTER — APPOINTMENT (OUTPATIENT)
Dept: GENERAL RADIOLOGY | Age: 81
DRG: 270 | End: 2021-06-21
Attending: PHYSICIAN ASSISTANT
Payer: MEDICARE

## 2021-06-21 ENCOUNTER — APPOINTMENT (OUTPATIENT)
Dept: GENERAL RADIOLOGY | Age: 81
DRG: 270 | End: 2021-06-21
Attending: INTERNAL MEDICINE
Payer: MEDICARE

## 2021-06-21 ENCOUNTER — ANESTHESIA (OUTPATIENT)
Dept: CARDIOTHORACIC SURGERY | Age: 81
DRG: 270 | End: 2021-06-21
Payer: MEDICARE

## 2021-06-21 DIAGNOSIS — I48.19 PERSISTENT ATRIAL FIBRILLATION (HCC): ICD-10-CM

## 2021-06-21 DIAGNOSIS — I31.4 PERICARDIAL EFFUSION WITH CARDIAC TAMPONADE: ICD-10-CM

## 2021-06-21 DIAGNOSIS — Z79.899 RECEIVING INOTROPIC MEDICATION: ICD-10-CM

## 2021-06-21 DIAGNOSIS — Z97.8 ENDOTRACHEALLY INTUBATED: ICD-10-CM

## 2021-06-21 DIAGNOSIS — I48.11 LONGSTANDING PERSISTENT ATRIAL FIBRILLATION (HCC): ICD-10-CM

## 2021-06-21 DIAGNOSIS — I31.39 PERICARDIAL EFFUSION WITH CARDIAC TAMPONADE: ICD-10-CM

## 2021-06-21 DIAGNOSIS — R57.0 CARDIOGENIC SHOCK (HCC): ICD-10-CM

## 2021-06-21 DIAGNOSIS — R73.9 HYPERGLYCEMIA: ICD-10-CM

## 2021-06-21 DIAGNOSIS — I48.91 ATRIAL FIBRILLATION, UNSPECIFIED TYPE (HCC): ICD-10-CM

## 2021-06-21 PROBLEM — Z98.890 S/P PERICARDIAL WINDOW CREATION: Status: ACTIVE | Noted: 2021-06-21

## 2021-06-21 LAB
ALBUMIN SERPL-MCNC: 3 G/DL (ref 3.5–5)
ALBUMIN SERPL-MCNC: 3.2 G/DL (ref 3.5–5)
ALBUMIN SERPL-MCNC: 3.3 G/DL (ref 3.5–5)
ALBUMIN/GLOB SERPL: 1.2 {RATIO} (ref 1.1–2.2)
ALBUMIN/GLOB SERPL: 1.3 {RATIO} (ref 1.1–2.2)
ALBUMIN/GLOB SERPL: 1.4 {RATIO} (ref 1.1–2.2)
ALP SERPL-CCNC: 55 U/L (ref 45–117)
ALP SERPL-CCNC: 71 U/L (ref 45–117)
ALP SERPL-CCNC: 72 U/L (ref 45–117)
ALT SERPL-CCNC: 57 U/L (ref 12–78)
ALT SERPL-CCNC: 59 U/L (ref 12–78)
ALT SERPL-CCNC: 78 U/L (ref 12–78)
AMYLASE SERPL-CCNC: 50 U/L (ref 25–115)
ANION GAP SERPL CALC-SCNC: 6 MMOL/L (ref 5–15)
ANION GAP SERPL CALC-SCNC: 8 MMOL/L (ref 5–15)
APTT PPP: 22.2 SEC (ref 22.1–31)
ARTERIAL PATENCY WRIST A: ABNORMAL
ARTERIAL PATENCY WRIST A: ABNORMAL
ARTERIAL PATENCY WRIST A: YES
AST SERPL-CCNC: 57 U/L (ref 15–37)
AST SERPL-CCNC: 59 U/L (ref 15–37)
AST SERPL-CCNC: 78 U/L (ref 15–37)
BASE DEFICIT BLDA-SCNC: 10.4 MMOL/L
BASE DEFICIT BLDA-SCNC: 4 MMOL/L
BASE DEFICIT BLDA-SCNC: 4.2 MMOL/L
BASOPHILS # BLD: 0 K/UL (ref 0–0.1)
BASOPHILS # BLD: 0 K/UL (ref 0–0.1)
BASOPHILS NFR BLD: 0 % (ref 0–1)
BASOPHILS NFR BLD: 0 % (ref 0–1)
BDY SITE: ABNORMAL
BILIRUB DIRECT SERPL-MCNC: 0.1 MG/DL (ref 0–0.2)
BILIRUB SERPL-MCNC: 0.4 MG/DL (ref 0.2–1)
BILIRUB SERPL-MCNC: 0.4 MG/DL (ref 0.2–1)
BILIRUB SERPL-MCNC: 0.8 MG/DL (ref 0.2–1)
BUN SERPL-MCNC: 20 MG/DL (ref 6–20)
BUN SERPL-MCNC: 21 MG/DL (ref 6–20)
BUN/CREAT SERPL: 20 (ref 12–20)
BUN/CREAT SERPL: 25 (ref 12–20)
CALCIUM SERPL-MCNC: 7.6 MG/DL (ref 8.5–10.1)
CALCIUM SERPL-MCNC: 8.7 MG/DL (ref 8.5–10.1)
CHLORIDE SERPL-SCNC: 113 MMOL/L (ref 97–108)
CHLORIDE SERPL-SCNC: 114 MMOL/L (ref 97–108)
CO2 SERPL-SCNC: 23 MMOL/L (ref 21–32)
CO2 SERPL-SCNC: 24 MMOL/L (ref 21–32)
CREAT SERPL-MCNC: 0.83 MG/DL (ref 0.55–1.02)
CREAT SERPL-MCNC: 1.01 MG/DL (ref 0.55–1.02)
DIFFERENTIAL METHOD BLD: ABNORMAL
DIFFERENTIAL METHOD BLD: ABNORMAL
EOSINOPHIL # BLD: 0 K/UL (ref 0–0.4)
EOSINOPHIL # BLD: 0 K/UL (ref 0–0.4)
EOSINOPHIL NFR BLD: 0 % (ref 0–7)
EOSINOPHIL NFR BLD: 0 % (ref 0–7)
ERYTHROCYTE [DISTWIDTH] IN BLOOD BY AUTOMATED COUNT: 13.8 % (ref 11.5–14.5)
ERYTHROCYTE [DISTWIDTH] IN BLOOD BY AUTOMATED COUNT: 13.9 % (ref 11.5–14.5)
ERYTHROCYTE [DISTWIDTH] IN BLOOD BY AUTOMATED COUNT: 13.9 % (ref 11.5–14.5)
FIO2 ON VENT: 100 %
FIO2 ON VENT: 40 %
FIO2 ON VENT: 60 %
GAS FLOW.O2 SETTING OXYMISER: 14 L/MIN
GAS FLOW.O2 SETTING OXYMISER: 14 L/MIN
GLOBULIN SER CALC-MCNC: 2.1 G/DL (ref 2–4)
GLOBULIN SER CALC-MCNC: 2.5 G/DL (ref 2–4)
GLOBULIN SER CALC-MCNC: 2.6 G/DL (ref 2–4)
GLUCOSE BLD STRIP.AUTO-MCNC: 147 MG/DL (ref 65–117)
GLUCOSE BLD STRIP.AUTO-MCNC: 205 MG/DL (ref 65–117)
GLUCOSE SERPL-MCNC: 165 MG/DL (ref 65–100)
GLUCOSE SERPL-MCNC: 271 MG/DL (ref 65–100)
HBV SURFACE AG SER QL: <0.1 INDEX
HBV SURFACE AG SER QL: NEGATIVE
HCG SERPL QL: NEGATIVE
HCO3 BLDA-SCNC: 17 MMOL/L (ref 22–26)
HCO3 BLDA-SCNC: 21 MMOL/L (ref 22–26)
HCO3 BLDA-SCNC: 21 MMOL/L (ref 22–26)
HCT VFR BLD AUTO: 28.6 % (ref 35–47)
HCT VFR BLD AUTO: 37 % (ref 35–47)
HCT VFR BLD AUTO: 39.2 % (ref 35–47)
HCV AB SERPL QL IA: NONREACTIVE
HCV COMMENT,HCGAC: NORMAL
HGB BLD-MCNC: 11.9 G/DL (ref 11.5–16)
HGB BLD-MCNC: 12.5 G/DL (ref 11.5–16)
HGB BLD-MCNC: 9.2 G/DL (ref 11.5–16)
HISTORY CHECKED?,CKHIST: NORMAL
HIV1 P24 AG SERPL QL IA: NONREACTIVE
HIV1+2 AB SERPL QL IA: NONREACTIVE
IMM GRANULOCYTES # BLD AUTO: 0.1 K/UL (ref 0–0.04)
IMM GRANULOCYTES # BLD AUTO: 0.1 K/UL (ref 0–0.04)
IMM GRANULOCYTES NFR BLD AUTO: 1 % (ref 0–0.5)
IMM GRANULOCYTES NFR BLD AUTO: 1 % (ref 0–0.5)
INR PPP: 1.2 (ref 0.9–1.1)
INR PPP: 1.2 (ref 0.9–1.1)
LYMPHOCYTES # BLD: 0.4 K/UL (ref 0.8–3.5)
LYMPHOCYTES # BLD: 0.6 K/UL (ref 0.8–3.5)
LYMPHOCYTES NFR BLD: 3 % (ref 12–49)
LYMPHOCYTES NFR BLD: 5 % (ref 12–49)
MAGNESIUM SERPL-MCNC: 2.2 MG/DL (ref 1.6–2.4)
MAGNESIUM SERPL-MCNC: 2.3 MG/DL (ref 1.6–2.4)
MCH RBC QN AUTO: 31.4 PG (ref 26–34)
MCH RBC QN AUTO: 31.8 PG (ref 26–34)
MCH RBC QN AUTO: 32.1 PG (ref 26–34)
MCHC RBC AUTO-ENTMCNC: 31.9 G/DL (ref 30–36.5)
MCHC RBC AUTO-ENTMCNC: 32.2 G/DL (ref 30–36.5)
MCHC RBC AUTO-ENTMCNC: 32.2 G/DL (ref 30–36.5)
MCV RBC AUTO: 98.5 FL (ref 80–99)
MCV RBC AUTO: 98.9 FL (ref 80–99)
MCV RBC AUTO: 99.7 FL (ref 80–99)
MONOCYTES # BLD: 0.4 K/UL (ref 0–1)
MONOCYTES # BLD: 0.5 K/UL (ref 0–1)
MONOCYTES NFR BLD: 3 % (ref 5–13)
MONOCYTES NFR BLD: 4 % (ref 5–13)
NEUTS SEG # BLD: 11.2 K/UL (ref 1.8–8)
NEUTS SEG # BLD: 12.6 K/UL (ref 1.8–8)
NEUTS SEG NFR BLD: 90 % (ref 32–75)
NEUTS SEG NFR BLD: 93 % (ref 32–75)
NRBC # BLD: 0 K/UL (ref 0–0.01)
NRBC BLD-RTO: 0 PER 100 WBC
PCO2 BLDA: 39 MMHG (ref 35–45)
PCO2 BLDA: 40 MMHG (ref 35–45)
PCO2 BLDA: 41 MMHG (ref 35–45)
PEEP RESPIRATORY: 5 CM[H2O]
PEEP RESPIRATORY: 5 CM[H2O]
PEEP RESPIRATORY: 8 CM[H2O]
PH BLDA: 7.24 [PH] (ref 7.35–7.45)
PH BLDA: 7.33 [PH] (ref 7.35–7.45)
PH BLDA: 7.36 [PH] (ref 7.35–7.45)
PHOSPHATE SERPL-MCNC: 3.5 MG/DL (ref 2.6–4.7)
PLATELET # BLD AUTO: 134 K/UL (ref 150–400)
PLATELET # BLD AUTO: 136 K/UL (ref 150–400)
PLATELET # BLD AUTO: 152 K/UL (ref 150–400)
PMV BLD AUTO: 12.6 FL (ref 8.9–12.9)
PMV BLD AUTO: 12.6 FL (ref 8.9–12.9)
PO2 BLDA: 110 MMHG (ref 80–100)
PO2 BLDA: 138 MMHG (ref 80–100)
PO2 BLDA: 242 MMHG (ref 80–100)
POTASSIUM SERPL-SCNC: 3.5 MMOL/L (ref 3.5–5.1)
POTASSIUM SERPL-SCNC: 3.7 MMOL/L (ref 3.5–5.1)
PRESSURE SUPPORT SETTING VENT: 5 CM[H2O]
PROT SERPL-MCNC: 5.1 G/DL (ref 6.4–8.2)
PROT SERPL-MCNC: 5.8 G/DL (ref 6.4–8.2)
PROT SERPL-MCNC: 5.8 G/DL (ref 6.4–8.2)
PROTHROMBIN TIME: 12.2 SEC (ref 9–11.1)
PROTHROMBIN TIME: 12.8 SEC (ref 9–11.1)
RBC # BLD AUTO: 2.87 M/UL (ref 3.8–5.2)
RBC # BLD AUTO: 3.74 M/UL (ref 3.8–5.2)
RBC # BLD AUTO: 3.98 M/UL (ref 3.8–5.2)
RBC MORPH BLD: ABNORMAL
SAO2 % BLD: 98 % (ref 92–97)
SAO2 % BLD: 99 % (ref 92–97)
SAO2 % BLD: 99 % (ref 92–97)
SAO2% DEVICE SAO2% SENSOR NAME: ABNORMAL
SERVICE CMNT-IMP: ABNORMAL
SODIUM SERPL-SCNC: 143 MMOL/L (ref 136–145)
SODIUM SERPL-SCNC: 145 MMOL/L (ref 136–145)
SPECIMEN SITE: ABNORMAL
THERAPEUTIC RANGE,PTTT: NORMAL SECS (ref 58–77)
VENTILATION MODE VENT: ABNORMAL
VT SETTING VENT: 450 ML
VT SETTING VENT: 450 ML
WBC # BLD AUTO: 12.4 K/UL (ref 3.6–11)
WBC # BLD AUTO: 12.8 K/UL (ref 3.6–11)
WBC # BLD AUTO: 13.5 K/UL (ref 3.6–11)

## 2021-06-21 PROCEDURE — 93312 ECHO TRANSESOPHAGEAL: CPT

## 2021-06-21 PROCEDURE — C1893 INTRO/SHEATH, FIXED,NON-PEEL: HCPCS | Performed by: INTERNAL MEDICINE

## 2021-06-21 PROCEDURE — 33016 PERICARDIOCENTESIS W/IMAGING: CPT | Performed by: INTERNAL MEDICINE

## 2021-06-21 PROCEDURE — 77030026438 HC STYL ET INTUB CARD -A: Performed by: ANESTHESIOLOGY

## 2021-06-21 PROCEDURE — 74011250636 HC RX REV CODE- 250/636: Performed by: PHYSICIAN ASSISTANT

## 2021-06-21 PROCEDURE — 80053 COMPREHEN METABOLIC PANEL: CPT

## 2021-06-21 PROCEDURE — 76060000037 HC ANESTHESIA 3 TO 3.5 HR: Performed by: INTERNAL MEDICINE

## 2021-06-21 PROCEDURE — C1769 GUIDE WIRE: HCPCS | Performed by: INTERNAL MEDICINE

## 2021-06-21 PROCEDURE — 77030038692 HC WND DEB SYS IRMX -B: Performed by: THORACIC SURGERY (CARDIOTHORACIC VASCULAR SURGERY)

## 2021-06-21 PROCEDURE — 77030003390 HC NDL ANGI MRTM -A: Performed by: INTERNAL MEDICINE

## 2021-06-21 PROCEDURE — 74011250636 HC RX REV CODE- 250/636: Performed by: EMERGENCY MEDICINE

## 2021-06-21 PROCEDURE — 36430 TRANSFUSION BLD/BLD COMPNT: CPT

## 2021-06-21 PROCEDURE — 74011250636 HC RX REV CODE- 250/636: Performed by: NURSE ANESTHETIST, CERTIFIED REGISTERED

## 2021-06-21 PROCEDURE — 74011250637 HC RX REV CODE- 250/637: Performed by: NURSE PRACTITIONER

## 2021-06-21 PROCEDURE — 77030040504 HC DRN WND MDII -B: Performed by: THORACIC SURGERY (CARDIOTHORACIC VASCULAR SURGERY)

## 2021-06-21 PROCEDURE — 36415 COLL VENOUS BLD VENIPUNCTURE: CPT

## 2021-06-21 PROCEDURE — 77030008771 HC TU NG SALEM SUMP -A: Performed by: ANESTHESIOLOGY

## 2021-06-21 PROCEDURE — 84703 CHORIONIC GONADOTROPIN ASSAY: CPT

## 2021-06-21 PROCEDURE — 74011000250 HC RX REV CODE- 250: Performed by: INTERNAL MEDICINE

## 2021-06-21 PROCEDURE — 77030016899 HC CBL EP EXT4 BSC -B: Performed by: INTERNAL MEDICINE

## 2021-06-21 PROCEDURE — 5A12012 PERFORMANCE OF CARDIAC OUTPUT, SINGLE, MANUAL: ICD-10-PCS | Performed by: THORACIC SURGERY (CARDIOTHORACIC VASCULAR SURGERY)

## 2021-06-21 PROCEDURE — 77030012390 HC DRN CHST BTL GTNG -B: Performed by: THORACIC SURGERY (CARDIOTHORACIC VASCULAR SURGERY)

## 2021-06-21 PROCEDURE — 0W9D0ZZ DRAINAGE OF PERICARDIAL CAVITY, OPEN APPROACH: ICD-10-PCS | Performed by: THORACIC SURGERY (CARDIOTHORACIC VASCULAR SURGERY)

## 2021-06-21 PROCEDURE — 93613 INTRACARDIAC EPHYS 3D MAPG: CPT | Performed by: INTERNAL MEDICINE

## 2021-06-21 PROCEDURE — 93656 COMPRE EP EVAL ABLTJ ATR FIB: CPT | Performed by: INTERNAL MEDICINE

## 2021-06-21 PROCEDURE — 93462 L HRT CATH TRNSPTL PUNCTURE: CPT | Performed by: INTERNAL MEDICINE

## 2021-06-21 PROCEDURE — 71045 X-RAY EXAM CHEST 1 VIEW: CPT

## 2021-06-21 PROCEDURE — 86901 BLOOD TYPING SEROLOGIC RH(D): CPT

## 2021-06-21 PROCEDURE — 77030002996 HC SUT SLK J&J -A: Performed by: THORACIC SURGERY (CARDIOTHORACIC VASCULAR SURGERY)

## 2021-06-21 PROCEDURE — C1894 INTRO/SHEATH, NON-LASER: HCPCS | Performed by: INTERNAL MEDICINE

## 2021-06-21 PROCEDURE — C1892 INTRO/SHEATH,FIXED,PEEL-AWAY: HCPCS | Performed by: INTERNAL MEDICINE

## 2021-06-21 PROCEDURE — 94002 VENT MGMT INPAT INIT DAY: CPT

## 2021-06-21 PROCEDURE — 93355 ECHO TRANSESOPHAGEAL (TEE): CPT | Performed by: THORACIC SURGERY (CARDIOTHORACIC VASCULAR SURGERY)

## 2021-06-21 PROCEDURE — 77030040922 HC BLNKT HYPOTHRM STRY -A

## 2021-06-21 PROCEDURE — 31500 INSERT EMERGENCY AIRWAY: CPT

## 2021-06-21 PROCEDURE — 77030020061 HC IV BLD WRMR ADMIN SET 3M -B: Performed by: ANESTHESIOLOGY

## 2021-06-21 PROCEDURE — 82150 ASSAY OF AMYLASE: CPT

## 2021-06-21 PROCEDURE — 85347 COAGULATION TIME ACTIVATED: CPT

## 2021-06-21 PROCEDURE — 77030037878 HC DRSG MEPILEX >48IN BORD MOLN -B: Performed by: THORACIC SURGERY (CARDIOTHORACIC VASCULAR SURGERY)

## 2021-06-21 PROCEDURE — 99291 CRITICAL CARE FIRST HOUR: CPT | Performed by: INTERNAL MEDICINE

## 2021-06-21 PROCEDURE — 82803 BLOOD GASES ANY COMBINATION: CPT

## 2021-06-21 PROCEDURE — 93621 COMP EP EVL L PAC&REC C SINS: CPT | Performed by: INTERNAL MEDICINE

## 2021-06-21 PROCEDURE — P9045 ALBUMIN (HUMAN), 5%, 250 ML: HCPCS | Performed by: EMERGENCY MEDICINE

## 2021-06-21 PROCEDURE — 93662 INTRACARDIAC ECG (ICE): CPT | Performed by: INTERNAL MEDICINE

## 2021-06-21 PROCEDURE — 74011636637 HC RX REV CODE- 636/637: Performed by: PHYSICIAN ASSISTANT

## 2021-06-21 PROCEDURE — 82962 GLUCOSE BLOOD TEST: CPT

## 2021-06-21 PROCEDURE — 0BH17EZ INSERTION OF ENDOTRACHEAL AIRWAY INTO TRACHEA, VIA NATURAL OR ARTIFICIAL OPENING: ICD-10-PCS | Performed by: EMERGENCY MEDICINE

## 2021-06-21 PROCEDURE — 99218 HC RM OBSERVATION: CPT

## 2021-06-21 PROCEDURE — 92960 CARDIOVERSION ELECTRIC EXT: CPT | Performed by: INTERNAL MEDICINE

## 2021-06-21 PROCEDURE — 77030003029 HC SUT VCRL J&J -B: Performed by: THORACIC SURGERY (CARDIOTHORACIC VASCULAR SURGERY)

## 2021-06-21 PROCEDURE — 77030018673: Performed by: THORACIC SURGERY (CARDIOTHORACIC VASCULAR SURGERY)

## 2021-06-21 PROCEDURE — 74011000250 HC RX REV CODE- 250: Performed by: NURSE ANESTHETIST, CERTIFIED REGISTERED

## 2021-06-21 PROCEDURE — 74011250636 HC RX REV CODE- 250/636

## 2021-06-21 PROCEDURE — 74011250636 HC RX REV CODE- 250/636: Performed by: INTERNAL MEDICINE

## 2021-06-21 PROCEDURE — 77030018831 HC SOL IRR H20 BAXT -A: Performed by: THORACIC SURGERY (CARDIOTHORACIC VASCULAR SURGERY)

## 2021-06-21 PROCEDURE — 77030019702 HC WRP THER MENM -C: Performed by: THORACIC SURGERY (CARDIOTHORACIC VASCULAR SURGERY)

## 2021-06-21 PROCEDURE — B24BZZ4 ULTRASONOGRAPHY OF HEART WITH AORTA, TRANSESOPHAGEAL: ICD-10-PCS | Performed by: ANESTHESIOLOGY

## 2021-06-21 PROCEDURE — 83735 ASSAY OF MAGNESIUM: CPT

## 2021-06-21 PROCEDURE — 74011250636 HC RX REV CODE- 250/636: Performed by: STUDENT IN AN ORGANIZED HEALTH CARE EDUCATION/TRAINING PROGRAM

## 2021-06-21 PROCEDURE — P9016 RBC LEUKOCYTES REDUCED: HCPCS

## 2021-06-21 PROCEDURE — 05HM33Z INSERTION OF INFUSION DEVICE INTO RIGHT INTERNAL JUGULAR VEIN, PERCUTANEOUS APPROACH: ICD-10-PCS | Performed by: INTERNAL MEDICINE

## 2021-06-21 PROCEDURE — 2709999900 HC NON-CHARGEABLE SUPPLY: Performed by: THORACIC SURGERY (CARDIOTHORACIC VASCULAR SURGERY)

## 2021-06-21 PROCEDURE — 86923 COMPATIBILITY TEST ELECTRIC: CPT

## 2021-06-21 PROCEDURE — 77030033352 HC TBNG IRR PMP COOL PNT STJU -B: Performed by: INTERNAL MEDICINE

## 2021-06-21 PROCEDURE — 4A133B1 MONITORING OF ARTERIAL PRESSURE, PERIPHERAL, PERCUTANEOUS APPROACH: ICD-10-PCS | Performed by: INTERNAL MEDICINE

## 2021-06-21 PROCEDURE — 5A1935Z RESPIRATORY VENTILATION, LESS THAN 24 CONSECUTIVE HOURS: ICD-10-PCS | Performed by: EMERGENCY MEDICINE

## 2021-06-21 PROCEDURE — 77030039046 HC PAD DEFIB RADIOTRNSPNT CNMD -B: Performed by: INTERNAL MEDICINE

## 2021-06-21 PROCEDURE — 92950 HEART/LUNG RESUSCITATION CPR: CPT

## 2021-06-21 PROCEDURE — 5A2204Z RESTORATION OF CARDIAC RHYTHM, SINGLE: ICD-10-PCS | Performed by: INTERNAL MEDICINE

## 2021-06-21 PROCEDURE — C1760 CLOSURE DEV, VASC: HCPCS | Performed by: INTERNAL MEDICINE

## 2021-06-21 PROCEDURE — 03HY32Z INSERTION OF MONITORING DEVICE INTO UPPER ARTERY, PERCUTANEOUS APPROACH: ICD-10-PCS | Performed by: INTERNAL MEDICINE

## 2021-06-21 PROCEDURE — 85025 COMPLETE CBC W/AUTO DIFF WBC: CPT

## 2021-06-21 PROCEDURE — C2630 CATH, EP, COOL-TIP: HCPCS | Performed by: INTERNAL MEDICINE

## 2021-06-21 PROCEDURE — 74011000250 HC RX REV CODE- 250: Performed by: PHYSICIAN ASSISTANT

## 2021-06-21 PROCEDURE — 33025 INCISION OF HEART SAC: CPT | Performed by: THORACIC SURGERY (CARDIOTHORACIC VASCULAR SURGERY)

## 2021-06-21 PROCEDURE — 85730 THROMBOPLASTIN TIME PARTIAL: CPT

## 2021-06-21 PROCEDURE — 93325 DOPPLER ECHO COLOR FLOW MAPG: CPT | Performed by: INTERNAL MEDICINE

## 2021-06-21 PROCEDURE — 93320 DOPPLER ECHO COMPLETE: CPT | Performed by: INTERNAL MEDICINE

## 2021-06-21 PROCEDURE — 84100 ASSAY OF PHOSPHORUS: CPT

## 2021-06-21 PROCEDURE — C1759 CATH, INTRA ECHOCARDIOGRAPHY: HCPCS | Performed by: INTERNAL MEDICINE

## 2021-06-21 PROCEDURE — 77030038563 HC TU ART PRESSR ICUM -Z

## 2021-06-21 PROCEDURE — 77030010880 HC CBL EP SUPRME STJU -C: Performed by: INTERNAL MEDICINE

## 2021-06-21 PROCEDURE — 30233N1 TRANSFUSION OF NONAUTOLOGOUS RED BLOOD CELLS INTO PERIPHERAL VEIN, PERCUTANEOUS APPROACH: ICD-10-PCS | Performed by: INTERNAL MEDICINE

## 2021-06-21 PROCEDURE — 77030008684 HC TU ET CUF COVD -B: Performed by: ANESTHESIOLOGY

## 2021-06-21 PROCEDURE — C1766 INTRO/SHEATH,STRBLE,NON-PEEL: HCPCS | Performed by: INTERNAL MEDICINE

## 2021-06-21 PROCEDURE — 99223 1ST HOSP IP/OBS HIGH 75: CPT | Performed by: INTERNAL MEDICINE

## 2021-06-21 PROCEDURE — 2709999900 HC NON-CHARGEABLE SUPPLY: Performed by: INTERNAL MEDICINE

## 2021-06-21 PROCEDURE — P9045 ALBUMIN (HUMAN), 5%, 250 ML: HCPCS | Performed by: PHYSICIAN ASSISTANT

## 2021-06-21 PROCEDURE — 93620 COMP EP EVL R AT VEN PAC&REC: CPT | Performed by: INTERNAL MEDICINE

## 2021-06-21 PROCEDURE — 0W9D3ZZ DRAINAGE OF PERICARDIAL CAVITY, PERCUTANEOUS APPROACH: ICD-10-PCS | Performed by: INTERNAL MEDICINE

## 2021-06-21 PROCEDURE — 76010000112 HC CV SURG 0.5 TO 1 HR: Performed by: THORACIC SURGERY (CARDIOTHORACIC VASCULAR SURGERY)

## 2021-06-21 PROCEDURE — 77030002933 HC SUT MCRYL J&J -A: Performed by: THORACIC SURGERY (CARDIOTHORACIC VASCULAR SURGERY)

## 2021-06-21 PROCEDURE — 93312 ECHO TRANSESOPHAGEAL: CPT | Performed by: INTERNAL MEDICINE

## 2021-06-21 PROCEDURE — 65610000003 HC RM ICU SURGICAL

## 2021-06-21 PROCEDURE — 77030025143 HC CBL EP SUPRME 1 STJU -C: Performed by: INTERNAL MEDICINE

## 2021-06-21 PROCEDURE — 77030038099 HC CBL CATH DIAG D-AVSE ABBT -D: Performed by: INTERNAL MEDICINE

## 2021-06-21 PROCEDURE — 76060000032 HC ANESTHESIA 0.5 TO 1 HR: Performed by: THORACIC SURGERY (CARDIOTHORACIC VASCULAR SURGERY)

## 2021-06-21 PROCEDURE — 85027 COMPLETE CBC AUTOMATED: CPT

## 2021-06-21 PROCEDURE — 74011250637 HC RX REV CODE- 250/637: Performed by: PHYSICIAN ASSISTANT

## 2021-06-21 PROCEDURE — 77030020506 HC NDL TRNSPTL NRG BAYL -F: Performed by: INTERNAL MEDICINE

## 2021-06-21 PROCEDURE — P9045 ALBUMIN (HUMAN), 5%, 250 ML: HCPCS

## 2021-06-21 PROCEDURE — 4A133J1 MONITORING OF ARTERIAL PULSE, PERIPHERAL, PERCUTANEOUS APPROACH: ICD-10-PCS | Performed by: INTERNAL MEDICINE

## 2021-06-21 PROCEDURE — 77030041076 HC DRSG AG OPTICELL MDII -A: Performed by: THORACIC SURGERY (CARDIOTHORACIC VASCULAR SURGERY)

## 2021-06-21 PROCEDURE — 77030011220 HC DEV ELECSURG COVD -B: Performed by: THORACIC SURGERY (CARDIOTHORACIC VASCULAR SURGERY)

## 2021-06-21 PROCEDURE — 99024 POSTOP FOLLOW-UP VISIT: CPT | Performed by: THORACIC SURGERY (CARDIOTHORACIC VASCULAR SURGERY)

## 2021-06-21 PROCEDURE — 80076 HEPATIC FUNCTION PANEL: CPT

## 2021-06-21 PROCEDURE — 77030018733 HC ELECTRD KT ENSITE STJU -G: Performed by: INTERNAL MEDICINE

## 2021-06-21 PROCEDURE — 75810000225 HC PERICARDIOCENTESIS: Performed by: INTERNAL MEDICINE

## 2021-06-21 PROCEDURE — 36600 WITHDRAWAL OF ARTERIAL BLOOD: CPT

## 2021-06-21 PROCEDURE — 85610 PROTHROMBIN TIME: CPT

## 2021-06-21 PROCEDURE — 77030013797 HC KT TRNSDUC PRSSR EDWD -A: Performed by: INTERNAL MEDICINE

## 2021-06-21 RX ORDER — SODIUM CHLORIDE 450 MG/100ML
10 INJECTION, SOLUTION INTRAVENOUS CONTINUOUS
Status: DISCONTINUED | OUTPATIENT
Start: 2021-06-21 | End: 2021-06-28 | Stop reason: HOSPADM

## 2021-06-21 RX ORDER — SODIUM CHLORIDE 0.9 % (FLUSH) 0.9 %
5-40 SYRINGE (ML) INJECTION EVERY 8 HOURS
Status: DISCONTINUED | OUTPATIENT
Start: 2021-06-21 | End: 2021-06-23

## 2021-06-21 RX ORDER — LEVOTHYROXINE SODIUM 25 UG/1
25 TABLET ORAL
Status: DISCONTINUED | OUTPATIENT
Start: 2021-06-22 | End: 2021-06-28 | Stop reason: HOSPADM

## 2021-06-21 RX ORDER — SODIUM BICARBONATE 84 MG/ML
50 INJECTION, SOLUTION INTRAVENOUS
Status: COMPLETED | OUTPATIENT
Start: 2021-06-21 | End: 2021-06-21

## 2021-06-21 RX ORDER — SODIUM CHLORIDE 0.9 % (FLUSH) 0.9 %
5-40 SYRINGE (ML) INJECTION AS NEEDED
Status: DISCONTINUED | OUTPATIENT
Start: 2021-06-21 | End: 2021-06-21 | Stop reason: HOSPADM

## 2021-06-21 RX ORDER — ROCURONIUM BROMIDE 10 MG/ML
INJECTION, SOLUTION INTRAVENOUS AS NEEDED
Status: DISCONTINUED | OUTPATIENT
Start: 2021-06-21 | End: 2021-06-21 | Stop reason: HOSPADM

## 2021-06-21 RX ORDER — ONDANSETRON 2 MG/ML
4 INJECTION INTRAMUSCULAR; INTRAVENOUS
Status: DISCONTINUED | OUTPATIENT
Start: 2021-06-21 | End: 2021-06-28 | Stop reason: HOSPADM

## 2021-06-21 RX ORDER — MELATONIN
5000 DAILY
Status: DISCONTINUED | OUTPATIENT
Start: 2021-06-22 | End: 2021-06-28 | Stop reason: HOSPADM

## 2021-06-21 RX ORDER — CETIRIZINE HCL 10 MG
10 TABLET ORAL EVERY EVENING
Status: DISCONTINUED | OUTPATIENT
Start: 2021-06-21 | End: 2021-06-28 | Stop reason: HOSPADM

## 2021-06-21 RX ORDER — MONTELUKAST SODIUM 10 MG/1
10 TABLET ORAL EVERY EVENING
Status: DISCONTINUED | OUTPATIENT
Start: 2021-06-21 | End: 2021-06-28 | Stop reason: HOSPADM

## 2021-06-21 RX ORDER — AMIODARONE HYDROCHLORIDE 200 MG/1
200 TABLET ORAL 2 TIMES DAILY
Status: DISCONTINUED | OUTPATIENT
Start: 2021-06-21 | End: 2021-06-21

## 2021-06-21 RX ORDER — ATORVASTATIN CALCIUM 20 MG/1
20 TABLET, FILM COATED ORAL EVERY EVENING
Status: DISCONTINUED | OUTPATIENT
Start: 2021-06-21 | End: 2021-06-28 | Stop reason: HOSPADM

## 2021-06-21 RX ORDER — SODIUM CHLORIDE 0.9 % (FLUSH) 0.9 %
5-40 SYRINGE (ML) INJECTION AS NEEDED
Status: DISCONTINUED | OUTPATIENT
Start: 2021-06-21 | End: 2021-06-28 | Stop reason: HOSPADM

## 2021-06-21 RX ORDER — EPINEPHRINE 0.1 MG/ML
1 INJECTION INTRACARDIAC; INTRAVENOUS
Status: COMPLETED | OUTPATIENT
Start: 2021-06-21 | End: 2021-06-21

## 2021-06-21 RX ORDER — VANCOMYCIN 2 GRAM/500 ML IN 0.9 % SODIUM CHLORIDE INTRAVENOUS
2000 ONCE
Status: COMPLETED | OUTPATIENT
Start: 2021-06-21 | End: 2021-06-21

## 2021-06-21 RX ORDER — POLYETHYLENE GLYCOL 3350 17 G/17G
17 POWDER, FOR SOLUTION ORAL DAILY
Status: DISCONTINUED | OUTPATIENT
Start: 2021-06-22 | End: 2021-06-28 | Stop reason: HOSPADM

## 2021-06-21 RX ORDER — SODIUM CHLORIDE 9 MG/ML
INJECTION, SOLUTION INTRAVENOUS
Status: DISCONTINUED | OUTPATIENT
Start: 2021-06-21 | End: 2021-06-21 | Stop reason: HOSPADM

## 2021-06-21 RX ORDER — SUCCINYLCHOLINE CHLORIDE 20 MG/ML
INJECTION INTRAMUSCULAR; INTRAVENOUS AS NEEDED
Status: DISCONTINUED | OUTPATIENT
Start: 2021-06-21 | End: 2021-06-21 | Stop reason: HOSPADM

## 2021-06-21 RX ORDER — MORPHINE SULFATE 4 MG/ML
4 INJECTION INTRAVENOUS
Status: DISCONTINUED | OUTPATIENT
Start: 2021-06-21 | End: 2021-06-23

## 2021-06-21 RX ORDER — FENTANYL CITRATE 50 UG/ML
INJECTION, SOLUTION INTRAMUSCULAR; INTRAVENOUS AS NEEDED
Status: DISCONTINUED | OUTPATIENT
Start: 2021-06-21 | End: 2021-06-21 | Stop reason: HOSPADM

## 2021-06-21 RX ORDER — OXYCODONE HYDROCHLORIDE 5 MG/1
10 TABLET ORAL
Status: DISCONTINUED | OUTPATIENT
Start: 2021-06-21 | End: 2021-06-28 | Stop reason: HOSPADM

## 2021-06-21 RX ORDER — MAGNESIUM SULFATE HEPTAHYDRATE 40 MG/ML
2 INJECTION, SOLUTION INTRAVENOUS ONCE
Status: CANCELLED | OUTPATIENT
Start: 2021-06-21 | End: 2021-06-21

## 2021-06-21 RX ORDER — BUDESONIDE 0.5 MG/2ML
1000 INHALANT ORAL 2 TIMES DAILY
Status: DISCONTINUED | OUTPATIENT
Start: 2021-06-21 | End: 2021-06-24

## 2021-06-21 RX ORDER — PHENYLEPHRINE HCL IN 0.9% NACL 0.4MG/10ML
SYRINGE (ML) INTRAVENOUS
Status: DISPENSED
Start: 2021-06-21 | End: 2021-06-22

## 2021-06-21 RX ORDER — MIDAZOLAM HYDROCHLORIDE 1 MG/ML
INJECTION, SOLUTION INTRAMUSCULAR; INTRAVENOUS
Status: COMPLETED
Start: 2021-06-21 | End: 2021-06-21

## 2021-06-21 RX ORDER — ALBUMIN HUMAN 50 G/1000ML
12.5 SOLUTION INTRAVENOUS
Status: COMPLETED | OUTPATIENT
Start: 2021-06-21 | End: 2021-06-22

## 2021-06-21 RX ORDER — ACETAMINOPHEN 325 MG/1
650 TABLET ORAL EVERY 4 HOURS
Status: DISPENSED | OUTPATIENT
Start: 2021-06-21 | End: 2021-06-23

## 2021-06-21 RX ORDER — FACIAL-BODY WIPES
10 EACH TOPICAL DAILY PRN
Status: DISCONTINUED | OUTPATIENT
Start: 2021-06-21 | End: 2021-06-28 | Stop reason: HOSPADM

## 2021-06-21 RX ORDER — HEPARIN SODIUM 1000 [USP'U]/ML
INJECTION, SOLUTION INTRAVENOUS; SUBCUTANEOUS AS NEEDED
Status: DISCONTINUED | OUTPATIENT
Start: 2021-06-21 | End: 2021-06-21

## 2021-06-21 RX ORDER — MIDAZOLAM HYDROCHLORIDE 1 MG/ML
2 INJECTION, SOLUTION INTRAMUSCULAR; INTRAVENOUS ONCE
Status: COMPLETED | OUTPATIENT
Start: 2021-06-21 | End: 2021-06-21

## 2021-06-21 RX ORDER — SODIUM CHLORIDE 0.9 % (FLUSH) 0.9 %
5-40 SYRINGE (ML) INJECTION EVERY 8 HOURS
Status: DISCONTINUED | OUTPATIENT
Start: 2021-06-21 | End: 2021-06-28 | Stop reason: HOSPADM

## 2021-06-21 RX ORDER — DEXAMETHASONE SODIUM PHOSPHATE 4 MG/ML
INJECTION, SOLUTION INTRA-ARTICULAR; INTRALESIONAL; INTRAMUSCULAR; INTRAVENOUS; SOFT TISSUE AS NEEDED
Status: DISCONTINUED | OUTPATIENT
Start: 2021-06-21 | End: 2021-06-21 | Stop reason: HOSPADM

## 2021-06-21 RX ORDER — POTASSIUM CHLORIDE 29.8 MG/ML
20 INJECTION INTRAVENOUS
Status: ACTIVE | OUTPATIENT
Start: 2021-06-21 | End: 2021-06-22

## 2021-06-21 RX ORDER — MAGNESIUM SULFATE 100 %
4 CRYSTALS MISCELLANEOUS AS NEEDED
Status: DISCONTINUED | OUTPATIENT
Start: 2021-06-21 | End: 2021-06-28 | Stop reason: HOSPADM

## 2021-06-21 RX ORDER — SODIUM CHLORIDE 9 MG/ML
250 INJECTION, SOLUTION INTRAVENOUS AS NEEDED
Status: DISCONTINUED | OUTPATIENT
Start: 2021-06-21 | End: 2021-06-28 | Stop reason: SDUPTHER

## 2021-06-21 RX ORDER — AMOXICILLIN 250 MG
1 CAPSULE ORAL 2 TIMES DAILY
Status: DISCONTINUED | OUTPATIENT
Start: 2021-06-22 | End: 2021-06-28 | Stop reason: HOSPADM

## 2021-06-21 RX ORDER — VANCOMYCIN HYDROCHLORIDE
1250
Status: DISCONTINUED | OUTPATIENT
Start: 2021-06-22 | End: 2021-06-23

## 2021-06-21 RX ORDER — SODIUM CHLORIDE 0.9 % (FLUSH) 0.9 %
5-40 SYRINGE (ML) INJECTION AS NEEDED
Status: DISCONTINUED | OUTPATIENT
Start: 2021-06-21 | End: 2021-06-28 | Stop reason: SDUPTHER

## 2021-06-21 RX ORDER — FUROSEMIDE 40 MG/1
40 TABLET ORAL DAILY
Status: DISCONTINUED | OUTPATIENT
Start: 2021-06-22 | End: 2021-06-21

## 2021-06-21 RX ORDER — ACETAMINOPHEN 325 MG/1
650 TABLET ORAL
Status: DISCONTINUED | OUTPATIENT
Start: 2021-06-21 | End: 2021-06-23

## 2021-06-21 RX ORDER — INSULIN LISPRO 100 [IU]/ML
INJECTION, SOLUTION INTRAVENOUS; SUBCUTANEOUS
Status: DISCONTINUED | OUTPATIENT
Start: 2021-06-21 | End: 2021-06-24

## 2021-06-21 RX ORDER — LANOLIN ALCOHOL/MO/W.PET/CERES
400 CREAM (GRAM) TOPICAL 2 TIMES DAILY
Status: DISCONTINUED | OUTPATIENT
Start: 2021-06-22 | End: 2021-06-23

## 2021-06-21 RX ORDER — DIPHENHYDRAMINE HCL 25 MG
25 CAPSULE ORAL
Status: DISCONTINUED | OUTPATIENT
Start: 2021-06-21 | End: 2021-06-28 | Stop reason: HOSPADM

## 2021-06-21 RX ORDER — EPINEPHRINE 1 MG/ML
INJECTION, SOLUTION, CONCENTRATE INTRAVENOUS AS NEEDED
Status: DISCONTINUED | OUTPATIENT
Start: 2021-06-21 | End: 2021-06-21 | Stop reason: HOSPADM

## 2021-06-21 RX ORDER — MIDAZOLAM HYDROCHLORIDE 1 MG/ML
1 INJECTION, SOLUTION INTRAMUSCULAR; INTRAVENOUS
Status: DISCONTINUED | OUTPATIENT
Start: 2021-06-21 | End: 2021-06-28 | Stop reason: HOSPADM

## 2021-06-21 RX ORDER — CALCIUM GLUCONATE 20 MG/ML
1 INJECTION, SOLUTION INTRAVENOUS ONCE
Status: DISPENSED | OUTPATIENT
Start: 2021-06-21 | End: 2021-06-22

## 2021-06-21 RX ORDER — PROPOFOL 10 MG/ML
0-50 VIAL (ML) INTRAVENOUS
Status: DISCONTINUED | OUTPATIENT
Start: 2021-06-21 | End: 2021-06-28 | Stop reason: HOSPADM

## 2021-06-21 RX ORDER — RALOXIFENE HYDROCHLORIDE 60 MG/1
60 TABLET, FILM COATED ORAL DAILY
Status: DISCONTINUED | OUTPATIENT
Start: 2021-06-22 | End: 2021-06-28 | Stop reason: HOSPADM

## 2021-06-21 RX ORDER — MAGNESIUM SULFATE 1 G/100ML
1 INJECTION INTRAVENOUS AS NEEDED
Status: DISCONTINUED | OUTPATIENT
Start: 2021-06-21 | End: 2021-06-28 | Stop reason: HOSPADM

## 2021-06-21 RX ORDER — CYCLOBENZAPRINE HCL 10 MG
5 TABLET ORAL
Status: DISCONTINUED | OUTPATIENT
Start: 2021-06-21 | End: 2021-06-28 | Stop reason: HOSPADM

## 2021-06-21 RX ORDER — DEXMEDETOMIDINE HYDROCHLORIDE 4 UG/ML
.1-1.5 INJECTION, SOLUTION INTRAVENOUS
Status: DISCONTINUED | OUTPATIENT
Start: 2021-06-21 | End: 2021-06-28 | Stop reason: SDUPTHER

## 2021-06-21 RX ORDER — AMIODARONE HYDROCHLORIDE 200 MG/1
200 TABLET ORAL DAILY
Status: DISCONTINUED | OUTPATIENT
Start: 2021-06-22 | End: 2021-06-28 | Stop reason: HOSPADM

## 2021-06-21 RX ORDER — SODIUM CHLORIDE 9 MG/ML
9 INJECTION, SOLUTION INTRAVENOUS CONTINUOUS
Status: DISCONTINUED | OUTPATIENT
Start: 2021-06-21 | End: 2021-06-28 | Stop reason: HOSPADM

## 2021-06-21 RX ORDER — METOPROLOL TARTRATE 25 MG/1
12.5 TABLET, FILM COATED ORAL 2 TIMES DAILY
Status: DISCONTINUED | OUTPATIENT
Start: 2021-06-21 | End: 2021-06-21

## 2021-06-21 RX ORDER — ONDANSETRON 2 MG/ML
4 INJECTION INTRAMUSCULAR; INTRAVENOUS
Status: DISCONTINUED | OUTPATIENT
Start: 2021-06-21 | End: 2021-06-23 | Stop reason: SDUPTHER

## 2021-06-21 RX ORDER — POTASSIUM CHLORIDE 750 MG/1
20 TABLET, FILM COATED, EXTENDED RELEASE ORAL DAILY
Status: DISCONTINUED | OUTPATIENT
Start: 2021-06-22 | End: 2021-06-22

## 2021-06-21 RX ORDER — MIDAZOLAM HYDROCHLORIDE 1 MG/ML
INJECTION, SOLUTION INTRAMUSCULAR; INTRAVENOUS
Status: DISPENSED
Start: 2021-06-21 | End: 2021-06-22

## 2021-06-21 RX ORDER — INSULIN GLARGINE 100 [IU]/ML
1-50 INJECTION, SOLUTION SUBCUTANEOUS
Status: ACTIVE | OUTPATIENT
Start: 2021-06-21 | End: 2021-06-22

## 2021-06-21 RX ORDER — ALBUMIN HUMAN 50 G/1000ML
12.5 SOLUTION INTRAVENOUS ONCE
Status: COMPLETED | OUTPATIENT
Start: 2021-06-21 | End: 2021-06-21

## 2021-06-21 RX ORDER — FAMOTIDINE 20 MG/1
20 TABLET, FILM COATED ORAL EVERY 12 HOURS
Status: DISCONTINUED | OUTPATIENT
Start: 2021-06-22 | End: 2021-06-28 | Stop reason: HOSPADM

## 2021-06-21 RX ORDER — INSULIN LISPRO 100 [IU]/ML
INJECTION, SOLUTION INTRAVENOUS; SUBCUTANEOUS
Status: DISCONTINUED | OUTPATIENT
Start: 2021-06-22 | End: 2021-06-24

## 2021-06-21 RX ORDER — ALBUTEROL SULFATE 0.83 MG/ML
2.5 SOLUTION RESPIRATORY (INHALATION)
Status: DISCONTINUED | OUTPATIENT
Start: 2021-06-21 | End: 2021-06-28 | Stop reason: HOSPADM

## 2021-06-21 RX ORDER — OXYCODONE HYDROCHLORIDE 5 MG/1
5 TABLET ORAL
Status: DISCONTINUED | OUTPATIENT
Start: 2021-06-21 | End: 2021-06-28 | Stop reason: HOSPADM

## 2021-06-21 RX ORDER — CEFAZOLIN SODIUM 1 G/3ML
INJECTION, POWDER, FOR SOLUTION INTRAMUSCULAR; INTRAVENOUS AS NEEDED
Status: DISCONTINUED | OUTPATIENT
Start: 2021-06-21 | End: 2021-06-21 | Stop reason: HOSPADM

## 2021-06-21 RX ORDER — SODIUM CHLORIDE 0.9 % (FLUSH) 0.9 %
5-40 SYRINGE (ML) INJECTION EVERY 8 HOURS
Status: DISCONTINUED | OUTPATIENT
Start: 2021-06-21 | End: 2021-06-21 | Stop reason: HOSPADM

## 2021-06-21 RX ORDER — DEXTROSE 50 % IN WATER (D50W) INTRAVENOUS SYRINGE
12.5-25 AS NEEDED
Status: DISCONTINUED | OUTPATIENT
Start: 2021-06-21 | End: 2021-06-28 | Stop reason: HOSPADM

## 2021-06-21 RX ORDER — MIDAZOLAM HYDROCHLORIDE 1 MG/ML
4 INJECTION, SOLUTION INTRAMUSCULAR; INTRAVENOUS ONCE
Status: COMPLETED | OUTPATIENT
Start: 2021-06-21 | End: 2021-06-21

## 2021-06-21 RX ORDER — CALCIUM CHLORIDE INJECTION 100 MG/ML
INJECTION, SOLUTION INTRAVENOUS AS NEEDED
Status: DISCONTINUED | OUTPATIENT
Start: 2021-06-21 | End: 2021-06-21 | Stop reason: HOSPADM

## 2021-06-21 RX ORDER — HYDROMORPHONE HYDROCHLORIDE 1 MG/ML
1 INJECTION, SOLUTION INTRAMUSCULAR; INTRAVENOUS; SUBCUTANEOUS
Status: DISCONTINUED | OUTPATIENT
Start: 2021-06-21 | End: 2021-06-28 | Stop reason: HOSPADM

## 2021-06-21 RX ORDER — NALOXONE HYDROCHLORIDE 0.4 MG/ML
0.4 INJECTION, SOLUTION INTRAMUSCULAR; INTRAVENOUS; SUBCUTANEOUS AS NEEDED
Status: DISCONTINUED | OUTPATIENT
Start: 2021-06-21 | End: 2021-06-28 | Stop reason: HOSPADM

## 2021-06-21 RX ORDER — PROPOFOL 10 MG/ML
INJECTION, EMULSION INTRAVENOUS AS NEEDED
Status: DISCONTINUED | OUTPATIENT
Start: 2021-06-21 | End: 2021-06-21 | Stop reason: HOSPADM

## 2021-06-21 RX ORDER — CHLORHEXIDINE GLUCONATE 1.2 MG/ML
10 RINSE ORAL EVERY 12 HOURS
Status: DISCONTINUED | OUTPATIENT
Start: 2021-06-21 | End: 2021-06-28 | Stop reason: HOSPADM

## 2021-06-21 RX ORDER — HYDROMORPHONE HYDROCHLORIDE 2 MG/ML
2 INJECTION, SOLUTION INTRAMUSCULAR; INTRAVENOUS; SUBCUTANEOUS ONCE
Status: DISPENSED | OUTPATIENT
Start: 2021-06-21 | End: 2021-06-21

## 2021-06-21 RX ORDER — HYDROCODONE BITARTRATE AND ACETAMINOPHEN 5; 325 MG/1; MG/1
1 TABLET ORAL
Status: DISCONTINUED | OUTPATIENT
Start: 2021-06-21 | End: 2021-06-23 | Stop reason: SDUPTHER

## 2021-06-21 RX ORDER — ALBUMIN HUMAN 50 G/1000ML
SOLUTION INTRAVENOUS
Status: COMPLETED
Start: 2021-06-21 | End: 2021-06-21

## 2021-06-21 RX ORDER — ACETAMINOPHEN 500 MG
500 TABLET ORAL
Status: DISCONTINUED | OUTPATIENT
Start: 2021-06-21 | End: 2021-06-21 | Stop reason: SDUPTHER

## 2021-06-21 RX ORDER — PROPOFOL 10 MG/ML
INJECTION, EMULSION INTRAVENOUS
Status: DISCONTINUED | OUTPATIENT
Start: 2021-06-21 | End: 2021-06-21 | Stop reason: HOSPADM

## 2021-06-21 RX ORDER — PROTAMINE SULFATE 10 MG/ML
INJECTION, SOLUTION INTRAVENOUS AS NEEDED
Status: DISCONTINUED | OUTPATIENT
Start: 2021-06-21 | End: 2021-06-21 | Stop reason: HOSPADM

## 2021-06-21 RX ORDER — SODIUM CHLORIDE, SODIUM LACTATE, POTASSIUM CHLORIDE, CALCIUM CHLORIDE 600; 310; 30; 20 MG/100ML; MG/100ML; MG/100ML; MG/100ML
INJECTION, SOLUTION INTRAVENOUS
Status: DISCONTINUED | OUTPATIENT
Start: 2021-06-21 | End: 2021-06-21 | Stop reason: HOSPADM

## 2021-06-21 RX ORDER — MUPIROCIN 20 MG/G
OINTMENT TOPICAL 2 TIMES DAILY
Status: DISPENSED | OUTPATIENT
Start: 2021-06-21 | End: 2021-06-26

## 2021-06-21 RX ORDER — ALBUTEROL SULFATE 0.83 MG/ML
2.5 SOLUTION RESPIRATORY (INHALATION)
Status: DISCONTINUED | OUTPATIENT
Start: 2021-06-21 | End: 2021-06-28 | Stop reason: SDUPTHER

## 2021-06-21 RX ADMIN — PROPOFOL 30 MCG/KG/MIN: 10 INJECTION, EMULSION INTRAVENOUS at 22:31

## 2021-06-21 RX ADMIN — SODIUM CHLORIDE: 900 INJECTION, SOLUTION INTRAVENOUS at 09:27

## 2021-06-21 RX ADMIN — MIDAZOLAM HYDROCHLORIDE 4 MG: 1 INJECTION, SOLUTION INTRAMUSCULAR; INTRAVENOUS at 16:45

## 2021-06-21 RX ADMIN — ALBUMIN (HUMAN) 12.5 G: 12.5 INJECTION, SOLUTION INTRAVENOUS at 16:57

## 2021-06-21 RX ADMIN — SODIUM CHLORIDE, POTASSIUM CHLORIDE, SODIUM LACTATE AND CALCIUM CHLORIDE: 600; 310; 30; 20 INJECTION, SOLUTION INTRAVENOUS at 17:44

## 2021-06-21 RX ADMIN — INSULIN LISPRO 3 UNITS: 100 INJECTION, SOLUTION INTRAVENOUS; SUBCUTANEOUS at 22:57

## 2021-06-21 RX ADMIN — EPINEPHRINE 1 MG: 0.1 INJECTION INTRACARDIAC; INTRAVENOUS at 16:33

## 2021-06-21 RX ADMIN — ROCURONIUM BROMIDE 25 MG: 10 SOLUTION INTRAVENOUS at 08:36

## 2021-06-21 RX ADMIN — ROCURONIUM BROMIDE 10 MG: 10 SOLUTION INTRAVENOUS at 09:24

## 2021-06-21 RX ADMIN — EPINEPHRINE 0.5 MG: 1 INJECTION INTRAMUSCULAR; INTRAVENOUS; SUBCUTANEOUS at 09:24

## 2021-06-21 RX ADMIN — VANCOMYCIN HYDROCHLORIDE 2000 MG: 10 INJECTION, POWDER, LYOPHILIZED, FOR SOLUTION INTRAVENOUS at 17:49

## 2021-06-21 RX ADMIN — SODIUM CHLORIDE 10 ML/HR: 4.5 INJECTION, SOLUTION INTRAVENOUS at 20:03

## 2021-06-21 RX ADMIN — ALBUMIN (HUMAN) 12.5 G: 12.5 INJECTION, SOLUTION INTRAVENOUS at 23:00

## 2021-06-21 RX ADMIN — FENTANYL CITRATE 50 MCG: 50 INJECTION, SOLUTION INTRAMUSCULAR; INTRAVENOUS at 18:00

## 2021-06-21 RX ADMIN — SODIUM CHLORIDE: 900 INJECTION, SOLUTION INTRAVENOUS at 07:41

## 2021-06-21 RX ADMIN — ACETAMINOPHEN 650 MG: 325 TABLET ORAL at 14:34

## 2021-06-21 RX ADMIN — SUCCINYLCHOLINE CHLORIDE 140 MG: 20 INJECTION, SOLUTION INTRAMUSCULAR; INTRAVENOUS at 08:07

## 2021-06-21 RX ADMIN — DEXAMETHASONE SODIUM PHOSPHATE 4 MG: 4 INJECTION, SOLUTION INTRAMUSCULAR; INTRAVENOUS at 08:26

## 2021-06-21 RX ADMIN — ROCURONIUM BROMIDE 50 MG: 10 SOLUTION INTRAVENOUS at 17:44

## 2021-06-21 RX ADMIN — MIDAZOLAM HYDROCHLORIDE 2 MG: 1 INJECTION, SOLUTION INTRAMUSCULAR; INTRAVENOUS at 17:36

## 2021-06-21 RX ADMIN — WATER 2 G: 1 INJECTION INTRAMUSCULAR; INTRAVENOUS; SUBCUTANEOUS at 17:55

## 2021-06-21 RX ADMIN — EPINEPHRINE 0.5 MG: 1 INJECTION INTRAMUSCULAR; INTRAVENOUS; SUBCUTANEOUS at 09:29

## 2021-06-21 RX ADMIN — SODIUM CHLORIDE: 900 INJECTION, SOLUTION INTRAVENOUS at 17:44

## 2021-06-21 RX ADMIN — PHENYLEPHRINE HYDROCHLORIDE 100 MCG/MIN: 10 INJECTION INTRAVENOUS at 15:10

## 2021-06-21 RX ADMIN — MUPIROCIN: 20 OINTMENT TOPICAL at 19:14

## 2021-06-21 RX ADMIN — PROPOFOL 25 MCG/KG/MIN: 10 INJECTION, EMULSION INTRAVENOUS at 10:07

## 2021-06-21 RX ADMIN — WATER 2 G: 1 INJECTION INTRAMUSCULAR; INTRAVENOUS; SUBCUTANEOUS at 22:13

## 2021-06-21 RX ADMIN — FENTANYL CITRATE 50 MCG: 50 INJECTION, SOLUTION INTRAMUSCULAR; INTRAVENOUS at 18:26

## 2021-06-21 RX ADMIN — CEFAZOLIN 2 G: 330 INJECTION, POWDER, FOR SOLUTION INTRAMUSCULAR; INTRAVENOUS at 10:00

## 2021-06-21 RX ADMIN — ACETAMINOPHEN 650 MG: 325 TABLET ORAL at 22:13

## 2021-06-21 RX ADMIN — CALCIUM CHLORIDE 0.5 G: 100 INJECTION, SOLUTION INTRAVENOUS; INTRAVENTRICULAR at 09:29

## 2021-06-21 RX ADMIN — EPINEPHRINE 0.5 MG: 1 INJECTION INTRAMUSCULAR; INTRAVENOUS; SUBCUTANEOUS at 09:53

## 2021-06-21 RX ADMIN — ALBUMIN (HUMAN) 12.5 G: 12.5 INJECTION, SOLUTION INTRAVENOUS at 15:40

## 2021-06-21 RX ADMIN — CHLORHEXIDINE GLUCONATE 10 ML: 1.2 RINSE ORAL at 20:52

## 2021-06-21 RX ADMIN — SODIUM BICARBONATE 50 MEQ: 84 INJECTION, SOLUTION INTRAVENOUS at 17:30

## 2021-06-21 RX ADMIN — PROPOFOL 30 MCG/KG/MIN: 10 INJECTION, EMULSION INTRAVENOUS at 17:11

## 2021-06-21 RX ADMIN — PROPOFOL 5 MG: 10 INJECTION, EMULSION INTRAVENOUS at 08:07

## 2021-06-21 RX ADMIN — FENTANYL CITRATE 100 MCG: 50 INJECTION, SOLUTION INTRAMUSCULAR; INTRAVENOUS at 08:07

## 2021-06-21 RX ADMIN — PHENYLEPHRINE HYDROCHLORIDE 20 MCG/MIN: 10 INJECTION INTRAVENOUS at 08:09

## 2021-06-21 RX ADMIN — PROTAMINE SULFATE 50 MG: 10 INJECTION, SOLUTION INTRAVENOUS at 09:23

## 2021-06-21 RX ADMIN — FAMOTIDINE 20 MG: 10 INJECTION, SOLUTION INTRAVENOUS at 20:52

## 2021-06-21 RX ADMIN — EPINEPHRINE 1 MG: 0.1 INJECTION INTRACARDIAC; INTRAVENOUS at 16:31

## 2021-06-21 RX ADMIN — Medication 10 ML: at 13:13

## 2021-06-21 RX ADMIN — HEPARIN SODIUM 10000 UNITS: 1000 INJECTION, SOLUTION INTRAVENOUS; SUBCUTANEOUS at 09:13

## 2021-06-21 RX ADMIN — HYDROMORPHONE HYDROCHLORIDE 1 MG: 1 INJECTION, SOLUTION INTRAMUSCULAR; INTRAVENOUS; SUBCUTANEOUS at 11:58

## 2021-06-21 NOTE — PROGRESS NOTES
Pharmacist Note - Vancomycin Dosing    Consult provided for this [de-identified] y.o. female for indication of bacteremia. -s/p cardiac arrest  -s/p TIM/DCCV 2020    Recent Labs     21  1451 21  0949   WBC 12.4* 12.8*   CREA 0.83  --    BUN 21*  --      Height: 168 cm  Weight: 83 kg  Est CrCl: 59 ml/min  Temp (24hrs), Av.8 °F (36.6 °C), Min:97.4 °F (36.3 °C), Max:98.1 °F (36.7 °C)    Goal trough = 15 - 20 mcg/mL    Therapy will be initiated with a loading dose of 2000 mg IV x 1 to be followed by a maintenance dose of 1250 mg IV every 16 hours. Pharmacy to follow patient daily and order levels / make dose adjustments as appropriate.

## 2021-06-21 NOTE — PROCEDURES
Indication - HD collapse  Diagnosis - pericardial tamponade    Done during code blue. The [place] was used and inserted into the oropharynx at which time there was a Grade 1 view of the vocal cords. A 7.5-Khmer endotracheal tube was inserted and visualized going through the vocal cords. The stylette was removed. Colorimetric change was visualized on the CO2 meter. Breath sounds were heard in both lung fields equally. The endotracheal tube was placed at 22cm, measured at the teeth.

## 2021-06-21 NOTE — ANESTHESIA PREPROCEDURE EVALUATION
Relevant Problems   No relevant active problems       Anesthetic History   No history of anesthetic complications            Review of Systems / Medical History  Patient summary reviewed, nursing notes reviewed and pertinent labs reviewed    Pulmonary  Within defined limits          Asthma        Neuro/Psych   Within defined limits           Cardiovascular  Within defined limits  Hypertension        Dysrhythmias            GI/Hepatic/Renal  Within defined limits              Endo/Other  Within defined limits      Arthritis     Other Findings              Physical Exam    Airway  Mallampati: II  TM Distance: > 6 cm  Neck ROM: normal range of motion   Mouth opening: Normal     Cardiovascular  Regular rate and rhythm,  S1 and S2 normal,  no murmur, click, rub, or gallop             Dental  No notable dental hx       Pulmonary  Breath sounds clear to auscultation               Abdominal  GI exam deferred       Other Findings            Anesthetic Plan    ASA: 3  Anesthesia type: general    Monitoring Plan: Arterial line      Induction: Intravenous  Anesthetic plan and risks discussed with: Patient

## 2021-06-21 NOTE — PROGRESS NOTES
Cardiac Cath Lab Procedure Area Arrival Note:    Ramona Lucas arrived to Cardiac Cath Lab, Procedure Area. Patient identifiers verified with NAME and DATE OF BIRTH. Procedure verified with patient. Consent forms verified. Allergies verified. Patient informed of procedure and plan of care. Questions answered with review. Patient voiced understanding of procedure and plan of care. Patient on cardiac monitor, non-invasive blood pressure, SPO2 monitor. On room air. Placed on O2 per CRNA   IV of NS on pump at 25 ml/hr. Patient status doing well without problems. Patient is A&Ox 4. Patient reports no pain. Patient medicated during procedure with orders obtained and verified by Dr. Maryann Aviles. Refer to patients Cardiac Cath Lab PROCEDURE REPORT for vital signs, assessment, status, and response during procedure, printed at end of case. Printed report on chart or scanned into chart.

## 2021-06-21 NOTE — Clinical Note
Bilateral groin prepped with ChloraPrep and draped. Wet prep solution applied at: 855. Wet prep solution dried at: 858. Wet prep elapsed drying time: 3 mins.

## 2021-06-21 NOTE — ANESTHESIA PROCEDURE NOTES
Arterial Line Placement    Start time: 6/21/2021 8:11 AM  End time: 6/21/2021 8:14 AM  Performed by: Kusum Mtz MD  Authorized by: Bryce Pina MD     Pre-Procedure  Indications:  Arterial pressure monitoring  Preanesthetic Checklist: patient identified, risks and benefits discussed, anesthesia consent, site marked, patient being monitored, timeout performed and patient being monitored    Timeout Time: 08:11 EDT        Procedure:   Prep:  Chlorhexidine  Seldinger Technique?: Yes    Orientation:  Right  Location:  Radial artery  Catheter size:  20 G  Number of attempts:  1    Assessment:   Post-procedure:  Line secured and sterile dressing applied  Patient Tolerance:  Patient tolerated the procedure well with no immediate complications

## 2021-06-21 NOTE — PROGRESS NOTES
Critical Care Note     Cardiac surgery was called for the evaluation and management of: cardiogenic shock, cardiac tamponade     The critical nature of the patient's condition includes the following: She is at imminent risk of death from cardiogenic shock. Diagnosis for which the procedure was performed: pericardial effusion     Procedure performed: pericardial window     Other critical care diagnoses that are being treated and are above and beyond the standard care for the procedure being performed:    Cardiogenic shock   Acute hypoxic respiratory failure     HPI: Patient is a [de-identified] woman with recent atrial ablation whi now has cardiogenic shock from cardiac tamponade. She is at imminent risk of death from cardiogenic shock. Cardiogenic shock   Patient is on high dose pressors / inotropes  Shocked x 2 for VT  Epi boluses  CPR    Addendum:  Continues on high dose inotropes    Addendum:  Adjusting drips   Going over issues with ICU attending    Addendum:  Adjusting drips  Going over issues with cardiac anesthesia    Addendum:  Woke up, moves everything    Addendum:  Looking better  Weaning drips    Acute hypoxic respiratory failure   Patient is intubated    Addendum:  Will plan to leave intubated overnight         Intake/Output Summary (Last 24 hours) at 6/21/2021 1659  Last data filed at 6/21/2021 1600  Gross per 24 hour   Intake 1286.55 ml   Output 630 ml   Net 656.55 ml      Visit Vitals  /69   Pulse 60   Temp 97.4 °F (36.3 °C)   Resp 23   Ht 5' 6\" (1.676 m)   Wt 183 lb 10.3 oz (83.3 kg)   SpO2 100%   Breastfeeding No   BMI 29.64 kg/m²      CXR Results  (Last 48 hours)    None               Total critical care time - 165 minutes (CPT 18621, 99292 x 4)       I personally spent the above critical care time. This is time spent at this critically ill patient's bedside / unit / floor actively involved in patient care as well as the coordination of care.   This does not include any procedural time which has been billed separately. This does not include any NP/PA patient care time. I had a face to face encounter with the patient, reviewed and interpreted patient data including clinical events, labs, images, vital signs, I/O's, and examined patient. I have discussed the case and the plan and management of the patient's care with the consulting services and bedside nurses. This patient has a high probability of imminent, clinically significant deterioration, which requires the highest level of preparedness to intervene urgently. I participated in the decision-making and personally managed or directed the management of life and organ supporting interventions to treat or prevent imminent deterioration. This patient has a critical illness or injury that is acutely impairing one or more vital organ systems such that there is a high probability of imminent or life threatening deterioration in the patient's condition. This patient requires high complexity medical decision making to assess, manipulate, and support vital organ system failure. After stabilization, this patient still requires management to prevent further life / organ threatening deterioration.

## 2021-06-21 NOTE — PROGRESS NOTES
CM spoke with patient's  at bedside who was present during [facility] - escorted  to waiting area. Cee Pina states patient has son and daughter in law - CM confirmed on demographics.  left card with CM for updates as appropriate after NOK is called - Cee Pina is [employer] [place] [facility] p:  [phone].     CM will follow up in the AM.     Babs Richard, [address]  Available via [facility] [place], [place] or

## 2021-06-21 NOTE — ANESTHESIA PREPROCEDURE EVALUATION
Relevant Problems   RESPIRATORY SYSTEM   (+) Asthma      CARDIOVASCULAR   (+) HTN (hypertension)   (+) Non-rheumatic mitral regurgitation   (+) Persistent atrial fibrillation (HCC)   (+) persistent atrial fibrillation       PERSONAL HX & FAMILY HX OF CANCER   (+) Breast cancer (HCC)     Relevant Problems   RESPIRATORY SYSTEM   (+) Asthma      CARDIOVASCULAR   (+) HTN (hypertension)   (+) Non-rheumatic mitral regurgitation   (+) Persistent atrial fibrillation (HCC)   (+) persistent atrial fibrillation       PERSONAL HX & FAMILY HX OF CANCER   (+) Breast cancer (HCC)       Anesthetic History   No history of anesthetic complications            Review of Systems / Medical History  Patient summary reviewed, nursing notes reviewed and pertinent labs reviewed    Pulmonary  Within defined limits          Asthma        Neuro/Psych   Within defined limits           Cardiovascular  Within defined limits  Hypertension        Dysrhythmias            GI/Hepatic/Renal  Within defined limits              Endo/Other  Within defined limits      Arthritis     Other Findings              Physical Exam    Airway  Mallampati: II  TM Distance: > 6 cm  Neck ROM: normal range of motion   Mouth opening: Normal     Cardiovascular  Regular rate and rhythm,  S1 and S2 normal,  no murmur, click, rub, or gallop             Dental  No notable dental hx       Pulmonary  Breath sounds clear to auscultation               Abdominal  GI exam deferred       Other Findings            Anesthetic Plan    ASA: 3  Anesthesia type: general    Monitoring Plan: Arterial line      Induction: Intravenous  Anesthetic plan and risks discussed with: Patient              Anesthetic History   No history of anesthetic complications            Review of Systems / Medical History  Patient summary reviewed, nursing notes reviewed and pertinent labs reviewed    Pulmonary            Asthma     Comments: Lung nodule   Neuro/Psych             Comments: Dizziness Cardiovascular    Hypertension        Dysrhythmias : atrial fibrillation  Hyperlipidemia    Exercise tolerance: <4 METS  Comments: S/P ablation of atrial fibrillation    Cardiac/pericardial tamponade   GI/Hepatic/Renal  Within defined limits              Endo/Other        Blood dyscrasia, arthritis and cancer     Other Findings            Physical Exam    Airway    TM Distance: 4 - 6 cm      Intubated   Cardiovascular    Rhythm: regular  Rate: abnormal        Comments: tachycardia  Dental         Pulmonary  Breath sounds clear to auscultation               Abdominal  GI exam deferred       Other Findings            Anesthetic Plan    ASA: 5, emergent  Anesthesia type: general    Monitoring Plan: Arterial line, CVP and TIM    Post procedure ventilation   Induction: Intravenous    No family bedside, s/p code blue

## 2021-06-21 NOTE — Clinical Note
As of this time approx 500 cc of blood has been drained from pericardium and  reinserted into  the pt via femoral sheath

## 2021-06-21 NOTE — PROGRESS NOTES
06/21/21 1925   Vent Settings   FIO2 (%) 60 %   SpO2/FIO2 Ratio 166.67   CMV Rate Set 14   Back-Up Rate 14   Vt Set (ml) 450 ml   PEEP/VENT (cm H2O) 5 cm H20   Insp Flow (l/min) 50 l/min   Flow Trigger 3     Patient placed on settings by MD

## 2021-06-21 NOTE — PROGRESS NOTES
TRANSFER - OUT REPORT:    Verbal report given to [id number] on Carlie Rothman being transferred to CVICU for change in patient condition(Pericardiocentesis with pericardial drain left in place)       Report consisted of patients Situation, Background, Assessment and   Recommendations(SBAR). Information from the following report(s) SBAR, Procedure Summary, Intake/Output, MAR and Cardiac Rhythm Sinus Rhythm was reviewed with the receiving nurse. Opportunity for questions and clarification was provided.

## 2021-06-21 NOTE — CONSULTS
HPI    22-year-old woman brought to the ICU for postoperative care. She has a significant history of A. fib-the plan was to go for an ablation oazal-Vfvjo-Ho the procedure was complicated by V. tach with hemodynamic instability and a large pericardial effusion-was cardioverted and emergency pericardiocentesis done and a pericardial drain left in. Brought to the ICU for continued care. Patient intubated. Review of systems unable to assess at this time.      Past Medical History:   Diagnosis Date    Arthritis     Asthma     dr. Doyle Davalos allergist    [facility]     Breast cancer ([facility] 1980    right - mastectomy    Coagulation disorder ([facility]     on eliquis    Dyslipidemia     labs 2008 - chol 283, HDL 83, ,     HTN (hypertension)     Hyperlipidemia LDL goal < 130     for increased LDL particles, Dr. Anabell Mcelroy nodule     Dr. Dejuan Underwood Palpitations     resolved     Past Surgical History:   Procedure Laterality Date    COLONOSCOPY N/A 9/10/2018    COLONOSCOPY performed by Geronimo Mcginnis MD at [facility] 149 W/O CONT WITH CALCIUM  1/2012    CAC score 0; calcified mediastinal lymph nodes consistent granulomatous disease    ECHO 2D ADULT  5/5/2008    normal, LVEF 60%    HX APPENDECTOMY      HX HYSTERECTOMY Bilateral 03/19/2015    and uro repair    HX KNEE REPLACEMENT Right     HX MASTECTOMY Right     HX TONSILLECTOMY      HX UROLOGICAL  8/1/14    Urodynamics    INTRACARD ECHO, THER/DX INTERVENT N/A 5/10/2019    Intracardiac Echocardiogram performed by Yara Martines MD at [place] [address] EXTERNAL  3/28/2018         MN CHEST SURGERY PROCEDURE UNLISTED      lung nodule removed - benign    MN EPHYS EVL TRNSPTL TX ATRIAL FIB ISOLAT PULM VEIN N/A 5/10/2019    ABLATION A-FIB  W COMPLETE EP STUDY performed by Yara Martines MD at [place] [address], [address] CATH LAB    MN INTRACARDIAC ELECTROPHYSIOLOGIC 3D MAPPING N/A 5/10/2019    Ep 3d Mapping performed by Nnamdi Shoemaker MD at [address] [facility] CATH LAB    STRESS TEST CARDIOLITE  1/5/12    walked 7:31, no chest pain, normal MPI. Current Outpatient Medications   Medication Instructions    acetaminophen (TYLENOL EXTRA STRENGTH) 500 mg, Oral, BEDTIME PRN    albuterol (PROVENTIL HFA, VENTOLIN HFA, PROAIR HFA) 90 mcg/actuation inhaler 1 Puff, Inhalation, EVERY 4 HOURS AS NEEDED    amiodarone (CORDARONE) 200 mg, Oral, 2 TIMES DAILY    amoxicillin (AMOXIL) 500 mg, AS NEEDED    atorvastatin (LIPITOR) 20 mg, Oral, EVERY EVENING    cetirizine (ZYRTEC) 10 mg, Oral, EVERY EVENING    cholecalciferol (VITAMIN D3) 5,000 Units, Oral, DAILY    cyclobenzaprine (FLEXERIL) 5 mg, AS NEEDED    diphenhydrAMINE (BENADRYL) 25 mg, EVERY 6 HOURS AS NEEDED    EPINEPHrine (EPIPEN) 0.3 mg, IntraMUSCular, ONCE PRN    estradiol (ESTRACE) 0.01 % (0.1 mg/gram) vaginal cream Vaginal, 2 TIMES A WEEK (MON & THUR)    fluticasone (FLOVENT HFA) 220 mcg/actuation inhaler AS NEEDED    furosemide (LASIX) 40 mg, Oral, DAILY    levothyroxine (SYNTHROID) 25 mcg tablet Oral, DAILY BEFORE BREAKFAST    metoprolol tartrate (LOPRESSOR) 12.5 mg, Oral, 2 TIMES DAILY    montelukast (SINGULAIR) 10 mg, Oral, EVERY EVENING    MULTIVITAMIN PO Oral, Takes one po once daily.  potassium chloride (K-DUR, KLOR-CON) 20 mEq tablet 20 mEq, Oral, DAILY    raloxifene (EVISTA) 60 mg, Oral, DAILY    sacubitriL-valsartan (Entresto) 24-26 mg tablet 1 Tablet, Oral, 2 TIMES DAILY     Allergies   Allergen Reactions    Betadine [Povidone-Iodine] Rash    Iodine Rash    Norvasc [Amlodipine] Other (comments)     Flushing/redness.      Social History     Socioeconomic History    Marital status:      Spouse name: Not on file    Number of children: Not on file    Years of education: Not on file    Highest education level: Not on file   Occupational History    Not on file   Tobacco Use    Smoking status: Never Smoker    Smokeless tobacco: Never Used   Substance and Sexual Activity    Alcohol use: Yes     Comment: wine nightly    Drug use: No    Sexual activity: Yes     Partners: Male     Birth control/protection: None   Other Topics Concern    Not on file   Social History Narrative    Retired      Social Determinants of Health     Financial Resource Strain:     Difficulty of Paying Living Expenses:    Food Insecurity:     Worried About [address] in the Last Year:     [address] in the Last Year:    Transportation Needs:     Lack of Transportation (Medical):      Lack of Transportation (Non-Medical):    Physical Activity:     Days of Exercise per Week:     Minutes of Exercise per Session:    Stress:     Feeling of Stress :    Social Connections:     Frequency of Communication with Friends and Family:     Frequency of Social Gatherings with Friends and Family:     Attends [employer] Services:     Active Member of Clubs or Organizations:     Attends Club or Organization Meetings:     Marital Status:    Intimate Partner Violence:     Fear of Current or Ex-Partner:     Emotionally Abused:     Physically Abused:     Sexually Abused:      Family History   Problem Relation Age of Onset    Heart Failure Mother     Stroke Father     Other Other         endometrial cancer, niece       Patient Vitals for the past 24 hrs:   Temp Pulse Resp BP SpO2   06/21/21 1300  61 17  99 %   06/21/21 1230  (!) 59 12  100 %   06/21/21 1219    129/69    06/21/21 1200  (!) 59 15     06/21/21 1140 97.4 °F (36.3 °C) 61 12     06/21/21 1110 97.4 °F (36.3 °C) 70 12 129/69 99 %   06/21/21 1025   12 (!) 172/102 99 %   06/21/21 1010   12 113/63 99 %   06/21/21 0700 98.1 °F (36.7 °C)  14 125/85 93 %   06/21/21 0654 98.1 °F (36.7 °C)         Physical exam  General-intubated, sedated  Neuro-pupils reactive, following simple commands  Cardiac-RRR, pericardial drain in situ  Lungs-clear  Abdomen-soft, nontender, nondistended  Extremities-warm    Labs and imaging reviewed    Assessment  A. fib  V. tach/cardiac tamponade status post emergency pericardiocentesis  Left intubated postoperatively    Plan    Analgesia/sedation with Precedex as needed    Continue hemodynamic monitoring, off pressors at the time of my evaluation, monitor drainage output, follow cardiology recommendations    Continue lung protective mechanical ventilation, will extubate when appropriate    N.p.o. for now, antiemetics as needed, serial abdominal examinations    Monitor urine output closely, check labs now, correct electrolyte derangements as needed    Check coags now, put SCDs on    Leukocytosis-monitor for now-we will have a low threshold to panculture and start antibiotic therapy    Keep glucose less than 180, continue Synthroid    Critical care time-40 minutes    Signed By: Kelsie Hernandes MD     June 21, 2021

## 2021-06-21 NOTE — PROCEDURES
Cardiac Procedure Note   Patient: Jaqueline Morales  MRN: [id number]  SSN: [id number]   YOB: 1940 Age: [de-identified] y.o. Sex: female    Date of Procedure: 6/21/2021   Pre-procedure Diagnosis: persistent atrial fibrillation with shortness of breaths despite rate control  Post-procedure Diagnosis: same  Acute pericardial effusion with tamponade  Procedure:  1. Transesophageal echocardiogram  2. EP study with 3 D MARLENI mapping and fluoroscopy, CS recording   3. Intracardiac echocardiogram  4. transeptal puncture  5. Pericardiocentesis with echo and fluoroscopy guidance  6. Electrical cardioversion of ventricular tachycardia  :  Dr. Jordan Benton MD    Assistant(s):  None  Anesthesia: general anesthesia  Estimated Blood Loss: Less than 500 mL from acute pericardial effusion but this was mostly injected back into central venous circulation of the patient  Specimens Removed: None  Findings:   TIM: Atrial fibrillation/atypical atrial flutter with severe enlargement of LA  No LA thrombus or appendage thrombus  MR is mild to moderate  TR mild  No AR or AS or MI  LVEF 25-30% K    CS catheter placement  Intracardiac echo with fluoroscopy for transeptal puncture  She was given 10, 000 unit heparin  Interatrial septum is thick and hard to advance Agilis sheath with dilator, needle. Protract wire was advanced into LA after RF was applied to the Calhoun Malachi needle but did not appears to come out of Agilis sheath tip so it was pulled back into the right atrium  Large posterior pericardial effusion was noted on intracardiac echocardiogram and SBP to 30 mmHg so emergency pericardiocentesis was performed with improvement in SBP to 170 mmHg  10, 000 unit protamine was given to reverse heparin  2 D echo was used to guide needle access (2 attempts from Apical aspect of LV).   First attempt was by me with a micropuncture needle and a small wire was advanced into pericardial space and second attempt was from Dr Dl Shaw when the first small wire did not travel far enough to exchange for long wire and then dilator and pig tail sheath were advanced into the pericardial space over the long wire  About 500 cc blood was removed and reinjected into the femoral venous sheath  Before pericardiocentesis she went into monomorphic VT and with low SBP 30 mmHg, 200 J cardioversion was delivered and converted her back into atrial fibrillation  Repeated 2 D echo at bedside was done by me and it does not show re accumulation of pericardial effusion, with pericardial drain left in place to gravity  Complications: pericardial effusion with tamponade during transeptal puncture and sheath advancement into left atrium  Implants:  None  Signed by:  Vasquez Amos MD  6/21/2021  10:31 AM

## 2021-06-21 NOTE — BRIEF OP NOTE
BRIEF OP NOTE  Pre-Op Diagnosis: PERICARDIAL EFFUSION    Post-Op Diagnosis: PERICARDIAL EFFUSION      Procedure:   Subxiphoid PERICARDIAL WINDOW with evacuation of mild amount of posterior clotted blood    Surgeon: Maurisio Soto MD    Assistant(s): Anita Schneider PA-C    Anesthesia: General     Infusions: propofol,  isidro    Estimated Blood Loss:  50ml    Drains and pacing wires: 2 manpreet drains    Complications: None    Findings: Cardiac tamponade

## 2021-06-21 NOTE — ANESTHESIA POSTPROCEDURE EVALUATION
Procedure(s):  ABLATION A-FIB  W COMPLETE EP STUDY  Ep 3d Mapping  Intracardiac Echocardiogram  Pericardiocentesis. general    Anesthesia Post Evaluation        Patient location during evaluation: PACU  Patient participation: complete - patient participated  Level of consciousness: awake and alert  Pain management: adequate  Airway patency: patent  Anesthetic complications: no  Cardiovascular status: acceptable  Respiratory status: acceptable  Hydration status: acceptable  Comments: I have seen and evaluated the patient and is ready for discharge. Mary Ellen Díaz MD    Post anesthesia nausea and vomiting:  none      INITIAL Post-op Vital signs: No vitals data found for the desired time range.

## 2021-06-21 NOTE — H&P
Cardiac Electrophysiology H&P Note     Subjective:      Ayaan Browne is a [de-identified] y.o. patient who presents for planned AF ablation, is s/p AF ablation on 05/10/2019 (left side not complete due to pericardial effusion during procedure).     ECG on 04/26/2021 showed AF 79 bpm with QTc 445 ms. Dr. Lynda Padilla increased amiodarone to 200 mg po bid, started metoprolol 12.5 mg po bid.     Thyroid testing WNL in 12/2020 on supplement. LFTs WNL in 12/2020. Overdue for CXR. She denies chest pain, palpitations, SOB, PND, orthopnea, lightheadedness, syncope, or edema.     Anticoagulated with Eliquis. Denies bleeding issues.        Previous:  S/p TIM/DCCV 06/2020.     S/p AF ablation 05/10/2019. PVI unable to be completed on left side due to pericardial effusion without tamponade. Heparin stopped & reversed.     Persistent AF, failed DCCV & flecainide.       Cardioversion with Dr. Lynda Padilla on 2/22/19, had follow up on 2/27/19, back in atrial fibrillation.     Mild dilated cardiomyopathy, improved on GDMT.   Couldn't tolerate meds due to low BP.     Negative stress test 5 yrs ago per her report.             Problem List  Date Reviewed: 6/18/2021        Codes Class Noted    S/P ablation of atrial fibrillation ICD-10-CM: Z98.890, Z86.79  ICD-9-CM: V45.89  5/10/2019        Pericardial effusion, acute ICD-10-CM: I30.9  ICD-9-CM: 420.90  5/10/2019    Overview Signed 5/10/2019  1:34 PM by Shannon Edouard MD     Loculated posterior LA and LV, no tamponade             Non-rheumatic mitral regurgitation ICD-10-CM: I34.0  ICD-9-CM: 424.0  5/10/2019    Overview Signed 5/10/2019  1:34 PM by Shannon Edouard MD     moderate             Non-rheumatic tricuspid valve insufficiency ICD-10-CM: I36.1  ICD-9-CM: 424.2  5/10/2019        Encounter for cardioversion procedure ICD-10-CM: Z01.89  ICD-9-CM: V72.85  3/28/2018    Overview Signed 3/28/2018  1:06 PM by Shannon Edouard MD     3/28/2018 200 J to NSR after a TIM without thrombus Dizziness ICD-10-CM: R42  ICD-9-CM: 780.4  3/27/2018        Persistent atrial fibrillation ([facility] ICD-10-CM: I48.19  ICD-9-CM: 427.31  3/27/2018        Cystocele ICD-10-CM: [id number]  ICD-9-CM: MWK8499  8/1/2014        Uterine prolapse ICD-10-CM: N81.4  ICD-9-CM: 618.1  8/1/2014        HTN (hypertension) ICD-10-CM: I10  ICD-9-CM: 401.9  Unknown        Breast cancer ([facility] ICD-10-CM: C50.919  ICD-9-CM: 174.9  Unknown        Lung nodule ICD-10-CM: R91.1  ICD-9-CM: 793.11  Unknown    Overview Signed 5/14/2014 10:34 AM by MD Dr. Boone Chavez: J45.909  ICD-9-CM: 493.90  Unknown        Hyperlipidemia LDL goal < 130 ICD-10-CM: E78.5  ICD-9-CM: 272.4  Unknown    Overview Signed 4/26/2013  9:17 AM by Joan Dick MD     for increased LDL particles, Dr. Cliff Lyons             Dyslipidemia ICD-10-CM: E78.5  ICD-9-CM: 272.4  Unknown    Overview Signed 12/31/2011  4:11 PM by Fito Bautista MD     labs 2008 - chol 283, HDL 83, ,              Palpitations ICD-10-CM: R00.2  ICD-9-CM: 785.1  Unknown        Chest pain, unspecified ICD-10-CM: R07.9  ICD-9-CM: 786.50  12/31/2011              Current Facility-Administered Medications   Medication Dose Route Frequency Provider Last Rate Last Admin    sodium chloride (NS) flush 5-40 mL  5-40 mL IntraVENous Q8H Sheldon Mathews MD        sodium chloride (NS) flush 5-40 mL  5-40 mL IntraVENous PRN Sheldon Mathews MD        sodium chloride (NS) flush 5-40 mL  5-40 mL IntraVENous Q8H Sheldon Mathews MD        sodium chloride (NS) flush 5-40 mL  5-40 mL IntraVENous PRN Sheldon Mathews MD         Allergies   Allergen Reactions    Betadine [Povidone-Iodine] Rash    Iodine Rash    Norvasc [Amlodipine] Other (comments)     Flushing/redness. No current facility-administered medications on file prior to encounter.      Current Outpatient Medications on File Prior to Encounter   Medication Sig Dispense Refill    amiodarone (CORDARONE) 200 mg tablet Take 1 Tab by mouth two (2) times a day. 180 Tab 1    atorvastatin (LIPITOR) 20 mg tablet Take 20 mg by mouth every evening.  cholecalciferol (VITAMIN D3) 5,000 unit capsule Take 5,000 Units by mouth daily.  cetirizine (ZYRTEC) 10 mg tablet Take 10 mg by mouth every evening.  albuterol (PROVENTIL HFA, VENTOLIN HFA, PROAIR HFA) 90 mcg/actuation inhaler Take 1 Puff by inhalation every four (4) hours as needed. 2 Inhaler 3    metoprolol tartrate (LOPRESSOR) 25 mg tablet Take 0.5 Tabs by mouth two (2) times a day. 90 Tab 1    Eliquis 5 mg tablet TAKE 1 TABLET TWICE A  Tab 3    cyclobenzaprine (FLEXERIL) 5 mg tablet 5 mg as needed. (Patient not taking: Reported on 6/21/2021)      diphenhydrAMINE (BenadryL) 25 mg capsule Take 25 mg by mouth every six (6) hours as needed. (Patient not taking: Reported on 6/21/2021)      amoxicillin (AMOXIL) 500 mg capsule Take 500 mg by mouth as needed. Only for dental procedure (Patient not taking: Reported on 6/21/2021)      acetaminophen (Tylenol Extra Strength) 500 mg tablet Take 500 mg by mouth nightly as needed.  levothyroxine (SYNTHROID) 25 mcg tablet Take  by mouth Daily (before breakfast).  fluticasone (FLOVENT HFA) 220 mcg/actuation inhaler as needed.  MULTIVITAMIN PO Take  by mouth. Takes one po once daily.  estradiol (ESTRACE) 0.01 % (0.1 mg/gram) vaginal cream Insert  into vagina every Monday and Thursday.  EPINEPHrine (EPIPEN) 0.3 mg/0.3 mL (1:1,000) injection 0.3 mL by IntraMUSCular route once as needed for up to 1 dose. 0.3 mL 0    raloxifene (EVISTA) 60 mg tablet Take 1 Tab by mouth daily. 90 Tab 4    montelukast (SINGULAIR) 10 mg tablet Take 10 mg by mouth every evening.          Past Medical History:   Diagnosis Date    Arthritis     Asthma     dr. López Parmar allergist    Atrial fibrillation ([facility]     Breast cancer [facility]) [facility]    right - mastectomy    Coagulation disorder ([facility]     on eliquis    Dyslipidemia     labs 2008 - chol 283, HDL 83, ,     HTN (hypertension)     Hyperlipidemia LDL goal < 130     for increased LDL particles, Dr. Cody Copeland nodule     Dr. Jolene Galindo Palpitations     resolved     Past Surgical History:   Procedure Laterality Date    COLONOSCOPY N/A 9/10/2018    COLONOSCOPY performed by Mayte Christopher MD at [facility] W/O CONT WITH CALCIUM  1/2012    CAC score 0; calcified mediastinal lymph nodes consistent granulomatous disease    ECHO 2D ADULT  5/5/2008    normal, LVEF 60%    HX APPENDECTOMY      HX HYSTERECTOMY Bilateral 03/19/2015    and uro repair    HX KNEE REPLACEMENT Right     HX MASTECTOMY Right     HX TONSILLECTOMY      HX UROLOGICAL  8/1/14    Urodynamics    INTRACARD ECHO, THER/DX INTERVENT N/A 5/10/2019    Intracardiac Echocardiogram performed by Sinai Dinh MD at [address]  3/28/2018         TN CHEST SURGERY PROCEDURE UNLISTED      lung nodule removed - benign    TN EPHYS EVL TRNSPTL TX ATRIAL FIB ISOLAT PULM VEIN N/A 5/10/2019    ABLATION A-FIB  W COMPLETE EP STUDY performed by Sinai Dinh MD at [facility] [address] CATH LAB    [place] INTRACARDIAC ELECTROPHYSIOLOGIC 3D MAPPING N/A 5/10/2019    Ep 3d Mapping performed by Sinai Dinh MD at [address] [place] [address] CATH LAB    STRESS TEST [facility]  1/5/12    walked 7:31, no chest pain, normal MPI. Family History   Problem Relation Age of Onset    Heart Failure Mother     Stroke Father     Other Other         endometrial cancer, niece     Social History     Tobacco Use    Smoking status: Never Smoker    Smokeless tobacco: Never Used   Substance Use Topics    Alcohol use: Yes     Comment: wine nightly        Review of Systems: Review of all other systems otherwise negative. Constitutional: Negative for fever, chills, weight loss, + malaise/fatigue. HEENT: Negative for nosebleeds, vision changes.    Respiratory: Negative for cough, hemoptysis  Cardiovascular: Negative for chest pain, palpitations, orthopnea, claudication, leg swelling, syncope, and PND. + SOB  Gastrointestinal: Negative for nausea, vomiting, diarrhea, blood in stool and melena. Genitourinary: Negative for dysuria, and hematuria. Musculoskeletal: Negative for myalgias, arthralgia. Skin: Negative for rash. Heme: Does not bleed or bruise easily. Neurological: Negative for speech change and focal weakness     Objective:     Visit Vitals  Temp 98.1 °F (36.7 °C)   Ht 5' 6\" (1.676 m)   Wt 183 lb 9.6 oz (83.3 kg)   BMI 29.63 kg/m²      Physical Exam:   Constitutional: Well-developed and well-nourished. No respiratory distress. Head: Normocephalic and atraumatic. Eyes: Pupils are equal, round  ENT: Hearing normal  Neck: Supple. No JVD present. Cardiovascular: Normal rate, irregular rhythm. Exam reveals no gallop and no friction rub. No murmur heard. Pulmonary/Chest: Effort normal and breath sounds normal. No wheezes. Right mastectomy  Abdominal: Soft, no tenderness. Musculoskeletal: Moves extremities independently. Normal gait. Vasc/lymphatic: No edema. Neurological: Alert,oriented. Skin: Skin is warm and dry  Psychiatric: Normal mood and affect. Behavior is normal. Judgment and thought content normal.      EKG: (04/26/2021): AF 79 bpm with QTc 445 ms      Assessment/Plan:     Imaging/Studies:  TIM (06/19/2020): LVEF 45-50%. Mildly dilated LA. Mild to mod MR. Mild to mod TR.     Limited echo (10/24/2019): LVEF 56-60%, mild concentric LVH, mildly dilated LV, no RWMA, grade 2 diastolic dysfunction. Mildly dilated RV. Mildly dilated RA. Mod dilated LA. Mild to mod MR. Mild AR & mild aortic valve sclerosis without significant stenosis. Mild to mod PH.     Echo (05/11/2019): LVEF 51-55%, no RWMA. Mod dilated LA, mod dilated RA. Small to mod anterior pericardial effusion adjacent to LV & RA.     TIM (05/10/2019): LVEF 56-60%, no RWMA. RV mod dilated. LA mod dilated. RA mod dilated. Mod MR. Mod TR. Mod posterior loculated pericardial effusion adjacent to LV & LA measuring 15 mm. Persistent AF: S/p EP study & partial PVI of left superior vein, full PVI of right veins during ablation on 05/10/2019, developed small pericardial effusion during procedure; no tamponade so procedure had to be stopped and heparin was reversed.     Amiodarone has failed, required DCCV in 06/2020, then with further recurrence this year. Symptomatic with SOB when in AFIB, though rate is controlled. Currently on amiodarone to 200 mg po bid. LFTs & thyroid WNL in 12/2020, will recheck CXR at a later time. QTc not significantly prolonged on ECG on 04/26/2020.     Risks/benefits of ablation of persistent AF reviewed. She agrees to proceed as scheduled. KEM Raphael [address] [facility]  06/21/21    Addendum from EP attending:   I have seen, examined patient, and discussed with nurse practitioner, registered nurse, reviewed, updated note and agree with the assessment and plan    I have talked to her this am. She is feeling short of breaths when in AFIB  Vital signs are stable  Exam shows irregular rhythm   Chest wall with right mastectomy  Assessment and Plan:  she agrees to the plan of AFIB ablation   She never had atrial flutter ablation so this is considered today too  TIM is needed  Risks include but are not limited to bleeding in the groin, infection in the groin and/or heart valves, fistula between the groin arteries and veins, valvular damage, diaphragmatic paralysis, aortic dissection, heart attack, stroke, blood clot in the leg, pulmonary embolism, lung collapse (pneumothorax or hemothorax), heart collapse (pericardial tamponade), death.  The added risks for left atrial ablation may be atrial-esophageal fistula, pulmonary vein stenoses, kidney failure (from contrast injection), higher risk of bleeding, stroke and heart attack. Elective or emergency surgery may be required to repair some of these complications. Prolonged hospitalization would be required. General anesthesia and transeptal catheterization may be required for the procedure  Thank you for involving me in this patient's care and please call with further concerns or questions. Jeramy Bravo M.D.   Electrophysiology/Cardiology  [facility]  [facility], [address] [place] [address]  [phone]

## 2021-06-21 NOTE — PROGRESS NOTES
Cardiac Cath Lab Recovery Arrival Note:      Lisa Soriano arrived to Cardiac Cath Lab, Recovery Area. Staff introduced to patient. Patient identifiers verified with NAME and DATE OF BIRTH. Procedure verified with patient. Consent forms reviewed and signed by patient or authorized representative and verified. Allergies verified. Patient and family oriented to department. Patient and family informed of procedure and plan of care. Questions answered with review. Patient prepped for procedure, per orders from physician, prior to arrival.    Patient on cardiac monitor, non-invasive blood pressure, SPO2 monitor. On RA. Patient is A&Ox 4. Patient reports no complaints. Patient in stretcher, in low position, with side rails up, call bell within reach, patient instructed to call if assistance as needed. Patient prep in: [address], [place].    Patient family has pager # [phone]  Family in: hospital.   Prep by: CC

## 2021-06-21 NOTE — PROGRESS NOTES
Spiritual Care Assessment/Progress Note  [facility]      NAME: Lala Mulligan      MRN: [id number]  AGE: [de-identified] y.o. SEX: female  Holiness Affiliation: Orthodoxy   Language: English     6/21/2021     Total Time (in minutes): 20     Spiritual Assessment begun in [facility] CV INTNSV CARE through conversation with:         []Patient        [] Family    [] Friend(s)        Reason for Consult: Other (comment) (Code Blue)     Spiritual beliefs: (Please include comment if needed)     [] Identifies with a jame tradition:         [] Supported by a jame community:            [] Claims no spiritual orientation:           [] Seeking spiritual identity:                [] Adheres to an individual form of spirituality:           [x] Not able to assess:                           Identified resources for coping:      [] Prayer                               [] Music                  [] Guided Imagery     [] Family/friends                 [] Pet visits     [] Devotional reading                         [x] Unknown     [] Other:                                             Interventions offered during this visit: (See comments for more details)    Patient Interventions: Other (comment) (Code Blue)           Plan of Care:     [] Support spiritual and/or cultural needs    [] Support AMD and/or advance care planning process      [] Support grieving process   [] Coordinate Rites and/or Rituals    [] Coordination with community clergy   [] No spiritual needs identified at this time   [] Detailed Plan of Care below (See Comments)  [] Make referral to Music Therapy  [] Make referral to Pet Therapy     [] Make referral to Addiction services  [] Make referral to [facility]  [] Make referral to Spiritual Care Partner  [] No future visits requested        [x] Follow up upon further referrals     Comments:    responded to Code [facility] page. Upon arrival, medical team was working on patient.  A nurse approached  expressing that the patients  was present when code happened and was escorted out to waiting room. Radha Marcela had asked if staff and/or  department to communicate the outcome of patient so she (the ) can communicate with son and daughter.  left card with contact info on patients carrillo chart.  advised nurse to contact [facility] for any further referrals.     Chaplain Hamzah Mahoney M.Div.  Porsha Cuellar ([id number])

## 2021-06-21 NOTE — ANESTHESIA POSTPROCEDURE EVALUATION
Procedure(s):  PERICARDIAL WINDOW / TIM BY DR. Keith Melgar. general    Anesthesia Post Evaluation      Multimodal analgesia: multimodal analgesia used between 6 hours prior to anesthesia start to PACU discharge  Patient location during evaluation: ICU  Note status: Sedated. Post-procedure mental status: sedated. Pain management: adequate  Airway patency: patent  Anesthetic complications: no  Cardiovascular status: acceptable  Respiratory status: acceptable  Hydration status: acceptable  Post anesthesia nausea and vomiting:  none      INITIAL Post-op Vital signs:   Vitals Value Taken Time   /67    Temp     Pulse 74 06/21/21 1850   Resp 15 06/21/21 1850   SpO2 100 % 06/21/21 1850   Vitals shown include unvalidated device data.

## 2021-06-21 NOTE — PROGRESS NOTES
1140- bedside report and drips verified. Patient is intubated and sedated. Plans to extubate soon. 1215- propofol decreased to 35 mcg.    1225- patient is starting to wake up. She will move all extremities and open eyes to command. 1250- RT at bedside to change ventilator to CPAP. Patient awake and following commands. Propofol down to 10 mcg. 1258- alo stopped; 138/67 (94). 1310- ABG drawn. 1325- patient suctioned and extubated to 4 L nasal cannula. She is able to cough and state her name. 56- Dr. Tania Haskins called and inquired about patient status. Updated him on extubation, he will not be able to come and speak with patient, but did update son on condition. 1508- HR 51, BP 50/33, patient stating she is seeing spots. Alo restarted at 100 mcg, pacer pads placed on patient; drain now has about 140 ml (did only have a few ml before). Dr. Cadence Jamison and Dr. Wilfred Kendall at bedside; Dr. Tania Haskins paged. Dr. Wilfred Kendall at bedside and performed bedside TTE; small pleural effusion seen. 1- Dr. Tania Haskins on phone and updated on events. Patient no longer seeing spots. BP and HR improving. 1- Dr. Wilfred Kendall gave orders to give albumin. Drain emptied, only 85 ml in bag.    1622- again HR and BP dropping quickly. Rushed to patients bedside; still responsive; HR 56, BP 84/52 (65). Within seconds BP dropping further; 46/34 (39). Able to doppler pulse and patient minimal responsive. Dr. Wilfred Kendall at bedside; epi given for above vitals. Bedside TTE showed larger effusion from before; he started aspirating blood from drain. Titrating alo drip up as needed. 8586- Patient no longer responsive, unable to doppler pulse; CODE BLUE called. CPR started immediately, EPI given. 1635- intubated by Dr. Wilfred Kendall. 1636- pulse check;  and strong. Cardiac surgery called for emergent pericardial window per Dr. Wilfred Kendall. 1645- total from drain aspirated 320 ml. Right central line placed by Dr. Bia Alberto.  Dr. Jordyn Garnett at bedside; plan for emergent pericardial window. 1657- albumin hung. 1708- ABG drawn; HCO3 17, 1 amp given. 36- daughter Meghan at bedside; update given by Dr. Luisana Boyce and Farzad Mcphersonma. Meghan given patient's jewelry (earrings and 5 rings). 1736- versed given prior to transport to OR. 1844- patient arrived back to CV[facility] 35. Patient assess and report from Melissa GARCIA, and Dr. Chiquis Michelle. 2052- MAP dropping to mid 50's; isidro increased to 30 mcg and patient placed supine. Chest tubes have small clots in them and was able to break them up to drain appropriately. 65- Dr. Ivana Turner at bedside to replace arterial line. 6455- Bedside and Verbal shift change report given to Nikki RN (oncoming nurse) by Tea Quiroga RN (offgoing nurse).  Report included the following information SBAR, Kardex, Recent Results and Cardiac Rhythm SR.

## 2021-06-21 NOTE — PROGRESS NOTES
SILVESTRE AYALA called at about 4:30 PM.      Prior to code her blood pressure dipped significantly. 2 hours prior I had done a bedside echo for a sudden drop in blood pressure which resolved with a alo push, it showed a small pericardial effusion without tamponade physiology-cardiology notified of said Cosme Morton was to continue to monitor    Echo done at bedside right before the code showed a much larger effusion with tamponade physiology-decision made to start aspirating directly from the catheter with 20 cc syringes due to concerns for a blocked drain-approximately 300 cc of maia blood was drained. While being drained she went into PEA arrest-ACLS activated. ROSC achieved within 2 minutes. Intubated during the code. Post code continued aspirating the pericardial accumulation. Anesthesia and cardiac surgery at bedside-plan is to take the patient to the OR for pericardial window.     For now sedation with propofol/Versed, support blood pressure with Alo-Synephrine/Levophed, will aspirate pericardial drain as needed till she goes to the OR, continue lung protective mechanical ventilation, n.p.o. for now, monitor urine output, correct electrolyte derangements as needed, transfuse for hemoglobin less than 7, platelet count less than 50, INR greater than 1.6, fibrinogen less than 100, considering how much manipulated the pericardial drain in the nonsterile fashion will start vancomycin, keep glucose less than 180    Additional critical care time-40 minutes

## 2021-06-21 NOTE — Clinical Note
under hemodynamic and ICE and Fluoro, utilizing a standard needle, Chito 98cm via a guiding sheath. Needle inserted.

## 2021-06-21 NOTE — PROGRESS NOTES
Critical CARE progress note    Patient was in EP lab when she acutely became hemodynamically compromised with suspected pericardial tamponade. Patient was assessed and was noted to have significant pericardial effusion with likely tamponade. A micropuncture needle was used under fluoroscopy and ultrasound to access the pericardial cavity and was subsequently upsized to a larger system/8 once position was confirmed in the pericardium. On initial entry of the needle, pericardial pressure was noted to be clearly high as blood was spurting from the pericardial cavity a total of about 5 to 600 cc of bloody fluid was removed and then subsequently injected through femoral sheath. Total of 35 minutes of critical care time was spent by me to support Dr. Andrea Jay in managing the patient during critical status. Blood pressure improved as pericardial pressures reduced and blood was removed from the pericardial cavity. I personally spent35 minutes of critical care time. This is time spent at this critically ill patient's bedside actively involved in patient care as well as the coordination of care and discussions with the patient's family. This does not include any procedural time which has been billed separately.

## 2021-06-22 ENCOUNTER — APPOINTMENT (OUTPATIENT)
Dept: NON INVASIVE DIAGNOSTICS | Age: 81
DRG: 270 | End: 2021-06-22
Attending: NURSE PRACTITIONER
Payer: MEDICARE

## 2021-06-22 ENCOUNTER — APPOINTMENT (OUTPATIENT)
Dept: GENERAL RADIOLOGY | Age: 81
DRG: 270 | End: 2021-06-22
Attending: INTERNAL MEDICINE
Payer: MEDICARE

## 2021-06-22 ENCOUNTER — APPOINTMENT (OUTPATIENT)
Dept: ULTRASOUND IMAGING | Age: 81
DRG: 270 | End: 2021-06-22
Attending: PHYSICIAN ASSISTANT
Payer: MEDICARE

## 2021-06-22 ENCOUNTER — APPOINTMENT (OUTPATIENT)
Dept: GENERAL RADIOLOGY | Age: 81
DRG: 270 | End: 2021-06-22
Attending: PHYSICIAN ASSISTANT
Payer: MEDICARE

## 2021-06-22 LAB
ABO + RH BLD: NORMAL
ACT BLD: 120 SECS (ref 79–138)
ALBUMIN SERPL-MCNC: 3.3 G/DL (ref 3.5–5)
ALBUMIN SERPL-MCNC: 3.7 G/DL (ref 3.5–5)
ALBUMIN/GLOB SERPL: 1.6 {RATIO} (ref 1.1–2.2)
ALBUMIN/GLOB SERPL: 1.8 {RATIO} (ref 1.1–2.2)
ALP SERPL-CCNC: 51 U/L (ref 45–117)
ALP SERPL-CCNC: 52 U/L (ref 45–117)
ALT SERPL-CCNC: 62 U/L (ref 12–78)
ALT SERPL-CCNC: 70 U/L (ref 12–78)
ANION GAP SERPL CALC-SCNC: 6 MMOL/L (ref 5–15)
ANION GAP SERPL CALC-SCNC: 7 MMOL/L (ref 5–15)
ANION GAP SERPL CALC-SCNC: 7 MMOL/L (ref 5–15)
APPEARANCE UR: ABNORMAL
ARTERIAL PATENCY WRIST A: ABNORMAL
AST SERPL-CCNC: 62 U/L (ref 15–37)
AST SERPL-CCNC: 68 U/L (ref 15–37)
BACTERIA URNS QL MICRO: NEGATIVE /HPF
BASE DEFICIT BLD-SCNC: 3 MMOL/L
BASE DEFICIT BLDA-SCNC: 3.5 MMOL/L
BASE DEFICIT BLDA-SCNC: 4 MMOL/L
BASOPHILS # BLD: 0 K/UL (ref 0–0.1)
BASOPHILS NFR BLD: 0 % (ref 0–1)
BDY SITE: ABNORMAL
BILIRUB SERPL-MCNC: 0.5 MG/DL (ref 0.2–1)
BILIRUB SERPL-MCNC: 0.7 MG/DL (ref 0.2–1)
BILIRUB UR QL: NEGATIVE
BLD PROD TYP BPU: NORMAL
BLD PROD TYP BPU: NORMAL
BLOOD GROUP ANTIBODIES SERPL: NORMAL
BPU ID: NORMAL
BPU ID: NORMAL
BUN SERPL-MCNC: 22 MG/DL (ref 6–20)
BUN SERPL-MCNC: 24 MG/DL (ref 6–20)
BUN SERPL-MCNC: 24 MG/DL (ref 6–20)
BUN/CREAT SERPL: 19 (ref 12–20)
BUN/CREAT SERPL: 21 (ref 12–20)
BUN/CREAT SERPL: 21 (ref 12–20)
CALCIUM SERPL-MCNC: 7.7 MG/DL (ref 8.5–10.1)
CALCIUM SERPL-MCNC: 7.9 MG/DL (ref 8.5–10.1)
CALCIUM SERPL-MCNC: 8 MG/DL (ref 8.5–10.1)
CHLORIDE SERPL-SCNC: 113 MMOL/L (ref 97–108)
CHLORIDE SERPL-SCNC: 115 MMOL/L (ref 97–108)
CHLORIDE SERPL-SCNC: 116 MMOL/L (ref 97–108)
CO2 SERPL-SCNC: 23 MMOL/L (ref 21–32)
CO2 SERPL-SCNC: 23 MMOL/L (ref 21–32)
CO2 SERPL-SCNC: 24 MMOL/L (ref 21–32)
COLOR UR: ABNORMAL
CREAT SERPL-MCNC: 1.12 MG/DL (ref 0.55–1.02)
CREAT SERPL-MCNC: 1.14 MG/DL (ref 0.55–1.02)
CREAT SERPL-MCNC: 1.16 MG/DL (ref 0.55–1.02)
CREAT UR-MCNC: 163 MG/DL
CROSSMATCH RESULT,%XM: NORMAL
CROSSMATCH RESULT,%XM: NORMAL
DIFFERENTIAL METHOD BLD: ABNORMAL
EOSINOPHIL # BLD: 0 K/UL (ref 0–0.4)
EOSINOPHIL NFR BLD: 0 % (ref 0–7)
EPITH CASTS URNS QL MICRO: ABNORMAL /LPF
ERYTHROCYTE [DISTWIDTH] IN BLOOD BY AUTOMATED COUNT: 15.3 % (ref 11.5–14.5)
GAS FLOW.O2 O2 DELIVERY SYS: ABNORMAL L/MIN
GAS FLOW.O2 SETTING OXYMISER: 12 BPM
GLOBULIN SER CALC-MCNC: 2.1 G/DL (ref 2–4)
GLOBULIN SER CALC-MCNC: 2.1 G/DL (ref 2–4)
GLUCOSE BLD STRIP.AUTO-MCNC: 109 MG/DL (ref 65–117)
GLUCOSE BLD STRIP.AUTO-MCNC: 128 MG/DL (ref 65–117)
GLUCOSE BLD STRIP.AUTO-MCNC: 145 MG/DL (ref 65–117)
GLUCOSE BLD STRIP.AUTO-MCNC: 183 MG/DL (ref 65–117)
GLUCOSE SERPL-MCNC: 130 MG/DL (ref 65–100)
GLUCOSE SERPL-MCNC: 135 MG/DL (ref 65–100)
GLUCOSE SERPL-MCNC: 157 MG/DL (ref 65–100)
GLUCOSE UR STRIP.AUTO-MCNC: NEGATIVE MG/DL
HCO3 BLD-SCNC: 23.1 MMOL/L (ref 22–26)
HCO3 BLDA-SCNC: 21 MMOL/L (ref 22–26)
HCO3 BLDA-SCNC: 21 MMOL/L (ref 22–26)
HCT VFR BLD AUTO: 28.6 % (ref 35–47)
HCT VFR BLD AUTO: 29 % (ref 35–47)
HCT VFR BLD AUTO: 31.5 % (ref 35–47)
HGB BLD-MCNC: 10.5 G/DL (ref 11.5–16)
HGB BLD-MCNC: 9.3 G/DL (ref 11.5–16)
HGB BLD-MCNC: 9.5 G/DL (ref 11.5–16)
HGB UR QL STRIP: ABNORMAL
HYALINE CASTS URNS QL MICRO: ABNORMAL /LPF (ref 0–5)
IMM GRANULOCYTES # BLD AUTO: 0.2 K/UL (ref 0–0.04)
IMM GRANULOCYTES NFR BLD AUTO: 1 % (ref 0–0.5)
KETONES UR QL STRIP.AUTO: NEGATIVE MG/DL
LEUKOCYTE ESTERASE UR QL STRIP.AUTO: NEGATIVE
LYMPHOCYTES # BLD: 1.2 K/UL (ref 0.8–3.5)
LYMPHOCYTES NFR BLD: 7 % (ref 12–49)
MAGNESIUM SERPL-MCNC: 2 MG/DL (ref 1.6–2.4)
MAGNESIUM SERPL-MCNC: 2 MG/DL (ref 1.6–2.4)
MCH RBC QN AUTO: 31.9 PG (ref 26–34)
MCHC RBC AUTO-ENTMCNC: 33.3 G/DL (ref 30–36.5)
MCV RBC AUTO: 95.7 FL (ref 80–99)
MONOCYTES # BLD: 1.2 K/UL (ref 0–1)
MONOCYTES NFR BLD: 7 % (ref 5–13)
NEUTS SEG # BLD: 14.9 K/UL (ref 1.8–8)
NEUTS SEG NFR BLD: 85 % (ref 32–75)
NITRITE UR QL STRIP.AUTO: NEGATIVE
NRBC # BLD: 0 K/UL (ref 0–0.01)
NRBC BLD-RTO: 0 PER 100 WBC
O2/TOTAL GAS SETTING VFR VENT: 80 %
PCO2 BLD: 44.9 MMHG (ref 35–45)
PCO2 BLDA: 36 MMHG (ref 35–45)
PCO2 BLDA: 37 MMHG (ref 35–45)
PEEP RESPIRATORY: 5 CMH2O
PH BLD: 7.32 [PH] (ref 7.35–7.45)
PH BLDA: 7.37 [PH] (ref 7.35–7.45)
PH BLDA: 7.38 [PH] (ref 7.35–7.45)
PH UR STRIP: 5.5 [PH] (ref 5–8)
PHOSPHATE SERPL-MCNC: 3.7 MG/DL (ref 2.6–4.7)
PLATELET # BLD AUTO: 162 K/UL (ref 150–400)
PLATELET COMMENTS,PCOM: ABNORMAL
PO2 BLD: 279 MMHG (ref 80–100)
PO2 BLDA: 100 MMHG (ref 80–100)
PO2 BLDA: 151 MMHG (ref 80–100)
POTASSIUM SERPL-SCNC: 4 MMOL/L (ref 3.5–5.1)
POTASSIUM SERPL-SCNC: 4.1 MMOL/L (ref 3.5–5.1)
POTASSIUM SERPL-SCNC: 4.2 MMOL/L (ref 3.5–5.1)
PRESSURE SUPPORT SETTING VENT: 5 CMH2O
PROT SERPL-MCNC: 5.4 G/DL (ref 6.4–8.2)
PROT SERPL-MCNC: 5.8 G/DL (ref 6.4–8.2)
PROT UR STRIP-MCNC: 30 MG/DL
RBC # BLD AUTO: 3.29 M/UL (ref 3.8–5.2)
RBC #/AREA URNS HPF: >100 /HPF (ref 0–5)
RBC MORPH BLD: ABNORMAL
SAO2 % BLD: 97 % (ref 92–97)
SAO2 % BLD: 99 % (ref 92–97)
SAO2 % BLD: 99.9 % (ref 92–97)
SAO2% DEVICE SAO2% SENSOR NAME: ABNORMAL
SAO2% DEVICE SAO2% SENSOR NAME: ABNORMAL
SERVICE CMNT-IMP: ABNORMAL
SERVICE CMNT-IMP: NORMAL
SODIUM SERPL-SCNC: 143 MMOL/L (ref 136–145)
SODIUM SERPL-SCNC: 145 MMOL/L (ref 136–145)
SODIUM SERPL-SCNC: 146 MMOL/L (ref 136–145)
SODIUM UR-SCNC: <5 MMOL/L
SP GR UR REFRACTOMETRY: 1.02 (ref 1–1.03)
SPECIMEN EXP DATE BLD: NORMAL
SPECIMEN SITE: ABNORMAL
SPECIMEN SITE: ABNORMAL
SPECIMEN TYPE: ABNORMAL
STATUS OF UNIT,%ST: NORMAL
STATUS OF UNIT,%ST: NORMAL
UNIT DIVISION, %UDIV: 0
UNIT DIVISION, %UDIV: 0
UR CULT HOLD, URHOLD: NORMAL
UROBILINOGEN UR QL STRIP.AUTO: 1 EU/DL (ref 0.2–1)
UUN UR-MCNC: 902 MG/DL
VENTILATION MODE VENT: ABNORMAL
VT SETTING VENT: 450 ML
WBC # BLD AUTO: 17.5 K/UL (ref 3.6–11)
WBC URNS QL MICRO: ABNORMAL /HPF (ref 0–4)

## 2021-06-22 PROCEDURE — 74011250636 HC RX REV CODE- 250/636: Performed by: INTERNAL MEDICINE

## 2021-06-22 PROCEDURE — 0W9B3ZZ DRAINAGE OF LEFT PLEURAL CAVITY, PERCUTANEOUS APPROACH: ICD-10-PCS | Performed by: RADIOLOGY

## 2021-06-22 PROCEDURE — 74011000250 HC RX REV CODE- 250: Performed by: INTERNAL MEDICINE

## 2021-06-22 PROCEDURE — P9045 ALBUMIN (HUMAN), 5%, 250 ML: HCPCS | Performed by: EMERGENCY MEDICINE

## 2021-06-22 PROCEDURE — 81001 URINALYSIS AUTO W/SCOPE: CPT

## 2021-06-22 PROCEDURE — 65610000003 HC RM ICU SURGICAL

## 2021-06-22 PROCEDURE — 94640 AIRWAY INHALATION TREATMENT: CPT

## 2021-06-22 PROCEDURE — 71045 X-RAY EXAM CHEST 1 VIEW: CPT

## 2021-06-22 PROCEDURE — 80053 COMPREHEN METABOLIC PANEL: CPT

## 2021-06-22 PROCEDURE — 99233 SBSQ HOSP IP/OBS HIGH 50: CPT | Performed by: INTERNAL MEDICINE

## 2021-06-22 PROCEDURE — 99291 CRITICAL CARE FIRST HOUR: CPT | Performed by: THORACIC SURGERY (CARDIOTHORACIC VASCULAR SURGERY)

## 2021-06-22 PROCEDURE — 36415 COLL VENOUS BLD VENIPUNCTURE: CPT

## 2021-06-22 PROCEDURE — 85014 HEMATOCRIT: CPT

## 2021-06-22 PROCEDURE — 84300 ASSAY OF URINE SODIUM: CPT

## 2021-06-22 PROCEDURE — 93308 TTE F-UP OR LMTD: CPT

## 2021-06-22 PROCEDURE — 74011250637 HC RX REV CODE- 250/637: Performed by: PHYSICIAN ASSISTANT

## 2021-06-22 PROCEDURE — 84540 ASSAY OF URINE/UREA-N: CPT

## 2021-06-22 PROCEDURE — 99232 SBSQ HOSP IP/OBS MODERATE 35: CPT | Performed by: CLINICAL NURSE SPECIALIST

## 2021-06-22 PROCEDURE — 74011000250 HC RX REV CODE- 250: Performed by: EMERGENCY MEDICINE

## 2021-06-22 PROCEDURE — 74011250636 HC RX REV CODE- 250/636: Performed by: PHYSICIAN ASSISTANT

## 2021-06-22 PROCEDURE — 32555 ASPIRATE PLEURA W/ IMAGING: CPT

## 2021-06-22 PROCEDURE — P9045 ALBUMIN (HUMAN), 5%, 250 ML: HCPCS | Performed by: PHYSICIAN ASSISTANT

## 2021-06-22 PROCEDURE — 74011250637 HC RX REV CODE- 250/637: Performed by: INTERNAL MEDICINE

## 2021-06-22 PROCEDURE — 83735 ASSAY OF MAGNESIUM: CPT

## 2021-06-22 PROCEDURE — 74011000250 HC RX REV CODE- 250: Performed by: PHYSICIAN ASSISTANT

## 2021-06-22 PROCEDURE — 74011250636 HC RX REV CODE- 250/636: Performed by: EMERGENCY MEDICINE

## 2021-06-22 PROCEDURE — 74011000258 HC RX REV CODE- 258: Performed by: EMERGENCY MEDICINE

## 2021-06-22 PROCEDURE — 77010033678 HC OXYGEN DAILY

## 2021-06-22 PROCEDURE — 2709999900 HC NON-CHARGEABLE SUPPLY

## 2021-06-22 PROCEDURE — 82570 ASSAY OF URINE CREATININE: CPT

## 2021-06-22 PROCEDURE — 93325 DOPPLER ECHO COLOR FLOW MAPG: CPT | Performed by: SPECIALIST

## 2021-06-22 PROCEDURE — 74011250637 HC RX REV CODE- 250/637: Performed by: NURSE PRACTITIONER

## 2021-06-22 PROCEDURE — C1729 CATH, DRAINAGE: HCPCS

## 2021-06-22 PROCEDURE — 93308 TTE F-UP OR LMTD: CPT | Performed by: SPECIALIST

## 2021-06-22 PROCEDURE — 93321 DOPPLER ECHO F-UP/LMTD STD: CPT | Performed by: SPECIALIST

## 2021-06-22 PROCEDURE — 99292 CRITICAL CARE ADDL 30 MIN: CPT | Performed by: THORACIC SURGERY (CARDIOTHORACIC VASCULAR SURGERY)

## 2021-06-22 PROCEDURE — 74011636637 HC RX REV CODE- 636/637: Performed by: PHYSICIAN ASSISTANT

## 2021-06-22 PROCEDURE — 84100 ASSAY OF PHOSPHORUS: CPT

## 2021-06-22 PROCEDURE — 85025 COMPLETE CBC W/AUTO DIFF WBC: CPT

## 2021-06-22 PROCEDURE — 82962 GLUCOSE BLOOD TEST: CPT

## 2021-06-22 RX ORDER — LEVOFLOXACIN 5 MG/ML
750 INJECTION, SOLUTION INTRAVENOUS
Status: DISCONTINUED | OUTPATIENT
Start: 2021-06-22 | End: 2021-06-23

## 2021-06-22 RX ORDER — ALBUMIN HUMAN 50 G/1000ML
12.5 SOLUTION INTRAVENOUS ONCE
Status: COMPLETED | OUTPATIENT
Start: 2021-06-22 | End: 2021-06-22

## 2021-06-22 RX ORDER — NOREPINEPHRINE BITARTRATE/D5W 8 MG/250ML
.5-3 PLASTIC BAG, INJECTION (ML) INTRAVENOUS
Status: DISCONTINUED | OUTPATIENT
Start: 2021-06-22 | End: 2021-06-28 | Stop reason: HOSPADM

## 2021-06-22 RX ORDER — ALBUMIN HUMAN 50 G/1000ML
25 SOLUTION INTRAVENOUS ONCE
Status: COMPLETED | OUTPATIENT
Start: 2021-06-22 | End: 2021-06-22

## 2021-06-22 RX ADMIN — LEVOTHYROXINE SODIUM 25 MCG: 0.03 TABLET ORAL at 06:09

## 2021-06-22 RX ADMIN — VANCOMYCIN HYDROCHLORIDE 1250 MG: 10 INJECTION, POWDER, LYOPHILIZED, FOR SOLUTION INTRAVENOUS at 11:07

## 2021-06-22 RX ADMIN — POLYETHYLENE GLYCOL 3350 17 G: 17 POWDER, FOR SOLUTION ORAL at 08:39

## 2021-06-22 RX ADMIN — MUPIROCIN: 20 OINTMENT TOPICAL at 08:41

## 2021-06-22 RX ADMIN — Medication 10 ML: at 07:05

## 2021-06-22 RX ADMIN — NOREPINEPHRINE BITARTRATE 4 MCG/MIN: 1 INJECTION, SOLUTION, CONCENTRATE INTRAVENOUS at 09:59

## 2021-06-22 RX ADMIN — MAGNESIUM OXIDE TAB 400 MG (241.3 MG ELEMENTAL MG) 400 MG: 400 (241.3 MG) TAB at 18:55

## 2021-06-22 RX ADMIN — ALBUMIN (HUMAN) 12.5 G: 12.5 INJECTION, SOLUTION INTRAVENOUS at 02:26

## 2021-06-22 RX ADMIN — PROPOFOL 30 MCG/KG/MIN: 10 INJECTION, EMULSION INTRAVENOUS at 05:24

## 2021-06-22 RX ADMIN — FAMOTIDINE 20 MG: 10 INJECTION, SOLUTION INTRAVENOUS at 08:39

## 2021-06-22 RX ADMIN — LEVOFLOXACIN 750 MG: 5 INJECTION, SOLUTION INTRAVENOUS at 08:39

## 2021-06-22 RX ADMIN — CHLORHEXIDINE GLUCONATE 10 ML: 1.2 RINSE ORAL at 08:40

## 2021-06-22 RX ADMIN — Medication 5000 UNITS: at 08:40

## 2021-06-22 RX ADMIN — ALBUMIN (HUMAN) 12.5 G: 12.5 INJECTION, SOLUTION INTRAVENOUS at 15:12

## 2021-06-22 RX ADMIN — ALBUMIN (HUMAN) 25 G: 12.5 INJECTION, SOLUTION INTRAVENOUS at 08:39

## 2021-06-22 RX ADMIN — ACETAMINOPHEN 650 MG: 325 TABLET ORAL at 03:37

## 2021-06-22 RX ADMIN — ACETAMINOPHEN 650 MG: 325 TABLET ORAL at 18:55

## 2021-06-22 RX ADMIN — DOCUSATE SODIUM 50 MG AND SENNOSIDES 8.6 MG 1 TABLET: 8.6; 5 TABLET, FILM COATED ORAL at 08:39

## 2021-06-22 RX ADMIN — BUDESONIDE 1000 MCG: 0.5 INHALANT RESPIRATORY (INHALATION) at 09:00

## 2021-06-22 RX ADMIN — MUPIROCIN: 20 OINTMENT TOPICAL at 18:59

## 2021-06-22 RX ADMIN — FAMOTIDINE 20 MG: 20 TABLET ORAL at 07:29

## 2021-06-22 RX ADMIN — MIDAZOLAM HYDROCHLORIDE 1 MG: 1 INJECTION, SOLUTION INTRAMUSCULAR; INTRAVENOUS at 04:09

## 2021-06-22 RX ADMIN — HYDROMORPHONE HYDROCHLORIDE 1 MG: 1 INJECTION, SOLUTION INTRAMUSCULAR; INTRAVENOUS; SUBCUTANEOUS at 06:09

## 2021-06-22 RX ADMIN — CETIRIZINE HYDROCHLORIDE 10 MG: 10 TABLET, FILM COATED ORAL at 18:55

## 2021-06-22 RX ADMIN — POTASSIUM BICARBONATE 20 MEQ: 782 TABLET, EFFERVESCENT ORAL at 08:40

## 2021-06-22 RX ADMIN — HYDROCODONE BITARTRATE AND ACETAMINOPHEN 1 TABLET: 5; 325 TABLET ORAL at 12:00

## 2021-06-22 RX ADMIN — PROPOFOL 30 MCG/KG/MIN: 10 INJECTION, EMULSION INTRAVENOUS at 10:12

## 2021-06-22 RX ADMIN — ACETAMINOPHEN 650 MG: 325 TABLET ORAL at 07:29

## 2021-06-22 RX ADMIN — HYDROMORPHONE HYDROCHLORIDE 1 MG: 1 INJECTION, SOLUTION INTRAMUSCULAR; INTRAVENOUS; SUBCUTANEOUS at 14:25

## 2021-06-22 RX ADMIN — MONTELUKAST 10 MG: 10 TABLET, FILM COATED ORAL at 18:55

## 2021-06-22 RX ADMIN — AMIODARONE HYDROCHLORIDE 200 MG: 200 TABLET ORAL at 08:40

## 2021-06-22 RX ADMIN — CHLORHEXIDINE GLUCONATE 10 ML: 1.2 RINSE ORAL at 21:47

## 2021-06-22 RX ADMIN — ATORVASTATIN CALCIUM 20 MG: 20 TABLET, FILM COATED ORAL at 18:55

## 2021-06-22 RX ADMIN — SODIUM CHLORIDE 3 ML/HR: 9 INJECTION, SOLUTION INTRAVENOUS at 06:34

## 2021-06-22 RX ADMIN — BUDESONIDE 1000 MCG: 0.5 INHALANT RESPIRATORY (INHALATION) at 20:00

## 2021-06-22 RX ADMIN — DOCUSATE SODIUM 50 MG AND SENNOSIDES 8.6 MG 1 TABLET: 8.6; 5 TABLET, FILM COATED ORAL at 18:55

## 2021-06-22 RX ADMIN — PHENYLEPHRINE HYDROCHLORIDE 50 MCG/MIN: 10 INJECTION INTRAVENOUS at 01:46

## 2021-06-22 RX ADMIN — FAMOTIDINE 20 MG: 20 TABLET ORAL at 21:47

## 2021-06-22 RX ADMIN — Medication 10 ML: at 21:48

## 2021-06-22 RX ADMIN — ACETAMINOPHEN 650 MG: 325 TABLET ORAL at 22:11

## 2021-06-22 RX ADMIN — ACETAMINOPHEN 650 MG: 325 TABLET ORAL at 11:59

## 2021-06-22 RX ADMIN — INSULIN LISPRO 2 UNITS: 100 INJECTION, SOLUTION INTRAVENOUS; SUBCUTANEOUS at 08:39

## 2021-06-22 RX ADMIN — WATER 2 G: 1 INJECTION INTRAMUSCULAR; INTRAVENOUS; SUBCUTANEOUS at 06:09

## 2021-06-22 RX ADMIN — ALBUMIN (HUMAN) 12.5 G: 12.5 INJECTION, SOLUTION INTRAVENOUS at 03:37

## 2021-06-22 NOTE — PROCEDURES
[facility]      Procedure Note - Arterial Access:   Performed by Elicia Coyne MD.  Diagnosis: post arrest   Insertion Date: 06/21/2021 Time:2230   Obtained Consent? no; emergent   Procedure Location:  CVICU    Immediately prior to the procedure, the patient was reevaluated and found suitable for the planned procedure and any planned medications. Immediately prior to the procedure a time out was called to verify the correct patient, procedure, equipment, staff, and marking as appropriate. Central line Bundle:  Full sterile barrier precautions used. 5 mL 1% Lidocaine placed at insertion site. Method: Seldinger technique. Site Prep: ChloraPrep and Sterile draping. Procedure: Arterial Catheter Insertion in Left, Radial Artery   Catheter inserted into a new site. Number of Attempts: 2 Indication: Monitoring and Blood Drawing. There was bright red, pulsatile blood return. Femoral Site? no. If Yes, reason femoral site was chosen: N/A  Catheter secured. Biopatch in place? yes. Sterile Bio-occlusive dressing placed. Complication None. The procedure was tolerated well.     Elicia Coyne MD   Critical Care Medicine  [facility]

## 2021-06-22 NOTE — PROGRESS NOTES
0000: Bedside shift change report given to Nikki ROSS RN (oncoming nurse) by Madina Reyes RN (offgoing nurse). Report included the following information SBAR, OR Summary, Intake/Output, MAR, Recent Results, Cardiac Rhythm NSR and Alarm Parameters . 1st degree AVB noted measuring 0.32    Pt noted to intermittently have a wide QRS/ventricular rhythm. Dr. Abundio Antonio notified and orders to obtain BMP received    0030: pts BP dropped to 50s, improved independently    0210: pt turned slightly to R) side to aide with CT draining. Pts SBP dropped ~10pts but pt able to recover    0220: Dr. Abundio Antonio notified of UOP <20. Orders received for albumin 5% x1    0330: Dr. Abundio Antonio at bedside, notified of continued low UOP, pt assessed. Albumin 5% x1 ordered. Alo at 60 d/t SBP 80-90s, attempting to increase BP to aide with UOP    0400: bath completed. Pt awakened slightly, grimacing to stimulation, opens eyes to stimulation. Bilateral arms noted to move spontaneously, unable to [facility] at this time    0600: pt moving bilateral arms, frequent grimacing, dilaudid admin for pain    0705: Dr. Reginold Bumpers at bedside, updated by RN on overnight events    0730: JOSE Finn at bedside, updated on overnight events. Orders received for albumin x1    0800: Bedside shift change report given to Kwesi Reis RN (oncoming nurse) by Margarito Banda RN (offgoing nurse). Report included the following information SBAR, Intake/Output, MAR, Recent Results, Cardiac Rhythm NSR, 1st degree AVB and Alarm Parameters .

## 2021-06-22 NOTE — CONSULTS
CSS Consult    Subjective:      Eder Navarrete is a [de-identified] y.o. female who was referred for cardiac evaluation from Dr. Danelle Guthrie for cardiac tamponade s/p AF ablation. Dr. Rosalee Lorenzo responded to code blue in CVI with patient in cardiac tamponade. Pericardial drain inserted and patient achieved ROSC. Patient taken to OR for emergent pericardial window.        Past Medical History:   Diagnosis Date    Arthritis     Asthma     dr. Goddard Phoenix allergist    [facility] [address] [address]     Breast cancer ([facility] [address] 1980    right - mastectomy    Coagulation disorder ([facility] [address]     on eliquis    Dyslipidemia     labs 2008 - chol 283, HDL 83, ,     HTN (hypertension)     Hyperlipidemia LDL goal < 130     for increased LDL particles, Dr. Cody Copeland nodule     Dr. Jolene Galindo Palpitations     resolved     Past Surgical History:   Procedure Laterality Date    COLONOSCOPY N/A 9/10/2018    COLONOSCOPY performed by Mayte Christopher MD at [facility] W/O CONT WITH CALCIUM  1/2012    CAC score 0; calcified mediastinal lymph nodes consistent granulomatous disease    ECHO 2D ADULT  5/5/2008    normal, LVEF 60%    HX APPENDECTOMY      HX HYSTERECTOMY Bilateral 03/19/2015    and uro repair    HX KNEE REPLACEMENT Right     HX MASTECTOMY Right     HX TONSILLECTOMY      HX UROLOGICAL  8/1/14    Urodynamics    INTRACARD ECHO, THER/DX INTERVENT N/A 5/10/2019    Intracardiac Echocardiogram performed by Sinai Dinh MD at [address]  3/28/2018         [facility] CHEST SURGERY PROCEDURE UNLISTED      lung nodule removed - benign    [facility] EPHYS EVL TRNSPTL TX ATRIAL FIB ISOLAT PULM VEIN N/A 5/10/2019    ABLATION A-FIB  W COMPLETE EP STUDY performed by Sinai Dinh MD at [address] [place] [address], [address] CATH LAB    [facility] INTRACARDIAC ELECTROPHYSIOLOGIC 3D MAPPING N/A 5/10/2019    Ep 3d Mapping [place] [address] CATH LAB    STRESS TEST [facility]  1/5/12    walked 7:31, no chest pain, normal MPI. Social History     Tobacco Use    Smoking status: Never Smoker    Smokeless tobacco: Never Used   Substance Use Topics    Alcohol use: Yes     Comment: wine nightly      Family History   Problem Relation Age of Onset    Heart Failure Mother     Stroke Father     Other Other         endometrial cancer, niece     Prior to Admission medications    Medication Sig Start Date End Date Taking? Authorizing Provider   sacubitriL-valsartan Soundra Sale) 24-26 mg tablet Take 1 Tablet by mouth two (2) times a day. 5/26/21  Yes Cesar White MD   potassium chloride (K-DUR, KLOR-CON) 20 mEq tablet Take 1 Tablet by mouth daily. 5/26/21  Yes Cesar White MD   furosemide (Lasix) 40 mg tablet Take 1 Tablet by mouth daily. 5/26/21  Yes Cesar White MD   amiodarone (CORDARONE) 200 mg tablet Take 1 Tab by mouth two (2) times a day. 4/26/21  Yes Cesar White MD   metoprolol tartrate (LOPRESSOR) 25 mg tablet Take 0.5 Tabs by mouth two (2) times a day. 4/26/21  Yes Cesar White MD   levothyroxine (SYNTHROID) 25 mcg tablet Take  by mouth Daily (before breakfast). 7/2/19  Yes Provider, Historical   fluticasone (FLOVENT HFA) 220 mcg/actuation inhaler as needed. Yes Provider, Historical   atorvastatin (LIPITOR) 20 mg tablet Take 20 mg by mouth every evening. Yes Provider, Historical   MULTIVITAMIN PO Take  by mouth. Takes one po once daily. Yes Provider, Historical   cholecalciferol (VITAMIN D3) 5,000 unit capsule Take 5,000 Units by mouth daily. Yes Provider, Historical   cetirizine (ZYRTEC) 10 mg tablet Take 10 mg by mouth every evening. Yes Provider, Historical   estradiol (ESTRACE) 0.01 % (0.1 mg/gram) vaginal cream Insert  into vagina every Monday and Thursday. Yes Provider, Historical   albuterol (PROVENTIL HFA, VENTOLIN HFA, PROAIR HFA) 90 mcg/actuation inhaler Take 1 Puff by inhalation every four (4) hours as needed.  12/28/15  Yes Genoveva Christopher MD   raloxifene (EVISTA) 60 mg tablet Take 1 Tab by mouth daily. 8/26/15  Yes Shellie Gutierrez MD   montelukast (SINGULAIR) 10 mg tablet Take 10 mg by mouth every evening. Yes Provider, Historical   cyclobenzaprine (FLEXERIL) 5 mg tablet 5 mg as needed. Patient not taking: Reported on 6/21/2021 12/7/20   Provider, Historical   diphenhydrAMINE (BenadryL) 25 mg capsule Take 25 mg by mouth every six (6) hours as needed. Patient not taking: Reported on 6/21/2021    Provider, Historical   amoxicillin (AMOXIL) 500 mg capsule Take 500 mg by mouth as needed. Only for dental procedure  Patient not taking: Reported on 6/21/2021 3/31/20   Provider, Historical   acetaminophen (Tylenol Extra Strength) 500 mg tablet Take 500 mg by mouth nightly as needed. Provider, Historical   EPINEPHrine (EPIPEN) 0.3 mg/0.3 mL (1:1,000) injection 0.3 mL by IntraMUSCular route once as needed for up to 1 dose. 1/12/16   Shellie Gutierrez MD       Allergies   Allergen Reactions    Betadine [Povidone-Iodine] Rash    Iodine Rash    Norvasc [Amlodipine] Other (comments)     Flushing/redness. Review of Systems:   Consititutional: Denies fever or chills. Eyes:  Denies use of glasses or vision problems(cataracts). ENT:  Denies hearing or swallowing difficulty. CV: Denies CP, claudication, HTN. Resp: Denies dyspnea, productive cough. : Denies dialysis or kidney problems. GI: Denies ulcers, esophageal strictures, liver problems. M/S: Denies joint or bone problems, or implanted artificial hardware. Skin: Denies varicose veins, edema. Neuro: Denies strokes, or TIAs. Psych: Denies anxiety or depression. Endocrine: Denies thyroid problems or diabetes. Heme/Lymphatic: Denies easy bruising or lymphedema.      Objective:     VS:   Visit Vitals  BP (!) 84/53   Pulse 69   Temp 99.2 °F (37.3 °C)   Resp 14   Ht 5' 6\" (1.676 m)   Wt 186 lb 8.2 oz (84.6 kg)   SpO2 97%   Breastfeeding No   BMI 30.10 kg/m²     Physical Exam:    General appearance: Sedated, intubated  Head: normocephalic, without obvious abnormality; atraumatic  Eyes: conjunctivae/corneas clear  Nose: nares normal; no drainage. Neck: no carotid bruit and no JVD  Lungs: clear to auscultation bilaterally  Heart: regular rate and rhythm; no murmur  Abdomen: soft, bowel sounds normal  Extremities: Sedated  Skin: Skin color normal; No varicose veins or edema. Neurologic: Sedated    Labs:   Recent Labs     06/22/21  0346 06/21/21  2210 06/21/21  1900 06/21/21  1513   WBC 17.5*  --  13.5*   < >   HGB 10.5*  --  9.2*   < >   HCT 31.5*  --  28.6*   < >     --  134*   < >     --  145   < >   K 4.1  --  3.5   < >   BUN 24*  --  20   < >   CREA 1.14*  --  1.01   < >   *  --  271*   < >   GLUCPOC  --  205*  --   --    INR  --   --  1.2*  --     < > = values in this interval not displayed. Assessment:     Principal Problem:    Pericardial effusion with cardiac tamponade (5/10/2019)      Overview: Loculated posterior LA and LV, no tamponade    Active Problems:    persistent atrial fibrillation  (6/21/2021)      Breast cancer (HCC) ()      Non-rheumatic mitral regurgitation (5/10/2019)      Overview: moderate      Non-rheumatic tricuspid valve insufficiency (5/10/2019)      Cardiac/pericardial tamponade (6/21/2021)      S/P pericardial window creation (6/21/2021)        Plan:     1. Cardiac tamponade: Taken to OR emergently for pericardial window. 2. Cardiogenic shock due to cardiac tamponade: ROSC, support MAPs with isidro  3. Acute hypoxic respiratory failure: Intubated during code  4. AF s/p ablation: Dr. Andrea Jay following  5. Hypothyroidism: On replacement  6. Asthma: On singulair  7.  Hyperlipidemia: On atorvastatin      Signed By: JSOE Smallwood     June 22, 2021

## 2021-06-22 NOTE — PROGRESS NOTES
HPI    78-year-old woman brought to the ICU for postoperative care. She has a significant history of A. fib-the plan was to go for an ablation [id number] the procedure was complicated by V. tach with hemodynamic instability and a large pericardial effusion-was cardioverted and emergency pericardiocentesis done and a pericardial drain left in. Brought to the ICU for continued care. Patient intubated.     Interval changes    6/22 - yesterday had re accumulation of effusion - went into tamponade, coded, ROSC after 2 mins, went to OR for manpreet placements, s/p L thora this AM - 400 cc of sanguinous drainage, extubated this AM    Patient Vitals for the past 24 hrs:   Temp Pulse Resp BP SpO2   06/22/21 1300  94 27  99 %   06/22/21 1200 98.2 °F (36.8 °C) 98 17  100 %   06/22/21 1107    (!) 84/53    06/22/21 1100  95 14  97 %   06/22/21 1000  64 14  99 %   06/22/21 0900  69 14  99 %   06/22/21 0814     100 %   06/22/21 0800 98.7 °F (37.1 °C) 70 14  97 %   06/22/21 0700 99.1 °F (37.3 °C) 69 14  97 %   06/22/21 0600  75 14  98 %   06/22/21 0534  75 14  98 %   06/22/21 0500  73 14  99 %   06/22/21 0400 99.2 °F (37.3 °C) 74 15  98 %   06/22/21 0300 99.2 °F (37.3 °C) 70 14  100 %   06/22/21 0245 99.1 °F (37.3 °C) 69 14  99 %   06/22/21 0200  69 15  99 %   06/22/21 0130  69 15  99 %   06/22/21 0100  67 18  99 %   06/22/21 0045  67 15  99 %   06/22/21 0030  66 15  98 %   06/22/21 0024  68 14  97 %   06/22/21 0015  65 16  97 %   06/22/21 0000 98.3 °F (36.8 °C) 63 17  97 %   06/21/21 2345  62 15  97 %   06/21/21 2300  65 14 (!) 84/53 98 %   06/21/21 2230  67 14 (!) 95/49 100 %   06/21/21 2200 97.3 °F (36.3 °C) 68 14 104/65 100 %   06/21/21 2145  68 14 113/65 100 %   06/21/21 2100 97 °F (36.1 °C) 66 14  98 %   06/21/21 2050  66 14  100 %   06/21/21 2035  68 14  100 %   06/21/21 2000 96.8 °F (36 °C) 68 14  100 %   06/21/21 1925  70 14  100 %   06/21/21 1900  72 14  100 % 06/21/21 1848 97 °F (36.1 °C) 74 12 118/67 99 %   06/21/21 1847 96.8 °F (36 °C) 75 11  100 %   06/21/21 1715  (!) 102 22  100 %   06/21/21 1705  95 20  100 %   06/21/21 1700  (!) 105 25  100 %   06/21/21 1651  (!) 132 26  100 %   06/21/21 1650  (!) 130 25  100 %   06/21/21 1645  (!) 135 25  100 %   06/21/21 1640  (!) 145 18     06/21/21 1635  (!) 116 (!) 74     06/21/21 1631  (!) 113 22     06/21/21 1630  (!) 123 23     06/21/21 1627  (!) 110      06/21/21 1623  (!) 55      06/21/21 1622  (!) 56      06/21/21 1600  60 23  100 %   06/21/21 1530  (!) 59 19  91 %   06/21/21 1515  (!) 57 19     06/21/21 1508  (!) 51 15  96 %   06/21/21 1506  (!) 54 16  95 %   06/21/21 1500  62 16     06/21/21 1400  61 14  94 %     Physical exam  General-NAD  Neuro-Non focal, generalized weakness  Cardiac-RRR, manpreet x 2 in situ  Lungs-clear  Abdomen-soft, nontender, nondistended  Extremities-warm    Labs and imaging reviewed    Assessment  A. fib  V. tach/cardiac tamponade status post emergency pericardiocentesis  Left intubated postoperatively  Reaccumulation of pericardial fluid - tamponde - PEA arrest - now s/p manpreet placment    Plan    Pain control as needed, PT/OT/OOB as tolerated, other delirium prevention strategies    Continue hemodynamic monitoring, MAP goal > 65, on as needed levo, monitor drainage output, follow cardiology recommendations    On NC, sat goal > 90%, encourage incentive spirometry    N.p.o. for now, antiemetics as needed, on H2RA therapy    Monitor urine output closely, low u/o, slight bump in Cr - will send some urine studies, serial BMPs, correct electrolyte derangements as needed    SCDs for DVT ppx    Leukocytosis worsening - likely reactional, on vanc/levaquin    Keep glucose less than 180, continue Synthroid    Critical care time-40 minutes    Signed By: Patricia Luther MD     June 22, 2021

## 2021-06-22 NOTE — PROGRESS NOTES
Chart reviewed, patient received sedated and on vent. Per ABCDEF protocol, will work with patient when PEEP is 10.0 or less, FIO2 60% or less, and patient is following basic commands (PEEP 5; FiO2 40%; not following commands). Will follow patient peripherally.      Lola Healy PT, DPT  Geriatric Clinical Specialist

## 2021-06-22 NOTE — PROGRESS NOTES
Chart reviewed, patient received sedated and on vent. Per ABCDEF protocol, will work with patient when PEEP is 10.0 or less, FIO2 60% or less, and patient is following basic commands (PEEP 5; FiO2 40%; not following commands). Will follow patient peripherally. Recommend nursing to complete with patient, as able and per protocol, in order to promote cardiopulmonary systems, maintain strength, endurance and independence:   -bed in chair position or maximize full reverse Trendelenburg position to facilitate upright activity with foot board and non-skid footwear on 3x/day ~30-60 mins each   -ROM during bathing B UEs and LEs  -positioning to prevent contractures and edema. RASS -1/0/+1 (during SAT)  Active ROM   Sitting (bed in chair position)   Standing (reverse Trendelenburg)   ADLs   RASS -3/2  Passive ROM   Sit (bed in chair position)   RASS -5/-4  Passive ROM       Thank you for your assistance.

## 2021-06-22 NOTE — PROGRESS NOTES
Day #2 of Vancomycin  Indication:  bacteremia  Current regimen:  1250 mg IV Q16h  Abx regimen:  vancomycin  ID Following ?: NO  Concomitant nephrotoxic drugs (requires more frequent monitoring): Vasopressors  Frequency of BMP?: daily    Recent Labs     21  0346 21  0030 21  1900 21  1451   WBC 17.5*  --  13.5* 12.4*   CREA 1.14* 1.16* 1.01 0.83   BUN 24* 22* 20 21*     Est CrCl: 43 ml/min  Temp (24hrs), Av.8 °F (36.6 °C), Min:96.8 °F (36 °C), Max:99.2 °F (37.3 °C)    Cultures:   none    MRSA Swab ordered (if applicable)?  N/A    Goal trough = 15 - 20 mcg/mL    Recent trough history (date/time/level/dose/action taken):  none    Plan: Continue current regimen

## 2021-06-22 NOTE — DIABETES MGMT
[facility]    CLINICAL NURSE SPECIALIST CONSULT     Initial Presentation   Ovidio Edgar is a [de-identified] y.o. female who presented for a planned AF ablation 6/21/21    HX:   Past Medical History:   Diagnosis Date    Arthritis     Asthma     dr. Mario Alberto Eller allergist    Atrial fibrillation ([facility] [address]     Breast cancer ([facility] [address] 1980    right - mastectomy    Coagulation disorder ([facility] [address]     on eliquis    Dyslipidemia     labs 2008 - chol 283, HDL 83, ,     HTN (hypertension)     Hyperlipidemia LDL goal < 130     for increased LDL particles, Dr. Cassy Anthony nodule     Dr. Gary Sam Palpitations     resolved        DX: Atrial Fibrillation, Cardiogenic shock s/t pericardial effusion with tamponade     Treatment plan     TX: Pericardiocentesis, pericardial window    Hospital course   Clinical progress has been COMPLICATED by:    2/95/33: transseptal puncture with emergent pericardiocentesis, cardioversion of ventricular tachycardia x2. Recurrent cardiac tamponade s/p pericardial window and drain placement  Diabetes    Patient does not have a history of diabetes. Family history unknown for diabetes. Admission      Consulted by JOSE Ly for advanced diabetes nursing assessment and care, specifically related to   [x] Inpatient management strategy      Subjective   EP lab when she acutely became hemodynamically compromised with significant pericardial effusion with suspected pericardial tamponade. Underwent an emergent pericardiocentesis. Complicated by VT- shocked x2.    430 pm yesterday: PEA arrest, ECHO right before arrest with large effusion with tamponade. ROSC after 2 min. Back to OR for pericardial window     S/p PRBC transfusion  Left sided pleural effusion: US guided thoracentesis ordered. Concern for aspiration PNA, starting IV antibiotics (Levaquin)  On phenylephrine (10-100mcg. min in the last 24h, now at 10)  Decadron 4mg x1 in OR at 830 6/21  On correctional humalo units since bedtime  No A1C  Pre-op   Glucose 165 after initial procedure  Glucose max 271 after pericardial window  Fasting   PARI: GFR 46  Objective   Physical exam  General   Neuro  Sedated and intubated  Vital Signs   Visit Vitals  BP (!) 84/53   Pulse 64   Temp 98.7 °F (37.1 °C)   Resp 14   Ht 5' 6\" (1.676 m)   Wt 84.6 kg (186 lb 8.2 oz)   SpO2 99%   Breastfeeding No   BMI 30.10 kg/m²     Skin  Warm and dry. No acanthosis noted along neckline. Heart   Regular rate and rhythm. No murmurs, rubs or gallops  Lungs  Clear to auscultation without rales or rhonchi  Extremities No foot wounds        Laboratory      CBC WITH AUTOMATED DIFF    Collection Time: 21  3:46 AM   Result Value Ref Range    WBC 17.5 (H) 3.6 - 11.0 K/uL    RBC 3.29 (L) 3.80 - 5.20 M/uL    HGB 10.5 (L) 11.5 - 16.0 g/dL    HCT 31.5 (L) 35.0 - 47.0 %    MCV 95.7 80.0 - 99.0 FL    MCH 31.9 26.0 - 34.0 PG    MCHC 33.3 30.0 - 36.5 g/dL    RDW 15.3 (H) 11.5 - 14.5 %    PLATELET 258 163 - 893 K/uL    NRBC 0.0 0  WBC    ABSOLUTE NRBC 0.00 0.00 - 0.01 K/uL    NEUTROPHILS 85 (H) 32 - 75 %    LYMPHOCYTES 7 (L) 12 - 49 %    MONOCYTES 7 5 - 13 %    EOSINOPHILS 0 0 - 7 %    BASOPHILS 0 0 - 1 %    IMMATURE GRANULOCYTES 1 (H) 0.0 - 0.5 %    ABS. NEUTROPHILS 14.9 (H) 1.8 - 8.0 K/UL    ABS. LYMPHOCYTES 1.2 0.8 - 3.5 K/UL    ABS. MONOCYTES 1.2 (H) 0.0 - 1.0 K/UL    ABS. EOSINOPHILS 0.0 0.0 - 0.4 K/UL    ABS. BASOPHILS 0.0 0.0 - 0.1 K/UL    ABS. IMM.  GRANS. 0.2 (H) 0.00 - 0.04 K/UL    DF SMEAR SCANNED      PLATELET COMMENTS Large Platelets      RBC COMMENTS ANISOCYTOSIS  1+             METABOLIC PANEL, COMPREHENSIVE    Collection Time: 21  3:46 AM   Result Value Ref Range    Sodium 145 136 - 145 mmol/L    Potassium 4.1 3.5 - 5.1 mmol/L    Chloride 115 (H) 97 - 108 mmol/L    CO2 23 21 - 32 mmol/L    Anion gap 7 5 - 15 mmol/L    Glucose 135 (H) 65 - 100 mg/dL    BUN 24 (H) 6 - 20 MG/DL    Creatinine 1.14 (H) 0.55 - 1.02 MG/DL    BUN/Creatinine ratio 21 (H) 12 - 20      GFR est AA 56 (L) >60 ml/min/1.73m2    GFR est non-AA 46 (L) >60 ml/min/1.73m2    Calcium 7.9 (L) 8.5 - 10.1 MG/DL    Bilirubin, total 0.7 0.2 - 1.0 MG/DL    ALT (SGPT) 62 12 - 78 U/L    AST (SGOT) 62 (H) 15 - 37 U/L    Alk. phosphatase 51 45 - 117 U/L    Protein, total 5.8 (L) 6.4 - 8.2 g/dL    Albumin 3.7 3.5 - 5.0 g/dL    Globulin 2.1 2.0 - 4.0 g/dL    A-G Ratio 1.8 1.1 - 2.2         Factors impacting BG management  Factor Dose Comments   Nutrition:  NPO     Drugs:  Steroids     Decadron 4mg x1 in OR      Pain ? Infection Concern for aspiration pna  IV antibiotics started     Other: Cardiogenic shock Shocked x2 for VT  Epi boluses  Vasopressors/inotropes      Blood glucose pattern        Assessment and Plan   Nursing Diagnosis Risk for unstable blood glucose pattern   Nursing Intervention Domain 5250 Decision-making Support   Nursing Interventions Examined current inpatient blood glucose control   Explored factors facilitating and impeding inpatient management  Explored corrective strategies with responsible inpatient provider      Evaluation   Jaqueline Morales is an [de-identified]year old female, with no history of diabetes, who admitted to the CV for post-operative care following an emergent pericardiocentesis and cardioversion of ventricular tachycardia x2. While in the unit, she experienced a recurrent cardiac tamponade and went back to the OR for a pericardial window and drain placement. Preoperative glucose was 112, A1C not available. Her glucose max was 271 following PEA arrest and decadron 4mg given in OR. Correctional humalog was started at bedtime and sufficient to maintain glucose in ICU goal of 140-180. Fasting  today. There is low suspicion of hyperglycemia this admission.     Factors impacting glucose:  Steroid use: Decadrom 4mg x1 in OR 6/21  Cardiogenic shock: vasopressor use, cardioversion, CPR, epinephrine bolus during arrest, impaired insulin delivery  PARI: GFR 46 (baseline 60)  Aspiration PNA: started on IV antibiotics. Recommendations   1. POC glucose Q6  2. Correctional humalog at normal sensitivity Q6    Do not obtain an A1C, will not be accurate as she has received a PRBC transfusion   Billing Code(s)     [x] 36473     Before making these care recommendations, I personally reviewed the hosptialization record, including laboratory and diagnostic data, medications and examined the patient at bedside (circumstances permitting).   Total minutes: 25    JAYDE Kelly  Diabetes Clinical Nurse Specialist  [facility]  Access via [employer]

## 2021-06-22 NOTE — PROGRESS NOTES
Cardiac Electrophysiology Hospital Progress Note     Subjective:      Jamin Calderón is a [de-identified] y.o. patient who was admitted after developing cardiac tamponade during AF ablation attempt. Pericardial effusion occurred after transseptal puncture (thick septum), required emergent pericardiocentesis in EP lab. Extubated post procedure. There was no blood in the bag until 4 pm later in the afternoon, she had further cardiac tamponade, required pericardial window, was reintubated. Communication with Dr Hector Banks this am   Initial drain appeared to have been blocked by clot, which was then successfully flushed out during pericardial window procedure. Also shocked x 2 for VT during decompensation at 5-6 pm in CVICU, was given epi boluses for ? PEA arrest.  RN said BP was low but heart did not stop  She got 1 unit PRBC before going to OR because it was thought that she bled more  Now on phenylephrine 60 mcg/min IV. Hgb 10.5 today, Cr mildly elevated. Poor urine output overnight, given albumin. She also experienced pericardial effusion during prior attempted AF ablation on 05/10/2019. Holding Eliquis. Previous:  S/p TIM/DCCV 06/2020. S/p AF ablation 05/10/2019. PVI unable to be completed on left side due to pericardial effusion without tamponade. Heparin stopped & reversed. Persistent AF, failed DCCV & flecainide. Cardioversion with Dr. Katarzyna Mckeon on 2/22/19, had follow up on 2/27/19, back in atrial fibrillation. Mild dilated cardiomyopathy, improved on GDMT. Couldn't tolerate meds due to low BP. Negative stress test 5 yrs ago per her report.           Problem List  Date Reviewed: 6/21/2021          Codes Class Noted    persistent atrial fibrillation  ICD-10-CM: I48.91  ICD-9-CM: 427.31  6/21/2021        S/P ablation of atrial fibrillation ICD-10-CM: Z98.890, Z86.79  ICD-9-CM: V45.89  5/10/2019        Cardiac/pericardial tamponade ICD-10-CM: I31.4  ICD-9-CM: 423.3  6/21/2021 S/P pericardial window creation ICD-10-CM: Z98.890  ICD-9-CM: V45.89  6/21/2021        * (Principal) Pericardial effusion with cardiac tamponade ICD-10-CM: I31.3, I31.4  ICD-9-CM: 423.9, 423.3  5/10/2019    Overview Signed 5/10/2019  1:34 PM by Brianne Shields MD     Loculated posterior LA and LV, no tamponade             Non-rheumatic mitral regurgitation ICD-10-CM: I34.0  ICD-9-CM: 424.0  5/10/2019    Overview Signed 5/10/2019  1:34 PM by Brianne Shields MD     moderate             Non-rheumatic tricuspid valve insufficiency ICD-10-CM: I36.1  ICD-9-CM: 424.2  5/10/2019        Encounter for cardioversion procedure ICD-10-CM: Z01.89  ICD-9-CM: V72.85  3/28/2018    Overview Signed 3/28/2018  1:06 PM by Brianne Shields MD     3/28/2018 200 J to NSR after a TIM without thrombus             Dizziness ICD-10-CM: R42  ICD-9-CM: 780.4  3/27/2018        Persistent atrial fibrillation ([facility] ICD-10-CM: I48.19  ICD-9-CM: 427.31  3/27/2018        Cystocele ICD-10-CM: FZF7888  ICD-9-CM: GXF5112  8/1/2014        Uterine prolapse ICD-10-CM: N81.4  ICD-9-CM: 618.1  8/1/2014        HTN (hypertension) ICD-10-CM: I10  ICD-9-CM: 401.9  Unknown        Breast cancer ([facility] ICD-10-CM: C50.919  ICD-9-CM: 174.9  Unknown        Lung nodule ICD-10-CM: R91.1  ICD-9-CM: 793.11  Unknown    Overview Signed 5/14/2014 10:34 AM by MD Dr. Elizabeth Cano: J45.909  ICD-9-CM: 493.90  Unknown        Hyperlipidemia LDL goal < 130 ICD-10-CM: E78.5  ICD-9-CM: 272.4  Unknown    Overview Signed 4/26/2013  9:17 AM by Edel Hernandez MD     for increased LDL particles, Dr. Maldonado Notice             Dyslipidemia ICD-10-CM: E78.5  ICD-9-CM: 272.4  Unknown    Overview Signed 12/31/2011  4:11 PM by Zaynab Almonte MD     labs 2008 - chol 283, HDL 83, ,              Palpitations ICD-10-CM: R00.2  ICD-9-CM: 785.1  Unknown        Chest pain, unspecified ICD-10-CM: R07.9  ICD-9-CM: 786.50  12/31/2011 Current Facility-Administered Medications   Medication Dose Route Frequency Provider Last Rate Last Admin    albumin human 5% (BUMINATE) solution 25 g  25 g IntraVENous ONCE Haylie Alexandra, PA        sodium chloride (NS) flush 5-40 mL  5-40 mL IntraVENous PRN Lacretia Neas B, NP        sodium chloride (NS) flush 5-40 mL  5-40 mL IntraVENous Q8H Lacretia Neas B, NP   10 mL at 06/21/21 1313    sodium chloride (NS) flush 5-40 mL  5-40 mL IntraVENous Q8H Lacretia Neas B, NP        sodium chloride (NS) flush 5-40 mL  5-40 mL IntraVENous PRN Lacretia Neas B, NP        acetaminophen (TYLENOL) tablet 650 mg  650 mg Oral Q4H PRN Lacretia Neas B, NP   650 mg at 06/21/21 1434    HYDROcodone-acetaminophen (NORCO) 5-325 mg per tablet 1 Tablet  1 Tablet Oral Q4H PRN Edouard List, NP        ondansetron (ZOFRAN) injection 4 mg  4 mg IntraVENous Q4H PRN Lacretia Neas B, NP        sodium chloride (NS) flush 5-40 mL  5-40 mL IntraVENous Q8H Lacretia Neas B, NP        sodium chloride (NS) flush 5-40 mL  5-40 mL IntraVENous PRN Lacretia Neas B, NP        levothyroxine (SYNTHROID) tablet 25 mcg  25 mcg Oral ACB Lacretia Neas B, NP   25 mcg at 06/22/21 0609    montelukast (SINGULAIR) tablet 10 mg  10 mg Oral QPM Lacretia Neas B, NP        ondansetron (ZOFRAN) injection 4 mg  4 mg IntraVENous Q8H PRN Lacretia Neas B, NP        PHENYLephrine (DOE-SYNEPHRINE) 30 mg in 0.9% sodium chloride 250 mL infusion   mcg/min IntraVENous TITRATE Sinai Dinh MD 30 mL/hr at 06/22/21 0615 60 mcg/min at 06/22/21 0615    propofol (DIPRIVAN) 10 mg/mL infusion  0-50 mcg/kg/min IntraVENous TITRATE Sinai Dinh MD 15 mL/hr at 06/22/21 0524 30 mcg/kg/min at 06/22/21 0524    HYDROmorphone (PF) (DILAUDID) injection 1 mg  1 mg IntraVENous Q4H PRN Sinai Dinh MD   1 mg at 06/22/21 7215    acetaminophen (TYLENOL) tablet 650 mg  650 mg Oral Q4H PRN Sinai Dinh MD        ELECTROLYTE REPLACEMENT PROTOCOL - Potassium and Magnesium  1 Each Other PRN Trevor Roldan MD        albuterol (PROVENTIL VENTOLIN) nebulizer solution 2.5 mg  2.5 mg Nebulization Q4H PRN Booker CHEUNG NP        atorvastatin (LIPITOR) tablet 20 mg  20 mg Oral QPM Haylie Alexandra PA        cetirizine (ZYRTEC) tablet 10 mg  10 mg Oral QPM Haylie Alexandra PA        cholecalciferol (VITAMIN D3) (1000 Units /25 mcg) tablet 5,000 Units  5,000 Units Oral DAILY Haylie Alexandra PA        cyclobenzaprine (FLEXERIL) tablet 5 mg  5 mg Oral BID PRN Haylie Alexandra PA        diphenhydrAMINE (BENADRYL) capsule 25 mg  25 mg Oral Q6H PRN Haylie Alexandra PA        budesonide (PULMICORT) 500 mcg/2 ml nebulizer suspension  1,000 mcg Nebulization BID Haylie Alexandra PA        potassium chloride SR (KLOR-CON 10) tablet 20 mEq  20 mEq Oral DAILY Booker CHEUNG NP        raloxifene (EVISTA) tablet 60 mg  60 mg Oral DAILY Haylie Alexandra PA        amiodarone (CORDARONE) tablet 200 mg  200 mg Oral DAILY Haylie Alexandra PA        0.9% sodium chloride infusion 250 mL  250 mL IntraVENous PRN Marco A GRIJALVA MD        0.9% sodium chloride infusion 250 mL  250 mL IntraVENous PRN Marco A Her MD        Vancomycin- pharmacy to dose   Other Rx Dosing/Monitoring Haylie Alexandra PA        vancomycin (VANCOCIN) 1250 mg in  ml infusion  1,250 mg IntraVENous Q16H Haylie Alexadnra PA        insulin regular (NOVOLIN R, HUMULIN R) 100 Units in 0.9% sodium chloride 100 mL infusion  0-50 Units/hr IntraVENous TITRATE Haylie Alexandra PA        sodium chloride (NS) flush 5-40 mL  5-40 mL IntraVENous Q8H Haylie Alexandra PA   10 mL at 06/22/21 0705    sodium chloride (NS) flush 5-40 mL  5-40 mL IntraVENous PRN Haylie Alexandra PA        0.45% sodium chloride infusion  10 mL/hr IntraVENous CONTINUOUS Haylie Alexandra PA 10 mL/hr at 06/21/21 2003 10 mL/hr at 06/21/21 2003    0.9% sodium chloride infusion  9 mL/hr IntraVENous CONTINUOUS JOSE Stephens 3 mL/hr at 06/22/21 0634 3 mL/hr at 06/22/21 5376    acetaminophen (TYLENOL) tablet 650 mg  650 mg Oral Q4H Haylie Alexandra PA   650 mg at 06/22/21 8618    oxyCODONE IR (ROXICODONE) tablet 5 mg  5 mg Oral Q4H PRN Haylie Alexandra PA        oxyCODONE IR (ROXICODONE) tablet 10 mg  10 mg Oral Q4H PRN Haylie Alexandra PA        morphine injection 4 mg  4 mg IntraVENous Q2H PRN Haylie Alexandra PA        naloxone (NARCAN) injection 0.4 mg  0.4 mg IntraVENous PRN Haylie Alexandra PA        mupirocin (BACTROBAN) 2 % ointment   Both Nostrils BID Haylie Alexandra [place]   Given at 06/21/21 1914    ondansetron (ZOFRAN) injection 4 mg  4 mg IntraVENous Q4H PRN Haylie Alexandra PA        albuterol (PROVENTIL VENTOLIN) nebulizer solution 2.5 mg  2.5 mg Nebulization Q4H PRN Haylie Alexandra PA        midazolam (VERSED) injection 1 mg  1 mg IntraVENous Q1H PRN Haylie Alexandra PA   1 mg at 06/22/21 0409    chlorhexidine (PERIDEX) 0.12 % mouthwash 10 mL  10 mL Oral Q12H Haylie Alexandra PA   10 mL at 06/21/21 2052    famotidine (PF) (PEPCID) 20 mg in 0.9% sodium chloride 10 mL injection  20 mg IntraVENous Q12H Haylie Alexandra PA   20 mg at 06/21/21 2052    famotidine (PEPCID) tablet 20 mg  20 mg Oral Q12H Haylie Alexandra PA   20 mg at 06/22/21 0729    magnesium oxide (MAG-OX) tablet 400 mg  400 mg Oral BID Haylie Alexandra PA        calcium chloride 1 g in 0.9% sodium chloride 100 mL IVPB  1 g IntraVENous PRN Haylie Alexandra PA        bisacodyL (DULCOLAX) suppository 10 mg  10 mg Rectal DAILY PRN Haylie Alexandra, PA        senna-docusate (PERICOLACE) 8.6-50 mg per tablet 1 Tablet  1 Tablet Oral BID Haylie Alexandra, PA        polyethylene glycol (MIRALAX) packet 17 g  17 g Oral DAILY Haylie Alexandra, PA        ELECTROLYTE REPLACEMENT NOTE: Nurse to review Serum Potassium and Magnesuim levels and Initiate Electrolyte Replacement Protocol as needed  1 Each Other PRN Haylie Alexandra PA        magnesium sulfate 1 g/100 ml IVPB (premix or compounded)  1 g IntraVENous PRN Haylie Alexandra PA        glucose chewable tablet 16 g  4 Tablet Oral PRN Haylie Alexandra PA        dextrose (D50W) injection syrg 12.5-25 g  12.5-25 g IntraVENous PRN Haylie Alexandra PA        glucagon (GLUCAGEN) injection 1 mg  1 mg IntraMUSCular PRN Haylie Alexandra PA        insulin lispro (HUMALOG) injection   SubCUTAneous TIDAC Haylie Alexandra PA        insulin lispro (HUMALOG) injection   SubCUTAneous AC&HS Haylie Alexandra PA   3 Units at 06/21/21 2257    insulin glargine (LANTUS) injection 1-50 Units  1-50 Units SubCUTAneous ONCE PRN Haylie Alexandra PA        potassium chloride 20 mEq in 50 ml IVPB  20 mEq IntraVENous Q2H PRN Haylie Alexandra PA        potassium chloride 20 mEq in 50 ml IVPB  20 mEq IntraVENous Q2H PRN Haylie Alexandra PA        dexmedeTOMidine in 0.9 % NaCl (PRECEDEX) 400 mcg/100 mL (4 mcg/mL) infusion soln  0.1-1.5 mcg/kg/hr IntraVENous TITRATE Haylie Alexnadra PA         Allergies   Allergen Reactions    Betadine [Povidone-Iodine] Rash    Iodine Rash    Norvasc [Amlodipine] Other (comments)     Flushing/redness.      Past Medical History:   Diagnosis Date    Arthritis     Asthma     dr. Crystal Madden allergist    Atrial fibrillation ([facility]     Breast cancer [facility]) 1980    right - mastectomy    Coagulation disorder ([facility]     on eliquis    Dyslipidemia     labs 2008 - chol 283, HDL 83, ,     HTN (hypertension)     Hyperlipidemia LDL goal < 130     for increased LDL particles, Dr. Madelin Hewitt nodule     Dr. Uzma Hernandez    Palpitations     resolved     Past Surgical History:   Procedure Laterality Date    COLONOSCOPY N/A 9/10/2018    COLONOSCOPY performed by Laisha Perry MD at [facility] W/O CONT WITH CALCIUM  1/2012    CAC score 0; calcified mediastinal lymph nodes consistent granulomatous disease    ECHO 2D ADULT  5/5/2008    normal, LVEF 60%    HX APPENDECTOMY      HX HYSTERECTOMY Bilateral 03/19/2015    and uro repair    HX KNEE REPLACEMENT Right     HX MASTECTOMY Right     HX TONSILLECTOMY      HX UROLOGICAL  8/1/14    Urodynamics    INTRACARD ECHO, THER/DX INTERVENT N/A 5/10/2019    Intracardiac Echocardiogram performed by Jonelle Guidry MD at [address] EXTERNAL  3/28/2018         OH CHEST SURGERY PROCEDURE UNLISTED      lung nodule removed - benign    OH EPHYS EVL TRNSPTL TX ATRIAL FIB ISOLAT PULM VEIN N/A 5/10/2019    ABLATION A-FIB  W COMPLETE EP STUDY performed by Jonelle Guidry MD at [address] CATH LAB    [place] INTRACARDIAC ELECTROPHYSIOLOGIC 3D [address] N/A 5/10/2019    Ep 3d Mapping performed by Jonelle Guidry MD at [address] CATH LAB    STRESS TEST CARDIOLITE  1/5/12    walked 7:31, no chest pain, normal MPI. Family History   Problem Relation Age of Onset    Heart Failure Mother     Stroke Father     Other Other         endometrial cancer, niece     Social History     Tobacco Use    Smoking status: Never Smoker    Smokeless tobacco: Never Used   Substance Use Topics    Alcohol use: Yes     Comment: wine nightly        Review of Systems: Limited due to intubated, sedated status. Objective:     Visit Vitals  BP (!) 84/53   Pulse 69   Temp 99.2 °F (37.3 °C)   Resp 14   Ht 5' 6\" (1.676 m)   Wt 186 lb 8.2 oz (84.6 kg)   SpO2 97%   Breastfeeding No   BMI 30.10 kg/m²      Physical Exam:   Constitutional: Well-developed and well-nourished. Head: Normocephalic and atraumatic. Eyes: Pupils are equal, round. ENT: ET tube in place. Neck: Supple. No JVD present. Cardiovascular: Normal rate, regular rhythm. Exam reveals no gallop and no friction rub. No murmur heard. Pulmonary/Chest: Ventilator sounds. Abdominal: Soft, no tenderness. Musculoskeletal: Grossly symmetrical.  Vasc/lymphatic: No edema.   Neurological: Sedated. Skin: Skin is warm and dry      EKG: (04/26/2021): AF 79 bpm with QTc 445 ms    Assessment/Plan:     Imaging/Studies:  TIM (06/19/2020): LVEF 45-50%. Mildly dilated LA. Mild to mod MR. Mild to mod TR. Limited echo (10/24/2019): LVEF 56-60%, mild concentric LVH, mildly dilated LV, no RWMA, grade 2 diastolic dysfunction. Mildly dilated RV. Mildly dilated RA. Mod dilated LA. Mild to mod MR. Mild AR & mild aortic valve sclerosis without significant stenosis. Mild to mod PH. Echo (05/11/2019): LVEF 51-55%, no RWMA. Mod dilated LA, mod dilated RA. Small to mod anterior pericardial effusion adjacent to LV & RA.     TIM (05/10/2019): LVEF 56-60%, no RWMA. RV mod dilated. LA mod dilated. RA mod dilated. Mod MR. Mod TR. Mod posterior loculated pericardial effusion adjacent to LV & LA measuring 15 mm. Pericardial effusion: With tamponade x 2, occurred after transseptal puncture during incomplete AF ablation and in CVICU hours later. Pericardial drain clotted, required pericardial window yesterday; Dr. Yeni Díaz successfully flushed out clots. No recurrent bleeding noted  Continue to monitor output, will check limited echo. Hgb 10.5 today, down from 12.5 on 06/21/2021.    ? PEA/VT arrest: Occurred during 2nd tamponade on 06/21/2021. CPR, epi bolus. ROSC after 2 minutes. VT: S/p shock x 2, no further significant recurrence since. Will resume amiodarone when she is extubated & awake. Currently NSR    Anticoagulation: Continue to hold Eliquis. Persistent AF: S/p EP study & partial PVI of left superior vein, full PVI of right veins during ablation on 05/10/2019, developed small pericardial effusion during procedure; no tamponade so procedure had to be stopped and heparin was reversed. Again with pericardial effusion as noted above. Further PVI ablation will not be attempted.   She is recommended BIV pacer and av node ablation later (LVEF not normal with AF)       Florentino Olsen Sebastian Lynn, [facility]  06/22/21     Addendum from EP attending:   I have seen, examined patient, and discussed with nurse practitioner, registered nurse, reviewed, updated note and agree with the assessment and plan    I have talked to her daughter in law and Dr Jer Stark and her RN in CVICU this am. She is still intubated    Vital signs are stable. Bp was lower after dilaudid    Exam shows regular rhythm and no rub  Chest wall with clean dressing in subxiphoid area  Assessment and Plan:  Continue to manpreet drain and plan to extubate today  I have communicated with her family and discussed with RN and Dr Jer Stark  Thank you for involving me in this patient's care and please call with further concerns or questions. Echo Spears M.D.   Electrophysiology/Cardiology  [facility] [place]  [address] [facility] [address]  [address]  [phone]

## 2021-06-22 NOTE — PROGRESS NOTES
0800: Bedside and Verbal shift change report given to [address] (oncoming nurse) by Ida Tubbs RN (offgoing nurse). Report included the following information SBAR, Kardex, OR Summary, Procedure Summary, Intake/Output, MAR, Recent Results, Cardiac Rhythm SInus rhythm with 1st degree AV block and Alarm Parameters . [id number] Velázquez MD and team at bedside. Updated on patient's status. Per MD, plans to do thoracentesis today. Following thoracentesis, plans to extubate if patient is stable. Orders received to give 2 more albumins at this time. 7313Bianca Ellis MD at bedside. Updated on patient's status. Informed that HR and BP have continually been dropping and isidro has been increased. Orders received to switch patient to levophed at this time. 1015: Ultrasound at bedside for thoracentesis. Family at bedside. Consents signed. 1040: Echo at bedside. 1058: Xray at bedside. 1115: Respiratory at bedside. Vent mode changed to SIMV.     1201: Propofol stopped for possible extubation. 1210: Patient woke up, following commands, nodded \"yes\" and \"no\" appropriately. 1211: Respiratory at bedside. Patient's rate set to 7 from 14.     1229: Respiratory at bedside. Patient placed on CPAP mode on vent. 1305: Respiratory at bedside. ABG drawn. 1311: Dalton Lorenzo MD at bedside. Informed of patient's low UO for the past hour. Orders received for BMP. 1330: Patient extubated to 3L NC. Tolerated well, no stridor noted. 1440: Dalton Lorenzo MD notified of patient's BMP results. Orders received for 1 albumin. 1730: Orders received from Dalton Lorenzo MD to give patient a regular diet. 2000: Bedside and Verbal shift change report given to Nikki RN (oncoming nurse) by Deepti Abreu RN (offgoing nurse). Report included the following information SBAR, Kardex, OR Summary, Procedure Summary, Intake/Output, MAR, Recent Results, Cardiac Rhythm NSR w/1st degree AV block and Alarm Parameters .

## 2021-06-22 NOTE — PROCEDURES
[address]   TIM    Name:  Lou Wolff  MR#:  [id number]  :  1940  ACCOUNT #:  [de-identified]  DATE OF SERVICE:  2021      TRANSESOPHAGEAL ECHOCARDIOGRAPHIC REPORT    The transesophageal echocardiographic exam was requested by the surgeon, Dr. Jeancarlos Hill in order to evaluate real-time cardiac and valvular form and function in a patient with a history of cardiac tamponade with a pericardial drain status post atrial fibrillation ablation. The transesophageal echocardiographic probe was easily and atraumatically inserted into the patient's esophagus while the patient was sedated under general anesthesia inside the operating room . Modalities incorporated included 2-D, color-flow mode and pulsed wave Doppler. AORTA:  Ascending aorta: The patient's ascending aorta measured 2 cm in diameter. There was no evidence of dissection. There was minimal and non-mobile plaque. Aortic arch: The aortic arch measured 2 cm in diameter. There was no evidence of dissection. There was minimal and nonmobile plaque. Descending aorta: The descending aorta measured 2 cm in diameter. There was no evidence of dissection. There was mild and non-mobile plaque. VALVES:  Aortic valve: The aortic valve annulus measured 2 cm. There was no aortic valve stenosis. There was no significant aortic insufficiency. The aortic leaflet morphology and motion were both normal.    Mitral valve:  Mitral valve annulus measured 3.6 cm. There was no mitral valve stenosis. There was trace centrally located and directed mitral insufficiency. The mitral valve leaflet morphology and motion were both normal.    Tricuspid valve: The tricuspid valve annulus measured 3.6 cm. There was mild tricuspid regurgitation. There was no tricuspid stenosis. The tricuspid leaflet morphology and motion were both normal.    Pulmonic valve: The pulmonic valve annulus measured 2.4 cm in diameter.   There was no significant pulmonic insufficiency. There was no obvious pulmonic stenosis. The leaflet morphology and motion were both grossly normal.    ATRIA:  Right atrium:  The right atrial size was upper normal.  There was no spontaneous echo contrast.  There was no right atrial thrombus or tumor. No device was visualized. Left atrium:  The left atrial size was 3.6 cm. There was no spontaneous echo contrast.  There was no left atrial thrombus or tumor. Left atrial appendage: There was no thrombus visualized within the left atrial appendage. Pulsed wave Doppler within the appendage was greater than 45 cm/sec. Interatrial septum:  The interatrial septum morphology was normal.  There was no patent foramen ovale with color-flow mode. VENTRICLES:  Right ventricle: The right ventricular cavity size was normal.  There was upper normal right ventricular thickness. There was no right ventricular thrombus and the right ventricular ejection fraction was normal.    Left ventricle. The left ventricular major axis was 8 cm. There was mild left ventricular hypertrophy. There was no left ventricular thrombus and the left ventricular ejection fraction was 50%. REGIONAL FUNCTION:  There were no isolated regional wall motion abnormalities. PERICARDIUM:  There was a small mid to apical, lateral, anterior and posterior effusion without evidence of tamponade. PLEURAE:  There was a moderate left-sided pleural effusion. POST INTERVENTION FOLLOW-UP STUDY:  After a pericardial window was performed, there was minimal mid to apical, anterior, lateral and posterior pericardial fluid without evidence of tamponade. There was a residual moderate left-sided pleural effusion. Left ventricular ejection fraction was preserved. The right ventricular function was intact. There were no valvular changes. These results were discussed and viewed with the cardiac surgeon.   The transesophageal echocardiographic probe was easily and atraumatically removed from the patient's esophagus. The patient tolerated the procedure without event.         DO JENNIFER [email or web address]  D:  06/21/2021 18:34  T:  06/21/2021 22:08  JOB #:  [id number]

## 2021-06-23 ENCOUNTER — APPOINTMENT (OUTPATIENT)
Dept: GENERAL RADIOLOGY | Age: 81
DRG: 270 | End: 2021-06-23
Attending: PHYSICIAN ASSISTANT
Payer: MEDICARE

## 2021-06-23 LAB
ALBUMIN SERPL-MCNC: 3.3 G/DL (ref 3.5–5)
ALBUMIN/GLOB SERPL: 1.6 {RATIO} (ref 1.1–2.2)
ALP SERPL-CCNC: 45 U/L (ref 45–117)
ALT SERPL-CCNC: 29 U/L (ref 12–78)
ANION GAP SERPL CALC-SCNC: 6 MMOL/L (ref 5–15)
AST SERPL-CCNC: 35 U/L (ref 15–37)
BILIRUB SERPL-MCNC: 0.7 MG/DL (ref 0.2–1)
BUN SERPL-MCNC: 15 MG/DL (ref 6–20)
BUN/CREAT SERPL: 20 (ref 12–20)
CALCIUM SERPL-MCNC: 8.3 MG/DL (ref 8.5–10.1)
CHLORIDE SERPL-SCNC: 113 MMOL/L (ref 97–108)
CO2 SERPL-SCNC: 24 MMOL/L (ref 21–32)
COMMENT, HOLDF: NORMAL
CREAT SERPL-MCNC: 0.74 MG/DL (ref 0.55–1.02)
DATE LAST DOSE: ABNORMAL
ERYTHROCYTE [DISTWIDTH] IN BLOOD BY AUTOMATED COUNT: 15.8 % (ref 11.5–14.5)
GLOBULIN SER CALC-MCNC: 2.1 G/DL (ref 2–4)
GLUCOSE BLD STRIP.AUTO-MCNC: 123 MG/DL (ref 65–117)
GLUCOSE BLD STRIP.AUTO-MCNC: 136 MG/DL (ref 65–117)
GLUCOSE BLD STRIP.AUTO-MCNC: 175 MG/DL (ref 65–117)
GLUCOSE SERPL-MCNC: 145 MG/DL (ref 65–100)
HCT VFR BLD AUTO: 27.3 % (ref 35–47)
HCT VFR BLD AUTO: 28.5 % (ref 35–47)
HCT VFR BLD AUTO: 28.6 % (ref 35–47)
HGB BLD-MCNC: 8.7 G/DL (ref 11.5–16)
HGB BLD-MCNC: 9.2 G/DL (ref 11.5–16)
HGB BLD-MCNC: 9.4 G/DL (ref 11.5–16)
MAGNESIUM SERPL-MCNC: 2.2 MG/DL (ref 1.6–2.4)
MCH RBC QN AUTO: 31.7 PG (ref 26–34)
MCHC RBC AUTO-ENTMCNC: 32.3 G/DL (ref 30–36.5)
MCV RBC AUTO: 98.3 FL (ref 80–99)
NRBC # BLD: 0 K/UL (ref 0–0.01)
NRBC BLD-RTO: 0 PER 100 WBC
PLATELET # BLD AUTO: 92 K/UL (ref 150–400)
PMV BLD AUTO: 12.6 FL (ref 8.9–12.9)
POTASSIUM SERPL-SCNC: 3.8 MMOL/L (ref 3.5–5.1)
PROT SERPL-MCNC: 5.4 G/DL (ref 6.4–8.2)
RBC # BLD AUTO: 2.9 M/UL (ref 3.8–5.2)
REPORTED DOSE,DOSE: ABNORMAL UNITS
REPORTED DOSE/TIME,TMG: ABNORMAL
SAMPLES BEING HELD,HOLD: NORMAL
SERVICE CMNT-IMP: ABNORMAL
SODIUM SERPL-SCNC: 143 MMOL/L (ref 136–145)
VANCOMYCIN TROUGH SERPL-MCNC: 11.2 UG/ML (ref 5–10)
WBC # BLD AUTO: 13.3 K/UL (ref 3.6–11)

## 2021-06-23 PROCEDURE — 74011000250 HC RX REV CODE- 250: Performed by: PHYSICIAN ASSISTANT

## 2021-06-23 PROCEDURE — 85027 COMPLETE CBC AUTOMATED: CPT

## 2021-06-23 PROCEDURE — 74011250636 HC RX REV CODE- 250/636: Performed by: THORACIC SURGERY (CARDIOTHORACIC VASCULAR SURGERY)

## 2021-06-23 PROCEDURE — 74011250637 HC RX REV CODE- 250/637: Performed by: PHYSICIAN ASSISTANT

## 2021-06-23 PROCEDURE — 65610000003 HC RM ICU SURGICAL

## 2021-06-23 PROCEDURE — 77010033678 HC OXYGEN DAILY

## 2021-06-23 PROCEDURE — 97535 SELF CARE MNGMENT TRAINING: CPT

## 2021-06-23 PROCEDURE — 86340 INTRINSIC FACTOR ANTIBODY: CPT

## 2021-06-23 PROCEDURE — 74011636637 HC RX REV CODE- 636/637: Performed by: PHYSICIAN ASSISTANT

## 2021-06-23 PROCEDURE — 97530 THERAPEUTIC ACTIVITIES: CPT

## 2021-06-23 PROCEDURE — 99231 SBSQ HOSP IP/OBS SF/LOW 25: CPT | Performed by: CLINICAL NURSE SPECIALIST

## 2021-06-23 PROCEDURE — P9045 ALBUMIN (HUMAN), 5%, 250 ML: HCPCS | Performed by: PHYSICIAN ASSISTANT

## 2021-06-23 PROCEDURE — 94640 AIRWAY INHALATION TREATMENT: CPT

## 2021-06-23 PROCEDURE — 74011250637 HC RX REV CODE- 250/637: Performed by: INTERNAL MEDICINE

## 2021-06-23 PROCEDURE — 82542 COL CHROMOTOGRAPHY QUAL/QUAN: CPT

## 2021-06-23 PROCEDURE — 86022 PLATELET ANTIBODIES: CPT

## 2021-06-23 PROCEDURE — 36415 COLL VENOUS BLD VENIPUNCTURE: CPT

## 2021-06-23 PROCEDURE — 74011250637 HC RX REV CODE- 250/637: Performed by: EMERGENCY MEDICINE

## 2021-06-23 PROCEDURE — 85014 HEMATOCRIT: CPT

## 2021-06-23 PROCEDURE — 97165 OT EVAL LOW COMPLEX 30 MIN: CPT

## 2021-06-23 PROCEDURE — 74011250636 HC RX REV CODE- 250/636: Performed by: PHYSICIAN ASSISTANT

## 2021-06-23 PROCEDURE — 97161 PT EVAL LOW COMPLEX 20 MIN: CPT

## 2021-06-23 PROCEDURE — 71045 X-RAY EXAM CHEST 1 VIEW: CPT

## 2021-06-23 PROCEDURE — 82962 GLUCOSE BLOOD TEST: CPT

## 2021-06-23 PROCEDURE — 80202 ASSAY OF VANCOMYCIN: CPT

## 2021-06-23 PROCEDURE — 80053 COMPREHEN METABOLIC PANEL: CPT

## 2021-06-23 PROCEDURE — 99233 SBSQ HOSP IP/OBS HIGH 50: CPT | Performed by: INTERNAL MEDICINE

## 2021-06-23 PROCEDURE — 83735 ASSAY OF MAGNESIUM: CPT

## 2021-06-23 PROCEDURE — 74011250637 HC RX REV CODE- 250/637: Performed by: NURSE PRACTITIONER

## 2021-06-23 RX ORDER — ACETAMINOPHEN 325 MG/1
650 TABLET ORAL EVERY 4 HOURS
Status: DISCONTINUED | OUTPATIENT
Start: 2021-06-23 | End: 2021-06-28 | Stop reason: HOSPADM

## 2021-06-23 RX ORDER — POTASSIUM CHLORIDE 29.8 MG/ML
20 INJECTION INTRAVENOUS ONCE
Status: COMPLETED | OUTPATIENT
Start: 2021-06-23 | End: 2021-06-23

## 2021-06-23 RX ORDER — FUROSEMIDE 40 MG/1
40 TABLET ORAL ONCE
Status: COMPLETED | OUTPATIENT
Start: 2021-06-23 | End: 2021-06-23

## 2021-06-23 RX ORDER — ALBUMIN HUMAN 50 G/1000ML
25 SOLUTION INTRAVENOUS ONCE
Status: COMPLETED | OUTPATIENT
Start: 2021-06-23 | End: 2021-06-23

## 2021-06-23 RX ORDER — POTASSIUM CHLORIDE 750 MG/1
20 TABLET, FILM COATED, EXTENDED RELEASE ORAL DAILY
Status: DISCONTINUED | OUTPATIENT
Start: 2021-06-23 | End: 2021-06-28 | Stop reason: HOSPADM

## 2021-06-23 RX ADMIN — BUDESONIDE 1000 MCG: 0.5 INHALANT RESPIRATORY (INHALATION) at 08:13

## 2021-06-23 RX ADMIN — ATORVASTATIN CALCIUM 20 MG: 20 TABLET, FILM COATED ORAL at 18:54

## 2021-06-23 RX ADMIN — POTASSIUM CHLORIDE 20 MEQ: 750 TABLET, FILM COATED, EXTENDED RELEASE ORAL at 08:57

## 2021-06-23 RX ADMIN — CETIRIZINE HYDROCHLORIDE 10 MG: 10 TABLET, FILM COATED ORAL at 18:54

## 2021-06-23 RX ADMIN — MAGNESIUM OXIDE TAB 400 MG (241.3 MG ELEMENTAL MG) 400 MG: 400 (241.3 MG) TAB at 08:57

## 2021-06-23 RX ADMIN — CHLORHEXIDINE GLUCONATE 10 ML: 1.2 RINSE ORAL at 09:01

## 2021-06-23 RX ADMIN — OXYCODONE HYDROCHLORIDE 10 MG: 5 TABLET ORAL at 21:06

## 2021-06-23 RX ADMIN — Medication 10 ML: at 07:07

## 2021-06-23 RX ADMIN — Medication 10 ML: at 21:21

## 2021-06-23 RX ADMIN — POLYETHYLENE GLYCOL 3350 17 G: 17 POWDER, FOR SOLUTION ORAL at 08:57

## 2021-06-23 RX ADMIN — LEVOTHYROXINE SODIUM 25 MCG: 0.03 TABLET ORAL at 06:59

## 2021-06-23 RX ADMIN — VANCOMYCIN HYDROCHLORIDE 1000 MG: 1 INJECTION, POWDER, LYOPHILIZED, FOR SOLUTION INTRAVENOUS at 21:06

## 2021-06-23 RX ADMIN — DOCUSATE SODIUM 50 MG AND SENNOSIDES 8.6 MG 1 TABLET: 8.6; 5 TABLET, FILM COATED ORAL at 08:57

## 2021-06-23 RX ADMIN — OXYCODONE HYDROCHLORIDE 10 MG: 5 TABLET ORAL at 15:49

## 2021-06-23 RX ADMIN — CHLORHEXIDINE GLUCONATE 10 ML: 1.2 RINSE ORAL at 21:00

## 2021-06-23 RX ADMIN — ACETAMINOPHEN 650 MG: 325 TABLET ORAL at 10:59

## 2021-06-23 RX ADMIN — FAMOTIDINE 20 MG: 20 TABLET ORAL at 08:57

## 2021-06-23 RX ADMIN — RALOXIFENE HYDROCHLORIDE 60 MG: 60 TABLET, FILM COATED ORAL at 08:57

## 2021-06-23 RX ADMIN — FUROSEMIDE 40 MG: 40 TABLET ORAL at 10:59

## 2021-06-23 RX ADMIN — SODIUM CHLORIDE 3 ML/HR: 9 INJECTION, SOLUTION INTRAVENOUS at 06:58

## 2021-06-23 RX ADMIN — FAMOTIDINE 20 MG: 20 TABLET ORAL at 21:06

## 2021-06-23 RX ADMIN — ALBUMIN (HUMAN) 25 G: 12.5 INJECTION, SOLUTION INTRAVENOUS at 10:59

## 2021-06-23 RX ADMIN — POTASSIUM CHLORIDE 20 MEQ: 29.8 INJECTION, SOLUTION INTRAVENOUS at 06:59

## 2021-06-23 RX ADMIN — AMIODARONE HYDROCHLORIDE 200 MG: 200 TABLET ORAL at 08:57

## 2021-06-23 RX ADMIN — HYDROCODONE BITARTRATE AND ACETAMINOPHEN 1 TABLET: 5; 325 TABLET ORAL at 01:15

## 2021-06-23 RX ADMIN — SODIUM CHLORIDE 10 ML/HR: 4.5 INJECTION, SOLUTION INTRAVENOUS at 06:58

## 2021-06-23 RX ADMIN — MUPIROCIN: 20 OINTMENT TOPICAL at 19:06

## 2021-06-23 RX ADMIN — MUPIROCIN: 20 OINTMENT TOPICAL at 09:02

## 2021-06-23 RX ADMIN — OXYCODONE HYDROCHLORIDE 5 MG: 5 TABLET ORAL at 08:58

## 2021-06-23 RX ADMIN — MONTELUKAST 10 MG: 10 TABLET, FILM COATED ORAL at 18:54

## 2021-06-23 RX ADMIN — INSULIN LISPRO 2 UNITS: 100 INJECTION, SOLUTION INTRAVENOUS; SUBCUTANEOUS at 19:09

## 2021-06-23 RX ADMIN — ACETAMINOPHEN 650 MG: 325 TABLET ORAL at 06:58

## 2021-06-23 RX ADMIN — VANCOMYCIN HYDROCHLORIDE 1250 MG: 10 INJECTION, POWDER, LYOPHILIZED, FOR SOLUTION INTRAVENOUS at 02:10

## 2021-06-23 RX ADMIN — ALBUTEROL SULFATE 2.5 MG: 2.5 SOLUTION RESPIRATORY (INHALATION) at 16:06

## 2021-06-23 RX ADMIN — Medication 5000 UNITS: at 08:57

## 2021-06-23 RX ADMIN — ACETAMINOPHEN 650 MG: 325 TABLET ORAL at 18:54

## 2021-06-23 NOTE — PROGRESS NOTES
0800: Bedside and Verbal shift change report given to [employer] Francoise (oncoming nurse) by Jelena Reyes RN (offgoing nurse). Report included the following information SBAR, Kardex, OR Summary, Procedure Summary, Intake/Output, MAR, Recent Results, Cardiac Rhythm NSR w/1st degree AV block and Alarm Parameters . 1000: Ash GARCIA at bedside. Updated on patient's status. Orders received to give another albumin and attempt to wean levo. Orders also received for lasix to increase urine output. 1015: PT/OT at bedside. Patient's MAP dropped to 53 briefly, patient quickly recovered without intervention. Asymptomatic. 1130: Eleazar CAGLE at bedside. Updated on patient's status. Orders received to remove early after patient received lasix and albumin and urine output has increased. 1704: Levo weaned to 2mcg/min. 1731: Levo weaned to 1mcg/min. 1745: Patient up to bedside commode. Patient had large loose BM. 2000: Bedside and Verbal shift change report given to [address] [place] [address] (oncoming nurse) by Karen Alvarez RN (offgoing nurse). Report included the following information SBAR, Kardex, OR Summary, Procedure Summary, Intake/Output, MAR, Recent Results, Cardiac Rhythm NSR w/1st degree AV block and Alarm Parameters .

## 2021-06-23 NOTE — PROGRESS NOTES
Problem: Mobility Impaired (Adult and Pediatric)  Goal: *Acute Goals and Plan of Care (Insert Text)  Description: FUNCTIONAL STATUS PRIOR TO ADMISSION: Patient was independent and active without use of DME, driving, two falls (standing at mailbox and standing while completing lower body dressing). HOME SUPPORT PRIOR TO ADMISSION: The patient lived alone with no support required. Daughter and [employer] members available to assist if needed. Physical Therapy Goals  Initiated 6/23/2021  1. Patient will move from supine to sit and sit to supine , scoot up and down, and roll side to side in bed with modified independence within 7 day(s). 2.  Patient will transfer from bed to chair and chair to bed with modified independence using the least restrictive device within 7 day(s). 3.  Patient will perform sit to stand with modified independence within 7 day(s). 4.  Patient will ambulate with independence for 300 feet within 7 day(s). 5.  Patient will ascend/descend 1 step with modified independence within 7 day(s). Outcome: Not Met    PHYSICAL THERAPY EVALUATION  Patient: Jamin Caldeórn (45 y.o. female)  Date: 6/23/2021  Primary Diagnosis: Atrial fibrillation, unspecified type ([facility] [address] [I48.91]  A-fib ([facility] [address] [I48.91]  Cardiac/pericardial tamponade [I31.4]  Procedure(s) (LRB):  PERICARDIAL WINDOW / TIM BY DR. Domenic Wilkes (N/A) 2 Days Post-Op   Precautions:  Fall    ASSESSMENT  Based on the objective data described below, the patient presents with weakness, impaired stand balance, decreased endurance, pain, hypotension, and decreased tolerance to activity limiting functional mobility. Received patient sitting up in the chair, on 1L NCO2. Transitions to standing with min assist x2. Demonstrates forward lean and loss of balance requiring verbal cues and manual assist to correct. Hypotensive with upright activity (MAP 53), asymptomatic. Unable to progress ambulation today due to ICU lines, hypotension, balance. Patient lives alone, appears to have a strong support system. At this time, recommend rehab though hopeful she will progress and be able to return home with HHPT. Plan for next session: Progress ambulation as appropriate. Consider RW Trial if balance impairment persists. Current Level of Function Impacting Discharge (mobility/balance):   Sit<->Stand: Minimum assist  Ambulation: Moderate assist x2. Limited to 2 steps at chairside d/t ICU lines, hypotension, and balance. Functional Outcome Measure: The patient scored 4/28 on the Tinetti outcome measure which is indicative of high fall risk. Other factors to consider for discharge: Lives alone, fall risk, O2, BP     Patient will benefit from skilled therapy intervention to address the above noted impairments. PLAN :  Recommendations and Planned Interventions: bed mobility training, transfer training, gait training, therapeutic exercises, neuromuscular re-education, patient and family training/education, and therapeutic activities      Frequency/Duration: Patient will be followed by physical therapy:  5 times a week to address goals. Recommendation for discharge: (in order for the patient to meet his/her long term goals)  To be determined: Working towards home with [address] [place] [address] May need brief rehab stay prior to returning home. Will continue to monitor.     This discharge recommendation:  Has not yet been discussed the attending provider and/or case management    IF patient discharges home will need the following DME: patient owns DME required for discharge         SUBJECTIVE:       OBJECTIVE DATA SUMMARY:   HISTORY:    Past Medical History:   Diagnosis Date    Arthritis     Asthma     dr. Crystal Madden allergist    Atrial fibrillation ([facility] [address]     Breast cancer ([facility] [address] 1980    right - mastectomy    Coagulation disorder ([facility] [address]     on eliquis    Dyslipidemia     labs 2008 - chol 283, HDL 83, ,     HTN (hypertension)     Hyperlipidemia LDL goal < 130     for increased LDL particles, Dr. Madrigal [place] nodule     Dr. Ha Asp Palpitations     resolved     Past Surgical History:   Procedure Laterality Date    COLONOSCOPY N/A 9/10/2018    COLONOSCOPY performed by Gregery Buerger, MD at [facility] W/O CONT WITH CALCIUM  1/2012    CAC score 0; calcified mediastinal lymph nodes consistent granulomatous disease    ECHO 2D ADULT  5/5/2008    normal, LVEF 60%    HX APPENDECTOMY      HX HYSTERECTOMY Bilateral 03/19/2015    and uro repair    HX KNEE REPLACEMENT Right     HX MASTECTOMY Right     HX TONSILLECTOMY      HX UROLOGICAL  8/1/14    Urodynamics    INTRACARD ECHO, THER/DX INTERVENT N/A 5/10/2019    Intracardiac Echocardiogram performed by Gemma Jean MD at [place] [address] EXTERNAL  3/28/2018         KS CHEST SURGERY PROCEDURE UNLISTED      lung nodule removed - benign    [place] EPHYS EVL TRNSPTL TX ATRIAL FIB ISOLAT PULM VEIN N/A 5/10/2019    ABLATION A-FIB  W COMPLETE EP STUDY performed by Gemma Jean MD at [facility] CATH LAB    [place] INTRACARDIAC ELECTROPHYSIOLOGIC 3D MAPPING N/A 5/10/2019    Ep 3d Mapping performed by Gemma Jean MD at [address] CATH LAB    STRESS TEST [address] [facility]  1/5/12    walked 7:31, no chest pain, normal MPI.          Personal factors and/or comorbidities impacting plan of care: HPI/ PMH  Home Situation  Home Environment: Private residence  # Steps to Enter: 1 (small)  Rails to [employer]: No  One/Two Story Residence: One story  Living Alone: Yes  Support Systems: Child(sergio), [employer] community, Friends \ neighbors  Patient Expects to be Discharged to[de-identified] Unknown  Current DME Used/Available at Home: Grab bars, Raised toilet seat, Walker, rolling  Tub or Shower Type: Shower    EXAMINATION/PRESENTATION/DECISION MAKING:   Critical Behavior:  Neurologic State: Alert  Orientation Level: Oriented to person, Oriented to place, Oriented to situation, Oriented to time  Cognition: Appropriate decision making, Follows commands  Safety/Judgement: Awareness of environment  Hearing: Auditory  Auditory Impairment: None  Skin:  Exposed areas grossly intact  Edema: Trace ankles  Range Of Motion:  AROM: Generally decreased, functional                       Strength:    Strength: Generally decreased, functional                    Tone & Sensation:   Tone: Normal  Sensation: Impaired (numbness in hands and in bilateral feet)               Coordination:  Grossly functional  Vision:   Corrective Lenses: Glasses  Functional Mobility:  Bed Mobility:  Received/ remained sitting up in the chair           Transfers:  Sit to Stand: Minimum assistance;Assist x2; Additional time  Stand to Sit: Minimum assistance;Assist x2; Additional time    Balance:   Sitting: Impaired; Without support  Sitting - Static: Good (unsupported)  Sitting - Dynamic: Fair (occasional)  Standing: Impaired; With support  Standing - Static: Fair;Constant support  Standing - Dynamic : Fair;Constant support  Ambulation/Gait Training:  Distance (ft): 0 Feet (ft) (two steps)  Assistive Device: Other (comment) (bilateral hand held assist)  Ambulation - Level of Assistance: Moderate assistance;Assist x2     Gait Description (WDL): Exceptions to WDL (two steps )  Gait Abnormalities: Decreased step clearance        Base of Support: Widened;Shift to right;Center of gravity altered  Stance: Left decreased  Speed/Milli: Shuffled  Step Length: Right shortened;Left shortened                 Therapeutic Exercises:   UE exs completed w/ OT. PT addressed core stability/ strength during. Instructed pt in upright sitting posture, to engage core. Cues provided for scapular retraction, anterior weight shift to decrease posterior lean, lateral weight shift (left), upright standing & cervical posture.       Functional Measure:  Tinetti test:    Sitting Balance: 0  Arises: 0  Attempts to Rise: 0  Immediate Standing Balance: 0  Standing Balance: 0  Nudged: 0  Eyes Closed: 0  Turn 360 Degrees - Continuous/Discontinuous: 0 (inferred)  Turn 360 Degrees - Steady/Unsteady: 0  Sitting Down: 1  Balance Score: 1 Balance total score  Indication of Gait: 0  R Step Length/Height: 0  L Step Length/Height: 0  R Foot Clearance: 1  L Foot Clearance: 1  Step Symmetry: 0  Step Continuity: 0  Path: 1 (inferred)  Trunk: 0 (inferred)  Walking Time: 0  Gait Score: 3 Gait total score  Total Score: 4/28 Overall total score         Tinetti Tool Score Risk of Falls  <19 = High Fall Risk  19-24 = Moderate Fall Risk  25-28 = Low Fall Risk  Tinetti ME. Performance-Oriented Assessment of Mobility Problems in Elderly Patients. [address] 66; [id number]. (Scoring Description: PT Bulletin Feb. 10, 1993)    Older adults: Benjamin Patel et al, 2009; n = 1000 [facility] elderly evaluated with ABC, DESTINEY, ADL, and IADL)  · Mean DESTINEY score for males aged 69-68 years = 26.21(3.40)  · Mean DESTINEY score for females age 69-68 years = 25.16(4.30)  · Mean DESTINEY score for males over 80 years = 23.29(6.02)  · Mean DESTINEY score for females over 80 years = 17.20(8.32)            Physical Therapy Evaluation Charge Determination   History Examination Presentation Decision-Making   LOW Complexity : Zero comorbidities / personal factors that will impact the outcome / POC MEDIUM Complexity : 3 Standardized tests and measures addressing body structure, function, activity limitation and / or participation in recreation  MEDIUM Complexity : Evolving with changing characteristics  LOW Complexity : FOTO score of       Based on the above components, the patient evaluation is determined to be of the following complexity level: LOW     Pain Rating:  Lower anterior rib area    Activity Tolerance:   Fair and hypotensive at times, denies light headedness, voiced weakness.     After treatment patient left in no apparent distress:   Sitting in chair and Call bell within reach    COMMUNICATION/EDUCATION:   The patients plan of care was discussed with: Occupational therapist and Registered nurse. Fall prevention education was provided and the patient/caregiver indicated understanding., Patient/family have participated as able in goal setting and plan of care. , and Patient/family agree to work toward stated goals and plan of care.     Thank you for this referral.  Geovanni Hargrove, PT   Time Calculation: 26 mins

## 2021-06-23 NOTE — PROGRESS NOTES
HPI    77-year-old woman brought to the ICU for postoperative care. She has a significant history of A. fib-the plan was to go for an ablation rtoax-Kodza-Hl the procedure was complicated by V. tach with hemodynamic instability and a large pericardial effusion-was cardioverted and emergency pericardiocentesis done and a pericardial drain left in. Brought to the ICU for continued care. Patient intubated.     Interval changes    6/23 - Still on levo    6/22 - yesterday had re accumulation of effusion - went into tamponade, coded, ROSC after 2 mins, went to OR for manpreet placements, s/p L thora this AM - 400 cc of sanguinous drainage, extubated this AM    Patient Vitals for the past 24 hrs:   Temp Pulse Resp SpO2   06/23/21 1500  (!) 101 17 95 %   06/23/21 1400  96 19 95 %   06/23/21 1300  92 21 98 %   06/23/21 1200 98.4 °F (36.9 °C) 88 17 98 %   06/23/21 1100  91 18 95 %   06/23/21 1000  94 24 94 %   06/23/21 0900  98 26 96 %   06/23/21 0815    97 %   06/23/21 0800 98.9 °F (37.2 °C) 95 24 97 %   06/23/21 0700  96 27 95 %   06/23/21 0600  97 25 95 %   06/23/21 0500  97 22 95 %   06/23/21 0400 98.3 °F (36.8 °C) 99 26 92 %   06/23/21 0300  (!) 101 23 92 %   06/23/21 0200  (!) 102 23 90 %   06/23/21 0100  99 26 95 %   06/23/21 0000 98.3 °F (36.8 °C) 99 26 95 %   06/22/21 2300  100 26 94 %   06/22/21 2200  97 24 95 %   06/22/21 2100  99 24 94 %   06/22/21 2004    98 %   06/22/21 2000 98.2 °F (36.8 °C) 98 23 95 %   06/22/21 1900  (!) 103 18 95 %   06/22/21 1800  100 14 96 %   06/22/21 1700  96 20 94 %   06/22/21 1600 98.3 °F (36.8 °C) 98 21 95 %     Physical exam  General-NAD  Neuro-Non focal, generalized weakness  Cardiac-RRR, manpreet x 2 in situ  Lungs-clear  Abdomen-soft, nontender, nondistended  Extremities-warm    Labs, meds and imaging reviewed    Assessment  A. fib  V. tach/cardiac tamponade status post emergency pericardiocentesis  Left intubated postoperatively  Reaccumulation of pericardial fluid - tamponde - PEA arrest - now s/p manpreet placment    Plan    Pain control as needed, PT/OT/OOB as tolerated, other delirium prevention strategies    Continue hemodynamic monitoring, MAP goal > 65, still on levo, monitor manpreet output, follow cardiology recommendations    On NC, sat goal > 90%, encourage incentive spirometry    Diet as tolerated, antiemetics as needed, on H2RA therapy    Monitor urine output closely, Cr better, urine output has improved, correct electrolyte derangements as needed    SCDs for DVT ppx    Leukocytosis improving - likely reactional, on vanc/levaquin    Keep glucose less than 180, continue Synthroid    Critical care time-40 minutes    Signed By: Neli Harrington MD     June 23, 2021

## 2021-06-23 NOTE — PROGRESS NOTES
Problem: Self Care Deficits Care Plan (Adult)  Goal: *Acute Goals and Plan of Care (Insert Text)  Description: FUNCTIONAL STATUS PRIOR TO ADMISSION: Patient was independent and active without use of DME. Patient was driving and performed majority of IADL tasks however has a woman come to assist with cleaning her home. Patient reports that she is a  and is an active [employer]-goer. HOME SUPPORT: The patient lived alone with her daughter, friends, and Jainism community to provide assistance as needed. Occupational Therapy Goals  Initiated 6/23/2021  1. Patient will perform ADLs standing 5 mins without fatigue or LOB with supervision/set-up within 7 day(s). 2.  Patient will perform lower body ADLs with supervision/set-up within 7 day(s). 3.  Patient will perform gathering ADL items high and low 2/2 with supervision/set-up within 7 day(s). 4.  Patient will perform toilet transfers with supervision/set-up within 7 day(s). 5.  Patient will perform all aspects of toileting with supervision/set-up within 7 day(s). 6.  Patient will participate in cardiac/sternal upper extremity therapeutic exercise/activities to increase independence with ADLs with supervision/set-up for 5 minutes within 7 day(s).            Outcome: Progressing Towards Goal   OCCUPATIONAL THERAPY EVALUATION  Patient: Mark Anthony Doshi (14 y.o. female)  Date: 6/23/2021  Primary Diagnosis: Atrial fibrillation, unspecified type ([facility] [I48.91]  A-fib ([facility] [I48.91]  Cardiac/pericardial tamponade [I31.4]  Procedure(s) (LRB):  PERICARDIAL WINDOW / TIM BY DR. Richelle Salinas (N/A) 2 Days Post-Op   Precautions: Fall    ASSESSMENT  Based on the objective data described below, the patient presents with generalized weakness, decreased endurance on 1L NC O2, decreased activity tolerance (MAP 53-70 with activity), impaired dynamic standing balance, some LLE weakness with h/o L ankle arthritis, and patient with decreased safety awareness with reports of 2 falls in the past year (by her mailbox and while standing to perform LB dressing). Patient s/p transseptal puncture with emergent pericardiocentesis and cardioversion with pericardial window POD 2. Patient requires A x 2 for dynamic standing balance at this time with hypotension in standing. Recommend rehab vs HHOT pending functional progression in hospital stay. Current Level of Function Impacting Discharge (ADLs/self-care): up to MOD A LB ADLs    Functional Outcome Measure: The patient scored 40/100 on the Barthel Index outcome measure which is indicative of 60% ADL impairment. Other factors to consider for discharge: early cath; chest tubes; arterial line (limiting functional mobility this evaluation); lives alone     Patient will benefit from skilled therapy intervention to address the above noted impairments. PLAN :  Recommendations and Planned Interventions: self care training, functional mobility training, therapeutic exercise, balance training, therapeutic activities, endurance activities, patient education, home safety training, and family training/education    Frequency/Duration: Patient will be followed by occupational therapy 5 times a week to address goals. Recommendation for discharge: (in order for the patient to meet his/her long term goals)  To be determined: IPR vs HHOT pending functional progression    This discharge recommendation:  Has not yet been discussed the attending provider and/or case management    IF patient discharges home will need the following DME: TBD       SUBJECTIVE:   Patient stated I am so jarrell to be alive.     OBJECTIVE DATA SUMMARY:   HISTORY:   Past Medical History:   Diagnosis Date    Arthritis     Asthma     dr. Jamaal Xie allergist    Atrial fibrillation ([facility]     Breast cancer [facility]) 1980    right - mastectomy    Coagulation disorder ([facility]     on eliquis    Dyslipidemia     labs 2008 - chol 283, HDL 83, ,     HTN (hypertension)     Hyperlipidemia LDL goal < 130     for increased LDL particles, Dr. Kendra Grey nodule     Dr. Cramer Salts    Palpitations     resolved     Past Surgical History:   Procedure Laterality Date    COLONOSCOPY N/A 9/10/2018    COLONOSCOPY performed by Sanjuanita Merida MD at [facility] ENDOSCOPY    CT HEART W/O CONT WITH CALCIUM  1/2012    CAC score 0; calcified mediastinal lymph nodes consistent granulomatous disease    ECHO 2D ADULT  5/5/2008    normal, LVEF 60%    HX APPENDECTOMY      HX HYSTERECTOMY Bilateral 03/19/2015    and uro repair    HX KNEE REPLACEMENT Right     HX MASTECTOMY Right     HX TONSILLECTOMY      HX UROLOGICAL  8/1/14    Urodynamics    INTRACARD ECHO, THER/DX INTERVENT N/A 5/10/2019    Intracardiac Echocardiogram performed by Izabela Stanley MD at [address] EXTERNAL  3/28/2018         GA CHEST SURGERY PROCEDURE UNLISTED      lung nodule removed - benign    GA EPHYS EVL TRNSPTL TX ATRIAL FIB ISOLAT PULM VEIN N/A 5/10/2019    ABLATION A-FIB  W COMPLETE EP STUDY performed by Izabela Stanley MD at [place] [address], [address] CATH LAB    GA INTRACARDIAC ELECTROPHYSIOLOGIC 3D [address] [facility] N/A 5/10/2019    Ep 3d Mapping performed by Izabela Stanley MD at [place] [address], [address] CATH LAB    STRESS TEST [address] [facility]  1/5/12    walked 7:31, no chest pain, normal MPI.        Expanded or extensive additional review of patient history:     Home Situation  Home Environment: Private residence  # Steps to Enter: 1 (small)  Rails to [employer]: No  One/Two Story Residence: One story  Living Alone: Yes  Support Systems: Child(sergio), [employer] / j[employer] community, Friends \ neighbors  Patient Expects to be Discharged to[de-identified] Unknown  Current DME Used/Available at Home: Grab bars, Raised toilet seat, Walker, rolling  Tub or Shower Type: Shower    Hand dominance: Right    EXAMINATION OF PERFORMANCE DEFICITS:  Cognitive/Behavioral Status:  Neurologic State: Alert  Orientation Level: Oriented to person;Oriented to place;Oriented to situation;Oriented to time  Cognition: Appropriate decision making; Follows commands  Perception: Appears intact  Perseveration: No perseveration noted  Safety/Judgement: Awareness of environment    Skin: exposed areas grossly intact    Edema: bilateral feet    Hearing: Auditory  Auditory Impairment: None    Vision/Perceptual:                                Corrective Lenses: Glasses    Range of Motion:  BUE  AROM: Generally decreased, functional                         Strength:  BUE  Strength: Generally decreased, functional                Coordination:     Fine Motor Skills-Upper: Left Intact; Right Intact    Gross Motor Skills-Upper: Left Intact; Right Intact    Tone & Sensation:  Tone: Normal  Sensation: Impaired (numbness in hands and in bilateral feet)                      Balance:  Sitting: Impaired; Without support  Sitting - Static: Good (unsupported)  Sitting - Dynamic: Fair (occasional)  Standing: Impaired; With support  Standing - Static: Fair;Constant support  Standing - Dynamic : Fair;Constant support    Functional Mobility and Transfers for ADLs:  Bed Mobility:   Not observed. Transfers:  Sit to Stand: Minimum assistance;Assist x2; Additional time  Stand to Sit: Minimum assistance;Assist x2; Additional time    ADL Assessment:  Feeding: Setup;Supervision (infer A for opening some containers)    Oral Facial Hygiene/Grooming: Setup;Supervision (infer seated 2* impaired standing balance)    Bathing: Moderate assistance (infer A for posterior periarea and for dynamic standing clive)    Upper Body Dressing: Setup;Supervision (infer 2* BUE ROM)    Lower Body Dressing: Moderate assistance (infer for pulling pants up/over hips 2* balance)    Toileting:  Moderate assistance (early cath; infer A for pericare)                ADL Intervention and task modifications:                           Lower Body Dressing Assistance  Underpants: Moderate assistance (simulated)  Socks: Stand-by assistance (seated in chair)         Cognitive Retraining  Safety/Judgement: Awareness of environment    May have to adjust home setup to increase ease with items closer to waist height to prevent deep bending. Benefit to don clothing tailor sitting and don all clothing while sitting prior to standing. Patient demonstrated lower body dressing with Stand-by assistance. Therapeutic Exercises:   Patient instructed on the benefits and demonstrated 3/6 cardiac exercises while seated/standing with Contact guard assistance and Minimum assistance. CARDIAC   EXERCISE    Sets    Reps    Active  Active Assist    Passive  Self ROM    Comments    Shoulder flexion  1  5   [x]                            []                             []                             []                             seated   Shoulder abduction           Scapular elevation  1  5  [x]                             []                              []                             []                             standing   Scapular retraction  1  5  [x]                             []                             []                             []                             standing   Trunk rotation           Trunk sidebending             Functional Measure:  Barthel Index:    Bathin  Bladder: 0  Bowels: 10  Groomin  Dressin  Feedin  Mobility: 0  Stairs: 0  Toilet Use: 5  Transfer (Bed to Chair and Back): 10  Total: 40/100        The Barthel ADL Index: Guidelines  1. The index should be used as a record of what a patient does, not as a record of what a patient could do. 2. The main aim is to establish degree of independence from any help, physical or verbal, however minor and for whatever reason. 3. The need for supervision renders the patient not independent. 4. A patient's performance should be established using the best available evidence. Asking the patient, friends/relatives and nurses are the usual sources, but direct observation and common sense are also important.  However direct testing is not needed. 5. Usually the patient's performance over the preceding 24-48 hours is important, but occasionally longer periods will be relevant. 6. Middle categories imply that the patient supplies over 50 per cent of the effort. 7. Use of aids to be independent is allowed. Norberto Anis., Barthel, D.W. (2115). Functional evaluation: the Barthel Index. [address] [facility] (14)2. CULLEN Martino, Luis M Grady., Ebony Mendez., [place], [facility] (1999). Measuring the change indisability after inpatient rehabilitation; comparison of the responsiveness of the Barthel Index and Functional [place] Measure. Journal of Neurology, Neurosurgery, and Psychiatry, 66(4), 405-305. MAURI Gavin, DARLENE Barnett, & Toby Arriaza MCONSUELO. (2004.) Assessment of post-stroke quality of life in cost-effectiveness studies: The usefulness of the Barthel Index and the EuroQoL-5D. Quality of Life Research, 15, 997-30        Occupational Therapy Evaluation Charge Determination   History Examination Decision-Making   LOW Complexity : Brief history review  MEDIUM Complexity : 3-5 performance deficits relating to physical, cognitive , or psychosocial skils that result in activity limitations and / or participation restrictions MEDIUM Complexity : Patient may present with comorbidities that affect occupational performnce.  Miniml to moderate modification of tasks or assistance (eg, physical or verbal ) with assesment(s) is necessary to enable patient to complete evaluation       Based on the above components, the patient evaluation is determined to be of the following complexity level: LOW   Pain Rating:  Abdomen 2* chest tubes (did not quantify)    Activity Tolerance:   Fair, requires rest breaks, and MAP drops with functional standing    After treatment patient left in no apparent distress:    Sitting in chair and Call bell within reach    COMMUNICATION/EDUCATION:   The patients plan of care was discussed with: Physical therapist and Registered nurse. Home safety education was provided and the patient/caregiver indicated understanding., Patient/family have participated as able in goal setting and plan of care. , and Patient/family agree to work toward stated goals and plan of care. This patients plan of care is appropriate for delegation to [facility].     Thank you for this referral.  Verl Hands  Time Calculation: 25 mins

## 2021-06-23 NOTE — PROGRESS NOTES
Patient is seen   She felt better   Hb stable around 9  Platelet is down  She received heparin and protamine during AFIB ablation attempt  Will work up for HIT  Avoid heparin flush

## 2021-06-23 NOTE — PROGRESS NOTES
2000: Bedside shift change report given to Nikki ROSS RN (oncoming nurse) by Sharif Taylor RN (offgoing nurse). Report included the following information SBAR, Intake/Output, MAR, Recent Results, Cardiac Rhythm NSR, 1st degree AVB and Alarm Parameters . 0645: pt up to recliner. Brief drop in BP noted, MAP 40s, pt quickly recovered with no symptoms    0700: Dr. Cesario Douglas at bedside, updated by RN    0730: Dr. Juno Lynch and cardiac surgery team at bedside, updated by RN    0800: Bedside shift change report given to [facility] (oncoming nurse) by Ahmet Romo RN (offgoing nurse). Report included the following information SBAR, Intake/Output, MAR, Recent Results, Cardiac Rhythm NSR, 1st degree AVB and Alarm Parameters . Problem: Falls - Risk of  Goal: *Absence of Falls  Description: Document Deanne Parmar Fall Risk and appropriate interventions in the flowsheet. Outcome: Progressing Towards Goal  Note: Fall Risk Interventions:            Medication Interventions: Evaluate medications/consider consulting pharmacy    Elimination Interventions: Call light in reach, Toileting schedule/hourly rounds    History of Falls Interventions: Evaluate medications/consider consulting pharmacy         Problem: Patient Education: Go to Patient Education Activity  Goal: Patient/Family Education  Outcome: Progressing Towards Goal     Problem: Pressure Injury - Risk of  Goal: *Prevention of pressure injury  Description: Document Guicho Scale and appropriate interventions in the flowsheet. Outcome: Progressing Towards Goal  Note: Pressure Injury Interventions:  Sensory Interventions: Assess changes in LOC, Avoid rigorous massage over bony prominences, Check visual cues for pain, Float heels, Keep linens dry and wrinkle-free, Minimize linen layers, Turn and reposition approx.  every two hours (pillows and wedges if needed)         Activity Interventions: Assess need for specialty bed, Increase time out of bed    Mobility Interventions: Assess need for specialty bed, HOB 30 degrees or less, Turn and reposition approx.  every two hours(pillow and wedges)    Nutrition Interventions: Document food/fluid/supplement intake    Friction and Shear Interventions: Apply protective barrier, creams and emollients, HOB 30 degrees or less, Minimize layers                Problem: Patient Education: Go to Patient Education Activity  Goal: Patient/Family Education  Outcome: Progressing Towards Goal     Problem: Non-Violent Restraints  Goal: Removal from restraints as soon as assessed to be safe  Outcome: Resolved/Met  Goal: No harm/injury to patient while restraints in use  Outcome: Resolved/Met  Goal: Patient's dignity will be maintained  Outcome: Resolved/Met  Goal: Patient Interventions  Outcome: Resolved/Met     Problem: Ventilator Management  Goal: *Adequate oxygenation and ventilation  Outcome: Resolved/Met  Goal: *Patient maintains clear airway/free of aspiration  Outcome: Resolved/Met  Goal: *Absence of infection signs and symptoms  Outcome: Resolved/Met  Goal: *Normal spontaneous ventilation  Outcome: Resolved/Met     Problem: Patient Education: Go to Patient Education Activity  Goal: Patient/Family Education  Outcome: Resolved/Met

## 2021-06-23 NOTE — PROGRESS NOTES
[facility] ICU Progress Note    Admit Date: 2021  POD:  2 Day Post-Op    Procedure:    PERICARDIAL WINDOW / TIM BY DR. Mulu Cordova        Subjective/24 hr events:   Pt seen with Dr. Sandie Fay. MAPs dropped to 40s momentarily when moving to the chair, pt asymptomatic and quickly recovered. No other events. On norepi 4. On 3 L NC. Objective:   Vitals:  Blood pressure (!) 84/53, pulse 96, temperature 98.3 °F (36.8 °C), resp. rate 27, height 5' 6\" (1.676 m), weight 186 lb (84.4 kg), SpO2 95 %, not currently breastfeeding. Temp (24hrs), Av.3 °F (36.8 °C), Min:98.2 °F (36.8 °C), Max:98.7 °F (37.1 °C)    EKG/Rhythm:    NSR 70s    CT Output:   130ml overnight (300ml 24 hours)    Oxygen Therapy:  Oxygen Therapy  O2 Sat (%): 95 % (21 0700)  Pulse via Oximetry: 98 beats per minute (21)  O2 Device: Nasal cannula (21 040)  O2 Flow Rate (L/min): 3 l/min (21 0400)  FIO2 (%): 40 % (21 1200)    CXR:  CXR Results  (Last 48 hours)               21 1104  XR CHEST PORT Final result    Impression:  1. Interval decrease in left pleural effusion with some residual atelectasis at   the bases. 2. No change in lines and tubes. Narrative:  INDICATION:  s/p left thoracentesis       COMPARISON:  21 0404 hours       FINDINGS:    A portable AP radiograph of the chest was obtained at 1049 hours. The patient is on a cardiac monitor. Endotracheal tube is in place. Central   venous catheter on the right. Mediastinal and pleural drainage catheters are in   place. Nasogastric tube traverses the thorax. There has been interval decrease in hazy opacity representing left pleural   effusion compared to the exam earlier today. Right lung remains relatively clear   without significant fluid. .   Cardiomediastinal silhouette is stable. Pulmonary vascularity does not appear to be congested.             21 0510  XR CHEST PORT Final result    Impression:      Stable small left pleural effusion and left basilar opacity. Narrative:  EXAM:  XR CHEST PORT       INDICATION: Left pleural effusion and basilar opacity. COMPARISON: 6/21/2021 at 1910 hours       TECHNIQUE: Portable AP semiupright chest view at 0404 hours       FINDINGS: The endotracheal tube, enteric tube, right IJ catheter, and   mediastinal drains are stable. The cardiomediastinal contours are stable. A small left pleural effusion and left basilar opacity are stable. Linear   opacity in the mid right lung is unchanged. There is no pneumothorax. The bones   and upper abdomen are stable. 06/21/21 1916  XR CHEST PORT Final result    Impression:  Life-support lines and tubes as described with persistent left pleural   effusion/basilar atelectasis. No pneumothorax. Narrative:  INDICATION: postop heart Postop, upon arrival       EXAMINATION:  AP CHEST, PORTABLE       COMPARISON: Earlier today       FINDINGS: Single AP portable view of the chest demonstrates unchanged   endotracheal tube and right IJ line. Interval placement of an nasogastric tube   which extends to the left upper quadrant the tip not included on this study. There are also pleural and mediastinal drains. Mild cardiomegaly is unchanged. No pneumothorax. Left pleural effusion and basilar atelectasis. 06/21/21 1726  XR CHEST PORT Final result    Impression:  No pneumothorax. Small left pleural effusion. Life support lines and tubes as   above. Narrative:  INDICATION:   LIne placement       COMPARISON: May 12, 2019        FINDINGS:       AP portable supine chest radiograph demonstrates a right internal jugular venous   catheter tip over the distal superior vena cava. Endotracheal tube tip at the   thoracic inlet. Coiled tubing overlies the left lung base. Overlying monitoring   wires/leads. Heart is enlarged. Small left pleural effusion. No pneumothorax.                  Admission Weight: Last Weight   Weight: 183 lb 9.6 oz (83.3 kg) Weight: 186 lb (84.4 kg)     Intake / Output / Drain:  Current Shift: No intake/output data recorded. Last 24 hrs.:     Intake/Output Summary (Last 24 hours) at 2021 5243  Last data filed at 2021 0700  Gross per 24 hour   Intake 2200.09 ml   Output 1248 ml   Net 952.09 ml       EXAM:  General:   Pleasant, cooperative      Lungs:   Diminished left lung base   Incision:  Dressing c/d/i   Heart:  Regular rate and rhythm, S1, S2 normal, no murmur, click, rub or gallop. Abdomen:   Soft, non-tender. Bowel sounds normal. No masses,  No organomegaly. Extremities:  No edema. PPP. Neurologic: Sedated     Labs:   Recent Labs     21  0442 21  2149 21  2134 21  2210 21  1900   WBC 13.3*  --   --    < > 13.5*   HGB 9.2*   < >  --    < > 9.2*   HCT 28.5*   < >  --    < > 28.6*   PLT 92*  --   --    < > 134*     --   --    < > 145   K 3.8  --   --    < > 3.5   BUN 15  --   --    < > 20   CREA 0.74  --   --    < > 1.01   *  --   --    < > 271*   GLUCPOC  --   --  183*   < >  --    INR  --   --   --   --  1.2*    < > = values in this interval not displayed. Assessment:     Principal Problem:    Pericardial effusion with cardiac tamponade (5/10/2019)      Overview: Loculated posterior LA and LV, no tamponade    Active Problems:    persistent atrial fibrillation  (2021)      Breast cancer (HCC) ()      Non-rheumatic mitral regurgitation (5/10/2019)      Overview: moderate      Non-rheumatic tricuspid valve insufficiency (5/10/2019)      Cardiac/pericardial tamponade (2021)      S/P pericardial window creation (2021)         Plan/Recommendations/Medical Decision Makin. Cardiac tamponade s/p pericardial window and drain placement: Stable. Keep chest tubes for now. Wean norepi per primary team.  2. Acute post op blood loss anemia: Hemoglobin stable. Monitor H/H. CT output. Give another albumin this morning.   3. Left sided pleural effusion/atelectasis: US guided thoracentesis removed about 400 mL fluid. Monitor daily CXR. Wean O2 per primary team.   4. Leukocytosis/possible aspiration PNA: On vanc/levaquin. Monitor QTc (currently 468)  5. Atrial fibrillation s/p ablation: Dr. Michelle Nance following  6. Hyperlipidemia: On atorvastatin  7. Hypothyroidism: On replacement  8. Asthma: On singulair  9. Thrombocytopenia: HIT panel ordered by primary team.     Dispo: Remain in CVICU. PT/OT. Keep drains for now.      Signed By: JOSE Da Silva

## 2021-06-23 NOTE — PROGRESS NOTES
Cardiac Electrophysiology Hospital Progress Note     Subjective:      Ayaan Browne is a [de-identified] y.o. patient who was admitted after developing cardiac tamponade during AF ablation attempt. Pericardial effusion occurred after transseptal puncture (thick septum), required emergent pericardiocentesis in EP lab. Extubated post procedure. There was no blood in the bag until 4 pm later in the afternoon, she had further cardiac tamponade, required pericardial window, was reintubated. Initial drain appeared to have been blocked by clot, which was then successfully flushed out during pericardial window procedure. She received 1 unit PRBCs prior to going to the OR due to concern for blood loss. Drain output during last 24 hours 280 ccs. Also shocked x 2 for VT during decompensation at 5-6 pm in CVICU, was given epi boluses for ? PEA arrest (per notes, but RN stated BP was low without arrest). Still requiring levophed 0.4 mcg/min IV gtt. Hgb 9.2 this morning, newly thrombocytopenic; platelets 92 (330 yesterday). She did receive heparin & protamine during ablation attempt. She also experienced pericardial effusion during prior attempted AF ablation on 05/10/2019. Holding Eliquis. Previous:  S/p TIM/DCCV 06/2020. S/p AF ablation 05/10/2019. PVI unable to be completed on left side due to pericardial effusion without tamponade. Heparin stopped & reversed. Persistent AF, failed DCCV & flecainide. Cardioversion with Dr. Lynda Padilla on 2/22/19, had follow up on 2/27/19, back in atrial fibrillation. Mild dilated cardiomyopathy, improved on GDMT. Couldn't tolerate meds due to low BP. Negative stress test 5 yrs ago per her report.           Problem List  Date Reviewed: 6/21/2021          Codes Class Noted    persistent atrial fibrillation  ICD-10-CM: I48.91  ICD-9-CM: 427.31  6/21/2021        S/P ablation of atrial fibrillation ICD-10-CM: Z98.890, Z86.79  ICD-9-CM: V45.89 5/10/2019        Cardiac/pericardial tamponade ICD-10-CM: I31.4  ICD-9-CM: 423.3  6/21/2021        S/P pericardial window creation ICD-10-CM: Z98.890  ICD-9-CM: V45.89  6/21/2021        * (Principal) Pericardial effusion with cardiac tamponade ICD-10-CM: I31.3, I31.4  ICD-9-CM: 423.9, 423.3  5/10/2019    Overview Signed 5/10/2019  1:34 PM by Vladimir Ba MD     Loculated posterior LA and LV, no tamponade             Non-rheumatic mitral regurgitation ICD-10-CM: I34.0  ICD-9-CM: 424.0  5/10/2019    Overview Signed 5/10/2019  1:34 PM by Vladimir Ba MD     moderate             Non-rheumatic tricuspid valve insufficiency ICD-10-CM: I36.1  ICD-9-CM: 424.2  5/10/2019        Encounter for cardioversion procedure ICD-10-CM: Z01.89  ICD-9-CM: V72.85  3/28/2018    Overview Signed 3/28/2018  1:06 PM by Vladimir Ba MD     3/28/2018 200 J to NSR after a TIM without thrombus             Dizziness ICD-10-CM: R42  ICD-9-CM: 780.4  3/27/2018        Persistent atrial fibrillation ([facility] ICD-10-CM: I48.19  ICD-9-CM: 427.31  3/27/2018        Cystocele ICD-10-CM: NQI3166  ICD-9-CM: WBJ5072  8/1/2014        Uterine prolapse ICD-10-CM: N81.4  ICD-9-CM: 618.1  8/1/2014        HTN (hypertension) ICD-10-CM: I10  ICD-9-CM: 401.9  Unknown        Breast cancer ([facility] ICD-10-CM: C50.919  ICD-9-CM: 174.9  Unknown        Lung nodule ICD-10-CM: R91.1  ICD-9-CM: 793.11  Unknown    Overview Signed 5/14/2014 10:34 AM by MD Dr. Roslyn Jason Ebbs: J45.909  ICD-9-CM: 493.90  Unknown        Hyperlipidemia LDL goal < 130 ICD-10-CM: E78.5  ICD-9-CM: 272.4  Unknown    Overview Signed 4/26/2013  9:17 AM by Karine Lubin MD     for increased LDL particles, Dr. Roark Klinefelter             Dyslipidemia ICD-10-CM: E78.5  ICD-9-CM: 272.4  Unknown    Overview Signed 12/31/2011  4:11 PM by Leah Goodpasture, MD     labs 2008 - chol 283, HDL 83, ,              Palpitations ICD-10-CM: R00.2  ICD-9-CM: 785.1 Unknown        Chest pain, unspecified ICD-10-CM: R07.9  ICD-9-CM: 786.50  12/31/2011                Current Facility-Administered Medications   Medication Dose Route Frequency Provider Last Rate Last Admin    potassium chloride 20 mEq in 50 ml IVPB  20 mEq IntraVENous ONCE Zac Solano MD 25 mL/hr at 06/23/21 0659 20 mEq at 06/23/21 0659    Vancomycin Trough @ 1800 (GIVE DOSE IF LEVEL <16 mcg/ml)   Other ONCE Jose A Costa MD        levoFLOXacin (LEVAQUIN) 750 mg in D5W IVPB  750 mg IntraVENous Q48H Haylie Alexandra  mL/hr at 06/22/21 0839 750 mg at 06/22/21 0839    potassium bicarb-citric acid (EFFER-K) tablet 20 mEq  20 mEq Oral DAILY Lolita Cunningham MD   20 mEq at 06/22/21 0840    NOREPINephrine (LEVOPHED) 8 mg in 5% dextrose 250mL (32 mcg/mL) infusion  0.5-30 mcg/min IntraVENous TITRATE Adal CHEUNG MD 7.5 mL/hr at 06/23/21 0751 4 mcg/min at 06/23/21 0751    sodium chloride (NS) flush 5-40 mL  5-40 mL IntraVENous PRN Birdena Loft B, NP        sodium chloride (NS) flush 5-40 mL  5-40 mL IntraVENous Q8H Birdena Loft B, NP   10 mL at 06/23/21 0707    sodium chloride (NS) flush 5-40 mL  5-40 mL IntraVENous PRN Birdena Loft B, NP        acetaminophen (TYLENOL) tablet 650 mg  650 mg Oral Q4H PRN Birdena Loft B, NP   650 mg at 06/21/21 1434    HYDROcodone-acetaminophen (NORCO) 5-325 mg per tablet 1 Tablet  1 Tablet Oral Q4H PRN Birdena Loft B, NP   1 Tablet at 06/23/21 0115    ondansetron (ZOFRAN) injection 4 mg  4 mg IntraVENous Q4H PRN Birdena Loft B, NP        sodium chloride (NS) flush 5-40 mL  5-40 mL IntraVENous PRN Birdena Loft B, NP        levothyroxine (SYNTHROID) tablet 25 mcg  25 mcg Oral ACB Birdena Loft B, NP   25 mcg at 06/23/21 0659    montelukast (SINGULAIR) tablet 10 mg  10 mg Oral QPM Cristela CHEUNG NP   10 mg at 06/22/21 1855    ondansetron (ZOFRAN) injection 4 mg  4 mg IntraVENous Q8H PRN Elena Alonso NP PHENYLephrine (DOE-SYNEPHRINE) 30 mg in 0.9% sodium chloride 250 mL infusion   mcg/min IntraVENous TITRATE Sinai Dinh MD   Stopped at 06/22/21 1056    propofol (DIPRIVAN) 10 mg/mL infusion  0-50 mcg/kg/min IntraVENous TITRATE Sinai iDnh MD   Stopped at 06/22/21 1201    HYDROmorphone (PF) (DILAUDID) injection 1 mg  1 mg IntraVENous Q4H PRN Sinai Dinh MD   1 mg at 06/22/21 1425    acetaminophen (TYLENOL) tablet 650 mg  650 mg Oral Q4H PRN Sinai Dinh MD   650 mg at 06/23/21 6660    ELECTROLYTE REPLACEMENT PROTOCOL - Potassium and Magnesium  1 Each Other PRN Sinai Dinh MD        albuterol (PROVENTIL VENTOLIN) nebulizer solution 2.5 mg  2.5 mg Nebulization Q4H PRN Lacracr CHEUNG NP        atorvastatin (LIPITOR) tablet 20 mg  20 mg Oral QPM Haylie Alexandra PA   20 mg at 06/22/21 1855    cetirizine (ZYRTEC) tablet 10 mg  10 mg Oral QPM Haylie Alexandra PA   10 mg at 06/22/21 0988    cholecalciferol (VITAMIN D3) (1000 Units /25 mcg) tablet 5,000 Units  5,000 Units Oral DAILY Haylie Alexandra PA   5,000 Units at 06/22/21 0840    cyclobenzaprine (FLEXERIL) tablet 5 mg  5 mg Oral BID PRN Haylie Alexandra PA        diphenhydrAMINE (BENADRYL) capsule 25 mg  25 mg Oral Q6H PRN Haylie Alexandra PA        budesonide (PULMICORT) 500 mcg/2 ml nebulizer suspension  1,000 mcg Nebulization BID Haylie Alexandra PA   1,000 mcg at 06/22/21 2000    raloxifene (EVISTA) tablet 60 mg  60 mg Oral DAILY Haylie Alexandra PA        amiodarone (CORDARONE) tablet 200 mg  200 mg Oral DAILY Haylie Alexandra PA   200 mg at 06/22/21 0840    0.9% sodium chloride infusion 250 mL  250 mL IntraVENous PRN Ki Feliciano MD        0.9% sodium chloride infusion 250 mL  250 mL IntraVENous PRN Ki Feliciano MD        Vancomycin- pharmacy to dose   Other Rx Dosing/Monitoring Haylie Alexandra, PA        vancomycin (VANCOCIN) 1250 mg in  ml infusion  1,250 mg IntraVENous Q16H Ibrahima Nicole [facility],  mL/hr at 06/23/21 0210 1,250 mg at 06/23/21 0210    insulin regular (NOVOLIN R, HUMULIN R) 100 Units in 0.9% sodium chloride 100 mL infusion  0-50 Units/hr IntraVENous TITRATE Haylie Alexandra PA        sodium chloride (NS) flush 5-40 mL  5-40 mL IntraVENous PRN Haylie Alexandra PA        0.45% sodium chloride infusion  10 mL/hr IntraVENous CONTINUOUS Haylie Alexandra PA 10 mL/hr at 06/23/21 0751 10 mL/hr at 06/23/21 0751    0.9% sodium chloride infusion  9 mL/hr IntraVENous CONTINUOUS Haylie Alexandra PA 3 mL/hr at 06/23/21 0658 3 mL/hr at 06/23/21 0658    oxyCODONE IR (ROXICODONE) tablet 5 mg  5 mg Oral Q4H PRN Haylie Alexandra PA        oxyCODONE IR (ROXICODONE) tablet 10 mg  10 mg Oral Q4H PRN Haylie Alexandra PA        morphine injection 4 mg  4 mg IntraVENous Q2H PRN Haylie Alexandra PA        naloxone (NARCAN) injection 0.4 mg  0.4 mg IntraVENous PRN Haylie Alexandra PA        mupirocin (BACTROBAN) 2 % ointment   Both Nostrils BID Haylie Alexandra [place]   Given at 06/22/21 1859    ondansetron (ZOFRAN) injection 4 mg  4 mg IntraVENous Q4H PRN Haylie Alexandra PA        albuterol (PROVENTIL VENTOLIN) nebulizer solution 2.5 mg  2.5 mg Nebulization Q4H PRN Haylie Alexandra PA        midazolam (VERSED) injection 1 mg  1 mg IntraVENous Q1H PRN Haylie Alexandra PA   1 mg at 06/22/21 0409    chlorhexidine (PERIDEX) 0.12 % mouthwash 10 mL  10 mL Oral Q12H Haylie Alexandra PA   10 mL at 06/22/21 2146    famotidine (PEPCID) tablet 20 mg  20 mg Oral Q12H Haylie Alexandra PA   20 mg at 06/22/21 2147    magnesium oxide (MAG-OX) tablet 400 mg  400 mg Oral BID Haylie Alexandra PA   400 mg at 06/22/21 1315    calcium chloride 1 g in 0.9% sodium chloride 100 mL IVPB  1 g IntraVENous PRN Haylie Alexandra PA        bisacodyL (DULCOLAX) suppository 10 mg  10 mg Rectal DAILY PRN Haylie Alexandra PA        senna-docusate (PERICOLACE) 8.6-50 mg per tablet 1 Tablet  1 Tablet Oral BID [facility], [place]   1 Tablet at 06/22/21 1855    polyethylene glycol (MIRALAX) packet 17 g  17 g Oral DAILY Haylie Alexandra PA   17 g at 06/22/21 1652    ELECTROLYTE REPLACEMENT NOTE: Nurse to review Serum Potassium and Magnesuim levels and Initiate Electrolyte Replacement Protocol as needed  1 Each Other PRN Haylie Alexandra PA        magnesium sulfate 1 g/100 ml IVPB (premix or compounded)  1 g IntraVENous PRN Haylie Alexandra PA        glucose chewable tablet 16 g  4 Tablet Oral PRN Haylie Alexandra PA        dextrose (D50W) injection syrg 12.5-25 g  12.5-25 g IntraVENous PRN Haylie Alexandra PA        glucagon (GLUCAGEN) injection 1 mg  1 mg IntraMUSCular PRN Haylie Alexandra PA        insulin lispro (HUMALOG) injection   SubCUTAneous TIDAC Haylie Alexandra PA        insulin lispro (HUMALOG) injection   SubCUTAneous AC&HS Haylie Alexandra PA   2 Units at 06/22/21 0839    dexmedeTOMidine in 0.9 % NaCl (PRECEDEX) 400 mcg/100 mL (4 mcg/mL) infusion soln  0.1-1.5 mcg/kg/hr IntraVENous TITRATE Haylie Alexandra PA         Allergies   Allergen Reactions    Betadine [Povidone-Iodine] Rash    Iodine Rash    Norvasc [Amlodipine] Other (comments)     Flushing/redness.      Past Medical History:   Diagnosis Date    Arthritis     Asthma     dr. Flor Gallego allergist    Atrial fibrillation ([facility]     Breast cancer [facility]) 1980    right - mastectomy    Coagulation disorder ([facility]     on eliquis    Dyslipidemia     labs 2008 - chol 283, HDL 83, ,     HTN (hypertension)     Hyperlipidemia LDL goal < 130     for increased LDL particles, Dr. Edelmira Edmondsial nodule     Dr. Melany Fischer    Palpitations     resolved     Past Surgical History:   Procedure Laterality Date    COLONOSCOPY N/A 9/10/2018    COLONOSCOPY performed by Lux Mckenna MD at [address] [facility] W/O CONT WITH CALCIUM  1/2012    CAC score 0; calcified mediastinal lymph nodes consistent granulomatous disease ECHO 2D ADULT  5/5/2008    normal, LVEF 60%    HX APPENDECTOMY      HX HYSTERECTOMY Bilateral 03/19/2015    and uro repair    HX KNEE REPLACEMENT Right     HX MASTECTOMY Right     HX TONSILLECTOMY      HX UROLOGICAL  8/1/14    Urodynamics    INTRACARD ECHO, THER/DX INTERVENT N/A 5/10/2019    Intracardiac Echocardiogram performed by Jacqui Trotter MD at [address] EXTERNAL  3/28/2018         MS CHEST SURGERY PROCEDURE UNLISTED      lung nodule removed - benign    MS EPHYS EVL TRNSPTL TX ATRIAL FIB ISOLAT PULM VEIN N/A 5/10/2019    ABLATION A-FIB  W COMPLETE EP STUDY performed by Jacqui Trotter MD at [address] CATH LAB    MS INTRACARDIAC ELECTROPHYSIOLOGIC 3D [address] N/A 5/10/2019    Ep 3d Mapping performed by Jacqui Trotter MD at [address] CATH LAB    STRESS TEST CARDIOLITE  1/5/12    walked 7:31, no chest pain, normal MPI. Family History   Problem Relation Age of Onset    Heart Failure Mother     Stroke Father     Other Other         endometrial cancer, niece     Social History     Tobacco Use    Smoking status: Never Smoker    Smokeless tobacco: Never Used   Substance Use Topics    Alcohol use: Yes     Comment: wine nightly        Review of Systems: Review of all other systems otherwise negative. Constitutional: Negative for fever, chills, weight loss, + malaise/fatigue. HEENT: Negative for nosebleeds, vision changes. Respiratory: Negative for cough, hemoptysis  Cardiovascular: Negative for chest pain other than soreness at drain site, palpitations, orthopnea, claudication, leg swelling, syncope, and PND. + SOB  Gastrointestinal: Negative for nausea, vomiting, diarrhea, blood in stool and melena. Genitourinary: Negative for dysuria, and hematuria. Musculoskeletal: Negative for myalgias, arthralgia. + genralized weakness  Skin: Negative for rash. Heme: Does not bleed or bruise easily. Neurological: Negative for speech change and focal weakness. Objective:     Visit Vitals  /60 when seen this 7 am   Pulse 95   Temp 98.9 °F (37.2 °C)   Resp 24   Ht 5' 6\" (1.676 m)   Wt 189 lb 9.5 oz (86 kg)   SpO2 97%   Breastfeeding No   BMI 30.60 kg/m²      Low dose levophed  Physical Exam:   Constitutional: Well-developed and well-nourished. Head: Normocephalic and atraumatic. Eyes: Pupils are equal, round. ENT: Wearing glasses. Neck: Supple. No JVD present. Cardiovascular: Normal rate, regular rhythm. Exam reveals no gallop and no friction rub. No murmur heard. Pericardial window drain in place. Pulmonary/Chest: Respirations unlabored, clear to auscultation bilaterally. Abdominal: Soft, no tenderness. Musculoskeletal: Moves extremities independently, sits in chair. Vasc/lymphatic: No edema. Neurological: Alert & oriented. Skin: Skin is warm and dry. Psych: Mood & affect appropriate. Judgment & behavior appropriate. EKG: (04/26/2021): AF 79 bpm with QTc 445 ms    Assessment/Plan:     Imaging/Studies:  Limited echo (06/22/2021): Small posterior pericardial effusion noted. TIM (06/19/2020): LVEF 45-50%. Mildly dilated LA. Mild to mod MR. Mild to mod TR. Limited echo (10/24/2019): LVEF 56-60%, mild concentric LVH, mildly dilated LV, no RWMA, grade 2 diastolic dysfunction. Mildly dilated RV. Mildly dilated RA. Mod dilated LA. Mild to mod MR. Mild AR & mild aortic valve sclerosis without significant stenosis. Mild to mod PH. Echo (05/11/2019): LVEF 51-55%, no RWMA. Mod dilated LA, mod dilated RA. Small to mod anterior pericardial effusion adjacent to LV & RA.     TIM (05/10/2019): LVEF 56-60%, no RWMA. RV mod dilated. LA mod dilated. RA mod dilated. Mod MR. Mod TR. Mod posterior loculated pericardial effusion adjacent to LV & LA measuring 15 mm. Pericardial effusion: With tamponade x 2, occurred after transseptal puncture during incomplete AF ablation and in CVICU hours later.   Pericardial drain clotted, required pericardial window 06/21/2021; Dr. Juan Ramon Hyatt successfully flushed out clots. No recurrent bleeding noted, output 280 ml in the past 24 hours. Small residual effusion on eco yesterday. Hgb 9.2 today, 10.5 yesterday. Dr. Hitesh Gomez will determine when to pull drain. Thrombocytopenia: New today, plt 92. Will order HIT lab panel to rule out/in HIT. She did receive heparin & protamine during ablation attempt on 06/21/2021. Avoid heparin flushes for now. ? PEA/VT arrest: Occurred during 2nd tamponade on 06/21/2021. CPR, epi bolus. ROSC after 2 minutes. VT: S/p shock x 2, no further significant recurrence since. Will resume amiodarone when she is extubated & awake. Currently NSR    Anticoagulation: Continue to hold Eliquis. Persistent AF: S/p EP study & partial PVI of left superior vein, full PVI of right veins during ablation on 05/10/2019, developed small pericardial effusion during procedure; no tamponade so procedure had to be stopped and heparin was reversed. Again with pericardial effusion as noted above. Further PVI ablation will not be attempted. Recommend BiV pacemaker and AV node ablation at a later time once current acute problems are resolved.        KEM RosarioMiguel [address] [facility] [place]  06/23/21       Addendum from EP attending:   I have seen, examined patient, and discussed with nurse practitioner, registered nurse, reviewed, updated note and agree with the assessment and plan    I have talked to her this am. She is feeling fine but weak, no dyspnea  Vital signs with low bp but stable and on low dose levophed  Exam shows regular rhythm   Chest wall with subxiphoid pericardial drain  Assessment and Plan:  Continue to monitor drain output before removal  Posterior pericardial effusion 1 cm on echo yesterday  Discuss with her about events   Check for HIT panel given low platelet after heparin    Thank you for involving me in this patient's care and please call with further concerns or questions. Stanford Denise M.D.   Electrophysiology/Cardiology  [facility] [place]  [facility] [address], [address] [facility], [address]  [phone]

## 2021-06-23 NOTE — PROGRESS NOTES
I have discussed and updated her progress and condition with her and her son, daughter and grand children in person tonight

## 2021-06-24 ENCOUNTER — APPOINTMENT (OUTPATIENT)
Dept: GENERAL RADIOLOGY | Age: 81
DRG: 270 | End: 2021-06-24
Attending: PHYSICIAN ASSISTANT
Payer: MEDICARE

## 2021-06-24 LAB
ALBUMIN SERPL-MCNC: 3 G/DL (ref 3.5–5)
ALBUMIN/GLOB SERPL: 1.4 {RATIO} (ref 1.1–2.2)
ALP SERPL-CCNC: 50 U/L (ref 45–117)
ALT SERPL-CCNC: 30 U/L (ref 12–78)
ANION GAP SERPL CALC-SCNC: 7 MMOL/L (ref 5–15)
AST SERPL-CCNC: 30 U/L (ref 15–37)
BILIRUB SERPL-MCNC: 0.5 MG/DL (ref 0.2–1)
BUN SERPL-MCNC: 12 MG/DL (ref 6–20)
BUN/CREAT SERPL: 20 (ref 12–20)
CALCIUM SERPL-MCNC: 7.7 MG/DL (ref 8.5–10.1)
CHLORIDE SERPL-SCNC: 112 MMOL/L (ref 97–108)
CO2 SERPL-SCNC: 23 MMOL/L (ref 21–32)
CREAT SERPL-MCNC: 0.61 MG/DL (ref 0.55–1.02)
ERYTHROCYTE [DISTWIDTH] IN BLOOD BY AUTOMATED COUNT: 15.8 % (ref 11.5–14.5)
GLOBULIN SER CALC-MCNC: 2.2 G/DL (ref 2–4)
GLUCOSE BLD STRIP.AUTO-MCNC: 160 MG/DL (ref 65–117)
GLUCOSE SERPL-MCNC: 125 MG/DL (ref 65–100)
GLYCOPROTEIN IV AB, PLTGIV: NEGATIVE
HCT VFR BLD AUTO: 27.2 % (ref 35–47)
HCT VFR BLD AUTO: 27.9 % (ref 35–47)
HGB BLD-MCNC: 8.5 G/DL (ref 11.5–16)
HGB BLD-MCNC: 9 G/DL (ref 11.5–16)
HLA AB SER QL IA: NEGATIVE
MAGNESIUM SERPL-MCNC: 2 MG/DL (ref 1.6–2.4)
MCH RBC QN AUTO: 31.6 PG (ref 26–34)
MCHC RBC AUTO-ENTMCNC: 31.3 G/DL (ref 30–36.5)
MCV RBC AUTO: 101.1 FL (ref 80–99)
NRBC # BLD: 0 K/UL (ref 0–0.01)
NRBC BLD-RTO: 0 PER 100 WBC
PHOSPHATE SERPL-MCNC: 1.3 MG/DL (ref 2.6–4.7)
PLAT GP IA/IIA AB SER QL IA: NEGATIVE
PLAT GP IB/IX AB SER QL IA: NEGATIVE
PLAT GP IIB/IIIA AB SER QL IA: NEGATIVE
PLATELET # BLD AUTO: 80 K/UL (ref 150–400)
POTASSIUM SERPL-SCNC: 4 MMOL/L (ref 3.5–5.1)
PROT SERPL-MCNC: 5.2 G/DL (ref 6.4–8.2)
RBC # BLD AUTO: 2.69 M/UL (ref 3.8–5.2)
SERVICE CMNT-IMP: ABNORMAL
SODIUM SERPL-SCNC: 142 MMOL/L (ref 136–145)
TSH SERPL DL<=0.05 MIU/L-ACNC: 3.89 UIU/ML (ref 0.36–3.74)
WBC # BLD AUTO: 10.6 K/UL (ref 3.6–11)

## 2021-06-24 PROCEDURE — 65610000003 HC RM ICU SURGICAL

## 2021-06-24 PROCEDURE — 94640 AIRWAY INHALATION TREATMENT: CPT

## 2021-06-24 PROCEDURE — 82962 GLUCOSE BLOOD TEST: CPT

## 2021-06-24 PROCEDURE — 74011000250 HC RX REV CODE- 250: Performed by: EMERGENCY MEDICINE

## 2021-06-24 PROCEDURE — 84443 ASSAY THYROID STIM HORMONE: CPT

## 2021-06-24 PROCEDURE — 99233 SBSQ HOSP IP/OBS HIGH 50: CPT | Performed by: INTERNAL MEDICINE

## 2021-06-24 PROCEDURE — 71045 X-RAY EXAM CHEST 1 VIEW: CPT

## 2021-06-24 PROCEDURE — 85018 HEMOGLOBIN: CPT

## 2021-06-24 PROCEDURE — 85027 COMPLETE CBC AUTOMATED: CPT

## 2021-06-24 PROCEDURE — 74011250636 HC RX REV CODE- 250/636: Performed by: EMERGENCY MEDICINE

## 2021-06-24 PROCEDURE — 84100 ASSAY OF PHOSPHORUS: CPT

## 2021-06-24 PROCEDURE — 74011250637 HC RX REV CODE- 250/637: Performed by: PHYSICIAN ASSISTANT

## 2021-06-24 PROCEDURE — 74011000258 HC RX REV CODE- 258: Performed by: EMERGENCY MEDICINE

## 2021-06-24 PROCEDURE — 74011000250 HC RX REV CODE- 250: Performed by: PHYSICIAN ASSISTANT

## 2021-06-24 PROCEDURE — 74011250637 HC RX REV CODE- 250/637: Performed by: NURSE PRACTITIONER

## 2021-06-24 PROCEDURE — 80053 COMPREHEN METABOLIC PANEL: CPT

## 2021-06-24 PROCEDURE — 36415 COLL VENOUS BLD VENIPUNCTURE: CPT

## 2021-06-24 PROCEDURE — 77010033678 HC OXYGEN DAILY

## 2021-06-24 PROCEDURE — 74011250637 HC RX REV CODE- 250/637: Performed by: EMERGENCY MEDICINE

## 2021-06-24 PROCEDURE — 74011250637 HC RX REV CODE- 250/637: Performed by: INTERNAL MEDICINE

## 2021-06-24 PROCEDURE — 74011000250 HC RX REV CODE- 250: Performed by: INTERNAL MEDICINE

## 2021-06-24 PROCEDURE — 83735 ASSAY OF MAGNESIUM: CPT

## 2021-06-24 PROCEDURE — 97530 THERAPEUTIC ACTIVITIES: CPT

## 2021-06-24 PROCEDURE — 74011250636 HC RX REV CODE- 250/636: Performed by: PHYSICIAN ASSISTANT

## 2021-06-24 RX ORDER — MIDODRINE HYDROCHLORIDE 2.5 MG/1
2.5 TABLET ORAL EVERY 8 HOURS
Status: DISCONTINUED | OUTPATIENT
Start: 2021-06-24 | End: 2021-06-26

## 2021-06-24 RX ORDER — THERA TABS 400 MCG
1 TAB ORAL DAILY
Status: DISCONTINUED | OUTPATIENT
Start: 2021-06-24 | End: 2021-06-28 | Stop reason: HOSPADM

## 2021-06-24 RX ORDER — BUDESONIDE 0.5 MG/2ML
1000 INHALANT ORAL
Status: DISCONTINUED | OUTPATIENT
Start: 2021-06-24 | End: 2021-06-28 | Stop reason: HOSPADM

## 2021-06-24 RX ORDER — LANOLIN ALCOHOL/MO/W.PET/CERES
1 CREAM (GRAM) TOPICAL
Status: DISCONTINUED | OUTPATIENT
Start: 2021-06-24 | End: 2021-06-28 | Stop reason: HOSPADM

## 2021-06-24 RX ADMIN — DOCUSATE SODIUM 50 MG AND SENNOSIDES 8.6 MG 1 TABLET: 8.6; 5 TABLET, FILM COATED ORAL at 09:23

## 2021-06-24 RX ADMIN — POTASSIUM CHLORIDE 20 MEQ: 750 TABLET, FILM COATED, EXTENDED RELEASE ORAL at 09:23

## 2021-06-24 RX ADMIN — FAMOTIDINE 20 MG: 20 TABLET ORAL at 09:23

## 2021-06-24 RX ADMIN — SODIUM PHOSPHATE, MONOBASIC, MONOHYDRATE: 276; 142 INJECTION, SOLUTION INTRAVENOUS at 12:52

## 2021-06-24 RX ADMIN — ALBUTEROL SULFATE 2.5 MG: 2.5 SOLUTION RESPIRATORY (INHALATION) at 07:42

## 2021-06-24 RX ADMIN — RALOXIFENE HYDROCHLORIDE 60 MG: 60 TABLET, FILM COATED ORAL at 09:28

## 2021-06-24 RX ADMIN — MUPIROCIN: 20 OINTMENT TOPICAL at 09:31

## 2021-06-24 RX ADMIN — FERROUS SULFATE TAB 325 MG (65 MG ELEMENTAL FE) 325 MG: 325 (65 FE) TAB at 09:25

## 2021-06-24 RX ADMIN — CHLORHEXIDINE GLUCONATE 10 ML: 1.2 RINSE ORAL at 20:57

## 2021-06-24 RX ADMIN — Medication 10 ML: at 22:00

## 2021-06-24 RX ADMIN — BUDESONIDE 1000 MCG: 0.5 INHALANT RESPIRATORY (INHALATION) at 21:37

## 2021-06-24 RX ADMIN — Medication 10 ML: at 07:02

## 2021-06-24 RX ADMIN — DOCUSATE SODIUM 50 MG AND SENNOSIDES 8.6 MG 1 TABLET: 8.6; 5 TABLET, FILM COATED ORAL at 17:27

## 2021-06-24 RX ADMIN — ATORVASTATIN CALCIUM 20 MG: 20 TABLET, FILM COATED ORAL at 17:27

## 2021-06-24 RX ADMIN — Medication 5000 UNITS: at 09:23

## 2021-06-24 RX ADMIN — CHLORHEXIDINE GLUCONATE 10 ML: 1.2 RINSE ORAL at 09:31

## 2021-06-24 RX ADMIN — ACETAMINOPHEN 650 MG: 325 TABLET ORAL at 23:58

## 2021-06-24 RX ADMIN — VANCOMYCIN HYDROCHLORIDE 1000 MG: 1 INJECTION, POWDER, LYOPHILIZED, FOR SOLUTION INTRAVENOUS at 09:28

## 2021-06-24 RX ADMIN — MONTELUKAST 10 MG: 10 TABLET, FILM COATED ORAL at 17:27

## 2021-06-24 RX ADMIN — FAMOTIDINE 20 MG: 20 TABLET ORAL at 20:49

## 2021-06-24 RX ADMIN — ACETAMINOPHEN 650 MG: 325 TABLET ORAL at 17:27

## 2021-06-24 RX ADMIN — BUDESONIDE 1000 MCG: 0.5 INHALANT RESPIRATORY (INHALATION) at 07:42

## 2021-06-24 RX ADMIN — ACETAMINOPHEN 650 MG: 325 TABLET ORAL at 03:08

## 2021-06-24 RX ADMIN — LEVOFLOXACIN 750 MG: 500 TABLET, FILM COATED ORAL at 07:04

## 2021-06-24 RX ADMIN — THERA TABS 1 TABLET: TAB at 09:23

## 2021-06-24 RX ADMIN — MIDODRINE HYDROCHLORIDE 2.5 MG: 2.5 TABLET ORAL at 21:34

## 2021-06-24 RX ADMIN — VANCOMYCIN HYDROCHLORIDE 1000 MG: 1 INJECTION, POWDER, LYOPHILIZED, FOR SOLUTION INTRAVENOUS at 20:53

## 2021-06-24 RX ADMIN — AMIODARONE HYDROCHLORIDE 200 MG: 200 TABLET ORAL at 09:23

## 2021-06-24 RX ADMIN — MUPIROCIN: 20 OINTMENT TOPICAL at 20:57

## 2021-06-24 RX ADMIN — NOREPINEPHRINE BITARTRATE 3 MCG/MIN: 1 INJECTION, SOLUTION, CONCENTRATE INTRAVENOUS at 03:08

## 2021-06-24 RX ADMIN — MIDODRINE HYDROCHLORIDE 2.5 MG: 2.5 TABLET ORAL at 16:14

## 2021-06-24 RX ADMIN — MIDODRINE HYDROCHLORIDE 2.5 MG: 2.5 TABLET ORAL at 10:45

## 2021-06-24 RX ADMIN — CETIRIZINE HYDROCHLORIDE 10 MG: 10 TABLET, FILM COATED ORAL at 17:27

## 2021-06-24 RX ADMIN — LEVOTHYROXINE SODIUM 25 MCG: 0.03 TABLET ORAL at 06:57

## 2021-06-24 NOTE — PROGRESS NOTES
Physical therapy    Reviewed chart and attempted to treat pt. Pt with OT returning to bed from sitting in chair. Pt reports too fatigued to attempt gait. Assisted OT with returning supine. Will continue to follow and encourage progression.

## 2021-06-24 NOTE — PROGRESS NOTES
Cardiac Electrophysiology Hospital Progress Note     Subjective:      Jaqueline Morales is a [de-identified] y.o. patient who was admitted after developing cardiac tamponade during AF ablation attempt. Pericardial effusion occurred after transseptal puncture (thick septum), required emergent pericardiocentesis in EP lab. Extubated post procedure. There was no blood in the bag until 4 pm later in the afternoon, she had further cardiac tamponade, required pericardial window, POD2    Initial drain appeared to have been blocked by clot, which was then successfully flushed out during pericardial window procedure. She received 1 unit PRBCs prior to going to the OR due to concern for blood loss. Drain output during last 24 hours has been 90 (50, 30 and then 10 cc)     on levophed 0. 3 mcg/min IV gtt       Holding Eliquis. In NSR (was cardioverted for VT during tamponade)        Previous:  S/p TIM/DCCV 06/2020. S/p AF ablation 05/10/2019. PVI unable to be completed on left side due to pericardial effusion without tamponade. Heparin stopped & reversed. Persistent AF, failed DCCV & flecainide. Cardioversion with Dr. Luz Maria Shetty on 2/22/19, had follow up on 2/27/19, back in atrial fibrillation. Mild dilated cardiomyopathy, improved on GDMT. Couldn't tolerate meds due to low BP. Negative stress test 5 yrs ago per her report.           Problem List  Date Reviewed: 6/21/2021        Codes Class Noted    persistent atrial fibrillation  ICD-10-CM: I48.91  ICD-9-CM: 427.31  6/21/2021        S/P ablation of atrial fibrillation ICD-10-CM: Z98.890, Z86.79  ICD-9-CM: V45.89  5/10/2019        Cardiac/pericardial tamponade ICD-10-CM: I31.4  ICD-9-CM: 423.3  6/21/2021        Encounter for cardioversion procedure ICD-10-CM: Z01.89  ICD-9-CM: V72.85  3/28/2018    Overview Signed 3/28/2018  1:06 PM by Yara Martines MD     3/28/2018 200 J to NSR after a TIM without thrombus             Dizziness ICD-10-CM: R42  ICD-9-CM: 780.4  3/27/2018        Cystocele ICD-10-CM: WOD8629  ICD-9-CM: CXT2776  8/1/2014        Uterine prolapse ICD-10-CM: N81.4  ICD-9-CM: 618.1  8/1/2014        HTN (hypertension) ICD-10-CM: I10  ICD-9-CM: 401.9  Unknown        Lung nodule ICD-10-CM: R91.1  ICD-9-CM: 793.11  Unknown    Overview Signed 5/14/2014 10:34 AM by MD Dr. Guerita Whittaker: J45.909  ICD-9-CM: 493.90  Unknown        Hyperlipidemia LDL goal < 130 ICD-10-CM: E78.5  ICD-9-CM: 272.4  Unknown    Overview Signed 4/26/2013  9:17 AM by Kendal Moore MD     for increased LDL particles, Dr. Jessi Baptiste             Dyslipidemia ICD-10-CM: E78.5  ICD-9-CM: 272.4  Unknown    Overview Signed 12/31/2011  4:11 PM by Davide Corbin MD     labs 2008 - chol 283, HDL 83, ,              Palpitations ICD-10-CM: R00.2  ICD-9-CM: 785.1  Unknown        Chest pain, unspecified ICD-10-CM: R07.9  ICD-9-CM: 786.50  12/31/2011        S/P pericardial window creation ICD-10-CM: L32.722  ICD-9-CM: V45.89  6/21/2021        * (Principal) Pericardial effusion with cardiac tamponade ICD-10-CM: I31.3, I31.4  ICD-9-CM: 423.9, 423.3  5/10/2019    Overview Signed 5/10/2019  1:34 PM by Lolita Cunningham MD     Loculated posterior LA and LV, no tamponade             Non-rheumatic mitral regurgitation ICD-10-CM: I34.0  ICD-9-CM: 424.0  5/10/2019    Overview Signed 5/10/2019  1:34 PM by Lolita Cunningham MD     moderate             Non-rheumatic tricuspid valve insufficiency ICD-10-CM: I36.1  ICD-9-CM: 424.2  5/10/2019        Persistent atrial fibrillation ([address] ICD-10-CM: I48.19  ICD-9-CM: 427.31  3/27/2018        Breast cancer ([facility] ICD-10-CM: C50.919  ICD-9-CM: 174.9  Unknown              Current Facility-Administered Medications   Medication Dose Route Frequency Provider Last Rate Last Admin    potassium chloride SR (KLOR-CON 10) tablet 20 mEq  20 mEq Oral DAILY Adal CHEUNG MD   20 mEq at 06/23/21 0857    levoFLOXacin (LEVAQUIN) tablet 750 mg  750 mg Oral ACB JOSE Carty        acetaminophen (TYLENOL) tablet 650 mg  650 mg Oral Q4H JOSE Carty   650 mg at 06/24/21 0308    vancomycin (VANCOCIN) 1,000 mg in 0.9% sodium chloride 250 mL (VIAL-MATE)  1,000 mg IntraVENous Q12H Cecile Alexandra  mL/hr at 06/23/21 2106 1,000 mg at 06/23/21 2106    NOREPINephrine (LEVOPHED) 8 mg in 5% dextrose 250mL (32 mcg/mL) infusion  0.5-30 mcg/min IntraVENous TITRATE Alden CHEUNG MD 3.8 mL/hr at 06/24/21 0639 2 mcg/min at 06/24/21 4206    sodium chloride (NS) flush 5-40 mL  5-40 mL IntraVENous PRN Gerald Age, NP        sodium chloride (NS) flush 5-40 mL  5-40 mL IntraVENous Q8H Lindsay CHEUNG, NP   10 mL at 06/23/21 2121    sodium chloride (NS) flush 5-40 mL  5-40 mL IntraVENous PRN Gerald Age, NP        sodium chloride (NS) flush 5-40 mL  5-40 mL IntraVENous PRN Gerald Age, NP        levothyroxine (SYNTHROID) tablet 25 mcg  25 mcg Oral ACB Zada  B, NP   25 mcg at 06/23/21 0659    montelukast (SINGULAIR) tablet 10 mg  10 mg Oral QPM Zada Alex CHEUNG, NP   10 mg at 06/23/21 1854    PHENYLephrine (DOE-SYNEPHRINE) 30 mg in 0.9% sodium chloride 250 mL infusion   mcg/min IntraVENous TITRATE Ignacio Batista MD   Stopped at 06/22/21 1056    propofol (DIPRIVAN) 10 mg/mL infusion  0-50 mcg/kg/min IntraVENous TITRATE Ignacio Batista MD   Stopped at 06/22/21 1201    HYDROmorphone (PF) (DILAUDID) injection 1 mg  1 mg IntraVENous Q4H PRN Ignacio Batista MD   1 mg at 06/22/21 1425    ELECTROLYTE REPLACEMENT PROTOCOL - Potassium and Magnesium  1 Each Other PRN Ignacio Batista MD        albuterol (PROVENTIL VENTOLIN) nebulizer solution 2.5 mg  2.5 mg Nebulization Q4H PRN Gerald Petersne NP        atorvastatin (LIPITOR) tablet 20 mg  20 mg Oral QPM Haylie Alexandra, PA   20 mg at 06/23/21 1854    cetirizine (ZYRTEC) tablet 10 mg  10 mg Oral QPM JOSE Crawford   10 mg at 06/23/21 1854    cholecalciferol (VITAMIN D3) (1000 Units /25 mcg) tablet 5,000 Units  5,000 Units Oral DAILY JOSE Crawford   5,000 Units at 06/23/21 0857    cyclobenzaprine (FLEXERIL) tablet 5 mg  5 mg Oral BID PRN Haylie Alexandra PA        diphenhydrAMINE (BENADRYL) capsule 25 mg  25 mg Oral Q6H PRN Haylie Alexandra PA        budesonide (PULMICORT) 500 mcg/2 ml nebulizer suspension  1,000 mcg Nebulization BID Haylie Alexandra PA   1,000 mcg at 06/23/21 0813    raloxifene (EVISTA) tablet 60 mg  60 mg Oral DAILY JOSE Crawford   60 mg at 06/23/21 0903    amiodarone (CORDARONE) tablet 200 mg  200 mg Oral DAILY Haylie Alexandra PA   200 mg at 06/23/21 0857    0.9% sodium chloride infusion 250 mL  250 mL IntraVENous PRN Ki Feliciano MD        0.9% sodium chloride infusion 250 mL  250 mL IntraVENous PRN Ki Feliciano MD        [facility]- pharmacy to dose   Other Rx Dosing/Monitoring Haylie Alexandra PA        insulin regular (NOVOLIN R, HUMULIN R) 100 Units in 0.9% sodium chloride 100 mL infusion  0-50 Units/hr IntraVENous TITRATE Haylie Alexandra PA        sodium chloride (NS) flush 5-40 mL  5-40 mL IntraVENous PRN Haylie Alexandra PA        0.45% sodium chloride infusion  10 mL/hr IntraVENous CONTINUOUS Haylie Alexandra PA 10 mL/hr at 06/23/21 2013 10 mL/hr at 06/23/21 2013    0.9% sodium chloride infusion  9 mL/hr IntraVENous CONTINUOUS Haylie Alexandra PA 3 mL/hr at 06/23/21 0658 3 mL/hr at 06/23/21 0658    oxyCODONE IR (ROXICODONE) tablet 5 mg  5 mg Oral Q4H PRN Haylie Alexandra PA   5 mg at 06/23/21 0858    oxyCODONE IR (ROXICODONE) tablet 10 mg  10 mg Oral Q4H PRN Haylie Alexandra PA   10 mg at 06/23/21 3741    naloxone (NARCAN) injection 0.4 mg  0.4 mg IntraVENous PRN Haylie Alexandra PA        mupirocin (BACTROBAN) 2 % ointment   Both Nostrils BID Haylie Alexandra, [address]   Given at 06/23/21 1906    ondansetron (ZOFRAN) injection 4 mg  4 mg IntraVENous Q4H PRN Haylie Alexandra PA        albuterol (PROVENTIL VENTOLIN) nebulizer solution 2.5 mg  2.5 mg Nebulization Q4H PRN Haylie Alexandra PA   2.5 mg at 06/23/21 1606    midazolam (VERSED) injection 1 mg  1 mg IntraVENous Q1H PRN Haylie Alexandra PA   1 mg at 06/22/21 0409    chlorhexidine (PERIDEX) 0.12 % mouthwash 10 mL  10 mL Oral Q12H Haylie Alexandra PA   10 mL at 06/23/21 2100    famotidine (PEPCID) tablet 20 mg  20 mg Oral Q12H Haylie Alexandra PA   20 mg at 06/23/21 2106    calcium chloride 1 g in 0.9% sodium chloride 100 mL IVPB  1 g IntraVENous PRN Haylie Alexandra PA        bisacodyL (DULCOLAX) suppository 10 mg  10 mg Rectal DAILY PRN Haylie Alexandra PA        senna-docusate (PERICOLACE) 8.6-50 mg per tablet 1 Tablet  1 Tablet Oral BID JOSE Barber   1 Tablet at 06/23/21 0857    polyethylene glycol (MIRALAX) packet 17 g  17 g Oral DAILY Haylie Alexandra PA   17 g at 06/23/21 0857    ELECTROLYTE REPLACEMENT NOTE: Nurse to review Serum Potassium and Magnesuim levels and Initiate Electrolyte Replacement Protocol as needed  1 Each Other PRN Haylie Alexandra PA        magnesium sulfate 1 g/100 ml IVPB (premix or compounded)  1 g IntraVENous PRN Haylie Alexandra PA        glucose chewable tablet 16 g  4 Tablet Oral PRN Haylie Alexandra PA        dextrose (D50W) injection syrg 12.5-25 g  12.5-25 g IntraVENous PRN Haylie Alexandra PA        glucagon (GLUCAGEN) injection 1 mg  1 mg IntraMUSCular PRN Haylie Alexandra PA        insulin lispro (HUMALOG) injection   SubCUTAneous TIDAC Haylie Alexandra PA        insulin lispro (HUMALOG) injection   SubCUTAneous AC&HS JOSE Barber   2 Units at 06/23/21 1909    dexmedeTOMidine in 0.9 % NaCl (PRECEDEX) 400 mcg/100 mL (4 mcg/mL) infusion soln  0.1-1.5 mcg/kg/hr IntraVENous TITRATE Haylie Alexandra PA         Allergies Allergen Reactions    Betadine [Povidone-Iodine] Rash    Iodine Rash    Norvasc [Amlodipine] Other (comments)     Flushing/redness. Past Medical History:   Diagnosis Date    Arthritis     Asthma     dr. Rosemary De La Torre allergist    [facility]     Breast cancer ([facility] 1980    right - mastectomy    Coagulation disorder ([facility]     on eliquis    Dyslipidemia     labs 2008 - chol 283, HDL 83, ,     HTN (hypertension)     Hyperlipidemia LDL goal < 130     for increased LDL particles, Dr. Adán Mg nodule     Dr. Ramos Falling Palpitations     resolved     Past Surgical History:   Procedure Laterality Date    COLONOSCOPY N/A 9/10/2018    COLONOSCOPY performed by Oswald Carlos MD at [facility] W/O CONT WITH CALCIUM  1/2012    CAC score 0; calcified mediastinal lymph nodes consistent granulomatous disease    ECHO 2D ADULT  5/5/2008    normal, LVEF 60%    HX APPENDECTOMY      HX HYSTERECTOMY Bilateral 03/19/2015    and uro repair    HX KNEE REPLACEMENT Right     HX MASTECTOMY Right     HX TONSILLECTOMY      HX UROLOGICAL  8/1/14    Urodynamics    INTRACARD ECHO, THER/DX INTERVENT N/A 5/10/2019    Intracardiac Echocardiogram performed by Katie Ritter MD at [address]  3/28/2018         NH CHEST SURGERY PROCEDURE UNLISTED      lung nodule removed - benign    NH EPHYS EVL TRNSPTL TX ATRIAL FIB ISOLAT PULM VEIN N/A 5/10/2019    ABLATION A-FIB  W COMPLETE EP STUDY performed [place] [address], [address] CATH LAB    NH INTRACARDIAC ELECTROPHYSIOLOGIC 3D MAPPING N/A 5/10/2019    Ep 3d Mapping performed by Katie Ritter MD at [place] [address], [address] CATH LAB    [facility]  1/5/12    walked 7:31, no chest pain, normal MPI.      Family History   Problem Relation Age of Onset    Heart Failure Mother     Stroke Father     Other Other         endometrial cancer, niece     Social History     Tobacco Use    Smoking status: Never Smoker    Smokeless tobacco: Never Used   Substance Use Topics    Alcohol use: Yes     Comment: wine nightly        Review of Systems: Review of all other systems otherwise negative. Constitutional: Negative for fever, chills, weight loss, + malaise/fatigue. HEENT: Negative for nosebleeds, vision changes. Respiratory: hard to cough   Cardiovascular: Negative for chest pain other than soreness at drain site, palpitations, orthopnea, claudication, leg swelling, syncope, and PND. Gastrointestinal: Negative for nausea, vomiting, diarrhea, blood in stool and melena. Genitourinary: Negative for dysuria, and hematuria. Musculoskeletal: Negative for myalgias, arthralgia. + generalized weakness  Skin: Negative for rash. Heme: Does not bleed or bruise easily. Neurological: Negative for speech change and focal weakness. Objective:     Visit Vitals  /68 on low dose levophed   Pulse 93   Temp 98.6 °F (37 °C)   Resp 19   Ht 5' 6\" (1.676 m)   Wt 189 lb 9.5 oz (86 kg)   SpO2 99%   Breastfeeding No   BMI 30.60 kg/m²       Physical Exam:   Constitutional: Well-developed and well-nourished. Head: Normocephalic and atraumatic. Eyes: Pupils are equal, round. ENT: Wearing glasses. Neck: right IJ central line  Cardiovascular: Normal rate, regular rhythm. Exam reveals no gallop and no friction rub. No murmur heard. Gavin drain in place. Pulmonary/Chest: Respirations unlabored, clear to auscultation bilaterally. Abdominal: Soft, no tenderness. Musculoskeletal: Moves extremities independently   Vasc/lymphatic: trace edema. Neurological: Alert & oriented. Skin: Skin is warm and dry. Psych: Mood & affect appropriate. Judgment & behavior appropriate. Telemetry NRSR    Assessment/Plan:     Imaging/Studies:  Limited echo (06/22/2021): Small posterior pericardial effusion noted. TIM (06/19/2020): LVEF 45-50%. Mildly dilated LA. Mild to mod MR. Mild to mod TR.      Limited echo (10/24/2019): LVEF 56-60%, mild concentric LVH, mildly dilated LV, no RWMA, grade 2 diastolic dysfunction. Mildly dilated RV. Mildly dilated RA. Mod dilated LA. Mild to mod MR. Mild AR & mild aortic valve sclerosis without significant stenosis. Mild to mod PH. Echo (05/11/2019): LVEF 51-55%, no RWMA. Mod dilated LA, mod dilated RA. Small to mod anterior pericardial effusion adjacent to LV & RA.     TIM (05/10/2019): LVEF 56-60%, no RWMA. RV mod dilated. LA mod dilated. RA mod dilated. Mod MR. Mod TR. Mod posterior loculated pericardial effusion adjacent to LV & LA measuring 15 mm. AFIB: NSR post cardioversion   eliquis on hold   Amiodarone 200 mg every day  If recurrent AFIB consider BIV pacer and av node ablation     Pericardial effusion: drainage via manpreet drain is slowing down to 10 cc last shift  If not much this morning perhaps it can be removed by CT surgery team and then she can be weaned off levophed and goes to step down unit. Echo after drain is removed    Thrombocytopenia: Avoid heparin flushes for now. Disposition: home is possible in 48-72 hrs    Thank you for involving me in this patient's care and please call with further concerns or questions. Barb Osgood, M.D.   Electrophysiology/Cardiology  [facility] [place]  [address] , [address]  [phone]

## 2021-06-24 NOTE — PROGRESS NOTES
[facility] ICU Progress Note    Admit Date: 2021  POD:  3 Day Post-Op    Procedure:    PERICARDIAL WINDOW / TIM BY DR. Cherelle Mims        Subjective/24 hr events:   Pt seen with Dr. Jacqueline Mahoney. Feels well this morning. Chest tubes a little sore. On 2LNC. On Levo at 2. Objective:   Vitals:  Blood pressure (!) 84/53, pulse 97, temperature 98.6 °F (37 °C), resp. rate 27, height 5' 6\" (1.676 m), weight 194 lb 6.4 oz (88.2 kg), SpO2 96 %, not currently breastfeeding. Temp (24hrs), Av.5 °F (36.9 °C), Min:98.4 °F (36.9 °C), Max:98.6 °F (37 °C)    EKG/Rhythm:    NSR 70s    CT Output:   30ml overnight (70ml 24 hours)    Oxygen Therapy:  Oxygen Therapy  O2 Sat (%): 96 % (21 0745)  Pulse via Oximetry: 84 beats per minute (21 0745)  O2 Device: Nasal cannula (21 0745)  O2 Flow Rate (L/min): 2 l/min (21 0745)  FIO2 (%): 40 % (21 1200)    CXR:  CXR Results  (Last 48 hours)               21 0441  XR CHEST PORT Final result    Impression:  No significant change. Small left pleural effusion left lower lobe   volume loss. Prominence of the right hilum unchanged       Narrative:  EXAM: XR CHEST PORT       INDICATION: post-op heart       COMPARISON: 2021       FINDINGS: A portable AP radiograph of the chest was obtained at 435 hours. Tubes   and lines are stable. . Small left pleural effusion and left lower lobe volume   loss are again noted. Scarring in the right with prior wedge resections again   noted. Prominence of the right hilum is also again noted. . The cardiac and   mediastinal contours and pulmonary vascularity are normal.  The bones and soft   tissues are grossly within normal limits. 21 0507  XR CHEST PORT Final result    Impression:  Left pleural effusion. Shallow volumes and pulmonary vascular congestion. Narrative:  PORTABLE CHEST RADIOGRAPH/S: 2021 5:07 AM       INDICATION: Postop heart. COMPARISON: 2021, 2019.        TECHNIQUE: Portable frontal semiupright radiograph/s of the chest.       FINDINGS:    Left basilar atelectasis is associated with a pleural effusion. The lungs are   hypoinflated. There is pulmonary vascular congestion. There is a wedge section   staple line in the right lateral lung. The central airways are patent. A right   IJ central line and mediastinal drains remain in place. 06/22/21 1104  XR CHEST PORT Final result    Impression:  1. Interval decrease in left pleural effusion with some residual atelectasis at   the bases. 2. No change in lines and tubes. Narrative:  INDICATION:  s/p left thoracentesis       COMPARISON:  6/22/21 0404 hours       FINDINGS:    A portable AP radiograph of the chest was obtained at 1049 hours. The patient is on a cardiac monitor. Endotracheal tube is in place. Central   venous catheter on the right. Mediastinal and pleural drainage catheters are in   place. Nasogastric tube traverses the thorax. There has been interval decrease in hazy opacity representing left pleural   effusion compared to the exam earlier today. Right lung remains relatively clear   without significant fluid. .   Cardiomediastinal silhouette is stable. Pulmonary vascularity does not appear to be congested. Admission Weight: Last Weight   Weight: 183 lb 9.6 oz (83.3 kg) Weight: 194 lb 6.4 oz (88.2 kg)     Intake / Output / Drain:  Current Shift: No intake/output data recorded. Last 24 hrs.:     Intake/Output Summary (Last 24 hours) at 6/24/2021 0810  Last data filed at 6/24/2021 0700  Gross per 24 hour   Intake 1643.68 ml   Output 1021 ml   Net 622.68 ml       EXAM:  General:   Pleasant, cooperative      Lungs:   Diminished left lung base   Incision:  Dressing c/d/i   Heart:  Regular rate and rhythm, S1, S2 normal, no murmur, click, rub or gallop. Abdomen:   Soft, non-tender. Bowel sounds normal. No masses,  No organomegaly. Extremities:  No edema. PPP.     Neurologic: Grossly intact     Labs:   Recent Labs     21  0313 21  0306 21  0442 21  2210 21  1900   WBC 10.6  --    < >   < > 13.5*   HGB 8.5*  --    < >   < > 9.2*   HCT 27.2*  --    < >   < > 28.6*   PLT 80*  --    < >   < > 134*     --    < >   < > 145   K 4.0  --    < >   < > 3.5   BUN 12  --    < >   < > 20   CREA 0.61  --    < >   < > 1.01   *  --    < >   < > 271*   GLUCPOC  --  160*  --    < >  --    INR  --   --   --   --  1.2*    < > = values in this interval not displayed. Assessment:     Principal Problem:    Pericardial effusion with cardiac tamponade (5/10/2019)      Overview: Loculated posterior LA and LV, no tamponade    Active Problems:    persistent atrial fibrillation  (2021)      Breast cancer (HCC) ()      Non-rheumatic mitral regurgitation (5/10/2019)      Overview: moderate      Non-rheumatic tricuspid valve insufficiency (5/10/2019)      Cardiac/pericardial tamponade (2021)      S/P pericardial window creation (2021)         Plan/Recommendations/Medical Decision Makin. Cardiac tamponade s/p pericardial window and drain placement: Stable. Keep chest tubes for now. Wean norepi per primary team.  2. Acute post op blood loss anemia: Hemoglobin trending down slightly, likely dilitutional from IV fluid boluses. Monitor H/H. CT output. 3. Left sided pleural effusion/atelectasis: US guided thoracentesis removed about 400 mL fluid. Monitor daily CXR. Wean O2 per primary team.   4. Leukocytosis/possible aspiration PNA: On vanc/levaquin. Monitor QTc  5. Atrial fibrillation s/p ablation: Dr. Aparna Mitchell following  6. Hyperlipidemia: On atorvastatin  7. Hypothyroidism: On replacement  8. Asthma: On singulair  9. Thrombocytopenia: HIT panel ordered by primary team.     Dispo: Remain in CVICU. PT/OT. Keep drains for now.      Signed By: JOSE Savage

## 2021-06-24 NOTE — PROGRESS NOTES
0800- bedside report and drips verified. Patient in chair without complaints. Uneventful shift, patient did not report pain or shortness of breath during this shift. Paitent was out of bed three times today (breakfast, lunch, and dinner). 2000- Bedside and Verbal shift change report given to Mary Alfonso RN (oncoming nurse) by Francisco Acevedo RN (offgoing nurse).  Report included the following information SBAR, Kardex, Recent Results and Cardiac Rhythm SR.

## 2021-06-24 NOTE — PROGRESS NOTES
HPI    80-year-old woman brought to the ICU for postoperative care. She has a significant history of A. fib-the plan was to go for an ablation [id number] the procedure was complicated by V. tach with hemodynamic instability and a large pericardial effusion-was cardioverted and emergency pericardiocentesis done and a pericardial drain left in. Brought to the ICU for continued care. Patient intubated.     Interval changes    6/23 - 24 - Still on levo    6/22 - yesterday had re accumulation of effusion - went into tamponade, coded, ROSC after 2 mins, went to OR for manpreet placements, s/p L thora this AM - 400 cc of sanguinous drainage, extubated this AM    Patient Vitals for the past 24 hrs:   Temp Pulse Resp SpO2   06/24/21 1200 98 °F (36.7 °C) 87 25 98 %   06/24/21 1100  91 21 97 %   06/24/21 1000  97 22 95 %   06/24/21 0900  93 21 95 %   06/24/21 0800 98 °F (36.7 °C) 93 14 95 %   06/24/21 0745    96 %   06/24/21 0700  97 27 98 %   06/24/21 0600  93 19 99 %   06/24/21 0500  93 20 99 %   06/24/21 0400 98.6 °F (37 °C) 91 19 99 %   06/24/21 0300  91 17 97 %   06/24/21 0200  90 19 98 %   06/24/21 0100  92 17 98 %   06/24/21 0000 98.5 °F (36.9 °C) 90 16 96 %   06/23/21 2300  88 18 97 %   06/23/21 2200  83 19 98 %   06/23/21 2100  85 25 98 %   06/23/21 2000 98.4 °F (36.9 °C) 84 22 96 %   06/23/21 1900  88 20 97 %   06/23/21 1800  88 18 98 %   06/23/21 1700  98 24 97 %   06/23/21 1607    99 %   06/23/21 1600 98.5 °F (36.9 °C) 98 23 98 %   06/23/21 1500  (!) 101 17 95 %   06/23/21 1400  96 19 95 %     Physical exam  General-NAD  Neuro-Non focal, generalized weakness  Cardiac-RRR, manpreet x 2 in situ  Lungs-clear  Abdomen-soft, nontender, nondistended  Extremities-warm    Labs, meds and imaging reviewed    Assessment  A. fib  V. tach/cardiac tamponade status post emergency pericardiocentesis  Left intubated postoperatively  Reaccumulation of pericardial fluid - tamponde - PEA arrest - now s/p manpreet placment    Plan    Pain control as needed, PT/OT/OOB as tolerated, other delirium prevention strategies    Continue hemodynamic monitoring, MAP goal > 65, still on levo, will start midodrine, monitor manpreet output, follow cardiology/CS recommendations    On NC, sat goal > 90%, encourage incentive spirometry    Diet as tolerated, antiemetics as needed, on H2RA therapy    Monitor urine output closely, correct electrolyte derangements as needed    SCDs for DVT ppx, now on iron    Leukocytosis improving - likely reactional, on vanc (previous pericardial drain was manipulated in a non sterile fashion during the code), also on levaquin per CS team    Keep glucose less than 180, continue Synthroid    Critical care time-40 minutes    Signed By: Nazia Navarrete MD     June 24, 2021

## 2021-06-24 NOTE — PROGRESS NOTES
2000: Received report from Ailyn Parr RN. Gtt dual verified. 0600: Pt OOB X2 assist to chair without difficulty. 0800: Bedside and Verbal shift change report given to [facility] [employer] (oncoming nurse) by Ryan Stapleton (offgoing nurse). Report included the following information SBAR, ED Summary, OR Summary, Procedure Summary, Intake/Output, MAR, Accordion, Med Rec Status and Cardiac Rhythm NSR [id number] AV Block.

## 2021-06-24 NOTE — PROGRESS NOTES
Pharmacist Note - Vancomycin Dosing  Therapy day 3  Indication: bacteremia  Current regimen: 1250 mg IV Q16h    Recent Labs     06/23/21  0442 06/22/21  1336 06/22/21  0346 06/22/21  0030 06/21/21  1900   WBC 13.3*  --  17.5*  --  13.5*   CREA 0.74 1.12* 1.14*   < > 1.01   BUN 15 24* 24*   < > 20    < > = values in this interval not displayed. A Trough Level resulted at 11.2 mcg/mL which was obtained 17 hrs post-dose. The extrapolated \"true\" trough is approximately 12 mcg/mL based on the patient's known kinetics. Goal trough: 15 - 20 mcg/mL     Plan: Change to 1000 mg IV Q12h . Pharmacy will continue to monitor this patient daily for changes in clinical status and renal function.

## 2021-06-24 NOTE — PROGRESS NOTES
Problem: Self Care Deficits Care Plan (Adult)  Goal: *Acute Goals and Plan of Care (Insert Text)  Description: FUNCTIONAL STATUS PRIOR TO ADMISSION: Patient was independent and active without use of DME. Patient was driving and performed majority of IADL tasks however has a woman come to assist with cleaning her home. Patient reports that she is a  and is an active Taoism-goer. HOME SUPPORT: The patient lived alone with her daughter, friends, and Taoism community to provide assistance as needed. Occupational Therapy Goals  Initiated 6/23/2021  1. Patient will perform ADLs standing 5 mins without fatigue or LOB with supervision/set-up within 7 day(s). 2.  Patient will perform lower body ADLs with supervision/set-up within 7 day(s). 3.  Patient will perform gathering ADL items high and low 2/2 with supervision/set-up within 7 day(s). 4.  Patient will perform toilet transfers with supervision/set-up within 7 day(s). 5.  Patient will perform all aspects of toileting with supervision/set-up within 7 day(s). 6.  Patient will participate in cardiac/sternal upper extremity therapeutic exercise/activities to increase independence with ADLs with supervision/set-up for 5 minutes within 7 day(s). Outcome: Progressing Towards Goal   OCCUPATIONAL THERAPY TREATMENT  Patient: Lacey Kincaid (82 y.o. female)  Date: 6/24/2021  Diagnosis: Atrial fibrillation, unspecified type ([facility] [I48.91]  A-fib ([facility] [I48.91]  Cardiac/pericardial tamponade [I31.4] Pericardial effusion with cardiac tamponade  Procedure(s) (LRB):  PERICARDIAL WINDOW / TIM BY DR. Geo Caba (N/A) 3 Days Post-Op  Precautions: Fall  Chart, occupational therapy assessment, plan of care, and goals were reviewed. ASSESSMENT  Patient continues with skilled OT services and is progressing towards goals.   Patient improved with standing balance today however continues to present with generalized weakness, decreased endurance, and decreased activity tolerance s/p pericardial window POD 3. Anticipate ability to transfer home with [facility] pending functional progression; if patient does not achieve supervision level, may need IPR. Current Level of Function Impacting Discharge (ADLs): MIN A largely    Other factors to consider for discharge: chest tube; arterial line; low MAPS with functional transfers (50s-70s)         PLAN :  Patient continues to benefit from skilled intervention to address the above impairments. Continue treatment per established plan of care to address goals. Recommend with staff: OOB x 3 to chair    Recommend next OT session: LB ADL training     Recommendation for discharge: (in order for the patient to meet his/her long term goals)  HHOT vs IPR pending functional progression    This discharge recommendation:  Has not yet been discussed the attending provider and/or case management    IF patient discharges home will need the following DME: TBD       SUBJECTIVE:   Patient stated I need to rest.    OBJECTIVE DATA SUMMARY:   Cognitive/Behavioral Status:  Neurologic State: Alert  Orientation Level: Oriented X4  Cognition: Appropriate for age attention/concentration  Perception: Appears intact  Perseveration: No perseveration noted  Safety/Judgement: Insight into deficits    Functional Mobility and Transfers for ADLs:  Bed Mobility:  Sit to Supine: Moderate assistance    Transfers:  Sit to Stand: Minimum assistance  Functional Transfers  Toilet Transfer : Minimum assistance       Balance:  Sitting: Intact; Without support  Standing: Impaired; Without support  Standing - Static: Fair;Occasional  Standing - Dynamic : Fair;Constant support    ADL Intervention:       Grooming  Position Performed: Standing  Washing Hands: Contact guard assistance                        Toileting  Toileting Assistance: Contact guard assistance  Bladder Hygiene: Contact guard assistance    Cognitive Retraining  Safety/Judgement: Insight into deficits      Therapeutic Exercises:   Patient instructed on the benefits and demonstrated cardiac exercises while seated with Supervision. CARDIAC   EXERCISE    Sets    Reps    Active  Active Assist    Passive  Self ROM    Comments    Shoulder flexion  1  5  [x]                            []                             []                             []                         Shoulder abduction  1  5  [x]                             []                             []                             []                                Scapular retraction  1  5  [x]                             []                             []                             []                                Scapular elevated 1 set x 5 reps; trunk rotation 1 set x 5 reps    Pain:  NO complaints    Activity Tolerance:   Fair and hypotension with functional mobility    After treatment patient left in no apparent distress:   Supine in bed, Heels elevated for pressure relief, Call bell within reach, and Side rails x 3    COMMUNICATION/COLLABORATION:   The patients plan of care was discussed with: Physical therapist and Registered nurse.      Layne Wei  Time Calculation: 15 mins

## 2021-06-25 ENCOUNTER — APPOINTMENT (OUTPATIENT)
Dept: NON INVASIVE DIAGNOSTICS | Age: 81
DRG: 270 | End: 2021-06-25
Attending: INTERNAL MEDICINE
Payer: MEDICARE

## 2021-06-25 ENCOUNTER — APPOINTMENT (OUTPATIENT)
Dept: GENERAL RADIOLOGY | Age: 81
DRG: 270 | End: 2021-06-25
Attending: PHYSICIAN ASSISTANT
Payer: MEDICARE

## 2021-06-25 ENCOUNTER — APPOINTMENT (OUTPATIENT)
Dept: GENERAL RADIOLOGY | Age: 81
DRG: 270 | End: 2021-06-25
Attending: INTERNAL MEDICINE
Payer: MEDICARE

## 2021-06-25 LAB
ALBUMIN SERPL-MCNC: 2.9 G/DL (ref 3.5–5)
ALBUMIN/GLOB SERPL: 1.2 {RATIO} (ref 1.1–2.2)
ALP SERPL-CCNC: 93 U/L (ref 45–117)
ALT SERPL-CCNC: 33 U/L (ref 12–78)
ANION GAP SERPL CALC-SCNC: 5 MMOL/L (ref 5–15)
AST SERPL-CCNC: 33 U/L (ref 15–37)
BILIRUB SERPL-MCNC: 0.6 MG/DL (ref 0.2–1)
BUN SERPL-MCNC: 12 MG/DL (ref 6–20)
BUN/CREAT SERPL: 19 (ref 12–20)
CALCIUM SERPL-MCNC: 8.3 MG/DL (ref 8.5–10.1)
CHLORIDE SERPL-SCNC: 109 MMOL/L (ref 97–108)
CO2 SERPL-SCNC: 24 MMOL/L (ref 21–32)
COMMENT, SRA4L: NORMAL
COMMENT, SRA8L: NORMAL
CREAT SERPL-MCNC: 0.63 MG/DL (ref 0.55–1.02)
DATE LAST DOSE: ABNORMAL
ENOXAPARIN HIGH DOSE, SRA6L: NORMAL %
ENOXAPARIN LOW DOSE, SRA5L: NORMAL %
ENOXAPARIN RESULT, SRA7L: NORMAL
ERYTHROCYTE [DISTWIDTH] IN BLOOD BY AUTOMATED COUNT: 15.2 % (ref 11.5–14.5)
GLOBULIN SER CALC-MCNC: 2.4 G/DL (ref 2–4)
GLUCOSE SERPL-MCNC: 134 MG/DL (ref 65–100)
HCT VFR BLD AUTO: 25.4 % (ref 35–47)
HGB BLD-MCNC: 8.3 G/DL (ref 11.5–16)
IF BLOCK AB SER-ACNC: 1 AU/ML (ref 0–1.1)
MCH RBC QN AUTO: 32 PG (ref 26–34)
MCHC RBC AUTO-ENTMCNC: 32.7 G/DL (ref 30–36.5)
MCV RBC AUTO: 98.1 FL (ref 80–99)
NRBC # BLD: 0.02 K/UL (ref 0–0.01)
NRBC BLD-RTO: 0.2 PER 100 WBC
PLATELET # BLD AUTO: 78 K/UL (ref 150–400)
POTASSIUM SERPL-SCNC: 4.2 MMOL/L (ref 3.5–5.1)
PROT SERPL-MCNC: 5.3 G/DL (ref 6.4–8.2)
RBC # BLD AUTO: 2.59 M/UL (ref 3.8–5.2)
REPORTED DOSE,DOSE: ABNORMAL UNITS
REPORTED DOSE/TIME,TMG: ABNORMAL
SODIUM SERPL-SCNC: 138 MMOL/L (ref 136–145)
SRA 100IU/ML UFH SER-ACNC: <1 % (ref 0–20)
SRA UFH SER-IMP: NORMAL
UNFRAC HEPARIN LOW DOSE: <1 % (ref 0–20)
VANCOMYCIN TROUGH SERPL-MCNC: 12.7 UG/ML (ref 5–10)
WBC # BLD AUTO: 9 K/UL (ref 3.6–11)

## 2021-06-25 PROCEDURE — 97530 THERAPEUTIC ACTIVITIES: CPT

## 2021-06-25 PROCEDURE — 80202 ASSAY OF VANCOMYCIN: CPT

## 2021-06-25 PROCEDURE — 97535 SELF CARE MNGMENT TRAINING: CPT

## 2021-06-25 PROCEDURE — 94640 AIRWAY INHALATION TREATMENT: CPT

## 2021-06-25 PROCEDURE — 74011250637 HC RX REV CODE- 250/637: Performed by: NURSE PRACTITIONER

## 2021-06-25 PROCEDURE — 74011000250 HC RX REV CODE- 250: Performed by: INTERNAL MEDICINE

## 2021-06-25 PROCEDURE — 74011250637 HC RX REV CODE- 250/637: Performed by: PHYSICIAN ASSISTANT

## 2021-06-25 PROCEDURE — 65660000000 HC RM CCU STEPDOWN

## 2021-06-25 PROCEDURE — 71045 X-RAY EXAM CHEST 1 VIEW: CPT

## 2021-06-25 PROCEDURE — 80053 COMPREHEN METABOLIC PANEL: CPT

## 2021-06-25 PROCEDURE — 77030029065 HC DRSG HEMO QCLOT ZMED -B

## 2021-06-25 PROCEDURE — 93308 TTE F-UP OR LMTD: CPT | Performed by: SPECIALIST

## 2021-06-25 PROCEDURE — 74011250636 HC RX REV CODE- 250/636: Performed by: PHYSICIAN ASSISTANT

## 2021-06-25 PROCEDURE — 93325 DOPPLER ECHO COLOR FLOW MAPG: CPT | Performed by: SPECIALIST

## 2021-06-25 PROCEDURE — 74011250637 HC RX REV CODE- 250/637: Performed by: EMERGENCY MEDICINE

## 2021-06-25 PROCEDURE — 36415 COLL VENOUS BLD VENIPUNCTURE: CPT

## 2021-06-25 PROCEDURE — 85027 COMPLETE CBC AUTOMATED: CPT

## 2021-06-25 PROCEDURE — 99233 SBSQ HOSP IP/OBS HIGH 50: CPT | Performed by: INTERNAL MEDICINE

## 2021-06-25 PROCEDURE — 74011250637 HC RX REV CODE- 250/637: Performed by: INTERNAL MEDICINE

## 2021-06-25 PROCEDURE — 97116 GAIT TRAINING THERAPY: CPT

## 2021-06-25 PROCEDURE — 93308 TTE F-UP OR LMTD: CPT

## 2021-06-25 PROCEDURE — 93321 DOPPLER ECHO F-UP/LMTD STD: CPT | Performed by: SPECIALIST

## 2021-06-25 RX ORDER — AMIODARONE HYDROCHLORIDE 200 MG/1
200 TABLET ORAL DAILY
Qty: 180 TABLET | Refills: 1 | Status: SHIPPED
Start: 2021-06-25 | End: 2021-07-25

## 2021-06-25 RX ORDER — FUROSEMIDE 20 MG/1
20 TABLET ORAL DAILY
Qty: 30 TABLET | Refills: 3 | Status: SHIPPED
Start: 2021-06-25 | End: 2021-06-28

## 2021-06-25 RX ORDER — FUROSEMIDE 20 MG/1
20 TABLET ORAL DAILY
Status: DISCONTINUED | OUTPATIENT
Start: 2021-06-25 | End: 2021-06-28

## 2021-06-25 RX ADMIN — FAMOTIDINE 20 MG: 20 TABLET ORAL at 08:44

## 2021-06-25 RX ADMIN — THERA TABS 1 TABLET: TAB at 08:46

## 2021-06-25 RX ADMIN — POLYETHYLENE GLYCOL 3350 17 G: 17 POWDER, FOR SOLUTION ORAL at 08:47

## 2021-06-25 RX ADMIN — BUDESONIDE 1000 MCG: 0.5 INHALANT RESPIRATORY (INHALATION) at 09:59

## 2021-06-25 RX ADMIN — RALOXIFENE HYDROCHLORIDE 60 MG: 60 TABLET, FILM COATED ORAL at 08:45

## 2021-06-25 RX ADMIN — Medication 10 ML: at 21:45

## 2021-06-25 RX ADMIN — ACETAMINOPHEN 650 MG: 325 TABLET ORAL at 04:28

## 2021-06-25 RX ADMIN — ATORVASTATIN CALCIUM 20 MG: 20 TABLET, FILM COATED ORAL at 17:00

## 2021-06-25 RX ADMIN — FERROUS SULFATE TAB 325 MG (65 MG ELEMENTAL FE) 325 MG: 325 (65 FE) TAB at 08:45

## 2021-06-25 RX ADMIN — Medication 10 ML: at 06:39

## 2021-06-25 RX ADMIN — MIDODRINE HYDROCHLORIDE 2.5 MG: 2.5 TABLET ORAL at 22:29

## 2021-06-25 RX ADMIN — MUPIROCIN: 20 OINTMENT TOPICAL at 17:00

## 2021-06-25 RX ADMIN — FUROSEMIDE 20 MG: 40 TABLET ORAL at 08:44

## 2021-06-25 RX ADMIN — MIDODRINE HYDROCHLORIDE 2.5 MG: 2.5 TABLET ORAL at 14:21

## 2021-06-25 RX ADMIN — DOCUSATE SODIUM 50 MG AND SENNOSIDES 8.6 MG 1 TABLET: 8.6; 5 TABLET, FILM COATED ORAL at 08:44

## 2021-06-25 RX ADMIN — LEVOFLOXACIN 750 MG: 500 TABLET, FILM COATED ORAL at 07:17

## 2021-06-25 RX ADMIN — DOCUSATE SODIUM 50 MG AND SENNOSIDES 8.6 MG 1 TABLET: 8.6; 5 TABLET, FILM COATED ORAL at 17:00

## 2021-06-25 RX ADMIN — LEVOTHYROXINE SODIUM 25 MCG: 0.03 TABLET ORAL at 06:38

## 2021-06-25 RX ADMIN — Medication 5000 UNITS: at 08:44

## 2021-06-25 RX ADMIN — FAMOTIDINE 20 MG: 20 TABLET ORAL at 21:36

## 2021-06-25 RX ADMIN — ACETAMINOPHEN 650 MG: 325 TABLET ORAL at 21:36

## 2021-06-25 RX ADMIN — VANCOMYCIN HYDROCHLORIDE 1000 MG: 1 INJECTION, POWDER, LYOPHILIZED, FOR SOLUTION INTRAVENOUS at 09:05

## 2021-06-25 RX ADMIN — ACETAMINOPHEN 650 MG: 325 TABLET ORAL at 14:23

## 2021-06-25 RX ADMIN — CETIRIZINE HYDROCHLORIDE 10 MG: 10 TABLET, FILM COATED ORAL at 17:00

## 2021-06-25 RX ADMIN — BUDESONIDE 1000 MCG: 0.5 INHALANT RESPIRATORY (INHALATION) at 21:59

## 2021-06-25 RX ADMIN — MONTELUKAST 10 MG: 10 TABLET, FILM COATED ORAL at 17:00

## 2021-06-25 RX ADMIN — ACETAMINOPHEN 650 MG: 325 TABLET ORAL at 11:52

## 2021-06-25 RX ADMIN — POTASSIUM CHLORIDE 20 MEQ: 750 TABLET, FILM COATED, EXTENDED RELEASE ORAL at 08:44

## 2021-06-25 RX ADMIN — ACETAMINOPHEN 650 MG: 325 TABLET ORAL at 08:07

## 2021-06-25 RX ADMIN — AMIODARONE HYDROCHLORIDE 200 MG: 200 TABLET ORAL at 08:46

## 2021-06-25 RX ADMIN — MIDODRINE HYDROCHLORIDE 2.5 MG: 2.5 TABLET ORAL at 06:39

## 2021-06-25 NOTE — PROGRESS NOTES
Pt doing well overall s/p emergent pericardial window. Incision healing well. CT x 2 removed this morning with no issues. Will follow up as outpatient. CSS to sign off, please re-consult if needed.

## 2021-06-25 NOTE — PROGRESS NOTES
Problem: Mobility Impaired (Adult and Pediatric)  Goal: *Acute Goals and Plan of Care (Insert Text)  Description: FUNCTIONAL STATUS PRIOR TO ADMISSION: Patient was independent and active without use of DME, driving, two falls (standing at mailbox and standing while completing lower body dressing). HOME SUPPORT PRIOR TO ADMISSION: The patient lived alone with no support required. Daughter and Confucianist members available to assist if needed. Physical Therapy Goals  Initiated 6/23/2021  1. Patient will move from supine to sit and sit to supine , scoot up and down, and roll side to side in bed with modified independence within 7 day(s). 2.  Patient will transfer from bed to chair and chair to bed with modified independence using the least restrictive device within 7 day(s). 3.  Patient will perform sit to stand with modified independence within 7 day(s). 4.  Patient will ambulate with independence for 300 feet within 7 day(s). 5.  Patient will ascend/descend 1 step with modified independence within 7 day(s). Outcome: Progressing Towards Goal       PHYSICAL THERAPY TREATMENT  Patient: Juan Silverman (66 y.o. female)  Date: 6/25/2021  Diagnosis: Atrial fibrillation, unspecified type ([facility] [I48.91]  A-fib ([facility] [I48.91]  Cardiac/pericardial tamponade [I31.4] Pericardial effusion with cardiac tamponade  Procedure(s) (LRB):  PERICARDIAL WINDOW / TIM BY DR. Mulu Cordova (N/A) 4 Days Post-Op  Precautions: Fall  Chart, physical therapy assessment, plan of care and goals were reviewed. ASSESSMENT  Patient continues with skilled PT services and is progressing towards goals. Pt was able to increase gait tolerance. Pt did utilize rolling walker to assist with balance and give support. Pt did report SOB with gait SpO2 96% on room air. Pt reports owning rolling walker.  Pt needs to be ambulating  with  RN to increase mobility with gait belt/rolling walker    Current Level of Function Impacting Discharge (mobility/balance): min A     Other factors to consider for discharge: SOB         PLAN :  Patient continues to benefit from skilled intervention to address the above impairments. Continue treatment per established plan of care. to address goals. Recommendation for discharge: (in order for the patient to meet his/her long term goals)  Physical therapy at least 2 days/week in the home AND ensure assist and/or supervision for safety with mobility as needed    This discharge recommendation:  Has not yet been discussed the attending provider and/or case management    IF patient discharges home will need the following DME: patient owns DME required for discharge       SUBJECTIVE:   Patient stated I am just SOB.     OBJECTIVE DATA SUMMARY:   Critical Behavior:  Neurologic State: Alert  Orientation Level: Oriented X4  Cognition: Appropriate for age attention/concentration  Safety/Judgement: Insight into deficits  Functional Mobility Training:  Bed Mobility:     Supine to Sit: Minimum assistance              Transfers:  Sit to Stand: Contact guard assistance  Stand to Sit: Contact guard assistance                             Balance:  Sitting: Intact  Standing: Impaired  Standing - Static: Good  Standing - Dynamic : Fair  Ambulation/Gait Training:  Distance (ft): 40 Feet (ft)  Assistive Device: Gait belt;Walker, rolling  Ambulation - Level of Assistance: Contact guard assistance        Gait Abnormalities: Decreased step clearance        Base of Support: Widened        Step Length: Left shortened;Right shortened                    Stairs: Therapeutic Exercises:     Pain Rating:  Incisional     Activity Tolerance:   requires rest breaks    After treatment patient left in no apparent distress:   Sitting in chair    COMMUNICATION/COLLABORATION:   The patients plan of care was discussed with: Registered nurse.      Queenie Rubio PTA   Time Calculation: 23 mins

## 2021-06-25 NOTE — OP NOTES
[address]   OPERATIVE REPORT    Name:  Irene Morse  MR#:  [id number]  :  1940  ACCOUNT #:  [de-identified]  DATE OF SERVICE:  2021      PREOPERATIVE DIAGNOSIS:  Pericardial effusion. POSTOPERATIVE DIAGNOSIS:  Pericardial effusion. PROCEDURE PERFORMED:  Pericardial window (CPT code 96462). SURGEON:  Rea Morse MD    ASSISTANT:  JOSE Ruiz Mai    ANESTHESIA:  General anesthesia. COMPLICATIONS:  None. SPECIMENS REMOVED:  None. IMPLANTS:  None. ESTIMATED BLOOD LOSS:  50 mL. INDICATIONS:  The patient is a very pleasant 43-year-old woman who has a significant pericardial effusion. She is now being brought to the operating room to have it drained. PROCEDURE:  The patient was already intubated when she arrived to the operating room. We then prepped her chest in usual sterile fashion. We then made a subxiphoid incision down to the level of the pericardium and created a window. Vicryl sutures were used to close the incision. Two Gavin drains were placed at the end of the procedure. She tolerated the procedure well.   I was present for the entire procedure.; ; PA-C assistance was needed due to difficulty of procedure, dissection, and identification of pertinent anatomy throughout the case           MD DANNY Baptiste/[email or web address]  D:  2021 10:10  T:  2021 12:00  JOB #:  [id number]

## 2021-06-25 NOTE — PROGRESS NOTES
0730 Bedside shift change report given to Cynthia Major (oncoming nurse) by Stormy Stark (offgoing nurse). Report included the following information SBAR, Intake/Output, MAR, Recent Results and Cardiac Rhythm NSR.       [address] [place] [address] with Cardiac Surgery at bedside. Chest tubes removed. [address] removed  1440 Orders received to downgrade patient. 1730 TRANSFER - OUT REPORT:    Verbal report given to Vasquez(name) marce Calderón  being transferred to CVSU(unit) for routine progression of care       Report consisted of patients Situation, Background, Assessment and   Recommendations(SBAR). Information from the following report(s) SBAR, Intake/Output, MAR, Recent Results and Cardiac Rhythm NSR was reviewed with the receiving nurse. Lines:   Peripheral IV 06/21/21 Left Hand (Active)   Site Assessment Clean, dry, & intact 06/25/21 1600   Phlebitis Assessment 0 06/25/21 1600   Infiltration Assessment 0 06/25/21 1600   Dressing Status Clean, dry, & intact 06/25/21 1600   Dressing Type Transparent 06/25/21 1600   Hub Color/Line Status Blue 06/25/21 1600   Action Taken Open ports on tubing capped 06/25/21 1600   Alcohol Cap Used Yes 06/25/21 1600        Opportunity for questions and clarification was provided.       Patient transported with:   Monitor  Registered Nurse

## 2021-06-25 NOTE — PROGRESS NOTES
Pharmacist Note - Vancomycin Dosing  Therapy day 5  Indication:  bacteremia  Current regimen:  1000 mg Q12hr    Recent Labs     06/25/21  0423 06/24/21  0313 06/23/21  0442   WBC 9.0 10.6 13.3*   CREA 0.63 0.61 0.74   BUN 12 12 15       A Trough Level resulted at 12.7 mcg/mL which was obtained 12.2hrs post-dose. The extrapolated \"true\" trough is approximately 13mcg/mL based on the patient's known kinetics. Goal trough:~ 15 mcg/ml  Plan: Continue current regimen. Pharmacy will continue to monitor this patient daily for changes in clinical status and renal function.

## 2021-06-25 NOTE — PROGRESS NOTES
Critical Care Note      Cardiac surgery was called for the evaluation and management of: cardiogenic shock, cardiac tamponade      The critical nature of the patient's condition includes the following: She is at imminent risk of death from cardiogenic shock.       Diagnosis for which the procedure was performed: pericardial effusion      Procedure performed: pericardial window      Other critical care diagnoses that are being treated and are above and beyond the standard care for the procedure being performed:     Cardiogenic shock   Acute hypoxic respiratory failure      HPI: Patient is a [de-identified] woman with recent atrial ablation whi now has cardiogenic shock from cardiac tamponade. She is at imminent risk of death from cardiogenic shock.       Cardiogenic shock   Patient continues on pressors / inotropes  's  Creatinine and LFTs bumped a little from shock     Addendum:  Had to go up on pressors over the course of the day for soft BP  Slow wean    Acute hypoxic respiratory failure   Patient is intubated  Left sided effusion - will try to get it drained today    Addendum:  IR drained 400cc  CXR looks better  Plan for SAT/SBT    Addendum:  Extubated  Looks good  Moving everything      Intake/Output Summary (Last 24 hours) at 6/25/2021 0928  Last data filed at 6/25/2021 0700  Gross per 24 hour   Intake 1502.64 ml   Output 800 ml   Net 702.64 ml      Visit Vitals  BP (!) 84/53   Pulse 70   Temp 97.8 °F (36.6 °C)   Resp 24   Ht 5' 6\" (1.676 m)   Wt 198 lb 3.2 oz (89.9 kg)   SpO2 96%   Breastfeeding No   BMI 31.99 kg/m²      CXR Results  (Last 48 hours)               06/25/21 0442  XR CHEST PORT Final result    Impression:      Stable small left pleural effusion and left basilar opacity. Stable right hilar   prominence. Narrative:  EXAM:  XR CHEST PORT       INDICATION: Left pleural effusion and left basilar opacity.        COMPARISON: 6/24/2021 at 0435 hours       TECHNIQUE: Portable AP semiupright chest view at 0401 hours       FINDINGS: The right IJ catheter and mediastinal drains are stable. The   cardiomediastinal contours are stable. The pulmonary vasculature is within   normal limits. Right hilar prominence and right upper lung scarring are unchanged. There is a   stable small left pleural effusion and left basilar opacity. There is no   pneumothorax. The bones and upper abdomen are stable. 06/24/21 0441  XR CHEST PORT Final result    Impression:  No significant change. Small left pleural effusion left lower lobe   volume loss. Prominence of the right hilum unchanged       Narrative:  EXAM: XR CHEST PORT       INDICATION: post-op heart       COMPARISON: 6/23/2021       FINDINGS: A portable AP radiograph of the chest was obtained at 435 hours. Tubes   and lines are stable. . Small left pleural effusion and left lower lobe volume   loss are again noted. Scarring in the right with prior wedge resections again   noted. Prominence of the right hilum is also again noted. . The cardiac and   mediastinal contours and pulmonary vascularity are normal.  The bones and soft   tissues are grossly within normal limits. Total critical care time - 75 minutes (CPT 99794, [id number])      I personally spent the above critical care time. This is time spent at this critically ill patient's bedside / unit / floor actively involved in patient care as well as the coordination of care. This does not include any procedural time which has been billed separately. This does not include any NP/PA patient care time. I had a face to face encounter with the patient, reviewed and interpreted patient data including clinical events, labs, images, vital signs, I/O's, and examined patient. I have discussed the case and the plan and management of the patient's care with the consulting services and bedside nurses.        This patient has a high probability of imminent, clinically significant deterioration, which requires the highest level of preparedness to intervene urgently. I participated in the decision-making and personally managed or directed the management of life and organ supporting interventions to treat or prevent imminent deterioration. This patient has a critical illness or injury that is acutely impairing one or more vital organ systems such that there is a high probability of imminent or life threatening deterioration in the patient's condition. This patient requires high complexity medical decision making to assess, manipulate, and support vital organ system failure. After stabilization, this patient still requires management to prevent further life / organ threatening deterioration.

## 2021-06-25 NOTE — PROGRESS NOTES
2000 Received report from [address] weaned off today, Systolic BP remains  Low Pt remains in SR. BARRY noted and pt oprthopneic. 2100 Up  to bedside commode to void. 2320 Cuff pressure systolic  , A-line pressure  86. Will continue to monitor  0500 Minimal rest this shift. Pt awake at 3A and stated she couldn't go back to sleep. Lights on and pt reading a book. 0800 Bedside, Verbal and Written shift change report given to LOS Verma (oncoming nurse) . Report included the following information SBAR, Kardex, Intake/Output, MAR, Recent Results and Cardiac Rhythm SR. Dr. Boles Angle in to see pt.

## 2021-06-25 NOTE — PROGRESS NOTES
[facility] ICU Progress Note    Admit Date: 2021  POD:  4 Day Post-Op    Procedure:    PERICARDIAL WINDOW / TIM BY DR. Sergey Juarez        Subjective/24 hr events:   Pt seen with Dr. Ricci Romberg. Woke up at 3a and was unable to go back to bed. Reports some incisional soreness. On 2LNC. Off all gtts. Objective:   Vitals:  Blood pressure (!) 84/53, pulse 70, temperature 97.8 °F (36.6 °C), resp. rate 24, height 5' 6\" (1.676 m), weight 198 lb 3.2 oz (89.9 kg), SpO2 96 %, not currently breastfeeding. Temp (24hrs), Av.1 °F (36.7 °C), Min:97.8 °F (36.6 °C), Max:98.4 °F (36.9 °C)    EKG/Rhythm:    NSR 70s    CT Output:   60ml overnight (120ml 24 hours)    Oxygen Therapy:  Oxygen Therapy  O2 Sat (%): 96 % (21 0700)  Pulse via Oximetry: 84 beats per minute (21 0745)  O2 Device: Nasal cannula (21 0400)  O2 Flow Rate (L/min): 2 l/min (21 0400)  FIO2 (%): 40 % (21 1200)    CXR:  CXR Results  (Last 48 hours)               21 0442  XR CHEST PORT Final result    Impression:      Stable small left pleural effusion and left basilar opacity. Stable right hilar   prominence. Narrative:  EXAM:  XR CHEST PORT       INDICATION: Left pleural effusion and left basilar opacity. COMPARISON: 2021 at 0435 hours       TECHNIQUE: Portable AP semiupright chest view at 0401 hours       FINDINGS: The right IJ catheter and mediastinal drains are stable. The   cardiomediastinal contours are stable. The pulmonary vasculature is within   normal limits. Right hilar prominence and right upper lung scarring are unchanged. There is a   stable small left pleural effusion and left basilar opacity. There is no   pneumothorax. The bones and upper abdomen are stable. 21 044  XR CHEST PORT Final result    Impression:  No significant change. Small left pleural effusion left lower lobe   volume loss.  Prominence of the right hilum unchanged       Narrative:  EXAM: XR CHEST PORT INDICATION: post-op heart       COMPARISON: 6/23/2021       FINDINGS: A portable AP radiograph of the chest was obtained at 435 hours. Tubes   and lines are stable. . Small left pleural effusion and left lower lobe volume   loss are again noted. Scarring in the right with prior wedge resections again   noted. Prominence of the right hilum is also again noted. . The cardiac and   mediastinal contours and pulmonary vascularity are normal.  The bones and soft   tissues are grossly within normal limits. Admission Weight: Last Weight   Weight: 183 lb 9.6 oz (83.3 kg) Weight: 198 lb 3.2 oz (89.9 kg)     Intake / Output / Drain:  Current Shift: No intake/output data recorded. Last 24 hrs.:     Intake/Output Summary (Last 24 hours) at 6/25/2021 4571  Last data filed at 6/25/2021 0700  Gross per 24 hour   Intake 1742.64 ml   Output 800 ml   Net 942.64 ml       EXAM:  General:   Pleasant, cooperative      Lungs:   Diminished left lung base   Incision:  Incision c/d/i   Heart:  Regular rate and rhythm, S1, S2 normal, no murmur, click, rub or gallop. Abdomen:   Soft, non-tender. Bowel sounds normal. No masses,  No organomegaly. Extremities:  No edema. PPP. Neurologic: Grossly intact     Labs:   Recent Labs     06/25/21  0423 06/24/21  0313 06/24/21  0306   WBC 9.0   < >  --    HGB 8.3*   < >  --    HCT 25.4*   < >  --    PLT 78*   < >  --       < >  --    K 4.2   < >  --    BUN 12   < >  --    CREA 0.63   < >  --    *   < >  --    GLUCPOC  --   --  160*    < > = values in this interval not displayed.         Assessment:     Principal Problem:    Pericardial effusion with cardiac tamponade (5/10/2019)      Overview: Loculated posterior LA and LV, no tamponade    Active Problems:    persistent atrial fibrillation  (6/21/2021)      Breast cancer (HCC) ()      Non-rheumatic mitral regurgitation (5/10/2019)      Overview: moderate      Non-rheumatic tricuspid valve insufficiency (5/10/2019) Cardiac/pericardial tamponade (2021)      S/P pericardial window creation (2021)         Plan/Recommendations/Medical Decision Makin. Cardiac tamponade s/p pericardial window and drain placement: Stable. Will d/c CTs today. 2. Acute post op blood loss anemia: Hemoglobin trending down slightly, likely dilitutional from IV fluid boluses. 3. Left sided pleural effusion/atelectasis: US guided thoracentesis removed about 400 mL fluid on . Monitor daily CXR. Wean O2/diuresis per primary team.   4. Leukocytosis/possible aspiration PNA: On vanc/levaquin. Recommend monitoring QTc  5. Atrial fibrillation s/p ablation: In NSR currently  6. Hyperlipidemia: On atorvastatin  7. Hypothyroidism: On replacement  8. Asthma: On singulair  9. Thrombocytopenia: HIT panel ordered by primary team.     Dispo: PT/OT. Will d/c CTs today.  Rest of care/orders from primary team.     Signed By: JOSE Rob

## 2021-06-25 NOTE — DISCHARGE SUMMARY
Cardiology Discharge Summary               Patient ID:  Ramona Lucas  [id number]  86 y.o.  1940    Admit Date: 6/21/2021    Discharge Date: 6/25/2021     Admitting Physician: Sergei Villegas MD     Discharge Physician: Sergei Villegas MD    Admission Diagnoses: Atrial fibrillation, unspecified type ([facility] [I48.91]; A-fib ([facility] [I48.91]; Cardiac/pericardial tamponade [I31.4]    Discharge Diagnoses: Principal Problem:    Pericardial effusion with cardiac tamponade (5/10/2019)      Overview: Loculated posterior LA and LV, no tamponade    Active Problems:    persistent atrial fibrillation  (6/21/2021)      Cardiac/pericardial tamponade (6/21/2021)      Breast cancer (HCC) ()      Non-rheumatic mitral regurgitation (5/10/2019)      Overview: moderate      Non-rheumatic tricuspid valve insufficiency (5/10/2019)      S/P pericardial window creation (6/21/2021)        Discharge Condition: Stable    Cardiology Procedures this Admission:  EP study and transeptal puncture, echocardiogram, pericardiocentesis    Hospital Course: she underwent AFIB ablation but had acute pericardial tamponade during difficult transeptal puncture when sheath was advanced into the left atrium. Emergency pericardiocentesis was done in EP lab and she was stable going to CVICU and was extubated there. In evening her bp dropped and she had bradycardia with ? PEA and VT and was shocked out of VT and was reintubated and went to operating room where there was no acute bleeding found. Dr Clarissa Holbrook noted clot in pericardium and evacuated those and did a window with placement of manpreet drains. She required levophed for bp support and got 2 units PRBC transfusion. She was then extubated after left thoracentesis was done.    She was stable afterwards  Platelet was lower to 80 than baseline but HIT platelet serotonin release assay still pending  Hb 8.5  She remains in NSR on amiodarone 200 mg every day  Eliquis, entresto and toprol are held  LVEF on TIM was 25% with AFIB  Will consider av node ablation and BIV pacer +/- ICD in a month if she goes back in atrial fibrillation and LVEF does not improve      Consults: Cardiac Surgery and ICU intensivist    Discharge Exam:     Visit Vitals  /65   Pulse 68   Temp 98.3 °F (36.8 °C)   Resp 22   Ht 5' 6\" (1.676 m)   Wt 198 lb 3.1 oz (89.9 kg)   SpO2 98%   Breastfeeding No   BMI 31.99 kg/m²     General Appearance:  Well developed, well nourished,alert and oriented x 3, and individual in no acute distress. Ears/Nose/Mouth/Throat:   Hearing grossly normal.         Neck: Supple. Chest:   Lungs clear to auscultation bilaterally. Cardiovascular:  Regular rhythm    Abdomen:   Soft, non-tender    Extremities: No edema bilaterally. Skin: Warm and dry. Disposition: home    Patient Instructions:   Current Discharge Medication List      CONTINUE these medications which have CHANGED    Details   amiodarone (CORDARONE) 200 mg tablet Take 1 Tablet by mouth daily. Qty: 180 Tablet, Refills: 1    Comments: Dose increased to 200mg BID 4/26/21  Associated Diagnoses: Persistent atrial fibrillation (HCC)      furosemide (Lasix) 20 mg tablet Take 1 Tablet by mouth daily. Qty: 30 Tablet, Refills: 3    Comments: Changes to Daily 5/26/21         CONTINUE these medications which have NOT CHANGED    Details   potassium chloride (K-DUR, KLOR-CON) 20 mEq tablet Take 1 Tablet by mouth daily. Qty: 90 Tablet, Refills: 1    Comments: Changes to Daily 5/26/21      levothyroxine (SYNTHROID) 25 mcg tablet Take  by mouth Daily (before breakfast). fluticasone (FLOVENT HFA) 220 mcg/actuation inhaler as needed. atorvastatin (LIPITOR) 20 mg tablet Take 20 mg by mouth every evening. MULTIVITAMIN PO Take  by mouth. Takes one po once daily. cholecalciferol (VITAMIN D3) 5,000 unit capsule Take 5,000 Units by mouth daily. cetirizine (ZYRTEC) 10 mg tablet Take 10 mg by mouth every evening.       estradiol (ESTRACE) 0.01 % (0.1 mg/gram) vaginal cream Insert  into vagina every Monday and Thursday. albuterol (PROVENTIL HFA, VENTOLIN HFA, PROAIR HFA) 90 mcg/actuation inhaler Take 1 Puff by inhalation every four (4) hours as needed. Qty: 2 Inhaler, Refills: 3      raloxifene (EVISTA) 60 mg tablet Take 1 Tab by mouth daily. Qty: 90 Tab, Refills: 4    Associated Diagnoses: Palpitations      montelukast (SINGULAIR) 10 mg tablet Take 10 mg by mouth every evening. Associated Diagnoses: Palpitations      cyclobenzaprine (FLEXERIL) 5 mg tablet 5 mg as needed. diphenhydrAMINE (BenadryL) 25 mg capsule Take 25 mg by mouth every six (6) hours as needed. amoxicillin (AMOXIL) 500 mg capsule Take 500 mg by mouth as needed. Only for dental procedure      acetaminophen (Tylenol Extra Strength) 500 mg tablet Take 500 mg by mouth nightly as needed. EPINEPHrine (EPIPEN) 0.3 mg/0.3 mL (1:1,000) injection 0.3 mL by IntraMUSCular route once as needed for up to 1 dose. Qty: 0.3 mL, Refills: 0         STOP taking these medications       sacubitriL-valsartan (Entresto) 24-26 mg tablet Comments:   Reason for Stopping:         metoprolol tartrate (LOPRESSOR) 25 mg tablet Comments:   Reason for Stopping:         Eliquis 5 mg tablet Comments:   Reason for Stopping:               Quality Care Documentation       Patient not prescribed ACEI due to low bp      Patient not prescribed ARB due to low bp      Patient not prescribed Beta-Blocker due to low bp        Referenced discharge instructions provided by nursing for diet and activity.     Follow-up    Future Appointments   Date Time Provider Hazel Wade   7/13/2021 11:20 AM MD JOSÉ Mcgowan BS AMB   7/13/2021  1:30 PM Megha Dennis MD [facility] BS AMB   8/13/2021  3:00 PM MD JOSÉ Cheng BS AMB       Signed:  Lucy Avalos MD  6/25/2021  4:38 PM

## 2021-06-25 NOTE — PROCEDURES
CT x 2 cleaned and removed without difficulty. Pt tolerated well.  Rest of care/orders by primary team.

## 2021-06-25 NOTE — PROGRESS NOTES
Problem: Self Care Deficits Care Plan (Adult)  Goal: *Acute Goals and Plan of Care (Insert Text)  Description: FUNCTIONAL STATUS PRIOR TO ADMISSION: Patient was independent and active without use of DME. Patient was driving and performed majority of IADL tasks however has a woman come to assist with cleaning her home. Patient reports that she is a  and is an active [employer]er. HOME SUPPORT: The patient lived alone with her daughter, friends, and [employer] community to provide assistance as needed. Occupational Therapy Goals  Initiated 6/23/2021  1. Patient will perform ADLs standing 5 mins without fatigue or LOB with supervision/set-up within 7 day(s). 2.  Patient will perform lower body ADLs with supervision/set-up within 7 day(s). 3.  Patient will perform gathering ADL items high and low 2/2 with supervision/set-up within 7 day(s). 4.  Patient will perform toilet transfers with supervision/set-up within 7 day(s). 5.  Patient will perform all aspects of toileting with supervision/set-up within 7 day(s). 6.  Patient will participate in cardiac/sternal upper extremity therapeutic exercise/activities to increase independence with ADLs with supervision/set-up for 5 minutes within 7 day(s). Outcome: Progressing Towards Goal   OCCUPATIONAL THERAPY TREATMENT  Patient: Carlie Rothman (89 y.o. female)  Date: 6/25/2021  Diagnosis: Atrial fibrillation, unspecified type ([facility] [I48.91]  [facility] [I48.91]  Cardiac/pericardial tamponade [I31.4] Pericardial effusion with cardiac tamponade  Procedure(s) (LRB):  PERICARDIAL WINDOW / TIM BY DR. Raimundo Serna (N/A) 4 Days Post-Op  Precautions: Fall  Chart, occupational therapy assessment, plan of care, and goals were reviewed. ASSESSMENT  Patient continues with skilled OT services and is progressing towards goals. Patient requires largely CGA-SBA for functional mobility using walker and is largely supervision for ADL tasks at this time.  Discussed fall prevention strategies for performing LB ADLs and patient verbalized understanding. Also discussed that patient will have support from her daughter who has planned to stay with her post discharge. Recommend discharge home with HHOT at this time. Current Level of Function Impacting Discharge (ADLs): up to CGA    Other factors to consider for discharge: good home support         PLAN :  Patient continues to benefit from skilled intervention to address the above impairments. Continue treatment per established plan of care to address goals. Recommend with staff: OOB x 3 to chair    Recommend next OT session: light IADL in hospital room    Recommendation for discharge: (in order for the patient to meet his/her long term goals)  Occupational therapy at least 2 days/week in the home AND ensure assist and/or supervision for safety with IADL tasks as needed    This discharge recommendation:  Has not yet been discussed the attending provider and/or case management    IF patient discharges home will need the following DME: patient owns DME required for discharge       SUBJECTIVE:   Patient stated I have a whole medical supply in the garage.     OBJECTIVE DATA SUMMARY:   Cognitive/Behavioral Status:  Neurologic State: Alert  Orientation Level: Oriented X4  Cognition: Appropriate for age attention/concentration  Perception: Appears intact  Perseveration: No perseveration noted  Safety/Judgement: Insight into deficits    Functional Mobility and Transfers for ADLs:  Bed Mobility:   Not observed. Transfers:  Sit to Stand: Contact guard assistance          Balance:  Sitting: Intact; Without support  Standing: Impaired; With support  Standing - Static: Fair;Occasional  Standing - Dynamic : Fair;Constant support    ADL Intervention:       Grooming  Position Performed: Standing  Brushing Teeth: Stand-by assistance                   Lower Body Dressing Assistance  Socks: Supervision  Leg Crossed Method Used: Yes  Position Performed: Seated in chair         Cognitive Retraining  Safety/Judgement: Insight into deficits    Therapeutic Exercises:   Patient instructed on the benefits and demonstrated cardiac exercises while standing with Contact guard assistance. CARDIAC   EXERCISE    Sets    Reps    Active  Active Assist    Passive  Self ROM    Comments    Shoulder flexion  1  5   [x]                            []                             []                             []                                Shoulder abduction  1  5  [x]                             []                             []                             []                                Scapular elevation  1  5  [x]                             []                              []                             []                                Scapular retraction  1  5  [x]                             []                             []                             []                                Trunk rotation  1  5  [x]                             []                             []                             []                                Trunk sidebending  1  5  [x]                             []                              []                             []                                           Pain:  No complaints    Activity Tolerance:   Good    After treatment patient left in no apparent distress:   Sitting in chair and Call bell within reach    COMMUNICATION/COLLABORATION:   The patients plan of care was discussed with: Physical therapist and Registered nurse.      Henrique Wei  Time Calculation: 23 mins

## 2021-06-25 NOTE — PROGRESS NOTES
Transitions of Care Plan:  RUR: 28% - moderate  Clinical Plan: CT - remove today; Therapy  Consults: Therapy; Cardiac Surgery  Baseline: independent without DME; resides alone  Disposition: home vs home health    Reason for Admission:   Ablation and s/p cardiac tamponade                  RUR Score:     28%             PCP: First and Last name:   Lexy Pena MD     Name of Practice: [facility]   Are you a current patient: Yes/No: Yes   Approximate date of last visit:  Within the last 6 months   Can you participate in a virtual visit if needed: Yes    Do you (patient/family) have any concerns for transition/discharge? Patient has no concerns              Plan for utilizing home health:   Agreeable if needed - will reevaluate once chest tubes are removed and pt works w therapy    Current Advanced Directive/Advance Care Plan:  Full Code      Healthcare Decision Maker: Son and Daughter per patient  Click here to complete [address] including selection of the Healthcare Decision Maker Relationship (ie \"Primary\")              Transition of Care Plan:          CM spoke with patient at bedside re initial assessment and discharge plan:    Baseline Assessment:  1) ADLs, self-care, orientation: independent without DME; AOx4  2) Social: resides at address on file alone; family nearby  3) Pharmacy:   4) PCP and Insurance: Confirmed    Disposition Plan:  Home vs Home Health   Patient agreeable to home health if recommended and ordered by treatment team; [facility] but cannot recall name. CM will need choice for [facility] set up if ordered. Michele Montesinos, MPH  Care Manager [facility]  Available via [employer]      Care Management Interventions  PCP Verified by CM: Yes  Palliative Care Criteria Met (RRAT>21 & CHF Dx)?: No  Mode of Transport at Discharge:  Other (see comment) (Family, private car)  Transition of Care Consult (CM Consult): Discharge Planning  MyChart Signup: Yes  Discharge Durable Medical Equipment: No  Health Maintenance Reviewed: Yes  Physical Therapy Consult: Yes  Occupational Therapy Consult: Yes  Speech Therapy Consult: No  Current Support Network: Family Lives Nearby, Own Home, Lives Alone  Confirm Follow Up Transport: Family  Discharge Location  Discharge Placement: Home

## 2021-06-25 NOTE — PROGRESS NOTES
HPI    80-year-old woman brought to the ICU for postoperative care. She has a significant history of A. fib-the plan was to go for an ablation [id number] the procedure was complicated by V. tach with hemodynamic instability and a large pericardial effusion-was cardioverted and emergency pericardiocentesis done and a pericardial drain left in. Brought to the ICU for continued care. Patient intubated.     Interval changes    6/25 - now off levo, blakes removed    6/23 - 24 - Still on levo    6/22 - yesterday had re accumulation of effusion - went into tamponade, coded, ROSC after 2 mins, went to OR for manpreet placements, s/p L thora this AM - 400 cc of sanguinous drainage, extubated this AM    Patient Vitals for the past 24 hrs:   Temp Pulse Resp BP SpO2   06/25/21 1600 98.3 °F (36.8 °C) 68 22 125/65 98 %   06/25/21 1500  71 18 125/69    06/25/21 1400  71 13 (!) 109/58 92 %   06/25/21 1300  70 26 101/73 91 %   06/25/21 1200 98 °F (36.7 °C) 71 16 (!) 120/55 96 %   06/25/21 1100  72 17 121/82 95 %   06/25/21 1000  68 19 (!) 112/58 96 %   06/25/21 0959     96 %   06/25/21 0900  68 20 (!) 120/50 98 %   06/25/21 0800 98 °F (36.7 °C) 67 23 125/73 97 %   06/25/21 0700  70 24  96 %   06/25/21 0600  71 24  95 %   06/25/21 0500  75 26  97 %   06/25/21 0400 97.8 °F (36.6 °C) 75 21  97 %   06/25/21 0300  67 23  97 %   06/25/21 0200  67 23  95 %   06/25/21 0100  76 23  95 %   06/25/21 0000 98.4 °F (36.9 °C) 84 20  95 %   06/24/21 2200  83 22  96 %   06/24/21 2100  81 24  95 %   06/24/21 2000 98.3 °F (36.8 °C) 77 20  97 %   06/24/21 1900  82 16  97 %   06/24/21 1800  81 24  98 %   06/24/21 1700  79 17  96 %     Physical exam  General-NAD  Neuro-Non focal, generalized weakness  Cardiac-RRR, manpreet x 2 in situ  Lungs-clear  Abdomen-soft, nontender, nondistended  Extremities-warm    Labs, meds and imaging reviewed    Assessment  A. fib  V. tach/cardiac tamponade status post emergency pericardiocentesis  Left intubated postoperatively  Reaccumulation of pericardial fluid - tamponde - PEA arrest - now s/p manpreet placment    Plan    Pain control as needed, PT/OT/OOB as tolerated, other delirium prevention strategies    Continue hemodynamic monitoring, MAP goal > 65, still onow off levo, continue midodrine for now, follow cardiology recommendations    On NC, sat goal > 90%, encourage incentive spirometry, on pulmicort and singulair    Diet as tolerated, antiemetics as needed, on H2RA therapy    Monitor urine output closely, correct electrolyte derangements as needed, cards added lasix    SCDs for DVT ppx, on iron    on vanc (previous pericardial drain was manipulated in a non sterile fashion during the code) - manpreet now out will d/c    Keep euglycemic, continue Synthroid     Time 35 mins    Signed By: Wolf Velasquez MD     June 25, 2021

## 2021-06-25 NOTE — PROGRESS NOTES
Cardiac Electrophysiology Hospital Progress Note     Subjective:      Ovidio Edgar is a [de-identified] y.o. patient who is seen this am and she is sitting up reading her phone messages but said she is short of breaths when walking  She was admitted after developing cardiac tamponade during AF ablation attempt. Pericardial effusion occurred after transseptal puncture (thick septum), required emergent pericardiocentesis in EP lab. Extubated post procedure. There was no blood in the bag until 4 pm later in the afternoon, she had further cardiac tamponade, required pericardial window, POD2    Initial drain appeared to have been blocked by clot, which was then successfully flushed out during pericardial window procedure. She received 1 unit PRBCs prior to going to the OR due to concern for blood loss. Drain output during last 24 hours has been 120 cc total  Levophed is off       Holding Eliquis. In NSR (was cardioverted for VT during tamponade)        Previous:  S/p TIM/DCCV 06/2020. S/p AF ablation 05/10/2019. PVI unable to be completed on left side due to pericardial effusion without tamponade. Heparin stopped & reversed. Persistent AF, failed DCCV & flecainide. Cardioversion with Dr. Vanessa Minor on 2/22/19, had follow up on 2/27/19, back in atrial fibrillation. Mild dilated cardiomyopathy, improved on GDMT. Couldn't tolerate meds due to low BP. Negative stress test 5 yrs ago per her report.           Problem List  Date Reviewed: 6/21/2021        Codes Class Noted    persistent atrial fibrillation  ICD-10-CM: I48.91  ICD-9-CM: 427.31  6/21/2021        S/P ablation of atrial fibrillation ICD-10-CM: Z98.890, Z86.79  ICD-9-CM: V45.89  5/10/2019        Cardiac/pericardial tamponade ICD-10-CM: I31.4  ICD-9-CM: 423.3  6/21/2021        Encounter for cardioversion procedure ICD-10-CM: Z01.89  ICD-9-CM: V72.85  3/28/2018    Overview Signed 3/28/2018  1:06 PM by Jan Barakat MD     3/28/2018 200 J to NSR after a TIM without thrombus             Dizziness ICD-10-CM: R42  ICD-9-CM: 780.4  3/27/2018        Cystocele ICD-10-CM: [id number]  ICD-9-CM: Earlyne Bob  8/1/2014        Uterine prolapse ICD-10-CM: N81.4  ICD-9-CM: 618.1  8/1/2014        HTN (hypertension) ICD-10-CM: I10  ICD-9-CM: 401.9  Unknown        Lung nodule ICD-10-CM: R91.1  ICD-9-CM: 793.11  Unknown    Overview Signed 5/14/2014 10:34 AM by MD Dr. Orlando Guerra: J45.909  ICD-9-CM: 493.90  Unknown        Hyperlipidemia LDL goal < 130 ICD-10-CM: E78.5  ICD-9-CM: 272.4  Unknown    Overview Signed 4/26/2013  9:17 AM by Stephani Quick MD     for increased LDL particles, Dr. Rashida Hernandez             Dyslipidemia ICD-10-CM: E78.5  ICD-9-CM: 272.4  Unknown    Overview Signed 12/31/2011  4:11 PM by Sarah Horan MD     labs 2008 - chol 283, HDL 83, ,              Palpitations ICD-10-CM: R00.2  ICD-9-CM: 785.1  Unknown        Chest pain, unspecified ICD-10-CM: R07.9  ICD-9-CM: 786.50  12/31/2011        S/P pericardial window creation ICD-10-CM: Y06.221  ICD-9-CM: V45.89  6/21/2021        * (Principal) Pericardial effusion with cardiac tamponade ICD-10-CM: I31.3, I31.4  ICD-9-CM: 423.9, 423.3  5/10/2019    Overview Signed 5/10/2019  1:34 PM by Lashawn Chino MD     Loculated posterior LA and LV, no tamponade             Non-rheumatic mitral regurgitation ICD-10-CM: I34.0  ICD-9-CM: 424.0  5/10/2019    Overview Signed 5/10/2019  1:34 PM by Lashawn Chino MD     moderate             Non-rheumatic tricuspid valve insufficiency ICD-10-CM: I36.1  ICD-9-CM: 424.2  5/10/2019        Persistent atrial fibrillation ([facility] ICD-10-CM: I48.19  ICD-9-CM: 427.31  3/27/2018        Breast cancer ([facility] ICD-10-CM: C50.919  ICD-9-CM: 174.9  Unknown              Current Facility-Administered Medications   Medication Dose Route Frequency Provider Last Rate Last Admin    therapeutic multivitamin (THERAGRAN) tablet 1 Tablet  1 Tablet Oral DAILY Lauren Cleveland MD   1 Tablet at 06/24/21 9437    ferrous sulfate tablet 325 mg  1 Tablet Oral DAILY WITH Eddie Phelps MD   325 mg at 06/24/21 0925    midodrine (PROAMATINE) tablet 2.5 mg  2.5 mg Oral Q8H Zurdo Bush MD   2.5 mg at 06/25/21 0639    budesonide (PULMICORT) 500 mcg/2 ml nebulizer suspension  1,000 mcg Nebulization BID RT Lauren Cleveland MD   1,000 mcg at 06/24/21 2137    potassium chloride SR (KLOR-CON 10) tablet 20 mEq  20 mEq Oral DAILY Vanesa CHEUNG MD   20 mEq at 06/24/21 0923    levoFLOXacin (LEVAQUIN) tablet 750 mg  750 mg Oral ACB JOSE Bustos   750 mg at 06/25/21 0717    acetaminophen (TYLENOL) tablet 650 mg  650 mg Oral Q4H JOSE Bustos   650 mg at 06/25/21 0428    vancomycin (VANCOCIN) 1,000 mg in 0.9% sodium chloride 250 mL (VIAL-MATE)  1,000 mg IntraVENous Q12H JOSE Franco 125 mL/hr at 06/24/21 2053 1,000 mg at 06/24/21 2053    NOREPINephrine (LEVOPHED) 8 mg in 5% dextrose 250mL (32 mcg/mL) infusion  0.5-30 mcg/min IntraVENous TITRATE Vanesa CHEUNG MD   Stopped at 06/24/21 1614    sodium chloride (NS) flush 5-40 mL  5-40 mL IntraVENous PRN Juli Dubin, REGI        sodium chloride (NS) flush 5-40 mL  5-40 mL IntraVENous Q8H Willie CHEUNG NP   10 mL at 06/25/21 3429    sodium chloride (NS) flush 5-40 mL  5-40 mL IntraVENous PRN Juli Dubin, NP        sodium chloride (NS) flush 5-40 mL  5-40 mL IntraVENous PRN Juli Dubin, NP        levothyroxine (SYNTHROID) tablet 25 mcg  25 mcg Oral ACB Willie CHEUNG NP   25 mcg at 06/25/21 1386    montelukast (SINGULAIR) tablet 10 mg  10 mg Oral QPM Willie CHEUNG NP   10 mg at 06/24/21 1727    PHENYLephrine (DOE-SYNEPHRINE) 30 mg in 0.9% sodium chloride 250 mL infusion   mcg/min IntraVENous TITRATE Lauren Cleveland MD   Stopped at 06/22/21 1056    propofol (DIPRIVAN) 10 mg/mL infusion  0-50 mcg/kg/min IntraVENous TITRATE Izabela Stanley MD   Stopped at 06/22/21 1201    HYDROmorphone (PF) (DILAUDID) injection 1 mg  1 mg IntraVENous Q4H PRN Izabela Stanley MD   1 mg at 06/22/21 1425    ELECTROLYTE REPLACEMENT PROTOCOL - Potassium and Magnesium  1 Each Other PRN Izabela Stanley MD        albuterol (PROVENTIL VENTOLIN) nebulizer solution 2.5 mg  2.5 mg Nebulization Q4H PRN Tejas Ritter NP        atorvastatin (LIPITOR) tablet 20 mg  20 mg Oral QPM Haylie Alexandra PA   20 mg at 06/24/21 1727    cetirizine (ZYRTEC) tablet 10 mg  10 mg Oral QPM Haylie Alexandra PA   10 mg at 06/24/21 1727    cholecalciferol (VITAMIN D3) (1000 Units /25 mcg) tablet 5,000 Units  5,000 Units Oral DAILY [place], PA   5,000 Units at 06/24/21 2563    cyclobenzaprine (FLEXERIL) tablet 5 mg  5 mg Oral BID PRN Haylie Alexandra PA        diphenhydrAMINE (BENADRYL) capsule 25 mg  25 mg Oral Q6H PRN Haylie Alexandra PA        raloxifene (EVISTA) tablet 60 mg  60 mg Oral DAILY [place], PA   60 mg at 06/24/21 4904    amiodarone (CORDARONE) tablet 200 mg  200 mg Oral DAILY Haylie Alexandra PA   200 mg at 06/24/21 5416    0.9% sodium chloride infusion 250 mL  250 mL IntraVENous PRN Oziel Frankel MD        0.9% sodium chloride infusion 250 mL  250 mL IntraVENous PRN Oziel Frankel MD        Vancomycin- pharmacy to dose   Other Rx Dosing/Monitoring Haylie Alexandra PA        insulin regular (NOVOLIN R, HUMULIN R) 100 Units in 0.9% sodium chloride 100 mL infusion  0-50 Units/hr IntraVENous TITRATE Haylie Alexandra PA        sodium chloride (NS) flush 5-40 mL  5-40 mL IntraVENous PRN Haylie Alexandra PA        0.45% sodium chloride infusion  10 mL/hr IntraVENous CONTINUOUS Haylie Alexandra PA 10 mL/hr at 06/24/21 0810 10 mL/hr at 06/24/21 0810    0.9% sodium chloride infusion  9 mL/hr IntraVENous CONTINUOUS Haylie Alexandra PA 3 mL/hr at 06/23/21 0658 3 mL/hr at 06/23/21 501    oxyCODONE IR (ROXICODONE) tablet 5 mg  5 mg Oral Q4H PRN Haylie Alexandra PA   5 mg at 06/23/21 0858    oxyCODONE IR (ROXICODONE) tablet 10 mg  10 mg Oral Q4H PRN Daryl Alexandra PA   10 mg at 06/23/21 2106    naloxone (NARCAN) injection 0.4 mg  0.4 mg IntraVENous PRN Haylie Alexandra PA        mupirocin (BACTROBAN) 2 % ointment   Both Nostrils BID [place]   Given at 06/24/21 2057    ondansetron (ZOFRAN) injection 4 mg  4 mg IntraVENous Q4H PRN Haylie Alexandra PA        albuterol (PROVENTIL VENTOLIN) nebulizer solution 2.5 mg  2.5 mg Nebulization Q4H PRN Haylie Alexandra PA   2.5 mg at 06/24/21 0742    midazolam (VERSED) injection 1 mg  1 mg IntraVENous Q1H PRN Haylie Alexandra PA   1 mg at 06/22/21 0409    chlorhexidine (PERIDEX) 0.12 % mouthwash 10 mL  10 mL Oral Q12H Haylie Alexandra PA   10 mL at 06/24/21 2057    famotidine (PEPCID) tablet 20 mg  20 mg Oral Q12H Haylie Alexandra PA   20 mg at 06/24/21 2049    calcium chloride 1 g in 0.9% sodium chloride 100 mL IVPB  1 g IntraVENous PRN Haylie Alexandra PA        bisacodyL (DULCOLAX) suppository 10 mg  10 mg Rectal DAILY PRN Haylie Alexandra PA        senna-docusate (PERICOLACE) 8.6-50 mg per tablet 1 Tablet  1 Tablet Oral BID JOSE Aguilar   1 Tablet at 06/24/21 1727    polyethylene glycol (MIRALAX) packet 17 g  17 g Oral DAILY Haylie Alexandra PA   17 g at 06/23/21 0857    ELECTROLYTE REPLACEMENT NOTE: Nurse to review Serum Potassium and Magnesuim levels and Initiate Electrolyte Replacement Protocol as needed  1 Each Other PRN Haylie Alexandra PA        magnesium sulfate 1 g/100 ml IVPB (premix or compounded)  1 g IntraVENous PRN Haylie Alexandra PA        glucose chewable tablet 16 g  4 Tablet Oral PRN Haylie Alexandra PA        dextrose (D50W) injection syrg 12.5-25 g  12.5-25 g IntraVENous PRN Haylie Alexandra PA        glucagon (GLUCAGEN) injection 1 mg  1 mg IntraMUSCular PRN Haylie Alexandra PA        dexmedeTOMidine in 0.9 % NaCl (PRECEDEX) 400 mcg/100 mL (4 mcg/mL) infusion soln  0.1-1.5 mcg/kg/hr IntraVENous TITRATE Haylie Alexandra PA         Allergies   Allergen Reactions    Betadine [Povidone-Iodine] Rash    Iodine Rash    Norvasc [Amlodipine] Other (comments)     Flushing/redness.      Past Medical History:   Diagnosis Date    Arthritis     Asthma     dr. Mitch Diaz allergist    Atrial fibrillation ([facility]     Breast cancer ([facility] 1980    right - mastectomy    Coagulation disorder ([facility]     on eliquis    Dyslipidemia     labs 2008 - chol 283, HDL 83, ,     HTN (hypertension)     Hyperlipidemia LDL goal < 130     for increased LDL particles, Dr. Lupe Samaniego nodule     Dr. Callum Young Palpitations     resolved     Past Surgical History:   Procedure Laterality Date    COLONOSCOPY N/A 9/10/2018    COLONOSCOPY performed by Prince Baldwin MD at [facility] W/O CONT WITH CALCIUM  1/2012    CAC score 0; calcified mediastinal lymph nodes consistent granulomatous disease    ECHO 2D ADULT  5/5/2008    normal, LVEF 60%    HX APPENDECTOMY      HX HYSTERECTOMY Bilateral 03/19/2015    and uro repair    HX KNEE REPLACEMENT Right     HX MASTECTOMY Right     HX TONSILLECTOMY      HX UROLOGICAL  8/1/14    Urodynamics    INTRACARD ECHO, THER/DX INTERVENT N/A 5/10/2019    Intracardiac Echocardiogram performed by Ti Clay MD at [address]  3/28/2018         NE CHEST SURGERY PROCEDURE UNLISTED      lung nodule removed - benign    NE EPHYS EVL TRNSPTL TX ATRIAL FIB ISOLAT PULM VEIN N/A 5/10/2019    ABLATION A-FIB  W COMPLETE EP STUDY performed by Ti Clay MD at [place] [address] CATH LAB    NE INTRACARDIAC ELECTROPHYSIOLOGIC 3D MAPPING N/A 5/10/2019    Ep 3d Mapping performed by Ti Clay MD at [place] [address] CATH LAB    STRESS TEST [facility]  1/5/12    walked 7:31, no chest pain, normal MPI. Family History   Problem Relation Age of Onset    Heart Failure Mother     Stroke Father     Other Other         endometrial cancer, niece     Social History     Tobacco Use    Smoking status: Never Smoker    Smokeless tobacco: Never Used   Substance Use Topics    Alcohol use: Yes     Comment: wine nightly        Review of Systems: Review of all other systems otherwise negative. Constitutional: Negative for fever, chills, weight loss, + malaise/fatigue. HEENT: Negative for nosebleeds, vision changes. Respiratory: hard to cough   Cardiovascular: Negative for chest pain other than soreness at drain site, palpitations, orthopnea, claudication, leg swelling, syncope, and PND. +BARRY  Gastrointestinal: Negative for nausea, vomiting, diarrhea, blood in stool and melena. Genitourinary: Negative for dysuria, and hematuria. Musculoskeletal: Negative for myalgias, arthralgia. + generalized weakness  Skin: Negative for rash. Heme: Does not bleed or bruise easily. Neurological: Negative for speech change and focal weakness. Objective:     Visit Vitals  Pulse 70   Temp 97.8 °F (36.6 °C)   Resp 24   Ht 5' 6\" (1.676 m)   Wt 198 lb 3.2 oz (89.9 kg)   SpO2 96%   Breastfeeding No   BMI 31.99 kg/m²       Physical Exam:   Constitutional: Well-developed and well-nourished. Head: Normocephalic and atraumatic. Eyes: Pupils are equal, round. ENT: Wearing glasses. Neck: right IJ central line  Cardiovascular: Normal rate, regular rhythm. Exam reveals no gallop and no friction rub. No murmur heard. Gavin drain in place. Pulmonary/Chest: crackles right lung  Abdominal: Soft, no tenderness. Musculoskeletal: Moves extremities independently   Vasc/lymphatic: trace edema. Neurological: Alert & oriented. Skin: Skin is warm and dry. Psych: Mood & affect appropriate. Judgment & behavior appropriate.     Telemetry NSR    Assessment/Plan:     Imaging/Studies:  Limited echo (06/22/2021): Small posterior pericardial effusion noted. TIM (06/19/2020): LVEF 45-50%. Mildly dilated LA. Mild to mod MR. Mild to mod TR. Limited echo (10/24/2019): LVEF 56-60%, mild concentric LVH, mildly dilated LV, no RWMA, grade 2 diastolic dysfunction. Mildly dilated RV. Mildly dilated RA. Mod dilated LA. Mild to mod MR. Mild AR & mild aortic valve sclerosis without significant stenosis. Mild to mod PH. Echo (05/11/2019): LVEF 51-55%, no RWMA. Mod dilated LA, mod dilated RA. Small to mod anterior pericardial effusion adjacent to LV & RA.     TIM (05/10/2019): LVEF 56-60%, no RWMA. RV mod dilated. LA mod dilated. RA mod dilated. Mod MR. Mod TR. Mod posterior loculated pericardial effusion adjacent to LV & LA measuring 15 mm. AFIB: NSR post cardioversion   eliquis on hold due to recent bleeding  Amiodarone 200 mg every day  If recurrent AFIB consider BIV pacer and av node ablation     Pericardial effusion: drainage via manpreet drain in place  2 D echo this morning  If not much this morning perhaps it can be removed by CT surgery team and goes to step down unit. Thrombocytopenia: Avoid heparin flushes for now. Serotonin release assay is still pending    Anemia: due to bleeding during ablation attempt and post op  2 unit PRBC since admission   Continue to monitor and transfuse if down to 7    BARRY anemia and ? Right sided pleural effusion   She has gotten IV fluid and may start to have pulmonary edema and needs diuresis   Check chest xray    Disposition: home is possible in 48-72 hrs    Will ask Dr Nina Suárez to see for weekend    Thank you for involving me in this patient's care and please call with further concerns or questions. Kaveh Boykin M.D.   Electrophysiology/Cardiology  [facility]  [address] , [place] [phone]

## 2021-06-26 ENCOUNTER — APPOINTMENT (OUTPATIENT)
Dept: GENERAL RADIOLOGY | Age: 81
DRG: 270 | End: 2021-06-26
Attending: PHYSICIAN ASSISTANT
Payer: MEDICARE

## 2021-06-26 LAB
ALBUMIN SERPL-MCNC: 3 G/DL (ref 3.5–5)
ALBUMIN/GLOB SERPL: 1.2 {RATIO} (ref 1.1–2.2)
ALP SERPL-CCNC: 99 U/L (ref 45–117)
ALT SERPL-CCNC: 36 U/L (ref 12–78)
ANION GAP SERPL CALC-SCNC: 5 MMOL/L (ref 5–15)
AST SERPL-CCNC: 29 U/L (ref 15–37)
BILIRUB SERPL-MCNC: 0.6 MG/DL (ref 0.2–1)
BUN SERPL-MCNC: 15 MG/DL (ref 6–20)
BUN/CREAT SERPL: 23 (ref 12–20)
CALCIUM SERPL-MCNC: 9 MG/DL (ref 8.5–10.1)
CHLORIDE SERPL-SCNC: 111 MMOL/L (ref 97–108)
CO2 SERPL-SCNC: 22 MMOL/L (ref 21–32)
CREAT SERPL-MCNC: 0.64 MG/DL (ref 0.55–1.02)
ERYTHROCYTE [DISTWIDTH] IN BLOOD BY AUTOMATED COUNT: 14.9 % (ref 11.5–14.5)
GLOBULIN SER CALC-MCNC: 2.6 G/DL (ref 2–4)
GLUCOSE SERPL-MCNC: 135 MG/DL (ref 65–100)
HCT VFR BLD AUTO: 29.3 % (ref 35–47)
HGB BLD-MCNC: 9.1 G/DL (ref 11.5–16)
MCH RBC QN AUTO: 31.7 PG (ref 26–34)
MCHC RBC AUTO-ENTMCNC: 31.1 G/DL (ref 30–36.5)
MCV RBC AUTO: 102.1 FL (ref 80–99)
NRBC # BLD: 0 K/UL (ref 0–0.01)
NRBC BLD-RTO: 0 PER 100 WBC
PLATELET # BLD AUTO: 102 K/UL (ref 150–400)
PMV BLD AUTO: 12.9 FL (ref 8.9–12.9)
POTASSIUM SERPL-SCNC: 4.3 MMOL/L (ref 3.5–5.1)
PROT SERPL-MCNC: 5.6 G/DL (ref 6.4–8.2)
RBC # BLD AUTO: 2.87 M/UL (ref 3.8–5.2)
SODIUM SERPL-SCNC: 138 MMOL/L (ref 136–145)
WBC # BLD AUTO: 10.2 K/UL (ref 3.6–11)

## 2021-06-26 PROCEDURE — 97116 GAIT TRAINING THERAPY: CPT

## 2021-06-26 PROCEDURE — 74011250637 HC RX REV CODE- 250/637: Performed by: NURSE PRACTITIONER

## 2021-06-26 PROCEDURE — 36415 COLL VENOUS BLD VENIPUNCTURE: CPT

## 2021-06-26 PROCEDURE — 74011250637 HC RX REV CODE- 250/637: Performed by: EMERGENCY MEDICINE

## 2021-06-26 PROCEDURE — 71045 X-RAY EXAM CHEST 1 VIEW: CPT

## 2021-06-26 PROCEDURE — 85027 COMPLETE CBC AUTOMATED: CPT

## 2021-06-26 PROCEDURE — 65660000000 HC RM CCU STEPDOWN

## 2021-06-26 PROCEDURE — 74011250636 HC RX REV CODE- 250/636: Performed by: SPECIALIST

## 2021-06-26 PROCEDURE — 74011250637 HC RX REV CODE- 250/637: Performed by: PHYSICIAN ASSISTANT

## 2021-06-26 PROCEDURE — 80053 COMPREHEN METABOLIC PANEL: CPT

## 2021-06-26 PROCEDURE — 99233 SBSQ HOSP IP/OBS HIGH 50: CPT | Performed by: SPECIALIST

## 2021-06-26 PROCEDURE — 94640 AIRWAY INHALATION TREATMENT: CPT

## 2021-06-26 PROCEDURE — 74011250637 HC RX REV CODE- 250/637: Performed by: INTERNAL MEDICINE

## 2021-06-26 PROCEDURE — 97530 THERAPEUTIC ACTIVITIES: CPT

## 2021-06-26 PROCEDURE — 74011000250 HC RX REV CODE- 250: Performed by: INTERNAL MEDICINE

## 2021-06-26 RX ORDER — FUROSEMIDE 10 MG/ML
20 INJECTION INTRAMUSCULAR; INTRAVENOUS ONCE
Status: COMPLETED | OUTPATIENT
Start: 2021-06-26 | End: 2021-06-26

## 2021-06-26 RX ADMIN — FAMOTIDINE 20 MG: 20 TABLET ORAL at 22:13

## 2021-06-26 RX ADMIN — ACETAMINOPHEN 650 MG: 325 TABLET ORAL at 22:13

## 2021-06-26 RX ADMIN — MONTELUKAST 10 MG: 10 TABLET, FILM COATED ORAL at 17:52

## 2021-06-26 RX ADMIN — ATORVASTATIN CALCIUM 20 MG: 20 TABLET, FILM COATED ORAL at 17:52

## 2021-06-26 RX ADMIN — FUROSEMIDE 20 MG: 10 INJECTION, SOLUTION INTRAMUSCULAR; INTRAVENOUS at 09:32

## 2021-06-26 RX ADMIN — DOCUSATE SODIUM 50 MG AND SENNOSIDES 8.6 MG 1 TABLET: 8.6; 5 TABLET, FILM COATED ORAL at 17:52

## 2021-06-26 RX ADMIN — POTASSIUM CHLORIDE 20 MEQ: 750 TABLET, FILM COATED, EXTENDED RELEASE ORAL at 09:31

## 2021-06-26 RX ADMIN — FUROSEMIDE 20 MG: 40 TABLET ORAL at 09:31

## 2021-06-26 RX ADMIN — Medication 10 ML: at 22:14

## 2021-06-26 RX ADMIN — LEVOTHYROXINE SODIUM 25 MCG: 0.03 TABLET ORAL at 06:00

## 2021-06-26 RX ADMIN — BUDESONIDE 1000 MCG: 0.5 INHALANT RESPIRATORY (INHALATION) at 09:08

## 2021-06-26 RX ADMIN — THERA TABS 1 TABLET: TAB at 09:31

## 2021-06-26 RX ADMIN — AMIODARONE HYDROCHLORIDE 200 MG: 200 TABLET ORAL at 09:31

## 2021-06-26 RX ADMIN — Medication 10 ML: at 06:05

## 2021-06-26 RX ADMIN — FAMOTIDINE 20 MG: 20 TABLET ORAL at 09:31

## 2021-06-26 RX ADMIN — ACETAMINOPHEN 650 MG: 325 TABLET ORAL at 16:01

## 2021-06-26 RX ADMIN — MUPIROCIN: 20 OINTMENT TOPICAL at 09:00

## 2021-06-26 RX ADMIN — ACETAMINOPHEN 650 MG: 325 TABLET ORAL at 00:38

## 2021-06-26 RX ADMIN — POLYETHYLENE GLYCOL 3350 17 G: 17 POWDER, FOR SOLUTION ORAL at 09:32

## 2021-06-26 RX ADMIN — FERROUS SULFATE TAB 325 MG (65 MG ELEMENTAL FE) 325 MG: 325 (65 FE) TAB at 09:31

## 2021-06-26 RX ADMIN — CETIRIZINE HYDROCHLORIDE 10 MG: 10 TABLET, FILM COATED ORAL at 17:52

## 2021-06-26 RX ADMIN — ACETAMINOPHEN 650 MG: 325 TABLET ORAL at 06:00

## 2021-06-26 RX ADMIN — CHLORHEXIDINE GLUCONATE 10 ML: 1.2 RINSE ORAL at 22:15

## 2021-06-26 RX ADMIN — ACETAMINOPHEN 650 MG: 325 TABLET ORAL at 04:08

## 2021-06-26 RX ADMIN — BUDESONIDE 1000 MCG: 0.5 INHALANT RESPIRATORY (INHALATION) at 22:19

## 2021-06-26 RX ADMIN — MIDODRINE HYDROCHLORIDE 2.5 MG: 2.5 TABLET ORAL at 06:00

## 2021-06-26 RX ADMIN — CHLORHEXIDINE GLUCONATE 10 ML: 1.2 RINSE ORAL at 09:00

## 2021-06-26 RX ADMIN — DOCUSATE SODIUM 50 MG AND SENNOSIDES 8.6 MG 1 TABLET: 8.6; 5 TABLET, FILM COATED ORAL at 09:31

## 2021-06-26 RX ADMIN — Medication 5000 UNITS: at 09:31

## 2021-06-26 NOTE — PROGRESS NOTES
Problem: Mobility Impaired (Adult and Pediatric)  Goal: *Acute Goals and Plan of Care (Insert Text)  Description: FUNCTIONAL STATUS PRIOR TO ADMISSION: Patient was independent and active without use of DME, driving, two falls (standing at mailbox and standing while completing lower body dressing). HOME SUPPORT PRIOR TO ADMISSION: The patient lived alone with no support required. Daughter and [employer] members available to assist if needed. Physical Therapy Goals  Initiated 6/23/2021  1. Patient will move from supine to sit and sit to supine , scoot up and down, and roll side to side in bed with modified independence within 7 day(s). 2.  Patient will transfer from bed to chair and chair to bed with modified independence using the least restrictive device within 7 day(s). 3.  Patient will perform sit to stand with modified independence within 7 day(s). 4.  Patient will ambulate with independence for 300 feet within 7 day(s). 5.  Patient will ascend/descend 1 step with modified independence within 7 day(s). Outcome: Progressing Towards Goal   PHYSICAL THERAPY TREATMENT  Patient: Shandra Muller (35 y.o. female)  Date: 6/26/2021  Diagnosis: Atrial fibrillation, unspecified type ([facility] [I48.91]  A-fib ([facility] [I48.91]  Cardiac/pericardial tamponade [I31.4] Pericardial effusion with cardiac tamponade  Procedure(s) (LRB):  PERICARDIAL WINDOW / TIM BY DR. Whitney Alcantara (N/A) 5 Days Post-Op  Precautions: Fall  Chart, physical therapy assessment, plan of care and goals were reviewed. ASSESSMENT  Patient continues with skilled PT services and is progressing towards goals. Pt cleared by nsg and received sitting up in chair following breakfast. Pt able to stand with supervision and ambulated 60 feet with RW. Pt did not require a standing rest break, had only mild observed dyspnea at end of gait. HR stable and at 73 once back in room.   Reviewed safety precautions for home including BSC close to bed, adequate lighting for use of BR at night, short frequent walks to improve endurance, removing throw rugs. Pt has good family support (dtr present in room) who can stay with pt once home. Continue to recommend HHPT to  improve functional endurance. Current Level of Function Impacting Discharge (mobility/balance): pt with decreased balance and endurance below her baseline. Other factors to consider for discharge: pt has good support from family, lives in one level home. PLAN :  Patient continues to benefit from skilled intervention to address the above impairments. Continue treatment per established plan of care. to address goals. Recommendation for discharge: (in order for the patient to meet his/her long term goals)  Physical therapy at least 2 days/week in the home     This discharge recommendation:  Has not yet been discussed the attending provider and/or case management    IF patient discharges home will need the following DME: patient owns DME required for discharge ( and [facility])       SUBJECTIVE:   Patient stated Matias Amin did better than yesterday, I feel better.     OBJECTIVE DATA SUMMARY:   Patient mobilized on continuous portable monitor/telemetry.   Critical Behavior:  Neurologic State: Alert  Orientation Level: Oriented X4  Cognition: Appropriate for age attention/concentration  Safety/Judgement: Insight into deficits    Functional Mobility Training:  Bed Mobility:   Pt up in chair\         Transfers:  Sit to Stand: Supervision  Stand to Sit: Supervision         Balance:  Sitting: Intact  Standing: Impaired  Standing - Static: Good;Constant support  Standing - Dynamic : Constant support;Good  Ambulation/Gait Training:  Distance (ft): 60 Feet (ft)  Assistive Device: Walker, rolling;Gait belt  Ambulation - Level of Assistance: Contact guard assistance      Mild lateral trunk sway   Base of Support: Widened        Step Length: Left shortened;Right lengthened            Stairs:      Pt has one little \"lip\" and lives in a one level  home       Therapeutic Exercises:   Patient instructed on the benefits and demonstrated cardiac exercises while seated  Instructed and indicated understanding on how to progress reps, sets, against gravity, working up through weights and so on based on cardiologist clearance in prep for functional activity. Can use household items for weights. CARDIAC  EXERCISE   Sets   Reps   Active Active Assist   Passive Self ROM   Comments   Shoulder flexion 1 5 [x]                                            []                                            []                                            []                                               Shoulder abduction 1  5 [x]                                            []                                            []                                            []                                               Scapular retraction 1 5 [x]                                            []                                            []                                            []                                                   Pain Rating:  Pt reporting 0/10     Activity Tolerance:   Good and observed SOB with activity, on RA, mild dyspnea      After treatment patient left in no apparent distress:   Sitting in chair, Call bell within reach, and Caregiver / family present    COMMUNICATION/COLLABORATION:   The patients plan of care was discussed with: Registered nurse.      Sixto Toro, PT   Time Calculation: 24 mins

## 2021-06-26 NOTE — PROGRESS NOTES
[address] [place] [address]               Division of Cardiology  [phone]                       Progress note              Haja Joshi M.D., JAY. NAME:  Marisela Fleming   :   1940   MRN:   [id number]         ICD-10-CM ICD-9-CM    1. Atrial fibrillation, unspecified type ([address]  I48.91 427.31 ELECTROPHYSIOLOGY PROCEDURE      ELECTROPHYSIOLOGY PROCEDURE      CARDIAC PROCEDURE      CARDIAC PROCEDURE      ECHO TIM W OR WO CONTRAST      ECHO TIM W OR WO CONTRAST   2. Pericardial effusion with cardiac tamponade  I31.3 423.9     I31.4 423.3    3. Hyperglycemia  R73.9 790.29    4. Longstanding persistent atrial fibrillation (HCC)  I48.11 427.31    5. Receiving inotropic medication  Z79.899 V49.89    6. Cardiogenic shock (HCC)  R57.0 785.51    7. Endotracheally intubated  Z97.8 V49.89    8. Persistent atrial fibrillation (HCC)  I48.19 427.31 amiodarone (CORDARONE) 200 mg tablet                 HPI  Very pleasant [de-identified]years old lady with the past medical history remarkable for paroxysmal atrial fibrillation. She has undergone cardioversion in the past which failed then with and without antiarrhythmic therapy. She is status post atrial fibrillation ablation of attempt unfortunately developed pericardial effusion after transseptal puncture due to thick septum. This required emergent pericardiocentesis. She had a clot in in the drainage which led to further cardiac tamponade requiring emergency pericardial window. She is now status post POD number 3. She is doing okay today she continues to have some shortness of breath with activities and some pleuritic chest pain on her left side. Assessment/Plan: 1. Pericardial effusiontamponade: Status post drainage of the Gavin drain which was removed.     Follow-up echocardiogram done yesterday although poor quality with only still pictures revealed persistent small pericardial effusion mostly posterior which is unchanged in size. Continue to hold off Eliquis at this point as per Dr. Sandy Hatch.    2.  Atrial fibrillation: She is currently normal sinus rhythm. Continue amiodarone off Eliquis for now due to recent pericardial effusion and bleed. 3.  Shortness of breath: Probably multifactorial her hemoglobin is stable actually slightly improved. She received transfusions during this hospital stay. She probably still requires some diuresis. Chest x-ray from yesterday noted with left pleural effusion although small. Continue mobilization at this point. tsh was normal plts have improved    Off eliquis entresto and topol at this time    She remains positive volume wise with +[phone] since admission. 1 extra dose of Lasix today intravenously. 4.  Hypertension: This is resolved and actually blood pressure is elevated. I will stop midodrine           CARDIAC STUDIES      06/21/21    ECHO ADULT FOLLOW-UP OR LIMITED 06/26/2021 6/26/2021    Interpretation Summary  · Pericardium: Trivial-to-small posterior pericardial effusion measuring 0.9 mm.  · Only still pictures available for review. reliability of interpretation is compromised. Signed by: Margarette Feng MD on 6/26/2021  8:54 AM      06/14/21    NUCLEAR CARDIAC STRESS TEST 06/15/2021 6/17/2021    Interpretation Summary  · SPECT: Left ventricular function post-stress was abnormal. Calculated ejection fraction is 49%. The TID ratio is 1.1. · Baseline ECG: Atrial flutter, non-specific ST-T wave abnormalities, myocardial infarction, myocardial infarction. The infarction is located in the anterior regions. .  · SPECT: Myocardial perfusion imaging defect 1: There is a defect that is small in size with a mild reduction in uptake present in the mid-apical and anterior location(s) that is non-reversible. There is normal wall motion in the defect area. Viability in the area is good.  The defect appears to probably be artifact caused by breast attenuation. · SPECT: Left ventricular perfusion is probably normal. Myocardial perfusion imaging supports a low risk stress test.    Signed by: Mary Lou Ivy MD on 6/15/2021  5:18 PM           @[email or web address]      IMAGING      CT Results (most recent):  Results from Hospital Encounter encounter on 05/20/21    CT HEAD WO CONT    Narrative  EXAM: CT HEAD WO CONT    INDICATION: Status post fall, vomiting, unsteadiness    COMPARISON: 3/28/2018. CONTRAST: None. TECHNIQUE: Unenhanced CT of the head was performed using 5 mm images. Brain and  bone windows were generated. Coronal and sagittal reformats. CT dose reduction  was achieved through use of a standardized protocol tailored for this  examination and automatic exposure control for dose modulation. FINDINGS:  The ventricles and sulci are normal in size, shape and configuration. . There is  no significant white matter disease. There is no intracranial hemorrhage,  extra-axial collection, or mass effect. The basilar cisterns are open. No CT  evidence of acute infarct. The bone windows demonstrate no abnormalities. The visualized portions of the  paranasal sinuses and mastoid air cells are clear. Impression  No acute abnormality. XR Results (most recent):  Results from Hospital Encounter encounter on 06/21/21    XR CHEST PORT    Narrative  EXAM:  XR CHEST PORT    INDICATION: Dyspnea on exertion. Left pleural effusion and basilar opacity. COMPARISON: 6/25/2021 at 0401 hours    TECHNIQUE: Portable AP upright chest view at 0844 hours    FINDINGS: The right IJ catheter and mediastinal drains are stable. The  cardiomediastinal contours are stable. A small left pleural effusion and left basilar opacity are stable. Right hilar  prominence and right upper lung scarring are stable. There is no right pleural  effusion or pneumothorax. The bones and upper abdomen are stable.     Impression  Right hilar prominence and small left pleural effusion and left basilar opacity  are unchanged. No new acute abnormality. MRI Results (most recent):  Results from [place] encounter on 07/16/13    MRI KNEE RT WO CONT    Narrative  **Final Report**      ICD Codes / Adm. Diagnosis: 836.2  401.9 / Other tear of cartilage or men  Examination:  MR KNEE WO CON RT  - [id number] - Jul 16 2013  3:28PM  Accession No:  [id number]  Reason:  tear      REPORT:  EXAM:  Right knee MRI without contrast    INDICATION: Pain    COMPARISON: None    TECHNIQUE: Axial T2 fat-saturated and proton density fat-saturated; coronal  T1 and proton density fat-saturated; and sagittal T2 fat-saturated, proton  density fat-saturated, and gradient echo MRI of the     knee . CONTRAST:  None. FINDINGS: Bone marrow: Degenerative type bone marrow edema is present in the  medial aspects of the medial femoral condyle and medial tibial plateau. A  small focus of subchondral bone marrow edema is present in the lateral facet  of the patella. There is no evidence of fracture or marrow replacing process. Joint fluid: There is a mild joint effusion. Areas of synovitis are seen  throughout the joint space, particularly in the suprapatellar recess and  anterior to the lateral compartment. There is additional 9 mm loose body  along the course of the popliteal tendon on series 7 image 55. There is a moderate-sized Baker's cyst posterior medial to the knee. This  measures 2.0 x 1.9 x 7.7 cm craniocaudal. There is a small rounded loose  body within this measuring 5 mm on series 8 image 4. Several strands of  synovitis are seen within this. Collateral ligaments and posterior, lateral corner: Intact. Medial meniscus: The medial meniscus is extruded. There is a complete radial  tear of the posterior horn of the medial meniscus as evidenced by an absent  posterior horn on series 9 image 8.  The distal material is flipped  inferiorly and laterally into the gutter on series 8 image 4 and series 6  image 17.    Lateral meniscus: High signal is seen in the free edge of the body related  to free edge tearing. Irregular increased signal is seen anterior to the  anterior horn is likely related to synovitis. ACL and PCL: A small area mucoid degeneration is present in the proximal  ACL. The ACL and PCL are intact however. Tendons: Intact. .    Muscles: Within normal limits. Patellofemoral alignment: No patellar subluxation/tilt. Trochlear groove is  not hypoplastic. TT-TG distance: Normal at 12 mm. Articular cartilage: Full-thickness cartilage loss is seen along the  weightbearing surfaces of the medial femoral condyle and medial tibial  plateau with significant marginal osteophytosis. Mild underlying subchondral  bone marrow edema is present medially. Near full-thickness fissuring is seen along the vertical ridge of the  patella. Mild irregular cartilage loss is seen along the lateral aspect of  the patellofemoral compartment. Small marginal osteophytes are seen adjacent  to this. There is a small area of subchondral bone marrow edema related to a  full-thickness fissure in the lateral facet of patella. There is a shallow thickness of fissuring are seen along the lateral femoral  condyle towards the tibial spine. Significant marginal osteophytes are  present. There is diffuse mild cartilage thinning throughout the lateral  compartment. Soft tissue mass: None. Nonspecific soft tissue edema is seen anterior to  the patellar tendon. IMPRESSION:  1. Complete radial tear of the posterior horn of the medial meniscus with  displacement of meniscal material into the inferior gutter. 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 2. Moderate lateral and patellofemoral osteoarthritis. 3. Free edge tearing in the body of the lateral meniscus.   4. Moderate joint effusion with areas of extensive synovitis likely related  to long-standing degenerative disease. 5. A moderate-sized Baker's cyst is seen posterior medial to the knee with  areas of synovitis and a small loose body. An additional small loose body is  seen along the course of the popliteus tendon.           Signing/Reading Doctor: Zaida Valenzuela ([id number])  Approved: Zaida Valenzuela ([id number])  Jul 16 2013  4:08PM          Past Medical History:   Diagnosis Date    Arthritis     Asthma     dr. Rosemary De La Torre allergist    Atrial fibrillation ([address]     Breast cancer [facility]) 1980    right - mastectomy    Coagulation disorder ([address]     on eliquis    Dyslipidemia     labs 2008 - chol 283, HDL 83, ,     HTN (hypertension)     Hyperlipidemia LDL goal < 130     for increased LDL particles, Dr. Adán Mg nodule     Dr. Ramos Falling Palpitations     resolved           Past Surgical History:   Procedure Laterality Date    COLONOSCOPY N/A 9/10/2018    COLONOSCOPY performed by Oswald Carlos MD at [facility] W/O CONT WITH CALCIUM  1/2012    CAC score 0; calcified mediastinal lymph nodes consistent granulomatous disease    ECHO 2D ADULT  5/5/2008    normal, LVEF 60%    HX APPENDECTOMY      HX HYSTERECTOMY Bilateral 03/19/2015    and uro repair    HX KNEE REPLACEMENT Right     HX MASTECTOMY Right     HX TONSILLECTOMY      HX UROLOGICAL  8/1/14    [employer]    INTRACARD ECHO, THER/DX INTERVENT N/A 5/10/2019    Intracardiac Echocardiogram performed by Katie Ritter MD at [address]  3/28/2018         GA CHEST SURGERY PROCEDURE UNLISTED      lung nodule removed - benign    GA EPHYS EVL TRNSPTL TX ATRIAL FIB ISOLAT PULM VEIN N/A 5/10/2019    ABLATION A-FIB  W COMPLETE EP STUDY performed by Katie Ritter MD at [place] [address] CATH LAB    GA INTRACARDIAC ELECTROPHYSIOLOGIC 3D MAPPING N/A 5/10/2019    Ep 3d Mapping performed [facility] [place] [address] [address] C[facility]    STRESS TEST [facility]  1/5/12    walked 7:31, no chest pain, normal MPI. Visit Vitals  BP (!) 151/66 (BP 1 Location: Left arm, BP Patient Position: At rest)   Pulse 70   Temp 98.6 °F (37 °C)   Resp 16   Ht 5' 6\" (1.676 m)   Wt 200 lb 2.8 oz (90.8 kg)   SpO2 96%   Breastfeeding No   BMI 32.31 kg/m²         Wt Readings from Last 3 Encounters:   06/26/21 200 lb 2.8 oz (90.8 kg)   06/18/21 182 lb (82.6 kg)   06/14/21 185 lb (83.9 kg)         Review of Systems:   Pertinent items are noted in the History of Present Illness. No intake/output data recorded. 06/24 1901 - 06/26 0700  In: 655.1 [P.O.:200; I.V.:455.1]  Out: 1360 [Urine:1300; Drains:60]      Telemetry: normal sinus rhythm    Physical Exam:    Neck: no JVD  Heart: normal apical impulse, regular rate and rhythm  Lungs: diminished breath sounds L base  Abdomen: soft, non-tender.  Bowel sounds normal. No masses,  no organomegaly  Extremities: edema 1+ b/l    Current Facility-Administered Medications   Medication Dose Route Frequency    furosemide (LASIX) tablet 20 mg  20 mg Oral DAILY    therapeutic multivitamin (THERAGRAN) tablet 1 Tablet  1 Tablet Oral DAILY    ferrous sulfate tablet 325 mg  1 Tablet Oral DAILY WITH BREAKFAST    midodrine (PROAMATINE) tablet 2.5 mg  2.5 mg Oral Q8H    budesonide (PULMICORT) 500 mcg/2 ml nebulizer suspension  1,000 mcg Nebulization BID RT    potassium chloride SR (KLOR-CON 10) tablet 20 mEq  20 mEq Oral DAILY    acetaminophen (TYLENOL) tablet 650 mg  650 mg Oral Q4H    NOREPINephrine (LEVOPHED) 8 mg in 5% dextrose 250mL (32 mcg/mL) infusion  0.5-30 mcg/min IntraVENous TITRATE    sodium chloride (NS) flush 5-40 mL  5-40 mL IntraVENous PRN    sodium chloride (NS) flush 5-40 mL  5-40 mL IntraVENous Q8H    sodium chloride (NS) flush 5-40 mL  5-40 mL IntraVENous PRN    sodium chloride (NS) flush 5-40 mL  5-40 mL IntraVENous PRN    levothyroxine (SYNTHROID) tablet 25 mcg  25 mcg Oral ACB    montelukast (SINGULAIR) tablet 10 mg  10 mg Oral QPM    PHENYLephrine (DOE-SYNEPHRINE) 30 mg in 0.9% sodium chloride 250 mL infusion   mcg/min IntraVENous TITRATE    propofol (DIPRIVAN) 10 mg/mL infusion  0-50 mcg/kg/min IntraVENous TITRATE    HYDROmorphone (PF) (DILAUDID) injection 1 mg  1 mg IntraVENous Q4H PRN    ELECTROLYTE REPLACEMENT PROTOCOL - Potassium and Magnesium  1 Each Other PRN    albuterol (PROVENTIL VENTOLIN) nebulizer solution 2.5 mg  2.5 mg Nebulization Q4H PRN    atorvastatin (LIPITOR) tablet 20 mg  20 mg Oral QPM    cetirizine (ZYRTEC) tablet 10 mg  10 mg Oral QPM    cholecalciferol (VITAMIN D3) (1000 Units /25 mcg) tablet 5,000 Units  5,000 Units Oral DAILY    cyclobenzaprine (FLEXERIL) tablet 5 mg  5 mg Oral BID PRN    diphenhydrAMINE (BENADRYL) capsule 25 mg  25 mg Oral Q6H PRN    raloxifene (EVISTA) tablet 60 mg  60 mg Oral DAILY    amiodarone (CORDARONE) tablet 200 mg  200 mg Oral DAILY    0.9% sodium chloride infusion 250 mL  250 mL IntraVENous PRN    0.9% sodium chloride infusion 250 mL  250 mL IntraVENous PRN    insulin regular (NOVOLIN R, HUMULIN R) 100 Units in 0.9% sodium chloride 100 mL infusion  0-50 Units/hr IntraVENous TITRATE    sodium chloride (NS) flush 5-40 mL  5-40 mL IntraVENous PRN    0.45% sodium chloride infusion  10 mL/hr IntraVENous CONTINUOUS    0.9% sodium chloride infusion  9 mL/hr IntraVENous CONTINUOUS    oxyCODONE IR (ROXICODONE) tablet 5 mg  5 mg Oral Q4H PRN    oxyCODONE IR (ROXICODONE) tablet 10 mg  10 mg Oral Q4H PRN    naloxone (NARCAN) injection 0.4 mg  0.4 mg IntraVENous PRN    mupirocin (BACTROBAN) 2 % ointment   Both Nostrils BID    ondansetron (ZOFRAN) injection 4 mg  4 mg IntraVENous Q4H PRN    albuterol (PROVENTIL VENTOLIN) nebulizer solution 2.5 mg  2.5 mg Nebulization Q4H PRN    midazolam (VERSED) injection 1 mg  1 mg IntraVENous Q1H PRN    chlorhexidine (PERIDEX) 0.12 % mouthwash 10 mL  10 mL Oral Q12H    famotidine (PEPCID) tablet 20 mg  20 mg Oral Q12H    calcium chloride 1 g in 0.9% sodium chloride 100 mL IVPB  1 g IntraVENous PRN    bisacodyL (DULCOLAX) suppository 10 mg  10 mg Rectal DAILY PRN    senna-docusate (PERICOLACE) 8.6-50 mg per tablet 1 Tablet  1 Tablet Oral BID    polyethylene glycol (MIRALAX) packet 17 g  17 g Oral DAILY    ELECTROLYTE REPLACEMENT NOTE: Nurse to review Serum Potassium and Magnesuim levels and Initiate Electrolyte Replacement Protocol as needed  1 Each Other PRN    magnesium sulfate 1 g/100 ml IVPB (premix or compounded)  1 g IntraVENous PRN    glucose chewable tablet 16 g  4 Tablet Oral PRN    dextrose (D50W) injection syrg 12.5-25 g  12.5-25 g IntraVENous PRN    glucagon (GLUCAGEN) injection 1 mg  1 mg IntraMUSCular PRN    dexmedeTOMidine in 0.9 % NaCl (PRECEDEX) 400 mcg/100 mL (4 mcg/mL) infusion soln  0.1-1.5 mcg/kg/hr IntraVENous TITRATE         All Cardiac Markers in the last 24 hours:  No results found for: CPK, CK, CKMMB, CKMB, RCK3, CKMBT, CKMBPOC, CKNDX, CKND1, PAIGE, TROPT, TROIQ, CARLOZ, TROPT, TNIPOC, BNP, BNPP, BNPNT     Lab Results   Component Value Date/Time    Creatinine (POC) 1.0 10/25/2009 03:04 AM    Creatinine 0.64 06/26/2021 03:55 AM          Lab Results   Component Value Date/Time    Troponin-I, Qt. 0.06 (H) 03/28/2018 10:23 AM         Lab Results   Component Value Date/Time    WBC 10.2 06/26/2021 03:55 AM    Hemoglobin (POC) 12.9 10/25/2009 03:04 AM    HGB 9.1 (L) 06/26/2021 03:55 AM    Hematocrit (POC) 38 10/25/2009 03:04 AM    HCT 29.3 (L) 06/26/2021 03:55 AM    PLATELET 741 (L) 32/16/3020 03:55 AM    .1 (H) 06/26/2021 03:55 AM         Lab Results   Component Value Date/Time    Sodium 138 06/26/2021 03:55 AM    Potassium 4.3 06/26/2021 03:55 AM    Chloride 111 (H) 06/26/2021 03:55 AM    CO2 22 06/26/2021 03:55 AM    Anion gap 5 06/26/2021 03:55 AM    Glucose 135 (H) 06/26/2021 03:55 AM    BUN 15 06/26/2021 03:55 AM    Creatinine 0.64 06/26/2021 03:55 AM    BUN/Creatinine ratio 23 (H) 06/26/2021 03:55 AM    GFR est AA >60 06/26/2021 03:55 AM    GFR est non-AA >60 06/26/2021 03:55 AM    Calcium 9.0 06/26/2021 03:55 AM         No results found for: BNP, BNPP, BNPPPOC, XBNPT, BNPNT      Lab Results   Component Value Date/Time    Cholesterol, total 142 03/28/2018 04:00 AM    HDL Cholesterol 76 03/28/2018 04:00 AM    LDL, calculated 55.2 03/28/2018 04:00 AM    VLDL, calculated 10.8 03/28/2018 04:00 AM    Triglyceride 54 03/28/2018 04:00 AM    CHOL/HDL Ratio 1.9 03/28/2018 04:00 AM         Lab Results   Component Value Date/Time    ALT (SGPT) 36 06/26/2021 03:55 AM    Alk.  phosphatase 99 06/26/2021 03:55 AM    Bilirubin, direct 0.1 06/21/2021 02:51 PM    Bilirubin, total 0.6 06/26/2021 03:55 AM

## 2021-06-27 ENCOUNTER — APPOINTMENT (OUTPATIENT)
Dept: GENERAL RADIOLOGY | Age: 81
DRG: 270 | End: 2021-06-27
Attending: PHYSICIAN ASSISTANT
Payer: MEDICARE

## 2021-06-27 LAB
ALBUMIN SERPL-MCNC: 2.9 G/DL (ref 3.5–5)
ALBUMIN/GLOB SERPL: 1.2 {RATIO} (ref 1.1–2.2)
ALP SERPL-CCNC: 77 U/L (ref 45–117)
ALT SERPL-CCNC: 31 U/L (ref 12–78)
ANION GAP SERPL CALC-SCNC: 7 MMOL/L (ref 5–15)
AST SERPL-CCNC: 23 U/L (ref 15–37)
BILIRUB SERPL-MCNC: 0.5 MG/DL (ref 0.2–1)
BUN SERPL-MCNC: 19 MG/DL (ref 6–20)
BUN/CREAT SERPL: 25 (ref 12–20)
CALCIUM SERPL-MCNC: 8.8 MG/DL (ref 8.5–10.1)
CHLORIDE SERPL-SCNC: 111 MMOL/L (ref 97–108)
CO2 SERPL-SCNC: 22 MMOL/L (ref 21–32)
CREAT SERPL-MCNC: 0.75 MG/DL (ref 0.55–1.02)
ERYTHROCYTE [DISTWIDTH] IN BLOOD BY AUTOMATED COUNT: 15 % (ref 11.5–14.5)
GLOBULIN SER CALC-MCNC: 2.4 G/DL (ref 2–4)
GLUCOSE SERPL-MCNC: 114 MG/DL (ref 65–100)
HCT VFR BLD AUTO: 27.1 % (ref 35–47)
HGB BLD-MCNC: 8.5 G/DL (ref 11.5–16)
MCH RBC QN AUTO: 31.8 PG (ref 26–34)
MCHC RBC AUTO-ENTMCNC: 31.4 G/DL (ref 30–36.5)
MCV RBC AUTO: 101.5 FL (ref 80–99)
NRBC # BLD: 0 K/UL (ref 0–0.01)
NRBC BLD-RTO: 0 PER 100 WBC
PLATELET # BLD AUTO: 83 K/UL (ref 150–400)
POTASSIUM SERPL-SCNC: 3.8 MMOL/L (ref 3.5–5.1)
PROT SERPL-MCNC: 5.3 G/DL (ref 6.4–8.2)
RBC # BLD AUTO: 2.67 M/UL (ref 3.8–5.2)
SODIUM SERPL-SCNC: 140 MMOL/L (ref 136–145)
WBC # BLD AUTO: 7.9 K/UL (ref 3.6–11)

## 2021-06-27 PROCEDURE — 74011250637 HC RX REV CODE- 250/637: Performed by: EMERGENCY MEDICINE

## 2021-06-27 PROCEDURE — 71045 X-RAY EXAM CHEST 1 VIEW: CPT

## 2021-06-27 PROCEDURE — 65660000000 HC RM CCU STEPDOWN

## 2021-06-27 PROCEDURE — 80053 COMPREHEN METABOLIC PANEL: CPT

## 2021-06-27 PROCEDURE — 36415 COLL VENOUS BLD VENIPUNCTURE: CPT

## 2021-06-27 PROCEDURE — 74011250637 HC RX REV CODE- 250/637: Performed by: SPECIALIST

## 2021-06-27 PROCEDURE — 74011250637 HC RX REV CODE- 250/637: Performed by: NURSE PRACTITIONER

## 2021-06-27 PROCEDURE — 74011250637 HC RX REV CODE- 250/637: Performed by: INTERNAL MEDICINE

## 2021-06-27 PROCEDURE — 74011250637 HC RX REV CODE- 250/637: Performed by: PHYSICIAN ASSISTANT

## 2021-06-27 PROCEDURE — 99233 SBSQ HOSP IP/OBS HIGH 50: CPT | Performed by: SPECIALIST

## 2021-06-27 PROCEDURE — 74011000250 HC RX REV CODE- 250: Performed by: INTERNAL MEDICINE

## 2021-06-27 PROCEDURE — 94640 AIRWAY INHALATION TREATMENT: CPT

## 2021-06-27 PROCEDURE — 74011000250 HC RX REV CODE- 250: Performed by: PHYSICIAN ASSISTANT

## 2021-06-27 PROCEDURE — 74011250636 HC RX REV CODE- 250/636: Performed by: SPECIALIST

## 2021-06-27 PROCEDURE — 85027 COMPLETE CBC AUTOMATED: CPT

## 2021-06-27 RX ORDER — FUROSEMIDE 10 MG/ML
40 INJECTION INTRAMUSCULAR; INTRAVENOUS ONCE
Status: COMPLETED | OUTPATIENT
Start: 2021-06-27 | End: 2021-06-27

## 2021-06-27 RX ORDER — POTASSIUM CHLORIDE 750 MG/1
20 TABLET, FILM COATED, EXTENDED RELEASE ORAL
Status: COMPLETED | OUTPATIENT
Start: 2021-06-27 | End: 2021-06-27

## 2021-06-27 RX ADMIN — RALOXIFENE HYDROCHLORIDE 60 MG: 60 TABLET, FILM COATED ORAL at 17:58

## 2021-06-27 RX ADMIN — ACETAMINOPHEN 650 MG: 325 TABLET ORAL at 07:11

## 2021-06-27 RX ADMIN — Medication 5000 UNITS: at 10:17

## 2021-06-27 RX ADMIN — THERA TABS 1 TABLET: TAB at 10:17

## 2021-06-27 RX ADMIN — POLYETHYLENE GLYCOL 3350 17 G: 17 POWDER, FOR SOLUTION ORAL at 10:17

## 2021-06-27 RX ADMIN — FERROUS SULFATE TAB 325 MG (65 MG ELEMENTAL FE) 325 MG: 325 (65 FE) TAB at 10:17

## 2021-06-27 RX ADMIN — Medication 10 ML: at 22:19

## 2021-06-27 RX ADMIN — MONTELUKAST 10 MG: 10 TABLET, FILM COATED ORAL at 17:58

## 2021-06-27 RX ADMIN — AMIODARONE HYDROCHLORIDE 200 MG: 200 TABLET ORAL at 10:18

## 2021-06-27 RX ADMIN — CHLORHEXIDINE GLUCONATE 10 ML: 1.2 RINSE ORAL at 22:20

## 2021-06-27 RX ADMIN — Medication 10 ML: at 18:00

## 2021-06-27 RX ADMIN — ACETAMINOPHEN 650 MG: 325 TABLET ORAL at 17:58

## 2021-06-27 RX ADMIN — ACETAMINOPHEN 650 MG: 325 TABLET ORAL at 10:56

## 2021-06-27 RX ADMIN — BUDESONIDE 1000 MCG: 0.5 INHALANT RESPIRATORY (INHALATION) at 08:02

## 2021-06-27 RX ADMIN — ACETAMINOPHEN 650 MG: 325 TABLET ORAL at 03:24

## 2021-06-27 RX ADMIN — FAMOTIDINE 20 MG: 20 TABLET ORAL at 22:19

## 2021-06-27 RX ADMIN — CETIRIZINE HYDROCHLORIDE 10 MG: 10 TABLET, FILM COATED ORAL at 17:58

## 2021-06-27 RX ADMIN — POTASSIUM CHLORIDE 20 MEQ: 750 TABLET, FILM COATED, EXTENDED RELEASE ORAL at 10:56

## 2021-06-27 RX ADMIN — Medication 10 ML: at 07:12

## 2021-06-27 RX ADMIN — LEVOTHYROXINE SODIUM 25 MCG: 0.03 TABLET ORAL at 07:12

## 2021-06-27 RX ADMIN — DOCUSATE SODIUM 50 MG AND SENNOSIDES 8.6 MG 1 TABLET: 8.6; 5 TABLET, FILM COATED ORAL at 10:17

## 2021-06-27 RX ADMIN — FAMOTIDINE 20 MG: 20 TABLET ORAL at 10:17

## 2021-06-27 RX ADMIN — FUROSEMIDE 40 MG: 10 INJECTION, SOLUTION INTRAMUSCULAR; INTRAVENOUS at 10:57

## 2021-06-27 RX ADMIN — POTASSIUM CHLORIDE 20 MEQ: 750 TABLET, FILM COATED, EXTENDED RELEASE ORAL at 10:19

## 2021-06-27 RX ADMIN — ACETAMINOPHEN 650 MG: 325 TABLET ORAL at 22:19

## 2021-06-27 RX ADMIN — BUDESONIDE 1000 MCG: 0.5 INHALANT RESPIRATORY (INHALATION) at 20:18

## 2021-06-27 RX ADMIN — FUROSEMIDE 20 MG: 40 TABLET ORAL at 10:17

## 2021-06-27 RX ADMIN — CHLORHEXIDINE GLUCONATE 10 ML: 1.2 RINSE ORAL at 09:00

## 2021-06-27 RX ADMIN — ATORVASTATIN CALCIUM 20 MG: 20 TABLET, FILM COATED ORAL at 17:58

## 2021-06-27 NOTE — PROGRESS NOTES
[address] [place] [address]               Division of Cardiology  [phone]                       Progress note              David Gardiner M.D., ELIS.A.CQuangC. NAME:  Jamin Calderón   :   1940   MRN:   [id number]         ICD-10-CM ICD-9-CM    1. Atrial fibrillation, unspecified type ([address]  I48.91 427.31 ELECTROPHYSIOLOGY PROCEDURE      ELECTROPHYSIOLOGY PROCEDURE      CARDIAC PROCEDURE      CARDIAC PROCEDURE      ECHO TIM W OR WO CONTRAST      ECHO TIM W OR WO CONTRAST   2. Pericardial effusion with cardiac tamponade  I31.3 423.9     I31.4 423.3    3. Hyperglycemia  R73.9 790.29    4. Longstanding persistent atrial fibrillation (HCC)  I48.11 427.31    5. Receiving inotropic medication  Z79.899 V49.89    6. Cardiogenic shock (HCC)  R57.0 785.51    7. Endotracheally intubated  Z97.8 V49.89    8. Persistent atrial fibrillation (HCC)  I48.19 427.31 amiodarone (CORDARONE) 200 mg tablet                 HPI  Very pleasant [de-identified]years old lady with the past medical history remarkable for paroxysmal atrial fibrillation. She has undergone cardioversion in the past which failed then with and without antiarrhythmic therapy. She is status post atrial fibrillation ablation of attempt unfortunately developed pericardial effusion after transseptal puncture due to thick septum. This required emergent pericardiocentesis. She had a clot in in the drainage which led to further cardiac tamponade requiring emergency pericardial window. She is now status post POD number 4    She remains dyspneic with minimal activities she tells me      Assessment/Plan: 1. Postop pericardial effusiontamponade: Status post drainage with Gavin drain which has been being at this point removed. Follow-up echocardiac with trace to small posterior pericardial effusion measuring 0.9 mm. Continue to hold off Eliquis for now.     2.  Shortness of breath: Likely multifactorial.  Anemia is clearly playing a role but the patient also remains positive fluid wise for 4843 cc. Additional dose of intravenous Lasix today and an additional dose of potassium. She is currently off Toprol and Eliquis for now. Last chest x-ray showed left pleural effusion although small. 3.  Hypertension: Hypertensive. For now hold off resume either Toprol or Entresto. Intravenous Lasix also will help with the blood pressure as well. She is now off midodrine. 4.  Atrial fibrillation: She remains for now in normal sinus rhythm continue with amiodarone he is off Eliquis on account of recent pericardial effusion and bleed. Dr. Lainey Downey to follow-up in the morning. CARDIAC STUDIES      06/21/21    ECHO ADULT FOLLOW-UP OR LIMITED 06/26/2021 6/26/2021    Interpretation Summary  · Pericardium: Trivial-to-small posterior pericardial effusion measuring 0.9 mm.  · Only still pictures available for review. reliability of interpretation is compromised. Signed by: Afsaneh Hardy MD on 6/26/2021  8:54 AM      06/14/21    NUCLEAR CARDIAC STRESS TEST 06/15/2021 6/17/2021    Interpretation Summary  · SPECT: Left ventricular function post-stress was abnormal. Calculated ejection fraction is 49%. The TID ratio is 1.1. · Baseline ECG: Atrial flutter, non-specific ST-T wave abnormalities, myocardial infarction, myocardial infarction. The infarction is located in the anterior regions. .  · SPECT: Myocardial perfusion imaging defect 1: There is a defect that is small in size with a mild reduction in uptake present in the mid-apical and anterior location(s) that is non-reversible. There is normal wall motion in the defect area. Viability in the area is good. The defect appears to probably be artifact caused by breast attenuation.   · SPECT: Left ventricular perfusion is probably normal. Myocardial perfusion imaging supports a low risk stress test.    Signed by: Afsaneh Hardy MD on 6/15/2021 5:18 PM           @[email or web address]      IMAGING      CT Results (most recent):  Results from Hospital Encounter encounter on 05/20/21    CT HEAD WO CONT    Narrative  EXAM: CT HEAD WO CONT    INDICATION: Status post fall, vomiting, unsteadiness    COMPARISON: 3/28/2018. CONTRAST: None. TECHNIQUE: Unenhanced CT of the head was performed using 5 mm images. Brain and  bone windows were generated. Coronal and sagittal reformats. CT dose reduction  was achieved through use of a standardized protocol tailored for this  examination and automatic exposure control for dose modulation. FINDINGS:  The ventricles and sulci are normal in size, shape and configuration. . There is  no significant white matter disease. There is no intracranial hemorrhage,  extra-axial collection, or mass effect. The basilar cisterns are open. No CT  evidence of acute infarct. The bone windows demonstrate no abnormalities. The visualized portions of the  paranasal sinuses and mastoid air cells are clear. Impression  No acute abnormality. XR Results (most recent):  Results from Hospital Encounter encounter on 06/21/21    XR CHEST PORT    Narrative  EXAM:  XR CHEST PORT    INDICATION: Dyspnea on exertion. Left pleural effusion and basilar opacity. COMPARISON: 6/25/2021 at 0401 hours    TECHNIQUE: Portable AP upright chest view at 0844 hours    FINDINGS: The right IJ catheter and mediastinal drains are stable. The  cardiomediastinal contours are stable. A small left pleural effusion and left basilar opacity are stable. Right hilar  prominence and right upper lung scarring are stable. There is no right pleural  effusion or pneumothorax. The bones and upper abdomen are stable. Impression  Right hilar prominence and small left pleural effusion and left basilar opacity  are unchanged. No new acute abnormality.        MRI Results (most recent):  Results from [place] encounter on 07/16/13    MRI KNEE RT WO CONT    Narrative  **Final Report**      ICD Codes / Adm. Diagnosis: 836.2  401.9 / Other tear of cartilage or men  Examination:  MR KNEE WO HAI RT  - [id number] - Jul 16 2013  3:28PM  Accession No:  [id number]  Reason:  tear      REPORT:  EXAM:  Right knee MRI without contrast    INDICATION: Pain    COMPARISON: None    TECHNIQUE: Axial T2 fat-saturated and proton density fat-saturated; coronal  T1 and proton density fat-saturated; and sagittal T2 fat-saturated, proton  density fat-saturated, and gradient echo MRI of the     knee . CONTRAST:  None. FINDINGS: Bone marrow: Degenerative type bone marrow edema is present in the  medial aspects of the medial femoral condyle and medial tibial plateau. A  small focus of subchondral bone marrow edema is present in the lateral facet  of the patella. There is no evidence of fracture or marrow replacing process. Joint fluid: There is a mild joint effusion. Areas of synovitis are seen  throughout the joint space, particularly in the suprapatellar recess and  anterior to the lateral compartment. There is additional 9 mm loose body  along the course of the popliteal tendon on series 7 image 55. There is a moderate-sized Baker's cyst posterior medial to the knee. This  measures 2.0 x 1.9 x 7.7 cm craniocaudal. There is a small rounded loose  body within this measuring 5 mm on series 8 image 4. Several strands of  synovitis are seen within this. Collateral ligaments and posterior, lateral corner: Intact. Medial meniscus: The medial meniscus is extruded. There is a complete radial  tear of the posterior horn of the medial meniscus as evidenced by an absent  posterior horn on series 9 image 8. The distal material is flipped  inferiorly and laterally into the gutter on series 8 image 4 and series 6  image 17. Lateral meniscus: High signal is seen in the free edge of the body related  to free edge tearing.  Irregular increased signal is seen anterior to the  anterior horn is likely related to synovitis. ACL and PCL: A small area mucoid degeneration is present in the proximal  ACL. The ACL and PCL are intact however. Tendons: Intact. .    Muscles: Within normal limits. Patellofemoral alignment: No patellar subluxation/tilt. Trochlear groove is  not hypoplastic. TT-TG distance: Normal at 12 mm. Articular cartilage: Full-thickness cartilage loss is seen along the  weightbearing surfaces of the medial femoral condyle and medial tibial  plateau with significant marginal osteophytosis. Mild underlying subchondral  bone marrow edema is present medially. Near full-thickness fissuring is seen along the vertical ridge of the  patella. Mild irregular cartilage loss is seen along the lateral aspect of  the patellofemoral compartment. Small marginal osteophytes are seen adjacent  to this. There is a small area of subchondral bone marrow edema related to a  full-thickness fissure in the lateral facet of patella. There is a shallow thickness of fissuring are seen along the lateral femoral  condyle towards the tibial spine. Significant marginal osteophytes are  present. There is diffuse mild cartilage thinning throughout the lateral  compartment. Soft tissue mass: None. Nonspecific soft tissue edema is seen anterior to  the patellar tendon. IMPRESSION:  1. Complete radial tear of the posterior horn of the medial meniscus with  displacement of meniscal material into the inferior gutter. 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 2. Moderate lateral and patellofemoral osteoarthritis. 3. Free edge tearing in the body of the lateral meniscus. 4. Moderate joint effusion with areas of extensive synovitis likely related  to long-standing degenerative disease. 5. A moderate-sized Baker's cyst is seen posterior medial to the knee with  areas of synovitis and a small loose body.  An additional small loose body is  seen along the course of the popliteus tendon. Signing/Reading Doctor: Meryle Harlem ([id number])  Approved: Meryle Harlem ([id number])  Jul 16 2013  4:08PM          Past Medical History:   Diagnosis Date    Arthritis     Asthma     dr. Joceline Junior allergist    At[facility] ([facility]     Breast cancer [facility]) 1980    right - mastectomy    Coagulation disorder ([facility]     on eliquis    Dyslipidemia     labs 2008 - chol 283, HDL 83, ,     HTN (hypertension)     Hyperlipidemia LDL goal < 130     for increased LDL particles, Dr. Deann Ellington nodule     Dr. Elvira Galarza Palpitations     resolved           Past Surgical History:   Procedure Laterality Date    COLONOSCOPY N/A 9/10/2018    COLONOSCOPY performed by Rush Blanco MD at [facility] W/O CONT WITH CALCIUM  1/2012    CAC score 0; calcified mediastinal lymph nodes consistent granulomatous disease    ECHO 2D ADULT  5/5/2008    normal, LVEF 60%    HX APPENDECTOMY      HX HYSTERECTOMY Bilateral 03/19/2015    and uro repair    HX KNEE REPLACEMENT Right     HX MASTECTOMY Right     HX TONSILLECTOMY      HX UROLOGICAL  8/1/14    [employer]    INTRACARD ECHO, THER/DX INTERVENT N/A 5/10/2019    Intracardiac Echocardiogram performed by Josh Jones MD at [address]  3/28/2018         NC CHEST SURGERY PROCEDURE UNLISTED      lung nodule removed - benign    NC EPHYS EVL TRNSPTL TX ATRIAL FIB ISOLAT PULM VEIN N/A 5/10/2019    ABLATION A-FIB  W COMPLETE EP STUDY performed by Josh Jones MD at [place] [address], [address] CATH LAB    NC INTRACARDIAC ELECTROPHYSIOLOGIC 3D MAPPING N/A 5/10/2019    Ep 3d Mapping performed by Josh Jones MD at [place] [address], [address] CATH LAB    [facility]  [facility]    walked 7:31, no chest pain, normal MPI.                Visit Vitals  BP (!) 160/68 Comment: REPEAT   Pulse 69   Temp 98 °F (36.7 °C)   Resp 16   Ht 5' 6\" (1.676 m)   Wt 194 lb 14.2 oz (88.4 kg)   SpO2 98% Breastfeeding No   BMI 31.46 kg/m²         Wt Readings from Last 3 Encounters:   06/27/21 194 lb 14.2 oz (88.4 kg)   06/18/21 182 lb (82.6 kg)   06/14/21 185 lb (83.9 kg)         Review of Systems:   Pertinent items are noted in the History of Present Illness. No intake/output data recorded. 06/25 1901 - 06/27 0700  In: 440 [P.O.:440]  Out: 0       Telemetry: normal sinus rhythm    Physical Exam:    Neck: no carotid bruit and no JVD  Heart: regular rate and rhythm  Lungs: diminished breath sounds R base, L base  Abdomen: soft, non-tender.  Bowel sounds normal. No masses,  no organomegaly  Extremities: edema 1+ b/l    Current Facility-Administered Medications   Medication Dose Route Frequency    furosemide (LASIX) tablet 20 mg  20 mg Oral DAILY    therapeutic multivitamin (THERAGRAN) tablet 1 Tablet  1 Tablet Oral DAILY    ferrous sulfate tablet 325 mg  1 Tablet Oral DAILY WITH BREAKFAST    budesonide (PULMICORT) 500 mcg/2 ml nebulizer suspension  1,000 mcg Nebulization BID RT    potassium chloride SR (KLOR-CON 10) tablet 20 mEq  20 mEq Oral DAILY    acetaminophen (TYLENOL) tablet 650 mg  650 mg Oral Q4H    NOREPINephrine (LEVOPHED) 8 mg in 5% dextrose 250mL (32 mcg/mL) infusion  0.5-30 mcg/min IntraVENous TITRATE    sodium chloride (NS) flush 5-40 mL  5-40 mL IntraVENous PRN    sodium chloride (NS) flush 5-40 mL  5-40 mL IntraVENous Q8H    sodium chloride (NS) flush 5-40 mL  5-40 mL IntraVENous PRN    sodium chloride (NS) flush 5-40 mL  5-40 mL IntraVENous PRN    levothyroxine (SYNTHROID) tablet 25 mcg  25 mcg Oral ACB    montelukast (SINGULAIR) tablet 10 mg  10 mg Oral QPM    PHENYLephrine (DOE-SYNEPHRINE) 30 mg in 0.9% sodium chloride 250 mL infusion   mcg/min IntraVENous TITRATE    propofol (DIPRIVAN) 10 mg/mL infusion  0-50 mcg/kg/min IntraVENous TITRATE    HYDROmorphone (PF) (DILAUDID) injection 1 mg  1 mg IntraVENous Q4H PRN    ELECTROLYTE REPLACEMENT PROTOCOL - Potassium and Magnesium  1 Each Other PRN    albuterol (PROVENTIL VENTOLIN) nebulizer solution 2.5 mg  2.5 mg Nebulization Q4H PRN    atorvastatin (LIPITOR) tablet 20 mg  20 mg Oral QPM    cetirizine (ZYRTEC) tablet 10 mg  10 mg Oral QPM    cholecalciferol (VITAMIN D3) (1000 Units /25 mcg) tablet 5,000 Units  5,000 Units Oral DAILY    cyclobenzaprine (FLEXERIL) tablet 5 mg  5 mg Oral BID PRN    diphenhydrAMINE (BENADRYL) capsule 25 mg  25 mg Oral Q6H PRN    raloxifene (EVISTA) tablet 60 mg  60 mg Oral DAILY    amiodarone (CORDARONE) tablet 200 mg  200 mg Oral DAILY    0.9% sodium chloride infusion 250 mL  250 mL IntraVENous PRN    0.9% sodium chloride infusion 250 mL  250 mL IntraVENous PRN    insulin regular (NOVOLIN R, HUMULIN R) 100 Units in 0.9% sodium chloride 100 mL infusion  0-50 Units/hr IntraVENous TITRATE    sodium chloride (NS) flush 5-40 mL  5-40 mL IntraVENous PRN    0.45% sodium chloride infusion  10 mL/hr IntraVENous CONTINUOUS    0.9% sodium chloride infusion  9 mL/hr IntraVENous CONTINUOUS    oxyCODONE IR (ROXICODONE) tablet 5 mg  5 mg Oral Q4H PRN    oxyCODONE IR (ROXICODONE) tablet 10 mg  10 mg Oral Q4H PRN    naloxone (NARCAN) injection 0.4 mg  0.4 mg IntraVENous PRN    ondansetron (ZOFRAN) injection 4 mg  4 mg IntraVENous Q4H PRN    albuterol (PROVENTIL VENTOLIN) nebulizer solution 2.5 mg  2.5 mg Nebulization Q4H PRN    midazolam (VERSED) injection 1 mg  1 mg IntraVENous Q1H PRN    chlorhexidine (PERIDEX) 0.12 % mouthwash 10 mL  10 mL Oral Q12H    famotidine (PEPCID) tablet 20 mg  20 mg Oral Q12H    calcium chloride 1 g in 0.9% sodium chloride 100 mL IVPB  1 g IntraVENous PRN    bisacodyL (DULCOLAX) suppository 10 mg  10 mg Rectal DAILY PRN    senna-docusate (PERICOLACE) 8.6-50 mg per tablet 1 Tablet  1 Tablet Oral BID    polyethylene glycol (MIRALAX) packet 17 g  17 g Oral DAILY    ELECTROLYTE REPLACEMENT NOTE: Nurse to review Serum Potassium and Magnesuim levels and Initiate Electrolyte Replacement Protocol as needed  1 Each Other PRN    magnesium sulfate 1 g/100 ml IVPB (premix or compounded)  1 g IntraVENous PRN    glucose chewable tablet 16 g  4 Tablet Oral PRN    dextrose (D50W) injection syrg 12.5-25 g  12.5-25 g IntraVENous PRN    glucagon (GLUCAGEN) injection 1 mg  1 mg IntraMUSCular PRN    dexmedeTOMidine in 0.9 % NaCl (PRECEDEX) 400 mcg/100 mL (4 mcg/mL) infusion soln  0.1-1.5 mcg/kg/hr IntraVENous TITRATE         All Cardiac Markers in the last 24 hours:  No results found for: CPK, CK, CKMMB, CKMB, RCK3, CKMBT, CKMBPOC, CKNDX, CKND1, PAIGE, TROPT, TROIQ, CARLOZ, TROPT, TNIPOC, BNP, BNPP, BNPNT     Lab Results   Component Value Date/Time    Creatinine (POC) 1.0 10/25/2009 03:04 AM    Creatinine 0.75 06/27/2021 03:18 AM          Lab Results   Component Value Date/Time    Troponin-I, Qt. 0.06 (H) 03/28/2018 10:23 AM         Lab Results   Component Value Date/Time    WBC 7.9 06/27/2021 03:18 AM    Hemoglobin (POC) 12.9 10/25/2009 03:04 AM    HGB 8.5 (L) 06/27/2021 03:18 AM    Hematocrit (POC) 38 10/25/2009 03:04 AM    HCT 27.1 (L) 06/27/2021 03:18 AM    PLATELET 83 (L) 15/73/2755 03:18 AM    .5 (H) 06/27/2021 03:18 AM         Lab Results   Component Value Date/Time    Sodium 140 06/27/2021 03:18 AM    Potassium 3.8 06/27/2021 03:18 AM    Chloride 111 (H) 06/27/2021 03:18 AM    CO2 22 06/27/2021 03:18 AM    Anion gap 7 06/27/2021 03:18 AM    Glucose 114 (H) 06/27/2021 03:18 AM    BUN 19 06/27/2021 03:18 AM    Creatinine 0.75 06/27/2021 03:18 AM    BUN/Creatinine ratio 25 (H) 06/27/2021 03:18 AM    GFR est AA >60 06/27/2021 03:18 AM    GFR est non-AA >60 06/27/2021 03:18 AM    Calcium 8.8 06/27/2021 03:18 AM         No results found for: BNP, BNPP, BNPPPOC, XBNPT, BNPNT      Lab Results   Component Value Date/Time    Cholesterol, total 142 03/28/2018 04:00 AM    HDL Cholesterol 76 03/28/2018 04:00 AM    LDL, calculated 55.2 03/28/2018 04:00 AM    VLDL, calculated 10.8 03/28/2018 04:00 AM    Triglyceride 54 03/28/2018 04:00 AM    CHOL/HDL Ratio 1.9 03/28/2018 04:00 AM         Lab Results   Component Value Date/Time    ALT (SGPT) 31 06/27/2021 03:18 AM    Alk.  phosphatase 77 06/27/2021 03:18 AM    Bilirubin, direct 0.1 06/21/2021 02:51 PM    Bilirubin, total 0.5 06/27/2021 03:18 AM

## 2021-06-28 ENCOUNTER — APPOINTMENT (OUTPATIENT)
Dept: GENERAL RADIOLOGY | Age: 81
DRG: 270 | End: 2021-06-28
Attending: PHYSICIAN ASSISTANT
Payer: MEDICARE

## 2021-06-28 VITALS
HEART RATE: 67 BPM | WEIGHT: 194.89 LBS | TEMPERATURE: 97.8 F | RESPIRATION RATE: 18 BRPM | OXYGEN SATURATION: 97 % | DIASTOLIC BLOOD PRESSURE: 71 MMHG | BODY MASS INDEX: 31.32 KG/M2 | SYSTOLIC BLOOD PRESSURE: 131 MMHG | HEIGHT: 66 IN

## 2021-06-28 LAB
ALBUMIN SERPL-MCNC: 2.9 G/DL (ref 3.5–5)
ALBUMIN/GLOB SERPL: 1.2 {RATIO} (ref 1.1–2.2)
ALP SERPL-CCNC: 72 U/L (ref 45–117)
ALT SERPL-CCNC: 28 U/L (ref 12–78)
ANION GAP SERPL CALC-SCNC: 4 MMOL/L (ref 5–15)
AST SERPL-CCNC: 19 U/L (ref 15–37)
BILIRUB SERPL-MCNC: 0.5 MG/DL (ref 0.2–1)
BUN SERPL-MCNC: 20 MG/DL (ref 6–20)
BUN/CREAT SERPL: 24 (ref 12–20)
CALCIUM SERPL-MCNC: 8.6 MG/DL (ref 8.5–10.1)
CHLORIDE SERPL-SCNC: 107 MMOL/L (ref 97–108)
CO2 SERPL-SCNC: 29 MMOL/L (ref 21–32)
CREAT SERPL-MCNC: 0.82 MG/DL (ref 0.55–1.02)
ERYTHROCYTE [DISTWIDTH] IN BLOOD BY AUTOMATED COUNT: 15.2 % (ref 11.5–14.5)
GLOBULIN SER CALC-MCNC: 2.5 G/DL (ref 2–4)
GLUCOSE SERPL-MCNC: 105 MG/DL (ref 65–100)
HCT VFR BLD AUTO: 25.4 % (ref 35–47)
HGB BLD-MCNC: 8.2 G/DL (ref 11.5–16)
MCH RBC QN AUTO: 31.8 PG (ref 26–34)
MCHC RBC AUTO-ENTMCNC: 32.3 G/DL (ref 30–36.5)
MCV RBC AUTO: 98.4 FL (ref 80–99)
NRBC # BLD: 0 K/UL (ref 0–0.01)
NRBC BLD-RTO: 0 PER 100 WBC
PLATELET # BLD AUTO: 141 K/UL (ref 150–400)
PMV BLD AUTO: 12.1 FL (ref 8.9–12.9)
POTASSIUM SERPL-SCNC: 3.7 MMOL/L (ref 3.5–5.1)
PROT SERPL-MCNC: 5.4 G/DL (ref 6.4–8.2)
RBC # BLD AUTO: 2.58 M/UL (ref 3.8–5.2)
SODIUM SERPL-SCNC: 140 MMOL/L (ref 136–145)
WBC # BLD AUTO: 7.6 K/UL (ref 3.6–11)

## 2021-06-28 PROCEDURE — 74011000250 HC RX REV CODE- 250: Performed by: INTERNAL MEDICINE

## 2021-06-28 PROCEDURE — 74011250637 HC RX REV CODE- 250/637: Performed by: EMERGENCY MEDICINE

## 2021-06-28 PROCEDURE — 74011250637 HC RX REV CODE- 250/637: Performed by: NURSE PRACTITIONER

## 2021-06-28 PROCEDURE — 36415 COLL VENOUS BLD VENIPUNCTURE: CPT

## 2021-06-28 PROCEDURE — 74011250637 HC RX REV CODE- 250/637: Performed by: PHYSICIAN ASSISTANT

## 2021-06-28 PROCEDURE — 80053 COMPREHEN METABOLIC PANEL: CPT

## 2021-06-28 PROCEDURE — 94640 AIRWAY INHALATION TREATMENT: CPT

## 2021-06-28 PROCEDURE — 71045 X-RAY EXAM CHEST 1 VIEW: CPT

## 2021-06-28 PROCEDURE — 99239 HOSP IP/OBS DSCHRG MGMT >30: CPT | Performed by: INTERNAL MEDICINE

## 2021-06-28 PROCEDURE — 85027 COMPLETE CBC AUTOMATED: CPT

## 2021-06-28 PROCEDURE — 74011250637 HC RX REV CODE- 250/637: Performed by: INTERNAL MEDICINE

## 2021-06-28 RX ORDER — LANOLIN ALCOHOL/MO/W.PET/CERES
325 CREAM (GRAM) TOPICAL DAILY
Qty: 90 TABLET | Refills: 1 | Status: SHIPPED | OUTPATIENT
Start: 2021-06-28 | End: 2021-12-23

## 2021-06-28 RX ORDER — METOPROLOL SUCCINATE 25 MG/1
25 TABLET, EXTENDED RELEASE ORAL DAILY
Status: DISCONTINUED | OUTPATIENT
Start: 2021-06-28 | End: 2021-06-28 | Stop reason: HOSPADM

## 2021-06-28 RX ORDER — BUMETANIDE 0.25 MG/ML
1 INJECTION INTRAMUSCULAR; INTRAVENOUS 2 TIMES DAILY
Status: DISCONTINUED | OUTPATIENT
Start: 2021-06-28 | End: 2021-06-28 | Stop reason: HOSPADM

## 2021-06-28 RX ORDER — BUMETANIDE 1 MG/1
1 TABLET ORAL DAILY
Qty: 90 TABLET | Refills: 1 | Status: SHIPPED | OUTPATIENT
Start: 2021-06-28 | End: 2021-08-11

## 2021-06-28 RX ORDER — METOPROLOL SUCCINATE 25 MG/1
25 TABLET, EXTENDED RELEASE ORAL DAILY
Qty: 90 TABLET | Refills: 1 | Status: SHIPPED | OUTPATIENT
Start: 2021-06-29 | End: 2021-08-06

## 2021-06-28 RX ORDER — SPIRONOLACTONE 25 MG/1
25 TABLET ORAL DAILY
Qty: 90 TABLET | Refills: 1 | Status: SHIPPED | OUTPATIENT
Start: 2021-06-29 | End: 2021-08-06

## 2021-06-28 RX ORDER — SPIRONOLACTONE 25 MG/1
25 TABLET ORAL DAILY
Status: DISCONTINUED | OUTPATIENT
Start: 2021-06-28 | End: 2021-06-28 | Stop reason: HOSPADM

## 2021-06-28 RX ADMIN — THERA TABS 1 TABLET: TAB at 09:49

## 2021-06-28 RX ADMIN — SACUBITRIL AND VALSARTAN 1 TABLET: 24; 26 TABLET, FILM COATED ORAL at 09:49

## 2021-06-28 RX ADMIN — Medication 5000 UNITS: at 09:49

## 2021-06-28 RX ADMIN — FAMOTIDINE 20 MG: 20 TABLET ORAL at 09:50

## 2021-06-28 RX ADMIN — SPIRONOLACTONE 25 MG: 25 TABLET ORAL at 09:49

## 2021-06-28 RX ADMIN — ACETAMINOPHEN 650 MG: 325 TABLET ORAL at 11:57

## 2021-06-28 RX ADMIN — LEVOTHYROXINE SODIUM 25 MCG: 0.03 TABLET ORAL at 06:13

## 2021-06-28 RX ADMIN — METOPROLOL SUCCINATE 25 MG: 25 TABLET, EXTENDED RELEASE ORAL at 09:49

## 2021-06-28 RX ADMIN — FERROUS SULFATE TAB 325 MG (65 MG ELEMENTAL FE) 325 MG: 325 (65 FE) TAB at 09:49

## 2021-06-28 RX ADMIN — AMIODARONE HYDROCHLORIDE 200 MG: 200 TABLET ORAL at 09:50

## 2021-06-28 RX ADMIN — BUMETANIDE 1 MG: 0.25 INJECTION, SOLUTION INTRAMUSCULAR; INTRAVENOUS at 11:57

## 2021-06-28 RX ADMIN — POTASSIUM CHLORIDE 20 MEQ: 750 TABLET, FILM COATED, EXTENDED RELEASE ORAL at 09:49

## 2021-06-28 RX ADMIN — BUDESONIDE 1000 MCG: 0.5 INHALANT RESPIRATORY (INHALATION) at 07:13

## 2021-06-28 RX ADMIN — Medication 10 ML: at 06:13

## 2021-06-28 RX ADMIN — ACETAMINOPHEN 650 MG: 325 TABLET ORAL at 06:13

## 2021-06-28 RX ADMIN — RALOXIFENE HYDROCHLORIDE 60 MG: 60 TABLET, FILM COATED ORAL at 10:06

## 2021-06-28 RX ADMIN — ACETAMINOPHEN 650 MG: 325 TABLET ORAL at 03:33

## 2021-06-28 RX ADMIN — CHLORHEXIDINE GLUCONATE 10 ML: 1.2 RINSE ORAL at 09:50

## 2021-06-28 NOTE — PROGRESS NOTES
Physical Therapy    Reviewed chart noted scheduled discharge. Pt expressing no concerns with mobility and discharging home. Pt has been mobilizing in the room. Will continue to follow until discharge.

## 2021-06-28 NOTE — PROGRESS NOTES
0730: Bedside shift change report given to Lisseth Chung (oncoming nurse) by Laly (offgoing nurse). Report included the following information SBAR, Kardex, OR Summary, Procedure Summary, Intake/Output, MAR and Recent Results. 1241: Pt off tele and IV removed for discharge. I have reviewed discharge instructions with the patient. The patient verbalized understanding. Discharge medications reviewed with patient and appropriate educational materials and side effects teaching were provided. Current Discharge Medication List      START taking these medications    Details   metoprolol succinate (TOPROL-XL) 25 mg XL tablet Take 1 Tablet by mouth daily. Qty: 90 Tablet, Refills: 1  Start date: 6/29/2021      spironolactone (ALDACTONE) 25 mg tablet Take 1 Tablet by mouth daily. Qty: 90 Tablet, Refills: 1  Start date: 6/29/2021      bumetanide (BUMEX) 1 mg tablet Take 1 Tablet by mouth daily. Qty: 90 Tablet, Refills: 1  Start date: 6/28/2021      ferrous sulfate 325 mg (65 mg iron) tablet Take 1 Tablet by mouth daily. Qty: 90 Tablet, Refills: 1  Start date: 6/28/2021         CONTINUE these medications which have CHANGED    Details   amiodarone (CORDARONE) 200 mg tablet Take 1 Tablet by mouth daily. Qty: 180 Tablet, Refills: 1  Start date: 6/25/2021    Comments: Dose increased to 200mg BID 4/26/21  Associated Diagnoses: Persistent atrial fibrillation ([facility] [address]         CONTINUE these medications which have NOT CHANGED    Details   sacubitriL-valsartan (Entresto) 24-26 mg tablet Take 1 Tablet by mouth two (2) times a day. Qty: 180 Tablet, Refills: 1    Comments: To start 48 hour after stopping Losartan      potassium chloride (K-DUR, KLOR-CON) 20 mEq tablet Take 1 Tablet by mouth daily. Qty: 90 Tablet, Refills: 1    Comments: Changes to Daily 5/26/21      levothyroxine (SYNTHROID) 25 mcg tablet Take  by mouth Daily (before breakfast). fluticasone (FLOVENT HFA) 220 mcg/actuation inhaler as needed. atorvastatin (LIPITOR) 20 mg tablet Take 20 mg by mouth every evening. MULTIVITAMIN PO Take  by mouth. Takes one po once daily. cholecalciferol (VITAMIN D3) 5,000 unit capsule Take 5,000 Units by mouth daily. cetirizine (ZYRTEC) 10 mg tablet Take 10 mg by mouth every evening. estradiol (ESTRACE) 0.01 % (0.1 mg/gram) vaginal cream Insert  into vagina every Monday and Thursday. albuterol (PROVENTIL HFA, VENTOLIN HFA, PROAIR HFA) 90 mcg/actuation inhaler Take 1 Puff by inhalation every four (4) hours as needed. Qty: 2 Inhaler, Refills: 3      raloxifene (EVISTA) 60 mg tablet Take 1 Tab by mouth daily. Qty: 90 Tab, Refills: 4    Associated Diagnoses: Palpitations      montelukast (SINGULAIR) 10 mg tablet Take 10 mg by mouth every evening. Associated Diagnoses: Palpitations      cyclobenzaprine (FLEXERIL) 5 mg tablet 5 mg as needed. diphenhydrAMINE (BenadryL) 25 mg capsule Take 25 mg by mouth every six (6) hours as needed. amoxicillin (AMOXIL) 500 mg capsule Take 500 mg by mouth as needed. Only for dental procedure      acetaminophen (Tylenol Extra Strength) 500 mg tablet Take 500 mg by mouth nightly as needed. EPINEPHrine (EPIPEN) 0.3 mg/0.3 mL (1:1,000) injection 0.3 mL by IntraMUSCular route once as needed for up to 1 dose.   Qty: 0.3 mL, Refills: 0         STOP taking these medications       metoprolol tartrate (LOPRESSOR) 25 mg tablet Comments:   Reason for Stopping:         furosemide (Lasix) 40 mg tablet Comments:   Reason for Stopping:         Eliquis 5 mg tablet Comments:   Reason for Stopping:

## 2021-06-28 NOTE — DISCHARGE INSTRUCTIONS
Patient Instructions      Medication changes:  Hold Eliquis until follow up. Your metoprolol has been changed to an extended release version (25 mg by mouth daily). Continue Entresto 24-26 mg by mouth twice daily as previously prescribed. Start spironolactone 25 mg by mouth daily. Start ferrous sulfate 325 mg by mouth daily. Reduce amiodarone to 200 mg. Stop furosemide, start bumetanide 1 mg by mouth daily. 1.  No heavy lifting or exercises for 1 week. This includes the following:  Do not push or move furniture, jog or run    2. Do not drive for 1 day     3. Call Dr. Evan Holt at [phone] if you experience any of the following symptoms:  Dizziness, lightheadedness, fainting spells  Lack of energy  Shortness of breath  Rapid heart rate  Chest or muscle twitches  Blurry vision, double vision, weakness, numbness  Nausea, vomiting  Fever  Bleeding in the stool, black stool  Leg swelling, pain    4. Follow-up      Future Appointments   Date Time Provider Hazel Wade   7/13/2021 11:20 AM MD JOSÉ Farooq AMB   8/13/2021  3:00 PM MD Emanuel Dowd M.D.   Electrophysiology/Cardiology  [facility]  [address] [place] [address]                               [phone]

## 2021-06-28 NOTE — PROGRESS NOTES
0815: Bedside and Verbal shift change report given to Mary Feliciano  (oncoming nurse) by Madiha Escamilla  (offgoing nurse). Report included the following information SBAR, Kardex, Intake/Output, MAR, Recent Results, Med Rec Status and Cardiac Rhythm NSR.

## 2021-06-28 NOTE — PROGRESS NOTES
Transitions of Care Plan:  RUR:  23%  Clinical Plan: Discharge today  Consults: None  Baseline: independent without DME; resides alone; family support  Disposition: Home with family support    CM offered therapy HH set up for patient. [facility] and states she feels like she is stronger today. Patient owns RW at home if needed. Patient is currently using [facility] RW to ambulate independently in her room. Patient's son will transport her home and daughter plans to stay with her overnight tonight for additional support. BCPI-A letter regarding Heart Failure has been delivered to the patient/caregiver. Medicare pt has received and reviewed 2nd IM letter informing them of their right to appeal the discharge. Copy has been placed on pt bedside chart.     Johnnie Tomlinson, MPH  Care Manager l [address]  Available via Send Word Now or

## 2021-06-28 NOTE — DISCHARGE SUMMARY
Cardiology Discharge Summary               Patient ID:  Mark Anthony Doshi  [id number]  39 y.o.  1940    Admit Date: 6/21/2021    Discharge Date: 6/28/2021     Admitting Physician: Akila Bernal MD     Discharge Physician: Akila Bernal MD      Admission Diagnoses: Atrial fibrillation, unspecified type ([address] [I48.91]; A-fib ([address] [I48.91]; Cardiac/pericardial tamponade [I31.4]    Discharge Diagnoses:     Pericardial effusion with cardiac tamponade       Overview: pericardiocentesis and window made    Active Problems:    persistent atrial fibrillation  (6/21/2021)-NSR after cardioversion   On amiodarone      Breast cancer (HCC) ()      Non-rheumatic mitral regurgitation (5/10/2019)      Overview: moderate      Non-rheumatic tricuspid valve insufficiency (5/10/2019)         Discharge Condition: Stable      Cardiology Procedures this Admission:   1. Electrophysiology study including CS/LA recording  2. Intracardiac echocardiogram was utilized and one transseptal catheterizations were performed. 3. Fluoroscopy was also used along with 3-dimensional electroanatomical MARLENI Precision mapping. 4. Pericardiocentesis was performed in EP lab acutely and emergently  5. Electrical cardioversion was performed  6. Pericardial window by CT surgeon after larger pericardial drain. Hospital Course:   Ms. René Albert presented for planned EPS with AF/AFL ablation on 06/21/2021. During procedure, she had acute pericardial tamponade during a difficult transseptal puncture as sheath was advanced into the left atrium. Emergency pericardiocentesis done, stable & later extubated in CVICU. That evening, she became hypotensive & bradycardic with possible PEA, VT. Shocked out of VT, received epi & compressions. Reintubated, taken to OR with no acute bleeding found. Dr. Claribel Alexandra noted clot in the pericardium, evacuated & placed Gavin drain for pericardial window. Required levophed for BP support, received 2 units PRBCs.   Extubated after left thoracentesis. Stabilized. Drain later removed without difficulty. Limited echo on 06/25/2021 showed only trivial to small posterior pericardial effusion. Transient thrombocytopenia to 80, but HIT platelet serotonin release assay negative. Hgb stable this morning, 8.2. Weight gain, hypervolemia during admission; changing diuretics. On room air, able to make it to the restroom without difficulty. Consults:  Cardiothoracic surgery  ICU intensivist    Discharge Exam:     Visit Vitals  BP (!) 160/74 (BP 1 Location: Left upper arm, BP Patient Position: Sitting)   Pulse 71   Temp 98.5 °F (36.9 °C)   Resp 18   Ht 5' 6\" (1.676 m)   Wt 194 lb 14.2 oz (88.4 kg)   SpO2 (!) 20%   Breastfeeding No   BMI 31.46 kg/m²     General Appearance:  Well developed, well nourished,alert and oriented x 3, and individual in no acute distress. Ears/Nose/Mouth/Throat:   Hearing grossly normal.         Neck: Supple. Chest:   Lungs clear to auscultation bilaterally, decreased in right base. Cardiovascular:  Regular rate & rhythm. Abdomen:   Soft, non-tender    Extremities: No edema bilaterally. Skin: Warm and dry. Large bruise on left forearm from blood draw and IV access               Disposition: Home    Patient Instructions:     Medications   See med records        Assessment/Plan    Pericardial effusion: With cardiac tamponade. Occurred post transseptal puncture during EPS/AF ablation procedure. Trivial to small after pericardiocentesis, subsequent pericardial window. Continue to hold Eliquis until after outpatient follow up. Start ferrous sulfate 325 mg po daily for mild anemia. SOB will likely take time to improve. NICM: TIM during admission showed LVEF 25-30%, NYHA II-III chronic systolic CHF. Discharge on Toprol XL 25 mg po daily, Entresto 24-26 mg po bid, spironolactone 25 mg po daily. Volume overload during admission, change furosemide to bumetanide 1 mg po daily.     Persistent AF: Cardioverted during procedure, NSR currently. Discharge on amiodarone 200 mg po daily. AF ablation is no longer an option, has had pericardial effusion on 2 separate attempts. Consider AV node ablation & biventricular pacemaker +/- ICD in approximately 3 months if LVEF doesn't significantly improve. Referenced discharge instructions provided by nursing for diet and activity. Follow up in EP clinic as noted below. Future Appointments   Date Time Provider Hazel Sheri   7/6/2021 11:40 AM MD JOSÉ Hammer [facility]   7/13/2021  1:30 PM Que Venegas MD [facility]   8/13/2021  3:00 PM MD JOSÉ Curran [facility]     JENI Song-KENNA  [facility]  06/28/21    Addendum from EP attending:   I have seen, examined patient, and discussed with nurse practitioner, registered nurse, reviewed, updated note and agree with the assessment and plan    I have talked to her this am. Her son is at bedside  Vital signs are stable, bp is now high  Exam shows regular rhythm   Legs trace edema  Assessment and Plan:  Continue to monitor AFIB recurrence in office follow up  Medical treatment include bumex, aldactone, entresto and toprol are prescribed today  She wants to go home  Hold eliquis until follow up  She remains in NSR  If LVEF not normalized and she has recurrent AFIB she may need BIV pacer and av node ablation    Richmond Abreu M.D.   Electrophysiology/Cardiology  [facility] [place]  [address] [place] [address]                               [phone]

## 2021-06-30 ENCOUNTER — TELEPHONE (OUTPATIENT)
Dept: CARDIOLOGY CLINIC | Age: 81
End: 2021-06-30

## 2021-06-30 DIAGNOSIS — I42.0 DILATED CARDIOMYOPATHY (HCC): ICD-10-CM

## 2021-06-30 DIAGNOSIS — I31.39 PERICARDIAL EFFUSION: Primary | ICD-10-CM

## 2021-06-30 NOTE — TELEPHONE ENCOUNTER
Verified patient with two types of identifiers. Patient states she was notified by her watch that she is back in A fib. Patient states she went to PCP this morning BP was good 122/70. Current heart rate 93 bpm. Verified current medications. Notified patient not uncommon to have short episdoes of A fib after ablation. Patient to continue to monitor and call if she remains in A fib. Patient over all feels well just tired and still a little short of breath. Reminded patient that per Dr. Austin Kiran last office ntoe \"SOB will likely take time to improve. \". Patient verbalized understanding and will call with any other questions.

## 2021-07-01 NOTE — TELEPHONE ENCOUNTER
Verified patient with two types of identifiers. Notified patient of MD recommendations. Scheduled patient for limited echo on the day of her follow up. Patient verbalized understanding and will call with any other questions.       Future Appointments   Date Time Provider Hazel Sheri   7/6/2021 11:30 AM Kennedy Cole BS AMB   7/6/2021 11:40 AM MD JOSÉ Gómez BS AMB   7/13/2021  1:30 PM Bianca Mcbride MD [facility] BS AMB   8/13/2021  3:00 PM MD JOSÉ Daniels BS AMB

## 2021-07-01 NOTE — TELEPHONE ENCOUNTER
She did not have ablation   She had cardioversion and I had told her she will be back in AFIB and after my follow up appt I will make decision on BIV pacer and av node ablation   Resume eliquis 5 mg bid  Schedule limited 2 D echo for pericardial effusion and LVEF when follow up  Future Appointments   Date Time Provider Hazel Wade   7/6/2021 11:40 AM MD JOSÉ Champagne BS AMB   7/13/2021  1:30 PM Odis Bumpers, MD [facility] BS AMB   8/13/2021  3:00 PM MD JOSÉ Kimble BS AMB

## 2021-07-06 ENCOUNTER — ANCILLARY PROCEDURE (OUTPATIENT)
Dept: CARDIOLOGY CLINIC | Age: 81
End: 2021-07-06
Attending: INTERNAL MEDICINE
Payer: MEDICARE

## 2021-07-06 ENCOUNTER — HOSPITAL ENCOUNTER (OUTPATIENT)
Dept: GENERAL RADIOLOGY | Age: 81
Discharge: HOME | End: 2021-07-06
Attending: INTERNAL MEDICINE
Payer: MEDICARE

## 2021-07-06 ENCOUNTER — OFFICE VISIT (OUTPATIENT)
Dept: CARDIOLOGY CLINIC | Age: 81
End: 2021-07-06
Payer: MEDICARE

## 2021-07-06 VITALS
SYSTOLIC BLOOD PRESSURE: 110 MMHG | BODY MASS INDEX: 30.53 KG/M2 | HEIGHT: 66 IN | WEIGHT: 190 LBS | RESPIRATION RATE: 18 BRPM | HEART RATE: 50 BPM | OXYGEN SATURATION: 96 % | DIASTOLIC BLOOD PRESSURE: 68 MMHG

## 2021-07-06 DIAGNOSIS — I31.39 PERICARDIAL EFFUSION: ICD-10-CM

## 2021-07-06 DIAGNOSIS — Z79.01 CHRONIC ANTICOAGULATION: ICD-10-CM

## 2021-07-06 DIAGNOSIS — I42.0 DILATED CARDIOMYOPATHY (HCC): ICD-10-CM

## 2021-07-06 DIAGNOSIS — R06.02 SOB (SHORTNESS OF BREATH): Primary | ICD-10-CM

## 2021-07-06 DIAGNOSIS — I48.11 LONGSTANDING PERSISTENT ATRIAL FIBRILLATION (HCC): ICD-10-CM

## 2021-07-06 PROCEDURE — 3288F FALL RISK ASSESSMENT DOCD: CPT | Performed by: INTERNAL MEDICINE

## 2021-07-06 PROCEDURE — 1090F PRES/ABSN URINE INCON ASSESS: CPT | Performed by: INTERNAL MEDICINE

## 2021-07-06 PROCEDURE — 93005 ELECTROCARDIOGRAM TRACING: CPT | Performed by: INTERNAL MEDICINE

## 2021-07-06 PROCEDURE — G0463 HOSPITAL OUTPT CLINIC VISIT: HCPCS | Performed by: INTERNAL MEDICINE

## 2021-07-06 PROCEDURE — G8427 DOCREV CUR MEDS BY ELIG CLIN: HCPCS | Performed by: INTERNAL MEDICINE

## 2021-07-06 PROCEDURE — 93308 TTE F-UP OR LMTD: CPT | Performed by: INTERNAL MEDICINE

## 2021-07-06 PROCEDURE — 99214 OFFICE O/P EST MOD 30 MIN: CPT | Performed by: INTERNAL MEDICINE

## 2021-07-06 PROCEDURE — G8417 CALC BMI ABV UP PARAM F/U: HCPCS | Performed by: INTERNAL MEDICINE

## 2021-07-06 PROCEDURE — G8510 SCR DEP NEG, NO PLAN REQD: HCPCS | Performed by: INTERNAL MEDICINE

## 2021-07-06 PROCEDURE — G8752 SYS BP LESS 140: HCPCS | Performed by: INTERNAL MEDICINE

## 2021-07-06 PROCEDURE — 1100F PTFALLS ASSESS-DOCD GE2>/YR: CPT | Performed by: INTERNAL MEDICINE

## 2021-07-06 PROCEDURE — G8399 PT W/DXA RESULTS DOCUMENT: HCPCS | Performed by: INTERNAL MEDICINE

## 2021-07-06 PROCEDURE — G8536 NO DOC ELDER MAL SCRN: HCPCS | Performed by: INTERNAL MEDICINE

## 2021-07-06 PROCEDURE — 93010 ELECTROCARDIOGRAM REPORT: CPT | Performed by: INTERNAL MEDICINE

## 2021-07-06 PROCEDURE — G8754 DIAS BP LESS 90: HCPCS | Performed by: INTERNAL MEDICINE

## 2021-07-06 PROCEDURE — 1111F DSCHRG MED/CURRENT MED MERGE: CPT | Performed by: INTERNAL MEDICINE

## 2021-07-06 PROCEDURE — 71046 X-RAY EXAM CHEST 2 VIEWS: CPT

## 2021-07-06 NOTE — PROGRESS NOTES
Cardiac Electrophysiology OFFICE Note     Subjective:       Jaqueline Morales is a [de-identified] y.o. patient who is seen for follow up post recent hospitalization for cardiac pericardial effusion tamponade and pericardial window. Admitted after developing cardiac tamponade during AF ablation transeptal puncture attempt due to thick septum and she had emergent pericardiocentesis in EP lab. Janet Getting clotted in pericardial sac later that day, had recurrent tamponade, underwent subsequent pericardial window with clot flushed out. Her Apple Watch has noted AF since 06/30/2021.  Reports fatigue & SOB since discharge. Echo today pending, then resumed Eliquis on 06/30/2021. Ronnie Rangel today pending.       Initially held Eliquis, then.  In NSR (was cardioverted for VT during tamponade)           Previous:   S/p attempted AF ablation 06/21/2021, developed pericardial effusion & tamponade after transseptal puncture (thick septum).  Pericardial drain, recurrent tamponade due to clotted drain, then pericardial window.  Required 1 unit PRBCs.      S/p TIM/DCCV 06/2020.       S/p AF ablation 05/10/2019.  PVI unable to be completed on left side due to pericardial effusion without tamponade.  Heparin stopped & reversed.       Persistent AF, failed DCCV & flecainide.         Cardioversion with Dr. Luz Maria Shetty on 2/22/19, had follow up on 2/27/19, back in atrial fibrillation.       Mild dilated cardiomyopathy, improved on GDMT.  Couldn't tolerate meds due to low BP.       Negative stress test 5 yrs ago per her report.         Problem List    persistent atrial fibrillation ICD-10-CM: I48.91   ICD-9-CM: 427.31 6/21/2021     S/P ablation of atrial fibrillation ICD-10-CM: Z98.890, Z86.79   ICD-9-CM: V45.89 5/10/2019     Cardiac/pericardial tamponade ICD-10-CM: I31.4   ICD-9-CM: 423.3 6/21/2021     S/P pericardial window creation ICD-10-CM: R63.605   ICD-9-CM: V45.89 6/21/2021     Pericardial effusion with cardiac tamponade ICD-10-CM: I31.3, I31.4 ICD-9-CM: 423.9, 423.3 5/10/2019   Overview Signed 5/10/2019  1:34 PM by Macey Navarro MD     Loculated posterior LA and LV, no tamponade         Non-rheumatic mitral regurgitation ICD-10-CM: I34.0   ICD-9-CM: 424.0 5/10/2019   Overview Signed 5/10/2019  1:34 PM by Macey Navarro MD     moderate         Non-rheumatic tricuspid valve insufficiency ICD-10-CM: I36.1   ICD-9-CM: 424.2 5/10/2019     Encounter for cardioversion procedure ICD-10-CM: Z01.89   ICD-9-CM: V72.85 3/28/2018   Overview Signed 3/28/2018  1:06 PM by Macey Navarro MD     3/28/2018 200 J to NSR after a TIM without thrombus         Dizziness ICD-10-CM: R42   ICD-9-CM: 780.4 3/27/2018     Persistent atrial fibrillation ([email or web address] ICD-10-CM: I48.19   ICD-9-CM: 427.31 3/27/2018     Cystocele ICD-10-CM: [id number]   ICD-9-CM: ATK9490 8/1/2014     Uterine prolapse ICD-10-CM: N81.4   ICD-9-CM: 618.1 8/1/2014     HTN (hypertension) ICD-10-CM: I10   ICD-9-CM: 401.9 Unknown     Breast cancer ([email or web address] ICD-10-CM: C50.919   ICD-9-CM: 174.9 Unknown     Lung nodule ICD-10-CM: R91.1   ICD-9-CM: 793.11 Unknown   Overview Signed 5/14/2014 10:34 AM by MD Dr. Marycruz Webb: J45.909   ICD-9-CM: 493.90 Unknown     Hyperlipidemia LDL goal < 130 ICD-10-CM: E78.5   ICD-9-CM: 272.4 Unknown   Overview Signed 4/26/2013  9:17 AM by Kenny Javier MD     for increased LDL particles, Dr. Curtis Reis         Dyslipidemia ICD-10-CM: E78.5   ICD-9-CM: 272.4 Unknown   Overview Signed 12/31/2011  4:11 PM by Ajit Forrester MD     labs 2008 - chol 283, HDL 83, ,          Palpitations ICD-10-CM: R00.2   ICD-9-CM: 785.1 Unknown     Chest pain, unspecified ICD-10-CM: R07.9   ICD-9-CM: 786.50 12/31/2011           Current Outpatient Medications   Medication Sig Dispense Refill   · metoprolol succinate (TOPROL-XL) 25 mg XL tablet Take 1 Tablet by mouth daily.  90 Tablet 1   · spironolactone (ALDACTONE) 25 mg tablet Take 1 Tablet by mouth daily. 90 Tablet 1   · bumetanide (BUMEX) 1 mg tablet Take 1 Tablet by mouth daily. 90 Tablet 1   · ferrous sulfate 325 mg (65 mg iron) tablet Take 1 Tablet by mouth daily. 90 Tablet 1   · amiodarone (CORDARONE) 200 mg tablet Take 1 Tablet by mouth daily. 180 Tablet 1   · sacubitriL-valsartan (Entresto) 24-26 mg tablet Take 1 Tablet by mouth two (2) times a day. 180 Tablet 1   · amoxicillin (AMOXIL) 500 mg capsule Take 500 mg by mouth as needed. Only for dental procedure   · acetaminophen (Tylenol Extra Strength) 500 mg tablet Take 500 mg by mouth nightly as needed. · levothyroxine (SYNTHROID) 25 mcg tablet Take  by mouth Daily (before breakfast). · fluticasone (FLOVENT HFA) 220 mcg/actuation inhaler as needed. · atorvastatin (LIPITOR) 20 mg tablet Take 20 mg by mouth every evening. · MULTIVITAMIN PO Take  by mouth. Takes one po once daily. · cholecalciferol (VITAMIN D3) 5,000 unit capsule Take 5,000 Units by mouth daily. · cetirizine (ZYRTEC) 10 mg tablet Take 10 mg by mouth every evening. · estradiol (ESTRACE) 0.01 % (0.1 mg/gram) vaginal cream Insert  into vagina every Monday and Thursday. · EPINEPHrine (EPIPEN) 0.3 mg/0.3 mL (1:1,000) injection 0.3 mL by IntraMUSCular route once as needed for up to 1 dose. 0.3 mL 0   · albuterol (PROVENTIL HFA, VENTOLIN HFA, PROAIR HFA) 90 mcg/actuation inhaler Take 1 Puff by inhalation every four (4) hours as needed. 2 Inhaler 3   · raloxifene (EVISTA) 60 mg tablet Take 1 Tab by mouth daily. 90 Tab 4   · montelukast (SINGULAIR) 10 mg tablet Take 10 mg by mouth every evening. · potassium chloride (K-DUR, KLOR-CON) 20 mEq tablet Take 1 Tablet by mouth daily. (Patient not taking: Reported on 7/6/2021) 90 Tablet 1   · cyclobenzaprine (FLEXERIL) 5 mg tablet 5 mg as needed. (Patient not taking: Reported on 7/6/2021)   · diphenhydrAMINE (BenadryL) 25 mg capsule Take 25 mg by mouth every six (6) hours as needed.  (Patient not taking: Reported on 6/21/2021) Allergies   Allergen Reactions   · Betadine [Povidone-Iodine] Rash   · Iodine Rash   · Norvasc [Amlodipine] Other (comments)   Flushing/redness. Past Medical History:   Diagnosis Date   · Arthritis   · Asthma   dr. López Parmar allergist   · Atrial fibrillation [facility])   · Breast cancer ([facility] 1980   right - mastectomy   · Coagulation disorder ([facility]   on eliquis   · Dyslipidemia   labs 2008 - chol 283, HDL 83, ,    · HTN (hypertension)   · Hyperlipidemia LDL goal < 130   for increased LDL particles, Dr. Nancy Fang   · Lung nodule   Dr. Deepti Dickinson   · Palpitations   resolved     Past Surgical History:   Procedure Laterality Date   · COLONOSCOPY N/A 9/10/2018   [facility] Luís Humphries [facility]   · CT HEART W/O CONT WITH CALCIUM 1/2012   CAC score 0; calcified mediastinal lymph nodes consistent granulomatous disease   · ECHO 2D ADULT 5/5/2008   normal, LVEF 60%   · HX APPENDECTOMY   · HX HYSTERECTOMY Bilateral 03/19/2015   and uro repair   · HX KNEE REPLACEMENT Right   · HX MASTECTOMY Right   · HX TONSILLECTOMY   · HX UROLOGICAL 8/1/14   Urodynamics   · INTRACARD ECHO, THER/DX INTERVENT N/A 5/10/2019   Intracardiac Echocardiogram performed by Ignacio Batista MD at [facility]   · IL CARDIOVERSION ELECTIVE ARRHYTHMIA EXTERNAL 3/28/2018     · IL CHEST SURGERY PROCEDURE UNLISTED   lung nodule removed - benign   · IL EPHYS EVL TRNSPTL TX ATRIAL FIB ISOLAT PULM VEIN N/A 5/10/2019   ABLATION A-FIB  W COMPLETE EP STUDY performed by Ignacio Batista MD at [facility], [address] CATH LAB   · IL INTRACARDIAC ELECTROPHYSIOLOGIC 3D MAPPING N/A 5/10/2019   Ep 3d Mapping performed [place] Ign[facility] [place] [facility] [place] [address], [address] CATH LAB   · STRESS TEST CARDIOLITE 1/5/12   walked 7:31, no chest pain, normal MPI.      Family History   Problem Relation Age of Onset   · Heart Failure Mother   · Stroke Father   · Other Other      endometrial cancer, niece     Social History     Tobacco Use   · Smoking status: Never Smoker   · Smokeless tobacco: Never Used   Substance Use Topics   · Alcohol use: Yes   Alcohol/week: 7.0 standard drinks   Types: 7 Standard drinks or equivalent per week   Comment: wine nightly         Review of Systems: Review of all other systems otherwise negative. Constitutional: Negative for fever, chills, weight loss, + malaise/fatigue. HEENT: Negative for nosebleeds, vision changes. Respiratory: Negative for cough, hemoptysis   Cardiovascular: Negative for chest pain, palpitations, orthopnea, claudication, leg swelling, syncope, and PND. + BARRY   Gastrointestinal: Negative for nausea, vomiting, diarrhea, blood in stool and melena. Genitourinary: Negative for dysuria, and hematuria. Musculoskeletal: Negative for myalgias, arthralgia. Skin: Negative for rash. Heme: Does not bleed or bruise easily. Neurological: Negative for speech change and focal weakness.       Objective:     Visit Vitals   /68 (BP 1 Location: Left upper arm, BP Patient Position: Sitting, BP Cuff Size: Adult)   Pulse (!) 50   Resp 18   Ht 5' 6\" (1.676 m)   Wt 190 lb (86.2 kg)   SpO2 96%   BMI 30.67 kg/m²       Physical Exam:   Constitutional: Well-developed and well-nourished. No respiratory distress. Head: Normocephalic and atraumatic. Eyes: Pupils are equal, round. ENT: Hearing grossly normal.   Neck: Supple. No JVD present. Cardiovascular: Bradycardic rate, irregular rhythm. Exam reveals no gallop and no friction rub. No murmur heard. Pulmonary/Chest: Effort normal and breath sounds normal. No wheezes. Abdominal: Soft, no tenderness. Musculoskeletal: Moves extremities independently.  Normal gait. Vasc/lymphatic: No edema. Neurological: Alert,oriented. Skin: Skin is warm and dry. Psychiatric: Normal mood and affect.  Behavior is normal. Judgment and thought content normal.       EKG:     Assessment/Plan:   Imaging/Studies:   Limited echo (06/22/2021): Small posterior pericardial effusion noted.       TIM (06/19/2020): LVEF 45-50%.  Mildly dilated LA.  Mild to mod MR.  Mild to mod TR.       Limited echo (10/24/2019): LVEF 56-60%, mild concentric LVH, mildly dilated LV, no RWMA, grade 2 diastolic dysfunction.  Mildly dilated RV.  Mildly dilated RA.  Mod dilated LA.  Mild to mod MR.  Mild AR & mild aortic valve sclerosis without significant stenosis.  Mild to mod PH.       Echo (05/11/2019): LVEF 51-55%, no RWMA.  Mod dilated LA, mod dilated RA.  Small to mod anterior pericardial effusion adjacent to LV & RA.       TIM (05/10/2019): LVEF 56-60%, no RWMA.  RV mod dilated.  LA mod dilated.  RA mod dilated.  Mod MR.  Mod TR.  Mod posterior loculated pericardial effusion adjacent to LV & LA measuring 15 mm.           ICD-10-CM ICD-9-CM    1. SOB (shortness of breath)  R06.02 786.05 AMB POC EKG ROUTINE W/ 12 LEADS, INTER & REP      AMB POC EKG ROUTINE W/ 12 LEADS, INTER & REP   2. Pericardial effusion  I31.3 423.9 XR CHEST PA LAT      AMB POC EKG ROUTINE W/ 12 LEADS, INTER & REP      AMB POC EKG ROUTINE W/ 12 LEADS, INTER & REP   3. Dilated cardiomyopathy (HCC)  I42.0 425.4    4. Chronic anticoagulation  Z79.01 V58.61    5. Longstanding persistent atrial fibrillation (HCC)  I48.11 427.31      ECG  Atrial fibrillation   -Incomplete left bundle branch block.    -Nonspecific ST depression   +   Diffuse nonspecific T-abnormality  -         Pericardial effusion: Occurred after transseptal puncture during AF ablation attempt, had tamponade x 2.  Ultimately required pericardial window.  Still with fatigue & Mireya Keyes today showed trivial posterior pericardial effusion but large left pleural effusion and LVEF is known not normal with persistent AFIB      ECHO ADULT FOLLOW-UP OR LIMITED 07/07/2021 7/7/2021    Interpretation Summary  · LV: Calculated LVEF is 37%. Biplane method used to measure ejection fraction. Normal cavity size. Moderate concentric hypertrophy. Moderately reduced systolic function.   · Pericardium: Trivial posterior loculated pericardial effusion. There is a large left pleural effusion. · LA: Severely dilated left atrium. · RV: Mildly dilated right ventricle. Mildly reduced systolic function. · RA: Severely dilated right atrium. · IAS: No atrial septal defect present. Signed by: Jacqui Trotter MD on 7/7/2021  6:39 AM          Obtained CXR to check for pleural effusion and it showed large left pleural effusion     Will refer to IR for thoracentesis    Then consider AV node ablation and BIV pacer    Persistent AF failed antiarrhythmic including amiodarone  Cannot do AFIB ablation any more due to hx of complications and thick atrial septum     Anticoagulation: hold eliquis for thoracentesis         EP clinic follow up after thoracentesis at [facility] since [facility] has no spot    Future Appointments   Date Time Provider [facility] Sheri   7/9/2021 11:00 AM [address] [place] [address]   7/13/2021  1:30 PM Delia Jordan MD [facility] BS AMB   8/13/2021  3:00 PM Margarette Feng MD [facility] AMB       Thank you for involving me in this patient's care and please call with further concerns or questions. Xiomara Barriga M.D.    Electrophysiology/Cardiology   [facility] [place]   [address]                     [address] [facility], [address] [place] [address]   [phone]                                        [phone]

## 2021-07-07 ENCOUNTER — TELEPHONE (OUTPATIENT)
Dept: CARDIOLOGY CLINIC | Age: 81
End: 2021-07-07

## 2021-07-07 DIAGNOSIS — J90 PLEURAL EFFUSION, LEFT: Primary | ICD-10-CM

## 2021-07-07 LAB
ECHO LV EDV A2C: 94.66 ML
ECHO LV EDV A4C: 65.62 ML
ECHO LV EDV BP: 78.57 ML (ref 56–104)
ECHO LV EDV INDEX A4C: 33.5 ML/M2
ECHO LV EDV INDEX BP: 40.1 ML/M2
ECHO LV EDV NDEX A2C: 48.3 ML/M2
ECHO LV EJECTION FRACTION A2C: 46 PERCENT
ECHO LV EJECTION FRACTION A4C: 32 PERCENT
ECHO LV EJECTION FRACTION BIPLANE: 36.9 PERCENT (ref 55–100)
ECHO LV ESV A2C: 50.78 ML
ECHO LV ESV A4C: 44.8 ML
ECHO LV ESV BP: 49.6 ML (ref 19–49)
ECHO LV ESV INDEX A2C: 25.9 ML/M2
ECHO LV ESV INDEX A4C: 22.9 ML/M2
ECHO LV ESV INDEX BP: 25.3 ML/M2
ECHO LV INTERNAL DIMENSION DIASTOLIC: 4.46 CM (ref 3.9–5.3)
ECHO LV INTERNAL DIMENSION SYSTOLIC: 3.42 CM
ECHO LV IVSD: 1.43 CM (ref 0.6–0.9)
ECHO LV MASS 2D: 230.9 G (ref 67–162)
ECHO LV MASS INDEX 2D: 117.8 G/M2 (ref 43–95)
ECHO LV POSTERIOR WALL DIASTOLIC: 1.26 CM (ref 0.6–0.9)

## 2021-07-07 NOTE — TELEPHONE ENCOUNTER
Verified patient with two types of identifiers. Notified patient of Chest x-ray and Echo results. Will schedule patient for Thoracentesis. Will call back with date and time. Patient has not taken Eliquis yet today. Instructed patient not to take Eliquis yet in hopes of getting it scheduled for this Friday. Patient verbalized understanding and will call with any other questions. Verified patient with two types of identifiers. Spoke with Kingston Buenrostro at central scheduling. [address] [place] [address] does not have availability until next weeks. Scheduled US guided thoracentesis at [facility] on 7/9/21 at 11:00 am. Patient is to arrive at 10:30 am. Per Kingston Buenrostro patient does not need to be NPO. Hold Eliquis 2 days prior. Verified patient with two types of identifiers. Notified patient of date and time of procedure. Patient is agreeable to go to [facility]. Will send [employer] message with date, time, and address for procedure. Patient verbalized understanding and will call with any other questions.

## 2021-07-07 NOTE — TELEPHONE ENCOUNTER
----- Message from Sergei Villegas MD sent at 7/7/2021  6:41 AM EDT -----  Discussed in office  No significant pericardial effusion but large left pleural effusion   -refer to interventional radiology [facility] for ultrasound guided thoracentesis.   Will need to hold eliquis 2 days before draining procedure    In atrial fibrillation and LVEF 37%  Will consider BIV pacer and av node ablation later

## 2021-07-07 NOTE — PROGRESS NOTES
Discussed in office  No significant pericardial effusion but large left pleural effusion   -refer to interventional radiology [facility] for ultrasound guided thoracentesis.   Will need to hold eliquis 2 days before draining procedure    In atrial fibrillation and LVEF 37%  Will consider BIV pacer and av node ablation later

## 2021-07-09 ENCOUNTER — HOSPITAL ENCOUNTER (OUTPATIENT)
Dept: GENERAL RADIOLOGY | Age: 81
Discharge: HOME | End: 2021-07-09
Attending: INTERNAL MEDICINE
Payer: MEDICARE

## 2021-07-09 ENCOUNTER — HOSPITAL ENCOUNTER (OUTPATIENT)
Dept: ULTRASOUND IMAGING | Age: 81
Discharge: HOME | End: 2021-07-09
Attending: INTERNAL MEDICINE
Payer: MEDICARE

## 2021-07-09 VITALS
BODY MASS INDEX: 31.65 KG/M2 | WEIGHT: 190 LBS | HEART RATE: 87 BPM | SYSTOLIC BLOOD PRESSURE: 130 MMHG | RESPIRATION RATE: 18 BRPM | DIASTOLIC BLOOD PRESSURE: 54 MMHG | HEIGHT: 65 IN | OXYGEN SATURATION: 98 % | TEMPERATURE: 98 F

## 2021-07-09 DIAGNOSIS — J90 PLEURAL EFFUSION, LEFT: ICD-10-CM

## 2021-07-09 PROCEDURE — 71045 X-RAY EXAM CHEST 1 VIEW: CPT

## 2021-07-09 PROCEDURE — 74011000250 HC RX REV CODE- 250: Performed by: STUDENT IN AN ORGANIZED HEALTH CARE EDUCATION/TRAINING PROGRAM

## 2021-07-09 PROCEDURE — 77030028236 US THORACENTESIS CATH W IMAGE

## 2021-07-09 RX ORDER — LIDOCAINE HYDROCHLORIDE 10 MG/ML
10 INJECTION, SOLUTION EPIDURAL; INFILTRATION; INTRACAUDAL; PERINEURAL
Status: COMPLETED | OUTPATIENT
Start: 2021-07-09 | End: 2021-07-09

## 2021-07-09 RX ADMIN — LIDOCAINE HYDROCHLORIDE 10 ML: 10 INJECTION, SOLUTION EPIDURAL; INFILTRATION; INTRACAUDAL; PERINEURAL at 13:00

## 2021-07-09 NOTE — PROGRESS NOTES
Name of procedure: Left Thoracentesis     Vital Signs: Stable    Fluid Removed: 1.5 Liters blood tinged fluid    Samples sent to lab: no labs ordered but cup held in lab    Any complications related to procedure: none    Pt tolerated procedure well. VSS. No C/O pain. Dressing to site D&I. No bleeding or hematoma noted to site. HOB elevated. PCXR completed. MD has read PCXR and per MD pt may be discharged. Discharge instructions given. Pt takne to car by wheelchair and taken home by family. NAD noted at time of discharge.

## 2021-07-09 NOTE — DISCHARGE INSTRUCTIONS
[facility]  Radiology Department  [phone]    Radiologist: Caio Taveras    Date: 7/9/2021      Thoracentesis Discharge Instructions    You may have an aching pain in the puncture site tonight as the numbing medicine wears off. You may take Tylenol, as directed on the label, for pain or discomfort. Avoid ibuprofen (Advil, Motrin) and aspirin products for the next 48 hours as these drugs may increase your chance of bleeding. You have develop a mild cough as the lungs re expand with air, this should resolve with in the next 24 hours. Resume your previous diet and continue your prescribed medicaitons. Rest for the next 24 hours. If you experience shortness of breath (other than your normal breathing pattern) difficulty breathing, or have severe chest pain, call 911 and go to the nearest Emergency Room.     Be sure to follow up with your referring physician as soon as possible

## 2021-07-11 ENCOUNTER — DOCUMENTATION ONLY (OUTPATIENT)
Dept: CARDIOLOGY CLINIC | Age: 81
End: 2021-07-11

## 2021-07-11 NOTE — PROGRESS NOTES
Successful ultrasound guided left thoracentesis yielding approximately 1500 ml of fluid on 7/9/2021 by IR at [facility]  In atrial fibrillation and LVEF 37%  Proceed to schedule BIV pacer and av node ablation hold eliquis 2 days before    Future Appointments   Date Time Provider Hazel Wade   7/13/2021  1:30 PM Mone Sheth MD [facility] BS AMB   8/13/2021  3:00 PM MD JOSÉ [facility] BS AMB

## 2021-07-13 ENCOUNTER — OFFICE VISIT (OUTPATIENT)
Dept: CARDIOLOGY CLINIC | Age: 81
End: 2021-07-13
Payer: MEDICARE

## 2021-07-13 DIAGNOSIS — I31.4 CARDIAC/PERICARDIAL TAMPONADE: Primary | ICD-10-CM

## 2021-07-13 DIAGNOSIS — I48.91 ATRIAL FIBRILLATION, UNSPECIFIED TYPE (HCC): ICD-10-CM

## 2021-07-13 DIAGNOSIS — Z01.812 PRE-PROCEDURE LAB EXAM: ICD-10-CM

## 2021-07-13 DIAGNOSIS — I42.0 DILATED CARDIOMYOPATHY (HCC): Primary | ICD-10-CM

## 2021-07-13 PROCEDURE — 99024 POSTOP FOLLOW-UP VISIT: CPT | Performed by: THORACIC SURGERY (CARDIOTHORACIC VASCULAR SURGERY)

## 2021-07-13 RX ORDER — CHLORHEXIDINE GLUCONATE 4 G/100ML
SOLUTION TOPICAL
Qty: 1 BOTTLE | Refills: 0 | Status: SHIPPED | OUTPATIENT
Start: 2021-07-13 | End: 2021-07-26

## 2021-07-13 NOTE — PROGRESS NOTES
Patient: Rene Kenny   Age: [de-identified] y.o. Patient Care Team:  Phillip Cochran MD as PCP - General (Internal Medicine)  Kailee Joseph MD (Orthopedic Surgery)  Becca Spicer MD (Cardiology)  Macey Navarro MD (Cardiology)  Kriss Parham MD (Cardiothoracic Surgery)    Diagnosis: There were no encounter diagnoses. Problem List:   Patient Active Problem List   Diagnosis Code    Dyslipidemia E78.5    Palpitations R00.2    Chest pain, unspecified R07.9    Asthma J45.909    Hyperlipidemia LDL goal < 130 E78.5    HTN (hypertension) I10    Breast cancer ([facility] [address] C50.919    Lung nodule R91.1    Cystocele [id number]    Uterine prolapse N81.4    Dizziness R42    Persistent atrial fibrillation (HCC) I48.19    Encounter for cardioversion procedure Z01.89    S/P ablation of atrial fibrillation Z98.890, Z86.79    Pericardial effusion with cardiac tamponade I31.3, I31.4    Non-rheumatic mitral regurgitation I34.0    Non-rheumatic tricuspid valve insufficiency I36.1    persistent atrial fibrillation  I48.91    Cardiac/pericardial tamponade I31.4    S/P pericardial window creation P26.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 Doing well, had thoracentesis recently that drained 1500 ml. Still having some SOB. Saw Dr. Evan Holt and has plans for PPM soon. Surgical site healing well, some mild soreness. Current Medications:   Current Outpatient Medications   Medication Sig Dispense Refill    chlorhexidine (Hibiclens) 4 % liquid Apply to the upper chest area from shoulder/neck to mid line of chest and to below the nipple every day, 5 days prior to the procedure. 1 Bottle 0    apixaban (Eliquis) 2.5 mg tablet Take 2.5 mg by mouth two (2) times a day.  metoprolol succinate (TOPROL-XL) 25 mg XL tablet Take 1 Tablet by mouth daily. 90 Tablet 1    spironolactone (ALDACTONE) 25 mg tablet Take 1 Tablet by mouth daily.  90 Tablet 1    bumetanide (BUMEX) 1 mg tablet Take 1 Tablet by mouth daily. 90 Tablet 1    ferrous sulfate 325 mg (65 mg iron) tablet Take 1 Tablet by mouth daily. 90 Tablet 1    amiodarone (CORDARONE) 200 mg tablet Take 1 Tablet by mouth daily. 180 Tablet 1    sacubitriL-valsartan (Entresto) 24-26 mg tablet Take 1 Tablet by mouth two (2) times a day. 180 Tablet 1    potassium chloride (K-DUR, KLOR-CON) 20 mEq tablet Take 1 Tablet by mouth daily. (Patient not taking: Reported on 7/6/2021) 90 Tablet 1    cyclobenzaprine (FLEXERIL) 5 mg tablet 5 mg as needed. (Patient not taking: Reported on 7/6/2021)      diphenhydrAMINE (BenadryL) 25 mg capsule Take 25 mg by mouth every six (6) hours as needed. (Patient not taking: Reported on 6/21/2021)      amoxicillin (AMOXIL) 500 mg capsule Take 500 mg by mouth as needed. Only for dental procedure      acetaminophen (Tylenol Extra Strength) 500 mg tablet Take 500 mg by mouth nightly as needed.  levothyroxine (SYNTHROID) 25 mcg tablet Take  by mouth Daily (before breakfast).  fluticasone (FLOVENT HFA) 220 mcg/actuation inhaler as needed.  atorvastatin (LIPITOR) 20 mg tablet Take 20 mg by mouth every evening.  MULTIVITAMIN PO Take  by mouth. Takes one po once daily.  cholecalciferol (VITAMIN D3) 5,000 unit capsule Take 5,000 Units by mouth daily.  cetirizine (ZYRTEC) 10 mg tablet Take 10 mg by mouth every evening.  estradiol (ESTRACE) 0.01 % (0.1 mg/gram) vaginal cream Insert  into vagina every Monday and Thursday.  EPINEPHrine (EPIPEN) 0.3 mg/0.3 mL (1:1,000) injection 0.3 mL by IntraMUSCular route once as needed for up to 1 dose. 0.3 mL 0    albuterol (PROVENTIL HFA, VENTOLIN HFA, PROAIR HFA) 90 mcg/actuation inhaler Take 1 Puff by inhalation every four (4) hours as needed. 2 Inhaler 3    raloxifene (EVISTA) 60 mg tablet Take 1 Tab by mouth daily. 90 Tab 4    montelukast (SINGULAIR) 10 mg tablet Take 10 mg by mouth every evening. Vitals: There were no vitals taken for this visit. Allergies: is allergic to betadine [povidone-iodine], iodine, and norvasc [amlodipine]. Physical Exam:  Wounds: clean, dry, no drainage    Lungs: clear to auscultation bilaterally    Heart: regular rate and rhythm, S1, S2 normal, no murmur, click, rub or gallop    Extremities: no edema     Assessment/Plan:   1. S/p pericardial window: incision healed. FU with cardiology.

## 2021-07-13 NOTE — PROGRESS NOTES
Patient responded via [employer].  Patient scheduled for 9/1/21 at 9:30 am. Pre-procedure instructions sent back via [employer]

## 2021-07-21 ENCOUNTER — HOSPITAL ENCOUNTER (INPATIENT)
Age: 81
LOS: 4 days | Discharge: HOME HEALTH | DRG: 243 | End: 2021-07-25
Attending: EMERGENCY MEDICINE | Admitting: INTERNAL MEDICINE
Payer: MEDICARE

## 2021-07-21 ENCOUNTER — APPOINTMENT (OUTPATIENT)
Dept: CT IMAGING | Age: 81
DRG: 243 | End: 2021-07-21
Attending: EMERGENCY MEDICINE
Payer: MEDICARE

## 2021-07-21 ENCOUNTER — APPOINTMENT (OUTPATIENT)
Dept: GENERAL RADIOLOGY | Age: 81
DRG: 243 | End: 2021-07-21
Attending: EMERGENCY MEDICINE
Payer: MEDICARE

## 2021-07-21 ENCOUNTER — TELEPHONE (OUTPATIENT)
Dept: CARDIOLOGY CLINIC | Age: 81
End: 2021-07-21

## 2021-07-21 DIAGNOSIS — Z86.79 S/P ABLATION OF ATRIAL FIBRILLATION: ICD-10-CM

## 2021-07-21 DIAGNOSIS — I48.11 LONGSTANDING PERSISTENT ATRIAL FIBRILLATION (HCC): ICD-10-CM

## 2021-07-21 DIAGNOSIS — J90 PLEURAL EFFUSION: ICD-10-CM

## 2021-07-21 DIAGNOSIS — Z98.890 S/P ABLATION OF ATRIAL FIBRILLATION: ICD-10-CM

## 2021-07-21 DIAGNOSIS — I50.23 ACUTE ON CHRONIC SYSTOLIC (CONGESTIVE) HEART FAILURE (HCC): ICD-10-CM

## 2021-07-21 DIAGNOSIS — Z98.890 S/P PERICARDIAL WINDOW CREATION: ICD-10-CM

## 2021-07-21 DIAGNOSIS — I42.0 CONGESTIVE CARDIOMYOPATHY (HCC): ICD-10-CM

## 2021-07-21 DIAGNOSIS — R06.00 DYSPNEA, UNSPECIFIED TYPE: Primary | ICD-10-CM

## 2021-07-21 DIAGNOSIS — I48.91 ATRIAL FIBRILLATION, UNSPECIFIED TYPE (HCC): ICD-10-CM

## 2021-07-21 DIAGNOSIS — Z95.0 PACEMAKER: ICD-10-CM

## 2021-07-21 LAB
ALBUMIN SERPL-MCNC: 2.9 G/DL (ref 3.5–5)
ALBUMIN/GLOB SERPL: 0.9 {RATIO} (ref 1.1–2.2)
ALP SERPL-CCNC: 83 U/L (ref 45–117)
ALT SERPL-CCNC: 36 U/L (ref 12–78)
ANION GAP SERPL CALC-SCNC: 6 MMOL/L (ref 5–15)
AST SERPL-CCNC: 38 U/L (ref 15–37)
ATRIAL RATE: 102 BPM
BASOPHILS # BLD: 0.1 K/UL (ref 0–0.1)
BASOPHILS NFR BLD: 0 % (ref 0–1)
BILIRUB SERPL-MCNC: 0.6 MG/DL (ref 0.2–1)
BNP SERPL-MCNC: 3357 PG/ML
BUN SERPL-MCNC: 17 MG/DL (ref 6–20)
BUN/CREAT SERPL: 18 (ref 12–20)
CALCIUM SERPL-MCNC: 8.6 MG/DL (ref 8.5–10.1)
CALCULATED R AXIS, ECG10: 75 DEGREES
CALCULATED T AXIS, ECG11: -161 DEGREES
CHLORIDE SERPL-SCNC: 104 MMOL/L (ref 97–108)
CO2 SERPL-SCNC: 28 MMOL/L (ref 21–32)
COMMENT, HOLDF: NORMAL
CREAT SERPL-MCNC: 0.94 MG/DL (ref 0.55–1.02)
DIAGNOSIS, 93000: NORMAL
DIFFERENTIAL METHOD BLD: ABNORMAL
EOSINOPHIL # BLD: 0 K/UL (ref 0–0.4)
EOSINOPHIL NFR BLD: 0 % (ref 0–7)
ERYTHROCYTE [DISTWIDTH] IN BLOOD BY AUTOMATED COUNT: 15.9 % (ref 11.5–14.5)
GLOBULIN SER CALC-MCNC: 3.3 G/DL (ref 2–4)
GLUCOSE SERPL-MCNC: 159 MG/DL (ref 65–100)
HCT VFR BLD AUTO: 35.1 % (ref 35–47)
HGB BLD-MCNC: 11.5 G/DL (ref 11.5–16)
IMM GRANULOCYTES # BLD AUTO: 0.1 K/UL (ref 0–0.04)
IMM GRANULOCYTES NFR BLD AUTO: 1 % (ref 0–0.5)
LYMPHOCYTES # BLD: 1.9 K/UL (ref 0.8–3.5)
LYMPHOCYTES NFR BLD: 13 % (ref 12–49)
MAGNESIUM SERPL-MCNC: 2.6 MG/DL (ref 1.6–2.4)
MCH RBC QN AUTO: 32.3 PG (ref 26–34)
MCHC RBC AUTO-ENTMCNC: 32.8 G/DL (ref 30–36.5)
MCV RBC AUTO: 98.6 FL (ref 80–99)
MONOCYTES # BLD: 1 K/UL (ref 0–1)
MONOCYTES NFR BLD: 7 % (ref 5–13)
NEUTS SEG # BLD: 11.6 K/UL (ref 1.8–8)
NEUTS SEG NFR BLD: 79 % (ref 32–75)
NRBC # BLD: 0 K/UL (ref 0–0.01)
NRBC BLD-RTO: 0 PER 100 WBC
PLATELET # BLD AUTO: 283 K/UL (ref 150–400)
PMV BLD AUTO: 12.3 FL (ref 8.9–12.9)
POTASSIUM SERPL-SCNC: 4.4 MMOL/L (ref 3.5–5.1)
PROT SERPL-MCNC: 6.2 G/DL (ref 6.4–8.2)
Q-T INTERVAL, ECG07: 378 MS
QRS DURATION, ECG06: 108 MS
QTC CALCULATION (BEZET), ECG08: 480 MS
RBC # BLD AUTO: 3.56 M/UL (ref 3.8–5.2)
SAMPLES BEING HELD,HOLD: NORMAL
SODIUM SERPL-SCNC: 138 MMOL/L (ref 136–145)
TROPONIN I SERPL-MCNC: <0.05 NG/ML
VENTRICULAR RATE, ECG03: 97 BPM
WBC # BLD AUTO: 14.6 K/UL (ref 3.6–11)

## 2021-07-21 PROCEDURE — B51V1ZZ FLUOROSCOPY OF OTHER VEINS USING LOW OSMOLAR CONTRAST: ICD-10-PCS | Performed by: INTERNAL MEDICINE

## 2021-07-21 PROCEDURE — 83880 ASSAY OF NATRIURETIC PEPTIDE: CPT

## 2021-07-21 PROCEDURE — 83735 ASSAY OF MAGNESIUM: CPT

## 2021-07-21 PROCEDURE — 93005 ELECTROCARDIOGRAM TRACING: CPT

## 2021-07-21 PROCEDURE — 84484 ASSAY OF TROPONIN QUANT: CPT

## 2021-07-21 PROCEDURE — 65270000032 HC RM SEMIPRIVATE

## 2021-07-21 PROCEDURE — 36415 COLL VENOUS BLD VENIPUNCTURE: CPT

## 2021-07-21 PROCEDURE — 85025 COMPLETE CBC W/AUTO DIFF WBC: CPT

## 2021-07-21 PROCEDURE — 02H63JZ INSERTION OF PACEMAKER LEAD INTO RIGHT ATRIUM, PERCUTANEOUS APPROACH: ICD-10-PCS | Performed by: INTERNAL MEDICINE

## 2021-07-21 PROCEDURE — 02HL3JZ INSERTION OF PACEMAKER LEAD INTO LEFT VENTRICLE, PERCUTANEOUS APPROACH: ICD-10-PCS | Performed by: INTERNAL MEDICINE

## 2021-07-21 PROCEDURE — 02583ZZ DESTRUCTION OF CONDUCTION MECHANISM, PERCUTANEOUS APPROACH: ICD-10-PCS | Performed by: INTERNAL MEDICINE

## 2021-07-21 PROCEDURE — 80053 COMPREHEN METABOLIC PANEL: CPT

## 2021-07-21 PROCEDURE — 71046 X-RAY EXAM CHEST 2 VIEWS: CPT

## 2021-07-21 PROCEDURE — 70450 CT HEAD/BRAIN W/O DYE: CPT

## 2021-07-21 PROCEDURE — 99285 EMERGENCY DEPT VISIT HI MDM: CPT

## 2021-07-21 PROCEDURE — 0W9B3ZZ DRAINAGE OF LEFT PLEURAL CAVITY, PERCUTANEOUS APPROACH: ICD-10-PCS | Performed by: INTERNAL MEDICINE

## 2021-07-21 PROCEDURE — 0JH607Z INSERTION OF CARDIAC RESYNCHRONIZATION PACEMAKER PULSE GENERATOR INTO CHEST SUBCUTANEOUS TISSUE AND FASCIA, OPEN APPROACH: ICD-10-PCS | Performed by: INTERNAL MEDICINE

## 2021-07-21 NOTE — Clinical Note
Patient transported with a Registered Nurse, [address] [place] [address] / Patient Care [facility] and Monitor.

## 2021-07-21 NOTE — TELEPHONE ENCOUNTER
Verified patient with two types of identifiers. Spoke with patient's son, Anjali Spangler, verified on PHI. Anjali Spangler states he and his sister are very worried about patient. Her SOB continues to get worse. Anjali Spangler reports patient fell last night from being unsteady and weak. Anjali Spangler reports no injury. Anjali Spangler requesting to speak with Dr. Jules Hess about patient's plan of care. Will ask MD to return call to Anjali Spangler at [phone]    Number in chart for Anjali Spangler is [phone]- that is his wife's phone number.  He said either is fine to call

## 2021-07-21 NOTE — ED TRIAGE NOTES
Pt reports increased SOB and fatigue x2 weeks. Pt also reports a fall last night. Pt hit her head and is on Eliquis.

## 2021-07-21 NOTE — Clinical Note
sheath exchanged. Upon evaluation of the common femoral artery stick using fluoroscopy, the access site puncture was within the safe zone.

## 2021-07-21 NOTE — Clinical Note
sheath exchanged for SHEATH INTRO 8FR L10CM MINI GWIRE L45CM 0.035IN COR KINK. Upon evaluation of the common femoral artery stick using fluoroscopy, the access site puncture was within the safe zone.

## 2021-07-21 NOTE — Clinical Note
Sheath: inserted. Sheath inserted/placed in the right femoral  vein. Upon evaluation of the common femoral artery stick using fluoroscopy, the access site puncture was within the safe zone.

## 2021-07-21 NOTE — ED PROVIDER NOTES
Patient is an 70-year-old female with past medical history significant for A. fib on anticoagulation, hyperlipidemia, hypertension, pericardial tamponade status post window, left pleural effusion for which she had a thoracentesis earlier in the month and drained a liter and a half fluid who is supposed to have a biventricular pacer and AV node ablation in the future by Dr. Sharyn Domingo. She presents emergency department with worsening shortness of breath and left shoulder discomfort. She denies any obvious injury to the shoulder and feels like it is related to arthritis. She does state that she is felt a little bit hazy and wonders if the A. fib or the fluid in her lung is causing this. Patient states after her thoracentesis she did feel quite a bit better for a few days but now is having a hard time getting around and actually felt so weak that she fell and hit her head yesterday. Denies any loss of consciousness or active nausea vomiting.            Past Medical History:   Diagnosis Date    Arthritis     Asthma     dr. Chano Gee allergist    Atrial fibrillation ([facility] [address]     Breast cancer ([facility] [address] 1980    right - mastectomy    Coagulation disorder ([facility] [address]     on eliquis    Dyslipidemia     labs 2008 - chol 283, HDL 83, ,     HTN (hypertension)     Hyperlipidemia LDL goal < 130     for increased LDL particles, Dr. Tomasa Jauregui nodule     Dr. Ramos Weidman Palpitations     resolved       Past Surgical History:   Procedure Laterality Date    COLONOSCOPY N/A 9/10/2018    COLONOSCOPY performed by Tiki Stuart MD at [facility] W/O CONT WITH CALCIUM  1/2012    CAC score 0; calcified mediastinal lymph nodes consistent granulomatous disease    ECHO 2D ADULT  5/5/2008    normal, LVEF 60%    HX APPENDECTOMY      HX HYSTERECTOMY Bilateral 03/19/2015    and uro repair    HX KNEE REPLACEMENT Right     HX MASTECTOMY Right     HX TONSILLECTOMY      HX UROLOGICAL  8/1/14    Urodynamics    INTRACARD ECHO, THER/DX INTERVENT N/A 5/10/2019    Intracardiac Echocardiogram performed by Marylen Skill, MD at [address] EXTERNAL  3/28/2018         HI CHEST SURGERY PROCEDURE UNLISTED      lung nodule removed - benign    HI EPHYS EVL TRNSPTL TX ATRIAL FIB ISOLAT PULM VEIN N/A 5/10/2019    ABLATION A-FIB  W COMPLETE EP STUDY performed by Marylen Skill, MD at [address] CATH LAB    HI INTRACARDIAC ELECTROPHYSIOLOGIC 3D [address] [facility] N/A 5/10/2019    Ep 3d Mapping performed by Marylen Skill, MD at [address] CATH LAB    STRESS TEST [facility]  1/5/12    walked 7:31, no chest pain, normal MPI. Family History:   Problem Relation Age of Onset    Heart Failure Mother     Stroke Father     Other Other         endometrial cancer, niece       Social History     Socioeconomic History    Marital status:      Spouse name: Not on file    Number of children: Not on file    Years of education: Not on file    Highest education level: Not on file   Occupational History    Not on file   Tobacco Use    Smoking status: Never Smoker    Smokeless tobacco: Never Used   Substance and Sexual Activity    Alcohol use: Yes     Alcohol/week: 7.0 standard drinks     Types: 7 Standard drinks or equivalent per week     Comment: wine nightly    Drug use: No    Sexual activity: Yes     Partners: Male     Birth control/protection: None   Other Topics Concern    Not on file   Social History Narrative    Retired      Social Determinants of Health     Financial Resource Strain:     Difficulty of Paying Living Expenses:    Food Insecurity:     Worried About [address] in the Last Year:     [address] in the Last Year:    Transportation Needs:     Lack of Transportation (Medical):      Lack of Transportation (Non-Medical):    Physical Activity:     Days of Exercise per Week:     Minutes of Exercise per Session:    Stress:     Feeling of Stress :    Social Connections:     Frequency of Communication with Friends and Family:     Frequency of Social Gatherings with Friends and Family:     Attends [employer] Services:     Active Member of Clubs or Organizations:     Attends Club or Organization Meetings:     Marital Status:    Intimate Partner Violence:     Fear of Current or Ex-Partner:     Emotionally Abused:     Physically Abused:     Sexually Abused: ALLERGIES: Betadine [povidone-iodine], Iodine, and Norvasc [amlodipine]    Review of Systems   Constitutional: Positive for activity change and fatigue. Negative for fever. HENT: Negative for facial swelling. Eyes: Negative for photophobia. Respiratory: Positive for shortness of breath. Cardiovascular: Positive for leg swelling. Negative for chest pain. Gastrointestinal: Negative for abdominal pain and vomiting. Musculoskeletal: Positive for arthralgias. Negative for joint swelling and neck pain. Neurological: Positive for light-headedness. Negative for seizures and syncope. Hematological: Bruises/bleeds easily. Psychiatric/Behavioral: Negative. Vitals:    07/21/21 1425   BP: 105/64   Pulse: (!) 105   Resp: 18   Temp: 98.5 °F (36.9 °C)   SpO2: 96%   Weight: 81.6 kg (180 lb)   Height: 5' 6\" (1.676 m)            Physical Exam  Vitals and nursing note reviewed. Constitutional:       Appearance: She is not toxic-appearing or diaphoretic. HENT:      Head: Normocephalic. Jaw: There is normal jaw occlusion. Neck:      Trachea: No tracheal deviation. Cardiovascular:      Rate and Rhythm: Tachycardia present. Rhythm irregular. Heart sounds: No friction rub. Pulmonary:      Breath sounds: Examination of the left-middle field reveals decreased breath sounds. Examination of the left-lower field reveals decreased breath sounds. Decreased breath sounds present. Chest:      Chest wall: No deformity, tenderness or crepitus.    Abdominal:      Palpations: Abdomen is soft.      Tenderness: There is no abdominal tenderness. There is no guarding. Musculoskeletal:      Right lower leg: Edema present. Left lower leg: Edema present. Skin:     General: Skin is warm and dry. Neurological:      Mental Status: She is alert and oriented to person, place, and time. Psychiatric:         Mood and Affect: Mood normal.         Behavior: Behavior normal.          MDM  Number of Diagnoses or Management Options  Dyspnea, unspecified type: established and worsening  Pleural effusion: established and worsening     Amount and/or Complexity of Data Reviewed  Clinical lab tests: reviewed and ordered  Tests in the radiology section of CPT®: ordered and reviewed  Decide to obtain previous medical records or to obtain history from someone other than the patient: yes  Discuss the patient with other providers: yes  Independent visualization of images, tracings, or specimens: yes           Procedures      Perfect Serve Consult for Admission  7:16 PM    ED Room Number: ER25/25  Patient Name and age:  Janece Handler [de-identified] y.o.  female  Working Diagnosis:   1. Dyspnea, unspecified type    2. Atrial fibrillation, unspecified type ([address]    3. Pleural effusion        COVID-19 Suspicion:  no  Sepsis present:  no  Reassessment needed: no  Code Status:  Full Code  Readmission: no  Isolation Requirements:  no  Recommended Level of Care:  telemetry  Department:[facility] Adult ED - 21   Other: Patient is an 77-year-old female with past medical history significant for A. fib, cardiac tamponade status post pericardial window and left pleural effusion requiring thoracentesis who presents emergency department with worsening shortness of breath. Patient had thoracentesis draining a liter and a half earlier in the month however has had recurrence of her left pleural effusion. Patient increasingly weak as a result of her shortness of breath and fell yesterday hit her head. Head CT negative.   Patient is anticoagulated with Eliquis

## 2021-07-22 ENCOUNTER — APPOINTMENT (OUTPATIENT)
Dept: GENERAL RADIOLOGY | Age: 81
DRG: 243 | End: 2021-07-22
Attending: NURSE PRACTITIONER
Payer: MEDICARE

## 2021-07-22 ENCOUNTER — APPOINTMENT (OUTPATIENT)
Dept: CT IMAGING | Age: 81
DRG: 243 | End: 2021-07-22
Attending: INTERNAL MEDICINE
Payer: MEDICARE

## 2021-07-22 ENCOUNTER — APPOINTMENT (OUTPATIENT)
Dept: ULTRASOUND IMAGING | Age: 81
DRG: 243 | End: 2021-07-22
Attending: THORACIC SURGERY (CARDIOTHORACIC VASCULAR SURGERY)
Payer: MEDICARE

## 2021-07-22 LAB
ALBUMIN SERPL-MCNC: 2.6 G/DL (ref 3.5–5)
ALBUMIN/GLOB SERPL: 0.9 {RATIO} (ref 1.1–2.2)
ALP SERPL-CCNC: 76 U/L (ref 45–117)
ALT SERPL-CCNC: 31 U/L (ref 12–78)
ANION GAP SERPL CALC-SCNC: 5 MMOL/L (ref 5–15)
APPEARANCE FLD: ABNORMAL
APPEARANCE UR: CLEAR
AST SERPL-CCNC: 21 U/L (ref 15–37)
BACTERIA URNS QL MICRO: NEGATIVE /HPF
BASOPHILS # BLD: 0.1 K/UL (ref 0–0.1)
BASOPHILS NFR BLD: 1 % (ref 0–1)
BILIRUB SERPL-MCNC: 0.7 MG/DL (ref 0.2–1)
BILIRUB UR QL: NEGATIVE
BODY FLD TYPE: NORMAL
BUN SERPL-MCNC: 17 MG/DL (ref 6–20)
BUN/CREAT SERPL: 24 (ref 12–20)
CALCIUM SERPL-MCNC: 8.4 MG/DL (ref 8.5–10.1)
CHLORIDE SERPL-SCNC: 105 MMOL/L (ref 97–108)
CO2 SERPL-SCNC: 27 MMOL/L (ref 21–32)
COLOR FLD: ABNORMAL
COLOR UR: ABNORMAL
CREAT SERPL-MCNC: 0.72 MG/DL (ref 0.55–1.02)
DIFFERENTIAL METHOD BLD: ABNORMAL
EOSINOPHIL # BLD: 0.1 K/UL (ref 0–0.4)
EOSINOPHIL NFR BLD: 1 % (ref 0–7)
EPITH CASTS URNS QL MICRO: ABNORMAL /LPF
ERYTHROCYTE [DISTWIDTH] IN BLOOD BY AUTOMATED COUNT: 15.6 % (ref 11.5–14.5)
EST. AVERAGE GLUCOSE BLD GHB EST-MCNC: 117 MG/DL
GLOBULIN SER CALC-MCNC: 2.8 G/DL (ref 2–4)
GLUCOSE FLD-MCNC: 125 MG/DL
GLUCOSE SERPL-MCNC: 120 MG/DL (ref 65–100)
GLUCOSE UR STRIP.AUTO-MCNC: NEGATIVE MG/DL
HBA1C MFR BLD: 5.7 % (ref 4–5.6)
HCT VFR BLD AUTO: 32.6 % (ref 35–47)
HGB BLD-MCNC: 10.4 G/DL (ref 11.5–16)
HGB UR QL STRIP: NEGATIVE
HYALINE CASTS URNS QL MICRO: ABNORMAL /LPF (ref 0–5)
IMM GRANULOCYTES # BLD AUTO: 0.1 K/UL (ref 0–0.04)
IMM GRANULOCYTES NFR BLD AUTO: 1 % (ref 0–0.5)
KETONES UR QL STRIP.AUTO: NEGATIVE MG/DL
LDH FLD L TO P-CCNC: 145 U/L
LDH SERPL L TO P-CCNC: 425 U/L (ref 81–246)
LEUKOCYTE ESTERASE UR QL STRIP.AUTO: ABNORMAL
LYMPHOCYTES # BLD: 1.9 K/UL (ref 0.8–3.5)
LYMPHOCYTES NFR BLD: 17 % (ref 12–49)
LYMPHOCYTES NFR FLD: 4 %
MAGNESIUM SERPL-MCNC: 2.4 MG/DL (ref 1.6–2.4)
MCH RBC QN AUTO: 31.6 PG (ref 26–34)
MCHC RBC AUTO-ENTMCNC: 31.9 G/DL (ref 30–36.5)
MCV RBC AUTO: 99.1 FL (ref 80–99)
MESOTHL CELL NFR FLD: 5 %
MONOCYTES # BLD: 0.9 K/UL (ref 0–1)
MONOCYTES NFR BLD: 8 % (ref 5–13)
MONOS+MACROS NFR FLD: 46 %
NEUTROPHILS NFR FLD: 45 %
NEUTS SEG # BLD: 8.4 K/UL (ref 1.8–8)
NEUTS SEG NFR BLD: 72 % (ref 32–75)
NITRITE UR QL STRIP.AUTO: NEGATIVE
NRBC # BLD: 0 K/UL (ref 0–0.01)
NRBC BLD-RTO: 0 PER 100 WBC
NUC CELL # FLD: 1383 /CU MM
PH FLD: 7 [PH]
PH UR STRIP: 5.5 [PH] (ref 5–8)
PHOSPHATE SERPL-MCNC: 2.8 MG/DL (ref 2.6–4.7)
PLATELET # BLD AUTO: 242 K/UL (ref 150–400)
PMV BLD AUTO: 12.3 FL (ref 8.9–12.9)
POTASSIUM SERPL-SCNC: 3.8 MMOL/L (ref 3.5–5.1)
PROT FLD-MCNC: 3.1 G/DL
PROT SERPL-MCNC: 5.4 G/DL (ref 6.4–8.2)
PROT UR STRIP-MCNC: 30 MG/DL
RBC # BLD AUTO: 3.29 M/UL (ref 3.8–5.2)
RBC # FLD: >100 /CU MM
RBC #/AREA URNS HPF: ABNORMAL /HPF (ref 0–5)
SODIUM SERPL-SCNC: 137 MMOL/L (ref 136–145)
SP GR UR REFRACTOMETRY: 1.02 (ref 1–1.03)
SPECIMEN SOURCE FLD: ABNORMAL
SPECIMEN SOURCE FLD: NORMAL
TROPONIN I SERPL-MCNC: <0.05 NG/ML
TSH SERPL DL<=0.05 MIU/L-ACNC: 7.66 UIU/ML (ref 0.36–3.74)
UR CULT HOLD, URHOLD: NORMAL
UROBILINOGEN UR QL STRIP.AUTO: 0.2 EU/DL (ref 0.2–1)
WBC # BLD AUTO: 11.4 K/UL (ref 3.6–11)
WBC URNS QL MICRO: ABNORMAL /HPF (ref 0–4)

## 2021-07-22 PROCEDURE — 97161 PT EVAL LOW COMPLEX 20 MIN: CPT

## 2021-07-22 PROCEDURE — 74011000250 HC RX REV CODE- 250: Performed by: INTERNAL MEDICINE

## 2021-07-22 PROCEDURE — 83036 HEMOGLOBIN GLYCOSYLATED A1C: CPT

## 2021-07-22 PROCEDURE — 74176 CT ABD & PELVIS W/O CONTRAST: CPT

## 2021-07-22 PROCEDURE — 65270000032 HC RM SEMIPRIVATE

## 2021-07-22 PROCEDURE — 87205 SMEAR GRAM STAIN: CPT

## 2021-07-22 PROCEDURE — 72125 CT NECK SPINE W/O DYE: CPT

## 2021-07-22 PROCEDURE — 85025 COMPLETE CBC W/AUTO DIFF WBC: CPT

## 2021-07-22 PROCEDURE — 94760 N-INVAS EAR/PLS OXIMETRY 1: CPT

## 2021-07-22 PROCEDURE — 71250 CT THORAX DX C-: CPT

## 2021-07-22 PROCEDURE — 84443 ASSAY THYROID STIM HORMONE: CPT

## 2021-07-22 PROCEDURE — 71045 X-RAY EXAM CHEST 1 VIEW: CPT

## 2021-07-22 PROCEDURE — 84157 ASSAY OF PROTEIN OTHER: CPT

## 2021-07-22 PROCEDURE — 36415 COLL VENOUS BLD VENIPUNCTURE: CPT

## 2021-07-22 PROCEDURE — 81001 URINALYSIS AUTO W/SCOPE: CPT

## 2021-07-22 PROCEDURE — 74011250637 HC RX REV CODE- 250/637: Performed by: INTERNAL MEDICINE

## 2021-07-22 PROCEDURE — 83986 ASSAY PH BODY FLUID NOS: CPT

## 2021-07-22 PROCEDURE — 88112 CYTOPATH CELL ENHANCE TECH: CPT

## 2021-07-22 PROCEDURE — 97530 THERAPEUTIC ACTIVITIES: CPT

## 2021-07-22 PROCEDURE — 83735 ASSAY OF MAGNESIUM: CPT

## 2021-07-22 PROCEDURE — 83615 LACTATE (LD) (LDH) ENZYME: CPT

## 2021-07-22 PROCEDURE — 89050 BODY FLUID CELL COUNT: CPT

## 2021-07-22 PROCEDURE — 99222 1ST HOSP IP/OBS MODERATE 55: CPT | Performed by: THORACIC SURGERY (CARDIOTHORACIC VASCULAR SURGERY)

## 2021-07-22 PROCEDURE — 88305 TISSUE EXAM BY PATHOLOGIST: CPT

## 2021-07-22 PROCEDURE — 32555 ASPIRATE PLEURA W/ IMAGING: CPT

## 2021-07-22 PROCEDURE — 80053 COMPREHEN METABOLIC PANEL: CPT

## 2021-07-22 PROCEDURE — 84100 ASSAY OF PHOSPHORUS: CPT

## 2021-07-22 PROCEDURE — 99223 1ST HOSP IP/OBS HIGH 75: CPT | Performed by: INTERNAL MEDICINE

## 2021-07-22 PROCEDURE — 82945 GLUCOSE OTHER FLUID: CPT

## 2021-07-22 PROCEDURE — 84484 ASSAY OF TROPONIN QUANT: CPT

## 2021-07-22 RX ORDER — ONDANSETRON 2 MG/ML
4 INJECTION INTRAMUSCULAR; INTRAVENOUS
Status: DISCONTINUED | OUTPATIENT
Start: 2021-07-22 | End: 2021-07-25 | Stop reason: HOSPADM

## 2021-07-22 RX ORDER — CETIRIZINE HCL 10 MG
10 TABLET ORAL EVERY EVENING
Status: DISCONTINUED | OUTPATIENT
Start: 2021-07-22 | End: 2021-07-25 | Stop reason: HOSPADM

## 2021-07-22 RX ORDER — LANOLIN ALCOHOL/MO/W.PET/CERES
325 CREAM (GRAM) TOPICAL DAILY
Status: DISCONTINUED | OUTPATIENT
Start: 2021-07-22 | End: 2021-07-25 | Stop reason: HOSPADM

## 2021-07-22 RX ORDER — SPIRONOLACTONE 25 MG/1
25 TABLET ORAL DAILY
Status: DISCONTINUED | OUTPATIENT
Start: 2021-07-22 | End: 2021-07-25 | Stop reason: HOSPADM

## 2021-07-22 RX ORDER — LIDOCAINE HYDROCHLORIDE 10 MG/ML
10 INJECTION, SOLUTION EPIDURAL; INFILTRATION; INTRACAUDAL; PERINEURAL
Status: ACTIVE | OUTPATIENT
Start: 2021-07-22 | End: 2021-07-23

## 2021-07-22 RX ORDER — AMIODARONE HYDROCHLORIDE 200 MG/1
200 TABLET ORAL DAILY
Status: DISCONTINUED | OUTPATIENT
Start: 2021-07-22 | End: 2021-07-22

## 2021-07-22 RX ORDER — MONTELUKAST SODIUM 10 MG/1
10 TABLET ORAL EVERY EVENING
Status: DISCONTINUED | OUTPATIENT
Start: 2021-07-22 | End: 2021-07-25 | Stop reason: HOSPADM

## 2021-07-22 RX ORDER — METOPROLOL SUCCINATE 25 MG/1
25 TABLET, EXTENDED RELEASE ORAL DAILY
Status: DISCONTINUED | OUTPATIENT
Start: 2021-07-22 | End: 2021-07-25 | Stop reason: HOSPADM

## 2021-07-22 RX ORDER — SODIUM CHLORIDE 0.9 % (FLUSH) 0.9 %
5-40 SYRINGE (ML) INJECTION AS NEEDED
Status: DISCONTINUED | OUTPATIENT
Start: 2021-07-22 | End: 2021-07-25 | Stop reason: HOSPADM

## 2021-07-22 RX ORDER — BUMETANIDE 0.25 MG/ML
1 INJECTION INTRAMUSCULAR; INTRAVENOUS 2 TIMES DAILY
Status: DISCONTINUED | OUTPATIENT
Start: 2021-07-22 | End: 2021-07-22

## 2021-07-22 RX ORDER — POLYETHYLENE GLYCOL 3350 17 G/17G
17 POWDER, FOR SOLUTION ORAL DAILY PRN
Status: DISCONTINUED | OUTPATIENT
Start: 2021-07-22 | End: 2021-07-25 | Stop reason: HOSPADM

## 2021-07-22 RX ORDER — ONDANSETRON 4 MG/1
4 TABLET, ORALLY DISINTEGRATING ORAL
Status: DISCONTINUED | OUTPATIENT
Start: 2021-07-22 | End: 2021-07-25 | Stop reason: HOSPADM

## 2021-07-22 RX ORDER — SODIUM CHLORIDE 0.9 % (FLUSH) 0.9 %
5-40 SYRINGE (ML) INJECTION EVERY 8 HOURS
Status: DISCONTINUED | OUTPATIENT
Start: 2021-07-22 | End: 2021-07-25 | Stop reason: HOSPADM

## 2021-07-22 RX ORDER — ACETAMINOPHEN 325 MG/1
650 TABLET ORAL
Status: DISCONTINUED | OUTPATIENT
Start: 2021-07-22 | End: 2021-07-23

## 2021-07-22 RX ORDER — BUMETANIDE 1 MG/1
1 TABLET ORAL DAILY
Status: DISCONTINUED | OUTPATIENT
Start: 2021-07-23 | End: 2021-07-25 | Stop reason: HOSPADM

## 2021-07-22 RX ORDER — POTASSIUM CHLORIDE 750 MG/1
20 TABLET, FILM COATED, EXTENDED RELEASE ORAL DAILY
Status: DISCONTINUED | OUTPATIENT
Start: 2021-07-22 | End: 2021-07-25 | Stop reason: HOSPADM

## 2021-07-22 RX ORDER — ATORVASTATIN CALCIUM 20 MG/1
20 TABLET, FILM COATED ORAL EVERY EVENING
Status: DISCONTINUED | OUTPATIENT
Start: 2021-07-22 | End: 2021-07-25 | Stop reason: HOSPADM

## 2021-07-22 RX ORDER — IPRATROPIUM BROMIDE AND ALBUTEROL SULFATE 2.5; .5 MG/3ML; MG/3ML
3 SOLUTION RESPIRATORY (INHALATION)
Status: DISCONTINUED | OUTPATIENT
Start: 2021-07-22 | End: 2021-07-25 | Stop reason: HOSPADM

## 2021-07-22 RX ORDER — ACETAMINOPHEN 650 MG/1
650 SUPPOSITORY RECTAL
Status: DISCONTINUED | OUTPATIENT
Start: 2021-07-22 | End: 2021-07-23

## 2021-07-22 RX ORDER — RALOXIFENE HYDROCHLORIDE 60 MG/1
60 TABLET, FILM COATED ORAL DAILY
Status: DISCONTINUED | OUTPATIENT
Start: 2021-07-22 | End: 2021-07-25 | Stop reason: HOSPADM

## 2021-07-22 RX ORDER — LEVOTHYROXINE SODIUM 25 UG/1
25 TABLET ORAL
Status: DISCONTINUED | OUTPATIENT
Start: 2021-07-22 | End: 2021-07-25 | Stop reason: HOSPADM

## 2021-07-22 RX ADMIN — BUMETANIDE 1 MG: 0.25 INJECTION, SOLUTION INTRAMUSCULAR; INTRAVENOUS at 09:36

## 2021-07-22 RX ADMIN — ATORVASTATIN CALCIUM 20 MG: 20 TABLET, FILM COATED ORAL at 17:54

## 2021-07-22 RX ADMIN — LEVOTHYROXINE SODIUM 25 MCG: 0.03 TABLET ORAL at 08:26

## 2021-07-22 RX ADMIN — Medication 10 ML: at 21:27

## 2021-07-22 RX ADMIN — MONTELUKAST 10 MG: 10 TABLET, FILM COATED ORAL at 17:54

## 2021-07-22 RX ADMIN — SACUBITRIL AND VALSARTAN 1 TABLET: 24; 26 TABLET, FILM COATED ORAL at 17:54

## 2021-07-22 RX ADMIN — Medication 10 ML: at 17:55

## 2021-07-22 RX ADMIN — METOPROLOL SUCCINATE 25 MG: 25 TABLET, EXTENDED RELEASE ORAL at 09:36

## 2021-07-22 RX ADMIN — ACETAMINOPHEN 650 MG: 325 TABLET ORAL at 21:24

## 2021-07-22 RX ADMIN — CETIRIZINE HYDROCHLORIDE 10 MG: 10 TABLET, FILM COATED ORAL at 17:53

## 2021-07-22 RX ADMIN — RALOXIFENE HYDROCHLORIDE 60 MG: 60 TABLET, FILM COATED ORAL at 09:38

## 2021-07-22 RX ADMIN — SPIRONOLACTONE 25 MG: 25 TABLET ORAL at 09:35

## 2021-07-22 RX ADMIN — AMIODARONE HYDROCHLORIDE 200 MG: 200 TABLET ORAL at 09:35

## 2021-07-22 RX ADMIN — SACUBITRIL AND VALSARTAN 1 TABLET: 24; 26 TABLET, FILM COATED ORAL at 09:34

## 2021-07-22 RX ADMIN — FERROUS SULFATE TAB 325 MG (65 MG ELEMENTAL FE) 325 MG: 325 (65 FE) TAB at 09:35

## 2021-07-22 RX ADMIN — POTASSIUM CHLORIDE 20 MEQ: 750 TABLET, FILM COATED, EXTENDED RELEASE ORAL at 09:35

## 2021-07-22 RX ADMIN — ACETAMINOPHEN 325 MG: 325 TABLET ORAL at 13:27

## 2021-07-22 NOTE — PROGRESS NOTES
[facility] 7[facility]22/2021 in room 202. Rev.  Shantal Anderson MDiv, [facility], [facility] paging service: [phone])

## 2021-07-22 NOTE — PROGRESS NOTES
Consult received  Full note to follow  Recurrent left effusion  Recommend repeat thoracentesis  If the effusion recurs after she gets her BiV pacer and is optimized will consider a Pleur-X or other intervention.

## 2021-07-22 NOTE — PROGRESS NOTES
Bedside shift change report given to Ho Chris (oncoming nurse) by Hoang Shaffer (offgoing nurse). Report included the following information SBAR and Kardex.

## 2021-07-22 NOTE — ROUTINE PROCESS
TRANSFER - OUT REPORT:    Verbal report given to Cruz Still RN(name) on Dariela Blake  being transferred to 202-02(unit) for routine progression of care       Report consisted of patients Situation, Background, Assessment and   Recommendations(SBAR). Information from the following report(s) SBAR, ED Summary, [facility] and Recent Results was reviewed with the receiving nurse. Lines:   Peripheral IV 07/21/21 Left Forearm (Active)   Site Assessment Clean, dry, & intact 07/21/21 1448   Phlebitis Assessment 0 07/21/21 1448   Infiltration Assessment 0 07/21/21 1448   Dressing Status Clean, dry, & intact 07/21/21 1448   Dressing Type Transparent 07/21/21 1448   Hub Color/Line Status Pink 07/21/21 1448   Action Taken Blood drawn 07/21/21 1448   Alcohol Cap Used No 07/21/21 1448        Opportunity for questions and clarification was provided.       Patient transported with:   Registered Nurse

## 2021-07-22 NOTE — CONSULTS
[facility]iology [facility] Initial Visit Note     Subjective:       Madelyn Aguirre is an [de-identified] y. o.patient who is seen for management of chronic AF and CHF. She had previously been hospitalized 06/21/2021-06/28/2021 pericardial effusion with tamponade, required pericardial window.  She was cardioverted to NSR for VT during tamponade.  Post discharge, she has required thoracentesis for pleural effusion.  Only trivial pericardial effusion was noted on repeat echo on 07/06/2021. She reports that her Apple Watch has noted AF since 06/30/2021 despite continued amiodarone.  Reports fatigue, generalized weakness & SOB since discharge; minimal improvement with thoracentesis. Misty Hanson has had multiple falls per family report. NICM.  Echo on 07/06/2021 showed LVEF reduced to 37% with moderate concentric LVH & severe KRISTI.  NYHA 3-4 with presence of pleural effusion.  On appropriate GDMT.       Elevated BNP (3357) noted in ER, & CXR showed moderate left pleural effusion.  Thoracentesis is planned for today, has chest CT pending. Anemic upon prior discharge, has Hgb 10.4 today. Troponin negative, TSH elevated (7.66, had been 3.89 in 06/2021). Initially held Eliquis after pericardial effusion, but restarted on 06/30/2021. She has stopped again   She fell the day before at home        Previous:   S/p attempted AF ablation 06/21/2021, developed pericardial effusion & tamponade after transseptal puncture (thick septum).  Pericardial drain, recurrent tamponade due to clotted drain, then pericardial window.  Required 1 unit PRBCs. S/p TIM/DCCV 06/2020. Recurrence with AFIB      S/p AF ablation 05/10/2019.  PVI unable to be completed on left side due to pericardial effusion without tamponade. Heparin stopped & reversed.  Did not require drain      Persistent AF, failed DCCV & flecainide.         Cardioversion with Dr. Chiquis Purcell on 2/22/19, had follow up on 2/27/19, back in atrial fibrillation.       Mild dilated cardiomyopathy, improved on GDMT.  Couldn't tolerate meds due to low BP.       Negative stress test 5 yrs ago per her report.           Problem List     persistent atrial fibrillation ICD-10-CM: I48.91   ICD-9-CM: 427.31 6/21/2021     S/P ablation of atrial fibrillation ICD-10-CM: Z98.890, Z86.79   ICD-9-CM: V45.89 5/10/2019     * (Principal) Acute on chronic systolic (congestive) heart failure (HCC) ICD-10-CM: I50.23   ICD-9-CM: 428.23, 428.0 7/21/2021     Cardiac/pericardial tamponade ICD-10-CM: I31.4   ICD-9-CM: 423.3 6/21/2021     S/P pericardial window creation ICD-10-CM: Z98.890   ICD-9-CM: V45.89 6/21/2021     Pericardial effusion with cardiac tamponade ICD-10-CM: I31.3, I31.4   ICD-9-CM: 423.9, 423.3 5/10/2019   Overview Signed 5/10/2019  1:34 PM by Vladimir Ba MD     Loculated posterior LA and LV, no tamponade         Non-rheumatic mitral regurgitation ICD-10-CM: I34.0   ICD-9-CM: 424.0 5/10/2019   Overview Signed 5/10/2019  1:34 PM by Vladimir Ba MD     moderate         Non-rheumatic tricuspid valve insufficiency ICD-10-CM: I36.1   ICD-9-CM: 424.2 5/10/2019     Encounter for cardioversion procedure ICD-10-CM: Z01.89   ICD-9-CM: V72.85 3/28/2018   Overview Signed 3/28/2018  1:06 PM by Vladimir Ba MD     3/28/2018 200 J to NSR after a TIM without thrombus         Dizziness ICD-10-CM: R42   ICD-9-CM: 780.4 3/27/2018     Persistent atrial fibrillation ([address] ICD-10-CM: I48.19   ICD-9-CM: 427.31 3/27/2018     Cystocele ICD-10-CM: HAU1679   ICD-9-CM: SUI6346 8/1/2014     Uterine prolapse ICD-10-CM: N81.4   ICD-9-CM: 618.1 8/1/2014     HTN (hypertension) ICD-10-CM: I10   ICD-9-CM: 401.9 Unknown     Breast cancer ([facility] ICD-10-CM: C50.919   ICD-9-CM: 174.9 Unknown     Lung nodule ICD-10-CM: R91.1   ICD-9-CM: 793.11 Unknown   Overview Signed 5/14/2014 10:34 AM by MD Dr. Milvai Jason Asper: J45.909   ICD-9-CM: 493.90 Unknown     Hyperlipidemia LDL goal < 130 ICD-10-CM: E78.5   ICD-9-CM: 272.4 Unknown   Overview Signed 4/26/2013  9:17 AM by Marcelino Owens MD     for increased LDL particles, Dr. Pa Kay         Dyslipidemia ICD-10-CM: E78.5   ICD-9-CM: 272.4 Unknown   Overview Signed 12/31/2011  4:11 PM by Arabella Tinoco MD     labs 2008 - chol 283, HDL 83, ,          Palpitations ICD-10-CM: R00.2   ICD-9-CM: 785.1 Unknown     Chest pain, unspecified ICD-10-CM: R07.9   ICD-9-CM: 786.50 12/31/2011       Current Facility-Administered Medications   Medication Dose Route Frequency Provider Last Rate Last Admin   · albuterol-ipratropium (DUO-NEB) 2.5 MG-0.5 MG/3 ML 3 mL Nebulization Q6H PRN Chantelle Henning MD   · amiodarone (CORDARONE) tablet 200 mg 200 mg Oral DAILY Chantelle Henning MD   · [Held by provider] apixaban (ELIQUIS) tablet 2.5 mg 2.5 mg Oral BID Chantelle Henning MD   · atorvastatin (LIPITOR) tablet 20 mg 20 mg Oral QPM Chantelle Henning MD   · bumetanide (BUMEX) injection 1 mg 1 mg IntraVENous BID Chantelle Henning MD   · cetirizine (ZYRTEC) tablet 10 mg 10 mg Oral QPM Chantelle Henning MD   · ferrous sulfate tablet 325 mg 325 mg Oral DAILY Chantelle Henning MD   · levothyroxine (SYNTHROID) tablet 25 mcg 25 mcg Oral ACB Chantelle Henning MD   · metoprolol succinate (TOPROL-XL) XL tablet 25 mg 25 mg Oral DAILY Chantelle Henning MD   · montelukast (SINGULAIR) tablet 10 mg 10 mg Oral QPM Chantelle Henning MD   · potassium chloride SR (KLOR-CON 10) tablet 20 mEq 20 mEq Oral DAILY Chantelle Henning MD   · raloxifene (EVISTA) tablet 60 mg 60 mg Oral DAILY Chantelle Henning MD   · sacubitriL-valsartan (ENTRESTO) 24-26 mg tablet 1 Tablet 1 Tablet Oral BID Chantelle Henning MD   · spironolactone (ALDACTONE) tablet 25 mg 25 mg Oral DAILY Chantelle Henning MD   · sodium chloride (NS) flush 5-40 mL 5-40 mL IntraVENous Q8H Chantelle Henning MD   · sodium chloride (NS) flush 5-40 mL 5-40 mL IntraVENous PRN Chantelle Henning MD   · acetaminophen (TYLENOL) tablet 650 mg 650 mg Oral Q6H PRN Chantelle Henning MD   Or   · acetaminophen (TYLENOL) suppository 650 mg 650 mg Rectal Q6H PRN Chantlele Henning MD   · polyethylene glycol (MIRALAX) packet 17 g 17 g Oral DAILY PRN Chantelle Henning MD   · ondansetron (ZOFRAN ODT) tablet 4 mg 4 mg Oral Q8H PRN Chantelle Henning MD   Or   · ondansetron (ZOFRAN) injection 4 mg 4 mg IntraVENous Q6H PRN Chantelle Henning MD     Allergies   Allergen Reactions   · Betadine [Povidone-Iodine] Rash   · Iodine Rash   · Norvasc [Amlodipine] Other (comments)   Flushing/redness.      Past Medical History:   Diagnosis Date   · Arthritis   · Asthma   dr. Flor Gallego allergist   · Atrial fibrillation [facility])   · Breast cancer ([facility] 1980   right - mastectomy   · Coagulation disorder ([facility]   on eliquis   · Dyslipidemia   labs 2008 - chol 283, HDL 83, ,    · HTN (hypertension)   · Hyperlipidemia LDL goal < 130   for increased LDL particles, Dr. Roark Klinefelter   · Lung nodule   Dr. Melany Fischer   · Palpitations   resolved     Past Surgical History:   Procedure Laterality Date   · COLONOSCOPY N/A 9/10/2018   COLONOSCOPY performed by Lux Mckenna MD at [address] [place] [address]   · CT HEART W/O CONT WITH CALCIUM 1/2012   CAC score 0; calcified mediastinal lymph nodes consistent granulomatous disease   · ECHO 2D ADULT 5/5/2008   normal, LVEF 60%   · HX APPENDECTOMY   · HX HYSTERECTOMY Bilateral 03/19/2015   and uro repair   · HX KNEE REPLACEMENT Right   · HX MASTECTOMY Right   · HX TONSILLECTOMY   · HX UROLOGICAL 8/1/14   Urodynamics   · INTRACARD ECHO, THER/DX INTERVENT N/A 5/10/2019   Intracardiac Echocardiogram performed by Vladimir Ba MD at [facility]   · [place] CARDIOVERSION ELECTIVE ARRHYTHMIA EXTERNAL 3/28/2018     · [place] CHEST SURGERY PROCEDURE UNLISTED   lung nodule removed - benign   · [place] EPHYS EVL TRNSPTL TX ATRIAL FIB ISOLAT PULM VEIN N/A 5/10/2019   ABLATION A-FIB  W COMPLETE EP STUDY performed by Vladimir Ba MD at [address] [address] CATH LAB   · [place] INTRACARDIAC ELECTROPHYSIOLOGIC 3D MAPPING N/A 5/10/2019   Ep 3d Mapping performed by Trevor Roldan MD at [address], [facility] CATH LAB   · STRESS TEST CARDIOLITE 1/5/12   walked 7:31, no chest pain, normal MPI. Family History   Problem Relation Age of Onset   · Heart Failure Mother   · Stroke Father   · Other Other      endometrial cancer, niece     Social History     Tobacco Use   · Smoking status: Never Smoker   · Smokeless tobacco: Never Used   Substance Use Topics   · Alcohol use: Yes   Alcohol/week: 7.0 standard drinks   Types: 7 Standard drinks or equivalent per week   Comment: wine nightly         Review of Systems: Review of all other systems otherwise negative. Constitutional: Negative for fever, chills, weight loss, + malaise/fatigue, generalized weakness, falls. HEENT: Negative for nosebleeds, vision changes. Respiratory: Negative for cough, hemoptysis   Cardiovascular: Negative for chest pain, palpitations, claudication, leg swelling, syncope, and PND. + SOB   Gastrointestinal: Negative for nausea, vomiting, diarrhea, blood in stool and melena. Genitourinary: Negative for dysuria, and hematuria. Musculoskeletal: Negative for myalgias, arthralgia. Skin: Negative for rash. Heme: Does not bleed or bruise easily. Neurological: Negative for speech change and focal weakness.       Objective:     Visit Vitals   /75 (BP 1 Location: Left upper arm, BP Patient Position: At rest)   Pulse (!) 106   Temp 98.8 °F (37.1 °C)   Resp 18   Ht 5' 6\" (1.676 m)   Wt 180 lb (81.6 kg)   SpO2 93%   BMI 29.05 kg/m²       Physical Exam:   Constitutional: Well-developed and well-nourished. No respiratory distress. Head: Normocephalic and atraumatic. Eyes: Pupils are equal, round   ENT: Hearing normal   Neck: Supple. No JVD present. Cardiovascular: fast rate, irregular rhythm. Exam reveals no gallop and no friction rub. No murmur heard. Pulmonary/Chest: Effort normal.  Crackles in bilateral bases.    Abdominal: Soft, + obese   Musculoskeletal: Moves extremities independently.  Normal gait. Vasc/lymphatic: No edema. Neurological: Alert,oriented. Skin: Skin is warm and dry. Psychiatric: Normal mood and affect. Behavior is normal. Judgment and thought content normal.         Assessment/Plan:     Imaging/Studies:   CXR (07/21/2021): Mod left pleural effusion. Limited echo (07/06/2021): LVEF 37%, mod concentric LVH.  Mildly dilated RV, mildly reduced RVEF.  Severe KRISTI.  Trivial posterior loculated pericardial effusion.  Large left pleural effusion. Limited echo (06/22/2021): Small posterior pericardial effusion noted.       TIM (06/19/2020): LVEF 45-50%.  Mildly dilated LA.  Mild to mod MR.  Mild to mod TR.       Limited echo (10/24/2019): LVEF 56-60%, mild concentric LVH, mildly dilated LV, no RWMA, grade 2 diastolic dysfunction.  Mildly dilated RV.  Mildly dilated RA.  Mod dilated LA.  Mild to mod MR.  Mild AR & mild aortic valve sclerosis without significant stenosis.  Mild to mod PH.       Echo (05/11/2019): LVEF 51-55%, no RWMA.  Mod dilated LA, mod dilated RA.  Small to mod anterior pericardial effusion adjacent to LV & RA.       TIM (05/10/2019): LVEF 56-60%, no RWMA.  RV mod dilated.  LA mod dilated.  RA mod dilated.  Mod MR.  Mod TR.  Mod posterior loculated pericardial effusion adjacent to LV & LA measuring 15 mm. Left Pleural effusion: Already s/p thoracentesis on 07/09/2021, not sanguinous at that time.  Now with recurrent moderate left pleural effusion, planning for thoracentesis by Dr. Samina Spencer suspects due to Margarito Lin will consider Pleur-X catheter if she redevelops pleural effusion post AV node ablation & biventricular pacemaker. NICM: LVEF 37% on 07/06/2021 despite GDMT.  TIM in 06/2021 showed LVEF 25-30%, NYHA II-III chronic systolic CHF.  Elevated NT pro-BNP.  Undergoing diuresis.      Pericardial effusion: With cardiac tamponade in 06/2021, occurred post transseptal puncture during EPS/AF ablation procedure.  Trivial to small after pericardiocentesis, subsequent pericardial window.  Noted trivial on last imaging.  Hgb improved to 10.5. Persistent AF: Cardioverted during VT episode with cardiac tamponade in 06/2021.  Now back in AF since 06/30/2021    Echo on 07/06/2021 showed LVEF reduced to 37% with moderate concentric LVH & severe KRISTI, NYHA class 3-4  Amiodarone likely causing hypothyroidism, isn't effective, will discontinue  Continue to use toprol    Planning AV node ablation & biventricular pacemaker in 09/2021. I spoke to Lino haynes in EP lab and he will work on scheduling for tomorrow pm      Thank you for involving me in this patient's care and please call with further concerns or questions. Rudy Huertas M.D.    Electrophysiology/Cardiology   [facility] [place]   [address], [facility] [address]                     [id number] [facility], [address] [place] [address]   [phone]

## 2021-07-22 NOTE — NURSE NAVIGATOR
Chart reviewed by Heart Failure Nurse Navigator. Heart Failure database completed. EF:  37%     ACEi/ARB/ARNi: Entresto 24/26mg Q 12 hr    BB: Toprol XL 25 mg QD    Aldosterone Antagonist: Aldactone 25 mg QD    Obstructive Sleep Apnea Screening: screening priority 2   STOP-BANG score:   Referred to Sleep Medicine:     CRT ICD implant schedule 9/21. NYHA Functional Class III/IV on admission. Heart Failure Teach Back in Patient Education. Heart Failure Avoiding Triggers on Discharge Instructions. Cardiologist: Dr Steffanie White ([facility])    [address] discharge follow up phone call to be made within 48-72 hours of discharge.       READMISSION  Previous admission dates 6/21/21-6/28/21  Final code; longstanding atrial fib  Spoke with Dr Josias Hand office nurse 6/30/21  Followup with Dr. Malinda Gonzalez 6/30/21  Followup with Dr. Steffanie White 7/6/21  Followup with Dr. Wooten 7/13/21  Readmitted with c/o worsening shortness of breath and increased fatigue, recurrent pleural effusion

## 2021-07-22 NOTE — PROGRESS NOTES
TRANSFER - IN REPORT:    Verbal report received from Chet(name) on Judit Alonso  being received from ED(unit) for routine progression of care      Report consisted of patients Situation, Background, Assessment and   Recommendations(SBAR). Information from the following report(s) SBAR and Kardex was reviewed with the receiving nurse. Opportunity for questions and clarification was provided. Assessment completed upon patients arrival to unit and care assumed.

## 2021-07-22 NOTE — PROGRESS NOTES
Problem: Mobility Impaired (Adult and Pediatric)  Goal: *Acute Goals and Plan of Care (Insert Text)  Description: FUNCTIONAL STATUS PRIOR TO ADMISSION: Prior to one month ago, patient was independent and active without an assistive device, walked 45 mins in the neighborhood for exercise. Just prior to admission, she started relying on a rolling walker, had a fall two days ago. HOME SUPPORT PRIOR TO ADMISSION: The patient lived alone with family available to provide assistance if needed. She required no assistance prior to one month ago. Physical Therapy Goals  Initiated 7/22/2021  1. Patient will perform sit to stand with modified independence within 7 day(s). 2.  Patient will ambulate with independence for 300 feet with the least restrictive device within 7 day(s). 3.  Patient will ascend/descend 1 stairs with handrail(s) with modified independence within 7 day(s). Outcome: Not Met   PHYSICAL THERAPY EVALUATION  Patient: Tarun Tyler (88 y.o. female)  Date: 7/22/2021  Primary Diagnosis: Acute on chronic systolic (congestive) heart failure (HCC) [I50.23]        Precautions:  Fall    ASSESSMENT  Based on the objective data described below, the patient presents with impaired balance, decreased endurance and activity tolerance, generalized weakness, and pain limiting functional mobility. Prior to a month ago, patient was independent and walking 45 mins in the community on unlevel terrain. She endorses a decline in her strength and balance over the past two weeks causing her to rely on a rolling walker for ambulation and is no longer able to walk 45 mins. She had a fall in the home two days ago (slipped, wearing compression stockings at the time). On assessment today, she is able to transition positions and ambulate 80 ft with CGA. Gait w/o a device is unsteady with pt reaching for objects to steady herself and feeling SOB after.   Will place a RW in the room for patient to use with RN assist to the bathroom. Currently recommending HHPT. Pt reports having a good support system of family and friends to assist her in the home if needed. Current Level of Function Impacting Discharge (mobility/balance): CGA, ambulation limited to short distances, fall risk    Functional Outcome Measure: The patient scored 13/28 on the Tinetti outcome measure which is indicative of high fall risk. Other factors to consider for discharge: PLOF indep w/o device     Patient will benefit from skilled therapy intervention to address the above noted impairments. PLAN :  Recommendations and Planned Interventions: transfer training, gait training, therapeutic exercises, patient and family training/education and therapeutic activities      Frequency/Duration: Patient will be followed by physical therapy:  5 times a week to address goals. Recommendation for discharge: (in order for the patient to meet his/her long term goals)  Physical therapy at least 2 days/week in the home     This discharge recommendation:  Has not yet been discussed the attending provider and/or case management    IF patient discharges home will need the following DME: patient owns DME required for discharge         SUBJECTIVE:   Patient stated I haven't been walking outside.     OBJECTIVE DATA SUMMARY:   HISTORY:    Past Medical History:   Diagnosis Date    Arthritis     Asthma     dr. Priscilla Black allergist    [facility] [address]     Breast cancer ([facility] [address] 1980    right - mastectomy    Coagulation disorder ([facility] [address]     on eliquis    Dyslipidemia     labs 2008 - chol 283, HDL 83, ,     HTN (hypertension)     Hyperlipidemia LDL goal < 130     for increased LDL particles, Dr. Lopez Listen nodule     Dr. Alden Schaffer Palpitations     resolved     Past Surgical History:   Procedure Laterality Date    COLONOSCOPY N/A 9/10/2018    COLONOSCOPY performed by Royal Za MD at [facility] 149 W/O CONT WITH CALCIUM 1/2012    CAC score 0; calcified mediastinal lymph nodes consistent granulomatous disease    ECHO 2D ADULT  5/5/2008    normal, LVEF 60%    HX APPENDECTOMY      HX HYSTERECTOMY Bilateral 03/19/2015    and uro repair    HX KNEE REPLACEMENT Right     HX MASTECTOMY Right     HX TONSILLECTOMY      HX UROLOGICAL  8/1/14    Urodynamics    INTRACARD ECHO, THER/DX INTERVENT N/A 5/10/2019    Intracardiac Echocardiogram performed by Brianne Shields MD at [address]  3/28/2018         MI CHEST SURGERY PROCEDURE UNLISTED      lung nodule removed - benign    MI EPHYS EVL TRNSPTL TX ATRIAL FIB ISOLAT PULM VEIN N/A 5/10/2019    ABLATION A-FIB  W COMPLETE EP STUDY performed by Brianne Shields MD at [facility] CATH LAB    MI INTRACARDIAC ELECTROPHYSIOLOGIC 3D [address] [facility] N/A 5/10/2019    Ep 3d Mapping performed by Brianne Shields MD at [facility] CATH LAB    STRESS TEST [address] [facility]  1/5/12    walked 7:31, no chest pain, normal MPI. Personal factors and/or comorbidities impacting plan of care: PMH    Home Situation  Home Environment: Private residence  # Steps to Enter: 1  Rails to Enter: No (relies on door jam)  One/Two Story Residence: One story  Living Alone: Yes  Support Systems: Family member(s)  Patient Expects to be Discharged to[de-identified] House  Current DME Used/Available at Home: Commode, bedside, Grab bars, Shower chair, Walker, rolling  Tub or Shower Type: Shower    EXAMINATION/PRESENTATION/DECISION MAKING:   Critical Behavior:  Neurologic State: Alert  Orientation Level: Oriented to person, Oriented to place, Oriented to situation  Cognition: Appropriate decision making, Appropriate for age attention/concentration, Appropriate safety awareness, Follows commands  Safety/Judgement: Awareness of environment  Hearing:   Auditory  Auditory Impairment: None  Skin:  Exposed areas grossly intact  Edema: none noted  Range Of Motion:  AROM: Within functional limits Strength:    Strength: Generally decreased, functional                    Tone & Sensation:   Tone: Normal                              Coordination:  Coordination: Within functional limits  Vision:   Glasses  Functional Mobility:  Bed Mobility:  Supine to Sit: Supervision;Bed Modified  Sit to Supine: Supervision;Bed Modified     Transfers:  Sit to Stand: Contact guard assistance;Assist x1  Stand to Sit: Contact guard assistance;Assist x1  Balance:   Sitting: Intact; Without support  Standing: Impaired; Without support  Standing - Static: Fair  Standing - Dynamic : Fair;Occasional  Ambulation/Gait Training:  Distance (ft): 80 Feet (ft)  Assistive Device: Gait belt (intermittent use of wall banister)  Ambulation - Level of Assistance: Contact guard assistance;Assist x1;Additional time  Gait Description (WDL): Exceptions to WDL  Gait Abnormalities: Decreased step clearance; Altered arm swing; Step to gait; Other  Base of Support: Widened  Speed/Milli: Slow  Step Length: Right shortened;Left shortened  Swing Pattern: Right asymmetrical    Therapeutic Activity/ Education:   Fall prevention - shoes/  socks vs socks when ambulating, RW with RN assist, benefit of HHPT    Functional Measure:  Tinetti test:    Sitting Balance: 1  Arises: 1  Attempts to Rise: 2  Immediate Standing Balance: 1  Standing Balance: 1  Nudged: 1  Eyes Closed: 0 (inferred)  Turn 360 Degrees - Continuous/Discontinuous: 0  Turn 360 Degrees - Steady/Unsteady: 0  Sitting Down: 1  Balance Score: 8 Balance total score  Indication of Gait: 0  R Step Length/Height: 1  L Step Length/Height: 0  R Foot Clearance: 1  L Foot Clearance: 1  Step Symmetry: 0  Step Continuity: 0  Path: 1  Trunk: 1  Walking Time: 0  Gait Score: 5 Gait total score  Total Score: 13/28 Overall total score         Tinetti Tool Score Risk of Falls  <19 = High Fall Risk  19-24 = Moderate Fall Risk  25-28 = Low Fall Risk  Dahlia ME.  Performance-Oriented Assessment of Mobility Problems in Elderly Patients. [facility] 66; [id number]. (Scoring Description: PT Bulletin Feb. 10, 1993)    Older adults: Nithya Newsome et al, 2009; n = 1000 [facility] elderly evaluated with ABC, DESTINEY, ADL, and IADL)  · Mean DESTINEY score for males aged 69-68 years = 26.21(3.40)  · Mean DESTINEY score for females age 69-68 years = 25.16(4.30)  · Mean DESTINEY score for males over 80 years = 23.29(6.02)  · Mean DESTINEY score for females over 80 years = 17.20(8.32)            Physical Therapy Evaluation Charge Determination   History Examination Presentation Decision-Making   MEDIUM  Complexity : 1-2 comorbidities / personal factors will impact the outcome/ POC  MEDIUM Complexity : 3 Standardized tests and measures addressing body structure, function, activity limitation and / or participation in recreation  MEDIUM Complexity : Evolving with changing characteristics  Other outcome measures Tinetti 13/28  HIGH       Based on the above components, the patient evaluation is determined to be of the following complexity level: MEDIUM    Pain Rating:  Left lateral trunk s/p paracentesis    Activity Tolerance:   Fair, requires rest breaks, and mildly sob    After treatment patient left in no apparent distress:   Supine in bed, Call bell within reach, Caregiver / family present and Side rails x 3    COMMUNICATION/EDUCATION:   The patients plan of care was discussed with: Registered nurse. Fall prevention education was provided and the patient/caregiver indicated understanding., Patient/family have participated as able in goal setting and plan of care. and Patient/family agree to work toward stated goals and plan of care.     Thank you for this referral.  Brea Son, PT   Time Calculation: 21 mins

## 2021-07-22 NOTE — H&P
[address] [facility]  HISTORY AND PHYSICAL    Name:  Jaimie Justice  MR#:  [id number]  :  1940  ACCOUNT #:  [de-identified]  ADMIT DATE:  2021      The patient was seen, evaluated, and admitted by me on 2021. PRIMARY CARE PHYSICIAN:  Dr. Jessee Sprague. SOURCE OF INFORMATION:  The patient and review of ED and old electronic medical records. CHIEF COMPLAINT:  Shortness of breath. HISTORY OF PRESENT ILLNESS:  This is an 80-year-old woman with a past medical history significant for chronic systolic congestive heart failure; nonischemic cardiomyopathy; chronic atrial fibrillation, on Eliquis for anticoagulation; dyslipidemia; hypertension; hypothyroidism, was in her usual state of health until about 2 weeks ago when the patient developed shortness of breath which is progressive and getting worse. The shortness of breath is associated with fatigue. The shortness of breath is worse when the patient is lying down and also with activity such as walking. The patient stated that as a result of the shortness of breath she fell at home last night. The patient was brought to the emergency room for further evaluation. She recently underwent thoracentesis in which significant amount of fluid was drained from the chest.  The patient is under the care of Cardiology, is awaiting AICD placement in September for nonischemic cardiomyopathy. When the patient arrived at the emergency room, the patient was found to have elevated BNP level. Chest x-ray shows moderate left pleural effusion. The patient was referred to the hospitalist service for evaluation for admission. She was last admitted to the hospital from 2021 to 2021. The patient was admitted with pericardial effusion with cardiac tamponade. She underwent pericardiocentesis and window was made. The patient denies fever, rigors, or chills.     PAST MEDICAL HISTORY:  Chronic systolic congestive heart failure; nonischemic cardiomyopathy; chronic atrial fibrillation, on Eliquis for anticoagulation; dyslipidemia; hypertension; hypothyroidism. ALLERGIES:  THE PATIENT IS ALLERGIC TO BETADINE, IODINE, AND NORVASC. MEDICATIONS:  Tylenol Extra Strength 500 mg daily as needed at bedtime, albuterol 90 mcg 1 puff by inhalation every 4 hours as needed, amiodarone 200 mg daily, Eliquis 2.5 mg twice daily, Lipitor 20 mg daily, Bumex 1 mg daily, Zyrtec 10 mg daily in the evening, Benadryl 25 mg every 6 hours as needed, ferrous sulfate 325 mg daily, Flovent 220 mcg inhalation as needed, Synthroid 25 mcg daily, Toprol XL 25 mg daily, Singulair 10 mg daily, potassium chloride 20 mEq daily, Evista 60 mg daily, Entresto 1 tablet twice daily, Aldactone 25 mg daily. FAMILY HISTORY:  This was reviewed. Her mother had congestive heart failure. Father had stroke. PAST SURGICAL HISTORY:  This is significant for right mastectomy secondary to right breast cancer, right knee replacement, hysterectomy, appendectomy. SOCIAL HISTORY:  No history of tobacco abuse. The patient admits to social consumption of alcohol. REVIEW OF SYSTEMS:  HEAD, EYES, EARS, NOSE AND THROAT:  No headache, no dizziness, no blurring of vision, no photophobia. RESPIRATORY SYSTEM:  This is positive for shortness of breath, no cough, no hemoptysis. CARDIOVASCULAR SYSTEM:  This is positive for orthopnea. No chest pain, no palpitation. GASTROINTESTINAL SYSTEM:  No nausea or vomiting. No diarrhea. No constipation. GENITOURINARY SYSTEM:  No dysuria, no urgency, and no frequency. All other systems are reviewed and they are negative. PHYSICAL EXAMINATION:  GENERAL APPEARANCE:  The patient appeared ill, in moderate distress. VITAL SIGNS:  On arrival at the emergency room, temperature 98.5, pulse 105, respiratory rate 18, blood pressure 105/64, oxygen saturation 96% on room air. HEENT:  Head:  Normocephalic, atraumatic. Eyes:  Normal eye movement.   No redness, no drainage, no discharge. Ears:  Normal external ears with no evidence of drainage. Nose:  No deformity and no drainage. Mouth and Throat:  No visible oral lesion. NECK:  Neck is supple. Mild JVD. No thyromegaly. CHEST:  Bilateral basilar crackles and few expiratory wheezing. HEART:  Normal S1 and S2. Irregularly irregular. No clinically appreciable murmur. ABDOMEN:  Soft and nontender. Normal bowel sounds. CNS:  Alert and oriented x3. No gross focal neurological deficit. EXTREMITIES:  No edema. Pulses 2+ bilaterally. MUSCULOSKELETAL SYSTEM:  No evidence of joint deformity and swelling. SKIN:  No active skin lesions seen in the exposed part of the body. PSYCHIATRY:  Normal mood and affect. LYMPHATIC SYSTEM:  No cervical lymphadenopathy. DIAGNOSTIC DATA:  EKG shows atrial fibrillation and nonspecific ST and T-waves abnormalities. CT scan of the head without contrast negative. Chest x-ray shows moderate left pleural effusion. LABORATORY DATA:  Hematology:  WBC 14.6, hemoglobin 11.5, hematocrit 35.1, platelets 980. Pro-BNP level 3357. Chemistry:  Sodium 138, potassium 4.4, chloride 104, CO2 of 28, glucose 159, BUN 17, creatinine 0.94, calcium 8.6, total bilirubin 0.6, ALT 36, AST 38, alkaline phosphatase 83, total protein at 6.2, albumin level 2.9, globulin at 3.3. Cardiac Profile:  Troponin less than 0.05.    ASSESSMENT:  1. Acute-on-chronic systolic congestive heart failure. 2.  Nonischemic cardiomyopathy. 3.  Persistent atrial fibrillation. 4.  Left pleural effusion. 5.  Dyslipidemia. 6.  Hypertension. 7.  Hypothyroidism. 8.  Fall. 9.  Hyperglycemia. 10.  Leukocytosis. PLAN:  1. Acute-on-chronic systolic congestive heart failure. We will admit the patient for further evaluation and treatment. This is the most likely cause of the patient's shortness of breath. We will continue with diuretic.   We will check serial cardiac markers to rule out acute myocardial infarction as a possible cause of the acute-on-chronic systolic congestive heart failure. The patient is on Eliquis for anticoagulation for atrial fibrillation. Because of that, we will not evaluate the patient for pulmonary embolism as a possible cause of the patient's shortness of breath. The patient's Cardiology group will be consulted to assist in further evaluation and treatment. 2.  Nonischemic cardiomyopathy. The patient is awaiting AICD placement in September. The patient's Cardiology group will be consulted. 3.  Persistent atrial fibrillation. We will continue with preadmission medication including Eliquis for anticoagulation. 4.  Left pleural effusion. We will obtain CT scan of the chest for further evaluation of the pleural effusion. Thoracic Surgery consult will be requested to assist in further evaluation and treatment. 5.  Dyslipidemia. We will continue with preadmission medication. 6.  Hypertension. We will resume preadmission medication and monitor the patient's blood pressure closely. 7.  Hypothyroidism. We will continue with Synthroid. We will check TSH level. 8.  Fall. We will obtain CT scan of the cervical spine and CT scan of the abdomen and pelvis to evaluate the patient for fracture and intra-abdominal injury. 9.  Hyperglycemia. The patient has no history of diabetes. We will check hemoglobin A1c level. 10.  Leukocytosis. The patient is afebrile, not toxic. We will obtain urinalysis. We will await the result of CT scan of the chest to evaluate the patient for pneumonia. OTHER ISSUES:  Code Status: The patient is a full code. The patient is on Eliquis. Because of that, there is no need for DVT prophylaxis. FUNCTIONAL STATUS PRIOR TO ADMISSION:  The patient came from home. The patient is ambulatory with a walker. COVID PRECAUTION:  The patient was wearing a facemask. I was wearing a facemask and gloves for this patient's encounter.         Demetrio Garcia, MD [email or web address]  D:  07/22/2021 6:20  T:  07/22/2021 7:33  JOB #:  [id number]  CC:  Dr. Lucie Trinidad

## 2021-07-22 NOTE — PROGRESS NOTES
TRANSFER - OUT REPORT:    Verbal report given to Stephenie Lynn RN on Dariela Blake  being transferred to 202 bed 2 for routine progression of care       Report consisted of patients Situation, Background, Assessment and   Recommendations(SBAR). Information from the following report(s) Procedure Summary was reviewed with the receiving nurse. Lines:   Peripheral IV 07/21/21 Left Forearm (Active)   Site Assessment Clean, dry, & intact 07/22/21 0000   Phlebitis Assessment 0 07/22/21 0000   Infiltration Assessment 0 07/22/21 0000   Dressing Status Clean, dry, & intact 07/22/21 0000   Dressing Type Transparent 07/22/21 0000   Hub Color/Line Status Pink 07/22/21 0000   Action Taken Blood drawn 07/21/21 1448   Alcohol Cap Used No 07/21/21 1448      Monitor left mid posterior thoracentesis site (band aid) for any bleeding or oozing. Portable chest xray to be done at bedside in room. Opportunity for questions and clarification was provided.

## 2021-07-22 NOTE — CONSULTS
Asked to see Mrs. Ayaan Browne re: pleural effusion    Referred by Dr. Sascha Madsen     Mrs. Ayaan Browne is a pleasant [de-identified] y/o lady with a past medical history significant for AFib, heart failure and non ischemic cardiomyopathy. She had a thoracentesis approx 1 month ago for a new left pleural effusion. She is now readmitted with dyspnea and a recurrent effusion. She reports increased fatigue. Allergies   Allergen Reactions    Betadine [Povidone-Iodine] Rash    Iodine Rash    Norvasc [Amlodipine] Other (comments)     Flushing/redness. PMHx: chronic systolic congestive heart failure, afib, HTN, hypothyroidism, breast Ca    PSHx: R mastectomy, hysterectomy, R TKA, appendectomy    SocHx: non smoker    Family History   Problem Relation Age of Onset    Heart Failure Mother     Stroke Father     Other Other         endometrial cancer, niece           ROS:    Constitutional- easily fatigued  HEENT- denies dysphagia  Neuro- denies syncope  Optho- no recent changes in vision  Resp- dyspnea  CV- denies angina or palpitations  GI- denies abd pain  - no complaints  ID- denies recent fevers  Vasc- denies claudication    Blood pressure 114/63, pulse 88, temperature 98.7 °F (37.1 °C), resp. rate 18, height 5' 6\" (1.676 m), weight 180 lb (81.6 kg), SpO2 93 %.     On exam she is seated upright  Alert and oriented  Appears her stated age  Well developed  Not tachypneic  Normal speech, normal affect  No goiter  Diminished breath sounds left side  irreg rate, rhythm, no murmurs  Radial pulses palp b/l  abd sntnd, no organomegaly  LE non edematous    ==============================    Recent Results (from the past 24 hour(s))   EKG, 12 LEAD, INITIAL    Collection Time: 07/21/21  2:32 PM   Result Value Ref Range    Ventricular Rate 97 BPM    Atrial Rate 102 BPM    QRS Duration 108 ms    Q-T Interval 378 ms    QTC Calculation (Bezet) 480 ms    Calculated R Axis 75 degrees    Calculated T Axis -161 degrees    Diagnosis Atrial fibrillation  Anterior infarct (cited on or before 21-JUL-2021)  ST & T wave abnormality, consider inferolateral ischemia  When compared with ECG of 19-JUN-2020 11:59,  Atrial fibrillation has replaced Sinus rhythm  Vent. rate has increased BY  46 BPM  Serial changes of Anterior infarct present  Confirmed by Adelaida Acevedo MD ([id number]) on 7/21/2021 9:46:09 PM     CBC WITH AUTOMATED DIFF    Collection Time: 07/21/21  2:34 PM   Result Value Ref Range    WBC 14.6 (H) 3.6 - 11.0 K/uL    RBC 3.56 (L) 3.80 - 5.20 M/uL    HGB 11.5 11.5 - 16.0 g/dL    HCT 35.1 35.0 - 47.0 %    MCV 98.6 80.0 - 99.0 FL    MCH 32.3 26.0 - 34.0 PG    MCHC 32.8 30.0 - 36.5 g/dL    RDW 15.9 (H) 11.5 - 14.5 %    PLATELET 004 351 - 984 K/uL    MPV 12.3 8.9 - 12.9 FL    NRBC 0.0 0  WBC    ABSOLUTE NRBC 0.00 0.00 - 0.01 K/uL    NEUTROPHILS 79 (H) 32 - 75 %    LYMPHOCYTES 13 12 - 49 %    MONOCYTES 7 5 - 13 %    EOSINOPHILS 0 0 - 7 %    BASOPHILS 0 0 - 1 %    IMMATURE GRANULOCYTES 1 (H) 0.0 - 0.5 %    ABS. NEUTROPHILS 11.6 (H) 1.8 - 8.0 K/UL    ABS. LYMPHOCYTES 1.9 0.8 - 3.5 K/UL    ABS. MONOCYTES 1.0 0.0 - 1.0 K/UL    ABS. EOSINOPHILS 0.0 0.0 - 0.4 K/UL    ABS. BASOPHILS 0.1 0.0 - 0.1 K/UL    ABS. IMM. GRANS. 0.1 (H) 0.00 - 0.04 K/UL    DF AUTOMATED     METABOLIC PANEL, COMPREHENSIVE    Collection Time: 07/21/21  2:34 PM   Result Value Ref Range    Sodium 138 136 - 145 mmol/L    Potassium 4.4 3.5 - 5.1 mmol/L    Chloride 104 97 - 108 mmol/L    CO2 28 21 - 32 mmol/L    Anion gap 6 5 - 15 mmol/L    Glucose 159 (H) 65 - 100 mg/dL    BUN 17 6 - 20 MG/DL    Creatinine 0.94 0.55 - 1.02 MG/DL    BUN/Creatinine ratio 18 12 - 20      GFR est AA >60 >60 ml/min/1.73m2    GFR est non-AA 57 (L) >60 ml/min/1.73m2    Calcium 8.6 8.5 - 10.1 MG/DL    Bilirubin, total 0.6 0.2 - 1.0 MG/DL    ALT (SGPT) 36 12 - 78 U/L    AST (SGOT) 38 (H) 15 - 37 U/L    Alk.  phosphatase 83 45 - 117 U/L    Protein, total 6.2 (L) 6.4 - 8.2 g/dL    Albumin 2.9 (L) 3.5 - 5.0 g/dL    Globulin 3.3 2.0 - 4.0 g/dL    A-G Ratio 0.9 (L) 1.1 - 2.2     SAMPLES BEING HELD    Collection Time: 07/21/21  2:34 PM   Result Value Ref Range    SAMPLES BEING HELD 1RED     COMMENT        Add-on orders for these samples will be processed based on acceptable specimen integrity and analyte stability, which may vary by analyte. NT-PRO BNP    Collection Time: 07/21/21  2:34 PM   Result Value Ref Range    NT pro-BNP 3,357 (H) <450 PG/ML   TROPONIN I    Collection Time: 07/21/21  2:34 PM   Result Value Ref Range    Troponin-I, Qt. <0.05 <0.05 ng/mL   MAGNESIUM    Collection Time: 07/21/21  2:34 PM   Result Value Ref Range    Magnesium 2.6 (H) 1.6 - 2.4 mg/dL   METABOLIC PANEL, COMPREHENSIVE    Collection Time: 07/22/21  5:01 AM   Result Value Ref Range    Sodium 137 136 - 145 mmol/L    Potassium 3.8 3.5 - 5.1 mmol/L    Chloride 105 97 - 108 mmol/L    CO2 27 21 - 32 mmol/L    Anion gap 5 5 - 15 mmol/L    Glucose 120 (H) 65 - 100 mg/dL    BUN 17 6 - 20 MG/DL    Creatinine 0.72 0.55 - 1.02 MG/DL    BUN/Creatinine ratio 24 (H) 12 - 20      GFR est AA >60 >60 ml/min/1.73m2    GFR est non-AA >60 >60 ml/min/1.73m2    Calcium 8.4 (L) 8.5 - 10.1 MG/DL    Bilirubin, total 0.7 0.2 - 1.0 MG/DL    ALT (SGPT) 31 12 - 78 U/L    AST (SGOT) 21 15 - 37 U/L    Alk.  phosphatase 76 45 - 117 U/L    Protein, total 5.4 (L) 6.4 - 8.2 g/dL    Albumin 2.6 (L) 3.5 - 5.0 g/dL    Globulin 2.8 2.0 - 4.0 g/dL    A-G Ratio 0.9 (L) 1.1 - 2.2     CBC WITH AUTOMATED DIFF    Collection Time: 07/22/21  5:01 AM   Result Value Ref Range    WBC 11.4 (H) 3.6 - 11.0 K/uL    RBC 3.29 (L) 3.80 - 5.20 M/uL    HGB 10.4 (L) 11.5 - 16.0 g/dL    HCT 32.6 (L) 35.0 - 47.0 %    MCV 99.1 (H) 80.0 - 99.0 FL    MCH 31.6 26.0 - 34.0 PG    MCHC 31.9 30.0 - 36.5 g/dL    RDW 15.6 (H) 11.5 - 14.5 %    PLATELET 495 895 - 737 K/uL    MPV 12.3 8.9 - 12.9 FL    NRBC 0.0 0  WBC    ABSOLUTE NRBC 0.00 0.00 - 0.01 K/uL    NEUTROPHILS 72 32 - 75 %    LYMPHOCYTES 17 12 - 49 %    MONOCYTES 8 5 - 13 %    EOSINOPHILS 1 0 - 7 %    BASOPHILS 1 0 - 1 %    IMMATURE GRANULOCYTES 1 (H) 0.0 - 0.5 %    ABS. NEUTROPHILS 8.4 (H) 1.8 - 8.0 K/UL    ABS. LYMPHOCYTES 1.9 0.8 - 3.5 K/UL    ABS. MONOCYTES 0.9 0.0 - 1.0 K/UL    ABS. EOSINOPHILS 0.1 0.0 - 0.4 K/UL    ABS. BASOPHILS 0.1 0.0 - 0.1 K/UL    ABS. IMM. GRANS. 0.1 (H) 0.00 - 0.04 K/UL    DF AUTOMATED     TSH 3RD GENERATION    Collection Time: 07/22/21  5:01 AM   Result Value Ref Range    TSH 7.66 (H) 0.36 - 3.74 uIU/mL   MAGNESIUM    Collection Time: 07/22/21  5:01 AM   Result Value Ref Range    Magnesium 2.4 1.6 - 2.4 mg/dL   PHOSPHORUS    Collection Time: 07/22/21  5:01 AM   Result Value Ref Range    Phosphorus 2.8 2.6 - 4.7 MG/DL   TROPONIN I    Collection Time: 07/22/21  5:01 AM   Result Value Ref Range    Troponin-I, Qt. <0.05 <0.05 ng/mL   HEMOGLOBIN A1C WITH EAG    Collection Time: 07/22/21  5:01 AM   Result Value Ref Range    Hemoglobin A1c 5.7 (H) 4.0 - 5.6 %    Est. average glucose 117 mg/dL       ==============================    I have personally reviewed her CXR, moderate left pleural effusion and cardiomegaly    ==============================    PFTs- none    ==============================    Diagnoses  1: recurrent left pleural effusion  2: chronic systolic congestive heart failure  3: Afib    =============================    Mrs. Jeet Daniels has a recurrent effusion. Fluid studies were not sent with the first thoracentesis. I suspect a transudative effusion [low suspicion for malignant efffusion given h/o breast cancer]. I spoke with Dr. Edelmira Montilla who is moving up her BiV pacer implantation and ablation to tomorrow. I recommend a thoracentesis by IR today. I will order fluid studies and cytology. I woul dlike to see those results and see what she does once her BiV is in place. If she recurs again we will consider a Pleur-X. Thank you for allowing us to care for Mrs. Slime Scanlon

## 2021-07-22 NOTE — PROGRESS NOTES
ALRFED:  1. RUR-26%  2. Admitted from with family support nearby. 3. Thoracic sx following. 4. PT/OT consults- patient is open to home health if needed. Care Management Interventions  PCP Verified by CM: Yes  Palliative Care Criteria Met (RRAT>21 & CHF Dx)?: No  Mode of Transport at Discharge:  Other (see comment) (Family transport)  [address] [place]: Yes  Discharge Durable Medical Equipment: No  Health Maintenance Reviewed: Yes  Physical Therapy Consult: Yes  Occupational Therapy Consult: Yes  Speech Therapy Consult: No  Current Support Network: Lives Alone, Family Lives Nearby  Confirm Follow Up Transport: Family  The Patient and/or Patient Representative was Provided with a Choice of Provider and Agrees with the Discharge Plan?: Yes  [employer] Information Provided?: No  Discharge Location  Discharge Placement: Unable to determine at this time      Readmission Assessment  Number of days since last admission?: 8-30 days  Previous disposition: Home with Family  Who is being interviewed?: Patient  What was the patient's/caregiver's perception as to why they think they needed to return back to the hospital?: Other (Comment) (symptoms still present)  Did you visit your Primary Care Physician after you left the hospital, before you returned this time?: Yes  Did you see a specialist, such as Cardiac, Pulmonary, Orthopedic Physician, etc. after you left the hospital?: Yes  Who advised the patient to return to the hospital?: Physician, Self-referral  Does the patient report anything that got in the way of taking their medications?: No  In our efforts to provide the best possible care to you and others like you, can you think of anything that we could have done to help you after you left the hospital the first time, so that you might not have needed to return so soon?: Other (Comment)    Reason for Admission:   Acute CHF                    RUR Score:                  PCP: First and Last name:   Eleuterio Vasquez MD     Name of Practice: [facility]   Are you a current patient: Yes/No:  Yes    Approximate date of last visit: 6/30/21   Can you participate in a virtual visit if needed:  No     Do you (patient/family) have any concerns for transition/discharge? Patient is open to home health if needed. Plan for utilizing home health:   TBD, pt/ot evals pending. Current Advanced Directive/Advance Care Plan:  Full Code      Healthcare Decision Maker:   Click here to complete HealthCare Decision Makers including selection of the Healthcare Decision Maker Relationship (ie \"Primary\")              Transition of Care Plan:        Reviewed chart for transitions of care, and discussed in rounds. CM met with patient at bedside to explain role and offer support. Patient is alert and oriented x4,and confirmed demographics. Her daughter, Britt Zapata was also present at bedside. Prior to admission, she was feeling weak but was able to provide self-care no DME use, lives alone but her family is local. She is agreeable to home health if recommended. CM continue to follow for discharge needs.       [facility]

## 2021-07-22 NOTE — PROGRESS NOTES
Problem: Falls - Risk of  Goal: *Absence of Falls  Description: Document [place] Fall Risk and appropriate interventions in the flowsheet.   Outcome: Progressing Towards Goal  Note: Fall Risk Interventions:  Mobility Interventions: Communicate number of staff needed for ambulation/transfer              Elimination Interventions: Call light in reach

## 2021-07-23 ENCOUNTER — HOME HEALTH ADMISSION (OUTPATIENT)
Dept: HOME HEALTH SERVICES | Facility: HOME HEALTH | Age: 81
End: 2021-07-23
Payer: MEDICARE

## 2021-07-23 ENCOUNTER — APPOINTMENT (OUTPATIENT)
Dept: GENERAL RADIOLOGY | Age: 81
DRG: 243 | End: 2021-07-23
Attending: INTERNAL MEDICINE
Payer: MEDICARE

## 2021-07-23 ENCOUNTER — ANESTHESIA (OUTPATIENT)
Dept: CARDIAC CATH/INVASIVE PROCEDURES | Age: 81
DRG: 243 | End: 2021-07-23
Payer: MEDICARE

## 2021-07-23 ENCOUNTER — APPOINTMENT (OUTPATIENT)
Dept: GENERAL RADIOLOGY | Age: 81
DRG: 243 | End: 2021-07-23
Attending: NURSE PRACTITIONER
Payer: MEDICARE

## 2021-07-23 ENCOUNTER — ANESTHESIA EVENT (OUTPATIENT)
Dept: CARDIAC CATH/INVASIVE PROCEDURES | Age: 81
DRG: 243 | End: 2021-07-23
Payer: MEDICARE

## 2021-07-23 LAB
ALBUMIN SERPL-MCNC: 2.3 G/DL (ref 3.5–5)
ANION GAP SERPL CALC-SCNC: 5 MMOL/L (ref 5–15)
BASOPHILS # BLD: 0.1 K/UL (ref 0–0.1)
BASOPHILS NFR BLD: 1 % (ref 0–1)
BUN SERPL-MCNC: 18 MG/DL (ref 6–20)
BUN/CREAT SERPL: 23 (ref 12–20)
CALCIUM SERPL-MCNC: 8.4 MG/DL (ref 8.5–10.1)
CHLORIDE SERPL-SCNC: 106 MMOL/L (ref 97–108)
CO2 SERPL-SCNC: 27 MMOL/L (ref 21–32)
CREAT SERPL-MCNC: 0.78 MG/DL (ref 0.55–1.02)
DIFFERENTIAL METHOD BLD: ABNORMAL
EOSINOPHIL # BLD: 0.2 K/UL (ref 0–0.4)
EOSINOPHIL NFR BLD: 2 % (ref 0–7)
ERYTHROCYTE [DISTWIDTH] IN BLOOD BY AUTOMATED COUNT: 15.8 % (ref 11.5–14.5)
GLUCOSE SERPL-MCNC: 132 MG/DL (ref 65–100)
HCT VFR BLD AUTO: 33.1 % (ref 35–47)
HGB BLD-MCNC: 10.5 G/DL (ref 11.5–16)
IMM GRANULOCYTES # BLD AUTO: 0.1 K/UL (ref 0–0.04)
IMM GRANULOCYTES NFR BLD AUTO: 1 % (ref 0–0.5)
LYMPHOCYTES # BLD: 1.9 K/UL (ref 0.8–3.5)
LYMPHOCYTES NFR BLD: 20 % (ref 12–49)
MCH RBC QN AUTO: 31.7 PG (ref 26–34)
MCHC RBC AUTO-ENTMCNC: 31.7 G/DL (ref 30–36.5)
MCV RBC AUTO: 100 FL (ref 80–99)
MONOCYTES # BLD: 0.7 K/UL (ref 0–1)
MONOCYTES NFR BLD: 8 % (ref 5–13)
NEUTS SEG # BLD: 6.5 K/UL (ref 1.8–8)
NEUTS SEG NFR BLD: 68 % (ref 32–75)
NRBC # BLD: 0 K/UL (ref 0–0.01)
NRBC BLD-RTO: 0 PER 100 WBC
PHOSPHATE SERPL-MCNC: 3.2 MG/DL (ref 2.6–4.7)
PLATELET # BLD AUTO: 249 K/UL (ref 150–400)
PMV BLD AUTO: 12.1 FL (ref 8.9–12.9)
POTASSIUM SERPL-SCNC: 4.2 MMOL/L (ref 3.5–5.1)
RBC # BLD AUTO: 3.31 M/UL (ref 3.8–5.2)
SODIUM SERPL-SCNC: 138 MMOL/L (ref 136–145)
WBC # BLD AUTO: 9.4 K/UL (ref 3.6–11)

## 2021-07-23 PROCEDURE — 76060000037 HC ANESTHESIA 3 TO 3.5 HR: Performed by: INTERNAL MEDICINE

## 2021-07-23 PROCEDURE — C1893 INTRO/SHEATH, FIXED,NON-PEEL: HCPCS | Performed by: INTERNAL MEDICINE

## 2021-07-23 PROCEDURE — 93650 ICAR CATH ABLTJ AV NODE FUNC: CPT | Performed by: INTERNAL MEDICINE

## 2021-07-23 PROCEDURE — 74011250636 HC RX REV CODE- 250/636: Performed by: INTERNAL MEDICINE

## 2021-07-23 PROCEDURE — 71045 X-RAY EXAM CHEST 1 VIEW: CPT

## 2021-07-23 PROCEDURE — 97535 SELF CARE MNGMENT TRAINING: CPT

## 2021-07-23 PROCEDURE — 77030004532 HC CATH ANGI DX IMP BSC -A: Performed by: INTERNAL MEDICINE

## 2021-07-23 PROCEDURE — 74011000250 HC RX REV CODE- 250: Performed by: INTERNAL MEDICINE

## 2021-07-23 PROCEDURE — C1769 GUIDE WIRE: HCPCS | Performed by: INTERNAL MEDICINE

## 2021-07-23 PROCEDURE — 77030039046 HC PAD DEFIB RADIOTRNSPNT CNMD -B: Performed by: INTERNAL MEDICINE

## 2021-07-23 PROCEDURE — C1887 CATHETER, GUIDING: HCPCS | Performed by: INTERNAL MEDICINE

## 2021-07-23 PROCEDURE — 77030032060 HC PWDR HEMSTAT ARISTA ASRB 3GM BARD -C: Performed by: INTERNAL MEDICINE

## 2021-07-23 PROCEDURE — C1898 LEAD, PMKR, OTHER THAN TRANS: HCPCS | Performed by: INTERNAL MEDICINE

## 2021-07-23 PROCEDURE — 36415 COLL VENOUS BLD VENIPUNCTURE: CPT

## 2021-07-23 PROCEDURE — 77030003390 HC NDL ANGI MRTM -A: Performed by: INTERNAL MEDICINE

## 2021-07-23 PROCEDURE — 74011250637 HC RX REV CODE- 250/637: Performed by: INTERNAL MEDICINE

## 2021-07-23 PROCEDURE — 74011250636 HC RX REV CODE- 250/636: Performed by: NURSE ANESTHETIST, CERTIFIED REGISTERED

## 2021-07-23 PROCEDURE — 77030022704 HC SUT VLOC COVD -B: Performed by: INTERNAL MEDICINE

## 2021-07-23 PROCEDURE — 74011250637 HC RX REV CODE- 250/637: Performed by: NURSE PRACTITIONER

## 2021-07-23 PROCEDURE — 77030041075 HC DRSG AG OPTIFRM MDII -B: Performed by: INTERNAL MEDICINE

## 2021-07-23 PROCEDURE — 77030033819 HC SELCT VEIN WORLEY MRTM -C: Performed by: INTERNAL MEDICINE

## 2021-07-23 PROCEDURE — C2621 PMKR, OTHER THAN SING/DUAL: HCPCS | Performed by: INTERNAL MEDICINE

## 2021-07-23 PROCEDURE — 65660000000 HC RM CCU STEPDOWN

## 2021-07-23 PROCEDURE — 33208 INSRT HEART PM ATRIAL & VENT: CPT | Performed by: INTERNAL MEDICINE

## 2021-07-23 PROCEDURE — 74011000250 HC RX REV CODE- 250: Performed by: NURSE ANESTHETIST, CERTIFIED REGISTERED

## 2021-07-23 PROCEDURE — 94760 N-INVAS EAR/PLS OXIMETRY 1: CPT

## 2021-07-23 PROCEDURE — 77030010507 HC ADH SKN DERMBND J&J -B: Performed by: INTERNAL MEDICINE

## 2021-07-23 PROCEDURE — C1760 CLOSURE DEV, VASC: HCPCS | Performed by: INTERNAL MEDICINE

## 2021-07-23 PROCEDURE — C1733 CATH, EP, OTHR THAN COOL-TIP: HCPCS | Performed by: INTERNAL MEDICINE

## 2021-07-23 PROCEDURE — C1781 MESH (IMPLANTABLE): HCPCS | Performed by: INTERNAL MEDICINE

## 2021-07-23 PROCEDURE — 97165 OT EVAL LOW COMPLEX 30 MIN: CPT

## 2021-07-23 PROCEDURE — 33225 L VENTRIC PACING LEAD ADD-ON: CPT | Performed by: INTERNAL MEDICINE

## 2021-07-23 PROCEDURE — 77030018547 HC SUT ETHBND1 J&J -B: Performed by: INTERNAL MEDICINE

## 2021-07-23 PROCEDURE — 99221 1ST HOSP IP/OBS SF/LOW 40: CPT | Performed by: PHYSICIAN ASSISTANT

## 2021-07-23 PROCEDURE — 85025 COMPLETE CBC W/AUTO DIFF WBC: CPT

## 2021-07-23 PROCEDURE — C1894 INTRO/SHEATH, NON-LASER: HCPCS | Performed by: INTERNAL MEDICINE

## 2021-07-23 PROCEDURE — C1751 CATH, INF, PER/CENT/MIDLINE: HCPCS | Performed by: INTERNAL MEDICINE

## 2021-07-23 PROCEDURE — 80069 RENAL FUNCTION PANEL: CPT

## 2021-07-23 PROCEDURE — 74011250637 HC RX REV CODE- 250/637: Performed by: HOSPITALIST

## 2021-07-23 PROCEDURE — C1892 INTRO/SHEATH,FIXED,PEEL-AWAY: HCPCS | Performed by: INTERNAL MEDICINE

## 2021-07-23 PROCEDURE — 2709999900 HC NON-CHARGEABLE SUPPLY: Performed by: INTERNAL MEDICINE

## 2021-07-23 DEVICE — CRTP W1TR01 PERCEPTA CRTP MRI US
Type: IMPLANTABLE DEVICE | Status: FUNCTIONAL
Brand: PERCEPTA™ CRT-P MRI SURESCAN™

## 2021-07-23 DEVICE — LEAD 407652 CAPSUREFIX NOVUS US MRI
Type: IMPLANTABLE DEVICE | Status: FUNCTIONAL
Brand: CAPSUREFIX NOVUS MRI™ SURESCAN™

## 2021-07-23 DEVICE — ENVELOPE CMRM6133 ABSORB LRG MR
Type: IMPLANTABLE DEVICE | Status: FUNCTIONAL
Brand: TYRX™

## 2021-07-23 DEVICE — LEAD 407658 CAPSUREFIX NOVUS US MRI
Type: IMPLANTABLE DEVICE | Status: FUNCTIONAL
Brand: CAPSUREFIX NOVUS MRI™ SURESCAN™

## 2021-07-23 RX ORDER — PROPOFOL 10 MG/ML
INJECTION, EMULSION INTRAVENOUS
Status: DISCONTINUED | OUTPATIENT
Start: 2021-07-23 | End: 2021-07-23 | Stop reason: HOSPADM

## 2021-07-23 RX ORDER — SODIUM CHLORIDE 0.9 % (FLUSH) 0.9 %
5-40 SYRINGE (ML) INJECTION AS NEEDED
Status: DISCONTINUED | OUTPATIENT
Start: 2021-07-23 | End: 2021-07-25 | Stop reason: HOSPADM

## 2021-07-23 RX ORDER — SODIUM CHLORIDE 0.9 % (FLUSH) 0.9 %
5-40 SYRINGE (ML) INJECTION AS NEEDED
Status: DISCONTINUED | OUTPATIENT
Start: 2021-07-23 | End: 2021-07-23 | Stop reason: HOSPADM

## 2021-07-23 RX ORDER — MIDAZOLAM HYDROCHLORIDE 1 MG/ML
INJECTION, SOLUTION INTRAMUSCULAR; INTRAVENOUS AS NEEDED
Status: DISCONTINUED | OUTPATIENT
Start: 2021-07-23 | End: 2021-07-23 | Stop reason: HOSPADM

## 2021-07-23 RX ORDER — HYDROCODONE BITARTRATE AND ACETAMINOPHEN 5; 325 MG/1; MG/1
1 TABLET ORAL
Status: DISCONTINUED | OUTPATIENT
Start: 2021-07-23 | End: 2021-07-25 | Stop reason: HOSPADM

## 2021-07-23 RX ORDER — DEXMEDETOMIDINE HYDROCHLORIDE 100 UG/ML
INJECTION, SOLUTION INTRAVENOUS AS NEEDED
Status: DISCONTINUED | OUTPATIENT
Start: 2021-07-23 | End: 2021-07-23 | Stop reason: HOSPADM

## 2021-07-23 RX ORDER — EPHEDRINE SULFATE/0.9% NACL/PF 50 MG/5 ML
SYRINGE (ML) INTRAVENOUS AS NEEDED
Status: DISCONTINUED | OUTPATIENT
Start: 2021-07-23 | End: 2021-07-23 | Stop reason: HOSPADM

## 2021-07-23 RX ORDER — SODIUM CHLORIDE 0.9 % (FLUSH) 0.9 %
5-40 SYRINGE (ML) INJECTION EVERY 8 HOURS
Status: DISCONTINUED | OUTPATIENT
Start: 2021-07-23 | End: 2021-07-25 | Stop reason: HOSPADM

## 2021-07-23 RX ORDER — NALOXONE HYDROCHLORIDE 0.4 MG/ML
0.4 INJECTION, SOLUTION INTRAMUSCULAR; INTRAVENOUS; SUBCUTANEOUS AS NEEDED
Status: DISCONTINUED | OUTPATIENT
Start: 2021-07-23 | End: 2021-07-25 | Stop reason: HOSPADM

## 2021-07-23 RX ORDER — SODIUM CHLORIDE 0.9 % (FLUSH) 0.9 %
5-40 SYRINGE (ML) INJECTION EVERY 8 HOURS
Status: DISCONTINUED | OUTPATIENT
Start: 2021-07-23 | End: 2021-07-23 | Stop reason: HOSPADM

## 2021-07-23 RX ORDER — PHENYLEPHRINE HCL IN 0.9% NACL 0.4MG/10ML
SYRINGE (ML) INTRAVENOUS AS NEEDED
Status: DISCONTINUED | OUTPATIENT
Start: 2021-07-23 | End: 2021-07-23 | Stop reason: HOSPADM

## 2021-07-23 RX ORDER — SODIUM CHLORIDE 9 MG/ML
INJECTION, SOLUTION INTRAVENOUS
Status: DISCONTINUED | OUTPATIENT
Start: 2021-07-23 | End: 2021-07-23 | Stop reason: HOSPADM

## 2021-07-23 RX ADMIN — DEXMEDETOMIDINE HYDROCHLORIDE 12 MCG: 100 INJECTION, SOLUTION, CONCENTRATE INTRAVENOUS at 17:21

## 2021-07-23 RX ADMIN — PROPOFOL 50 MG: 10 INJECTION, EMULSION INTRAVENOUS at 16:20

## 2021-07-23 RX ADMIN — Medication 120 MCG: at 16:53

## 2021-07-23 RX ADMIN — BUMETANIDE 1 MG: 1 TABLET ORAL at 09:42

## 2021-07-23 RX ADMIN — METOPROLOL SUCCINATE 25 MG: 25 TABLET, EXTENDED RELEASE ORAL at 09:42

## 2021-07-23 RX ADMIN — MIDAZOLAM HYDROCHLORIDE 5 MG: 1 INJECTION, SOLUTION INTRAMUSCULAR; INTRAVENOUS at 16:30

## 2021-07-23 RX ADMIN — PROPOFOL 25 MG: 10 INJECTION, EMULSION INTRAVENOUS at 17:16

## 2021-07-23 RX ADMIN — Medication 10 ML: at 06:12

## 2021-07-23 RX ADMIN — MIDAZOLAM HYDROCHLORIDE 5 MG: 1 INJECTION, SOLUTION INTRAMUSCULAR; INTRAVENOUS at 16:11

## 2021-07-23 RX ADMIN — Medication 120 MCG: at 16:42

## 2021-07-23 RX ADMIN — Medication 80 MCG/MIN: at 17:02

## 2021-07-23 RX ADMIN — Medication 10 MG: at 19:18

## 2021-07-23 RX ADMIN — SODIUM CHLORIDE: 900 INJECTION, SOLUTION INTRAVENOUS at 16:11

## 2021-07-23 RX ADMIN — CETIRIZINE HYDROCHLORIDE 10 MG: 10 TABLET, FILM COATED ORAL at 21:45

## 2021-07-23 RX ADMIN — PROPOFOL 50 MG: 10 INJECTION, EMULSION INTRAVENOUS at 16:30

## 2021-07-23 RX ADMIN — RALOXIFENE HYDROCHLORIDE 60 MG: 60 TABLET, FILM COATED ORAL at 09:42

## 2021-07-23 RX ADMIN — PROPOFOL 15 MG: 10 INJECTION, EMULSION INTRAVENOUS at 17:17

## 2021-07-23 RX ADMIN — PROPOFOL 50 MCG/KG/MIN: 10 INJECTION, EMULSION INTRAVENOUS at 16:15

## 2021-07-23 RX ADMIN — POTASSIUM CHLORIDE 20 MEQ: 750 TABLET, FILM COATED, EXTENDED RELEASE ORAL at 09:42

## 2021-07-23 RX ADMIN — ATORVASTATIN CALCIUM 20 MG: 20 TABLET, FILM COATED ORAL at 21:45

## 2021-07-23 RX ADMIN — Medication 5 MG: at 19:25

## 2021-07-23 RX ADMIN — DEXMEDETOMIDINE HYDROCHLORIDE 12 MCG: 100 INJECTION, SOLUTION, CONCENTRATE INTRAVENOUS at 18:02

## 2021-07-23 RX ADMIN — DEXMEDETOMIDINE HYDROCHLORIDE 6 MCG: 100 INJECTION, SOLUTION, CONCENTRATE INTRAVENOUS at 18:47

## 2021-07-23 RX ADMIN — MONTELUKAST 10 MG: 10 TABLET, FILM COATED ORAL at 21:45

## 2021-07-23 RX ADMIN — SACUBITRIL AND VALSARTAN 1 TABLET: 24; 26 TABLET, FILM COATED ORAL at 09:42

## 2021-07-23 RX ADMIN — Medication 120 MCG: at 16:37

## 2021-07-23 RX ADMIN — SACUBITRIL AND VALSARTAN 1 TABLET: 24; 26 TABLET, FILM COATED ORAL at 21:45

## 2021-07-23 RX ADMIN — LEVOTHYROXINE SODIUM 25 MCG: 0.03 TABLET ORAL at 06:11

## 2021-07-23 RX ADMIN — Medication 10 ML: at 15:57

## 2021-07-23 RX ADMIN — WATER 2 G: 1 INJECTION INTRAMUSCULAR; INTRAVENOUS; SUBCUTANEOUS at 16:35

## 2021-07-23 RX ADMIN — Medication 160 MCG: at 18:57

## 2021-07-23 RX ADMIN — HYDROCODONE BITARTRATE AND ACETAMINOPHEN 1 TABLET: 5; 325 TABLET ORAL at 21:45

## 2021-07-23 RX ADMIN — SPIRONOLACTONE 25 MG: 25 TABLET ORAL at 09:42

## 2021-07-23 NOTE — PROGRESS NOTES
Thoracic Surgery Simple Progress Note    Admit Date: 2021  POD: Left Thoracentesis 2021    Procedure:  Procedure(s):  INSERT PPM BIV MULTI  ABLATION AV NODE      Subjective:     Patient has complaints: No significant medical complaints    Review of Systems:  CARDIAC: positive for Afib, heart failure, non-ischemic cardiomyopathy  RESP: positive for pleural effusion  NEURO:  negative  INCISION: Clean, dry, and intact  EXT: Denies new swelling or pain in the legs or calves. Objective:     Blood pressure 107/68, pulse 94, temperature 98.2 °F (36.8 °C), resp. rate 16, height 5' 6\" (1.676 m), weight 183 lb 14.4 oz (83.4 kg), SpO2 94 %. Temp (24hrs), Av °F (36.7 °C), Min:97.6 °F (36.4 °C), Max:98.7 °F (37.1 °C)        Hemodynamics    PAP    CO    CI    No intake/output data recorded.  1901 -  0700  In: 680 [P.O.:680]  Out: 1501 [Urine:1]    EXAM:  GENERAL: VSS, afrible, alert and cooperative  HEART:  irregularly irregular rhythm  LUNG: clear to auscultation bilaterally, O2 sats 94% on RA  NEURO:  normal without focal findings  mental status, speech normal, A&O x3  INCISION: Clean, dry, and intact  EXTREMITIES:No evidence of DVT seen on physical exam.  GI/: Abd soft, nontender. Voiding    Labs:  Recent Results (from the past 24 hour(s))   PROTEIN TOTAL, FLUID    Collection Time: 21 12:30 PM   Result Value Ref Range    Fluid Type: PLEURAL FLUID      Protein total, body fld. 3.1 g/dL   PH, FLUID    Collection Time: 21 12:30 PM   Result Value Ref Range    FLUID TYPE(15) PLEURAL FLUID      FLUID PH 7.0     GLUCOSE, FLUID    Collection Time: 21 12:30 PM   Result Value Ref Range    Fluid Type: PLEURAL FLUID      Glucose, body fld.  125 MG/DL   CELL COUNT, BODY FLUID    Collection Time: 21 12:30 PM   Result Value Ref Range    BODY FLUID TYPE PLEURAL FLUID      FLUID COLOR RED      FLUID APPEARANCE CLOUDY      FLUID RBC CT. >100 (H) 0 /cu mm    FLUID NUCLEATED CELLS 1,383 /cu mm FLD NEUTROPHILS 45 (A) NRRE %    FLD LYMPHS 4 (A) NRRE %    FLD MONO/MACROPHAGES 46 (A) NRRE %    FLUID MESOTHELIAL 5 (A) NRRE %   LDH, BODY FLUID    Collection Time: 07/22/21 12:30 PM   Result Value Ref Range    Fluid Type: PLEURAL FLUID      LD, body fld. 145 U/L   CULTURE, BODY FLUID W GRAM STAIN    Collection Time: 07/22/21 12:30 PM    Specimen: Pleural Fluid   Result Value Ref Range    Special Requests: NO SPECIAL REQUESTS      GRAM STAIN FEW WBCS SEEN      GRAM STAIN NO ORGANISMS SEEN      Culture result: PENDING    CBC WITH AUTOMATED DIFF    Collection Time: 07/23/21  1:06 AM   Result Value Ref Range    WBC 9.4 3.6 - 11.0 K/uL    RBC 3.31 (L) 3.80 - 5.20 M/uL    HGB 10.5 (L) 11.5 - 16.0 g/dL    HCT 33.1 (L) 35.0 - 47.0 %    .0 (H) 80.0 - 99.0 FL    MCH 31.7 26.0 - 34.0 PG    MCHC 31.7 30.0 - 36.5 g/dL    RDW 15.8 (H) 11.5 - 14.5 %    PLATELET 143 320 - 010 K/uL    MPV 12.1 8.9 - 12.9 FL    NRBC 0.0 0  WBC    ABSOLUTE NRBC 0.00 0.00 - 0.01 K/uL    NEUTROPHILS 68 32 - 75 %    LYMPHOCYTES 20 12 - 49 %    MONOCYTES 8 5 - 13 %    EOSINOPHILS 2 0 - 7 %    BASOPHILS 1 0 - 1 %    IMMATURE GRANULOCYTES 1 (H) 0.0 - 0.5 %    ABS. NEUTROPHILS 6.5 1.8 - 8.0 K/UL    ABS. LYMPHOCYTES 1.9 0.8 - 3.5 K/UL    ABS. MONOCYTES 0.7 0.0 - 1.0 K/UL    ABS. EOSINOPHILS 0.2 0.0 - 0.4 K/UL    ABS. BASOPHILS 0.1 0.0 - 0.1 K/UL    ABS. IMM.  GRANS. 0.1 (H) 0.00 - 0.04 K/UL    DF AUTOMATED     RENAL FUNCTION PANEL    Collection Time: 07/23/21  1:10 AM   Result Value Ref Range    Sodium 138 136 - 145 mmol/L    Potassium 4.2 3.5 - 5.1 mmol/L    Chloride 106 97 - 108 mmol/L    CO2 27 21 - 32 mmol/L    Anion gap 5 5 - 15 mmol/L    Glucose 132 (H) 65 - 100 mg/dL    BUN 18 6 - 20 MG/DL    Creatinine 0.78 0.55 - 1.02 MG/DL    BUN/Creatinine ratio 23 (H) 12 - 20      GFR est AA >60 >60 ml/min/1.73m2    GFR est non-AA >60 >60 ml/min/1.73m2    Calcium 8.4 (L) 8.5 - 10.1 MG/DL    Phosphorus 3.2 2.6 - 4.7 MG/DL    Albumin 2.3 (L) 3.5 - 5.0 g/dL       Assessment:   No evidence of DVT.   Principal Problem:    Acute on chronic systolic (congestive) heart failure ([facility] [address] (7/21/2021)      Cytology--pending  Plan/Recommendations:     Pacer placement by Dr. Jules Hess today  No furhterThoracic Surgery intervention at this time  Will follow-up with patient once pleural fuid studies resulted    JOSE High  7/23/2021

## 2021-07-23 NOTE — PROGRESS NOTES
Problem: Self Care Deficits Care Plan (Adult)  Goal: *Acute Goals and Plan of Care (Insert Text)  Description:   FUNCTIONAL STATUS PRIOR TO ADMISSION: Prior to one-month ago, patient was independent and active without use of DME, walking 45 minutes daily for exercise. Within past month, patient with increased use of RW and one reported fall. HOME SUPPORT: Patient lives alone, has family local.     Occupational Therapy Goals  Initiated 7/23/2021  1. Patient will perform standing grooming with modified independence within 7 day(s). 2.  Patient will perform upper body dressing with modified independence within 7 day(s). 3.  Patient will perform lower body dressing with modified independence within 7 day(s). 4.  Patient will perform toilet transfers with modified independence within 7 day(s). 5.  Patient will perform all aspects of toileting with modified independence within 7 day(s). 6.  Patient will demonstrate good understanding of pacemaker precautions during functional activities with minimal cues within 7 days. 7.  Patient will utilize energy conservation and fall prevention techniques during functional activities with verbal cues within 7 day(s). Outcome: Progressing Towards Goal   OCCUPATIONAL THERAPY EVALUATION  Patient: Marisela Fleming (20 y.o. female)  Date: 7/23/2021  Primary Diagnosis: Acute on chronic systolic (congestive) heart failure (HCC) [I50.23]  Procedure(s) (LRB):  INSERT PPM BIV MULTI (N/A)  ABLATION AV NODE (N/A) Day of Surgery   Precautions:   Fall    ASSESSMENT  Based on the objective data described below, the patient presents with impaired higher level balance and decreased activity tolerance s/p admission for acute on chronic CHF. Patient received sitting EOB, requesting toileting in bathroom. Patient demonstrating functional mobility at overall CGA using RW for increased stability. Patient benefiting from cues for RW placement for toileting and handwashing at sink.      Of note, patient with scheduled PPM insertion this afternoon. Initiated patient education on PPM precautions, compensatory ADL strategies, and general energy conservation / fall prevention. Patient asking appropriate questions and actively participating in discussion. Plan to drop-off printed handouts for visual reference later today. At this time, anticipate no follow-up OT needs. Will follow-up after procedure to reinforce education and allow for patient practice in context. Current Level of Function Impacting Discharge (ADLs/self-care): CGA    Functional Outcome Measure: The patient scored 70/100 on the Barthel Index. Other factors to consider for discharge: scheduled for pacemaker this afternoon     Patient will benefit from skilled therapy intervention to address the above noted impairments. PLAN :  Recommendations and Planned Interventions: self care training, functional mobility training, therapeutic exercise, balance training, therapeutic activities, endurance activities, patient education, and home safety training    Frequency/Duration: Patient will be followed by occupational therapy 5 times a week to address goals. Recommendation for discharge: (in order for the patient to meet his/her long term goals)  No skilled occupational therapy/ follow up rehabilitation needs identified at this time. This discharge recommendation:  Has not yet been discussed the attending provider and/or case management    IF patient discharges home will need the following DME: none       SUBJECTIVE:   Patient stated this has been really helpful.     OBJECTIVE DATA SUMMARY:   HISTORY:   Past Medical History:   Diagnosis Date    Arthritis     Asthma     dr. Savannah Tellez allergist    Atrial fibrillation ([facility] [address]     Breast cancer [facility]) 1980    right - mastectomy    Coagulation disorder ([facility] [address]     on eliquis    Dyslipidemia     labs 2008 - chol 283, HDL 83, ,     HTN (hypertension)     Hyperlipidemia LDL goal < 130 for increased LDL particles, Dr. Michelle Marquis nodule     Dr. Zachariah Simmons    Palpitations     resolved     Past Surgical History:   Procedure Laterality Date    COLONOSCOPY N/A 9/10/2018    COLONOSCOPY performed by Carmita Arenas MD at [address] [facility] W/O CONT WITH CALCIUM  1/2012    CAC score 0; calcified mediastinal lymph nodes consistent granulomatous disease    ECHO 2D ADULT  5/5/2008    normal, LVEF 60%    HX APPENDECTOMY      HX HYSTERECTOMY Bilateral 03/19/2015    and uro repair    HX KNEE REPLACEMENT Right     HX MASTECTOMY Right     HX TONSILLECTOMY      HX UROLOGICAL  8/1/14    Urodynamics    INTRACARD ECHO, THER/DX INTERVENT N/A 5/10/2019    Intracardiac Echocardiogram performed by Lauren Cleveland MD at [address] EXTERNAL  3/28/2018         HI CHEST SURGERY PROCEDURE UNLISTED      lung nodule removed - benign    HI EPHYS EVL TRNSPTL TX ATRIAL FIB ISOLAT PULM VEIN N/A 5/10/2019    ABLATION A-FIB  W COMPLETE EP STUDY [facility] [address] CATH LAB    HI INTRACARDIAC ELECTROPHYSIOLOGIC 3D [address] [facility] N/A 5/10/2019    Ep 3d Mapping performed by Lauren Cleveland MD at [address] [place] [address], [address] CATH LAB    STRESS TEST [facility] [facility]  1/5/12    walked 7:31, no chest pain, normal MPI. Expanded or extensive additional review of patient history:     Home Situation  Home Environment: Private residence  # Steps to Enter: 1  Rails to [place]: No (relies on door jam)  One/Two Story Residence: One story  Living Alone: Yes  Support Systems: Family member(s)  Patient Expects to be Discharged to[de-identified] House  Current DME Used/Available at Home: Commode, bedside, Grab bars, Shower chair, Walker, rolling  Tub or Shower Type: Shower    Hand dominance: Right    EXAMINATION OF PERFORMANCE DEFICITS:  Cognitive/Behavioral Status:  Neurologic State: Alert; Appropriate for age  Orientation Level: Oriented X4  Cognition: Follows commands; Appropriate decision making; Appropriate for age attention/concentration; Appropriate safety awareness  Perception: Appears intact  Perseveration: No perseveration noted  Safety/Judgement: Awareness of environment; Fall prevention;Home safety    Hearing: Auditory  Auditory Impairment: None    Vision/Perceptual:    Acuity: Within Defined Limits    Corrective Lenses: Glasses    Range of Motion:  AROM: Within functional limits    Strength:  Strength: Generally decreased, functional    Coordination:  Coordination: Within functional limits  Fine Motor Skills-Upper: Left Intact; Right Intact    Gross Motor Skills-Upper: Left Intact; Right Intact    Tone & Sensation:  Tone: Normal    Balance:  Sitting: Intact  Standing: Impaired  Standing - Static: Good;Constant support  Standing - Dynamic : Good;Fair;Constant support    Functional Mobility and Transfers for ADLs:  Bed Mobility:       Transfers:  Sit to Stand: Contact guard assistance  Stand to Sit: Contact guard assistance  Bed to Chair: Contact guard assistance; Adaptive equipment  Bathroom Mobility: Contact guard assistance  Toilet Transfer : Contact guard assistance; Adaptive equipment; Additional time    ADL Assessment:  Feeding: Setup    Oral Facial Hygiene/Grooming: Setup    Upper Body Dressing: Setup    Lower Body Dressing: Contact guard assistance    Toileting: Contact guard assistance    ADL Intervention and task modifications:  Grooming  Grooming Assistance: Stand-by assistance  Position Performed: Standing  Washing Hands: Stand-by assistance  Cues: Verbal cues provided;Visual cues provided  Adaptive Equipment:  (RW)    Lower Body Dressing Assistance  Dressing Assistance: Stand-by assistance  Socks: Stand-by assistance    Toileting  Toileting Assistance: Contact guard assistance  Bladder Hygiene: Supervision  Clothing Management: Contact guard assistance  Cues: Verbal cues provided;Visual cues provided  Adaptive Equipment: Walker (cues for positioning)    Cognitive Retraining  Safety/Judgement: Awareness of environment; Fall prevention;Home safety    Functional Measure:  Barthel Index:    Bathin  Bladder: 10  Bowels: 10  Groomin  Dressin  Feeding: 10  Mobility: 10  Stairs: 5  Toilet Use: 5  Transfer (Bed to Chair and Back): 10  Total: 70/100        The Barthel ADL Index: Guidelines  1. The index should be used as a record of what a patient does, not as a record of what a patient could do. 2. The main aim is to establish degree of independence from any help, physical or verbal, however minor and for whatever reason. 3. The need for supervision renders the patient not independent. 4. A patient's performance should be established using the best available evidence. Asking the patient, friends/relatives and nurses are the usual sources, but direct observation and common sense are also important. However direct testing is not needed. 5. Usually the patient's performance over the preceding 24-48 hours is important, but occasionally longer periods will be relevant. 6. Middle categories imply that the patient supplies over 50 per cent of the effort. 7. Use of aids to be independent is allowed. Deon Pettit., Barthel, D.W. (5310). Functional evaluation: the Barthel Index. [address] [facility] (14)2. Rosicarlos Smith kay Rachel, CULLEN, Altagracia Sandoval., Khadra Sue., [place], [address] [place] [address] (). Measuring the change indisability after inpatient rehabilitation; comparison of the responsiveness of the Barthel Index and Functional [place] Measure. Journal of Neurology, Neurosurgery, and Psychiatry, 66(4), 645-708. JAIME Messer.WYATT, LIZZY BarnettJ.FLAQUITA, & Joyce Sanchez MQuangA. (2004.) Assessment of post-stroke quality of life in cost-effectiveness studies: The usefulness of the Barthel Index and the EuroQoL-5D.  Quality of Life Research, 15, [place]on   ]    Occupational Therapy Evaluation Charge Determination   History Examination Decision-Making   LOW Complexity : Brief history review  LOW Complexity : 1-3 performance deficits relating to physical, cognitive , or psychosocial skils that result in activity limitations and / or participation restrictions  LOW Complexity : No comorbidities that affect functional and no verbal or physical assistance needed to complete eval tasks       Based on the above components, the patient evaluation is determined to be of the following complexity level: LOW   Pain Rating:  No reports. Activity Tolerance:   Fair    After treatment patient left in no apparent distress:    Seated EOB with call bell in reach as received    COMMUNICATION/EDUCATION:   The patients plan of care was discussed with: Physical therapist and Registered nurse. Home safety education was provided and the patient/caregiver indicated understanding., Patient/family have participated as able in goal setting and plan of care. , and Patient/family agree to work toward stated goals and plan of care.     Thank you for this referral.  Errol Sanders OT  Time Calculation: 26 mins

## 2021-07-23 NOTE — PROGRESS NOTES
Physical therapy:    Attempted PT session. Pt off the floor for pacemaker placement. Will defer and continue to follow.  Thank you    Opal Joe, PT, DPT

## 2021-07-23 NOTE — ANESTHESIA PREPROCEDURE EVALUATION
Relevant Problems   No relevant active problems       Anesthetic History   No history of anesthetic complications            Review of Systems / Medical History  Patient summary reviewed, nursing notes reviewed and pertinent labs reviewed    Pulmonary  Within defined limits          Asthma     Comments: Recurrent left pleural   Neuro/Psych   Within defined limits           Cardiovascular  Within defined limits  Hypertension        Dysrhythmias            GI/Hepatic/Renal  Within defined limits              Endo/Other  Within defined limits      Arthritis     Other Findings            Physical Exam    Airway  Mallampati: II  TM Distance: > 6 cm  Neck ROM: normal range of motion   Mouth opening: Normal     Cardiovascular  Regular rate and rhythm,  S1 and S2 normal,  no murmur, click, rub, or gallop             Dental  No notable dental hx       Pulmonary  Breath sounds clear to auscultation               Abdominal  GI exam deferred       Other Findings            Anesthetic Plan    ASA: 3  Anesthesia type: MAC          Induction: Intravenous  Anesthetic plan and risks discussed with: Patient

## 2021-07-23 NOTE — PROGRESS NOTES
Cardiac Cath Lab Procedure Area Arrival Note:    Ilsa Allen arrived to Cardiac Cath Lab, Procedure Area. Patient identifiers verified with NAME and DATE OF BIRTH. Procedure verified with patient. Consent forms verified. Allergies verified. Patient informed of procedure and plan of care. Questions answered with review. Patient voiced understanding of procedure and plan of care. Patient on cardiac monitor, non-invasive blood pressure, SPO2 monitor. Airway management along with vital signs to be performed by CRNA. Patient status doing well without problems. Patient is A&Ox 4. Patient reports no pain. Patient medicated during procedure with orders obtained and verified by Dr. Steffanie White. Refer to patients Cardiac Cath Lab PROCEDURE REPORT for vital signs, assessment, status, and response during procedure, printed at end of case. Printed report on chart or scanned into chart.

## 2021-07-23 NOTE — PROGRESS NOTES
TRANSFER - OUT REPORT:    Verbal report given to ABDULLAHI MADISON on Jaqueline Morales being transferred to [address] [place] [address] for routine progression of care       Report consisted of patients Situation, Background, Assessment and   Recommendations(SBAR). Information from the following report(s) SBAR, Procedure Summary and MAR was reviewed with the receiving nurse. Opportunity for questions and clarification was provided.

## 2021-07-23 NOTE — ROUTINE PROCESS
Hospital follow-up PCP transitional care appointment has been scheduled with Dr. Coni Chavez on 7/28/21 at 1:30pm  Pending patient discharge.   Cathy Carmona, Care Management Specialist.

## 2021-07-23 NOTE — PROGRESS NOTES
Problem: Falls - Risk of  Goal: *Absence of Falls  Description: Document Monik Jewell Fall Risk and appropriate interventions in the flowsheet. Outcome: Progressing Towards Goal  Note: Fall Risk Interventions:  Mobility Interventions: Communicate number of staff needed for ambulation/transfer         Medication Interventions: Evaluate medications/consider consulting pharmacy    Elimination Interventions: Call light in reach    History of Falls Interventions: Bed/chair exit alarm         Problem: Patient Education: Go to Patient Education Activity  Goal: Patient/Family Education  Outcome: Progressing Towards Goal     Problem: Falls - Risk of  Goal: *Absence of Falls  Description: Document Monik Jewell Fall Risk and appropriate interventions in the flowsheet.   Outcome: Progressing Towards Goal  Note: Fall Risk Interventions:  Mobility Interventions: Communicate number of staff needed for ambulation/transfer         Medication Interventions: Evaluate medications/consider consulting pharmacy    Elimination Interventions: Call light in reach    History of Falls Interventions: Bed/chair exit alarm         Problem: Patient Education: Go to Patient Education Activity  Goal: Patient/Family Education  Outcome: Progressing Towards Goal

## 2021-07-23 NOTE — PROGRESS NOTES
Bedside shift change report given to Kenneth Cooks, RN (oncoming nurse) by Marcy Martin RN (offgoing nurse). Report included the following information SBAR, Kardex, MAR and Recent Results.

## 2021-07-23 NOTE — PROGRESS NOTES
[address] Adult  Hospitalist Group                                                                                          Hospitalist Progress Note  Christian Cox MD  Answering service: [phone] OR 36 from in house phone        Date of Service:  2021  NAME:  Rose Mahoney  :  1940  MRN:  [id number]      Admission Summary:   59-year-old woman with a past medical history significant for chronic systolic congestive heart failure; nonischemic cardiomyopathy; chronic atrial fibrillation, on Eliquis for anticoagulation; dyslipidemia; hypertension; hypothyroidism, was in her usual state of health until about 2 weeks ago when the patient developed shortness of breath which is progressive and getting worse. The shortness of breath is associated with fatigue    Interval history / Subjective:     Patient seen and examined    She denied any SOB today,plan for pacemaker placement today     Assessment & Plan:     #Left pleural effusion:resolved  Likely due to CHF,atrial fib  -exudative effusion, cytology pending,culture so far negative  -s/p thoracentesis-1.5 lt drained  -thoracic surgery following    #Non ischemic cardiomyopathy  #Acute on chronic systolic heart failure:  HTN  Switched to oral bumex  -on beta blocker,entresto,spironolactone      #Atrial fib:  -plan for ablation and pacemaker placement today  -hold elliquis for procedure  -amiodarone discontinued per cards as causing hypothyroidism     #Hypothyroidism: synthroid  -TSH high,As we stopped amio now, recheck TSH again 1 month. Continue current dose of synthroid      # small subpleural nodular densities in the right upper lobe with the largest  measuring 0.8 cm.  Follow-up CT is recommended 4 months to assess for interval  Change    #In the abdomen, there is suspicion of isodense mass projected inferiorly off  the right lobe of the liver may represent an area of focal nodular hyperplasia    -Need OP follow up            Code status: full  DVT prophylaxis: scd    Care Plan discussed with: Patient/Family and Nurse  Anticipated Disposition: Home w/Family  Anticipated Discharge: 24-48 hrs     Hospital Problems  Date Reviewed: 7/22/2021        Codes Class Noted POA    * (Principal) Acute on chronic systolic (congestive) heart failure ([facility] [address] ICD-10-CM: I50.23  ICD-9-CM: 428.23, 428.0  7/21/2021 Yes                Review of Systems:   Pertinent items are noted in HPI. Vital Signs:    Last 24hrs VS reviewed since prior progress note. Most recent are:  Visit Vitals  /70 (BP 1 Location: Left upper arm, BP Patient Position: At rest)   Pulse 94   Temp 98 °F (36.7 °C)   Resp 16   Ht 5' 6\" (1.676 m)   Wt 83.5 kg (184 lb 1.6 oz)   SpO2 93%   BMI 29.71 kg/m²         Intake/Output Summary (Last 24 hours) at 7/23/2021 1629  Last data filed at 7/22/2021 1739  Gross per 24 hour   Intake 240 ml   Output    Net 240 ml        Physical Examination:     I had a face to face encounter with this patient and independently examined them on 7/23/2021 as outlined below:          Constitutional:  No acute distress, cooperative, pleasant    ENT:  Oral mucosa moist, EOMI,anicteric sclera,normal conjunctiva. Resp:  clear to auscultation b/l, No wheezing/rhonchi/rales. No accessory muscle use   CV:  irregular rhythm, normal rate, S1,S 2 wnl    GI:  Soft, non distended, non tender. normoactive bowel sounds, no hepatosplenomegaly     Musculoskeletal:  No edema, warm, 2+ pulses throughout    Neurologic:  Moves all extremities.   AAOx3, CN II-XII reviewed            Data Review:    Review and/or order of clinical lab test  Review and/or order of tests in the radiology section of CPT  Review and/or order of tests in the medicine section of CPT      Labs:     Recent Labs     07/23/21  0106 07/22/21  0501   WBC 9.4 11.4*   HGB 10.5* 10.4*   HCT 33.1* 32.6*    242     Recent Labs     07/23/21  0110 07/22/21  0501 07/21/21  1434    137 138   K 4.2 3.8 4.4    105 104   CO2 27 27 28   BUN 18 17 17   CREA 0.78 0.72 0.94   * 120* 159*   CA 8.4* 8.4* 8.6   MG  --  2.4 2.6*   PHOS 3.2 2.8  --      Recent Labs     07/23/21  0110 07/22/21  0501 07/21/21  1434   ALT  --  31 36   AP  --  76 83   TBILI  --  0.7 0.6   TP  --  5.4* 6.2*   ALB 2.3* 2.6* 2.9*   GLOB  --  2.8 3.3     No results for input(s): INR, PTP, APTT, INREXT, INREXT in the last 72 hours. No results for input(s): FE, TIBC, PSAT, FERR in the last 72 hours. No results found for: FOL, RBCF   No results for input(s): PH, PCO2, PO2 in the last 72 hours.   Recent Labs     07/22/21  0501 07/21/21  1434   TROIQ <0.05 <0.05     Lab Results   Component Value Date/Time    Cholesterol, total 142 03/28/2018 04:00 AM    HDL Cholesterol 76 03/28/2018 04:00 AM    LDL, calculated 55.2 03/28/2018 04:00 AM    Triglyceride 54 03/28/2018 04:00 AM    CHOL/HDL Ratio 1.9 03/28/2018 04:00 AM     Lab Results   Component Value Date/Time    Glucose (POC) 160 (H) 06/24/2021 03:06 AM    Glucose (POC) 175 (H) 06/23/2021 06:57 PM    Glucose (POC) 136 (H) 06/23/2021 11:10 AM    Glucose (POC) 123 (H) 06/23/2021 08:16 AM    Glucose (POC) 183 (H) 06/22/2021 09:34 PM     Lab Results   Component Value Date/Time    Color DARK YELLOW 07/22/2021 10:49 AM    Appearance CLEAR 07/22/2021 10:49 AM    Specific gravity 1.022 07/22/2021 10:49 AM    Specific gravity 1.025 06/22/2021 09:51 AM    pH (UA) 5.5 07/22/2021 10:49 AM    Protein 30 (A) 07/22/2021 10:49 AM    Glucose Negative 07/22/2021 10:49 AM    Ketone Negative 07/22/2021 10:49 AM    Bilirubin Negative 07/22/2021 10:49 AM    Urobilinogen 0.2 07/22/2021 10:49 AM    Nitrites Negative 07/22/2021 10:49 AM    Leukocyte Esterase TRACE (A) 07/22/2021 10:49 AM    Epithelial cells MODERATE (A) 07/22/2021 10:49 AM    Bacteria Negative 07/22/2021 10:49 AM    WBC 0-4 07/22/2021 10:49 AM    RBC 0-5 07/22/2021 10:49 AM         Medications Reviewed:     Current Facility-Administered Medications Medication Dose Route Frequency    sodium chloride (NS) flush 5-40 mL  5-40 mL IntraVENous Q8H    sodium chloride (NS) flush 5-40 mL  5-40 mL IntraVENous PRN    ceFAZolin (ANCEF) 2 g in sterile water (preservative free) 20 mL IV syringe  2 g IntraVENous ONCE    albuterol-ipratropium (DUO-NEB) 2.5 MG-0.5 MG/3 ML  3 mL Nebulization Q6H PRN    [Held by provider] apixaban (ELIQUIS) tablet 2.5 mg  2.5 mg Oral BID    atorvastatin (LIPITOR) tablet 20 mg  20 mg Oral QPM    cetirizine (ZYRTEC) tablet 10 mg  10 mg Oral QPM    ferrous sulfate tablet 325 mg  325 mg Oral DAILY    levothyroxine (SYNTHROID) tablet 25 mcg  25 mcg Oral ACB    metoprolol succinate (TOPROL-XL) XL tablet 25 mg  25 mg Oral DAILY    montelukast (SINGULAIR) tablet 10 mg  10 mg Oral QPM    potassium chloride SR (KLOR-CON 10) tablet 20 mEq  20 mEq Oral DAILY    raloxifene (EVISTA) tablet 60 mg  60 mg Oral DAILY    sacubitriL-valsartan (ENTRESTO) 24-26 mg tablet 1 Tablet  1 Tablet Oral BID    spironolactone (ALDACTONE) tablet 25 mg  25 mg Oral DAILY    sodium chloride (NS) flush 5-40 mL  5-40 mL IntraVENous Q8H    sodium chloride (NS) flush 5-40 mL  5-40 mL IntraVENous PRN    acetaminophen (TYLENOL) tablet 650 mg  650 mg Oral Q6H PRN    Or    acetaminophen (TYLENOL) suppository 650 mg  650 mg Rectal Q6H PRN    polyethylene glycol (MIRALAX) packet 17 g  17 g Oral DAILY PRN    ondansetron (ZOFRAN ODT) tablet 4 mg  4 mg Oral Q8H PRN    Or    ondansetron (ZOFRAN) injection 4 mg  4 mg IntraVENous Q6H PRN    bumetanide (BUMEX) tablet 1 mg  1 mg Oral DAILY     ______________________________________________________________________  EXPECTED LENGTH OF STAY: 3d 2h  ACTUAL LENGTH OF STAY:          2                 Rosibel Servin MD

## 2021-07-23 NOTE — PROGRESS NOTES
Transition of Care Plan   RUR- medium   DISPOSITION: Home with HH PT, OT indicated in their assessment that OT was not need, sent referral to [facility], per family and patient there is no preference in [facility] after reviewing the list   F/U with PCP/Specialist     Transport: family to transport           [employer]  3:53 PM

## 2021-07-23 NOTE — PROCEDURES
Cardiac Procedure Note   Patient: Ilsa Allen  MRN: [id number]  SSN: [id number]   YOB: 1940 Age: [de-identified] y.o.   Sex: female    Date of Procedure: 7/23/2021   Pre-procedure Diagnosis: atrial fibrillation with occasional RVR, failed AFIB ablation and recurrent left pleural effusion s/p thoracentesis, acute systolic CHF  Post-procedure Diagnosis: same  Procedure: Permanent Pacemaker Insertion  Ablation of av node  :  Dr. Lucy Avalos MD    Assistant(s):  None  Anesthesia: Moderate Sedation by CRNA  Estimated Blood Loss: Less than 30 mL   Specimens Removed: None  Findings: RV lead placed in many places but low R wave sensing except at basal mid RV septum  RAA lead  LV lead cannot be implanted after 2 hrs extra procedure time and 1 hr fluoroscopy due to a valve at the ostium of coronary sinus  None of the sheaths available at [facility] EP lab allowed the sheath to pass up the coronary sinus to deliver LV lead  AV node ablation 48 W with 4 mm tip Blazer catheter to complete av block with junctional rhythm  Complications: None   Implants:  Biventricular pacemaker [employer] (for possible future epicardial LV lead)  Signed by:  Lucy Avalos MD  7/23/2021  7:20 PM

## 2021-07-24 ENCOUNTER — APPOINTMENT (OUTPATIENT)
Dept: NON INVASIVE DIAGNOSTICS | Age: 81
DRG: 243 | End: 2021-07-24
Attending: INTERNAL MEDICINE
Payer: MEDICARE

## 2021-07-24 LAB
ALBUMIN SERPL-MCNC: 2.3 G/DL (ref 3.5–5)
ANION GAP SERPL CALC-SCNC: 4 MMOL/L (ref 5–15)
BASOPHILS # BLD: 0.1 K/UL (ref 0–0.1)
BASOPHILS NFR BLD: 1 % (ref 0–1)
BUN SERPL-MCNC: 14 MG/DL (ref 6–20)
BUN/CREAT SERPL: 18 (ref 12–20)
CALCIUM SERPL-MCNC: 8.4 MG/DL (ref 8.5–10.1)
CHLORIDE SERPL-SCNC: 106 MMOL/L (ref 97–108)
CO2 SERPL-SCNC: 28 MMOL/L (ref 21–32)
CREAT SERPL-MCNC: 0.78 MG/DL (ref 0.55–1.02)
DIFFERENTIAL METHOD BLD: ABNORMAL
ECHO LV EDV A2C: 115.29 ML
ECHO LV EDV A4C: 79.27 ML
ECHO LV EDV BP: 96.75 ML (ref 56–104)
ECHO LV EDV INDEX A4C: 42.4 ML/M2
ECHO LV EDV INDEX BP: 51.7 ML/M2
ECHO LV EDV NDEX A2C: 61.7 ML/M2
ECHO LV EJECTION FRACTION A2C: 67 PERCENT
ECHO LV EJECTION FRACTION A4C: 52 PERCENT
ECHO LV EJECTION FRACTION BIPLANE: 60.7 PERCENT (ref 55–100)
ECHO LV ESV A2C: 37.48 ML
ECHO LV ESV A4C: 37.72 ML
ECHO LV ESV BP: 37.99 ML (ref 19–49)
ECHO LV ESV INDEX A2C: 20 ML/M2
ECHO LV ESV INDEX A4C: 20.2 ML/M2
ECHO LV ESV INDEX BP: 20.3 ML/M2
EOSINOPHIL # BLD: 0.3 K/UL (ref 0–0.4)
EOSINOPHIL NFR BLD: 3 % (ref 0–7)
ERYTHROCYTE [DISTWIDTH] IN BLOOD BY AUTOMATED COUNT: 15.4 % (ref 11.5–14.5)
GLUCOSE SERPL-MCNC: 139 MG/DL (ref 65–100)
HCT VFR BLD AUTO: 34.8 % (ref 35–47)
HGB BLD-MCNC: 10.8 G/DL (ref 11.5–16)
IMM GRANULOCYTES # BLD AUTO: 0.1 K/UL (ref 0–0.04)
IMM GRANULOCYTES NFR BLD AUTO: 1 % (ref 0–0.5)
LYMPHOCYTES # BLD: 1.7 K/UL (ref 0.8–3.5)
LYMPHOCYTES NFR BLD: 16 % (ref 12–49)
MCH RBC QN AUTO: 31.4 PG (ref 26–34)
MCHC RBC AUTO-ENTMCNC: 31 G/DL (ref 30–36.5)
MCV RBC AUTO: 101.2 FL (ref 80–99)
MONOCYTES # BLD: 0.8 K/UL (ref 0–1)
MONOCYTES NFR BLD: 8 % (ref 5–13)
NEUTS SEG # BLD: 7.7 K/UL (ref 1.8–8)
NEUTS SEG NFR BLD: 71 % (ref 32–75)
NRBC # BLD: 0 K/UL (ref 0–0.01)
NRBC BLD-RTO: 0 PER 100 WBC
PHOSPHATE SERPL-MCNC: 3.4 MG/DL (ref 2.6–4.7)
PLATELET # BLD AUTO: 237 K/UL (ref 150–400)
PMV BLD AUTO: 12.4 FL (ref 8.9–12.9)
POTASSIUM SERPL-SCNC: 4.2 MMOL/L (ref 3.5–5.1)
RBC # BLD AUTO: 3.44 M/UL (ref 3.8–5.2)
SODIUM SERPL-SCNC: 138 MMOL/L (ref 136–145)
WBC # BLD AUTO: 10.6 K/UL (ref 3.6–11)

## 2021-07-24 PROCEDURE — 74011000258 HC RX REV CODE- 258: Performed by: INTERNAL MEDICINE

## 2021-07-24 PROCEDURE — 74011250636 HC RX REV CODE- 250/636: Performed by: INTERNAL MEDICINE

## 2021-07-24 PROCEDURE — 97116 GAIT TRAINING THERAPY: CPT

## 2021-07-24 PROCEDURE — 97168 OT RE-EVAL EST PLAN CARE: CPT

## 2021-07-24 PROCEDURE — 74011250637 HC RX REV CODE- 250/637: Performed by: NURSE PRACTITIONER

## 2021-07-24 PROCEDURE — 74011250637 HC RX REV CODE- 250/637: Performed by: INTERNAL MEDICINE

## 2021-07-24 PROCEDURE — 97535 SELF CARE MNGMENT TRAINING: CPT

## 2021-07-24 PROCEDURE — 93321 DOPPLER ECHO F-UP/LMTD STD: CPT | Performed by: INTERNAL MEDICINE

## 2021-07-24 PROCEDURE — 65660000000 HC RM CCU STEPDOWN

## 2021-07-24 PROCEDURE — 36415 COLL VENOUS BLD VENIPUNCTURE: CPT

## 2021-07-24 PROCEDURE — 80069 RENAL FUNCTION PANEL: CPT

## 2021-07-24 PROCEDURE — 97530 THERAPEUTIC ACTIVITIES: CPT

## 2021-07-24 PROCEDURE — 93325 DOPPLER ECHO COLOR FLOW MAPG: CPT | Performed by: INTERNAL MEDICINE

## 2021-07-24 PROCEDURE — 93308 TTE F-UP OR LMTD: CPT | Performed by: INTERNAL MEDICINE

## 2021-07-24 PROCEDURE — 85025 COMPLETE CBC W/AUTO DIFF WBC: CPT

## 2021-07-24 PROCEDURE — 99232 SBSQ HOSP IP/OBS MODERATE 35: CPT | Performed by: INTERNAL MEDICINE

## 2021-07-24 PROCEDURE — 74011250637 HC RX REV CODE- 250/637: Performed by: HOSPITALIST

## 2021-07-24 PROCEDURE — C8923 2D TTE W OR W/O FOL W/CON,CO: HCPCS

## 2021-07-24 RX ADMIN — LEVOTHYROXINE SODIUM 25 MCG: 0.03 TABLET ORAL at 07:09

## 2021-07-24 RX ADMIN — HYDROCODONE BITARTRATE AND ACETAMINOPHEN 1 TABLET: 5; 325 TABLET ORAL at 05:10

## 2021-07-24 RX ADMIN — BUMETANIDE 1 MG: 1 TABLET ORAL at 08:38

## 2021-07-24 RX ADMIN — CEFAZOLIN SODIUM 1 G: 1 INJECTION, POWDER, FOR SOLUTION INTRAMUSCULAR; INTRAVENOUS at 08:39

## 2021-07-24 RX ADMIN — CETIRIZINE HYDROCHLORIDE 10 MG: 10 TABLET, FILM COATED ORAL at 17:54

## 2021-07-24 RX ADMIN — METOPROLOL SUCCINATE 25 MG: 25 TABLET, EXTENDED RELEASE ORAL at 12:24

## 2021-07-24 RX ADMIN — POTASSIUM CHLORIDE 20 MEQ: 750 TABLET, FILM COATED, EXTENDED RELEASE ORAL at 08:38

## 2021-07-24 RX ADMIN — RALOXIFENE HYDROCHLORIDE 60 MG: 60 TABLET, FILM COATED ORAL at 08:38

## 2021-07-24 RX ADMIN — ATORVASTATIN CALCIUM 20 MG: 20 TABLET, FILM COATED ORAL at 17:54

## 2021-07-24 RX ADMIN — SACUBITRIL AND VALSARTAN 1 TABLET: 24; 26 TABLET, FILM COATED ORAL at 12:24

## 2021-07-24 RX ADMIN — APIXABAN 5 MG: 5 TABLET, FILM COATED ORAL at 17:54

## 2021-07-24 RX ADMIN — APIXABAN 5 MG: 5 TABLET, FILM COATED ORAL at 08:37

## 2021-07-24 RX ADMIN — SACUBITRIL AND VALSARTAN 1 TABLET: 24; 26 TABLET, FILM COATED ORAL at 17:54

## 2021-07-24 RX ADMIN — SPIRONOLACTONE 25 MG: 25 TABLET ORAL at 12:24

## 2021-07-24 RX ADMIN — CEFAZOLIN SODIUM 1 G: 1 INJECTION, POWDER, FOR SOLUTION INTRAMUSCULAR; INTRAVENOUS at 00:45

## 2021-07-24 RX ADMIN — HYDROCODONE BITARTRATE AND ACETAMINOPHEN 1 TABLET: 5; 325 TABLET ORAL at 20:18

## 2021-07-24 RX ADMIN — Medication 10 ML: at 15:19

## 2021-07-24 RX ADMIN — MONTELUKAST 10 MG: 10 TABLET, FILM COATED ORAL at 17:54

## 2021-07-24 RX ADMIN — FERROUS SULFATE TAB 325 MG (65 MG ELEMENTAL FE) 325 MG: 325 (65 FE) TAB at 08:38

## 2021-07-24 NOTE — PROGRESS NOTES
Cardiology Progress Note      7/24/2021 8:45 AM    Admit Date: 7/21/2021    Admit Diagnosis: Acute on chronic systolic (congestive) heart failure (HCC) [I50.23]      Subjective:     Temple Poor breathing improved. No chest pain.      Visit Vitals  BP (!) 110/54   Pulse 80   Temp 97.9 °F (36.6 °C)   Resp 16   Ht 5' 6\" (1.676 m)   Wt 175 lb 7.8 oz (79.6 kg)   SpO2 99%   BMI 28.32 kg/m²     Current Facility-Administered Medications   Medication Dose Route Frequency    sodium chloride (NS) flush 5-40 mL  5-40 mL IntraVENous Q8H    sodium chloride (NS) flush 5-40 mL  5-40 mL IntraVENous PRN    HYDROcodone-acetaminophen (NORCO) 5-325 mg per tablet 1 Tablet  1 Tablet Oral Q4H PRN    apixaban (ELIQUIS) tablet 5 mg  5 mg Oral BID    sodium chloride (NS) flush 5-40 mL  5-40 mL IntraVENous Q8H    sodium chloride (NS) flush 5-40 mL  5-40 mL IntraVENous PRN    naloxone (NARCAN) injection 0.4 mg  0.4 mg IntraVENous PRN    ceFAZolin (ANCEF) 1 g in 0.9% sodium chloride (MBP/ADV) 50 mL MBP  1 g IntraVENous Q8H    albuterol-ipratropium (DUO-NEB) 2.5 MG-0.5 MG/3 ML  3 mL Nebulization Q6H PRN    atorvastatin (LIPITOR) tablet 20 mg  20 mg Oral QPM    cetirizine (ZYRTEC) tablet 10 mg  10 mg Oral QPM    ferrous sulfate tablet 325 mg  325 mg Oral DAILY    levothyroxine (SYNTHROID) tablet 25 mcg  25 mcg Oral ACB    metoprolol succinate (TOPROL-XL) XL tablet 25 mg  25 mg Oral DAILY    montelukast (SINGULAIR) tablet 10 mg  10 mg Oral QPM    potassium chloride SR (KLOR-CON 10) tablet 20 mEq  20 mEq Oral DAILY    raloxifene (EVISTA) tablet 60 mg  60 mg Oral DAILY    sacubitriL-valsartan (ENTRESTO) 24-26 mg tablet 1 Tablet  1 Tablet Oral BID    spironolactone (ALDACTONE) tablet 25 mg  25 mg Oral DAILY    sodium chloride (NS) flush 5-40 mL  5-40 mL IntraVENous Q8H    sodium chloride (NS) flush 5-40 mL  5-40 mL IntraVENous PRN    polyethylene glycol (MIRALAX) packet 17 g  17 g Oral DAILY PRN    ondansetron (ZOFRAN ODT) tablet 4 mg  4 mg Oral Q8H PRN    Or    ondansetron (ZOFRAN) injection 4 mg  4 mg IntraVENous Q6H PRN    bumetanide (BUMEX) tablet 1 mg  1 mg Oral DAILY         Objective:      Physical Exam:  Visit Vitals  BP (!) 110/54   Pulse 80   Temp 97.9 °F (36.6 °C)   Resp 16   Ht 5' 6\" (1.676 m)   Wt 175 lb 7.8 oz (79.6 kg)   SpO2 99%   BMI 28.32 kg/m²     General Appearance:  Alert, appropriate, no acute distress. Ears/Nose/Mouth/Throat:   Hearing grossly normal.         Neck: Supple. No JVD   Chest:   Lungs clear to auscultation bilaterally. Cardiovascular:  Regular paced, S1, S2 normal, no murmur. Abdomen:   Soft, non-tender, bowel sounds are active. Extremities: No edema bilaterally. Skin: Warm and dry. Data Review:   Labs:    Recent Results (from the past 24 hour(s))   CBC WITH AUTOMATED DIFF    Collection Time: 07/24/21  4:23 AM   Result Value Ref Range    WBC 10.6 3.6 - 11.0 K/uL    RBC 3.44 (L) 3.80 - 5.20 M/uL    HGB 10.8 (L) 11.5 - 16.0 g/dL    HCT 34.8 (L) 35.0 - 47.0 %    .2 (H) 80.0 - 99.0 FL    MCH 31.4 26.0 - 34.0 PG    MCHC 31.0 30.0 - 36.5 g/dL    RDW 15.4 (H) 11.5 - 14.5 %    PLATELET 058 754 - 075 K/uL    MPV 12.4 8.9 - 12.9 FL    NRBC 0.0 0  WBC    ABSOLUTE NRBC 0.00 0.00 - 0.01 K/uL    NEUTROPHILS 71 32 - 75 %    LYMPHOCYTES 16 12 - 49 %    MONOCYTES 8 5 - 13 %    EOSINOPHILS 3 0 - 7 %    BASOPHILS 1 0 - 1 %    IMMATURE GRANULOCYTES 1 (H) 0.0 - 0.5 %    ABS. NEUTROPHILS 7.7 1.8 - 8.0 K/UL    ABS. LYMPHOCYTES 1.7 0.8 - 3.5 K/UL    ABS. MONOCYTES 0.8 0.0 - 1.0 K/UL    ABS. EOSINOPHILS 0.3 0.0 - 0.4 K/UL    ABS. BASOPHILS 0.1 0.0 - 0.1 K/UL    ABS. IMM.  GRANS. 0.1 (H) 0.00 - 0.04 K/UL    DF AUTOMATED     RENAL FUNCTION PANEL    Collection Time: 07/24/21  4:23 AM   Result Value Ref Range    Sodium 138 136 - 145 mmol/L    Potassium 4.2 3.5 - 5.1 mmol/L    Chloride 106 97 - 108 mmol/L    CO2 28 21 - 32 mmol/L    Anion gap 4 (L) 5 - 15 mmol/L    Glucose 139 (H) 65 - 100 mg/dL    BUN 14 6 - 20 MG/DL    Creatinine 0.78 0.55 - 1.02 MG/DL    BUN/Creatinine ratio 18 12 - 20      GFR est AA >60 >60 ml/min/1.73m2    GFR est non-AA >60 >60 ml/min/1.73m2    Calcium 8.4 (L) 8.5 - 10.1 MG/DL    Phosphorus 3.4 2.6 - 4.7 MG/DL    Albumin 2.3 (L) 3.5 - 5.0 g/dL       Telemetry: V paced      Assessment:     Principal Problem:    Acute on chronic systolic (congestive) heart failure ([facility] [address] (7/21/2021)        Plan:     afib s/p pacer with RV lead and AV node ablation. LV lead was unable to be placed given anatomy; consider epicardial LV lead for BiV pacing in future. Repeat echo pending. If echo is ok and pt symptomatically breathing improved, ok for hospital dc.    L effusion s/p thoracentesis 7/2021  Pericardial effusion s/p window

## 2021-07-24 NOTE — PROGRESS NOTES
Problem: Mobility Impaired (Adult and Pediatric)  Goal: *Acute Goals and Plan of Care (Insert Text)  Description: FUNCTIONAL STATUS PRIOR TO ADMISSION: Prior to one month ago, patient was independent and active without an assistive device, walked 45 mins in the neighborhood for exercise. Just prior to admission, she started relying on a rolling walker, had a fall two days ago. HOME SUPPORT PRIOR TO ADMISSION: The patient lived alone with family available to provide assistance if needed. She required no assistance prior to one month ago. Physical Therapy Goals  Initiated 7/22/2021  1. Patient will perform sit to stand with modified independence within 7 day(s). 2.  Patient will ambulate with independence for 300 feet with the least restrictive device within 7 day(s). 3.  Patient will ascend/descend 1 stairs with handrail(s) with modified independence within 7 day(s). Outcome: Progressing Towards Goal   PHYSICAL THERAPY REEVALUATION  Patient: Patricia Cook (12 y.o. female)  Date: 7/24/2021  Primary Diagnosis: Acute on chronic systolic (congestive) heart failure (HCC) [I50.23]  Procedure(s) (LRB):  ABLATION AV NODE (N/A)  Insert Ppm Dual (N/A)  Lv Lead Placement (N/A) 1 Day Post-Op   Precautions:   Fall, Pacemaker placement L arm in sling      ASSESSMENT  Based on the objective data described below, the patient presents with decreased strength, decreased balance, and overall limited functional mobility s/p pacemaker placement POD#1. Patient's L UE is immobilized in sling and now limited with use, especially with sit to stand transfers. Patient exhibiting orthostatic hypotension and c/o of dizziness with sit to stand. Patient in sitting 110/56 asymptomatic, standing 97/36 with c/o dizziness, 120/46 return to sit with c/o of dizziness. Patient requesting to use restroom and able to walk short distance x 20 ft with SPC with CGA with continued symptoms of mild dizziness. RN notified.  BP at end of session 110/76. At this time, due to symptomatic orthostasis and unsteadiness using SPC during gait, discharge today from a mobility standpoint is unsafe. Current Level of Function Impacting Discharge (mobility/balance): min A/CGA for transfers and gait x 20 ft with [facility]     Functional Outcome Measure: The patient scored 60 on the Barthel outcome measure which is indicative of impairment of ADLs, functional transfers, and mobility. Other factors to consider for discharge: Lives alone, but has son and daughter available to assist as needed     Patient will benefit from skilled therapy intervention to address the above noted impairments. PLAN :  Recommendations and Planned Interventions: bed mobility training, transfer training, gait training, therapeutic exercises, neuromuscular re-education, patient and family training/education, and therapeutic activities      Frequency/Duration: Patient will be followed by physical therapy:  5 times a week to address goals. Recommendation for discharge: (in order for the patient to meet his/her long term goals)  Physical therapy at least 2 days/week in the home AND ensure assist and/or supervision for safety with general mobility    This discharge recommendation:  Has not yet been discussed the attending provider and/or case management    Equipment recommendations for successful discharge (if) home: TBD (owns [facility] and )         SUBJECTIVE:   Patient stated I am feeling better today.     OBJECTIVE DATA SUMMARY:   HISTORY:    Past Medical History:   Diagnosis Date    Arthritis     Asthma     dr. Marybeth Dash allergist    Atrial fibrillation ([facility] [address]     Breast cancer [facility]) 1980    right - mastectomy    Coagulation disorder ([facility] [address]     on eliquis    Dyslipidemia     labs 2008 - chol 283, HDL 83, ,     HTN (hypertension)     Hyperlipidemia LDL goal < 130     for increased LDL particles, Dr. Jett Tellez nodule     Dr. Jose Eduardo Willoughby    Palpitations     resolved Past Surgical History:   Procedure Laterality Date    COLONOSCOPY N/A 9/10/2018    COLONOSCOPY performed by Zac Mccormick MD at [facility] ENDOSCOPY    CT HEART W/O CONT WITH CALCIUM  1/2012    CAC score 0; calcified mediastinal lymph nodes consistent granulomatous disease    ECHO 2D ADULT  5/5/2008    normal, LVEF 60%    HX APPENDECTOMY      HX HYSTERECTOMY Bilateral 03/19/2015    and uro repair    HX KNEE REPLACEMENT Right     HX MASTECTOMY Right     HX TONSILLECTOMY      HX UROLOGICAL  8/1/14    Urodynamics    INTRACARD ECHO, THER/DX INTERVENT N/A 5/10/2019    Intracardiac Echocardiogram performed by Sheldon Mathews MD at [address] EXTERNAL  3/28/2018         MO CHEST SURGERY PROCEDURE UNLISTED      lung nodule removed - benign    MO EPHYS EVL TRNSPTL TX ATRIAL FIB ISOLAT PULM VEIN N/A 5/10/2019    ABLATION A-FIB  W COMPLETE EP STUDY performed by Sheldon Mathews MD at [address] [facility] [address] CATH LAB    MO INTRACARDIAC ELECTROPHYSIOLOGIC 3D [address] [facility] N/A 5/10/2019    Ep 3d Mapping performed by Sheldon Mathews MD at [address] [place] [address], [address] CATH LAB    STRESS TEST [address] [facility]  1/5/12    walked 7:31, no chest pain, normal MPI.      Hospital course since last seen and reason for reevaluation: Pacemaker placement 7/23    Personal factors and/or comorbidities impacting plan of care: Lives alone    Home Situation  Home Environment: Private residence  # Steps to Enter: 1  Rails to [employer]: No (relies on door jam)  One/Two Story Residence: One story  Living Alone: Yes  Support Systems: Family member(s)  Patient Expects to be Discharged to[de-identified] House  Current DME Used/Available at Home: Commode, bedside, Grab bars, Shower chair, Walker, rolling  Tub or Shower Type: Shower    EXAMINATION/PRESENTATION/DECISION MAKING:   Critical Behavior:  Neurologic State: Appropriate for age, Alert, Eyes open spontaneously  Orientation Level: Oriented X4  Cognition: Appropriate decision making, Appropriate for age attention/concentration, Appropriate safety awareness  Safety/Judgement: Awareness of environment, Fall prevention, Home safety  Hearing: Auditory  Auditory Impairment: None    Range Of Motion:  AROM: Within functional limits inB LEs        Strength:    Strength: Generally decreased, functional (B LEs)           Tone & Sensation:   Tone: Normal                              Coordination:  Coordination: Within functional limits  Vision:      Functional Mobility:  Bed Mobility:   NT, patient received sitting upright in bedside chair           Transfers:  Sit to Stand: Minimum assistance (unable to use L UE to assist with sit to stand)  Stand to Sit: Contact guard assistance (cues to avoid use of LUE)        Bed to Chair: Contact guard assistance              Balance:   Sitting: Intact  Standing: Impaired  Standing - Static: Constant support;Good  Standing - Dynamic : Constant support;Good;Fair  Ambulation/Gait Training:  Distance (ft): 20 Feet (ft) (10 ft x 2 (bedside chair to restroom))  Assistive Device: Cane, straight;Gait belt  Ambulation - Level of Assistance: Contact guard assistance        Gait Abnormalities: Decreased step clearance        Base of Support: Widened     Speed/Milli: Slow  Step Length: Left shortened;Right shortened  Swing Pattern: Right asymmetrical           Stairs:   Unsafe to complete today due to orthostasis and symptomatic           Functional Measure:  Barthel Index:    Bathin  Bladder: 10  Bowels: 10  Groomin  Dressin  Feeding: 10  Mobility: 5  Stairs: 0  Toilet Use: 5  Transfer (Bed to Chair and Back): 10  Total: 60/100       The Barthel ADL Index: Guidelines  1. The index should be used as a record of what a patient does, not as a record of what a patient could do. 2. The main aim is to establish degree of independence from any help, physical or verbal, however minor and for whatever reason. 3. The need for supervision renders the patient not independent.   4. A patient's performance should be established using the best available evidence. Asking the patient, friends/relatives and nurses are the usual sources, but direct observation and common sense are also important. However direct testing is not needed. 5. Usually the patient's performance over the preceding 24-48 hours is important, but occasionally longer periods will be relevant. 6. Middle categories imply that the patient supplies over 50 per cent of the effort. 7. Use of aids to be independent is allowed. Rhonda Littlejohn., Barthel, D.W. (7584). Functional evaluation: the Barthel Index. [address] [facility] (14)2. Dilan Chris kay CULLEN Ramos, Krissy Ng., Reenabubba Warren., [place], [address] Measuring the change indisability after inpatient rehabilitation; comparison of the responsiveness of the Barthel Index and Functional [place] Measure. Journal of Neurology, Neurosurgery, and Psychiatry, 66(4), 801-246. Ashu Gomez, N.J.A, DARLENE Barnett, & Aneta Lucia, MQuangA. (2004.) Assessment of post-stroke quality of life in cost-effectiveness studies: The usefulness of the Barthel Index and the EuroQoL-5D. Quality of Life Research, 13, 427-43            Pain Ratin/10     Activity Tolerance:   Fair and signs and symptoms of orthostatic hypotension    After treatment patient left in no apparent distress:   Sitting in chair, Call bell within reach, and RN notified    COMMUNICATION/EDUCATION:   The patients plan of care was discussed with: Registered nurse. Fall prevention education was provided and the patient/caregiver indicated understanding. and Patient/family have participated as able in goal setting and plan of care.     Thank you for this referral.  Luis Arnold, PT   Time Calculation: 42 mins

## 2021-07-24 NOTE — PROGRESS NOTES
I discussed over the phone with her son Renny Webb after procedure   No endocardial LV lead can be implanted due to a valve at CS ostium that prevented sheath from entering so may need epicardial LV lead   Need LVEF remeasurement with echo  CXR for left pleural effusion re accumulation before she can go home  Resume eliquis    She should be steady and without severe dyspnea before she can be discharged    Will ask Dr Alma Sweeney to see tomorrow

## 2021-07-24 NOTE — ANESTHESIA POSTPROCEDURE EVALUATION
Post-Anesthesia Evaluation and Assessment    Patient: Loyda Aguirre MRN: [id number]  SSN: [id number]    YOB: 1940  Age: [de-identified] y.o. Sex: female      I have evaluated the patient and they are stable and ready for discharge from the PACU. Cardiovascular Function/Vital Signs  Visit Vitals  BP (!) 150/74   Pulse 80   Temp 36.7 °C (98 °F)   Resp 20   Ht 5' 6\" (1.676 m)   Wt 83.5 kg (184 lb 1.6 oz)   SpO2 97%   BMI 29.71 kg/m²       Patient is status post MAC anesthesia for Procedure(s):  ABLATION AV NODE  Insert Ppm Dual.    Nausea/Vomiting: None    Postoperative hydration reviewed and adequate. Pain:  Pain Scale 1: Numeric (0 - 10) (07/23/21 1906)  Pain Intensity 1: 0 (07/23/21 1906)   Managed    Neurological Status:   Neuro  Neurologic State: Alert; Appropriate for age (07/23/21 1300)  Orientation Level: Oriented X4 (07/23/21 1300)  Cognition: Follows commands; Appropriate decision making; Appropriate for age attention/concentration; Appropriate safety awareness (07/23/21 1300)   At baseline    Mental Status, Level of Consciousness: Alert and  oriented to person, place, and time    Pulmonary Status:   O2 Device: CO2 nasal cannula (07/23/21 1925)   Adequate oxygenation and airway patent    Complications related to anesthesia: None    Post-anesthesia assessment completed. No concerns    Signed By: Carmenza Kendall MD     July 23, 2021              Procedure(s):  ABLATION AV NODE  Insert Ppm Dual.    MAC    <BSHSIANPOST>    INITIAL Post-op Vital signs:   Vitals Value Taken Time   /57 07/23/21 2000   Temp 36.7 °C (98 °F) 07/23/21 1925   Pulse 80 07/23/21 2010   Resp 21 07/23/21 2010   SpO2 94 % 07/23/21 2010   Vitals shown include unvalidated device data.

## 2021-07-24 NOTE — PROGRESS NOTES
TRANSFER - IN REPORT:    Verbal report received from Bret on Alin Garner  being received from Procedural area for routine progression of care. Report consisted of patients Situation, Background, Assessment and Recommendations(SBAR). Information from the following report(s) Procedure Summary, MAR and Recent Results was reviewed with the receiving clinician. Opportunity for questions and clarification was provided. Assessment completed upon patients arrival to [address] [place] [address] and care [facility]. Cardiac Cath Lab Recovery Arrival Note:    Alin Garner arrived to [facility] recovery area. Patient procedure= Pacer Placement and AV node ablation. Patient on cardiac monitor, non-invasive blood pressure, SPO2 monitor. O2 @ 3 lpm via NC. Patient status doing well without problems. Patient is A&Ox 2. Patient reports no complaints. PROCEDURE SITE CHECK:    Procedure site:without any bleeding and hematoma, no pain/discomfort reported at procedure site. No change in patient status. Continue to monitor patient and status.

## 2021-07-24 NOTE — PROGRESS NOTES
Problem: Self Care Deficits Care Plan (Adult)  Goal: *Acute Goals and Plan of Care (Insert Text)  Description:   FUNCTIONAL STATUS PRIOR TO ADMISSION: Prior to one-month ago, patient was independent and active without use of DME, walking 45 minutes daily for exercise. Within past month, patient with increased use of RW and one reported fall. HOME SUPPORT: Patient lives alone, has family local.     Occupational Therapy Goals  Initiated 7/23/2021, continued 7/24/2021  1. Patient will perform standing grooming with modified independence within 7 day(s). 2.  Patient will perform upper body dressing with modified independence within 7 day(s). 3.  Patient will perform lower body dressing with modified independence within 7 day(s). 4.  Patient will perform toilet transfers with modified independence within 7 day(s). 5.  Patient will perform all aspects of toileting with modified independence within 7 day(s). 6.  Patient will demonstrate good understanding of pacemaker precautions during functional activities with minimal cues within 7 days. 7.  Patient will utilize energy conservation and fall prevention techniques during functional activities with verbal cues within 7 day(s). Outcome: Progressing Towards Goal     OCCUPATIONAL THERAPY RE-EVALUATION  Patient: Ovidio Edgar (73 y.o. female)  Date: 7/24/2021  Diagnosis: Acute on chronic systolic (congestive) heart failure (HCC) [I50.23] Acute on chronic systolic (congestive) heart failure (HCC)  Procedure(s) (LRB):  ABLATION AV NODE (N/A)  Insert Ppm Dual (N/A)  Lv Lead Placement (N/A) 1 Day Post-Op  Precautions: Fall  Chart, occupational therapy assessment, plan of care, and goals were reviewed. ASSESSMENT  Occupational therapy re-evaluation completed s/p PPM implant, now POD 1. Note order to maintain LUE immobilized in sling until 2115 today. Patient was receptive to extensive re-education on PPM precautions/ ADL modifications.   Patient expressed concern for bed mobility at home, so practiced supine<>sit. With LUE weightbearing restriction, supine-sit was best with RUE support on rail, first sitting upright from supine position, and then pivoting from long sitting to sitting EOB. Anticipate good LUE functional use within PPM restrictions once sling is removed. She was encouraged to sit for bathing/ dressing tasks and practiced tailor sit position with good distal LB reach. She has a shower chair and has purchased a bed rail. Patient required minimum assistance for sit-stand, then contact guard assistance ambulating within room using [address] with RUE. She was using a RW PTA. She lives alone but will have intermittent family support. Recommend HHOT pending progress. Orthostatic vitals taken during session. Patient did not complain of any dizziness during session. Vitals:    07/24/21 1141 07/24/21 1148 07/24/21 1222   BP: (!) 97/43 119/62 (!) 107/44   BP 1 Location: Left upper arm Left upper arm Left upper arm   BP Patient Position: Supine Sitting Standing   Pulse: 80 80 82        Current Level of Function Impacting Discharge (ADLs): up to minimum assistance         PLAN :  Recommendations and Planned Interventions: self care training, functional mobility training, therapeutic exercise, balance training, therapeutic activities, endurance activities, patient education, home safety training, and family training/education    Frequency/Duration: Patient will be followed by occupational therapy 5 times a week to address goals. Recommendation for discharge: (in order for the patient to meet his/her long term goals)  HHOT with intermittent family support pending progress    This discharge recommendation:  Has been made in collaboration with the attending provider and/or case management         SUBJECTIVE:   Patient stated I would like to feel steadier on my feet.     OBJECTIVE DATA SUMMARY:   Hospital course since last seen and reason for reevaluation: POD 1 PPM    Cognitive/Behavioral Status:  Neurologic State: Alert  Orientation Level: Oriented X4  Cognition: Appropriate decision making; Appropriate for age attention/concentration; Appropriate safety awareness; Follows commands  Perception: Appears intact  Perseveration: No perseveration noted  Safety/Judgement: Awareness of environment    Skin: visible skin appears intact, surgical site not observed    Edema: none    Hearing: Auditory  Auditory Impairment: None    Vision/Perceptual:                           Acuity: Within Defined Limits    Corrective Lenses: Glasses    Range of Motion:    AROM: Within functional limits (except LUE still immobilized in sling s/p PPM)                         Strength:    Strength: Generally decreased, functional (except LUE still immobilized in sling s/p PPM)                Coordination:  Coordination: Within functional limits (except LUE still immobilized in sling s/p PPM)  Fine Motor Skills-Upper: Left Intact; Right Intact    Gross Motor Skills-Upper: Left Intact; Right Intact    Tone & Sensation:    Tone: Normal                           Functional Mobility and Transfers for ADLs:    Transfers:  Sit to Stand: Minimum assistance (unable to use L UE to assist with sit to stand)     Bed to Chair: Contact guard assistance    Balance:  Sitting: Intact  Standing: Impaired  Standing - Static: Constant support;Good  Standing - Dynamic : Constant support;Good;Fair    ADL Assessment:  Feeding: Independent (inferred, once LUE removed from sling)     Grooming: Set-up    Bathing: Setup (inferred, once LUE removed from sling)    Upper Body Dressing: Setup (inferred, once LUE removed from sling)    Lower Body Dressing: Minimum assistance (inferred for standing/ impaired balance)    Toileting: Minimum assistance (inferred for standing/ impaired balance)                ADL Intervention and task modifications:               Cognitive Retraining  Safety/Judgement: Awareness of environment      Functional Measure:  Barthel Index:    Bathin  Bladder: 10  Bowels: 10  Groomin  Dressin  Feeding: 10  Mobility: 5  Stairs: 0  Toilet Use: 5  Transfer (Bed to Chair and Back): 10  Total: 60/100        The Barthel ADL Index: Guidelines  1. The index should be used as a record of what a patient does, not as a record of what a patient could do. 2. The main aim is to establish degree of independence from any help, physical or verbal, however minor and for whatever reason. 3. The need for supervision renders the patient not independent. 4. A patient's performance should be established using the best available evidence. Asking the patient, friends/relatives and nurses are the usual sources, but direct observation and common sense are also important. However direct testing is not needed. 5. Usually the patient's performance over the preceding 24-48 hours is important, but occasionally longer periods will be relevant. 6. Middle categories imply that the patient supplies over 50 per cent of the effort. 7. Use of aids to be independent is allowed. Sushila Vargas., Barthel, D.W. (3548). Functional evaluation: the Barthel Index. [facility] (14)2. Kiki Ryan kay TASHIA RamosF, Ryan Knight., Tasneem Sneed., [place], [address] [place] [address] (). Measuring the change indisability after inpatient rehabilitation; comparison of the responsiveness of the Barthel Index and Functional [place] Measure. Journal of Neurology, Neurosurgery, and Psychiatry, 66(4), 212-113. Charis Rouse, N.J.A, DARLENE Barnett, & Marlen Lewis M.A. (2004.) Assessment of post-stroke quality of life in cost-effectiveness studies: The usefulness of the Barthel Index and the EuroQoL-5D.  Quality of Life Research, 13, 427-43         Pain:  Patient did not report pain    Activity Tolerance:   Good    After treatment patient left in no apparent distress:   Supine in bed, Call bell within reach, and Caregiver / family present    COMMUNICATION/COLLABORATION:   The patients plan of care was discussed with: Physical therapist and Registered nurse.      Abiel Plata, OT  Time Calculation: 48 mins

## 2021-07-24 NOTE — PROGRESS NOTES
Verbal report given to Halie(name) on Madelyn Search  being transferred to [facility](unit) for routine post - op       Report consisted of patients Situation, Background, Assessment and   Recommendations(SBAR). Information from the following report(s) Procedure Summary, MAR and Recent Results was reviewed with the receiving nurse. Lines:       Opportunity for questions and clarification was provided.       Patient transported with:   Monitor  Registered Nurse

## 2021-07-24 NOTE — PROGRESS NOTES
[facility] Adult  Hospitalist Group                                                                                          Hospitalist Progress Note  Antonio Casas MD  Answering service: [phone] from in house phone        Date of Service:  2021  NAME:  Loyda Aguirre  :  1940  MRN:  [id number]      Admission Summary:   80-year-old woman with a past medical history significant for chronic systolic congestive heart failure; nonischemic cardiomyopathy; chronic atrial fibrillation, on Eliquis for anticoagulation; dyslipidemia; hypertension; hypothyroidism, was in her usual state of health until about 2 weeks ago when the patient developed shortness of breath which is progressive and getting worse. The shortness of breath is associated with fatigue    Interval history / Subjective:     Patient seen and examined at bedside this AM. She had PPM placement yesterday, tolerated well. Soreness at surgical site, arm in sling. BP dropped this am - improved but her entresto and metoprolol held. Echo pending      Assessment & Plan:     #Left pleural effusion:resolved  Likely due to CHF,atrial fib  -exudative effusion, cytology pending,culture so far negative  -s/p thoracentesis-1.5 lt drained  -appreciate thoracic surgery input     #Non ischemic cardiomyopathy  #Acute on chronic systolic heart failure:  HTN  -  C/w oral bumex  -on beta blocker,entresto,spironolactone  - appreciate cardiology input   - BP soft- meds helds as above    #Atrial fib:  -s/p ablation and pacemaker    - appreciate Dr. Jacquelyn Berg input   -restarted Eliquis  -amiodarone discontinued per cards as causing hypothyroidism   - Echo pending     # Hypothyroidism: synthroid  -TSH high,As we stopped amio now, recheck TSH again 1 month. Continue current dose of synthroid    # Small subpleural nodular densities in the right upper lobe with the largest  measuring 0.8 cm.  Follow-up CT is recommended 4 months to assess for interval  Change    #In the abdomen, there is suspicion of isodense mass projected inferiorly off  the right lobe of the liver may represent an area of focal nodular hyperplasia  -Need OP follow up    Code status: full  DVT prophylaxis: Indramatinhailee 38 discussed with: Patient/Family and Nurse  Anticipated Disposition: Home w/Family home health   Anticipated Discharge: likely [facility] Problems  Date Reviewed: 7/22/2021        Codes Class Noted POA    * (Principal) Acute on chronic systolic (congestive) heart failure ([facility] [address] ICD-10-CM: I50.23  ICD-9-CM: 428.23, 428.0  7/21/2021 Yes                Review of Systems:   Pertinent items are noted in HPI. Vital Signs:    Last 24hrs VS reviewed since prior progress note. Most recent are:  Visit Vitals  /76 (BP 1 Location: Left upper arm)   Pulse 80   Temp 97.9 °F (36.6 °C)   Resp 16   Ht 5' 6\" (1.676 m)   Wt 79.6 kg (175 lb 7.8 oz)   SpO2 99%   BMI 28.32 kg/m²         Intake/Output Summary (Last 24 hours) at 7/24/2021 1130  Last data filed at 7/23/2021 1905  Gross per 24 hour   Intake 220 ml   Output    Net 220 ml        Physical Examination:     I had a face to face encounter with this patient and independently examined them on 7/24/2021 as outlined below:          Constitutional:  No acute distress, cooperative, pleasant    ENT:  Oral mucosa moist, EOMI,anicteric sclera,normal conjunctiva. Resp:  clear to auscultation b/l, No wheezing/rhonchi/rales. No accessory muscle use   CV:  irregular rhythm, normal rate, S1,S 2 wnl    GI:  Soft, non distended, non tender. normoactive bowel sounds, no hepatosplenomegaly     Musculoskeletal:  No edema, warm, 2+ pulses throughout    Neurologic:  Moves all extremities.   AAOx3, CN II-XII reviewed            Data Review:    Review and/or order of clinical lab test  Review and/or order of tests in the radiology section of CPT  Review and/or order of tests in the medicine section of CPT      Labs:     Recent Labs     07/24/21  0423 07/23/21  0106   WBC 10.6 9.4   HGB 10.8* 10.5*   HCT 34.8* 33.1*    249     Recent Labs     07/24/21  0423 07/23/21  0110 07/22/21  0501 07/21/21  1434 07/21/21  1434    138 137   < > 138   K 4.2 4.2 3.8   < > 4.4    106 105   < > 104   CO2 28 27 27   < > 28   BUN 14 18 17   < > 17   CREA 0.78 0.78 0.72   < > 0.94   * 132* 120*   < > 159*   CA 8.4* 8.4* 8.4*   < > 8.6   MG  --   --  2.4  --  2.6*   PHOS 3.4 3.2 2.8  --   --     < > = values in this interval not displayed. Recent Labs     07/24/21  0423 07/23/21  0110 07/22/21  0501 07/21/21  1434 07/21/21  1434   ALT  --   --  31  --  36   AP  --   --  76  --  83   TBILI  --   --  0.7  --  0.6   TP  --   --  5.4*  --  6.2*   ALB 2.3* 2.3* 2.6*   < > 2.9*   GLOB  --   --  2.8  --  3.3    < > = values in this interval not displayed. No results for input(s): INR, PTP, APTT, INREXT, INREXT in the last 72 hours. No results for input(s): FE, TIBC, PSAT, FERR in the last 72 hours. No results found for: FOL, RBCF   No results for input(s): PH, PCO2, PO2 in the last 72 hours.   Recent Labs     07/22/21 0501 07/21/21  1434   TROIQ <0.05 <0.05     Lab Results   Component Value Date/Time    Cholesterol, total 142 03/28/2018 04:00 AM    HDL Cholesterol 76 03/28/2018 04:00 AM    LDL, calculated 55.2 03/28/2018 04:00 AM    Triglyceride 54 03/28/2018 04:00 AM    CHOL/HDL Ratio 1.9 03/28/2018 04:00 AM     Lab Results   Component Value Date/Time    Glucose (POC) 160 (H) 06/24/2021 03:06 AM    Glucose (POC) 175 (H) 06/23/2021 06:57 PM    Glucose (POC) 136 (H) 06/23/2021 11:10 AM    Glucose (POC) 123 (H) 06/23/2021 08:16 AM    Glucose (POC) 183 (H) 06/22/2021 09:34 PM     Lab Results   Component Value Date/Time    Color DARK YELLOW 07/22/2021 10:49 AM    Appearance CLEAR 07/22/2021 10:49 AM    Specific gravity 1.022 07/22/2021 10:49 AM    Specific gravity 1.025 06/22/2021 09:51 AM    pH (UA) 5.5 07/22/2021 10:49 AM Protein 30 (A) 07/22/2021 10:49 AM    Glucose Negative 07/22/2021 10:49 AM    Ketone Negative 07/22/2021 10:49 AM    Bilirubin Negative 07/22/2021 10:49 AM    Urobilinogen 0.2 07/22/2021 10:49 AM    Nitrites Negative 07/22/2021 10:49 AM    Leukocyte Esterase TRACE (A) 07/22/2021 10:49 AM    Epithelial cells MODERATE (A) 07/22/2021 10:49 AM    Bacteria Negative 07/22/2021 10:49 AM    WBC 0-4 07/22/2021 10:49 AM    RBC 0-5 07/22/2021 10:49 AM         Medications Reviewed:     Current Facility-Administered Medications   Medication Dose Route Frequency    sodium chloride (NS) flush 5-40 mL  5-40 mL IntraVENous Q8H    sodium chloride (NS) flush 5-40 mL  5-40 mL IntraVENous PRN    HYDROcodone-acetaminophen (NORCO) 5-325 mg per tablet 1 Tablet  1 Tablet Oral Q4H PRN    apixaban (ELIQUIS) tablet 5 mg  5 mg Oral BID    sodium chloride (NS) flush 5-40 mL  5-40 mL IntraVENous Q8H    sodium chloride (NS) flush 5-40 mL  5-40 mL IntraVENous PRN    naloxone (NARCAN) injection 0.4 mg  0.4 mg IntraVENous PRN    albuterol-ipratropium (DUO-NEB) 2.5 MG-0.5 MG/3 ML  3 mL Nebulization Q6H PRN    atorvastatin (LIPITOR) tablet 20 mg  20 mg Oral QPM    cetirizine (ZYRTEC) tablet 10 mg  10 mg Oral QPM    ferrous sulfate tablet 325 mg  325 mg Oral DAILY    levothyroxine (SYNTHROID) tablet 25 mcg  25 mcg Oral ACB    metoprolol succinate (TOPROL-XL) XL tablet 25 mg  25 mg Oral DAILY    montelukast (SINGULAIR) tablet 10 mg  10 mg Oral QPM    potassium chloride SR (KLOR-CON 10) tablet 20 mEq  20 mEq Oral DAILY    raloxifene (EVISTA) tablet 60 mg  60 mg Oral DAILY    sacubitriL-valsartan (ENTRESTO) 24-26 mg tablet 1 Tablet  1 Tablet Oral BID    spironolactone (ALDACTONE) tablet 25 mg  25 mg Oral DAILY    sodium chloride (NS) flush 5-40 mL  5-40 mL IntraVENous Q8H    sodium chloride (NS) flush 5-40 mL  5-40 mL IntraVENous PRN    polyethylene glycol (MIRALAX) packet 17 g  17 g Oral DAILY PRN    ondansetron (ZOFRAN ODT) tablet 4 mg  4 mg Oral Q8H PRN    Or    ondansetron (ZOFRAN) injection 4 mg  4 mg IntraVENous Q6H PRN    bumetanide (BUMEX) tablet 1 mg  1 mg Oral DAILY     ______________________________________________________________________  EXPECTED LENGTH OF STAY: 3d 2h  ACTUAL LENGTH OF STAY:          3                 Masoud Sargent MD

## 2021-07-25 VITALS
HEIGHT: 65 IN | DIASTOLIC BLOOD PRESSURE: 85 MMHG | HEART RATE: 81 BPM | RESPIRATION RATE: 13 BRPM | WEIGHT: 180.78 LBS | SYSTOLIC BLOOD PRESSURE: 111 MMHG | BODY MASS INDEX: 30.12 KG/M2 | TEMPERATURE: 98.6 F | OXYGEN SATURATION: 96 %

## 2021-07-25 LAB
BASOPHILS # BLD: 0.1 K/UL (ref 0–0.1)
BASOPHILS NFR BLD: 1 % (ref 0–1)
DIFFERENTIAL METHOD BLD: ABNORMAL
EOSINOPHIL # BLD: 0.3 K/UL (ref 0–0.4)
EOSINOPHIL NFR BLD: 3 % (ref 0–7)
ERYTHROCYTE [DISTWIDTH] IN BLOOD BY AUTOMATED COUNT: 15.2 % (ref 11.5–14.5)
HCT VFR BLD AUTO: 33.3 % (ref 35–47)
HGB BLD-MCNC: 10.5 G/DL (ref 11.5–16)
IMM GRANULOCYTES # BLD AUTO: 0.1 K/UL (ref 0–0.04)
IMM GRANULOCYTES NFR BLD AUTO: 1 % (ref 0–0.5)
LYMPHOCYTES # BLD: 2.2 K/UL (ref 0.8–3.5)
LYMPHOCYTES NFR BLD: 22 % (ref 12–49)
MCH RBC QN AUTO: 31 PG (ref 26–34)
MCHC RBC AUTO-ENTMCNC: 31.5 G/DL (ref 30–36.5)
MCV RBC AUTO: 98.2 FL (ref 80–99)
MONOCYTES # BLD: 0.8 K/UL (ref 0–1)
MONOCYTES NFR BLD: 9 % (ref 5–13)
NEUTS SEG # BLD: 6.3 K/UL (ref 1.8–8)
NEUTS SEG NFR BLD: 64 % (ref 32–75)
NRBC # BLD: 0 K/UL (ref 0–0.01)
NRBC BLD-RTO: 0 PER 100 WBC
PLATELET # BLD AUTO: 227 K/UL (ref 150–400)
PMV BLD AUTO: 12.2 FL (ref 8.9–12.9)
RBC # BLD AUTO: 3.39 M/UL (ref 3.8–5.2)
WBC # BLD AUTO: 9.7 K/UL (ref 3.6–11)

## 2021-07-25 PROCEDURE — 36415 COLL VENOUS BLD VENIPUNCTURE: CPT

## 2021-07-25 PROCEDURE — 99232 SBSQ HOSP IP/OBS MODERATE 35: CPT | Performed by: INTERNAL MEDICINE

## 2021-07-25 PROCEDURE — 74011250637 HC RX REV CODE- 250/637: Performed by: INTERNAL MEDICINE

## 2021-07-25 PROCEDURE — 74011250637 HC RX REV CODE- 250/637: Performed by: HOSPITALIST

## 2021-07-25 PROCEDURE — 74011250637 HC RX REV CODE- 250/637: Performed by: NURSE PRACTITIONER

## 2021-07-25 PROCEDURE — 85025 COMPLETE CBC W/AUTO DIFF WBC: CPT

## 2021-07-25 RX ADMIN — LEVOTHYROXINE SODIUM 25 MCG: 0.03 TABLET ORAL at 07:03

## 2021-07-25 RX ADMIN — METOPROLOL SUCCINATE 25 MG: 25 TABLET, EXTENDED RELEASE ORAL at 08:57

## 2021-07-25 RX ADMIN — HYDROCODONE BITARTRATE AND ACETAMINOPHEN 1 TABLET: 5; 325 TABLET ORAL at 02:39

## 2021-07-25 RX ADMIN — Medication 10 ML: at 07:04

## 2021-07-25 RX ADMIN — SPIRONOLACTONE 25 MG: 25 TABLET ORAL at 08:59

## 2021-07-25 RX ADMIN — RALOXIFENE HYDROCHLORIDE 60 MG: 60 TABLET, FILM COATED ORAL at 08:59

## 2021-07-25 RX ADMIN — APIXABAN 5 MG: 5 TABLET, FILM COATED ORAL at 08:58

## 2021-07-25 RX ADMIN — BUMETANIDE 1 MG: 1 TABLET ORAL at 08:59

## 2021-07-25 RX ADMIN — SACUBITRIL AND VALSARTAN 1 TABLET: 24; 26 TABLET, FILM COATED ORAL at 08:57

## 2021-07-25 RX ADMIN — HYDROCODONE BITARTRATE AND ACETAMINOPHEN 1 TABLET: 5; 325 TABLET ORAL at 09:00

## 2021-07-25 RX ADMIN — POTASSIUM CHLORIDE 20 MEQ: 750 TABLET, FILM COATED, EXTENDED RELEASE ORAL at 08:57

## 2021-07-25 RX ADMIN — FERROUS SULFATE TAB 325 MG (65 MG ELEMENTAL FE) 325 MG: 325 (65 FE) TAB at 08:58

## 2021-07-25 NOTE — DISCHARGE SUMMARY
Discharge Summary     Patient:  Rose Mahoney       MRN: [id number]       YOB: 1940       Age: [de-identified] y.o. Date of admission:  7/21/2021    Date of discharge:  7/25/2021    Primary care provider: Dr. Kalpana Terry MD    Admitting provider:  Henny Cee MD    Discharging provider:  Heather Kennedy [address] [phone]. If unavailable, call [phone] and ask the  to page the triage hospitalist.    Consultations  · IP CONSULT TO CARDIOLOGY  · IP CONSULT TO THORACIC SURGERY    Procedures  · Procedure(s):  · ABLATION AV NODE  · Insert Ppm Dual  · Lv Lead Placement    Discharge destination: home with [facility]. The patient is stable for discharge. Admission diagnosis  · Acute on chronic systolic (congestive) heart failure (HCC) [I50.23]    Current Discharge Medication List      CONTINUE these medications which have NOT CHANGED    Details   chlorhexidine (Hibiclens) 4 % liquid Apply to the upper chest area from shoulder/neck to mid line of chest and to below the nipple every day, 5 days prior to the procedure. Qty: 1 Bottle, Refills: 0    Comments: To start using 5 days prior to her procedure on 9/1/21      apixaban (Eliquis) 2.5 mg tablet Take 2.5 mg by mouth two (2) times a day. metoprolol succinate (TOPROL-XL) 25 mg XL tablet Take 1 Tablet by mouth daily. Qty: 90 Tablet, Refills: 1      spironolactone (ALDACTONE) 25 mg tablet Take 1 Tablet by mouth daily. Qty: 90 Tablet, Refills: 1      bumetanide (BUMEX) 1 mg tablet Take 1 Tablet by mouth daily. Qty: 90 Tablet, Refills: 1      ferrous sulfate 325 mg (65 mg iron) tablet Take 1 Tablet by mouth daily. Qty: 90 Tablet, Refills: 1      sacubitriL-valsartan (Entresto) 24-26 mg tablet Take 1 Tablet by mouth two (2) times a day. Qty: 180 Tablet, Refills: 1    Comments:  To start 48 hour after stopping Losartan      potassium chloride (K-DUR, KLOR-CON) 20 mEq tablet Take 1 Tablet by mouth daily. Qty: 90 Tablet, Refills: 1    Comments: Changes to Daily 5/26/21      amoxicillin (AMOXIL) 500 mg capsule Take 500 mg by mouth as needed. Only for dental procedure      acetaminophen (Tylenol Extra Strength) 500 mg tablet Take 500 mg by mouth nightly as needed. levothyroxine (SYNTHROID) 25 mcg tablet Take  by mouth Daily (before breakfast). fluticasone (FLOVENT HFA) 220 mcg/actuation inhaler as needed. atorvastatin (LIPITOR) 20 mg tablet Take 20 mg by mouth every evening. MULTIVITAMIN PO Take  by mouth. Takes one po once daily. cholecalciferol (VITAMIN D3) 5,000 unit capsule Take 5,000 Units by mouth daily. cetirizine (ZYRTEC) 10 mg tablet Take 10 mg by mouth every evening. estradiol (ESTRACE) 0.01 % (0.1 mg/gram) vaginal cream Insert  into vagina every Monday and Thursday. EPINEPHrine (EPIPEN) 0.3 mg/0.3 mL (1:1,000) injection 0.3 mL by IntraMUSCular route once as needed for up to 1 dose. Qty: 0.3 mL, Refills: 0      albuterol (PROVENTIL HFA, VENTOLIN HFA, PROAIR HFA) 90 mcg/actuation inhaler Take 1 Puff by inhalation every four (4) hours as needed. Qty: 2 Inhaler, Refills: 3      raloxifene (EVISTA) 60 mg tablet Take 1 Tab by mouth daily. Qty: 90 Tab, Refills: 4    Associated Diagnoses: Palpitations      montelukast (SINGULAIR) 10 mg tablet Take 10 mg by mouth every evening. Associated Diagnoses: Palpitations         STOP taking these medications       amiodarone (CORDARONE) 200 mg tablet Comments:   Reason for Stopping:         cyclobenzaprine (FLEXERIL) 5 mg tablet Comments:   Reason for Stopping:         diphenhydrAMINE (BenadryL) 25 mg capsule Comments:   Reason for Stopping: Follow-up Information     Follow up With Specialties Details Why Liz Martinez MD Internal Medicine On 7/28/2021 For hospital follow Up.  Patient is already scheduled to be  seen at 1:30pm [address] [facility] [id number] [phone]      Jacqui Trotter MD Cardiology In 2 weeks  [address]  [address] [place] [address]  [address]  [phone]            Final discharge diagnoses and brief hospital course  51-year-old woman with a past medical history significant for chronic systolic congestive heart failure; nonischemic cardiomyopathy; chronic atrial fibrillation, on Eliquis for anticoagulation; dyslipidemia; hypertension; hypothyroidism, was in her usual state of health until about 2 weeks ago when the patient developed shortness of breath which is progressive and getting worse.  The shortness of breath is associated with fatigue     #Left pleural effusion:resolved  Likely due to CHF,atrial fib  -exudative effusion, cytology pending,culture so far negative  -s/p thoracentesis-1.5 lt drained  -appreciate thoracic surgery input     #Non ischemic cardiomyopathy  #Acute on chronic systolic heart failure:  # HTN  -  C/w oral bumex  -on beta blocker,entresto,spironolactone  - appreciate cardiology input      #Atrial fib:  -s/p ablation and pacemaker 7/23   - appreciate Dr. Lisa Meier input   -restarted Eliquis  -amiodarone discontinued per cards as causing hypothyroidism   - Echo: Moderately dilated left ventricle. Severely reduced systolic function. The estimated EF is 30 - 35%  - cardiology Dr. Kristen Black: ok to DC    # Hypothyroidism: synthroid  -TSH high,As we stopped amio now, recheck TSH again 1 month. Continue current dose of synthroid     # Small subpleural nodular densities in the right upper lobe with the largest  measuring 0.8 cm.  Follow-up CT is recommended 4 months to assess for interval change     #In the abdomen, there is suspicion of isodense mass projected inferiorly off  the right lobe of the liver may represent an area of focal nodular hyperplasia  -Need OP follow up    Discharge recommendations:  PCP f/u in 1 week  Cardiology f/u in 1 week  Monitor BP at home  PPM care per Dr. Salazar Mouse in 4 months     Discharge plan discussed in detail with patient. She understood and agreed        Physical examination at discharge  Visit Vitals  /60 (BP 1 Location: Left upper arm, BP Patient Position: At rest)   Pulse 80   Temp 97.7 °F (36.5 °C)   Resp 16   Ht 5' 5\" (1.651 m)   Wt 82 kg (180 lb 12.4 oz)   SpO2 98%   BMI 30.08 kg/m²     Constitutional:  No acute distress, cooperative, pleasant    ENT:  Oral mucosa moist, EOMI,anicteric sclera,normal conjunctiva. Resp:  clear to auscultation b/l, No wheezing/rhonchi/rales. No accessory muscle use   CV:  irregular rhythm, normal rate, S1,S 2 wnl    GI:  Soft, non distended, non tender. normoactive bowel sounds, no hepatosplenomegaly     Musculoskeletal:  No edema, warm, 2+ pulses throughout    Neurologic:  Moves all extremities. AAOx3, CN II-XII reviewed                               Pertinent imaging studies:    Per EMR     Recent Labs     07/25/21  0250 07/24/21  0423 07/23/21  0106   WBC 9.7 10.6 9.4   HGB 10.5* 10.8* 10.5*   HCT 33.3* 34.8* 33.1*    237 249     Recent Labs     07/24/21  0423 07/23/21  0110    138   K 4.2 4.2    106   CO2 28 27   BUN 14 18   CREA 0.78 0.78   * 132*   CA 8.4* 8.4*   PHOS 3.4 3.2     Recent Labs     07/24/21  0423 07/23/21  0110   ALB 2.3* 2.3*     No results for input(s): INR, PTP, APTT, INREXT in the last 72 hours. No results for input(s): FE, TIBC, PSAT, FERR in the last 72 hours. No results for input(s): PH, PCO2, PO2 in the last 72 hours. No results for input(s): CPK, CKMB in the last 72 hours.     No lab exists for component: TROPONINI  No components found for: Jv Point    Chronic Diagnoses:    Problem List as of 7/25/2021 Date Reviewed: 7/22/2021        Codes Class Noted - Resolved    persistent atrial fibrillation  ICD-10-CM: I48.91  ICD-9-CM: 427.31  6/21/2021 - Present        S/P ablation of atrial fibrillation ICD-10-CM: Z98.890, Z86.79  ICD-9-CM: V45.89  5/10/2019 - Present        * (Principal) Acute on chronic systolic (congestive) heart failure (HCC) ICD-10-CM: I50.23  ICD-9-CM: 428.23, 428.0  7/21/2021 - Present        Cardiac/pericardial tamponade ICD-10-CM: I31.4  ICD-9-CM: 423.3  6/21/2021 - Present        S/P pericardial window creation ICD-10-CM: Z98.890  ICD-9-CM: V45.89  6/21/2021 - Present        Pericardial effusion with cardiac tamponade ICD-10-CM: I31.3, I31.4  ICD-9-CM: 423.9, 423.3  5/10/2019 - Present    Overview Signed 5/10/2019  1:34 PM by Izabela Stanely MD     Loculated posterior LA and LV, no tamponade             Non-rheumatic mitral regurgitation ICD-10-CM: I34.0  ICD-9-CM: 424.0  5/10/2019 - Present    Overview Signed 5/10/2019  1:34 PM by Izabela Stanley MD     moderate             Non-rheumatic tricuspid valve insufficiency ICD-10-CM: I36.1  ICD-9-CM: 424.2  5/10/2019 - Present        Encounter for cardioversion procedure ICD-10-CM: Z01.89  ICD-9-CM: V72.85  3/28/2018 - Present    Overview Signed 3/28/2018  1:06 PM by Izabela Stanley MD     3/28/2018 200 J to NSR after a TIM without thrombus             Dizziness ICD-10-CM: R42  ICD-9-CM: 780.4  3/27/2018 - Present        Persistent [facility] fibrillation ([facility] ICD-10-CM: I48.19  ICD-9-CM: 427.31  3/27/2018 - Present        Cystocele ICD-10-CM: GIV7241  ICD-9-CM: NUI1808  8/1/2014 - Present        Uterine prolapse ICD-10-CM: N81.4  ICD-9-CM: 618.1  8/1/2014 - Present        HTN (hypertension) ICD-10-CM: I10  ICD-9-CM: 401.9  Unknown - Present        Breast cancer ([facility] ICD-10-CM: C50.919  ICD-9-CM: 174.9  Unknown - Present        Lung nodule ICD-10-CM: R91.1  ICD-9-CM: 793.11  Unknown - Present    Overview Signed 5/14/2014 10:34 AM by MD Dr. Imelda Suero: J45.909  ICD-9-CM: 493.90  Unknown - Present        Hyperlipidemia LDL goal < 130 ICD-10-CM: E78.5  ICD-9-CM: 272.4  Unknown - Present    Overview Signed 4/26/2013  9:17 AM by Henny Ring MD     for increased LDL particles, Dr. Nancy Fnag             Dyslipidemia ICD-10-CM: E78.5  ICD-9-CM: 272.4  Unknown - Present    Overview Signed 12/31/2011  4:11 PM by Malik Carter MD     labs 2008 - chol 283, HDL 83, ,              Palpitations ICD-10-CM: R00.2  ICD-9-CM: 785.1  Unknown - Present        Chest pain, unspecified ICD-10-CM: R07.9  ICD-9-CM: 786.50  12/31/2011 - Present              Time spent on discharge related activities today greater than 30 minutes.       Signed:  Madi Howell MD                 Hospitalist, Internal Medicine      Cc: La Joy MD

## 2021-07-25 NOTE — PROGRESS NOTES
Cardiology Progress Note      7/25/2021 8:45 AM    Admit Date: 7/21/2021    Admit Diagnosis: Acute on chronic systolic (congestive) heart failure (HCC) [I50.23]      Subjective:     Chalmer Coffee breathing improved. No chest pain.      Visit Vitals  /60 (BP 1 Location: Left upper arm, BP Patient Position: At rest)   Pulse 80   Temp 97.7 °F (36.5 °C)   Resp 16   Ht 5' 5\" (1.651 m)   Wt 180 lb 12.4 oz (82 kg)   SpO2 98%   BMI 30.08 kg/m²     Current Facility-Administered Medications   Medication Dose Route Frequency    sodium chloride (NS) flush 5-40 mL  5-40 mL IntraVENous Q8H    sodium chloride (NS) flush 5-40 mL  5-40 mL IntraVENous PRN    HYDROcodone-acetaminophen (NORCO) 5-325 mg per tablet 1 Tablet  1 Tablet Oral Q4H PRN    apixaban (ELIQUIS) tablet 5 mg  5 mg Oral BID    sodium chloride (NS) flush 5-40 mL  5-40 mL IntraVENous Q8H    sodium chloride (NS) flush 5-40 mL  5-40 mL IntraVENous PRN    naloxone (NARCAN) injection 0.4 mg  0.4 mg IntraVENous PRN    albuterol-ipratropium (DUO-NEB) 2.5 MG-0.5 MG/3 ML  3 mL Nebulization Q6H PRN    atorvastatin (LIPITOR) tablet 20 mg  20 mg Oral QPM    cetirizine (ZYRTEC) tablet 10 mg  10 mg Oral QPM    ferrous sulfate tablet 325 mg  325 mg Oral DAILY    levothyroxine (SYNTHROID) tablet 25 mcg  25 mcg Oral ACB    metoprolol succinate (TOPROL-XL) XL tablet 25 mg  25 mg Oral DAILY    montelukast (SINGULAIR) tablet 10 mg  10 mg Oral QPM    potassium chloride SR (KLOR-CON 10) tablet 20 mEq  20 mEq Oral DAILY    raloxifene (EVISTA) tablet 60 mg  60 mg Oral DAILY    sacubitriL-valsartan (ENTRESTO) 24-26 mg tablet 1 Tablet  1 Tablet Oral BID    spironolactone (ALDACTONE) tablet 25 mg  25 mg Oral DAILY    sodium chloride (NS) flush 5-40 mL  5-40 mL IntraVENous Q8H    sodium chloride (NS) flush 5-40 mL  5-40 mL IntraVENous PRN    polyethylene glycol (MIRALAX) packet 17 g  17 g Oral DAILY PRN    ondansetron (ZOFRAN ODT) tablet 4 mg  4 mg Oral Q8H PRN    Or  ondansetron (ZOFRAN) injection 4 mg  4 mg IntraVENous Q6H PRN    bumetanide (BUMEX) tablet 1 mg  1 mg Oral DAILY         Objective:      Physical Exam:  Visit Vitals  /60 (BP 1 Location: Left upper arm, BP Patient Position: At rest)   Pulse 80   Temp 97.7 °F (36.5 °C)   Resp 16   Ht 5' 5\" (1.651 m)   Wt 180 lb 12.4 oz (82 kg)   SpO2 98%   BMI 30.08 kg/m²     General Appearance:  Alert, appropriate, no acute distress. Ears/Nose/Mouth/Throat:   Hearing grossly normal.         Neck: Supple. No JVD   Chest:   Lungs clear to auscultation bilaterally. Cardiovascular:  Regular paced, S1, S2 normal, no murmur. Abdomen:   Soft, non-tender, bowel sounds are active. Extremities: No edema bilaterally. Skin: Warm and dry.                Data Review:   Labs:    Recent Results (from the past 24 hour(s))   ECHO ADULT FOLLOW-UP OR LIMITED    Collection Time: 07/24/21  3:51 PM   Result Value Ref Range    BP EF 60.7 55.0 - 100.0 percent    LV Ejection Fraction MOD 2C 67 percent    LV Ejection Fraction MOD 4C 52 percent    LV ED Vol A2C 115.29 mL    LV ED Vol A4C 79.27 mL    LV ED Vol BP 96.75 56.0 - 104.0 mL    LV ES Vol A2C 37.48 mL    LV ES Vol A4C 37.72 mL    LV ES Vol BP 37.99 19.0 - 49.0 mL    LVES Vol Index BP 20.3 mL/m2    LVED Vol Index BP 51.7 mL/m2    LVED Vol Index A4C 42.4 mL/m2    LVED Vol Index A2C 61.7 mL/m2    LVES Vol Index A4C 20.2 mL/m2    LVES Vol Index A2C 20.0 mL/m2   CBC WITH AUTOMATED DIFF    Collection Time: 07/25/21  2:50 AM   Result Value Ref Range    WBC 9.7 3.6 - 11.0 K/uL    RBC 3.39 (L) 3.80 - 5.20 M/uL    HGB 10.5 (L) 11.5 - 16.0 g/dL    HCT 33.3 (L) 35.0 - 47.0 %    MCV 98.2 80.0 - 99.0 FL    MCH 31.0 26.0 - 34.0 PG    MCHC 31.5 30.0 - 36.5 g/dL    RDW 15.2 (H) 11.5 - 14.5 %    PLATELET 583 487 - 782 K/uL    MPV 12.2 8.9 - 12.9 FL    NRBC 0.0 0  WBC    ABSOLUTE NRBC 0.00 0.00 - 0.01 K/uL    NEUTROPHILS 64 32 - 75 %    LYMPHOCYTES 22 12 - 49 %    MONOCYTES 9 5 - 13 % EOSINOPHILS 3 0 - 7 %    BASOPHILS 1 0 - 1 %    IMMATURE GRANULOCYTES 1 (H) 0.0 - 0.5 %    ABS. NEUTROPHILS 6.3 1.8 - 8.0 K/UL    ABS. LYMPHOCYTES 2.2 0.8 - 3.5 K/UL    ABS. MONOCYTES 0.8 0.0 - 1.0 K/UL    ABS. EOSINOPHILS 0.3 0.0 - 0.4 K/UL    ABS. BASOPHILS 0.1 0.0 - 0.1 K/UL    ABS. IMM. GRANS. 0.1 (H) 0.00 - 0.04 K/UL    DF AUTOMATED         Telemetry: V paced      Assessment:     Principal Problem:    Acute on chronic systolic (congestive) heart failure ([facility] (7/21/2021)        Plan:     afib s/p pacer with RV lead and AV node ablation. LV lead was unable to be placed given anatomy; consider epicardial LV lead for BiV pacing in future. Repeat echo with EF 35% overall unchanged, no significant pericardial effusion. Ok for hospital dc cardiac wise; outpt follow up with Dr. Nelly Thomson. Will sign off.   L effusion s/p thoracentesis 7/2021  Pericardial effusion s/p window

## 2021-07-25 NOTE — DISCHARGE INSTRUCTIONS
PATIENT INSTRUCTIONS POST-PACEMAKER UPGRADE & AV NODE ABLATION    1. No heavy lifting or exercises with the left arm for 4 weeks. This includes the following:  Do not raise arm above the shoulder level to comb hair, pull on clothes, etc... Do not use the affected arm to pull up or push up from a seated or laying  down position. Do not allow anyone else to pull on the affected arm. 2.  The dressing at your groin site may be removed the day after your procedure. You may shower with your Aquacel chest dressing in place. You may remove your chest dressing in 1 week, or it can be removed in clinic at follow up if you prefer. 3.  Do not drive for 3 days    4. Call Dr. Danelle Guthrie at [phone] if you experience any of the following symptoms:  1. Redness at the pacemaker site or groin  2. Swelling at or around the pacemaker or in the left arm or groin  3. Pain around the pacemaker or groin  4. Dizziness, lightheadedness, fainting spells  5. Lack of energy  6. Shortness of breath  7. Rapid heart rate  8. Chest or muscle twitches    5. Follow-up with Dr. Crispin Astudillo office as scheduled below. Future Appointments   Date Time Provider Hazel Wade   8/6/2021  9:00 AM PACEMAKER3, CRISTINA MULLER AMB   8/6/2021  9:30 AM WOUND CHECKS, CRISTINA MULLER AMB   8/13/2021  3:00 PM Patrick Colunga MD CAVREY BS AMB     6. You may use over the counter pain medication (Tylenol) and ice pack for pain relief as needed. You may wear the sling as a reminder to keep your arm below your shoulder. Bianca Mcdaniel M.D. [facility]  Electrophysiology/Cardiology  [facility] [place]  [address], [address] [facility], [place] [address] [facility], [address] [place] [address]  [phone]      Please bring this form with you to show your primary care provider at your follow-up appointment.     Primary care provider:  Dr. Joy Betancourt MD    Discharging provider:  Arely Rodriguez MD    You have been admitted to the hospital with the following diagnoses:  · Acute on chronic systolic (congestive) heart failure ([facility] [I50.23]    FOLLOW-UP CARE RECOMMENDATIONS:    APPOINTMENTS:  · Follow-up with primary care provider, Dr. Joy Betancourt MD  -  Please call [phone] shortly after discharge to set up an appointment to be seen in  1 week    · Cardiology in 1-2 weeks     FOLLOW-UP TESTS recommended: none     PENDING TEST RESULTS:  At the time of your discharge the following test results are still pending: none   Please make sure you review these results with your outpatient follow-up provider(s). SYMPTOMS to watch for: chest pain, shortness of breath, fever, chills, nausea, vomiting, diarrhea, change in mentation, falling, weakness, bleeding. DIET/what to eat:  Cardiac Diet    ACTIVITY:  Activity as tolerated    What to do if new or unexpected symptoms occur? If you experience any of the above symptoms (or should other concerns or questions arise after discharge) please call your primary care physician. Return to the emergency room if you cannot get hold of your doctor. · It is very important that you keep your follow-up appointment(s). · Please bring discharge papers, medication list (and/or medication bottles) to your follow-up appointments for review by your outpatient provider(s). · Please check the list of medications and be sure it includes every medication (even non-prescription medications) that your provider wants you to take. · It is important that you take the medication exactly as they are prescribed. · Keep your medication in the bottles provided by the pharmacist and keep a list of the medication names, dosages, and times to be taken in your wallet. · Do not take other medications without consulting your doctor.    · If you have any questions about your medications or other instructions, please talk to your nurse or care provider before you leave the hospital.    I understand that if any problems occur once I am at home I am to contact my physician. These instructions were explained to me and I had the opportunity to ask questions.

## 2021-07-25 NOTE — PROGRESS NOTES
0730: Bedside shift change report given to Inocencia RN/Anni RN (oncoming nurse) by Carlos A Carias RN (offgoing nurse). Report included the following information SBAR, Kardex, Intake/Output, MAR, Recent Results, and Cardiac Rhythm Paced . 1230: Laly RN and I have reviewed discharge instructions with the patient. The patient verbalized understanding. Problem: Falls - Risk of  Goal: *Absence of Falls  Description: Document [place] Pyo Fall Risk and appropriate interventions in the flowsheet.   Outcome: Progressing Towards Goal  Note: Fall Risk Interventions:  Mobility Interventions: Utilize walker, cane, or other assistive device, Bed/chair exit alarm, Communicate number of staff needed for ambulation/transfer, Patient to call before getting OOB         Medication Interventions: Bed/chair exit alarm, Assess postural VS orthostatic hypotension, Patient to call before getting OOB    Elimination Interventions: Bed/chair exit alarm, Call light in reach    History of Falls Interventions: Bed/chair exit alarm, Door open when patient unattended, Investigate reason for fall, Room close to nurse's station         Problem: Patient Education: Go to Patient Education Activity  Goal: Patient/Family Education  Outcome: Progressing Towards Goal     Problem: Breathing Pattern - Ineffective  Goal: *Absence of hypoxia  Outcome: Progressing Towards Goal  Goal: *Use of effective breathing techniques  Outcome: Progressing Towards Goal  Goal: *PALLIATIVE CARE:  Alleviation of Dyspnea  Outcome: Progressing Towards Goal     Problem: Patient Education: Go to Patient Education Activity  Goal: Patient/Family Education  Outcome: Progressing Towards Goal     Problem: Patient Education: Go to Patient Education Activity  Goal: Patient/Family Education  Outcome: Progressing Towards Goal     Problem: Patient Education: Go to Patient Education Activity  Goal: Patient/Family Education  Outcome: Progressing Towards Goal

## 2021-07-25 NOTE — PROGRESS NOTES
1230: Charting and patient care of Patricia Cook by Rosalie Bland RN from 8430 to [phone] was supervised and reviewed by this RN. 1230: I have reviewed discharge instructions with the patient. The patient verbalized understanding.

## 2021-07-25 NOTE — PROGRESS NOTES
Transition of Care: Patient is being discharged today with [facility]/OT/SN. Cm contacted Madiha Durand ([phone]) with [facility] and informed her of the discharge. * Madiha Durand informed cm that she would have her supervisor follow up on Monday to confirm PCP will follow patient post discharge for [address] pt has received, reviewed, and signed 2nd IM letter informing them of their right to appeal the discharge. Signed copy has been placed on pt bedside chart.

## 2021-07-25 NOTE — PROGRESS NOTES
1930: Bedside shift change report given to Elayne Pace (oncoming nurse) by Carlotta Hassan RN (offgoing nurse). Report included the following information SBAR, Kardex, Intake/Output, MAR, Recent Results, and Cardiac Rhythm paced . Problem: Falls - Risk of  Goal: *Absence of Falls  Description: Document Emmanuel Castro Fall Risk and appropriate interventions in the flowsheet.   Outcome: Progressing Towards Goal  Note: Fall Risk Interventions:  Mobility Interventions: Communicate number of staff needed for ambulation/transfer, Patient to call before getting OOB, Bed/chair exit alarm, Utilize walker, cane, or other assistive device         Medication Interventions: Teach patient to arise slowly, Patient to call before getting OOB    Elimination Interventions: Call light in reach, Patient to call for help with toileting needs    History of Falls Interventions: Bed/chair exit alarm, Consult care management for discharge planning, Door open when patient unattended, Investigate reason for fall, Room close to nurse's station

## 2021-07-26 ENCOUNTER — PATIENT OUTREACH (OUTPATIENT)
Dept: CASE MANAGEMENT | Age: 81
End: 2021-07-26

## 2021-07-26 ENCOUNTER — TELEPHONE (OUTPATIENT)
Dept: CARDIOLOGY CLINIC | Age: 81
End: 2021-07-26

## 2021-07-26 LAB
BACTERIA SPEC CULT: NORMAL
BACTERIA SPEC CULT: NORMAL
GRAM STN SPEC: NORMAL
GRAM STN SPEC: NORMAL
SERVICE CMNT-IMP: NORMAL

## 2021-07-26 NOTE — TELEPHONE ENCOUNTER
Patient states she needs a 2-week f/u with Dr. Aparna Mitchell after the pacemaker procedure. Patient is requesting a call back.     PHONE: [phone]

## 2021-07-26 NOTE — PROGRESS NOTES
Care Transitions Initial Call    Call within 2 business days of discharge: Yes     Patient: Marilou Ware Patient : 1940 MRN: [id number]    Last Discharge [address]       Complaint Diagnosis Description Type Department Provider    21 Shortness of Breath Dyspnea, unspecified type . .. ED to Hosp-Admission (Discharged) (ADMIT) Taylor Moseley MD; Jason Zamora, ... Was this an external facility discharge? No Discharge Facility: [facility]    Challenges to be reviewed by the provider   Additional needs identified to be addressed with provider: no         Method of communication with provider : none    Discussed COVID-19 related testing which was not done at this time. Advance Care Planning:   Does patient have an Advance Directive: daughter reports she thinks she has one. Not on file. Will discuss with patient. .     Inpatient Readmission Risk score: Unplanned Readmit Risk Score: 32    Was this a readmission? no   Patient stated reason for the admission: \" Breathing was labored\"    Patients top risk factors for readmission: none noted. Interventions to address risk factors: Scheduled appointment with PCP- and Obtained and reviewed discharge summary and/or continuity of care documents    Care Transition Nurse (CTN) contacted the family by telephone to perform post hospital discharge assessment. Verified name and  with family as identifiers. Provided introduction to self, and explanation of the CTN role. CTN reviewed discharge instructions, medical action plan and red flags with family who verbalized understanding. Were discharge instructions available to patient? yes. Reviewed appropriate site of care based on symptoms and resources available to patient including: PCP, Specialist, [address] [place] [address] [facility] and When to call 911. Family given an opportunity to ask questions and does not have any further questions or concerns at this time.  The family agrees to contact the PCP office for questions related to their healthcare. Medication reconciliation was performed with family, who verbalizes understanding of administration of home medications. Advised obtaining a 90-day supply of all daily and as-needed medications. Referral to Pharm D needed: no     Home Health/Outpatient orders at discharge: home health care, PT, OT and [address]: PRITI OLVERA [facility]  Date of initial visit: TBA    Durable Medical Equipment ordered at discharge: None    Covid Risk Education    Educated patient about risk for severe COVID-19 due to risk factors according to CDC guidelines. CTN reviewed discharge instructions, medical action plan and red flag symptoms with the family who verbalized understanding. Discussed COVID vaccination status: yes. Education provided on COVID-19 vaccination as appropriate. Discussed exposure protocols and quarantine with CDC Guidelines. Family was given an opportunity to verbalize any questions and concerns and agrees to contact CTN or health care provider for questions related to their healthcare. Was patient discharged with a pulse oximeter? no.    Discussed follow-up appointments. If no appointment was previously scheduled, appointment scheduling offered: yes. Is follow up appointment scheduled within 7 days of discharge? yes. [facility] follow up appointment(s):   Future Appointments   Date Time Provider Hazel Wade   8/6/2021  9:00 AM PACEMAKER3, CRISTINA MULLER AMB   8/6/2021  9:30 AM WOUND CHECKS, CRISTINA MULLER AMB   8/13/2021  3:00 PM Patrick Colunga MD CAVREY BS AMB     [facility] follow up appointment(s): PCP 7/28    Plan for follow-up call in 7-10 days based on severity of symptoms and risk factors. Plan for next call: CHF and follow up appointments  CTN provided contact information for future needs. Goals Addressed                 This Visit's Progress     ACP        7/26/2021  No ACP on file. Daughter states that she believes mother has ACP document.  CTN will discuss with patient at next call. -CTN to follow up in 1 week       Attends follow-up appointments as directed. 7/26/2021  -Will attend follow up with PCP on 7/28/2021  -Will attend Cardiology follow up on 8/6/2021  -CTN to follow up in 1 week  SP       Prevent complications post hospitalization. 7/26/2021  -CTN discussed importance of daily weighting with daughter. She states that her mother is in the habit of weighing daily for the past few weeks. Will report weight gain > 3lbs in 1 day or >5lbs in 1 week. -CHF progression briefly discussed with daughter. Encouraged her to discuss with Cardiologist at follow up appointment  -CTN educated daughter on medications and all medications were verified. -CTN discussed post-op pacemaker implant instructions. Will not remove dressing.  Will monitor for signs/symptoms of infection  -CTN to follow up in 1 week  SP

## 2021-07-26 NOTE — TELEPHONE ENCOUNTER
Returned patient call and verified identity by two identifiers. Confirmed with patient that her procedure f/u is scheduled on 8//6/2021. Patient verbalized all understanding and had no additional questions.     Future Appointments   Date Time Provider Hazel Romeroi   7/27/2021 To Be Determined Clari Crawford [facility] [address] [facility] [place]   8/6/2021  9:00 AM PACEMAKER3, CRISTINA MULLER AMB   8/6/2021  9:30 AM WOUND CHECKS, CRISTINA MULLER AMB   8/13/2021  3:00 PM MD JOSÉ Rubio AMB

## 2021-07-27 ENCOUNTER — TELEPHONE (OUTPATIENT)
Dept: CARDIOLOGY CLINIC | Age: 81
End: 2021-07-27

## 2021-07-27 ENCOUNTER — HOME CARE VISIT (OUTPATIENT)
Dept: SCHEDULING | Facility: HOME HEALTH | Age: 81
End: 2021-07-27
Payer: MEDICARE

## 2021-07-27 VITALS
TEMPERATURE: 97.8 F | OXYGEN SATURATION: 99 % | DIASTOLIC BLOOD PRESSURE: 64 MMHG | RESPIRATION RATE: 18 BRPM | HEART RATE: 84 BPM | SYSTOLIC BLOOD PRESSURE: 102 MMHG

## 2021-07-27 DIAGNOSIS — I48.91 ATRIAL FIBRILLATION, UNSPECIFIED TYPE (HCC): ICD-10-CM

## 2021-07-27 DIAGNOSIS — I48.19 PERSISTENT ATRIAL FIBRILLATION (HCC): Primary | ICD-10-CM

## 2021-07-27 PROCEDURE — 3331090002 HH PPS REVENUE DEBIT

## 2021-07-27 PROCEDURE — 400013 HH SOC

## 2021-07-27 PROCEDURE — 3331090001 HH PPS REVENUE CREDIT

## 2021-07-27 PROCEDURE — 400018 HH-NO PAY CLAIM PROCEDURE

## 2021-07-27 PROCEDURE — G0299 HHS/HOSPICE OF RN EA 15 MIN: HCPCS

## 2021-07-27 NOTE — TELEPHONE ENCOUNTER
Did an admission visit   Discharge summary states patient is taking eliquis 2.5 bid  Patient has been taking 5mg bid  Requesting clarification.         OWZDL:[phone]

## 2021-07-28 ENCOUNTER — HOME CARE VISIT (OUTPATIENT)
Dept: SCHEDULING | Facility: HOME HEALTH | Age: 81
End: 2021-07-28
Payer: MEDICARE

## 2021-07-28 VITALS
HEART RATE: 80 BPM | DIASTOLIC BLOOD PRESSURE: 60 MMHG | OXYGEN SATURATION: 95 % | SYSTOLIC BLOOD PRESSURE: 110 MMHG | RESPIRATION RATE: 12 BRPM | TEMPERATURE: 97.4 F

## 2021-07-28 PROCEDURE — G0151 HHCP-SERV OF PT,EA 15 MIN: HCPCS

## 2021-07-28 PROCEDURE — 3331090001 HH PPS REVENUE CREDIT

## 2021-07-28 PROCEDURE — 3331090002 HH PPS REVENUE DEBIT

## 2021-07-29 ENCOUNTER — HOME CARE VISIT (OUTPATIENT)
Dept: SCHEDULING | Facility: HOME HEALTH | Age: 81
End: 2021-07-29
Payer: MEDICARE

## 2021-07-29 VITALS
HEART RATE: 82 BPM | OXYGEN SATURATION: 96 % | SYSTOLIC BLOOD PRESSURE: 105 MMHG | DIASTOLIC BLOOD PRESSURE: 62 MMHG | TEMPERATURE: 96.6 F

## 2021-07-29 PROCEDURE — 3331090002 HH PPS REVENUE DEBIT

## 2021-07-29 PROCEDURE — 3331090001 HH PPS REVENUE CREDIT

## 2021-07-29 PROCEDURE — G0152 HHCP-SERV OF OT,EA 15 MIN: HCPCS

## 2021-07-29 NOTE — TELEPHONE ENCOUNTER
Patient should be taking Eliquis 5 mg po bid. She doesn't meet weight or renal criteria for reduced dose.

## 2021-07-29 NOTE — TELEPHONE ENCOUNTER
Called pt two patient identifiers confirmed. Notified pt about taking Eliquis 5 mg two times a day. Pt verbalized understanding of information discussed w/ no further questions at this time.

## 2021-07-29 NOTE — PROGRESS NOTES
Pharmacist Discharge Medication Reconciliation    Discharging Provider: Wan Garcia MD    Significant PMH:   Past Medical History:   Diagnosis Date    Arthritis     Asthma     dr. Corry Tipton allergist    Atrial fibrillation [facility])     Breast cancer [facility]) 1980    right - mastectomy    Coagulation disorder ([address]     on eliquis    Dyslipidemia     labs 2008 - chol 283, HDL 83, ,     HTN (hypertension)     Hyperlipidemia LDL goal < 130     for increased LDL particles, Dr. Neo Hankins nodule     Dr. Holger Welsh     resolved     Chief Complaint for this Admission:   Chief Complaint   Patient presents with    Shortness of Breath     Allergies: Betadine [povidone-iodine], Iodine, and Norvasc [amlodipine]    Discharge Medications:   Discharge Medication List as of 7/25/2021 12:18 PM        CONTINUE these medications which have NOT CHANGED    Details   chlorhexidine (Hibiclens) 4 % liquid Apply to the upper chest area from shoulder/neck to mid line of chest and to below the nipple every day, 5 days prior to the procedure., Normal, Disp-1 Bottle, R-0To start using 5 days prior to her procedure on 9/1/21      apixaban (Eliquis) 2.5 mg tablet Take 2.5 mg by mouth two (2) times a day., Historical Med      metoprolol succinate (TOPROL-XL) 25 mg XL tablet Take 1 Tablet by mouth daily. , Normal, Disp-90 Tablet, R-1      spironolactone (ALDACTONE) 25 mg tablet Take 1 Tablet by mouth daily. , Normal, Disp-90 Tablet, R-1      bumetanide (BUMEX) 1 mg tablet Take 1 Tablet by mouth daily. , Normal, Disp-90 Tablet, R-1      ferrous sulfate 325 mg (65 mg iron) tablet Take 1 Tablet by mouth daily. , Normal, Disp-90 Tablet, R-1      sacubitriL-valsartan (Entresto) 24-26 mg tablet Take 1 Tablet by mouth two (2) times a day., Normal, Disp-180 Tablet, R-1To start 48 hour after stopping Losartan      potassium chloride (K-DUR, KLOR-CON) 20 mEq tablet Take 1 Tablet by mouth daily. , Normal, Disp-90 Tablet, R-1Changes to Daily 5/26/21      amoxicillin (AMOXIL) 500 mg capsule Take 500 mg by mouth as needed. Only for dental procedure, Historical Med      acetaminophen (Tylenol Extra Strength) 500 mg tablet Take 500 mg by mouth nightly as needed., Historical Med      levothyroxine (SYNTHROID) 25 mcg tablet Take  by mouth Daily (before breakfast). , Historical Med      fluticasone (FLOVENT HFA) 220 mcg/actuation inhaler as needed., Historical Med      atorvastatin (LIPITOR) 20 mg tablet Take 20 mg by mouth every evening., Historical Med      MULTIVITAMIN PO Take  by mouth. Takes one po once daily. , Historical Med      cholecalciferol (VITAMIN D3) 5,000 unit capsule Take 5,000 Units by mouth daily. , Historical Med      cetirizine (ZYRTEC) 10 mg tablet Take 10 mg by mouth every evening., Historical Med      estradiol (ESTRACE) 0.01 % (0.1 mg/gram) vaginal cream Insert  into vagina every Monday and Thursday., Historical Med      EPINEPHrine (EPIPEN) 0.3 mg/0.3 mL (1:1,000) injection 0.3 mL by IntraMUSCular route once as needed for up to 1 dose., Print, Disp-0.3 mL, R-0      albuterol (PROVENTIL HFA, VENTOLIN HFA, PROAIR HFA) 90 mcg/actuation inhaler Take 1 Puff by inhalation every four (4) hours as needed., Mail Order, Disp-2 Inhaler, R-3      raloxifene (EVISTA) 60 mg tablet Take 1 Tab by mouth daily. , Normal, Disp-90 Tab, R-4      montelukast (SINGULAIR) 10 mg tablet Take 10 mg by mouth every evening., Historical Med           STOP taking these medications       amiodarone (CORDARONE) 200 mg tablet Comments:   Reason for Stopping:         cyclobenzaprine (FLEXERIL) 5 mg tablet Comments:   Reason for Stopping:         diphenhydrAMINE (BenadryL) 25 mg capsule Comments:   Reason for Stopping:               The patient's chart, MAR and AVS were reviewed by Shawn Whitfield RPH.

## 2021-07-30 ENCOUNTER — HOME CARE VISIT (OUTPATIENT)
Dept: SCHEDULING | Facility: HOME HEALTH | Age: 81
End: 2021-07-30
Payer: MEDICARE

## 2021-07-30 VITALS
OXYGEN SATURATION: 97 % | SYSTOLIC BLOOD PRESSURE: 98 MMHG | RESPIRATION RATE: 12 BRPM | HEART RATE: 84 BPM | DIASTOLIC BLOOD PRESSURE: 68 MMHG | TEMPERATURE: 97.4 F

## 2021-07-30 PROCEDURE — G0151 HHCP-SERV OF PT,EA 15 MIN: HCPCS

## 2021-07-30 PROCEDURE — G0300 HHS/HOSPICE OF LPN EA 15 MIN: HCPCS

## 2021-07-30 PROCEDURE — 3331090001 HH PPS REVENUE CREDIT

## 2021-07-30 PROCEDURE — 3331090002 HH PPS REVENUE DEBIT

## 2021-07-31 VITALS
BODY MASS INDEX: 29.38 KG/M2 | OXYGEN SATURATION: 97 % | HEART RATE: 68 BPM | RESPIRATION RATE: 20 BRPM | TEMPERATURE: 97.5 F | DIASTOLIC BLOOD PRESSURE: 70 MMHG | WEIGHT: 176.56 LBS | SYSTOLIC BLOOD PRESSURE: 126 MMHG

## 2021-07-31 PROCEDURE — 3331090001 HH PPS REVENUE CREDIT

## 2021-07-31 PROCEDURE — 3331090002 HH PPS REVENUE DEBIT

## 2021-08-01 PROCEDURE — 3331090001 HH PPS REVENUE CREDIT

## 2021-08-01 PROCEDURE — 3331090002 HH PPS REVENUE DEBIT

## 2021-08-02 ENCOUNTER — HOME CARE VISIT (OUTPATIENT)
Dept: SCHEDULING | Facility: HOME HEALTH | Age: 81
End: 2021-08-02
Payer: MEDICARE

## 2021-08-02 VITALS
BODY MASS INDEX: 29.07 KG/M2 | TEMPERATURE: 97.9 F | RESPIRATION RATE: 15 BRPM | DIASTOLIC BLOOD PRESSURE: 77 MMHG | HEART RATE: 79 BPM | OXYGEN SATURATION: 97 % | WEIGHT: 174.7 LBS | SYSTOLIC BLOOD PRESSURE: 126 MMHG

## 2021-08-02 PROCEDURE — 3331090002 HH PPS REVENUE DEBIT

## 2021-08-02 PROCEDURE — 3331090001 HH PPS REVENUE CREDIT

## 2021-08-02 PROCEDURE — G0300 HHS/HOSPICE OF LPN EA 15 MIN: HCPCS

## 2021-08-03 ENCOUNTER — HOME CARE VISIT (OUTPATIENT)
Dept: SCHEDULING | Facility: HOME HEALTH | Age: 81
End: 2021-08-03
Payer: MEDICARE

## 2021-08-03 VITALS
DIASTOLIC BLOOD PRESSURE: 60 MMHG | RESPIRATION RATE: 12 BRPM | HEART RATE: 81 BPM | SYSTOLIC BLOOD PRESSURE: 108 MMHG | OXYGEN SATURATION: 98 % | TEMPERATURE: 97.7 F

## 2021-08-03 PROCEDURE — 3331090001 HH PPS REVENUE CREDIT

## 2021-08-03 PROCEDURE — G0151 HHCP-SERV OF PT,EA 15 MIN: HCPCS

## 2021-08-03 PROCEDURE — 3331090002 HH PPS REVENUE DEBIT

## 2021-08-04 ENCOUNTER — HOME CARE VISIT (OUTPATIENT)
Dept: SCHEDULING | Facility: HOME HEALTH | Age: 81
End: 2021-08-04
Payer: MEDICARE

## 2021-08-04 PROCEDURE — 3331090002 HH PPS REVENUE DEBIT

## 2021-08-04 PROCEDURE — 3331090001 HH PPS REVENUE CREDIT

## 2021-08-04 PROCEDURE — G0300 HHS/HOSPICE OF LPN EA 15 MIN: HCPCS

## 2021-08-05 ENCOUNTER — HOME CARE VISIT (OUTPATIENT)
Dept: SCHEDULING | Facility: HOME HEALTH | Age: 81
End: 2021-08-05
Payer: MEDICARE

## 2021-08-05 VITALS
RESPIRATION RATE: 12 BRPM | TEMPERATURE: 97.5 F | SYSTOLIC BLOOD PRESSURE: 102 MMHG | OXYGEN SATURATION: 98 % | DIASTOLIC BLOOD PRESSURE: 60 MMHG | HEART RATE: 74 BPM

## 2021-08-05 PROCEDURE — 3331090001 HH PPS REVENUE CREDIT

## 2021-08-05 PROCEDURE — 3331090002 HH PPS REVENUE DEBIT

## 2021-08-05 PROCEDURE — G0151 HHCP-SERV OF PT,EA 15 MIN: HCPCS

## 2021-08-06 ENCOUNTER — CLINICAL SUPPORT (OUTPATIENT)
Dept: CARDIOLOGY CLINIC | Age: 81
End: 2021-08-06
Payer: MEDICARE

## 2021-08-06 ENCOUNTER — CLINICAL SUPPORT (OUTPATIENT)
Dept: CARDIOLOGY CLINIC | Age: 81
End: 2021-08-06

## 2021-08-06 DIAGNOSIS — Z95.0 CARDIAC PACEMAKER IN SITU: Primary | ICD-10-CM

## 2021-08-06 DIAGNOSIS — Z95.0 CARDIAC PACEMAKER IN SITU: ICD-10-CM

## 2021-08-06 DIAGNOSIS — I48.91 ATRIAL FIBRILLATION, UNSPECIFIED TYPE (HCC): Primary | ICD-10-CM

## 2021-08-06 PROCEDURE — 93280 PM DEVICE PROGR EVAL DUAL: CPT | Performed by: INTERNAL MEDICINE

## 2021-08-06 PROCEDURE — 3331090002 HH PPS REVENUE DEBIT

## 2021-08-06 PROCEDURE — 3331090001 HH PPS REVENUE CREDIT

## 2021-08-06 RX ORDER — METOPROLOL SUCCINATE 25 MG/1
12.5 TABLET, EXTENDED RELEASE ORAL DAILY
Qty: 45 TABLET | Refills: 1
Start: 2021-08-06 | End: 2021-08-11

## 2021-08-06 RX ORDER — SPIRONOLACTONE 25 MG/1
12.5 TABLET ORAL DAILY
Qty: 90 TABLET | Refills: 1
Start: 2021-08-06 | End: 2021-11-24

## 2021-08-06 NOTE — PROGRESS NOTES
Cardiac Electrophysiology Wound Check Note      Wound Check   Patient is here for wound check s/p AV node & BiV PPM.   No fever, drainage   Physical Exam   Constitutional: well-developed and well-nourished. Skin: Left side pocket is healing without redness, drainage, hematoma. The wound is intact. Tegaderm removed      ASSESSMENT and PLAN   The incision is healing without redness, drainage, hematoma. Intact with skin glue. The patient has been compliant with arm restrictions. They will continue arms restrictions for 2 more weeks. current treatment plan is effective, no change in therapy   Device check shows proper lead and generator functions    Follow-up Disposition:  Return 3 months I will check via device clinic or remote monitoring in the future    Patient states she is still short of breath walking short distances. Denies chest pain specifically upon inspiration or movement. Discussed with Florentino Olsen NP. BP checked today in office 86/62. VO per Florentino Olsen NP ordered CBC, decrease Toprol XL to 12.5 mg daily and Spironolactone 12.5 mg daily. Patient to check BP daily. Patient has previously scheduled appointment with Dr. Lexy Rogers next week. Patient verbalized understanding and will call with any other questions.

## 2021-08-06 NOTE — ADDENDUM NOTE
Addended by: [facility] Woody on: 8/6/2021 09:53 AM     Modules accepted: Orders

## 2021-08-06 NOTE — PATIENT INSTRUCTIONS
Please have your labs drawn today    Please decrease Toprol XL 12.5 mg daily and Spironolactone 12.5 mg daily    Please check BP daily and keep a long    Please keep follow up with Dr. Lexy Rogers next week

## 2021-08-07 PROCEDURE — 3331090002 HH PPS REVENUE DEBIT

## 2021-08-07 PROCEDURE — 3331090001 HH PPS REVENUE CREDIT

## 2021-08-08 PROCEDURE — 3331090002 HH PPS REVENUE DEBIT

## 2021-08-08 PROCEDURE — 3331090001 HH PPS REVENUE CREDIT

## 2021-08-09 ENCOUNTER — TELEPHONE (OUTPATIENT)
Dept: CARDIOLOGY CLINIC | Age: 81
End: 2021-08-09

## 2021-08-09 ENCOUNTER — HOME CARE VISIT (OUTPATIENT)
Dept: SCHEDULING | Facility: HOME HEALTH | Age: 81
End: 2021-08-09
Payer: MEDICARE

## 2021-08-09 VITALS
TEMPERATURE: 97.9 F | WEIGHT: 172.8 LBS | DIASTOLIC BLOOD PRESSURE: 60 MMHG | HEART RATE: 81 BPM | OXYGEN SATURATION: 97 % | BODY MASS INDEX: 28.76 KG/M2 | RESPIRATION RATE: 16 BRPM | SYSTOLIC BLOOD PRESSURE: 122 MMHG

## 2021-08-09 PROCEDURE — 3331090002 HH PPS REVENUE DEBIT

## 2021-08-09 PROCEDURE — 3331090001 HH PPS REVENUE CREDIT

## 2021-08-09 PROCEDURE — G0300 HHS/HOSPICE OF LPN EA 15 MIN: HCPCS

## 2021-08-09 NOTE — TELEPHONE ENCOUNTER
We're usually cautious with supplements, but melatonin doesn't specifically interact with any of her other medications. If she'd like to try it, would start with low dose.

## 2021-08-09 NOTE — TELEPHONE ENCOUNTER
Verified patient with two types of identifiers. Notified patient lab results and MD recommendations. Patient will stop Potassium. Patient states she is feeling better since making medication changes. Patient reports being able to complete PT route walking around her house that she was not previously able to do last week. Patient's main complaint at this time is insomnia. Patient reports being able to sleep 2-3 hours and then wakes up and is unable to go back to sleep. Nothing in particular wakes her up. Daughter discussed possible melatonin with patient but she wanted to check prior to trying. Will ask NP for recommendations regarding Melatonin.

## 2021-08-09 NOTE — TELEPHONE ENCOUNTER
----- Message from Solo Morales NP sent at 8/9/2021  8:57 AM EDT -----  See other encounter for BMP result & Dr. Deepail Ferrera advisement to stop KCl supplement. WBC mildly elevated, not significantly different from some values 2 weeks ago. Mildly anemic, but improved compared to previous. Nothing that would explain her current symptoms. Medication changes were made in clinic on 08/06/2021 due to low BP, which will hopefully improve her symptoms.

## 2021-08-09 NOTE — TELEPHONE ENCOUNTER
----- Message from Brad Matta MD sent at 8/6/2021  6:20 PM EDT -----  Sodium has come down slightly and potassium is mildly elevated     I recommend to stop potassium first  Future Appointments     8/11/2021  2:40 PM    MD JOSÉ Rodriguez              BS AMB    11/18/2021 3:00 PM    [email or web address]              BS AMB  11/18/2021 3:20 PM    MD JOSÉ Henriquez              BS AMB

## 2021-08-09 NOTE — TELEPHONE ENCOUNTER
Verified patient with two types of identifiers. Notified patient of NP recommendations. Patient verbalized understanding and will call with any other questions.

## 2021-08-10 ENCOUNTER — HOME CARE VISIT (OUTPATIENT)
Dept: SCHEDULING | Facility: HOME HEALTH | Age: 81
End: 2021-08-10
Payer: MEDICARE

## 2021-08-10 VITALS
OXYGEN SATURATION: 98 % | TEMPERATURE: 97.3 F | DIASTOLIC BLOOD PRESSURE: 72 MMHG | HEART RATE: 84 BPM | SYSTOLIC BLOOD PRESSURE: 108 MMHG | RESPIRATION RATE: 12 BRPM

## 2021-08-10 PROCEDURE — G0151 HHCP-SERV OF PT,EA 15 MIN: HCPCS

## 2021-08-10 PROCEDURE — 3331090001 HH PPS REVENUE CREDIT

## 2021-08-10 PROCEDURE — 3331090002 HH PPS REVENUE DEBIT

## 2021-08-11 ENCOUNTER — PATIENT OUTREACH (OUTPATIENT)
Dept: CASE MANAGEMENT | Age: 81
End: 2021-08-11

## 2021-08-11 ENCOUNTER — OFFICE VISIT (OUTPATIENT)
Dept: CARDIOLOGY CLINIC | Age: 81
End: 2021-08-11
Payer: MEDICARE

## 2021-08-11 VITALS
HEART RATE: 83 BPM | RESPIRATION RATE: 16 BRPM | HEIGHT: 65 IN | SYSTOLIC BLOOD PRESSURE: 98 MMHG | DIASTOLIC BLOOD PRESSURE: 60 MMHG | WEIGHT: 172 LBS | OXYGEN SATURATION: 96 % | BODY MASS INDEX: 28.66 KG/M2

## 2021-08-11 DIAGNOSIS — I42.0 CONGESTIVE CARDIOMYOPATHY (HCC): Primary | ICD-10-CM

## 2021-08-11 PROCEDURE — G8399 PT W/DXA RESULTS DOCUMENT: HCPCS | Performed by: SPECIALIST

## 2021-08-11 PROCEDURE — G0463 HOSPITAL OUTPT CLINIC VISIT: HCPCS | Performed by: SPECIALIST

## 2021-08-11 PROCEDURE — 3331090002 HH PPS REVENUE DEBIT

## 2021-08-11 PROCEDURE — 1111F DSCHRG MED/CURRENT MED MERGE: CPT | Performed by: SPECIALIST

## 2021-08-11 PROCEDURE — 3288F FALL RISK ASSESSMENT DOCD: CPT | Performed by: SPECIALIST

## 2021-08-11 PROCEDURE — 3331090001 HH PPS REVENUE CREDIT

## 2021-08-11 PROCEDURE — G8510 SCR DEP NEG, NO PLAN REQD: HCPCS | Performed by: SPECIALIST

## 2021-08-11 PROCEDURE — G8754 DIAS BP LESS 90: HCPCS | Performed by: SPECIALIST

## 2021-08-11 PROCEDURE — G8417 CALC BMI ABV UP PARAM F/U: HCPCS | Performed by: SPECIALIST

## 2021-08-11 PROCEDURE — G8752 SYS BP LESS 140: HCPCS | Performed by: SPECIALIST

## 2021-08-11 PROCEDURE — G8427 DOCREV CUR MEDS BY ELIG CLIN: HCPCS | Performed by: SPECIALIST

## 2021-08-11 PROCEDURE — 1090F PRES/ABSN URINE INCON ASSESS: CPT | Performed by: SPECIALIST

## 2021-08-11 PROCEDURE — 99214 OFFICE O/P EST MOD 30 MIN: CPT | Performed by: SPECIALIST

## 2021-08-11 PROCEDURE — G8536 NO DOC ELDER MAL SCRN: HCPCS | Performed by: SPECIALIST

## 2021-08-11 PROCEDURE — 1100F PTFALLS ASSESS-DOCD GE2>/YR: CPT | Performed by: SPECIALIST

## 2021-08-11 RX ORDER — METOPROLOL SUCCINATE 25 MG/1
12.5 TABLET, EXTENDED RELEASE ORAL
Qty: 45 TABLET | Refills: 1
Start: 2021-08-11 | End: 2022-03-04

## 2021-08-11 RX ORDER — BUMETANIDE 1 MG/1
0.5 TABLET ORAL DAILY
Qty: 45 TABLET | Refills: 1
Start: 2021-08-11 | End: 2021-11-10 | Stop reason: SDUPTHER

## 2021-08-11 NOTE — PROGRESS NOTES
Visit Vitals  BP 98/60   Pulse 83   Resp 16   Ht 5' 5\" (1.651 m)   Wt 172 lb (78 kg)   SpO2 96%   BMI 28.62 kg/m²

## 2021-08-11 NOTE — PROGRESS NOTES
[facility]                                                            OFFICE NOTE        Denny Fernández M.D.,KATIE Sarai Juarez   1940  [id number]    Date/Time:  8/11/20211:18 PM            SUBJECTIVE:  She is feeling better less sob and less fatigue  With lower aldactone and toprol       Assessment/Plan  1.  Atrial fibrillation: She now has a persistent atrial fibrillation.  She is symptomatic mostly with shortness of breath and fatigue.     She is status post partial ablation of atrial fibrillation May 6431.  This was complicated by pericardial effusion.     S/p 2nd attempt to AF ablation on 7/21 also this resulted in pericardial tamponade requiring pericardial window    Off amiodarone     Continue with Eliquis of course as well.       She has had some hypothyroidism and bradycardia with  amiodarone.     2.  Hypertension:low normal     3.  Cardiomyopathy: moderate in severity    Continue low dose of toprol aldactone and losartan and entresto    Start taking toprol at night    Decrease bumex to 0.5 mg daily ( no evidence for volume issues at this time)     We have discussed significance implication of cardiomyopathy. Nuclear stress test discussed with no ischemia and ef 49%     Lab Results   Component Value Date/Time    TSH 7.66 (H) 07/22/2021 05:01 AM             Otherwise I will see her back in approximately 8 weeks weeks with limited echo          HPI   80 y. o. female. Patient with h/o HTN, HLD, Ca score of 0 in 2012, PVCs on ECG on 1/16 and echo on 1/16 with EF 55% mild MR. S/p LTKR in 1/16(seen by Dr. Nelson Higgins)  Also remote h/o breast ca s/p mastectomy in 1980 but no chemo or xrt     Echo on 4/16:Ejection fraction was estimated to be 55 %. There were no  regional wall motion abnormalities.  Wall thickness was at the upper limits  of normal.    Left atrium: The atrium was mildly dilated. Mitral valve: There was mild regurgitation. Aortic valve: Leaflets exhibited sclerosis. holter on 4/16: NSR  frequent pacs couplets triples and occasional non sustained at, frequent pvcs (0.65%)     On 3/18: admitted to [facility] 27 onset A-fib with RVR     TIM and Cardioversion completed with 200 J with conversion to NSR  TIM no PFO, trace AR, mild MR, no TR or NJ Mild atherosclerosis in prox descending aortaNo KENNEDY thrombus LA moderate dilation RA normal  Started eliquis, flecainide and metoprolol     Echo on 8/20/18: The ventricle was dilated. Systolic function was mildly  reduced by EF (biplane method of disks). Ejection fraction was estimated  to be 45 % in the range of 40 % to 50 %. There was mild diffuse  hypokinesis.        TIM /CARDIOVERSION on 2/19 resulting in NSR. Flecainide resumed     CXR on 2/25/19:1. Interval development of small bilateral pleural effusions and mild basilar     atelectasis. Follow-up to resolution is suggested. Doppler LLE on 2/19:No evidence of left lower extremity vein thrombosis.     chest ct on 3/19:1. CTA pulmonary vein/left atrial mapping performed. 2. No accessory pulmonary vein, thrombus or stenosis. 3. 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 4. No acute finding.      AF ablation on 5/19  EP study & partial PVI of the left superior vein and full PVI of the right veins during ablation on 05/10/2019, developed pericardial effusion during procedure.  No tamponade.  She was able to restarted Eliquis thereafter.   but  she was admitted to CCU due to posterior lateral LA LV moderate pericardial effusion during left pulmonary vein isolation.  Heparin was reversed.  She was stable so no pericardiocentesis was warranted  Started on AMIO and BBlockers    On 7/21 :Ms. Soria presented for planned EPS with AF/AFL ablation on 06/21/2021.   During procedure, she had acute pericardial tamponade during a difficult transseptal puncture as sheath was advanced into the left atrium. Emergency pericardiocentesis done, stable & later extubated in CVICU.     That evening, she became hypotensive & bradycardic with possible PEA, VT. Shocked out of VT, received epi & compressions. Reintubated, taken to OR with no acute bleeding found. Dr. Jerel Barboza noted clot in the pericardium, evacuated & placed Gavin drain for pericardial window. Required levophed for BP support, received 2 units PRBCs. Extubated after left thoracentesis. Stabilized. Drain later removed without difficulty. Limited echo on 06/25/2021 showed only trivial to small posterior pericardial effusion.     Transient thrombocytopenia to 80, but HIT platelet serotonin release assay negative. Readmitted for pleural effusion and thoracentesis on 8/21              CARDIAC STUDIES        07/21/21    ECHO ADULT FOLLOW-UP OR LIMITED 07/24/2021 7/24/2021    Interpretation Summary  · LV: Moderately dilated left ventricle. Severely reduced systolic function. The estimated EF is 30 - 35%. · Pericardium: Insignificant pericardial effusion or fat. There is left pleural effusion. Signed by: Laney Murray MD on 7/24/2021  8:42 PM            06/14/21    NUCLEAR CARDIAC STRESS TEST 06/15/2021 6/17/2021    Interpretation Summary  · SPECT: Left ventricular function post-stress was abnormal. Calculated ejection fraction is 49%. The TID ratio is 1.1. · Baseline ECG: Atrial flutter, non-specific ST-T wave abnormalities, myocardial infarction, myocardial infarction. The infarction is located in the anterior regions. .  · SPECT: Myocardial perfusion imaging defect 1: There is a defect that is small in size with a mild reduction in uptake present in the mid-apical and anterior location(s) that is non-reversible. There is normal wall motion in the defect area. Viability in the area is good. The defect appears to probably be artifact caused by breast attenuation.   · SPECT: Left ventricular perfusion is probably normal. Myocardial perfusion imaging supports a low risk stress test.    Signed by: Gemma Herron MD on 6/15/2021  5:18 PM                    EKG Results     None              IMAGING      MRI Results (most recent):  Results from [place] encounter on 07/16/13    MRI KNEE RT WO CONT    Narrative  **Final Report**      ICD Codes / Adm. Diagnosis: 836.2  401.9 / Other tear of cartilage or men  Examination:  MR KNEE WO CON RT  - [id number] - Jul 16 2013  3:28PM  Accession No:  [id number]  Reason:  tear      REPORT:  EXAM:  Right knee MRI without contrast    INDICATION: Pain    COMPARISON: None    TECHNIQUE: Axial T2 fat-saturated and proton density fat-saturated; coronal  T1 and proton density fat-saturated; and sagittal T2 fat-saturated, proton  density fat-saturated, and gradient echo MRI of the     knee . CONTRAST:  None. FINDINGS: Bone marrow: Degenerative type bone marrow edema is present in the  medial aspects of the medial femoral condyle and medial tibial plateau. A  small focus of subchondral bone marrow edema is present in the lateral facet  of the patella. There is no evidence of fracture or marrow replacing process. Joint fluid: There is a mild joint effusion. Areas of synovitis are seen  throughout the joint space, particularly in the suprapatellar recess and  anterior to the lateral compartment. There is additional 9 mm loose body  along the course of the popliteal tendon on series 7 image 55. There is a moderate-sized Baker's cyst posterior medial to the knee. This  measures 2.0 x 1.9 x 7.7 cm craniocaudal. There is a small rounded loose  body within this measuring 5 mm on series 8 image 4. Several strands of  synovitis are seen within this. Collateral ligaments and posterior, lateral corner: Intact. Medial meniscus: The medial meniscus is extruded. There is a complete radial  tear of the posterior horn of the medial meniscus as evidenced by an absent  posterior horn on series 9 image 8.  The distal material is flipped  inferiorly and laterally into the gutter on series 8 image 4 and series 6  image 17. Lateral meniscus: High signal is seen in the free edge of the body related  to free edge tearing. Irregular increased signal is seen anterior to the  anterior horn is likely related to synovitis. ACL and PCL: A small area mucoid degeneration is present in the proximal  ACL. The ACL and PCL are intact however. Tendons: Intact. .    Muscles: Within normal limits. Patellofemoral alignment: No patellar subluxation/tilt. Trochlear groove is  not hypoplastic. TT-TG distance: Normal at 12 mm. Articular cartilage: Full-thickness cartilage loss is seen along the  weightbearing surfaces of the medial femoral condyle and medial tibial  plateau with significant marginal osteophytosis. Mild underlying subchondral  bone marrow edema is present medially. Near full-thickness fissuring is seen along the vertical ridge of the  patella. Mild irregular cartilage loss is seen along the lateral aspect of  the patellofemoral compartment. Small marginal osteophytes are seen adjacent  to this. There is a small area of subchondral bone marrow edema related to a  full-thickness fissure in the lateral facet of patella. There is a shallow thickness of fissuring are seen along the lateral femoral  condyle towards the tibial spine. Significant marginal osteophytes are  present. There is diffuse mild cartilage thinning throughout the lateral  compartment. Soft tissue mass: None. Nonspecific soft tissue edema is seen anterior to  the patellar tendon. IMPRESSION:  1. Complete radial tear of the posterior horn of the medial meniscus with  displacement of meniscal material into the inferior gutter. 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 2. Moderate lateral and patellofemoral osteoarthritis. 3. Free edge tearing in the body of the lateral meniscus.   4. Moderate joint effusion with areas of extensive synovitis likely related  to long-standing degenerative disease. 5. A moderate-sized Baker's cyst is seen posterior medial to the knee with  areas of synovitis and a small loose body. An additional small loose body is  seen along the course of the popliteus tendon. Signing/Reading Doctor: Tabby Zhu ([id number])  Approved: Tabby Zhu ([id number])  Jul 16 2013  4:08PM      CT Results (most recent):  Results from Hospital Encounter encounter on 07/21/21    CT SPINE CERV WO CONT    Narrative  EXAM:  CT CERVICAL SPINE WITHOUT CONTRAST    INDICATION: Fall. COMPARISON: None. CONTRAST:  None. TECHNIQUE: Multislice helical CT of the cervical spine was performed without  intravenous contrast administration. Sagittal and coronal reformats were  generated. CT dose reduction was achieved through use of a standardized  protocol tailored for this examination and automatic exposure control for dose  modulation. FINDINGS:    There is a left pleural effusion. See CT chest report. The alignment is within normal limits. There is no fracture or compression  deformity. The odontoid process is intact. The craniocervical junction is within  normal limits. There are mild degenerative disc changes throughout the cervical  spine    The incidentally imaged soft tissues are within normal limits. C2-C3: There is no spinal canal or neural foraminal stenosis. C3-C4: There is slight bulging of the disc and mild facet arthropathy with mild  central stenosis. Neural foramina are patent. [facility] C4-C5: There are degenerative disc changes and facet arthropathy right greater  than left with mild central stenosis and moderate narrowing of the right neural  foramen and mild narrowing of the left neural foramen. [facility] C5-C6: There are degenerative disc changes with small osteophyte/disc complex  with moderate central stenosis and mild narrowing of the left neural foramen. [facility]     C6-C7: There are degenerative disc changes with moderate central stenosis and  moderately severe narrowing of the left neural foramen. .    C7-T1: There is no spinal canal or neural foraminal stenosis. Impression  No fracture is identified. There are degenerative changes in the cervical spine  as detailed above. XR Results (most recent):  Results from Hospital Encounter encounter on 07/21/21    XR CHEST PORT    Narrative  EXAM: Chest radiograph    INDICATION: Evaluation for pneumothorax    COMPARISON: 7/23/2021    FINDINGS: A portable AP radiograph of the chest was obtained at 2118 hours. The  patient is on a cardiac monitor. There is a dual-lead pacemaker in place. A  small left pleural effusion is seen. The cardiac and mediastinal contours and  pulmonary vascularity are remarkable for cardiomegaly. The bones and soft  tissues are grossly within normal limits. Impression  Small left pleural effusion. No pneumothorax.           Past Medical History:   Diagnosis Date    Arthritis     Asthma     dr. Sami Oglesby allergist    Atrial fibrillation ([address]     Breast cancer ([facility] 1980    right - mastectomy    Coagulation disorder ([address]     on eliquis    Dyslipidemia     labs 2008 - chol 283, HDL 83, ,     HTN (hypertension)     Hyperlipidemia LDL goal < 130     for increased LDL particles, Dr. Lorraine Espinosa nodule     Dr. Tram Bartholomew Palpitations     resolved     Past Surgical History:   Procedure Laterality Date    COLONOSCOPY N/A 9/10/2018    COLONOSCOPY performed by Tyrell Aguirre MD at [facility] 149 W/O CONT WITH CALCIUM  1/2012    CAC score 0; calcified mediastinal lymph nodes consistent granulomatous disease    ECHO 2D ADULT  5/5/2008    normal, LVEF 60%    HX APPENDECTOMY      HX HYSTERECTOMY Bilateral 03/19/2015    and uro repair    HX KNEE REPLACEMENT Right     HX MASTECTOMY Right     HX TONSILLECTOMY      HX UROLOGICAL  8/1/14    Urodynamics    INTRACARD ECHO, THER/DX INTERVENT N/A 5/10/2019    Intracardiac Echocardiogram performed by David Rivera MD at [address] EXTERNAL  3/28/2018         [place] CHEST SURGERY PROCEDURE UNLISTED      lung nodule removed - benign    [place] EPHYS EVL TRNSPTL TX ATRIAL FIB ISOLAT PULM VEIN N/A 5/10/2019    ABLATION A-FIB  W COMPLETE EP STUDY performed by David Rivera MD at [address] CATH LAB    [place] INTRACARDIAC ELECTROPHYSIOLOGIC 3D [address] [facility] N/A 5/10/2019    Ep 3d Mapping performed by David Rivera MD at [address] CATH LAB    STRESS TEST CARDIOLITE  1/5/12    walked 7:31, no chest pain, normal MPI. Social History     Tobacco Use    Smoking status: Never Smoker    Smokeless tobacco: Never Used   Substance Use Topics    Alcohol use: Yes     Alcohol/week: 7.0 standard drinks     Types: 7 Standard drinks or equivalent per week     Comment: wine nightly    Drug use: No     Family History   Problem Relation Age of Onset    Heart Failure Mother     Stroke Father     Other Other         endometrial cancer, niece     Allergies   Allergen Reactions    Betadine [Povidone-Iodine] Rash    Iodine Rash    Norvasc [Amlodipine] Other (comments)     Flushing/redness. There were no vitals taken for this visit. There were no vitals filed for this visit. Review of Systems:   Pertinent items are noted in the History of Present Illness. Neck: no JVD  Heart: regularly irregular rhythm  Lungs: diminished breath sounds R base, L base  Abdomen: soft, non-tender. Bowel sounds normal. No masses,  no organomegaly  Extremities: no edema      Current Outpatient Medications on File Prior to Visit   Medication Sig Dispense Refill    metoprolol succinate (TOPROL-XL) 25 mg XL tablet Take 0.5 Tablets by mouth daily. 45 Tablet 1    spironolactone (ALDACTONE) 25 mg tablet Take 0.5 Tablets by mouth daily. 90 Tablet 1    apixaban (Eliquis) 5 mg tablet Take 1 Tablet by mouth two (2) times a day.  180 Tablet 3  cholecalciferol (VITAMIN D3) (2,000 UNITS /50 MCG) cap capsule Take 2,000 Units by mouth daily.  bumetanide (BUMEX) 1 mg tablet Take 1 Tablet by mouth daily. 90 Tablet 1    ferrous sulfate 325 mg (65 mg iron) tablet Take 1 Tablet by mouth daily. 90 Tablet 1    sacubitriL-valsartan (Entresto) 24-26 mg tablet Take 1 Tablet by mouth two (2) times a day. 180 Tablet 1    amoxicillin (AMOXIL) 500 mg capsule Take 500 mg by mouth as needed. Only for dental procedure      acetaminophen (Tylenol Extra Strength) 500 mg tablet Take 500 mg by mouth nightly as needed for Pain.  levothyroxine (SYNTHROID) 25 mcg tablet Take 25 mcg by mouth Daily (before breakfast). pt has 50mcg tab, take 0.5 tab daily before breakfast      fluticasone (FLOVENT HFA) 220 mcg/actuation inhaler as needed.  atorvastatin (LIPITOR) 20 mg tablet Take 20 mg by mouth every evening.  MULTIVITAMIN PO Take  by mouth. Takes one po once daily.  cetirizine (ZYRTEC) 10 mg tablet Take 10 mg by mouth every evening.  estradiol (ESTRACE) 0.01 % (0.1 mg/gram) vaginal cream Insert 2 g into vagina every Monday and Thursday.  EPINEPHrine (EPIPEN) 0.3 mg/0.3 mL (1:1,000) injection 0.3 mL by IntraMUSCular route once as needed for up to 1 dose. (Patient taking differently: 0.3 mL by IntraMUSCular route once as needed for Allergic Response.) 0.3 mL 0    albuterol (PROVENTIL HFA, VENTOLIN HFA, PROAIR HFA) 90 mcg/actuation inhaler Take 1 Puff by inhalation every four (4) hours as needed. (Patient taking differently: Take 1 Puff by inhalation every four (4) hours as needed for Wheezing.) 2 Inhaler 3    raloxifene (EVISTA) 60 mg tablet Take 1 Tab by mouth daily. 90 Tab 4    montelukast (SINGULAIR) 10 mg tablet Take 10 mg by mouth every evening. No current facility-administered medications on file prior to visit. Abdoul Bland had no medications administered during this visit.      Current Outpatient Medications   Medication Sig    metoprolol succinate (TOPROL-XL) 25 mg XL tablet Take 0.5 Tablets by mouth daily.  spironolactone (ALDACTONE) 25 mg tablet Take 0.5 Tablets by mouth daily.  apixaban (Eliquis) 5 mg tablet Take 1 Tablet by mouth two (2) times a day.  cholecalciferol (VITAMIN D3) (2,000 UNITS /50 MCG) cap capsule Take 2,000 Units by mouth daily.  bumetanide (BUMEX) 1 mg tablet Take 1 Tablet by mouth daily.  ferrous sulfate 325 mg (65 mg iron) tablet Take 1 Tablet by mouth daily.  sacubitriL-valsartan (Entresto) 24-26 mg tablet Take 1 Tablet by mouth two (2) times a day.  amoxicillin (AMOXIL) 500 mg capsule Take 500 mg by mouth as needed. Only for dental procedure    acetaminophen (Tylenol Extra Strength) 500 mg tablet Take 500 mg by mouth nightly as needed for Pain.  levothyroxine (SYNTHROID) 25 mcg tablet Take 25 mcg by mouth Daily (before breakfast). pt has 50mcg tab, take 0.5 tab daily before breakfast    fluticasone (FLOVENT HFA) 220 mcg/actuation inhaler as needed.  atorvastatin (LIPITOR) 20 mg tablet Take 20 mg by mouth every evening.  MULTIVITAMIN PO Take  by mouth. Takes one po once daily.  cetirizine (ZYRTEC) 10 mg tablet Take 10 mg by mouth every evening.  estradiol (ESTRACE) 0.01 % (0.1 mg/gram) vaginal cream Insert 2 g into vagina every Monday and Thursday.  EPINEPHrine (EPIPEN) 0.3 mg/0.3 mL (1:1,000) injection 0.3 mL by IntraMUSCular route once as needed for up to 1 dose. (Patient taking differently: 0.3 mL by IntraMUSCular route once as needed for Allergic Response.)    albuterol (PROVENTIL HFA, VENTOLIN HFA, PROAIR HFA) 90 mcg/actuation inhaler Take 1 Puff by inhalation every four (4) hours as needed. (Patient taking differently: Take 1 Puff by inhalation every four (4) hours as needed for Wheezing.)    raloxifene (EVISTA) 60 mg tablet Take 1 Tab by mouth daily.  montelukast (SINGULAIR) 10 mg tablet Take 10 mg by mouth every evening.      No current facility-administered medications for this visit. Lab Results   Component Value Date/Time    Cholesterol, total 142 03/28/2018 04:00 AM    HDL Cholesterol 76 03/28/2018 04:00 AM    LDL, calculated 55.2 03/28/2018 04:00 AM    VLDL, calculated 10.8 03/28/2018 04:00 AM    Triglyceride 54 03/28/2018 04:00 AM    CHOL/HDL Ratio 1.9 03/28/2018 04:00 AM       Lab Results   Component Value Date/Time    Sodium 134 (L) 08/06/2021 09:58 AM    Potassium 5.1 08/06/2021 09:58 AM    Chloride 103 08/06/2021 09:58 AM    CO2 24 08/06/2021 09:58 AM    Anion gap 7 08/06/2021 09:58 AM    Glucose 120 (H) 08/06/2021 09:58 AM    BUN 27 (H) 08/06/2021 09:58 AM    Creatinine 0.88 08/06/2021 09:58 AM    BUN/Creatinine ratio 31 (H) 08/06/2021 09:58 AM    GFR est AA >60 08/06/2021 09:58 AM    GFR est non-AA >60 08/06/2021 09:58 AM    Calcium 8.9 08/06/2021 09:58 AM       Lab Results   Component Value Date/Time    ALT (SGPT) 31 07/22/2021 05:01 AM    Alk.  phosphatase 76 07/22/2021 05:01 AM    Bilirubin, direct 0.1 06/21/2021 02:51 PM    Bilirubin, total 0.7 07/22/2021 05:01 AM       Lab Results   Component Value Date/Time    WBC 12.0 (H) 08/06/2021 09:58 AM    Hemoglobin (POC) 12.9 10/25/2009 03:04 AM    HGB 10.8 (L) 08/06/2021 09:58 AM    Hematocrit (POC) 38 10/25/2009 03:04 AM    HCT 33.8 (L) 08/06/2021 09:58 AM    PLATELET 798 64/96/4266 09:58 AM    MCV 98.5 08/06/2021 09:58 AM       Lab Results   Component Value Date/Time    TSH 7.66 (H) 07/22/2021 05:01 AM         Lab Results   Component Value Date/Time    Cholesterol, total 142 03/28/2018 04:00 AM    Cholesterol, total 175 12/18/2015 10:18 AM    Cholesterol, total 251 (H) 06/03/2015 08:45 AM    Cholesterol, total 206 (H) 05/14/2014 10:46 AM    Cholesterol, total 201 (H) 04/26/2013 09:56 AM    HDL Cholesterol 76 03/28/2018 04:00 AM    HDL Cholesterol 93 12/18/2015 10:18 AM    HDL Cholesterol 89 06/03/2015 08:45 AM    HDL Cholesterol 92 05/14/2014 10:46 AM    HDL Cholesterol 99 04/26/2013 09:56 AM    LDL, calculated 55.2 03/28/2018 04:00 AM    LDL, calculated 68 12/18/2015 10:18 AM    LDL, calculated 141 (H) 06/03/2015 08:45 AM    LDL, calculated 98 05/14/2014 10:46 AM    LDL, calculated 83 04/26/2013 09:56 AM    Triglyceride 54 03/28/2018 04:00 AM    Triglyceride 68 12/18/2015 10:18 AM    Triglyceride 104 06/03/2015 08:45 AM    Triglyceride 79 05/14/2014 10:46 AM    Triglyceride 96 04/26/2013 09:56 AM    CHOL/HDL Ratio 1.9 03/28/2018 04:00 AM                Please note that this dictation was completed with IDverge, the computer voice recognition software. Quite often unanticipated grammatical, syntax, homophones, and other interpretative errors are inadvertently transcribed by the computer software. Please disregard these errors. Please excuse any errors that have escaped final proofreading.

## 2021-08-12 ENCOUNTER — HOME CARE VISIT (OUTPATIENT)
Dept: SCHEDULING | Facility: HOME HEALTH | Age: 81
End: 2021-08-12
Payer: MEDICARE

## 2021-08-12 PROCEDURE — 3331090002 HH PPS REVENUE DEBIT

## 2021-08-12 PROCEDURE — 3331090001 HH PPS REVENUE CREDIT

## 2021-08-12 PROCEDURE — G0151 HHCP-SERV OF PT,EA 15 MIN: HCPCS

## 2021-08-13 ENCOUNTER — HOME CARE VISIT (OUTPATIENT)
Dept: SCHEDULING | Facility: HOME HEALTH | Age: 81
End: 2021-08-13
Payer: MEDICARE

## 2021-08-13 VITALS
OXYGEN SATURATION: 96 % | HEART RATE: 84 BPM | SYSTOLIC BLOOD PRESSURE: 100 MMHG | RESPIRATION RATE: 12 BRPM | DIASTOLIC BLOOD PRESSURE: 62 MMHG | TEMPERATURE: 97.5 F

## 2021-08-13 PROCEDURE — G0300 HHS/HOSPICE OF LPN EA 15 MIN: HCPCS

## 2021-08-13 PROCEDURE — 3331090002 HH PPS REVENUE DEBIT

## 2021-08-13 PROCEDURE — 3331090001 HH PPS REVENUE CREDIT

## 2021-08-14 PROCEDURE — 3331090002 HH PPS REVENUE DEBIT

## 2021-08-14 PROCEDURE — 3331090001 HH PPS REVENUE CREDIT

## 2021-08-15 VITALS
OXYGEN SATURATION: 98 % | DIASTOLIC BLOOD PRESSURE: 66 MMHG | TEMPERATURE: 97.7 F | BODY MASS INDEX: 29.09 KG/M2 | DIASTOLIC BLOOD PRESSURE: 70 MMHG | SYSTOLIC BLOOD PRESSURE: 124 MMHG | WEIGHT: 174.8 LBS | RESPIRATION RATE: 16 BRPM | HEART RATE: 71 BPM | WEIGHT: 172.4 LBS | BODY MASS INDEX: 28.69 KG/M2 | HEART RATE: 80 BPM | SYSTOLIC BLOOD PRESSURE: 119 MMHG | RESPIRATION RATE: 16 BRPM | OXYGEN SATURATION: 98 % | TEMPERATURE: 98.1 F

## 2021-08-15 PROCEDURE — 3331090002 HH PPS REVENUE DEBIT

## 2021-08-15 PROCEDURE — 3331090001 HH PPS REVENUE CREDIT

## 2021-08-16 ENCOUNTER — HOME CARE VISIT (OUTPATIENT)
Dept: SCHEDULING | Facility: HOME HEALTH | Age: 81
End: 2021-08-16
Payer: MEDICARE

## 2021-08-16 VITALS
WEIGHT: 174 LBS | BODY MASS INDEX: 28.96 KG/M2 | SYSTOLIC BLOOD PRESSURE: 117 MMHG | HEART RATE: 79 BPM | DIASTOLIC BLOOD PRESSURE: 67 MMHG | RESPIRATION RATE: 16 BRPM | TEMPERATURE: 97.8 F | OXYGEN SATURATION: 98 %

## 2021-08-16 PROCEDURE — 3331090002 HH PPS REVENUE DEBIT

## 2021-08-16 PROCEDURE — 3331090001 HH PPS REVENUE CREDIT

## 2021-08-16 PROCEDURE — G0300 HHS/HOSPICE OF LPN EA 15 MIN: HCPCS

## 2021-08-17 ENCOUNTER — HOME CARE VISIT (OUTPATIENT)
Dept: SCHEDULING | Facility: HOME HEALTH | Age: 81
End: 2021-08-17
Payer: MEDICARE

## 2021-08-17 PROCEDURE — G0151 HHCP-SERV OF PT,EA 15 MIN: HCPCS

## 2021-08-17 PROCEDURE — 3331090002 HH PPS REVENUE DEBIT

## 2021-08-17 PROCEDURE — 3331090001 HH PPS REVENUE CREDIT

## 2021-08-18 ENCOUNTER — HOME CARE VISIT (OUTPATIENT)
Dept: SCHEDULING | Facility: HOME HEALTH | Age: 81
End: 2021-08-18
Payer: MEDICARE

## 2021-08-18 ENCOUNTER — TELEPHONE (OUTPATIENT)
Dept: CARDIOLOGY CLINIC | Age: 81
End: 2021-08-18

## 2021-08-18 ENCOUNTER — HOME CARE VISIT (OUTPATIENT)
Dept: HOME HEALTH SERVICES | Facility: HOME HEALTH | Age: 81
End: 2021-08-18
Payer: MEDICARE

## 2021-08-18 VITALS
HEART RATE: 77 BPM | OXYGEN SATURATION: 98 % | RESPIRATION RATE: 15 BRPM | DIASTOLIC BLOOD PRESSURE: 74 MMHG | TEMPERATURE: 98 F | SYSTOLIC BLOOD PRESSURE: 118 MMHG | WEIGHT: 173.2 LBS | BODY MASS INDEX: 28.82 KG/M2

## 2021-08-18 VITALS
HEART RATE: 63 BPM | TEMPERATURE: 98.2 F | RESPIRATION RATE: 12 BRPM | SYSTOLIC BLOOD PRESSURE: 118 MMHG | DIASTOLIC BLOOD PRESSURE: 82 MMHG | OXYGEN SATURATION: 98 %

## 2021-08-18 PROCEDURE — 3331090002 HH PPS REVENUE DEBIT

## 2021-08-18 PROCEDURE — 3331090001 HH PPS REVENUE CREDIT

## 2021-08-18 PROCEDURE — G0300 HHS/HOSPICE OF LPN EA 15 MIN: HCPCS

## 2021-08-18 NOTE — TELEPHONE ENCOUNTER
Called with an update on patient status Increase of shortness of breath with the decrease of her fluid meds and salt intake.         ZEOGD:[phone]

## 2021-08-18 NOTE — TELEPHONE ENCOUNTER
Yamila Aragon MD  You; Cesar White MD 25 minutes ago (12:21 PM)   SW  Would go back up on her diuretic bumex. thx    Message text      TC to [facility] with [address] Good LM on confidential voicemail to advise as above. TC to pt, ID verified. Advised pt Dr. Lynda Padilla out this week and advised as above per Dr. Donovan Burrows. Pt will increase Bumex to 1mg daily. Pt states she thinks the increased fluid could have been cause by a slice of pizza she ate yesterday being too salty. Pt is agreeable to increasing the dose, and will call if this doesn't help her. No further questions.

## 2021-08-19 ENCOUNTER — HOME CARE VISIT (OUTPATIENT)
Dept: SCHEDULING | Facility: HOME HEALTH | Age: 81
End: 2021-08-19
Payer: MEDICARE

## 2021-08-19 PROCEDURE — G0151 HHCP-SERV OF PT,EA 15 MIN: HCPCS

## 2021-08-19 PROCEDURE — 3331090001 HH PPS REVENUE CREDIT

## 2021-08-19 PROCEDURE — 3331090002 HH PPS REVENUE DEBIT

## 2021-08-20 VITALS
OXYGEN SATURATION: 96 % | HEART RATE: 83 BPM | SYSTOLIC BLOOD PRESSURE: 112 MMHG | RESPIRATION RATE: 12 BRPM | TEMPERATURE: 98.2 F | DIASTOLIC BLOOD PRESSURE: 62 MMHG

## 2021-08-20 PROCEDURE — 3331090002 HH PPS REVENUE DEBIT

## 2021-08-20 PROCEDURE — 3331090001 HH PPS REVENUE CREDIT

## 2021-08-21 PROCEDURE — 3331090001 HH PPS REVENUE CREDIT

## 2021-08-21 PROCEDURE — 3331090002 HH PPS REVENUE DEBIT

## 2021-08-22 PROCEDURE — 3331090002 HH PPS REVENUE DEBIT

## 2021-08-22 PROCEDURE — 3331090001 HH PPS REVENUE CREDIT

## 2021-08-23 ENCOUNTER — PATIENT OUTREACH (OUTPATIENT)
Dept: CASE MANAGEMENT | Age: 81
End: 2021-08-23

## 2021-08-23 PROCEDURE — 3331090002 HH PPS REVENUE DEBIT

## 2021-08-23 PROCEDURE — 3331090001 HH PPS REVENUE CREDIT

## 2021-08-23 NOTE — PROGRESS NOTES
Patient has graduated from the Transitions of Care Coordination  program on 8/23/2021. Patient/family has the ability to self-manage at this time Care management goals have been completed. Patient was not referred to the [facility] team for further management. Goals Addressed                 This Visit's Progress     COMPLETED: ACP        7/26/2021  No ACP on file. Daughter states that she believes mother has ACP document. CTN will discuss with patient at next call. -CTN to follow up in 1 week       COMPLETED: Attends follow-up appointments as directed. 8/11/2021  -attend follow up with PCP and Cardiology   -Will attend follow up with Cardiology appointment today. -CTN to follow up in 2 weeks  SP  7/26/2021  -Will attend follow up with PCP on 7/28/2021  -Will attend Cardiology follow up on 8/6/2021  -CTN to follow up in 1 week  SP       COMPLETED: Prevent complications post hospitalization. 7/26/2021  -CTN discussed importance of daily weighting with daughter. She states that her mother is in the habit of weighing daily for the past few weeks. Will report weight gain > 3lbs in 1 day or >5lbs in 1 week. -CHF progression briefly discussed with daughter. Encouraged her to discuss with Cardiologist at follow up appointment  -CTN educated daughter on medications and all medications were verified. -CTN discussed post-op pacemaker implant instructions. Will not remove dressing. Will monitor for signs/symptoms of infection  -CTN to follow up in 1 week  SP              Patient has Care Transition Nurse's contact information for any further questions, concerns, or needs.   Patients upcoming visits:    Future Appointments   Date Time Provider Hazel Wade   8/24/2021 To Be Determined ROBERT Narayanan   8/24/2021 11:00 AM Kimberly Carrier [address] [place] [address]   8/26/2021  8:00 AM Kian Tellez PT Dee   8/30/2021 To Be Determined Kian Tellez, [address] [place] [address]  [address]   9/1/2021 To Be Determined Rosibel Frazier, LPN [facility]   9/2/2021 To Be Determined Memo Jamie, PT [facility] [address]   9/6/2021 To Be Determined Melida Caprice [address]   9/8/2021 To Be Determined Jonasine Freddie, LPN [address]   9/9/2021 To Be Determined [facility] [address]   9/13/2021 To Be Determined [facility] [address]   9/15/2021 To Be Determined Rosibel Frazier, LPN [address]   9/16/2021 To Be Determined [facility] [address]   9/22/2021 To Be Determined Rosibel Frazier, LPN [address]   10/13/2021  2:00 PM ECHOCRISTINA BS AMB   10/13/2021  2:40 PM MD JOSÉ Gaviria BS AMB   11/18/2021  3:00 PM PACEMAKER3CRISTINA BS AMB   11/18/2021  3:20 PM MD JOSÉ Ellis BS AMB

## 2021-08-24 ENCOUNTER — HOME CARE VISIT (OUTPATIENT)
Dept: SCHEDULING | Facility: HOME HEALTH | Age: 81
End: 2021-08-24
Payer: MEDICARE

## 2021-08-24 VITALS
HEART RATE: 81 BPM | DIASTOLIC BLOOD PRESSURE: 78 MMHG | RESPIRATION RATE: 12 BRPM | TEMPERATURE: 97.9 F | SYSTOLIC BLOOD PRESSURE: 110 MMHG | OXYGEN SATURATION: 99 %

## 2021-08-24 PROCEDURE — 3331090001 HH PPS REVENUE CREDIT

## 2021-08-24 PROCEDURE — 3331090002 HH PPS REVENUE DEBIT

## 2021-08-24 PROCEDURE — G0151 HHCP-SERV OF PT,EA 15 MIN: HCPCS

## 2021-08-25 ENCOUNTER — HOME CARE VISIT (OUTPATIENT)
Dept: SCHEDULING | Facility: HOME HEALTH | Age: 81
End: 2021-08-25
Payer: MEDICARE

## 2021-08-25 PROCEDURE — G0300 HHS/HOSPICE OF LPN EA 15 MIN: HCPCS

## 2021-08-25 PROCEDURE — 3331090002 HH PPS REVENUE DEBIT

## 2021-08-25 PROCEDURE — 3331090001 HH PPS REVENUE CREDIT

## 2021-08-26 ENCOUNTER — HOME CARE VISIT (OUTPATIENT)
Dept: SCHEDULING | Facility: HOME HEALTH | Age: 81
End: 2021-08-26
Payer: MEDICARE

## 2021-08-26 PROCEDURE — 3331090001 HH PPS REVENUE CREDIT

## 2021-08-26 PROCEDURE — 3331090002 HH PPS REVENUE DEBIT

## 2021-08-26 PROCEDURE — 400018 HH-NO PAY CLAIM PROCEDURE

## 2021-08-26 PROCEDURE — 400013 HH SOC

## 2021-08-26 PROCEDURE — G0151 HHCP-SERV OF PT,EA 15 MIN: HCPCS

## 2021-08-27 VITALS
HEART RATE: 82 BPM | SYSTOLIC BLOOD PRESSURE: 106 MMHG | DIASTOLIC BLOOD PRESSURE: 68 MMHG | RESPIRATION RATE: 12 BRPM | TEMPERATURE: 97.5 F | OXYGEN SATURATION: 96 %

## 2021-08-27 PROCEDURE — 3331090002 HH PPS REVENUE DEBIT

## 2021-08-27 PROCEDURE — 3331090001 HH PPS REVENUE CREDIT

## 2021-08-28 VITALS
SYSTOLIC BLOOD PRESSURE: 117 MMHG | WEIGHT: 169.6 LBS | OXYGEN SATURATION: 98 % | HEART RATE: 80 BPM | TEMPERATURE: 97.8 F | DIASTOLIC BLOOD PRESSURE: 68 MMHG | RESPIRATION RATE: 16 BRPM | BODY MASS INDEX: 28.22 KG/M2

## 2021-08-28 PROCEDURE — 3331090001 HH PPS REVENUE CREDIT

## 2021-08-28 PROCEDURE — 3331090002 HH PPS REVENUE DEBIT

## 2021-08-29 PROCEDURE — 3331090001 HH PPS REVENUE CREDIT

## 2021-08-29 PROCEDURE — 3331090002 HH PPS REVENUE DEBIT

## 2021-08-30 ENCOUNTER — HOME CARE VISIT (OUTPATIENT)
Dept: SCHEDULING | Facility: HOME HEALTH | Age: 81
End: 2021-08-30
Payer: MEDICARE

## 2021-08-30 PROCEDURE — G0151 HHCP-SERV OF PT,EA 15 MIN: HCPCS

## 2021-08-30 PROCEDURE — 3331090001 HH PPS REVENUE CREDIT

## 2021-08-30 PROCEDURE — 3331090002 HH PPS REVENUE DEBIT

## 2021-08-31 ENCOUNTER — HOME CARE VISIT (OUTPATIENT)
Dept: SCHEDULING | Facility: HOME HEALTH | Age: 81
End: 2021-08-31
Payer: MEDICARE

## 2021-08-31 VITALS
TEMPERATURE: 97.8 F | BODY MASS INDEX: 28.07 KG/M2 | SYSTOLIC BLOOD PRESSURE: 121 MMHG | OXYGEN SATURATION: 98 % | WEIGHT: 168.7 LBS | DIASTOLIC BLOOD PRESSURE: 62 MMHG | RESPIRATION RATE: 15 BRPM | HEART RATE: 80 BPM

## 2021-08-31 VITALS
HEART RATE: 93 BPM | DIASTOLIC BLOOD PRESSURE: 62 MMHG | RESPIRATION RATE: 12 BRPM | SYSTOLIC BLOOD PRESSURE: 100 MMHG | TEMPERATURE: 97.6 F | OXYGEN SATURATION: 96 %

## 2021-08-31 PROCEDURE — G0300 HHS/HOSPICE OF LPN EA 15 MIN: HCPCS

## 2021-08-31 PROCEDURE — 3331090001 HH PPS REVENUE CREDIT

## 2021-08-31 PROCEDURE — 3331090002 HH PPS REVENUE DEBIT

## 2021-09-01 PROCEDURE — 3331090002 HH PPS REVENUE DEBIT

## 2021-09-01 PROCEDURE — 3331090001 HH PPS REVENUE CREDIT

## 2021-09-02 ENCOUNTER — HOME CARE VISIT (OUTPATIENT)
Dept: SCHEDULING | Facility: HOME HEALTH | Age: 81
End: 2021-09-02
Payer: MEDICARE

## 2021-09-02 VITALS
HEART RATE: 103 BPM | RESPIRATION RATE: 12 BRPM | TEMPERATURE: 97.3 F | OXYGEN SATURATION: 97 % | DIASTOLIC BLOOD PRESSURE: 64 MMHG | SYSTOLIC BLOOD PRESSURE: 108 MMHG

## 2021-09-02 PROCEDURE — G0151 HHCP-SERV OF PT,EA 15 MIN: HCPCS

## 2021-09-02 PROCEDURE — 3331090002 HH PPS REVENUE DEBIT

## 2021-09-02 PROCEDURE — 3331090001 HH PPS REVENUE CREDIT

## 2021-09-03 PROCEDURE — 3331090002 HH PPS REVENUE DEBIT

## 2021-09-03 PROCEDURE — 3331090001 HH PPS REVENUE CREDIT

## 2021-09-04 PROCEDURE — 3331090001 HH PPS REVENUE CREDIT

## 2021-09-04 PROCEDURE — 3331090002 HH PPS REVENUE DEBIT

## 2021-09-05 PROCEDURE — 3331090002 HH PPS REVENUE DEBIT

## 2021-09-05 PROCEDURE — 3331090001 HH PPS REVENUE CREDIT

## 2021-09-06 PROCEDURE — 3331090001 HH PPS REVENUE CREDIT

## 2021-09-06 PROCEDURE — 3331090002 HH PPS REVENUE DEBIT

## 2021-09-07 ENCOUNTER — HOME CARE VISIT (OUTPATIENT)
Dept: SCHEDULING | Facility: HOME HEALTH | Age: 81
End: 2021-09-07
Payer: MEDICARE

## 2021-09-07 VITALS
SYSTOLIC BLOOD PRESSURE: 108 MMHG | RESPIRATION RATE: 12 BRPM | TEMPERATURE: 97.4 F | OXYGEN SATURATION: 95 % | HEART RATE: 91 BPM | DIASTOLIC BLOOD PRESSURE: 68 MMHG

## 2021-09-07 PROCEDURE — 3331090002 HH PPS REVENUE DEBIT

## 2021-09-07 PROCEDURE — 3331090001 HH PPS REVENUE CREDIT

## 2021-09-07 PROCEDURE — G0151 HHCP-SERV OF PT,EA 15 MIN: HCPCS

## 2021-09-08 ENCOUNTER — HOME CARE VISIT (OUTPATIENT)
Dept: SCHEDULING | Facility: HOME HEALTH | Age: 81
End: 2021-09-08
Payer: MEDICARE

## 2021-09-08 PROCEDURE — 3331090001 HH PPS REVENUE CREDIT

## 2021-09-08 PROCEDURE — 3331090002 HH PPS REVENUE DEBIT

## 2021-09-08 PROCEDURE — G0300 HHS/HOSPICE OF LPN EA 15 MIN: HCPCS

## 2021-09-09 ENCOUNTER — HOME CARE VISIT (OUTPATIENT)
Dept: SCHEDULING | Facility: HOME HEALTH | Age: 81
End: 2021-09-09
Payer: MEDICARE

## 2021-09-09 VITALS
HEART RATE: 73 BPM | OXYGEN SATURATION: 97 % | DIASTOLIC BLOOD PRESSURE: 62 MMHG | SYSTOLIC BLOOD PRESSURE: 108 MMHG | TEMPERATURE: 97.8 F | RESPIRATION RATE: 12 BRPM

## 2021-09-09 VITALS
DIASTOLIC BLOOD PRESSURE: 66 MMHG | TEMPERATURE: 98.1 F | WEIGHT: 168 LBS | HEART RATE: 80 BPM | RESPIRATION RATE: 15 BRPM | SYSTOLIC BLOOD PRESSURE: 115 MMHG | BODY MASS INDEX: 27.96 KG/M2 | OXYGEN SATURATION: 98 %

## 2021-09-09 PROCEDURE — 3331090001 HH PPS REVENUE CREDIT

## 2021-09-09 PROCEDURE — 3331090002 HH PPS REVENUE DEBIT

## 2021-09-09 PROCEDURE — G0151 HHCP-SERV OF PT,EA 15 MIN: HCPCS

## 2021-09-09 NOTE — CASE COMMUNICATION
I did an [address] on Mrs[address] Soria today as she is no longer homebound; please DC SN visits.   Thanks,   [employer]

## 2021-09-10 PROCEDURE — 3331090002 HH PPS REVENUE DEBIT

## 2021-09-10 PROCEDURE — 3331090001 HH PPS REVENUE CREDIT

## 2021-09-11 PROCEDURE — 3331090002 HH PPS REVENUE DEBIT

## 2021-09-11 PROCEDURE — 3331090001 HH PPS REVENUE CREDIT

## 2021-09-12 PROCEDURE — 3331090001 HH PPS REVENUE CREDIT

## 2021-09-12 PROCEDURE — 3331090002 HH PPS REVENUE DEBIT

## 2021-09-13 PROCEDURE — 3331090001 HH PPS REVENUE CREDIT

## 2021-09-13 PROCEDURE — 3331090002 HH PPS REVENUE DEBIT

## 2021-09-16 ENCOUNTER — HOME CARE VISIT (OUTPATIENT)
Dept: HOME HEALTH SERVICES | Facility: HOME HEALTH | Age: 81
End: 2021-09-16
Payer: MEDICARE

## 2021-10-12 RX ORDER — SACUBITRIL AND VALSARTAN 24; 26 MG/1; MG/1
1 TABLET, FILM COATED ORAL 2 TIMES DAILY
Qty: 180 TABLET | Refills: 2 | Status: SHIPPED | OUTPATIENT
Start: 2021-10-12

## 2021-10-12 NOTE — TELEPHONE ENCOUNTER
Cardiologist: Dr. Martha Diaz    Last appt: 8/11/2021  Future Appointments   Date Time Provider Hazel Sheri   10/13/2021  2:00 PM ECHO, CRISTNIA MULLER AMB   10/13/2021  2:40 PM MD JOSÉ Dominguez BS AMB   11/18/2021  3:00 PM PACEMAKER3, CRISTINA MULLER AMB   11/18/2021  3:20 PM MD JOSÉ Mccarty  AMB       Requested Prescriptions     Signed Prescriptions Disp Refills    sacubitriL-valsartan (Entresto) 24-26 mg tablet 180 Tablet 2     Sig: Take 1 Tablet by mouth two (2) times a day. Authorizing Provider: Moe Agarwal     Ordering User: CLARKE LUNA VO per Dr. Martha Diaz.

## 2021-10-13 ENCOUNTER — ANCILLARY PROCEDURE (OUTPATIENT)
Dept: CARDIOLOGY CLINIC | Age: 81
End: 2021-10-13
Payer: MEDICARE

## 2021-10-13 ENCOUNTER — OFFICE VISIT (OUTPATIENT)
Dept: CARDIOLOGY CLINIC | Age: 81
End: 2021-10-13
Payer: MEDICARE

## 2021-10-13 VITALS
RESPIRATION RATE: 16 BRPM | SYSTOLIC BLOOD PRESSURE: 120 MMHG | HEIGHT: 65 IN | BODY MASS INDEX: 27.99 KG/M2 | OXYGEN SATURATION: 98 % | HEART RATE: 80 BPM | WEIGHT: 168 LBS | DIASTOLIC BLOOD PRESSURE: 80 MMHG

## 2021-10-13 VITALS
SYSTOLIC BLOOD PRESSURE: 108 MMHG | HEIGHT: 65 IN | WEIGHT: 168 LBS | DIASTOLIC BLOOD PRESSURE: 62 MMHG | BODY MASS INDEX: 27.99 KG/M2

## 2021-10-13 DIAGNOSIS — I42.0 CONGESTIVE CARDIOMYOPATHY (HCC): Primary | ICD-10-CM

## 2021-10-13 LAB
ECHO IVC PROX: 1.51 CM
ECHO LV EDV A2C: 119.1 ML
ECHO LV EDV A4C: 109.34 ML
ECHO LV EDV BP: 117.34 ML (ref 56–104)
ECHO LV EDV INDEX A4C: 59.4 ML/M2
ECHO LV EDV INDEX BP: 63.8 ML/M2
ECHO LV EDV NDEX A2C: 64.7 ML/M2
ECHO LV EJECTION FRACTION A2C: 27 PERCENT
ECHO LV EJECTION FRACTION A4C: 36 PERCENT
ECHO LV EJECTION FRACTION BIPLANE: 30.7 PERCENT (ref 55–100)
ECHO LV ESV A2C: 87.06 ML
ECHO LV ESV A4C: 69.77 ML
ECHO LV ESV BP: 81.34 ML (ref 19–49)
ECHO LV ESV INDEX A2C: 47.3 ML/M2
ECHO LV ESV INDEX A4C: 37.9 ML/M2
ECHO LV ESV INDEX BP: 44.2 ML/M2
ECHO LV INTERNAL DIMENSION DIASTOLIC: 4.89 CM (ref 3.9–5.3)
ECHO LV INTERNAL DIMENSION SYSTOLIC: 3.85 CM
ECHO LV IVSD: 1.15 CM (ref 0.6–0.9)
ECHO LV MASS 2D: 198.6 G (ref 67–162)
ECHO LV MASS INDEX 2D: 107.9 G/M2 (ref 43–95)
ECHO LV POSTERIOR WALL DIASTOLIC: 1.04 CM (ref 0.6–0.9)
ECHO TV REGURGITANT MAX VELOCITY: 275.23 CM/S
ECHO TV REGURGITANT PEAK GRADIENT: 30.3 MMHG

## 2021-10-13 PROCEDURE — 3288F FALL RISK ASSESSMENT DOCD: CPT | Performed by: SPECIALIST

## 2021-10-13 PROCEDURE — 99214 OFFICE O/P EST MOD 30 MIN: CPT | Performed by: SPECIALIST

## 2021-10-13 PROCEDURE — 93308 TTE F-UP OR LMTD: CPT | Performed by: SPECIALIST

## 2021-10-13 PROCEDURE — G8399 PT W/DXA RESULTS DOCUMENT: HCPCS | Performed by: SPECIALIST

## 2021-10-13 PROCEDURE — 1100F PTFALLS ASSESS-DOCD GE2>/YR: CPT | Performed by: SPECIALIST

## 2021-10-13 PROCEDURE — G8754 DIAS BP LESS 90: HCPCS | Performed by: SPECIALIST

## 2021-10-13 PROCEDURE — G8510 SCR DEP NEG, NO PLAN REQD: HCPCS | Performed by: SPECIALIST

## 2021-10-13 PROCEDURE — G8752 SYS BP LESS 140: HCPCS | Performed by: SPECIALIST

## 2021-10-13 PROCEDURE — 1090F PRES/ABSN URINE INCON ASSESS: CPT | Performed by: SPECIALIST

## 2021-10-13 PROCEDURE — G8536 NO DOC ELDER MAL SCRN: HCPCS | Performed by: SPECIALIST

## 2021-10-13 PROCEDURE — 93321 DOPPLER ECHO F-UP/LMTD STD: CPT | Performed by: SPECIALIST

## 2021-10-13 PROCEDURE — G8417 CALC BMI ABV UP PARAM F/U: HCPCS | Performed by: SPECIALIST

## 2021-10-13 PROCEDURE — G8427 DOCREV CUR MEDS BY ELIG CLIN: HCPCS | Performed by: SPECIALIST

## 2021-10-13 PROCEDURE — G0463 HOSPITAL OUTPT CLINIC VISIT: HCPCS | Performed by: SPECIALIST

## 2021-10-13 NOTE — PROGRESS NOTES
Visit Vitals  /80   Pulse 80   Resp 16   Ht 5' 5\" (1.651 m)   Wt 168 lb (76.2 kg)   SpO2 98%   BMI 27.96 kg/m²

## 2021-10-13 NOTE — PROGRESS NOTES
[facility]                                                            OFFICE NOTE        Caty Benitez M.D.,KATIE LINDA ELLISON   1940  [id number]    Date/Time:  10/13/99408:59 PM            SUBJECTIVE:    Sob slowly improving   no cp or palpitations     Assessment/Plan  1.  Atrial fibrillation: She now has a persistent atrial fibrillation. S/p PPM and AV pascual ablation on 7/21     She is status post partial ablation of atrial fibrillation May 8312.  This was complicated by pericardial effusion.     S/p 2nd attempt to AF ablation on 7/21 also this resulted in pericardial tamponade requiring pericardial window     Off amiodarone      Continue with Eliquis of course as well.   no untoward effects thus far     She has had some hypothyroidism and bradycardia with  amiodarone.     2.  Hypertension:low normal     3.  Cardiomyopathy: echo today with ef of 35 % still. Symptoms are better  Continue toprol entresto  And aldactone   no changes in doses for now  She seems clinically compensated  Repeat limited echo in 3 months   Continue same dose of bumex     We have discussed significance implication of cardiomyopathy. Nuclear stress test discussed with no ischemia and ef 49%     Otherwise I will see her back in approximately in 3 months with limited echo again  Discussed nutrition and exercise          HPI   81 y. o. female. Patient with h/o HTN, HLD, Ca score of 0 in 2012, PVCs on ECG on 1/16 and echo on 1/16 with EF 55% mild MR. S/p LTKR in 1/16(seen by Dr. Anne Clifton)  Also remote h/o breast ca s/p mastectomy in 1980 but no chemo or xrt     Echo on 4/16:Ejection fraction was estimated to be 55 %. There were no  regional wall motion abnormalities. Wall thickness was at the upper limits  of normal.    Left atrium: The atrium was mildly dilated. Mitral valve: There was mild regurgitation.     Aortic valve: Leaflets exhibited sclerosis. holter on 4/16: NSR  frequent pacs couplets triples and occasional non sustained at, frequent pvcs (0.65%)     On 3/18: admitted to [facility] 27 onset A-fib with RVR     TIM and Cardioversion completed with 200 J with conversion to NSR  TIM no PFO, trace AR, mild MR, no TR or VA Mild atherosclerosis in prox descending aortaNo KENNEDY thrombus LA moderate dilation RA normal  Started eliquis, flecainide and metoprolol     Echo on 8/20/18: The ventricle was dilated. Systolic function was mildly  reduced by EF (biplane method of disks). Ejection fraction was estimated  to be 45 % in the range of 40 % to 50 %. There was mild diffuse  hypokinesis.        TIM /CARDIOVERSION on 2/19 resulting in NSR. Flecainide resumed     CXR on 2/25/19:1. Interval development of small bilateral pleural effusions and mild basilar     atelectasis. Follow-up to resolution is suggested. Doppler LLE on 2/19:No evidence of left lower extremity vein thrombosis.     chest ct on 3/19:1. CTA pulmonary vein/left atrial mapping performed. 2. No accessory pulmonary vein, thrombus or stenosis. 3. 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 4. No acute finding.      AF ablation on 5/19  EP study & partial PVI of the left superior vein and full PVI of the right veins during ablation on 05/10/2019, developed pericardial effusion during procedure.  No tamponade.  She was able to restarted Eliquis thereafter.   but  she was admitted to CCU due to posterior lateral LA LV moderate pericardial effusion during left pulmonary vein isolation.  Heparin was reversed.  She was stable so no pericardiocentesis was warranted  Started on AMIO and BBlockers     On 7/21 :Ms. Soria presented for planned EPS with AF/AFL ablation on 06/21/2021.  During procedure, she had acute pericardial tamponade during a difficult transseptal puncture as sheath was advanced into the left atrium.  Emergency pericardiocentesis done, stable & later extubated in CVICU.     That evening, she became hypotensive & bradycardic with possible PEA, VT.  Shocked out of VT, received epi & compressions.  Reintubated, taken to OR with no acute bleeding found.  Dr. Mitchell Copeland noted clot in the pericardium, evacuated & placed Gavin drain for pericardial window.  Required levophed for BP support, received 2 units PRBCs.  Extubated after left thoracentesis.  Stabilized.  Drain later removed without difficulty.  Limited echo on 06/25/2021 showed only trivial to small posterior pericardial effusion.     Transient thrombocytopenia to 80, but HIT platelet serotonin release assay negative. On 7/21 with Dr. Billie Ornelas Placement          Readmitted for pleural effusion and thoracentesis on 8/21              CARDIAC STUDIES        07/21/21    ECHO ADULT FOLLOW-UP OR LIMITED 07/24/2021 7/24/2021    Interpretation Summary  · LV: Moderately dilated left ventricle. Severely reduced systolic function. The estimated EF is 30 - 35%. · Pericardium: Insignificant pericardial effusion or fat. There is left pleural effusion. Signed by: Roxann Boucher MD on 7/24/2021  8:42 PM            06/14/21    NUCLEAR CARDIAC STRESS TEST 06/15/2021 6/17/2021    Interpretation Summary  · SPECT: Left ventricular function post-stress was abnormal. Calculated ejection fraction is 49%. The TID ratio is 1.1. · Baseline ECG: Atrial flutter, non-specific ST-T wave abnormalities, myocardial infarction, myocardial infarction. The infarction is located in the anterior regions. .  · SPECT: Myocardial perfusion imaging defect 1: There is a defect that is small in size with a mild reduction in uptake present in the mid-apical and anterior location(s) that is non-reversible. There is normal wall motion in the defect area. Viability in the area is good. The defect appears to probably be artifact caused by breast attenuation.   · SPECT: Left ventricular perfusion is probably normal. Myocardial perfusion imaging supports a low risk stress test.    Signed by: Jodee Sahni MD on 6/15/2021  5:18 PM                    EKG Results     None              IMAGING      MRI Results (most recent):  Results from [place] encounter on 07/16/13    MRI KNEE RT WO CONT    Narrative  **Final Report**      ICD Codes / Adm. Diagnosis: 836.2  401.9 / Other tear of cartilage or men  Examination:  MR KNEE WO CON RT  - [id number] - Jul 16 2013  3:28PM  Accession No:  [id number]  Reason:  tear      REPORT:  EXAM:  Right knee MRI without contrast    INDICATION: Pain    COMPARISON: None    TECHNIQUE: Axial T2 fat-saturated and proton density fat-saturated; coronal  T1 and proton density fat-saturated; and sagittal T2 fat-saturated, proton  density fat-saturated, and gradient echo MRI of the     knee . CONTRAST:  None. FINDINGS: Bone marrow: Degenerative type bone marrow edema is present in the  medial aspects of the medial femoral condyle and medial tibial plateau. A  small focus of subchondral bone marrow edema is present in the lateral facet  of the patella. There is no evidence of fracture or marrow replacing process. Joint fluid: There is a mild joint effusion. Areas of synovitis are seen  throughout the joint space, particularly in the suprapatellar recess and  anterior to the lateral compartment. There is additional 9 mm loose body  along the course of the popliteal tendon on series 7 image 55. There is a moderate-sized Baker's cyst posterior medial to the knee. This  measures 2.0 x 1.9 x 7.7 cm craniocaudal. There is a small rounded loose  body within this measuring 5 mm on series 8 image 4. Several strands of  synovitis are seen within this. Collateral ligaments and posterior, lateral corner: Intact. Medial meniscus: The medial meniscus is extruded.  There is a complete radial  tear of the posterior horn of the medial meniscus as evidenced by an absent  posterior horn on series 9 image 8. The distal material is flipped  inferiorly and laterally into the gutter on series 8 image 4 and series 6  image 17. Lateral meniscus: High signal is seen in the free edge of the body related  to free edge tearing. Irregular increased signal is seen anterior to the  anterior horn is likely related to synovitis. ACL and PCL: A small area mucoid degeneration is present in the proximal  ACL. The ACL and PCL are intact however. Tendons: Intact. .    Muscles: Within normal limits. Patellofemoral alignment: No patellar subluxation/tilt. Trochlear groove is  not hypoplastic. TT-TG distance: Normal at 12 mm. Articular cartilage: Full-thickness cartilage loss is seen along the  weightbearing surfaces of the medial femoral condyle and medial tibial  plateau with significant marginal osteophytosis. Mild underlying subchondral  bone marrow edema is present medially. Near full-thickness fissuring is seen along the vertical ridge of the  patella. Mild irregular cartilage loss is seen along the lateral aspect of  the patellofemoral compartment. Small marginal osteophytes are seen adjacent  to this. There is a small area of subchondral bone marrow edema related to a  full-thickness fissure in the lateral facet of patella. There is a shallow thickness of fissuring are seen along the lateral femoral  condyle towards the tibial spine. Significant marginal osteophytes are  present. There is diffuse mild cartilage thinning throughout the lateral  compartment. Soft tissue mass: None. Nonspecific soft tissue edema is seen anterior to  the patellar tendon. IMPRESSION:  1. Complete radial tear of the posterior horn of the medial meniscus with  displacement of meniscal material into the inferior gutter. 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 2. Moderate lateral and patellofemoral osteoarthritis.   3. Free edge tearing in the body of the lateral meniscus. 4. Moderate joint effusion with areas of extensive synovitis likely related  to long-standing degenerative disease. 5. A moderate-sized Baker's cyst is seen posterior medial to the knee with  areas of synovitis and a small loose body. An additional small loose body is  seen along the course of the popliteus tendon. Signing/Reading Doctor: Earlyne Fleischer ([id number])  Approved: Earlyne Fleischer ([id number])  Jul 16 2013  4:08PM      CT Results (most recent):  Results from Hospital Encounter encounter on 07/21/21    CT SPINE CERV WO CONT    Narrative  EXAM:  CT CERVICAL SPINE WITHOUT CONTRAST    INDICATION: Fall. COMPARISON: None. CONTRAST:  None. TECHNIQUE: Multislice helical CT of the cervical spine was performed without  intravenous contrast administration. Sagittal and coronal reformats were  generated. CT dose reduction was achieved through use of a standardized  protocol tailored for this examination and automatic exposure control for dose  modulation. FINDINGS:    There is a left pleural effusion. See CT chest report. The alignment is within normal limits. There is no fracture or compression  deformity. The odontoid process is intact. The craniocervical junction is within  normal limits. There are mild degenerative disc changes throughout the cervical  spine    The incidentally imaged soft tissues are within normal limits. C2-C3: There is no spinal canal or neural foraminal stenosis. C3-C4: There is slight bulging of the disc and mild facet arthropathy with mild  central stenosis. Neural foramina are patent. [place] C4-C5: There are degenerative disc changes and facet arthropathy right greater  than left with mild central stenosis and moderate narrowing of the right neural  foramen and mild narrowing of the left neural foramen. [place]     C5-C6: There are degenerative disc changes with small osteophyte/disc complex  with moderate central stenosis and mild narrowing of the left neural foramen. Juanita Sapp C6-C7: There are degenerative disc changes with moderate central stenosis and  moderately severe narrowing of the left neural foramen. .    C7-T1: There is no spinal canal or neural foraminal stenosis. Impression  No fracture is identified. There are degenerative changes in the cervical spine  as detailed above. XR Results (most recent):  Results from Hospital Encounter encounter on 07/21/21    XR CHEST PORT    Narrative  EXAM: Chest radiograph    INDICATION: Evaluation for pneumothorax    COMPARISON: 7/23/2021    FINDINGS: A portable AP radiograph of the chest was obtained at 2118 hours. The  patient is on a cardiac monitor. There is a dual-lead pacemaker in place. A  small left pleural effusion is seen. The cardiac and mediastinal contours and  pulmonary vascularity are remarkable for cardiomegaly. The bones and soft  tissues are grossly within normal limits. Impression  Small left pleural effusion. No pneumothorax.           Past Medical History:   Diagnosis Date    Arthritis     Asthma     dr. Yasmine Sotomayor allergist    Atrial fibrillation ([address]     Breast cancer ([address] 1980    right - mastectomy    Coagulation disorder ([address]     on eliquis    Dyslipidemia     labs 2008 - chol 283, HDL 83, ,     HTN (hypertension)     Hyperlipidemia LDL goal < 130     for increased LDL particles, Dr. Dion Otto nodule     Dr. Rodolfo Alvarez Palpitations     resolved     Past Surgical History:   Procedure Laterality Date    COLONOSCOPY N/A 9/10/2018    COLONOSCOPY performed by Dannielle Givens MD [facility] W/O CONT WITH CALCIUM  1/2012    CAC score 0; calcified mediastinal lymph nodes consistent granulomatous disease    ECHO 2D ADULT  5/5/2008    normal, LVEF 60%    HX APPENDECTOMY      HX HYSTERECTOMY Bilateral 03/19/2015    and uro repair    HX KNEE REPLACEMENT Right     HX MASTECTOMY Right     HX TONSILLECTOMY      HX UROLOGICAL  8/1/14    Urodynamics    INTRACARD ECHO, THER/DX INTERVENT N/A 5/10/2019    Intracardiac Echocardiogram [facility] [address] CATH LAB   [address] EXTERNAL  3/28/2018         [place] CHEST SURGERY PROCEDURE UNLISTED      lung nodule removed - benign    [place] EPHYS EVL TRNSPTL TX ATRIAL FIB ISOLAT PULM VEIN N/A 5/10/2019    ABLATION A-FIB  W COMPLETE EP STUDY performed by Georgi Cohen MD at [place] [address] CATH LAB    [place] INTRACARDIAC ELECTROPHYSIOLOGIC 3D [address] [facility] N/A 5/10/2019    Ep 3d Mapping performed by Georgi Cohen MD at [facility] [place] [address], [address] CATH LAB    STRESS TEST [facility]  1/5/12    walked 7:31, no chest pain, normal MPI. Social History     Tobacco Use    Smoking status: Never Smoker    Smokeless tobacco: Never Used   Substance Use Topics    Alcohol use: Yes     Alcohol/week: 7.0 standard drinks     Types: 7 Standard drinks or equivalent per week     Comment: wine nightly    Drug use: No     Family History   Problem Relation Age of Onset    Heart Failure Mother     Stroke Father     Other Other         endometrial cancer, niece     Allergies   Allergen Reactions    Betadine [Povidone-Iodine] Rash    Iodine Rash    Norvasc [Amlodipine] Other (comments)     Flushing/redness. Visit Vitals  /80   Pulse 80   Resp 16   Ht 5' 5\" (1.651 m)   Wt 168 lb (76.2 kg)   SpO2 98%   BMI 27.96 kg/m²         Last 3 Recorded Weights in this Encounter    10/13/21 1436   Weight: 168 lb (76.2 kg)            Review of Systems:   Pertinent items are noted in the History of Present Illness. Current Outpatient Medications on File Prior to Visit   Medication Sig Dispense Refill    sacubitriL-valsartan (Entresto) 24-26 mg tablet Take 1 Tablet by mouth two (2) times a day. 180 Tablet 2    melatonin 1 mg chew Take 1 mg by mouth nightly.  fluticasone prp-sod. chl,bicarb 50 mcg- 0.9 % ksps 2 Sprays by Nasal route daily.  2 sprays each nostril daily      bumetanide (BUMEX) 1 mg tablet Take 0.5 Tablets by mouth daily. (Patient taking differently: Take 1 mg by mouth daily. 1mg daily per Dr. Macie Whitfield 8/18) 45 Tablet 1    metoprolol succinate (TOPROL-XL) 25 mg XL tablet Take 0.5 Tablets by mouth nightly. 45 Tablet 1    spironolactone (ALDACTONE) 25 mg tablet Take 0.5 Tablets by mouth daily. 90 Tablet 1    apixaban (Eliquis) 5 mg tablet Take 1 Tablet by mouth two (2) times a day. 180 Tablet 3    cholecalciferol (VITAMIN D3) (2,000 UNITS /50 MCG) cap capsule Take 5,000 Units by mouth daily.  ferrous sulfate 325 mg (65 mg iron) tablet Take 1 Tablet by mouth daily. 90 Tablet 1    acetaminophen (Tylenol Extra Strength) 500 mg tablet Take 500 mg by mouth nightly as needed for Pain.  levothyroxine (SYNTHROID) 25 mcg tablet Take 25 mcg by mouth Daily (before breakfast). pt has 50mcg tab, take 0.5 tab daily before breakfast      fluticasone (FLOVENT HFA) 220 mcg/actuation inhaler as needed.  atorvastatin (LIPITOR) 20 mg tablet Take 20 mg by mouth every evening.  MULTIVITAMIN PO Take  by mouth. Takes one po once daily.  cetirizine (ZYRTEC) 10 mg tablet Take 10 mg by mouth every evening.  estradiol (ESTRACE) 0.01 % (0.1 mg/gram) vaginal cream Insert 1 g into vagina every Monday and Thursday.  albuterol (PROVENTIL HFA, VENTOLIN HFA, PROAIR HFA) 90 mcg/actuation inhaler Take 1 Puff by inhalation every four (4) hours as needed. (Patient taking differently: Take 1 Puff by inhalation every four (4) hours as needed for Wheezing.) 2 Inhaler 3    raloxifene (EVISTA) 60 mg tablet Take 1 Tab by mouth daily. 90 Tab 4    montelukast (SINGULAIR) 10 mg tablet Take 10 mg by mouth every evening.  amoxicillin (AMOXIL) 500 mg capsule Take 500 mg by mouth as needed. Only for dental procedure      EPINEPHrine (EPIPEN) 0.3 mg/0.3 mL (1:1,000) injection 0.3 mL by IntraMUSCular route once as needed for up to 1 dose.  (Patient taking differently: 0.3 mL by IntraMUSCular route once as needed for Allergic Response.) 0.3 mL 0     No current facility-administered medications on file prior to visit. William Barker had no medications administered during this visit. Current Outpatient Medications   Medication Sig    sacubitriL-valsartan (Entresto) 24-26 mg tablet Take 1 Tablet by mouth two (2) times a day.  melatonin 1 mg chew Take 1 mg by mouth nightly.  fluticasone prp-sod. chl,bicarb 50 mcg- 0.9 % ksps 2 Sprays by Nasal route daily. 2 sprays each nostril daily    bumetanide (BUMEX) 1 mg tablet Take 0.5 Tablets by mouth daily. (Patient taking differently: Take 1 mg by mouth daily. 1mg daily per Dr. Jose Alejandro Sarah 8/18)    metoprolol succinate (TOPROL-XL) 25 mg XL tablet Take 0.5 Tablets by mouth nightly.  spironolactone (ALDACTONE) 25 mg tablet Take 0.5 Tablets by mouth daily.  apixaban (Eliquis) 5 mg tablet Take 1 Tablet by mouth two (2) times a day.  cholecalciferol (VITAMIN D3) (2,000 UNITS /50 MCG) cap capsule Take 5,000 Units by mouth daily.  ferrous sulfate 325 mg (65 mg iron) tablet Take 1 Tablet by mouth daily.  acetaminophen (Tylenol Extra Strength) 500 mg tablet Take 500 mg by mouth nightly as needed for Pain.  levothyroxine (SYNTHROID) 25 mcg tablet Take 25 mcg by mouth Daily (before breakfast). pt has 50mcg tab, take 0.5 tab daily before breakfast    fluticasone (FLOVENT HFA) 220 mcg/actuation inhaler as needed.  atorvastatin (LIPITOR) 20 mg tablet Take 20 mg by mouth every evening.  MULTIVITAMIN PO Take  by mouth. Takes one po once daily.  cetirizine (ZYRTEC) 10 mg tablet Take 10 mg by mouth every evening.  estradiol (ESTRACE) 0.01 % (0.1 mg/gram) vaginal cream Insert 1 g into vagina every Monday and Thursday.  albuterol (PROVENTIL HFA, VENTOLIN HFA, PROAIR HFA) 90 mcg/actuation inhaler Take 1 Puff by inhalation every four (4) hours as needed.  (Patient taking differently: Take 1 Puff by inhalation every four (4) hours as needed for Wheezing.)    raloxifene (EVISTA) 60 mg tablet Take 1 Tab by mouth daily.  montelukast (SINGULAIR) 10 mg tablet Take 10 mg by mouth every evening.  amoxicillin (AMOXIL) 500 mg capsule Take 500 mg by mouth as needed. Only for dental procedure    EPINEPHrine (EPIPEN) 0.3 mg/0.3 mL (1:1,000) injection 0.3 mL by IntraMUSCular route once as needed for up to 1 dose. (Patient taking differently: 0.3 mL by IntraMUSCular route once as needed for Allergic Response.)     No current facility-administered medications for this visit. Lab Results   Component Value Date/Time    Cholesterol, total 142 03/28/2018 04:00 AM    HDL Cholesterol 76 03/28/2018 04:00 AM    LDL, calculated 55.2 03/28/2018 04:00 AM    VLDL, calculated 10.8 03/28/2018 04:00 AM    Triglyceride 54 03/28/2018 04:00 AM    CHOL/HDL Ratio 1.9 03/28/2018 04:00 AM       Lab Results   Component Value Date/Time    Sodium 134 (L) 08/06/2021 09:58 AM    Potassium 5.1 08/06/2021 09:58 AM    Chloride 103 08/06/2021 09:58 AM    CO2 24 08/06/2021 09:58 AM    Anion gap 7 08/06/2021 09:58 AM    Glucose 120 (H) 08/06/2021 09:58 AM    BUN 27 (H) 08/06/2021 09:58 AM    Creatinine 0.88 08/06/2021 09:58 AM    BUN/Creatinine ratio 31 (H) 08/06/2021 09:58 AM    GFR est AA >60 08/06/2021 09:58 AM    GFR est non-AA >60 08/06/2021 09:58 AM    Calcium 8.9 08/06/2021 09:58 AM       Lab Results   Component Value Date/Time    ALT (SGPT) 31 07/22/2021 05:01 AM    Alk.  phosphatase 76 07/22/2021 05:01 AM    Bilirubin, direct 0.1 06/21/2021 02:51 PM    Bilirubin, total 0.7 07/22/2021 05:01 AM       Lab Results   Component Value Date/Time    WBC 12.0 (H) 08/06/2021 09:58 AM    Hemoglobin (POC) 12.9 10/25/2009 03:04 AM    HGB 10.8 (L) 08/06/2021 09:58 AM    Hematocrit (POC) 38 10/25/2009 03:04 AM    HCT 33.8 (L) 08/06/2021 09:58 AM    PLATELET 468 94/22/1163 09:58 AM    MCV 98.5 08/06/2021 09:58 AM       Lab Results   Component Value Date/Time    TSH 7.66 (H) 07/22/2021 05:01 AM         Lab Results Component Value Date/Time    Cholesterol, total 142 03/28/2018 04:00 AM    Cholesterol, total 175 12/18/2015 10:18 AM    Cholesterol, total 251 (H) 06/03/2015 08:45 AM    Cholesterol, total 206 (H) 05/14/2014 10:46 AM    Cholesterol, total 201 (H) 04/26/2013 09:56 AM    HDL Cholesterol 76 03/28/2018 04:00 AM    HDL Cholesterol 93 12/18/2015 10:18 AM    HDL Cholesterol 89 06/03/2015 08:45 AM    HDL Cholesterol 92 05/14/2014 10:46 AM    HDL Cholesterol 99 04/26/2013 09:56 AM    LDL, calculated 55.2 03/28/2018 04:00 AM    LDL, calculated 68 12/18/2015 10:18 AM    LDL, calculated 141 (H) 06/03/2015 08:45 AM    LDL, calculated 98 05/14/2014 10:46 AM    LDL, calculated 83 04/26/2013 09:56 AM    Triglyceride 54 03/28/2018 04:00 AM    Triglyceride 68 12/18/2015 10:18 AM    Triglyceride 104 06/03/2015 08:45 AM    Triglyceride 79 05/14/2014 10:46 AM    Triglyceride 96 04/26/2013 09:56 AM    CHOL/HDL Ratio 1.9 03/28/2018 04:00 AM                Please note that this dictation was completed with ICEX, the [employer] voice recognition software. Quite often unanticipated grammatical, syntax, homophones, and other interpretative errors are inadvertently transcribed by the computer software. Please disregard these errors. Please excuse any errors that have escaped final proofreading.

## 2021-11-10 ENCOUNTER — TELEPHONE (OUTPATIENT)
Dept: CARDIOLOGY CLINIC | Age: 81
End: 2021-11-10

## 2021-11-10 RX ORDER — BUMETANIDE 1 MG/1
0.5 TABLET ORAL
Qty: 90 TABLET | Refills: 0
Start: 2021-11-10 | End: 2022-03-04 | Stop reason: SDUPTHER

## 2021-11-10 NOTE — TELEPHONE ENCOUNTER
Fax rec'd from Dr. Robbie Carlos (PCP) with attached labs, including creatinine of 1.17. Per note from Dr. Robbie Carlos, she would like suggestions on stopping Bumex.  Forwarded question to MD.

## 2021-11-10 NOTE — TELEPHONE ENCOUNTER
Aishwarya Pendleton MD  You 1 hour ago (1:22 PM)     MG    Bumex can be prn son daily    Message text      TC to Dr. Kyle Morales office. Advised Dr. [address] as above that Bumex can be PRN daily for swelling. No further questions.

## 2021-11-18 ENCOUNTER — CLINICAL SUPPORT (OUTPATIENT)
Dept: CARDIOLOGY CLINIC | Age: 81
End: 2021-11-18
Payer: MEDICARE

## 2021-11-18 ENCOUNTER — OFFICE VISIT (OUTPATIENT)
Dept: CARDIOLOGY CLINIC | Age: 81
End: 2021-11-18
Payer: MEDICARE

## 2021-11-18 VITALS
SYSTOLIC BLOOD PRESSURE: 100 MMHG | OXYGEN SATURATION: 99 % | HEART RATE: 84 BPM | WEIGHT: 176 LBS | HEIGHT: 65 IN | RESPIRATION RATE: 16 BRPM | DIASTOLIC BLOOD PRESSURE: 60 MMHG | BODY MASS INDEX: 29.32 KG/M2

## 2021-11-18 DIAGNOSIS — I97.190 COMPLETE AV BLOCK DUE TO AV NODAL ABLATION (HCC): ICD-10-CM

## 2021-11-18 DIAGNOSIS — I44.2 COMPLETE AV BLOCK DUE TO AV NODAL ABLATION (HCC): ICD-10-CM

## 2021-11-18 DIAGNOSIS — I42.0 DILATED CARDIOMYOPATHY (HCC): Primary | ICD-10-CM

## 2021-11-18 DIAGNOSIS — Z95.0 CARDIAC PACEMAKER IN SITU: Primary | ICD-10-CM

## 2021-11-18 DIAGNOSIS — I31.39 PERICARDIAL EFFUSION: ICD-10-CM

## 2021-11-18 DIAGNOSIS — I48.11 LONGSTANDING PERSISTENT ATRIAL FIBRILLATION (HCC): ICD-10-CM

## 2021-11-18 PROCEDURE — G8427 DOCREV CUR MEDS BY ELIG CLIN: HCPCS | Performed by: INTERNAL MEDICINE

## 2021-11-18 PROCEDURE — 1090F PRES/ABSN URINE INCON ASSESS: CPT | Performed by: INTERNAL MEDICINE

## 2021-11-18 PROCEDURE — G8536 NO DOC ELDER MAL SCRN: HCPCS | Performed by: INTERNAL MEDICINE

## 2021-11-18 PROCEDURE — G8417 CALC BMI ABV UP PARAM F/U: HCPCS | Performed by: INTERNAL MEDICINE

## 2021-11-18 PROCEDURE — G0463 HOSPITAL OUTPT CLINIC VISIT: HCPCS | Performed by: INTERNAL MEDICINE

## 2021-11-18 PROCEDURE — G8754 DIAS BP LESS 90: HCPCS | Performed by: INTERNAL MEDICINE

## 2021-11-18 PROCEDURE — G8510 SCR DEP NEG, NO PLAN REQD: HCPCS | Performed by: INTERNAL MEDICINE

## 2021-11-18 PROCEDURE — 3288F FALL RISK ASSESSMENT DOCD: CPT | Performed by: INTERNAL MEDICINE

## 2021-11-18 PROCEDURE — 1100F PTFALLS ASSESS-DOCD GE2>/YR: CPT | Performed by: INTERNAL MEDICINE

## 2021-11-18 PROCEDURE — G8399 PT W/DXA RESULTS DOCUMENT: HCPCS | Performed by: INTERNAL MEDICINE

## 2021-11-18 PROCEDURE — G8752 SYS BP LESS 140: HCPCS | Performed by: INTERNAL MEDICINE

## 2021-11-18 PROCEDURE — 99214 OFFICE O/P EST MOD 30 MIN: CPT | Performed by: INTERNAL MEDICINE

## 2021-11-18 PROCEDURE — 93280 PM DEVICE PROGR EVAL DUAL: CPT | Performed by: INTERNAL MEDICINE

## 2021-11-18 NOTE — PATIENT INSTRUCTIONS
A referral has been sent to Dr. Reilly Alejandro for evaluation of left ventricular epicardial lead.

## 2021-11-18 NOTE — PROGRESS NOTES
Cardiac Electrophysiology OFFICE Note     Subjective:       Christopher Macedo is an 80 y.o. patient who is seen for follow up post biventricular pacemaker and av node ablation (LV lead could not pass the CS ostium for CS branch implant)  So far LVEF is moderately depressed and she is on GDMT  NYHA class 2-3      ECHO ADULT FOLLOW-UP OR LIMITED 10/13/2021 10/13/2021    Interpretation Summary  · LV: Calculated LVEF is 35%. Visually measured ejection fraction. Normal cavity size. Upper normal wall thickness. Moderate-to-severely and globally reduced systolic function. Abnormal left ventricular septal motion consistent with right ventricular pacing. · Pericardium: Small-to-moderate pericardial effusion adjacent to right ventricle measuring 15 mm. Signed by: Abraham Adair MD on 10/13/2021  3:06 PM      She had hx of cardiac tamponade during AF ablation transeptal puncture attempt due to thick septum and she had emergent pericardiocentesis in EP lab. Delma Christine clotted in pericardial sac later that day, had recurrent tamponade, underwent subsequent pericardial window with clot flushed out in June 2021. (was cardioverted for VT during tamponade)          Previous:   S/p attempted AF ablation 06/21/2021, developed pericardial effusion & tamponade after transseptal puncture (thick septum).  Pericardial drain, recurrent tamponade due to clotted drain, then pericardial window.  Required 1 unit PRBCs.      S/p TIM/DCCV 06/2020.       S/p AF ablation 05/10/2019.  PVI unable to be completed on left side due to pericardial effusion without tamponade.  Heparin stopped & reversed.       Persistent AF, failed DCCV & flecainide.         Cardioversion with Dr. Abbey Zimmerman on 2/22/19, had follow up on 2/27/19, back in atrial fibrillation.       Mild dilated cardiomyopathy, improved on GDMT.  Couldn't tolerate meds due to low BP.       Negative stress test 5 yrs ago per her report.         Problem List    persistent atrial fibrillation ICD-10-CM: I48.91   ICD-9-CM: 427.31 6/21/2021     S/P ablation of atrial fibrillation ICD-10-CM: Z98.890, Z86.79   ICD-9-CM: V45.89 5/10/2019     Cardiac/pericardial tamponade ICD-10-CM: I31.4   ICD-9-CM: 423.3 6/21/2021     S/P pericardial window creation ICD-10-CM: Z98.890   ICD-9-CM: V45.89 6/21/2021     Pericardial effusion with cardiac tamponade ICD-10-CM: I31.3, I31.4   ICD-9-CM: 423.9, 423.3 5/10/2019   Overview Signed 5/10/2019  1:34 PM by Andrae Castro MD     Loculated posterior LA and LV, no tamponade         Non-rheumatic mitral regurgitation ICD-10-CM: I34.0   ICD-9-CM: 424.0 5/10/2019   Overview Signed 5/10/2019  1:34 PM by Andrae Castro MD     moderate         Non-rheumatic tricuspid valve insufficiency ICD-10-CM: I36.1   ICD-9-CM: 424.2 5/10/2019     Encounter for cardioversion procedure ICD-10-CM: Z01.89   ICD-9-CM: V72.85 3/28/2018   Overview Signed 3/28/2018  1:06 PM by Andrae Castro MD     3/28/2018 200 J to NSR after a TIM without thrombus         Dizziness ICD-10-CM: R42   ICD-9-CM: 780.4 3/27/2018     Persistent atrial fibrillation ([facility] ICD-10-CM: I48.19   ICD-9-CM: 427.31 3/27/2018     Cystocele ICD-10-CM: [id number]   ICD-9-CM: YBC5027 8/1/2014     Uterine prolapse ICD-10-CM: N81.4   ICD-9-CM: 618.1 8/1/2014     HTN (hypertension) ICD-10-CM: I10   ICD-9-CM: 401.9 Unknown     Breast cancer ([facility] ICD-10-CM: C50.919   ICD-9-CM: 174.9 Unknown     Lung nodule ICD-10-CM: R91.1   ICD-9-CM: 793.11 Unknown   Overview Signed 5/14/2014 10:34 AM by MD Dr. Jac Luna: J45.909   ICD-9-CM: 493.90 Unknown     Hyperlipidemia LDL goal < 130 ICD-10-CM: E78.5   ICD-9-CM: 272.4 Unknown   Overview Signed 4/26/2013  9:17 AM by Timotyh Pina MD     for increased LDL particles, Dr. Gopi Stevens         Dyslipidemia ICD-10-CM: E78.5   ICD-9-CM: 272.4 Unknown   Overview Signed 12/31/2011  4:11 PM by Sharad Navarro MD     labs 2008 - chol 283, HDL 83, ,  Palpitations ICD-10-CM: R00.2   ICD-9-CM: 785.1 Unknown     Chest pain, unspecified ICD-10-CM: R07.9   ICD-9-CM: 786.50 12/31/2011       Current Outpatient Medications   Medication Sig    bumetanide (BUMEX) 1 mg tablet Take 0.5 Tablets by mouth daily as needed (Swelling or shortness of breath).  sacubitriL-valsartan (Entresto) 24-26 mg tablet Take 1 Tablet by mouth two (2) times a day.  melatonin 1 mg chew Take 1 mg by mouth nightly.  fluticasone prp-sod. chl,bicarb 50 mcg- 0.9 % ksps 2 Sprays by Nasal route daily. 2 sprays each nostril daily    metoprolol succinate (TOPROL-XL) 25 mg XL tablet Take 0.5 Tablets by mouth nightly.  spironolactone (ALDACTONE) 25 mg tablet Take 0.5 Tablets by mouth daily.  apixaban (Eliquis) 5 mg tablet Take 1 Tablet by mouth two (2) times a day.  cholecalciferol (VITAMIN D3) (2,000 UNITS /50 MCG) cap capsule Take 5,000 Units by mouth daily.  ferrous sulfate 325 mg (65 mg iron) tablet Take 1 Tablet by mouth daily.  acetaminophen (Tylenol Extra Strength) 500 mg tablet Take 500 mg by mouth nightly as needed for Pain.  levothyroxine (SYNTHROID) 25 mcg tablet Take 25 mcg by mouth Daily (before breakfast). pt has 50mcg tab, take 0.5 tab daily before breakfast    fluticasone (FLOVENT HFA) 220 mcg/actuation inhaler as needed.  atorvastatin (LIPITOR) 20 mg tablet Take 20 mg by mouth every evening.  MULTIVITAMIN PO Take  by mouth. Takes one po once daily.  cetirizine (ZYRTEC) 10 mg tablet Take 10 mg by mouth every evening.  estradiol (ESTRACE) 0.01 % (0.1 mg/gram) vaginal cream Insert 1 g into vagina every Monday and Thursday.  albuterol (PROVENTIL HFA, VENTOLIN HFA, PROAIR HFA) 90 mcg/actuation inhaler Take 1 Puff by inhalation every four (4) hours as needed. (Patient taking differently: Take 1 Puff by inhalation every four (4) hours as needed for Wheezing.)    raloxifene (EVISTA) 60 mg tablet Take 1 Tab by mouth daily.     montelukast (SINGULAIR) 10 mg tablet Take 10 mg by mouth every evening. No current facility-administered medications for this visit. Allergies   Allergen Reactions   · Betadine [Povidone-Iodine] Rash   · Iodine Rash   · Norvasc [Amlodipine] Other (comments)   Flushing/redness. Past Medical History:   Diagnosis Date   · Arthritis   · Asthma   dr. Derrick Bernal allergist   · Atrial fibrillation [facility])   · Breast cancer ([facility] 1980   right - mastectomy   · Coagulation disorder ([facility]   on eliquis   · Dyslipidemia   labs 2008 - chol 283, HDL 83, ,    · HTN (hypertension)   · Hyperlipidemia LDL goal < 130   for increased LDL particles, Dr. Johanny Ge   · Lung nodule   Dr. King Service   · Palpitations   resolved     Past Surgical History:   Procedure Laterality Date   · COLONOSCOPY N/A 9/10/2018   COLONOSCOPY performed by Duarte Castaneda MD at [address] [place] [address]   · CT HEART W/O CONT WITH CALCIUM 1/2012   CAC score 0; calcified mediastinal lymph nodes consistent granulomatous disease   · ECHO 2D ADULT 5/5/2008   normal, LVEF 60%   · HX APPENDECTOMY   · HX HYSTERECTOMY Bilateral 03/19/2015   and uro repair   · HX KNEE REPLACEMENT Right   · HX MASTECTOMY Right   · HX TONSILLECTOMY   · HX UROLOGICAL 8/1/14   Urodynamics   · INTRACARD ECHO, THER/DX INTERVENT N/A 5/10/2019   Intracardiac Echocardiogram performed by Duke Palmer MD at [facility] LAB   · VT CARDIOVERSION ELECTIVE ARRHYTHMIA EXTERNAL 3/28/2018     · VT CHEST SURGERY PROCEDURE UNLISTED   lung nodule removed - benign   · VT EPHYS EVL TRNSPTL TX ATRIAL FIB ISOLAT PULM VEIN N/A 5/10/2019   ABLATION A-FIB  W COMPLETE EP STUDY performed by Duke Palmer MD at [facility] [facility] [address] CATH LAB   · VT INTRACARDIAC ELECTROPHYSIOLOGIC 3D MAPPING N/A 5/10/2019   Ep 3d [facility] [address] C[facility] LAB   · STRESS TEST CARDIOLITE 1/5/12   walked 7:31, no chest pain, normal MPI.      Family History   Problem Relation Age of Onset   · Heart Failure Mother   · Stroke Father   · Other Other      endometrial cancer, niece     Social History     Tobacco Use   · Smoking status: Never Smoker   · Smokeless tobacco: Never Used   Substance Use Topics   · Alcohol use: Yes   Alcohol/week: 7.0 standard drinks   Types: 7 Standard drinks or equivalent per week   Comment: wine nightly         Review of Systems: Review of all other systems otherwise negative. Constitutional: Negative for fever, chills, weight loss, + malaise/fatigue. HEENT: Negative for nosebleeds, vision changes. Respiratory: Negative for cough, hemoptysis   Cardiovascular: Negative for chest pain, palpitations, orthopnea, claudication, leg swelling, syncope, and PND. + BARRY   Gastrointestinal: Negative for nausea, vomiting, diarrhea, blood in stool and melena. Genitourinary: Negative for dysuria, and hematuria. Musculoskeletal: Negative for myalgias, arthralgia. Skin: Negative for rash. Heme: Does not bleed or bruise easily. Neurological: Negative for speech change and focal weakness.       Objective:     Visit Vitals   Visit Vitals  /60   Pulse 84   Resp 16   Ht 5' 5\" (1.651 m)   Wt 176 lb (79.8 kg)   SpO2 99%   BMI 29.29 kg/m²         Physical Exam:   Constitutional: Well-developed and well-nourished. No respiratory distress. Head: Normocephalic and atraumatic. Eyes: Pupils are equal, round. ENT: Hearing grossly normal.   Neck: Supple. No JVD present. Cardiovascular: normal rate, regular rhythm. Exam reveals no gallop and no friction rub. No murmur heard. Pulmonary/Chest: Effort normal and breath sounds normal. No wheezes. Abdominal: Soft, no tenderness. Musculoskeletal: Moves extremities independently.  Normal gait. Vasc/lymphatic: No edema. Neurological: Alert, oriented. Skin: Skin is warm and dry. Psychiatric: Normal mood and affect.  Behavior is normal. Judgment and thought content normal.        Assessment/Plan:   Imaging/Studies:   Limited echo (06/22/2021): Small posterior pericardial effusion noted.       TIM (06/19/2020): LVEF 45-50%.  Mildly dilated LA.  Mild to mod MR.  Mild to mod TR.       Limited echo (10/24/2019): LVEF 56-60%, mild concentric LVH, mildly dilated LV, no RWMA, grade 2 diastolic dysfunction.  Mildly dilated RV.  Mildly dilated RA.  Mod dilated LA.  Mild to mod MR.  Mild AR & mild aortic valve sclerosis without significant stenosis.  Mild to mod PH.       Echo (05/11/2019): LVEF 51-55%, no RWMA.  Mod dilated LA, mod dilated RA.  Small to mod anterior pericardial effusion adjacent to LV & RA.       TIM (05/10/2019): LVEF 56-60%, no RWMA.  RV mod dilated.  LA mod dilated.  RA mod dilated.  Mod MR.  Mod TR.  Mod posterior loculated pericardial effusion adjacent to LV & LA measuring 15 mm.           ICD-10-CM ICD-9-CM    1. Dilated cardiomyopathy (HCC)  I42.0 425.4 REFERRAL TO CARDIOLOGY   2. Longstanding persistent atrial fibrillation (HCC)  I48.11 427.31 REFERRAL TO CARDIOLOGY      CANCELED: REFERRAL TO CARDIOLOGY   3. Pericardial effusion  I31.3 423.9    4.  Complete AV block due to AV pascual ablation (HCC)  I97.190 997.1     I44.2 426.0         Pericardial effusion: Occurred after transseptal puncture during AF ablation attempt, had tamponade x 2.  Ultimately required pericardial window.    Loculated pericardial effusion present on recent October echo   LVEF 35%  She needs LV lead  Will refer to Dr Mitchell Copeland to had met her in June 2021 for LV epicardial lead placement and wash out pericardial loculated fluid  She has BIV pacer ready in left upper chest recently so no ICD as LVEF is expected to improved with BIV pacing  She agrees to meet Dr Mitchell Copeland but to do procedure after holidays    Persistent AF failed antiarrhythmic including amiodarone  Cannot do AFIB ablation any more due to hx of complications and thick atrial septum  Av node ablation had been done and needs BIV pacing     LVEF 35% with GDMT    Anticoagulation: on eliquis        Future Appointments Date Time Provider Hazel Wade   1/12/2022  2:00 PM VASCULAR, CRISTINA KUMAR BS AMB   1/12/2022  2:40 PM Patrick Colunga MD CAVREY BS AMB   2/28/2022  1:00 PM REMOTE1, CRISTINA PAIGEREY BS AMB   6/1/2022 10:15 AM REMOTE1, CRISTINA PAIGEREY BS AMB   9/7/2022  9:15 AM REMOTE1, CRISTINA PAIGEREY BS AMB   12/15/2022  3:00 PM PACEMAKER3, CRISTINA PAIGEREY BS AMB   12/15/2022  3:20 PM MD GRECIA TroyREY BS AMB       Thank you for involving me in this patient's care and please call with further concerns or questions. Matias Caldwell M.D.    Electrophysiology/Cardiology   [facility] [place]   [place] [address]                     [address] [facility], [address] [place] [address]   [phone]                                        [phone]

## 2021-11-24 RX ORDER — SPIRONOLACTONE 25 MG/1
12.5 TABLET ORAL DAILY
Qty: 45 TABLET | Refills: 1 | Status: SHIPPED | OUTPATIENT
Start: 2021-11-24 | End: 2022-05-04

## 2021-11-24 NOTE — TELEPHONE ENCOUNTER
Request for Spironolactone 12.5 mg daily. Last office visit 10/13/21, next office visit 1/12/22. Refills per verbal order from Dr. Agnieszka Li.

## 2021-12-06 DIAGNOSIS — I48.19 PERSISTENT ATRIAL FIBRILLATION (HCC): ICD-10-CM

## 2021-12-06 RX ORDER — APIXABAN 5 MG/1
TABLET, FILM COATED ORAL
Qty: 180 TABLET | Refills: 3 | Status: SHIPPED | OUTPATIENT
Start: 2021-12-06

## 2021-12-06 NOTE — TELEPHONE ENCOUNTER
Received refill request for Eliquis 5 mg po tabs. Refill authorized.     Future Appointments   Date Time Provider Hazel Sheri   1/12/2022  2:00 PM VASCULAR, CRISTINA KUMAR BS AMB   1/12/2022  2:40 PM Patrick Colunga MD CAVREY BS AMB   2/28/2022  1:00 PM REMOTE1, CRISTINA KUMAR BS AMB   6/1/2022 10:15 AM REMOTE1, CRISTINA KUMAR BS AMB   9/7/2022  9:15 AM REMOTE1, CRISTINA KUMAR BS AMB   12/15/2022  3:00 PM PACEMAKER3, CRISTINA KUMAR BS AMB   12/15/2022  3:20 PM MD JOSÉ Valdez BS AMB

## 2021-12-21 ENCOUNTER — OFFICE VISIT (OUTPATIENT)
Dept: CARDIOLOGY CLINIC | Age: 81
End: 2021-12-21
Payer: MEDICARE

## 2021-12-21 VITALS — DIASTOLIC BLOOD PRESSURE: 75 MMHG | OXYGEN SATURATION: 98 % | HEART RATE: 88 BPM | SYSTOLIC BLOOD PRESSURE: 99 MMHG

## 2021-12-21 DIAGNOSIS — I50.23 ACUTE ON CHRONIC SYSTOLIC (CONGESTIVE) HEART FAILURE (HCC): ICD-10-CM

## 2021-12-21 DIAGNOSIS — I31.39 PERICARDIAL EFFUSION WITH CARDIAC TAMPONADE: ICD-10-CM

## 2021-12-21 DIAGNOSIS — I48.19 PERSISTENT ATRIAL FIBRILLATION (HCC): Primary | ICD-10-CM

## 2021-12-21 DIAGNOSIS — I31.4 PERICARDIAL EFFUSION WITH CARDIAC TAMPONADE: ICD-10-CM

## 2021-12-21 PROCEDURE — 1090F PRES/ABSN URINE INCON ASSESS: CPT | Performed by: PHYSICIAN ASSISTANT

## 2021-12-21 PROCEDURE — 99204 OFFICE O/P NEW MOD 45 MIN: CPT | Performed by: PHYSICIAN ASSISTANT

## 2021-12-23 RX ORDER — FERROUS SULFATE TAB 325 MG (65 MG ELEMENTAL FE) 325 (65 FE) MG
TAB ORAL
Qty: 90 TABLET | Refills: 1 | Status: SHIPPED | OUTPATIENT
Start: 2021-12-23 | End: 2022-06-23

## 2021-12-23 NOTE — TELEPHONE ENCOUNTER
Received refill request for ferrous sulfate 325 mg po tabs. Refill authorized.     Future Appointments   Date Time Provider Hazel Sheri   1/12/2022  2:00 PM VASCULAR, CRISTINA KUMAR BS AMB   1/12/2022  2:40 PM Patrick Colunga MD CAVREY BS AMB   2/28/2022  1:00 PM REMOTE1, CRISTINA KUMAR BS AMB   6/1/2022 10:15 AM REMOTE1, CRISITNA KUMAR BS AMB   9/7/2022  9:15 AM REMOTE1, CRISTINA KUMAR BS AMB   12/15/2022  3:00 PM PACEMAKER3, CRISTINA KUMAR BS AMB   12/15/2022  3:20 PM MD JOSÉ Zamora BS AMB

## 2022-02-28 ENCOUNTER — OFFICE VISIT (OUTPATIENT)
Dept: CARDIOLOGY CLINIC | Age: 82
End: 2022-02-28
Payer: MEDICARE

## 2022-02-28 DIAGNOSIS — Z95.0 CARDIAC PACEMAKER IN SITU: Primary | ICD-10-CM

## 2022-02-28 PROCEDURE — 93296 REM INTERROG EVL PM/IDS: CPT | Performed by: INTERNAL MEDICINE

## 2022-02-28 NOTE — LETTER
2/28/2022 11:26 AM    Ms. Ana Hill  [address] [address]      This letter confirms that we have received your scheduled remote check of your implanted     device on 2-28-22 . Our EP team will contact you via phone if there are significant abnormal    findings. Your next remote check from home is scheduled for 6-1-22 . If you have any questions, please call [address] at [phone].                Sincerely,    Jessee Browning MD [facility]

## 2022-03-04 ENCOUNTER — ANCILLARY PROCEDURE (OUTPATIENT)
Dept: CARDIOLOGY CLINIC | Age: 82
End: 2022-03-04
Payer: MEDICARE

## 2022-03-04 ENCOUNTER — PATIENT MESSAGE (OUTPATIENT)
Dept: CARDIOLOGY CLINIC | Age: 82
End: 2022-03-04

## 2022-03-04 ENCOUNTER — OFFICE VISIT (OUTPATIENT)
Dept: CARDIOLOGY CLINIC | Age: 82
End: 2022-03-04
Payer: MEDICARE

## 2022-03-04 VITALS
HEART RATE: 99 BPM | DIASTOLIC BLOOD PRESSURE: 70 MMHG | OXYGEN SATURATION: 97 % | BODY MASS INDEX: 29.32 KG/M2 | WEIGHT: 176 LBS | SYSTOLIC BLOOD PRESSURE: 112 MMHG | HEIGHT: 65 IN | RESPIRATION RATE: 16 BRPM

## 2022-03-04 VITALS
WEIGHT: 176 LBS | DIASTOLIC BLOOD PRESSURE: 75 MMHG | SYSTOLIC BLOOD PRESSURE: 99 MMHG | BODY MASS INDEX: 29.32 KG/M2 | HEIGHT: 65 IN

## 2022-03-04 DIAGNOSIS — I48.19 PERSISTENT ATRIAL FIBRILLATION (HCC): ICD-10-CM

## 2022-03-04 DIAGNOSIS — Z95.0 CARDIAC PACEMAKER IN SITU: Primary | ICD-10-CM

## 2022-03-04 LAB
ECHO AO ROOT DIAM: 2.9 CM
ECHO AO ROOT INDEX: 1.55 CM/M2
ECHO LA DIAMETER INDEX: 2.83 CM/M2
ECHO LA DIAMETER: 5.3 CM
ECHO LA TO AORTIC ROOT RATIO: 1.83
ECHO LA VOL 2C: 109 ML (ref 22–52)
ECHO LA VOL 4C: 116 ML (ref 22–52)
ECHO LA VOL BP: 113 ML (ref 22–52)
ECHO LA VOL/BSA BIPLANE: 60 ML/M2 (ref 16–34)
ECHO LA VOLUME AREA LENGTH: 117 ML
ECHO LA VOLUME INDEX A2C: 58 ML/M2 (ref 16–34)
ECHO LA VOLUME INDEX A4C: 62 ML/M2 (ref 16–34)
ECHO LA VOLUME INDEX AREA LENGTH: 63 ML/M2 (ref 16–34)
ECHO LV EDV A2C: 85 ML
ECHO LV EDV A4C: 79 ML
ECHO LV EDV BP: 83 ML (ref 56–104)
ECHO LV EDV INDEX A4C: 42 ML/M2
ECHO LV EDV INDEX BP: 44 ML/M2
ECHO LV EDV NDEX A2C: 45 ML/M2
ECHO LV EJECTION FRACTION A2C: 45 %
ECHO LV EJECTION FRACTION A4C: 29 %
ECHO LV EJECTION FRACTION BIPLANE: 39 % (ref 55–100)
ECHO LV ESV A2C: 47 ML
ECHO LV ESV A4C: 56 ML
ECHO LV ESV BP: 51 ML (ref 19–49)
ECHO LV ESV INDEX A2C: 25 ML/M2
ECHO LV ESV INDEX A4C: 30 ML/M2
ECHO LV ESV INDEX BP: 27 ML/M2
ECHO LV FRACTIONAL SHORTENING: 19 % (ref 28–44)
ECHO LV INTERNAL DIMENSION DIASTOLE INDEX: 2.57 CM/M2
ECHO LV INTERNAL DIMENSION DIASTOLIC: 4.8 CM (ref 3.9–5.3)
ECHO LV INTERNAL DIMENSION SYSTOLIC INDEX: 2.09 CM/M2
ECHO LV INTERNAL DIMENSION SYSTOLIC: 3.9 CM
ECHO LV IVSD: 1.2 CM (ref 0.6–0.9)
ECHO LV MASS 2D: 219.1 G (ref 67–162)
ECHO LV MASS INDEX 2D: 117.2 G/M2 (ref 43–95)
ECHO LV POSTERIOR WALL DIASTOLIC: 1.2 CM (ref 0.6–0.9)
ECHO LV RELATIVE WALL THICKNESS RATIO: 0.5

## 2022-03-04 PROCEDURE — 93010 ELECTROCARDIOGRAM REPORT: CPT | Performed by: SPECIALIST

## 2022-03-04 PROCEDURE — G8427 DOCREV CUR MEDS BY ELIG CLIN: HCPCS | Performed by: SPECIALIST

## 2022-03-04 PROCEDURE — 99214 OFFICE O/P EST MOD 30 MIN: CPT | Performed by: SPECIALIST

## 2022-03-04 PROCEDURE — G8754 DIAS BP LESS 90: HCPCS | Performed by: SPECIALIST

## 2022-03-04 PROCEDURE — G8399 PT W/DXA RESULTS DOCUMENT: HCPCS | Performed by: SPECIALIST

## 2022-03-04 PROCEDURE — 1090F PRES/ABSN URINE INCON ASSESS: CPT | Performed by: SPECIALIST

## 2022-03-04 PROCEDURE — G0463 HOSPITAL OUTPT CLINIC VISIT: HCPCS | Performed by: SPECIALIST

## 2022-03-04 PROCEDURE — G8536 NO DOC ELDER MAL SCRN: HCPCS | Performed by: SPECIALIST

## 2022-03-04 PROCEDURE — 1101F PT FALLS ASSESS-DOCD LE1/YR: CPT | Performed by: SPECIALIST

## 2022-03-04 PROCEDURE — G8752 SYS BP LESS 140: HCPCS | Performed by: SPECIALIST

## 2022-03-04 PROCEDURE — G8417 CALC BMI ABV UP PARAM F/U: HCPCS | Performed by: SPECIALIST

## 2022-03-04 PROCEDURE — G8432 DEP SCR NOT DOC, RNG: HCPCS | Performed by: SPECIALIST

## 2022-03-04 PROCEDURE — 93308 TTE F-UP OR LMTD: CPT | Performed by: SPECIALIST

## 2022-03-04 PROCEDURE — 93005 ELECTROCARDIOGRAM TRACING: CPT | Performed by: SPECIALIST

## 2022-03-04 RX ORDER — METOPROLOL SUCCINATE 25 MG/1
25 TABLET, EXTENDED RELEASE ORAL
Qty: 90 TABLET | Refills: 1 | Status: SHIPPED | OUTPATIENT
Start: 2022-03-04

## 2022-03-04 RX ORDER — BUMETANIDE 1 MG/1
0.5 TABLET ORAL
Qty: 90 TABLET | Refills: 0 | Status: SHIPPED | OUTPATIENT
Start: 2022-03-04

## 2022-03-04 NOTE — PROGRESS NOTES
[address] [facility]                                                            OFFICE NOTE        Jeet Dominguez M.D.,KATIE Nenita Hess   1940  [id number]    Date/Time:  3/4/76877:05 PM            SUBJECTIVE:  Sob with moderate activities   no palpitations or syncope or cp reported       Assessment/Plan  1.  Atrial fibrillation: She now has a persistent atrial fibrillation.      S/p PPM and AV pascual ablation on 7/21     She is status post partial ablation of atrial fibrillation May 1395.  This was complicated by pericardial effusion.     S/p 2nd attempt to AF ablation on 7/21 also this resulted in pericardial tamponade requiring pericardial window     Off amiodarone      Continue with Eliquis of course as well.   no untoward effects thus far     She has had some hypothyroidism and bradycardia with  amiodarone. Status post AV pascual ablation    2.  Hypertension:low normal     3.  Cardiomyopathy: Discussed echocardiogram today. Ejection fraction 39%. For all intents and purposes about the same as previously. She continues to have some shortness of breath with moderate activity but overall she feels okay. I will attempt to maximize any medications even if her blood pressure is always on the low side and therefore limits significant changes. Continue same dose of Entresto. Continue same dose of Aldactone. Use Bumex half a tablet as needed. Increase Toprol to 25 mg daily. Repeat limited echo in 4 months. 4.  Pacemaker: Continue surveillance with Dr. Saundra Jordan. The patient was supposed to undergo BiV pacemaker placement with epicardial LV placement and she had seen cardiothoracic surgeon but it was then determined that may not be a best option for her of course I will defer to Dr. Saundra Jordan and Dr. Eliu Garcia final determination of this.     Otherwise I will see her back in 4 months with a echocardiogram 1 week prior. HPI   80 y. o. female. Patient with h/o HTN, HLD, Ca score of 0 in 2012, PVCs on ECG on 1/16 and echo on 1/16 with EF 55% mild MR. S/p LTKR in 1/16(seen by Dr. Wilda Rubio)  Also remote h/o breast ca s/p mastectomy in 1980 but no chemo or xrt     Echo on 4/16:Ejection fraction was estimated to be 55 %. There were no  regional wall motion abnormalities. Wall thickness was at the upper limits  of normal.    Left atrium: The atrium was mildly dilated. Mitral valve: There was mild regurgitation. Aortic valve: Leaflets exhibited sclerosis. holter on 4/16: NSR  frequent pacs couplets triples and occasional non sustained at, frequent pvcs (0.65%)     On 3/18: admitted to [facility] 27 onset A-fib with RVR     TIM and Cardioversion completed with 200 J with conversion to NSR  TIM no PFO, trace AR, mild MR, no TR or MO Mild atherosclerosis in prox descending aortaNo KENNEDY thrombus LA moderate dilation RA normal  Started eliquis, flecainide and metoprolol     Echo on 8/20/18: The ventricle was dilated. Systolic function was mildly  reduced by EF (biplane method of disks). Ejection fraction was estimated  to be 45 % in the range of 40 % to 50 %. There was mild diffuse  hypokinesis.        TIM /CARDIOVERSION on 2/19 resulting in NSR. Flecainide resumed     CXR on 2/25/19:1. Interval development of small bilateral pleural effusions and mild basilar     atelectasis. Follow-up to resolution is suggested. Doppler LLE on 2/19:No evidence of left lower extremity vein thrombosis.     chest ct on 3/19:1. CTA pulmonary vein/left atrial mapping performed. 2. No accessory pulmonary vein, thrombus or stenosis. 3. 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   4. No acute finding.      AF ablation on 5/19  EP study & partial PVI of the left superior vein and full PVI of the right veins during ablation on 05/10/2019, developed pericardial effusion during procedure.  No tamponade.  She was able to restarted Eliquis thereafter.   but  she was admitted to CCU due to posterior lateral LA LV moderate pericardial effusion during left pulmonary vein isolation.  Heparin was reversed.  She was stable so no pericardiocentesis was warranted  Started on AMIO and BBlockers     On 7/21 :Ms. Soria presented for planned EPS with AF/AFL ablation on 06/21/2021.  During procedure, she had acute pericardial tamponade during a difficult transseptal puncture as sheath was advanced into the left atrium.  Emergency pericardiocentesis done, stable & later extubated in CVICU.     That evening, she became hypotensive & bradycardic with possible PEA, VT.  Shocked out of VT, received epi & compressions.  Reintubated, taken to OR with no acute bleeding found.  Dr. Moises Goldmann noted clot in the pericardium, evacuated & placed Gavin drain for pericardial window.  Required levophed for BP support, received 2 units PRBCs.  Extubated after left thoracentesis.  Stabilized.  Drain later removed without difficulty.  Limited echo on 06/25/2021 showed only trivial to small posterior pericardial effusion.     Transient thrombocytopenia to 80, but HIT platelet serotonin release assay negative.      On 7/21 with Dr. De La Cruz:   ABLATION Anabela Casanova Dual   Lv Lead Placement            Readmitted for pleural effusion and thoracentesis on 8/21                  CARDIAC STUDIES        08/11/21    ECHO ADULT FOLLOW-UP OR LIMITED 10/13/2021 10/13/2021    Interpretation Summary  · LV: Calculated LVEF is 35%. Visually measured ejection fraction. Normal cavity size. Upper normal wall thickness. Moderate-to-severely and globally reduced systolic function. Abnormal left ventricular septal motion consistent with right ventricular pacing. · Pericardium: Small-to-moderate pericardial effusion adjacent to right ventricle measuring 15 mm.     Signed by: Mohinder Islas MD on 10/13/2021  3:06 PM            06/14/21    NUCLEAR CARDIAC STRESS TEST 06/15/2021 6/17/2021    Interpretation Summary  · SPECT: Left ventricular function post-stress was abnormal. Calculated ejection fraction is 49%. The TID ratio is 1.1. · Baseline ECG: Atrial flutter, non-specific ST-T wave abnormalities, myocardial infarction, myocardial infarction. The infarction is located in the anterior regions. .  · SPECT: Myocardial perfusion imaging defect 1: There is a defect that is small in size with a mild reduction in uptake present in the mid-apical and anterior location(s) that is non-reversible. There is normal wall motion in the defect area. Viability in the area is good. The defect appears to probably be artifact caused by breast attenuation. · SPECT: Left ventricular perfusion is probably normal. Myocardial perfusion imaging supports a low risk stress test.    Signed by: Fredi Novoa MD on 6/15/2021  5:18 PM                    EKG Results     Procedure 720 Value Units Date/Time    AMB POC EKG ROUTINE W/ 12 LEADS, INTER & REP [[id number]]     Order Status: No result               IMAGING      MRI Results (most recent):  Results from Hospital Encounter encounter on 07/16/13    MRI KNEE RT WO CONT    Narrative  **Final Report**      ICD Codes / Adm. Diagnosis: 836.2  401.9 / Other tear of cartilage or men  Examination:  MR KNEE WO CON RT  - [id number] - Jul 16 2013  3:28PM  Accession No:  [id number]  Reason:  tear      REPORT:  EXAM:  Right knee MRI without contrast    INDICATION: Pain    COMPARISON: None    TECHNIQUE: Axial T2 fat-saturated and proton density fat-saturated; coronal  T1 and proton density fat-saturated; and sagittal T2 fat-saturated, proton  density fat-saturated, and gradient echo MRI of the     knee . CONTRAST:  None. FINDINGS: Bone marrow: Degenerative type bone marrow edema is present in the  medial aspects of the medial femoral condyle and medial tibial plateau. A  small focus of subchondral bone marrow edema is present in the lateral facet  of the patella.  There is no evidence of fracture or marrow replacing process. Joint fluid: There is a mild joint effusion. Areas of synovitis are seen  throughout the joint space, particularly in the suprapatellar recess and  anterior to the lateral compartment. There is additional 9 mm loose body  along the course of the popliteal tendon on series 7 image 55. There is a moderate-sized Baker's cyst posterior medial to the knee. This  measures 2.0 x 1.9 x 7.7 cm craniocaudal. There is a small rounded loose  body within this measuring 5 mm on series 8 image 4. Several strands of  synovitis are seen within this. Collateral ligaments and posterior, lateral corner: Intact. Medial meniscus: The medial meniscus is extruded. There is a complete radial  tear of the posterior horn of the medial meniscus as evidenced by an absent  posterior horn on series 9 image 8. The distal material is flipped  inferiorly and laterally into the gutter on series 8 image 4 and series 6  image 17. Lateral meniscus: High signal is seen in the free edge of the body related  to free edge tearing. Irregular increased signal is seen anterior to the  anterior horn is likely related to synovitis. ACL and PCL: A small area mucoid degeneration is present in the proximal  ACL. The ACL and PCL are intact however. Tendons: Intact. .    Muscles: Within normal limits. Patellofemoral alignment: No patellar subluxation/tilt. Trochlear groove is  not hypoplastic. TT-TG distance: Normal at 12 mm. Articular cartilage: Full-thickness cartilage loss is seen along the  weightbearing surfaces of the medial femoral condyle and medial tibial  plateau with significant marginal osteophytosis. Mild underlying subchondral  bone marrow edema is present medially. Near full-thickness fissuring is seen along the vertical ridge of the  patella. Mild irregular cartilage loss is seen along the lateral aspect of  the patellofemoral compartment.  Small marginal osteophytes are seen adjacent  to this. There is a small area of subchondral bone marrow edema related to a  full-thickness fissure in the lateral facet of patella. There is a shallow thickness of fissuring are seen along the lateral femoral  condyle towards the tibial spine. Significant marginal osteophytes are  present. There is diffuse mild cartilage thinning throughout the lateral  compartment. Soft tissue mass: None. Nonspecific soft tissue edema is seen anterior to  the patellar tendon. IMPRESSION:  1. Complete radial tear of the posterior horn of the medial meniscus with  displacement of meniscal material into the inferior gutter. 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 2. Moderate lateral and patellofemoral osteoarthritis. 3. Free edge tearing in the body of the lateral meniscus. 4. Moderate joint effusion with areas of extensive synovitis likely related  to long-standing degenerative disease. 5. A moderate-sized Baker's cyst is seen posterior medial to the knee with  areas of synovitis and a small loose body. An additional small loose body is  seen along the course of the popliteus tendon. Signing/Reading Doctor: Kirk Multani ([id number])  Approved: Kirk Multani ([id number])  Jul 16 2013  4:08PM      CT Results (most recent):  Results from Hospital Encounter encounter on 07/21/21    CT SPINE CERV WO CONT    Narrative  EXAM:  CT CERVICAL SPINE WITHOUT CONTRAST    INDICATION: Fall. COMPARISON: None. CONTRAST:  None. TECHNIQUE: Multislice helical CT of the cervical spine was performed without  intravenous contrast administration. Sagittal and coronal reformats were  generated. CT dose reduction was achieved through use of a standardized  protocol tailored for this examination and automatic exposure control for dose  modulation. FINDINGS:    There is a left pleural effusion. See CT chest report. The alignment is within normal limits. There is no fracture or compression  deformity. The odontoid process is intact. The craniocervical junction is within  normal limits. There are mild degenerative disc changes throughout the cervical  spine    The incidentally imaged soft tissues are within normal limits. C2-C3: There is no spinal canal or neural foraminal stenosis. C3-C4: There is slight bulging of the disc and mild facet arthropathy with mild  central stenosis. Neural foramina are patent. Trula Blew C4-C5: There are degenerative disc changes and facet arthropathy right greater  than left with mild central stenosis and moderate narrowing of the right neural  foramen and mild narrowing of the left neural foramen. Trula Blew C5-C6: There are degenerative disc changes with small osteophyte/disc complex  with moderate central stenosis and mild narrowing of the left neural foramen. Trula Blew C6-C7: There are degenerative disc changes with moderate central stenosis and  moderately severe narrowing of the left neural foramen. .    C7-T1: There is no spinal canal or neural foraminal stenosis. Impression  No fracture is identified. There are degenerative changes in the cervical spine  as detailed above. XR Results (most recent):  Results from Hospital Encounter encounter on 07/21/21    XR CHEST PORT    Narrative  EXAM: Chest radiograph    INDICATION: Evaluation for pneumothorax    COMPARISON: 7/23/2021    FINDINGS: A portable AP radiograph of the chest was obtained at 2118 hours. The  patient is on a cardiac monitor. There is a dual-lead pacemaker in place. A  small left pleural effusion is seen. The cardiac and mediastinal contours and  pulmonary vascularity are remarkable for cardiomegaly. The bones and soft  tissues are grossly within normal limits. Impression  Small left pleural effusion. No pneumothorax.           Past Medical History:   Diagnosis Date    Arthritis     Asthma     dr. Lucinda Phillip allergist    [facility]     Breast cancer ([facility] 1980    right - mastectomy    Coagulation disorder ([address]     on eliquis    Dyslipidemia     labs 2008 - chol 283, HDL 83, ,     HTN (hypertension)     Hyperlipidemia LDL goal < 130     for increased LDL particles, Dr. Tisha Quiñones nodule     Dr. Gisselle Pro Palpitations     resolved     Past Surgical History:   Procedure Laterality Date    COLONOSCOPY N/A 9/10/2018    COLONOSCOPY performed by Farzaneh Pappas MD at [facility] 149 W/O CONT WITH CALCIUM  1/2012    CAC score 0; calcified mediastinal lymph nodes consistent granulomatous disease    ECHO 2D ADULT  5/5/2008    normal, LVEF 60%    HX APPENDECTOMY      HX HYSTERECTOMY Bilateral 03/19/2015    and uro repair    HX KNEE REPLACEMENT Right     HX MASTECTOMY Right     HX TONSILLECTOMY      HX UROLOGICAL  8/1/14    [employer]    SD CARDIOVERSION ELECTIVE ARRHYTHMIA EXTERNAL  3/28/2018         SD CHEST SURGERY PROCEDURE UNLISTED      lung nodule removed - benign    SD EPHYS EVL TRNSPTL TX ATRIAL FIB ISOLAT PULM VEIN N/A 5/10/2019    ABLATION A-FIB  W COMPLETE EP STUDY performed by Saw Langley MD at [place] [address] CATH LAB    SD INTRACARD ECHO, THER/DX INTERVENT N/A 5/10/2019    Intracardiac Echocardiogram performed by Saw Langley MD at [place] [address] CATH LAB    SD INTRACARDIAC ELECTROPHYSIOLOGIC 3D MAPPING N/A 5/10/2019    Ep 3d Mapping performed by Saw Langley MD at [place] [address] CATH LAB    STRESS TEST [facility]  1/5/12    walked 7:31, no chest pain, normal MPI. Social History     Tobacco Use    Smoking status: Never Smoker    Smokeless tobacco: Never Used   Substance Use Topics    Alcohol use:  Yes     Alcohol/week: 7.0 standard drinks     Types: 7 Standard drinks or equivalent per week     Comment: wine nightly    Drug use: No     Family History   Problem Relation Age of Onset    Heart Failure Mother     Stroke Father     Other Other         endometrial cancer, niece     Allergies   Allergen Reactions    Betadine [Povidone-Iodine] Rash    Iodine Rash    Norvasc [Amlodipine] Other (comments)     Flushing/redness. There were no vitals taken for this visit. There were no vitals filed for this visit. Review of Systems:   Pertinent items are noted in the History of Present Illness. Visit Vitals  /70   Pulse 99   Resp 16   Ht 5' 5\" (1.651 m)   Wt 176 lb (79.8 kg)   SpO2 97%   BMI 29.29 kg/m²     General Appearance:  Well developed, well nourished,alert and oriented x 3, and individual in no acute distress. Ears/Nose/Mouth/Throat:   Hearing grossly normal.         Neck: Supple. Chest:   Lungs clear to auscultation bilaterally. Cardiovascular:  Regular rate and rhythm, S1, S2 normal, no murmur. Abdomen:   Soft, non-tender, bowel sounds are active. Extremities: No edema bilaterally. Skin: Warm and dry. Current Outpatient Medications on File Prior to Visit   Medication Sig Dispense Refill    FeroSuL 325 mg (65 mg iron) tablet TAKE 1 TABLET BY MOUTH EVERY DAY 90 Tablet 1    Eliquis 5 mg tablet TAKE 1 TABLET TWICE A  Tablet 3    spironolactone (ALDACTONE) 25 mg tablet Take 0.5 Tablets by mouth daily. 45 Tablet 1    bumetanide (BUMEX) 1 mg tablet Take 0.5 Tablets by mouth daily as needed (Swelling or shortness of breath). 90 Tablet 0    sacubitriL-valsartan (Entresto) 24-26 mg tablet Take 1 Tablet by mouth two (2) times a day. 180 Tablet 2    melatonin 1 mg chew Take 1 mg by mouth nightly.  fluticasone prp-sod. chl,bicarb 50 mcg- 0.9 % ksps 2 Sprays by Nasal route daily. 2 sprays each nostril daily      metoprolol succinate (TOPROL-XL) 25 mg XL tablet Take 0.5 Tablets by mouth nightly. 45 Tablet 1    cholecalciferol (VITAMIN D3) (2,000 UNITS /50 MCG) cap capsule Take 5,000 Units by mouth daily.  acetaminophen (Tylenol Extra Strength) 500 mg tablet Take 500 mg by mouth nightly as needed for Pain.       levothyroxine (SYNTHROID) 25 mcg tablet Take 25 mcg by mouth Daily (before breakfast). pt has 50mcg tab, take 0.5 tab daily before breakfast      fluticasone (FLOVENT HFA) 220 mcg/actuation inhaler as needed.  atorvastatin (LIPITOR) 20 mg tablet Take 20 mg by mouth every evening.  MULTIVITAMIN PO Take  by mouth. Takes one po once daily.  cetirizine (ZYRTEC) 10 mg tablet Take 10 mg by mouth every evening.  estradiol (ESTRACE) 0.01 % (0.1 mg/gram) vaginal cream Insert 1 g into vagina every Monday and Thursday.  albuterol (PROVENTIL HFA, VENTOLIN HFA, PROAIR HFA) 90 mcg/actuation inhaler Take 1 Puff by inhalation every four (4) hours as needed. (Patient taking differently: Take 1 Puff by inhalation every four (4) hours as needed for Wheezing.) 2 Inhaler 3    raloxifene (EVISTA) 60 mg tablet Take 1 Tab by mouth daily. 90 Tab 4    montelukast (SINGULAIR) 10 mg tablet Take 10 mg by mouth every evening. No current facility-administered medications on file prior to visit. Negrito Coon had no medications administered during this visit. Current Outpatient Medications   Medication Sig    FeroSuL 325 mg (65 mg iron) tablet TAKE 1 TABLET BY MOUTH EVERY DAY    Eliquis 5 mg tablet TAKE 1 TABLET TWICE A DAY    spironolactone (ALDACTONE) 25 mg tablet Take 0.5 Tablets by mouth daily.  bumetanide (BUMEX) 1 mg tablet Take 0.5 Tablets by mouth daily as needed (Swelling or shortness of breath).  sacubitriL-valsartan (Entresto) 24-26 mg tablet Take 1 Tablet by mouth two (2) times a day.  melatonin 1 mg chew Take 1 mg by mouth nightly.  fluticasone prp-sod. chl,bicarb 50 mcg- 0.9 % ksps 2 Sprays by Nasal route daily. 2 sprays each nostril daily    metoprolol succinate (TOPROL-XL) 25 mg XL tablet Take 0.5 Tablets by mouth nightly.  cholecalciferol (VITAMIN D3) (2,000 UNITS /50 MCG) cap capsule Take 5,000 Units by mouth daily.     acetaminophen (Tylenol Extra Strength) 500 mg tablet Take 500 mg by mouth nightly as needed for Pain.  levothyroxine (SYNTHROID) 25 mcg tablet Take 25 mcg by mouth Daily (before breakfast). pt has 50mcg tab, take 0.5 tab daily before breakfast    fluticasone (FLOVENT HFA) 220 mcg/actuation inhaler as needed.  atorvastatin (LIPITOR) 20 mg tablet Take 20 mg by mouth every evening.  MULTIVITAMIN PO Take  by mouth. Takes one po once daily.  cetirizine (ZYRTEC) 10 mg tablet Take 10 mg by mouth every evening.  estradiol (ESTRACE) 0.01 % (0.1 mg/gram) vaginal cream Insert 1 g into vagina every Monday and Thursday.  albuterol (PROVENTIL HFA, VENTOLIN HFA, PROAIR HFA) 90 mcg/actuation inhaler Take 1 Puff by inhalation every four (4) hours as needed. (Patient taking differently: Take 1 Puff by inhalation every four (4) hours as needed for Wheezing.)    raloxifene (EVISTA) 60 mg tablet Take 1 Tab by mouth daily.  montelukast (SINGULAIR) 10 mg tablet Take 10 mg by mouth every evening. No current facility-administered medications for this visit. Lab Results   Component Value Date/Time    Cholesterol, total 142 03/28/2018 04:00 AM    HDL Cholesterol 76 03/28/2018 04:00 AM    LDL, calculated 55.2 03/28/2018 04:00 AM    VLDL, calculated 10.8 03/28/2018 04:00 AM    Triglyceride 54 03/28/2018 04:00 AM    CHOL/HDL Ratio 1.9 03/28/2018 04:00 AM       Lab Results   Component Value Date/Time    Sodium 134 (L) 08/06/2021 09:58 AM    Potassium 5.1 08/06/2021 09:58 AM    Chloride 103 08/06/2021 09:58 AM    CO2 24 08/06/2021 09:58 AM    Anion gap 7 08/06/2021 09:58 AM    Glucose 120 (H) 08/06/2021 09:58 AM    BUN 27 (H) 08/06/2021 09:58 AM    Creatinine 0.88 08/06/2021 09:58 AM    BUN/Creatinine ratio 31 (H) 08/06/2021 09:58 AM    GFR est AA >60 08/06/2021 09:58 AM    GFR est non-AA >60 08/06/2021 09:58 AM    Calcium 8.9 08/06/2021 09:58 AM       Lab Results   Component Value Date/Time    ALT (SGPT) 31 07/22/2021 05:01 AM    Alk.  phosphatase 76 07/22/2021 05:01 AM    Bilirubin, direct 0.1 06/21/2021 02:51 PM    Bilirubin, total 0.7 07/22/2021 05:01 AM       Lab Results   Component Value Date/Time    WBC 12.0 (H) 08/06/2021 09:58 AM    Hemoglobin (POC) 12.9 10/25/2009 03:04 AM    HGB 10.8 (L) 08/06/2021 09:58 AM    Hematocrit (POC) 38 10/25/2009 03:04 AM    HCT 33.8 (L) 08/06/2021 09:58 AM    PLATELET 549 25/97/8649 09:58 AM    MCV 98.5 08/06/2021 09:58 AM       Lab Results   Component Value Date/Time    TSH 7.66 (H) 07/22/2021 05:01 AM         Lab Results   Component Value Date/Time    Cholesterol, total 142 03/28/2018 04:00 AM    Cholesterol, total 175 12/18/2015 10:18 AM    Cholesterol, total 251 (H) 06/03/2015 08:45 AM    Cholesterol, total 206 (H) 05/14/2014 10:46 AM    Cholesterol, total 201 (H) 04/26/2013 09:56 AM    HDL Cholesterol 76 03/28/2018 04:00 AM    HDL Cholesterol 93 12/18/2015 10:18 AM    HDL Cholesterol 89 06/03/2015 08:45 AM    HDL Cholesterol 92 05/14/2014 10:46 AM    HDL Cholesterol 99 04/26/2013 09:56 AM    LDL, calculated 55.2 03/28/2018 04:00 AM    LDL, calculated 68 12/18/2015 10:18 AM    LDL, calculated 141 (H) 06/03/2015 08:45 AM    LDL, calculated 98 05/14/2014 10:46 AM    LDL, calculated 83 04/26/2013 09:56 AM    Triglyceride 54 03/28/2018 04:00 AM    Triglyceride 68 12/18/2015 10:18 AM    Triglyceride 104 06/03/2015 08:45 AM    Triglyceride 79 05/14/2014 10:46 AM    Triglyceride 96 04/26/2013 09:56 AM    CHOL/HDL Ratio 1.9 03/28/2018 04:00 AM                Please note that this dictation was completed with [employer], the [employer] voice recognition software. Quite often unanticipated grammatical, syntax, homophones, and other interpretative errors are inadvertently transcribed by the computer software. Please disregard these errors. Please excuse any errors that have escaped final proofreading.

## 2022-03-04 NOTE — TELEPHONE ENCOUNTER
Cardiologist: Dr. Mohsen Stout    Last appt: 3/4/2022  Future Appointments   Date Time Provider Hazel Sheri   6/1/2022 10:15 AM REMOTE1, CRISTINA MULLER AMB   7/21/2022  3:00 PM ECHO, CRISTINA MULLER AMB   7/27/2022  1:00 PM MD JOSÉ Diggs AMB   9/7/2022  9:15 AM REMOTE1, CRISTINA MULLER AMB   12/15/2022  3:00 PM PACEMAKER3, CRISTINA RUSSELL   12/15/2022  3:20 PM MD JOSÉ Marroquin BS AMB       Requested Prescriptions     Signed Prescriptions Disp Refills    bumetanide (BUMEX) 1 mg tablet 90 Tablet 0     Sig: Take 0.5 Tablets by mouth daily as needed (Swelling or shortness of breath). Authorizing Provider: Misa Wright     Ordering User: CLARKE LUNA VO per Dr. Mohsen Stout.

## 2022-03-04 NOTE — PROGRESS NOTES
Visit Vitals  /70   Pulse 99   Resp 16   Ht 5' 5\" (1.651 m)   Wt 176 lb (79.8 kg)   SpO2 97%   BMI 29.29 kg/m²

## 2022-03-04 NOTE — PATIENT INSTRUCTIONS
Please INCREASE your Toprol (metoprolol succinate) to 25mg daily    Please follow up with Dr. Sunni Torre in 4 months with a limited echocardiogram 1 week prior

## 2022-03-18 PROBLEM — I48.91 A-FIB (HCC): Status: ACTIVE | Noted: 2021-06-21

## 2022-03-18 PROBLEM — Z98.890 S/P PERICARDIAL WINDOW CREATION: Status: ACTIVE | Noted: 2021-06-21

## 2022-03-19 PROBLEM — I34.0 NON-RHEUMATIC MITRAL REGURGITATION: Status: ACTIVE | Noted: 2019-05-10

## 2022-03-19 PROBLEM — I50.23 ACUTE ON CHRONIC SYSTOLIC (CONGESTIVE) HEART FAILURE (HCC): Status: ACTIVE | Noted: 2021-07-21

## 2022-03-19 PROBLEM — R42 DIZZINESS: Status: ACTIVE | Noted: 2018-03-27

## 2022-03-19 PROBLEM — I48.19 PERSISTENT ATRIAL FIBRILLATION (HCC): Status: ACTIVE | Noted: 2018-03-27

## 2022-03-19 PROBLEM — I31.4 CARDIAC/PERICARDIAL TAMPONADE: Status: ACTIVE | Noted: 2021-06-21

## 2022-03-20 PROBLEM — I31.4 PERICARDIAL EFFUSION WITH CARDIAC TAMPONADE: Status: ACTIVE | Noted: 2019-05-10

## 2022-03-20 PROBLEM — Z98.890 S/P ABLATION OF ATRIAL FIBRILLATION: Status: ACTIVE | Noted: 2019-05-10

## 2022-03-20 PROBLEM — Z01.89 ENCOUNTER FOR CARDIOVERSION PROCEDURE: Status: ACTIVE | Noted: 2018-03-28

## 2022-03-20 PROBLEM — Z86.79 S/P ABLATION OF ATRIAL FIBRILLATION: Status: ACTIVE | Noted: 2019-05-10

## 2022-03-20 PROBLEM — I31.39 PERICARDIAL EFFUSION WITH CARDIAC TAMPONADE: Status: ACTIVE | Noted: 2019-05-10

## 2022-03-20 PROBLEM — I36.1 NON-RHEUMATIC TRICUSPID VALVE INSUFFICIENCY: Status: ACTIVE | Noted: 2019-05-10

## 2022-05-04 ENCOUNTER — PATIENT MESSAGE (OUTPATIENT)
Dept: CARDIOLOGY CLINIC | Age: 82
End: 2022-05-04

## 2022-05-04 RX ORDER — SPIRONOLACTONE 25 MG/1
12.5 TABLET ORAL DAILY
Qty: 45 TABLET | Refills: 1 | Status: SHIPPED | OUTPATIENT
Start: 2022-05-04

## 2022-05-04 NOTE — TELEPHONE ENCOUNTER
Cardiologist: Dr. Africa Simpson    Last appt: 3/4/2022  Future Appointments   Date Time Provider Hazel Romeroi   6/1/2022 10:15 AM REMOTE1, CRISTINA MULLER AMB   7/21/2022  3:00 PM ECHO, CRISTINA MULLER AMB   7/27/2022  1:00 PM MD JOSÉ Soares AMB   9/7/2022  9:15 AM REMOTE1, CRISTINA MULLER AMB   12/15/2022  3:00 PM PACEMAKER3, CRISTINA MULLER AMB   12/15/2022  3:20 PM MD JOSÉ Ya BS AMB       Requested Prescriptions     Signed Prescriptions Disp Refills    spironolactone (ALDACTONE) 25 mg tablet 45 Tablet 1     Sig: Take 0.5 Tablets by mouth daily. Authorizing Provider: Tedd Gottron     Ordering User: CLARKE LUNA VO per Dr. Africa Simpson.

## 2022-06-01 ENCOUNTER — OFFICE VISIT (OUTPATIENT)
Dept: CARDIOLOGY CLINIC | Age: 82
End: 2022-06-01
Payer: MEDICARE

## 2022-06-01 DIAGNOSIS — Z95.0 CARDIAC PACEMAKER IN SITU: Primary | ICD-10-CM

## 2022-06-01 PROCEDURE — 93296 REM INTERROG EVL PM/IDS: CPT | Performed by: INTERNAL MEDICINE

## 2022-06-01 NOTE — LETTER
6/1/2022 9:41 AM    Ms. Marcelino Julio  [address] [place] [id number]            This letter confirms that we have received your scheduled remote check of your implanted     device on 6-1-22  . Our EP team will contact you via phone if there are significant abnormal    findings. Your next remote check from home is scheduled for 9-7-22  . If you have any questions, please call [address] [place] [address] at [phone].                Sincerely,    Jett Alvarado MD [facility]

## 2022-06-23 RX ORDER — FERROUS SULFATE TAB 325 MG (65 MG ELEMENTAL FE) 325 (65 FE) MG
TAB ORAL
Qty: 90 TABLET | Refills: 1 | Status: SHIPPED | OUTPATIENT
Start: 2022-06-23

## 2022-06-23 NOTE — TELEPHONE ENCOUNTER
Received refill request for ferrous sulfate 325 mg po tabs. Refill authorized.     Future Appointments   Date Time Provider Hazel Wade   7/21/2022  3:00 PM ECHOCRISTINA AMB   8/24/2022  1:00 PM MD JOSÉ Giles AMB   9/7/2022  9:15 AM REMOTE1CRISTINA AMB   12/15/2022  3:00 PM PACEMAKER3, CRISTINA MULLER AMB   12/15/2022  3:20 PM MD JOSÉ Chau BS AMB

## 2022-07-18 ENCOUNTER — TELEPHONE (OUTPATIENT)
Dept: CARDIOLOGY CLINIC | Age: 82
End: 2022-07-18

## 2022-07-18 NOTE — TELEPHONE ENCOUNTER
Patient is calling because she would like to transfer her pacemaker monitoring to [facility] [phone]. Patient will be establishing care with [facility]. Patient apt for a Remote check on 9/7/22 needs to be canceled.     [phone]

## 2022-07-18 NOTE — TELEPHONE ENCOUNTER
Phoned patient, no answer. LVM that remote appt was cancelled as requested. Advised to have [address] enroll patient in home monitoring, then we will release from [facility]. (Attempted to transfer out manually, unsuccessful)  Advised to call back with any questions but new clinic needs to enroll her.

## 2022-08-03 NOTE — Clinical Note
sheath exchanged for INTRODUCER CATH 8.5FR L71CM 8.5FR DIL L180CM DIA0.032IN W/. [employer] Pregnancy And Lactation Text: This medication is Pregnancy Category X and is absolutely contraindicated during pregnancy. It is unknown if it is excreted in breast milk.

## 2022-08-29 ENCOUNTER — HOSPITAL ENCOUNTER (OUTPATIENT)
Dept: GENERAL RADIOLOGY | Age: 82
Discharge: HOME | End: 2022-08-29
Attending: INTERNAL MEDICINE
Payer: MEDICARE

## 2022-08-29 ENCOUNTER — TRANSCRIBE ORDER (OUTPATIENT)
Dept: GENERAL RADIOLOGY | Age: 82
End: 2022-08-29

## 2022-08-29 DIAGNOSIS — R05.1 ACUTE COUGH: Primary | ICD-10-CM

## 2022-08-29 DIAGNOSIS — R05.1 ACUTE COUGH: ICD-10-CM

## 2022-08-29 PROCEDURE — 71046 X-RAY EXAM CHEST 2 VIEWS: CPT

## 2022-11-29 NOTE — PATIENT INSTRUCTIONS
Please increase your lasix to 60mg and your potassium to 30 meq for two days. After that increase your lasix to 40mg daily and your potassium to 20 meq daily. Please get your chest xray completed today or tomorrow. Please get your blood work completed in about a week (around May 2nd) and follow up with Dr. Leah Osuna in 10 days.
[de-identified] : Indications: \par Osteoarthritis of the Left Knee\par Osteoarthritis of the Right Knee\par \par Consent:\par The risks and benefits of the procedure were discussed with the patient in detail. Upon verbal consent of the patient, we proceeded with the Euflexxa injection as noted below. \par \par Procedure:\par Under sterile conditions, the patient underwent a Euflexxa injection to the left and right knee of 20 mg sodium Hyaluronate, 17 mg sodium chloride, 1.12 mg disodium hydrogen phosphate dodecahydrate,.10 mg sodium dihydrogen phosphate dehydrate in a 2 ml syringe without any complications. The patient tolerated this well. \par \par : [employer]\par NDC #: [address]-4\par Lot #: [id number]\par Expiration: 9/19/2023\par \par :  [employer]\par NDC #:  680453-4118-2\par Lot #:  [id number]\par Expiration:  9/05/2023\par \par Plan: \par I have recommended ice and elevation. The patient will be reassessed in one week for the next Euflexxa injection for the osteoarthritis of the left and right knee. \par

## 2023-12-26 NOTE — ADDENDUM NOTE
Addended by: Jud Quiroga on: 4/25/2018 12:22 PM     Modules accepted: Aixa
Normal vision: sees adequately in most situations; can see medication labels, newsprint

## 2024-02-20 NOTE — TELEPHONE ENCOUNTER
Prior Auth initiated for Essence Pool The patient's goals for the shift include      The clinical goals for the shift include reduce pain      Problem: Pain  Goal: My pain/discomfort is manageable  Outcome: Progressing     Problem: Safety  Goal: Patient will be injury free during hospitalization  Outcome: Progressing  Goal: I will remain free of falls  Outcome: Progressing     Problem: Daily Care  Goal: Daily care needs are met  Outcome: Progressing     Problem: Psychosocial Needs  Goal: Demonstrates ability to cope with hospitalization/illness  Outcome: Progressing  Goal: Collaborate with me, my family, and caregiver to identify my specific goals  Outcome: Progressing  Flowsheets (Taken 2/20/2024 0138)  Cultural Requests During Hospitalization: NA  Spiritual Requests During Hospitalization: NA     Problem: Discharge Barriers  Goal: My discharge needs are met  Outcome: Progressing     Problem: Pain  Goal: Takes deep breaths with improved pain control throughout the shift  Outcome: Progressing  Goal: Turns in bed with improved pain control throughout the shift  Outcome: Progressing  Goal: Walks with improved pain control throughout the shift  Outcome: Progressing  Goal: Performs ADL's with improved pain control throughout shift  Outcome: Progressing  Goal: Participates in PT with improved pain control throughout the shift  Outcome: Progressing  Goal: Free from opioid side effects throughout the shift  Outcome: Progressing  Goal: Free from acute confusion related to pain meds throughout the shift  Outcome: Progressing

## 2024-10-29 NOTE — PROGRESS NOTES
Patients wife called and states she tried to call Dr. Jeronimo Watts neurologist and they could not see him until July 2025.    See scanned Pacemaker Report in Chart Review. Chargeable visit.

## 2024-11-27 NOTE — PROGRESS NOTES
ALFRED:  1. RUR-26%  2. Thoracic Sx following. 3. HH choice pending- patient reviewing list.      CM provided home health list to patient, she will review and then provide choice later.      [facility]em[facility] unknown

## 2025-02-18 NOTE — DIABETES MGMT
[address] [facility]    CLINICAL NURSE SPECIALIST CONSULT  FOLLOW UP NOTE     Initial Presentation   Ilsa Allen is a [de-identified] y.o. female who presented for a planned AF ablation 6/21/21    HX:   Past Medical History:   Diagnosis Date    Arthritis     Asthma     dr. López Parmar allergist    [facility] [address]     Breast [facility] ([facility] [address] 1980    right - mastectomy    Coagulation disorder ([facility] [address]     on eliquis    Dyslipidemia     labs 2008 - chol 283, HDL 83, ,     HTN (hypertension)     Hyperlipidemia LDL goal < 130     for increased LDL particles, Dr. Shazia Juárez nodule     Dr. Essence Milian Palpitations     resolved        DX: Atrial Fibrillation, Cardiogenic shock s/t pericardial effusion with tamponade     Treatment plan     TX: Pericardiocentesis, pericardial window    Hospital course   Clinical progress has been COMPLICATED by:    9/14/41: transseptal puncture with emergent pericardiocentesis, cardioversion of ventricular tachycardia x2. Recurrent cardiac tamponade s/p pericardial window and drain placement    6/22/21: left thoracotomy with 400mls drained, extubated  Diabetes    Patient does not have a history of diabetes. Family history unknown for diabetes. Admission      Consulted by JOSE Mckeon for advanced diabetes nursing assessment and care, specifically related to   [x] Inpatient management strategy  Subjective   EP lab when she acutely became hemodynamically compromised with significant pericardial effusion with suspected pericardial tamponade. Underwent an emergent pericardiocentesis. Complicated by VT- shocked x2.    430 pm yesterday: PEA arrest, ECHO right before arrest with large effusion with tamponade. ROSC after 2 min.   Back to OR for pericardial window     S/p PRBC transfusion  Left sided pleural effusion: US guided thoracentesis with 400mls drained  Concern for aspiration PNA, starting IV antibiotics (Levaquin)  On low dose norepi    On Hpi Title: Evaluation of Skin Lesions correctional humalo units since bedtime  No A1C  Pre-op   Glucose 165 after initial procedure  Glucose max 271 after pericardial window    Glucose now 123-183  2 units correctional insulin in the last 24h  On regular diet  Objective   Physical exam  General   Neuro  Awake, alert, oriented, conversant and cooperative. Up in chair. Vital Signs   Visit Vitals  BP (!) 84/53   Pulse 92   Temp 98.4 °F (36.9 °C)   Resp 21   Ht 5' 6\" (1.676 m)   Wt 86 kg (189 lb 9.5 oz)   SpO2 98%   Breastfeeding No   BMI 30.60 kg/m²     Skin  Warm and dry. No acanthosis noted along neckline. Heart   Regular rate and rhythm. No murmurs, rubs or gallops  Lungs  Clear to auscultation without rales or rhonchi  Extremities No foot wounds        Laboratory      CBC W/O DIFF    Collection Time: 21  4:42 AM   Result Value Ref Range    WBC 13.3 (H) 3.6 - 11.0 K/uL    RBC 2.90 (L) 3.80 - 5.20 M/uL    HGB 9.2 (L) 11.5 - 16.0 g/dL    HCT 28.5 (L) 35.0 - 47.0 %    MCV 98.3 80.0 - 99.0 FL    MCH 31.7 26.0 - 34.0 PG    MCHC 32.3 30.0 - 36.5 g/dL    RDW 15.8 (H) 11.5 - 14.5 %    PLATELET 92 (L) 115 - 400 K/uL    MPV 12.6 8.9 - 12.9 FL    NRBC 0.0 0  WBC    ABSOLUTE NRBC 0.00 0.00 - 0.01 K/uL         METABOLIC PANEL, COMPREHENSIVE    Collection Time: 21  4:42 AM   Result Value Ref Range    Sodium 143 136 - 145 mmol/L    Potassium 3.8 3.5 - 5.1 mmol/L    Chloride 113 (H) 97 - 108 mmol/L    CO2 24 21 - 32 mmol/L    Anion gap 6 5 - 15 mmol/L    Glucose 145 (H) 65 - 100 mg/dL    BUN 15 6 - 20 MG/DL    Creatinine 0.74 0.55 - 1.02 MG/DL    BUN/Creatinine ratio 20 12 - 20      GFR est AA >60 >60 ml/min/1.73m2    GFR est non-AA >60 >60 ml/min/1.73m2    Calcium 8.3 (L) 8.5 - 10.1 MG/DL    Bilirubin, total 0.7 0.2 - 1.0 MG/DL    ALT (SGPT) 29 12 - 78 U/L    AST (SGOT) 35 15 - 37 U/L    Alk.  phosphatase 45 45 - 117 U/L    Protein, total 5.4 (L) 6.4 - 8.2 g/dL    Albumin 3.3 (L) 3.5 - 5.0 g/dL    Globulin 2.1 2.0 - 4.0 g/dL    A-G Ratio 1.6 1.1 - 2.2         Factors impacting BG management  Factor Dose Comments   Nutrition:  NPO     Drugs:  Steroids     Decadron 4mg x1 in OR      Pain ? Infection Concern for aspiration pna  IV antibiotics started     Other: Cardiogenic shock: Resolving Shocked x2 for VT  Epi boluses  Vasopressors/inotropes      Blood glucose pattern        Assessment and Plan   Nursing Diagnosis Risk for unstable blood glucose pattern   Nursing Intervention Domain 5252 Decision-making Support   Nursing Interventions Examined current inpatient blood glucose control   Explored factors facilitating and impeding inpatient management  Explored corrective strategies with responsible inpatient provider      Evaluation   Ayaan Browne is an [de-identified]year old female, with no history of diabetes, who admitted to the CV for post-operative care following an emergent pericardiocentesis and cardioversion of ventricular tachycardia x2. While in the unit, she experienced a recurrent cardiac tamponade and went back to the OR for a pericardial window and drain placement. Preoperative glucose was 112, A1C not available. Her glucose max was 271 following PEA arrest and decadron 4mg given in OR. Correctional humalog was started at bedtime and sufficient to maintain glucose in ICU goal of 140-180. Fasting  today. There is low suspicion of hyperglycemia this admission. Factors impacting glucose:  Steroid use: Decadrom 4mg x1 in OR 6/21  Cardiogenic shock: vasopressor use, cardioversion, CPR, epinephrine bolus during arrest, impaired insulin delivery  PARI: GFR 46 (baseline 60)  Aspiration PNA: started on IV antibiotics. Recommendations   1. POC glucose ACHS  2. Correctional humalog at normal sensitivity ACHS    IF BG remains stable today, ok to stop scheduled glucose checks and d/c correctional insulin.     Do not obtain an A1C, will not be accurate as she has received a PRBC transfusion     Diabetes Management Team to sign off at this point as patient's blood glucose remains stable. Please re-consult us if patient needs change. Thank you for including us in their care. Billing Code(s)     [x] 28664    Before making these care recommendations, I personally reviewed the hosptialization record, including laboratory and diagnostic data, medications and examined the patient at bedside (circumstances permitting).   Total minutes: 13    JAYDE Hull  Diabetes Clinical Nurse Specialist  [facility]  Access via [employer]

## 2025-04-04 NOTE — PROGRESS NOTES
1.87 HISTORY OF PRESENT ILLNESS  Adelfo Mcfadden is a 66 y.o. female. Patient with h/o HTN, HLD, Ca score of 0 in 2012, PVCs on ECG on 1/16 and echo on 1/16 with EF 55% mild MR. S/p LTKR in 1/16(seen by Dr. Manda Castillo)  Also remote h/o breast ca s/p mastectomy in 1980 but no chemo or xrt     Echo on 4/16:Ejection fraction was estimated to be 55 %. There were no  regional wall motion abnormalities. Wall thickness was at the upper limits  of normal.    Left atrium: The atrium was mildly dilated. Mitral valve: There was mild regurgitation. Aortic valve: Leaflets exhibited sclerosis. holter on 4/16: NSR  frequent pacs couplets triples and occasional non sustained at, frequent pvcs (0.65%)    On 3/18: admitted to [facility] 27 onset A-fib with RVR    TIM and Cardioversion completed with 200 J with conversion to NSR  TIM no PFO, trace AR, mild MR, no TR or NM Mild atherosclerosis in prox descending aortaNo KENNEDY thrombus LA moderate dilation RA normal  Started eliquis, flecainide and metoprolol    Echo on 8/20/18: The ventricle was dilated. Systolic function was mildly  reduced by EF (biplane method of disks). Ejection fraction was estimated  to be 45 % in the range of 40 % to 50 %. There was mild diffuse  hypokinesis. Right ventricle: The size was at the upper limits of normal.    Left atrium: The atrium was moderately dilated. Right atrium: The atrium was mildly to moderately dilated. Mitral valve: There was moderate regurgitation. Aortic valve: The valve was trileaflet. Leaflets exhibited normal  thickness, normal cuspal separation, and sclerosis without stenosis. Tricuspid valve: There was mild to moderate regurgitation. Pulmonic valve: There was mild regurgitation. Pericardium: A trivial pericardial effusion was identified. COMPARISONS:  Comparison was made with the previous study of 22-Jan-2016. LV overall  function has decreased from 55 % to 45- 50 %.        Corrina Hefty   Past Medical History Diagnosis Date    Palpitations            resolved    Dyslipidemia            labs 2008 - chol 283, HDL 83, ,     HTN (hypertension)       Breast cancer (HCC) Brock Gaines allergist    Hyperlipidemia LDL goal < 130            for increased LDL particles, Dr. Mary Alice Borja   Past Surgical History   Procedure Laterality Date    Echo 2d adult    5/5/2008         normal, LVEF 60%    Stress test cardiolite    1/5/12         walked 7:31, no chest pain, normal MPI.  Ct heart w/o cont with calcium    1/2012         CAC score 0; calcified mediastinal lymph nodes consistent granulomatous disease    Hx urological    8/1/14         Urodynamics    Hx gyn          Hx hysterectomy Bilateral           HPI  Very tired and fatigued  No cp but some sob reported  Review of Systems   Constitutional: Positive for malaise/fatigue. Respiratory: Positive for shortness of breath. Cardiovascular: Positive for palpitations. Physical Exam   Neck: No JVD present. Carotid bruit is not present. Cardiovascular: An irregularly irregular rhythm present. Tachycardia present. Pulmonary/Chest: Effort normal and breath sounds normal.   Abdominal: Soft. Musculoskeletal: She exhibits no edema. Psychiatric: She has a normal mood and affect. Current Outpatient Medications on File Prior to Visit   Medication Sig Dispense Refill    metoprolol succinate (TOPROL XL) 50 mg XL tablet Take  by mouth daily.  ELIQUIS 5 mg tablet TAKE 1 TABLET TWICE A  Tab 1    fluticasone (FLONASE) 50 mcg/actuation nasal spray 2 Sprays by Both Nostrils route nightly.  atorvastatin (LIPITOR) 20 mg tablet Take 20 mg by mouth every evening.  MULTIVITAMIN PO Take  by mouth. Takes one po once daily.  cholecalciferol (VITAMIN D3) 5,000 unit capsule Take 5,000 Units by mouth daily.       cetirizine (ZYRTEC) 10 mg tablet Take 10 mg by mouth every evening.  fluticasone-salmeterol (ADVAIR DISKUS) 250-50 mcg/dose diskus inhaler Take 1 Puff by inhalation two (2) times daily as needed.  estradiol (ESTRACE) 0.01 % (0.1 mg/gram) vaginal cream Insert  into vagina every Monday and Thursday.  EPINEPHrine (EPIPEN) 0.3 mg/0.3 mL (1:1,000) injection 0.3 mL by IntraMUSCular route once as needed for up to 1 dose. 0.3 mL 0    albuterol (PROVENTIL HFA, VENTOLIN HFA, PROAIR HFA) 90 mcg/actuation inhaler Take 1 Puff by inhalation every four (4) hours as needed. 2 Inhaler 3    raloxifene (EVISTA) 60 mg tablet Take 1 Tab by mouth daily. 90 Tab 4    montelukast (SINGULAIR) 10 mg tablet Take 10 mg by mouth every evening. No current facility-administered medications on file prior to visit.         ASSESSMENT and PLAN  AF: s/p brandy cardioversion on 3/18 she is now back in AF with RVR  continue eliquis and toprol    Patient with echo of 8/18 revealing reduction in EF  Flecainide stopped at that time    Echo on 11/18 with EF of 60%    She is symptomatic from af with fatigue and sob  Discussed options at length   Proceed with BRANDY/Cardioversion   start amiodarone 400 mg bid ( side effects explained)  Discussed risks and benefits of brandy cardioversion  Risks including but not limited to cva death ppm tpm esophageal perforation, bleeding sore throat, respiratory depression requiring intubation, vocal chords damage  She understood and wishes to proceed      Continue eliquis , no longer c/o bruising after stopping asa         BP is low normal  Her cholesterol is closely followed by her pcp     See her back at the time of procedure

## (undated) DEVICE — SYRINGE MED 20ML STD CLR PLAS LUERLOCK TIP N CTRL DISP

## (undated) DEVICE — CATHETER EP ADOL AD 9FR L90CM TEMP SGL HND SELF LOK 4 W TIP

## (undated) DEVICE — INTRODUCER SHTH 10FR L12CM DIA0.038IN CLOSE TOL EXTRUSION

## (undated) DEVICE — 1200 GUARD II KIT W/5MM TUBE W/O VAC TUBE: Brand: GUARDIAN

## (undated) DEVICE — NEEDLE ANGIO 18GAX7CM SECURELOC

## (undated) DEVICE — Z DISCONTINUED USE 2751540 TUBING IRRIG L10IN DISP PMP ENDOGATOR

## (undated) DEVICE — INTRO SHTH HEMO 9FR 18G 13CM -- PRELUDE SNAP PEEL-AWAY

## (undated) DEVICE — Device: Brand: RFP-100A CONNECTOR CABLE

## (undated) DEVICE — PAD,ABDOMINAL,5"X9",ST,LF,25/BX: Brand: MEDLINE INDUSTRIES, INC.

## (undated) DEVICE — STERILE POLYISOPRENE POWDER-FREE SURGICAL GLOVES WITH EMOLLIENT COATING: Brand: PROTEXIS

## (undated) DEVICE — ANGIOGRAPHIC CATHETER: Brand: IMPULSE™

## (undated) DEVICE — VASCADE MVP

## (undated) DEVICE — WRAP SURG W1.31XL1.34M CARD FOR PT 165-172CM THERMOWRP

## (undated) DEVICE — DRAPE FLD WRM W44XL66IN C6L FOR INTRATEMP SYS THERMABASIN

## (undated) DEVICE — INTRODUCER SHTH 8FR L12CM DBL DST GWIRE L50CM 0.038IN

## (undated) DEVICE — CONNECTOR TBNG AUX H2O JET DISP FOR OLY 160/180 SER

## (undated) DEVICE — DRAIN,WOUND,ROUND,24FR,5/16",FULL-FLUTED: Brand: MEDLINE

## (undated) DEVICE — DRESSING ANTIMIC FOAM OPTIFOAM POSTOP ADH 4 X 6 IN

## (undated) DEVICE — 3M™ IOBAN™ 2 ANTIMICROBIAL INCISE DRAPE 6650EZ: Brand: IOBAN™ 2

## (undated) DEVICE — PEELABLE OUTER GUIDE CATHETER
Type: IMPLANTABLE DEVICE | Status: NON-FUNCTIONAL
Brand: CPS DIRECT™
Removed: 2021-07-23

## (undated) DEVICE — TORFLEX TRANSSEPTAL SHEATH; TRANSSEPTAL DILATOR; J-TIP GUIDEWIRE: Brand: TORFLEX TRANSSEPTAL GUIDING SHEATH

## (undated) DEVICE — REM POLYHESIVE ADULT PATIENT RETURN ELECTRODE: Brand: VALLEYLAB

## (undated) DEVICE — LIMB HOLDER, WRIST/ANKLE: Brand: DEROYAL

## (undated) DEVICE — CABLE EXT EP H/O/D BLK 150CM --

## (undated) DEVICE — Device: Brand: PADPRO

## (undated) DEVICE — KIT IV STRT W CHLORAPREP PD 1ML

## (undated) DEVICE — INTENDED TO STANDARDIZE OR CAMERAS TO ALLOW FOR THE USE OF THE OR CAMERA COVER: Brand: ASPEN® O.R. CAMERA COVER

## (undated) DEVICE — Device

## (undated) DEVICE — TUBING, SUCTION, 1/4" X 12', STRAIGHT: Brand: MEDLINE

## (undated) DEVICE — KIT PERICARDCENT DIA8.3FR STRAIGHT CATH + CLP LIDO TY FLD ASPIR

## (undated) DEVICE — INTRODUCER SHTH 6FR L12CM DIA0.038IN HEMSTAS CLOSE TOL

## (undated) DEVICE — SUTURE PERMA-HAND 0 L18IN NONABSORBABLE BLK CT-1 L36MM 1/2 C021D

## (undated) DEVICE — ELECTROSURGICAL DEVICE HOLSTER;FOR USE WITH MAXIMUM PEAK VOLTAGE OF 4000 V: Brand: FORCE TRIVERSE

## (undated) DEVICE — CABLE ABLAT CATH L250CM MOD 1641 IBI-1500T SER GENRTR FOR

## (undated) DEVICE — INTRO PEELWY HEMVLV 7F 13CM -- SHRT PRELUDE SNAP

## (undated) DEVICE — CATHETER EP 7FR L99CM 1-4-1MM SPC 20 POLE MAP DUO DECAPOLAR

## (undated) DEVICE — KIT ELECTRD SURF FOR DISPLAYING THE 3D POS OF EP CATH

## (undated) DEVICE — CABLE FOR DIAGNOSTIC CATHETER: Brand: CABLE, SURELINK™

## (undated) DEVICE — SPONGE GZ W4XL4IN COT 12 PLY TYP VII WVN C FLD DSGN

## (undated) DEVICE — CABLE DIAG FOR ADVISOR HD GRID MAP CATH SENS ENABLED

## (undated) DEVICE — TOWEL,OR,DSP,ST,BLUE,STD,2/PK,40PK/CS: Brand: MEDLINE

## (undated) DEVICE — AGENT HEMSTAT 3GM PURIFIED PLNT STARCH PWD ABSRB ARISTA AH

## (undated) DEVICE — Device: Brand: NRG TRANSSEPTAL NEEDLE

## (undated) DEVICE — CANN NASAL O2 CAPNOGRAPHY AD -- FILTERLINE

## (undated) DEVICE — 40418 TRENDELENBURG ONE-STEP ARM PROTECTORS LARGE (1 PAIR): Brand: 40418 TRENDELENBURG ONE-STEP ARM PROTECTORS LARGE (1 PAIR)

## (undated) DEVICE — SYSTEM INTRO 10.5FR L13CM STD WHT CAP HEMSTAT SPLITTABLE

## (undated) DEVICE — RADIFOCUS GLIDEWIRE: Brand: GLIDEWIRE

## (undated) DEVICE — PRESSURE MONITORING SET: Brand: TRUWAVE

## (undated) DEVICE — PAD GROUNDING ADLT ADH FOIL 9FT CORD UNIV

## (undated) DEVICE — CUFF BLD PRSS AD SM SZ 10 FOR 20-26CM LIMB VLY SFT W/O TUBE

## (undated) DEVICE — NON-REM POLYHESIVE PATIENT RETURN ELECTRODE: Brand: VALLEYLAB

## (undated) DEVICE — NEEDLE HYPO 18GA L1.5IN PNK S STL HUB POLYPR SHLD REG BVL

## (undated) DEVICE — Z DISCONTINUED NO SUB IDED SET EXTN W/ 4 W STPCOCK M SPIN LOK 36IN

## (undated) DEVICE — SUTURE DEV SZ 3-0 V-LOC 90 L12IN TO L18IN CV-23 VLT VLOCM0844

## (undated) DEVICE — CATHETER EP 6FR L92CM 2-5-2MM SPC TIP 1MM 4 ELECTRD D CRV

## (undated) DEVICE — INTRO SHEATH TRANSSEPTAL 8.5FRX71CM

## (undated) DEVICE — DRESSING SIL W4XL5IN ANTIBACT GELLING FBR CYTOFORM

## (undated) DEVICE — CATHETER ABLAT 8FR L115CM TIP ELECTRD L4MM F-J CRV 1-4-1

## (undated) DEVICE — KENDALL RADIOLUCENT FOAM MONITORING ELECTRODE -RECTANGULAR SHAPE: Brand: KENDALL

## (undated) DEVICE — PACK PROCEDURE SURG HRT CATH

## (undated) DEVICE — PINNACLE INTRODUCER SHEATH: Brand: PINNACLE

## (undated) DEVICE — SOL IRR STRL H2O 1000ML BTL --

## (undated) DEVICE — 3M™ TEGADERM™ TRANSPARENT FILM DRESSING FRAME STYLE, 1626W, 4 IN X 4-3/4 IN (10 CM X 12 CM), 50/CT 4CT/CASE: Brand: 3M™ TEGADERM™

## (undated) DEVICE — MICROPUNCTURE INTRODUCER SET SILHOUETTE TRANSITIONLESS WITH STAINLESS STEEL WIRE GUIDE: Brand: MICROPUNCTURE

## (undated) DEVICE — SOL INJ SOD CL 0.9% 500ML BG --

## (undated) DEVICE — TEMPERATURE ABLATION CATHETER: Brand: BLAZER® II HTD

## (undated) DEVICE — SYR 3ML LL TIP 1/10ML GRAD --

## (undated) DEVICE — SUTURE MCRYL SZ 3-0 L27IN ABSRB UD L24MM PS-1 3/8 CIR PRIM Y936H

## (undated) DEVICE — DRSG BORDR MPLX HEEL 8.7X9.1IN --

## (undated) DEVICE — CATHETER REPROC ELECTROPHYSIOLOGY 2-5-2 MM BAR201101R

## (undated) DEVICE — GOWN,SIRUS,NONRNF,SETINSLV,XL,20/CS: Brand: MEDLINE

## (undated) DEVICE — 1000ML,PRESSURE INFUSER W/STOPCOCK: Brand: MEDLINE

## (undated) DEVICE — GLOVE SURG SZ 7.5 L11.2IN THK9.8MIL STRW LTX POLYMER BEAD

## (undated) DEVICE — CABLE RMFG COOL PATH --

## (undated) DEVICE — SUTURE VCRL SZ 0 L18IN ABSRB VLT L40MM CT 1/2 CIR J752D

## (undated) DEVICE — THE VASCADE MVP VENOUS VASCULAR CLOSURE SYSTEM (VVCS) IS INTENDED TO SEAL FEMORAL VEINS WITH SINGLE OR MULTIPLE ACCESS SITES IN ONE OR BOTH LIMBS AT THE COMPLETION OF CATHETERIZATION PROCEDURES. THE SYSTEM IS DESIGNED TO DELIVER A RESORBABLE COLLAGEN PATCH, EXTRA-VASCULARLY, AT VESSEL PUNCTURE SITES TO ACHIEVE HEMOSTASIS. FOR USE WITH 6FR TO 12FR (15F MAXIMUM OUTER DIAMETER) INTRODUCER SHEATHS; OVERALL LENGTH OF THE SHEATH (INCLUDING THE HUB) NEEDS TO BE LESS THAN 15CM.: Brand: CARDIVA VASCADE MVP VVCS 6-12F

## (undated) DEVICE — PREP SKN CHLRAPRP APL 26ML STR --

## (undated) DEVICE — INTRODUCER CATH 8.5FR L71CM 8.5FR DIL L180CM DIA0.032IN W/

## (undated) DEVICE — GUIDE COR SNUS L135CM DIA0.035IN SHTH L50CM DIA9FR BRAID

## (undated) DEVICE — CATH RMFG EP DCAPLR 6FR 125CM --

## (undated) DEVICE — DERMABOND SKIN ADH 0.7ML -- DERMABOND ADVANCED 12/BX

## (undated) DEVICE — SENSOR TEMP SKIN DISP

## (undated) DEVICE — BAG BELONG PT PERS CLEAR HANDL

## (undated) DEVICE — PAD GEN USE BORDERED ADH 14IN 2IN AND 12IN 4IN GZ UNIV ST

## (undated) DEVICE — SURGICAL PROCEDURE PACK BASIN MAJ SET CUST NO CAUT

## (undated) DEVICE — INTRODUCER SHTH 8FR L63CM 8FR DIL GWIRE L145CM DIA0.038IN

## (undated) DEVICE — INTRODUCER SHTH 5FR L12CM SNAP LOK OBT 11FR DBL DST J STR

## (undated) DEVICE — SOLIDIFIER FLUID 3000 CC ABSORB

## (undated) DEVICE — SET TBNG L260CM IRRIG RF THER COOL PNT

## (undated) DEVICE — BAG TRASH WST 122 CM 183 CM 1400 CC FEMALE LUER PVC DEPOT

## (undated) DEVICE — PACEMAKER PACK: Brand: MEDLINE INDUSTRIES, INC.

## (undated) DEVICE — 450 ML BOTTLE OF 0.05% CHLORHEXIDINE GLUCONATE IN 99.95% STERILE WATER FOR IRRIGATION, USP AND APPLICATOR.: Brand: IRRISEPT ANTIMICROBIAL WOUND LAVAGE

## (undated) DEVICE — COVER,TABLE,HEAVY DUTY,60"X90",STRL: Brand: MEDLINE

## (undated) DEVICE — AMPLATZ EXTRA STIFF WIRE GUIDE: Brand: AMPLATZ

## (undated) DEVICE — CATH IV AUTOGRD BC BLU 22GA 25 -- INSYTE

## (undated) DEVICE — SUTURE V-LOC 180 SZ 2-0 L9IN ABSRB VLT GS-21 L37MM 1/2 CIR VLOCM0345

## (undated) DEVICE — CATHETER DIAG 6FR L110CM INTRO 6FR BLLN DIA9MM 1CC PULM ART

## (undated) DEVICE — Device: Brand: PROTRACK PIGTAIL WIRE

## (undated) DEVICE — BAG RED 3PLY 2MIL 30X40 IN

## (undated) DEVICE — COVER LT HNDL PLAS RIG 1 PER PK

## (undated) DEVICE — GDWIRE WHISPER HITORQ EDS CSJ -- ACUITY SOLD BY BX ONLY 4648

## (undated) DEVICE — BW-412T DISP COMBO CLEANING BRUSH: Brand: SINGLE USE COMBINATION CLEANING BRUSH

## (undated) DEVICE — ENDO CARRY-ON PROCEDURE KIT INCLUDES ENZYMATIC SPONGE, GAUZE, BIOHAZARD LABEL, TRAY, LUBRICANT, DIRTY SCOPE LABEL, WATER LABEL, TRAY, DRAWSTRING PAD, AND DEFENDO 4-PIECE KIT.: Brand: ENDO CARRY-ON PROCEDURE KIT

## (undated) DEVICE — CATHETER DIAG 5FR L75CM ID0.046IN HK CRV VEIN SEL UNIQUE

## (undated) DEVICE — SOL IRR SOD CL 0.9% 1000ML BTL --

## (undated) DEVICE — CONNECTOR DRNGE W3/8-0.5XH3/16XL3/16IN 2:1 SIL CARD STR

## (undated) DEVICE — TIDISHIELD TRANSPORT, CONTAINMENT COVER FOR BACK TABLE 4'6" (1.37M) TO 8' (2.43M) IN LENGTH: Brand: TIDISHIELD

## (undated) DEVICE — SET ADMIN 16ML TBNG L100IN 2 Y INJ SITE IV PIGGY BK DISP

## (undated) DEVICE — WRISTBAND ID AD W1XL11.5IN RED POLY ALRG PREPRINTED PERM

## (undated) DEVICE — DRAPE PRB US TRNSDCR 6X96IN --

## (undated) DEVICE — AIRLIFE™ U/CONNECT-IT OXYGEN TUBING 7 FEET (2.1 M) CRUSH-RESISTANT OXYGEN TUBING, VINYL TIPPED: Brand: AIRLIFE™

## (undated) DEVICE — CABLE RMFG EP EXT DECAPLAR 5FT --

## (undated) DEVICE — NEONATAL-ADULT SPO2 SENSOR: Brand: NELLCOR

## (undated) DEVICE — QUILTED PREMIUM COMFORT UNDERPAD,EXTRA HEAVY: Brand: WINGS

## (undated) DEVICE — SUTURE ETHBND EXCEL SZ 2 L30IN NONABSORBABLE GRN L40MM V-37 MX69G